# Patient Record
Sex: FEMALE | Race: BLACK OR AFRICAN AMERICAN | NOT HISPANIC OR LATINO | Employment: OTHER | URBAN - METROPOLITAN AREA
[De-identification: names, ages, dates, MRNs, and addresses within clinical notes are randomized per-mention and may not be internally consistent; named-entity substitution may affect disease eponyms.]

---

## 2017-09-22 ENCOUNTER — HISTORICAL (OUTPATIENT)
Dept: ADMINISTRATIVE | Facility: HOSPITAL | Age: 82
End: 2017-09-22

## 2017-10-17 ENCOUNTER — HISTORICAL (OUTPATIENT)
Dept: RADIOLOGY | Facility: HOSPITAL | Age: 82
End: 2017-10-17

## 2017-10-27 ENCOUNTER — HISTORICAL (OUTPATIENT)
Dept: ADMINISTRATIVE | Facility: HOSPITAL | Age: 82
End: 2017-10-27

## 2017-12-29 ENCOUNTER — HISTORICAL (OUTPATIENT)
Dept: RADIOLOGY | Facility: HOSPITAL | Age: 82
End: 2017-12-29

## 2018-01-10 ENCOUNTER — HISTORICAL (OUTPATIENT)
Dept: RADIOLOGY | Facility: HOSPITAL | Age: 83
End: 2018-01-10

## 2018-01-23 ENCOUNTER — HISTORICAL (OUTPATIENT)
Dept: RADIOLOGY | Facility: HOSPITAL | Age: 83
End: 2018-01-23

## 2018-02-14 ENCOUNTER — HISTORICAL (OUTPATIENT)
Dept: RADIOLOGY | Facility: HOSPITAL | Age: 83
End: 2018-02-14

## 2018-06-07 ENCOUNTER — HISTORICAL (OUTPATIENT)
Dept: RADIOLOGY | Facility: HOSPITAL | Age: 83
End: 2018-06-07

## 2018-06-28 ENCOUNTER — HISTORICAL (OUTPATIENT)
Dept: RADIOLOGY | Facility: HOSPITAL | Age: 83
End: 2018-06-28

## 2020-01-08 ENCOUNTER — HISTORICAL (OUTPATIENT)
Dept: RADIOLOGY | Facility: HOSPITAL | Age: 85
End: 2020-01-08

## 2020-02-05 ENCOUNTER — HISTORICAL (OUTPATIENT)
Dept: ADMINISTRATIVE | Facility: HOSPITAL | Age: 85
End: 2020-02-05

## 2020-02-05 LAB
ABS NEUT (OLG): 3.72 X10(3)/MCL (ref 2.1–9.2)
BASOPHILS # BLD AUTO: 0 X10(3)/MCL (ref 0–0.2)
BASOPHILS NFR BLD AUTO: 0 %
CRP SERPL HS-MCNC: <0.16 MG/L (ref 0–3)
EOSINOPHIL # BLD AUTO: 0.1 X10(3)/MCL (ref 0–0.9)
EOSINOPHIL NFR BLD AUTO: 1 %
ERYTHROCYTE [DISTWIDTH] IN BLOOD BY AUTOMATED COUNT: 13 % (ref 11.5–17)
ERYTHROCYTE [SEDIMENTATION RATE] IN BLOOD: 4 MM/HR (ref 0–20)
HCT VFR BLD AUTO: 42 % (ref 37–47)
HGB BLD-MCNC: 12.9 GM/DL (ref 12–16)
LYMPHOCYTES # BLD AUTO: 1.9 X10(3)/MCL (ref 0.6–4.6)
LYMPHOCYTES NFR BLD AUTO: 31 %
MCH RBC QN AUTO: 27.1 PG (ref 27–31)
MCHC RBC AUTO-ENTMCNC: 30.7 GM/DL (ref 33–36)
MCV RBC AUTO: 88.2 FL (ref 80–94)
MONOCYTES # BLD AUTO: 0.5 X10(3)/MCL (ref 0.1–1.3)
MONOCYTES NFR BLD AUTO: 8 %
NEUTROPHILS # BLD AUTO: 3.72 X10(3)/MCL (ref 2.1–9.2)
NEUTROPHILS NFR BLD AUTO: 59 %
PLATELET # BLD AUTO: 190 X10(3)/MCL (ref 130–400)
PMV BLD AUTO: 10.9 FL (ref 9.4–12.4)
RBC # BLD AUTO: 4.76 X10(6)/MCL (ref 4.2–5.4)
WBC # SPEC AUTO: 6.3 X10(3)/MCL (ref 4.5–11.5)

## 2020-03-04 ENCOUNTER — HISTORICAL (OUTPATIENT)
Dept: PREADMISSION TESTING | Facility: HOSPITAL | Age: 85
End: 2020-03-04

## 2020-03-04 ENCOUNTER — HISTORICAL (OUTPATIENT)
Dept: ADMINISTRATIVE | Facility: HOSPITAL | Age: 85
End: 2020-03-04

## 2020-03-04 LAB
ABS NEUT (OLG): 4.11 X10(3)/MCL (ref 2.1–9.2)
BASOPHILS # BLD AUTO: 0 X10(3)/MCL (ref 0–0.2)
BASOPHILS NFR BLD AUTO: 0 %
BUN SERPL-MCNC: 13 MG/DL (ref 7–18)
CALCIUM SERPL-MCNC: 9.7 MG/DL (ref 8.5–10.1)
CHLORIDE SERPL-SCNC: 90 MMOL/L (ref 98–107)
CO2 SERPL-SCNC: 31 MMOL/L (ref 21–32)
CREAT SERPL-MCNC: 0.91 MG/DL (ref 0.55–1.02)
CREAT/UREA NIT SERPL: 14.3
EOSINOPHIL # BLD AUTO: 0.1 X10(3)/MCL (ref 0–0.9)
EOSINOPHIL NFR BLD AUTO: 1 %
ERYTHROCYTE [DISTWIDTH] IN BLOOD BY AUTOMATED COUNT: 12.7 % (ref 11.5–17)
GLUCOSE SERPL-MCNC: 115 MG/DL (ref 74–106)
HCT VFR BLD AUTO: 41.4 % (ref 37–47)
HGB BLD-MCNC: 13.1 GM/DL (ref 12–16)
INR PPP: 1 (ref 0–1.3)
LYMPHOCYTES # BLD AUTO: 2.4 X10(3)/MCL (ref 0.6–4.6)
LYMPHOCYTES NFR BLD AUTO: 33 %
MCH RBC QN AUTO: 27.7 PG (ref 27–31)
MCHC RBC AUTO-ENTMCNC: 31.6 GM/DL (ref 33–36)
MCV RBC AUTO: 87.5 FL (ref 80–94)
MONOCYTES # BLD AUTO: 0.6 X10(3)/MCL (ref 0.1–1.3)
MONOCYTES NFR BLD AUTO: 9 %
NEUTROPHILS # BLD AUTO: 4.11 X10(3)/MCL (ref 2.1–9.2)
NEUTROPHILS NFR BLD AUTO: 56 %
PLATELET # BLD AUTO: 222 X10(3)/MCL (ref 130–400)
PMV BLD AUTO: 10.7 FL (ref 9.4–12.4)
POTASSIUM SERPL-SCNC: 4.4 MMOL/L (ref 3.5–5.1)
PROTHROMBIN TIME: 12.7 SECOND(S) (ref 11.1–13.7)
RBC # BLD AUTO: 4.73 X10(6)/MCL (ref 4.2–5.4)
SODIUM SERPL-SCNC: 129 MMOL/L (ref 136–145)
WBC # SPEC AUTO: 7.3 X10(3)/MCL (ref 4.5–11.5)

## 2020-03-12 ENCOUNTER — HISTORICAL (OUTPATIENT)
Dept: INTENSIVE CARE | Facility: HOSPITAL | Age: 85
End: 2020-03-12

## 2020-04-01 ENCOUNTER — HISTORICAL (OUTPATIENT)
Dept: RADIOLOGY | Facility: HOSPITAL | Age: 85
End: 2020-04-01

## 2020-07-10 ENCOUNTER — HISTORICAL (OUTPATIENT)
Dept: RADIOLOGY | Facility: HOSPITAL | Age: 85
End: 2020-07-10

## 2020-07-31 ENCOUNTER — HISTORICAL (OUTPATIENT)
Dept: LAB | Facility: HOSPITAL | Age: 85
End: 2020-07-31

## 2020-07-31 LAB
BUN SERPL-MCNC: 16 MG/DL (ref 9.8–20.1)
CALCIUM SERPL-MCNC: 9.6 MG/DL (ref 8.4–10.2)
CHLORIDE SERPL-SCNC: 101 MMOL/L (ref 98–107)
CO2 SERPL-SCNC: 29 MMOL/L (ref 23–31)
CREAT SERPL-MCNC: 0.96 MG/DL (ref 0.55–1.02)
CREAT/UREA NIT SERPL: 17
GLUCOSE SERPL-MCNC: 119 MG/DL (ref 82–115)
POTASSIUM SERPL-SCNC: 3.7 MMOL/L (ref 3.5–5.1)
SODIUM SERPL-SCNC: 140 MMOL/L (ref 136–145)

## 2020-08-04 ENCOUNTER — HISTORICAL (OUTPATIENT)
Dept: RESPIRATORY THERAPY | Facility: HOSPITAL | Age: 85
End: 2020-08-04

## 2020-08-21 ENCOUNTER — HISTORICAL (OUTPATIENT)
Dept: RADIOLOGY | Facility: HOSPITAL | Age: 85
End: 2020-08-21

## 2020-09-01 ENCOUNTER — HISTORICAL (OUTPATIENT)
Dept: CARDIOLOGY | Facility: HOSPITAL | Age: 85
End: 2020-09-01

## 2020-12-17 ENCOUNTER — HISTORICAL (OUTPATIENT)
Dept: RADIOLOGY | Facility: HOSPITAL | Age: 85
End: 2020-12-17

## 2020-12-17 LAB — POC CREATININE: 1.1 MG/DL (ref 0.6–1.3)

## 2022-04-11 ENCOUNTER — HISTORICAL (OUTPATIENT)
Dept: ADMINISTRATIVE | Facility: HOSPITAL | Age: 87
End: 2022-04-11
Payer: MEDICARE

## 2022-04-26 VITALS
WEIGHT: 187.38 LBS | HEIGHT: 65 IN | OXYGEN SATURATION: 98 % | BODY MASS INDEX: 31.22 KG/M2 | DIASTOLIC BLOOD PRESSURE: 76 MMHG | SYSTOLIC BLOOD PRESSURE: 122 MMHG

## 2022-05-11 ENCOUNTER — TELEPHONE (OUTPATIENT)
Dept: NEUROLOGY | Facility: CLINIC | Age: 87
End: 2022-05-11
Payer: MEDICARE

## 2022-05-11 NOTE — TELEPHONE ENCOUNTER
----- Message from Jes Smart MA sent at 2022 10:08 AM CDT -----  Regarding: Worsening Conditions  Issue: Reports pt has been falling due to trying to get out of bed, thinks she is not home while at home, and thinks her parents just . Reports pt recently fell 3 weeks ago and was taken to ED. States pt complained of hip pain afterwards. Reports pt feel again trying to get out of bed 2 weeks ago. Questions if pt has Alzheimer's or dementia. Is asking if a CT or MRI of the brain can be done. Reports pt is on new medication Benztropine 1 mg, Valium 5 mg, and Lexapram 25 mg. States pt stopped Seroquel and started back 2 weeks ago and it was decreased from 100 mg to 25 mg.       Is asking if pt's next appt can be schedule and she can be contacted with day and time.     Call back number: [phone] ( work) [phone] (Cell)

## 2022-05-18 DIAGNOSIS — F03.90 DEMENTIA WITHOUT BEHAVIORAL DISTURBANCE, UNSPECIFIED DEMENTIA TYPE: Primary | ICD-10-CM

## 2022-06-01 ENCOUNTER — HOSPITAL ENCOUNTER (OUTPATIENT)
Dept: RADIOLOGY | Facility: CLINIC | Age: 87
Discharge: HOME | End: 2022-06-01
Attending: ORTHOPAEDIC SURGERY
Payer: MEDICARE

## 2022-06-01 ENCOUNTER — OFFICE VISIT (OUTPATIENT)
Dept: ORTHOPEDICS | Facility: CLINIC | Age: 87
End: 2022-06-01
Payer: MEDICARE

## 2022-06-01 VITALS — BODY MASS INDEX: 27.32 KG/M2 | HEIGHT: 66 IN | WEIGHT: 170 LBS

## 2022-06-01 DIAGNOSIS — M17.0 BILATERAL PRIMARY OSTEOARTHRITIS OF KNEE: Primary | ICD-10-CM

## 2022-06-01 DIAGNOSIS — M25.561 ACUTE PAIN OF BOTH KNEES: ICD-10-CM

## 2022-06-01 DIAGNOSIS — M25.562 ACUTE PAIN OF BOTH KNEES: ICD-10-CM

## 2022-06-01 PROCEDURE — 99203 PR OFFICE/OUTPT VISIT, NEW, LEVL III, 30-44 MIN: ICD-10-PCS | Mod: 25,,, | Performed by: ORTHOPAEDIC SURGERY

## 2022-06-01 PROCEDURE — 73562 X-RAY EXAM OF KNEE 3: CPT | Mod: 50,,, | Performed by: ORTHOPAEDIC SURGERY

## 2022-06-01 PROCEDURE — 20610 LARGE JOINT ASPIRATION/INJECTION: BILATERAL KNEE: ICD-10-PCS | Mod: 50,,, | Performed by: NURSE PRACTITIONER

## 2022-06-01 PROCEDURE — 73562 XR KNEE 3 VIEW BILATERAL: ICD-10-PCS | Mod: 50,,, | Performed by: ORTHOPAEDIC SURGERY

## 2022-06-01 PROCEDURE — 20610 DRAIN/INJ JOINT/BURSA W/O US: CPT | Mod: 50,,, | Performed by: NURSE PRACTITIONER

## 2022-06-01 PROCEDURE — 99203 OFFICE O/P NEW LOW 30 MIN: CPT | Mod: 25,,, | Performed by: ORTHOPAEDIC SURGERY

## 2022-06-01 RX ORDER — ESCITALOPRAM OXALATE 20 MG/1
20 TABLET ORAL DAILY
COMMUNITY
Start: 2022-05-04 | End: 2022-10-05

## 2022-06-01 RX ORDER — CARVEDILOL 25 MG/1
6.25 TABLET ORAL 2 TIMES DAILY
COMMUNITY
Start: 2022-05-04

## 2022-06-01 RX ORDER — CLOPIDOGREL BISULFATE 75 MG/1
75 TABLET ORAL DAILY
Status: ON HOLD | COMMUNITY
Start: 2022-05-30 | End: 2022-12-30 | Stop reason: HOSPADM

## 2022-06-01 RX ORDER — DIAZEPAM 5 MG/1
5 TABLET ORAL DAILY PRN
Status: ON HOLD | COMMUNITY
Start: 2022-05-17 | End: 2022-12-14

## 2022-06-01 RX ORDER — RANOLAZINE 1000 MG/1
1000 TABLET, EXTENDED RELEASE ORAL 2 TIMES DAILY
Status: ON HOLD | COMMUNITY
Start: 2022-03-29 | End: 2022-12-30 | Stop reason: HOSPADM

## 2022-06-01 RX ORDER — ATORVASTATIN CALCIUM 40 MG/1
40 TABLET, FILM COATED ORAL DAILY
COMMUNITY
Start: 2022-05-04

## 2022-06-01 RX ORDER — CYPROHEPTADINE HYDROCHLORIDE 4 MG/1
4 TABLET ORAL NIGHTLY
COMMUNITY
Start: 2022-05-10 | End: 2022-10-05

## 2022-06-01 RX ORDER — ASPIRIN 81 MG/1
81 TABLET ORAL
Status: ON HOLD | COMMUNITY
End: 2023-02-12 | Stop reason: HOSPADM

## 2022-06-01 RX ORDER — QUETIAPINE FUMARATE 25 MG/1
25 TABLET, FILM COATED ORAL 2 TIMES DAILY
COMMUNITY
Start: 2022-05-06

## 2022-06-01 RX ORDER — IBUPROFEN 100 MG/5ML
1000 SUSPENSION, ORAL (FINAL DOSE FORM) ORAL DAILY
COMMUNITY

## 2022-06-01 RX ADMIN — BETAMETHASONE SODIUM PHOSPHATE AND BETAMETHASONE ACETATE 6 MG: 3; 3 INJECTION, SUSPENSION INTRA-ARTICULAR; INTRALESIONAL; INTRAMUSCULAR; SOFT TISSUE at 01:06

## 2022-06-01 RX ADMIN — LIDOCAINE HYDROCHLORIDE 5 ML: 20 INJECTION, SOLUTION EPIDURAL; INFILTRATION; INTRACAUDAL; PERINEURAL at 01:06

## 2022-06-01 NOTE — PROGRESS NOTES
Chief Complaint:   Chief Complaint   Patient presents with    Right Knee - Pain    Left Knee - Pain    Pain     FRAN knee pain patient states its been going on for a few years has had cortisone injections     History of present illness:  She is a pleasant 88-year-old who presents with bilateral knee pain, left worse than right, due to a known history of osteoarthritis.  Her pain is global in both knees.  It is worse with any activity, and she is now confined to a wheelchair.  It is somewhat better with rest.  She has tried steroid and Synvisc injections in the past, but she finds that they do not help anymore.  She has tried formal therapy but discontinued due to pain.     Past Medical History:   Diagnosis Date    Arthritis     Carpal tunnel syndrome     Hypertension     Respiratory distress        Past Surgical History:   Procedure Laterality Date    CARDIAC SURGERY      CATARACT EXTRACTION      INSERTION OF PACEMAKER      PARTIAL HYSTERECTOMY         Current Outpatient Medications   Medication Sig    ascorbic acid, vitamin C, (VITAMIN C) 1000 MG tablet Take 1,000 mg by mouth once daily.    aspirin (ECOTRIN) 81 MG EC tablet Take 81 mg by mouth.    atorvastatin (LIPITOR) 40 MG tablet Take 40 mg by mouth once daily.    carvediloL (COREG) 25 MG tablet Take 25 mg by mouth 2 (two) times daily.    clopidogreL (PLAVIX) 75 mg tablet Take 75 mg by mouth once daily.    cyproheptadine (PERIACTIN) 4 mg tablet Take 4 mg by mouth nightly.    diazePAM (VALIUM) 5 MG tablet Take 5 mg by mouth daily as needed.    EScitalopram oxalate (LEXAPRO) 20 MG tablet Take 20 mg by mouth once daily.    QUEtiapine (SEROQUEL) 25 MG Tab Take 25 mg by mouth nightly.    ranolazine (RANEXA) 1,000 mg Tb12 Take 1,000 mg by mouth 2 (two) times daily.     No current facility-administered medications for this visit.       Review of patient's allergies indicates:   Allergen Reactions    Statins-hmg-coa reductase inhibitors      Other  "reaction(s): Joint pain    Bimatoprost      Other reaction(s): Eye redness    Cortisone     Dorzolamide-timolol     Gabapentin      Other reaction(s): Confusion    Hydrocortisone     Tafluprost (pf)        History reviewed. No pertinent family history.    Social History     Socioeconomic History    Marital status:    Tobacco Use    Smoking status: Never Smoker    Smokeless tobacco: Never Used       Review of Systems:    Constitution:   Denies chills, fever, and sweats.  HENT:   Denies headaches or blurry vision.  Cardiovascular:  Denies chest pain or irregular heart beat.  Respiratory:   Denies cough or shortness of breath.  Gastrointestinal:  Denies abdominal pain, nausea, or vomiting.  Musculoskeletal:   Denies muscle cramps.  Neurological:   Denies dizziness or focal weakness.  Psychiatric/Behavior: Normal mental status.  Hematology/Lymph:  Denies bleeding problem or easy bruising/bleeding.  Skin:    Denies rash or suspicious lesions.    Examination:    Vital Signs:    Vitals:    06/01/22 1422 06/01/22 1423   Weight: 77.1 kg (170 lb)    Height: 5' 6" (1.676 m)    PainSc:  10-Worst pain ever       Body mass index is 27.44 kg/m².    Constitution:   Well-developed, well nourished patient in no acute distress.  Neurological:   Alert and oriented x 3 and cooperative to examination.     Psychiatric/Behavior: Normal mental status.  Respiratory:   No shortness of breath.  Eyes:    Extraoccular muscles intact  Skin:    No scars, rash or suspicious lesions.    MSK:   .Standing exam  stance: normal alignment, no significant leg-length discrepancy  gait: wheelchair     Knee examination  - General comments: unremarkable appearance    Knee                  RIGHT    LEFT  Skin:                  Intact      Intact  ROM:                 0-110      0-90  Effusion:             +              +  MJL TTP:           Neg         Neg  LJL TTP:            Neg         Neg  Amy:         Neg         Neg  Pat crep:       "      +              +  Patella TTPs:     Neg         Neg  Patella grind:      Neg        Neg      N-V intact intact    Lower extremity edema:Negative       Imaging: X-rays ordered and images interpreted today personally by me of 3 views of both knees show osteoarthritis        Assessment: Bilateral primary osteoarthritis of knee  -     X-Ray Knee 3 View Bilateral; Future; Expected date: 06/01/2022  -     Large Joint Aspiration/Injection: bilateral knee        Plan:  We will try steroid injections today to give her some relief. Refer to Dr. Loco for possible [place]a.

## 2022-06-01 NOTE — PROCEDURES
Large Joint Aspiration/Injection: bilateral knee    Date/Time: 6/1/2022 1:45 PM  Performed by: NEERAJ Richardson  Authorized by: NEERAJ Richardson     Consent Done?:  Yes (Verbal)  Indications:  Arthritis and pain  Site marked: the procedure site was marked    Timeout: prior to procedure the correct patient, procedure, and site was verified    Prep: patient was prepped and draped in usual sterile fashion      Local anesthesia used?: Yes    Local anesthetic:  Topical anesthetic    Details:  Needle Size:  21 G  Ultrasonic Guidance for needle placement?: No    Approach:  Anterolateral  Location:  Knee  Laterality:  Bilateral  Site:  Bilateral knee  Medications (Right):  5 mL LIDOcaine (PF) 20 mg/mL (2%) 20 mg/mL (2 %); 6 mg betamethasone acetate-betamethasone sodium phosphate 6 mg/mL  Medications (Left):  5 mL LIDOcaine (PF) 20 mg/mL (2%) 20 mg/mL (2 %); 6 mg betamethasone acetate-betamethasone sodium phosphate 6 mg/mL  Patient tolerance:  Patient tolerated the procedure well with no immediate complications

## 2022-06-21 RX ORDER — BETAMETHASONE SODIUM PHOSPHATE AND BETAMETHASONE ACETATE 3; 3 MG/ML; MG/ML
6 INJECTION, SUSPENSION INTRA-ARTICULAR; INTRALESIONAL; INTRAMUSCULAR; SOFT TISSUE
Status: SHIPPED | OUTPATIENT
Start: 2022-06-01

## 2022-06-21 RX ORDER — LIDOCAINE HYDROCHLORIDE 20 MG/ML
5 INJECTION, SOLUTION EPIDURAL; INFILTRATION; INTRACAUDAL; PERINEURAL
Status: SHIPPED | OUTPATIENT
Start: 2022-06-01

## 2022-07-09 ENCOUNTER — HOSPITAL ENCOUNTER (EMERGENCY)
Facility: HOSPITAL | Age: 87
Discharge: HOME | End: 2022-07-09
Attending: STUDENT IN AN ORGANIZED HEALTH CARE EDUCATION/TRAINING PROGRAM
Payer: MEDICARE

## 2022-07-09 VITALS
OXYGEN SATURATION: 97 % | BODY MASS INDEX: 31.53 KG/M2 | HEIGHT: 61 IN | SYSTOLIC BLOOD PRESSURE: 163 MMHG | TEMPERATURE: 99 F | DIASTOLIC BLOOD PRESSURE: 85 MMHG | RESPIRATION RATE: 20 BRPM | WEIGHT: 167 LBS | HEART RATE: 79 BPM

## 2022-07-09 DIAGNOSIS — I10 UNCONTROLLED HYPERTENSION: ICD-10-CM

## 2022-07-09 DIAGNOSIS — N30.00 ACUTE CYSTITIS WITHOUT HEMATURIA: Primary | ICD-10-CM

## 2022-07-09 DIAGNOSIS — F03.90 DEMENTIA WITHOUT BEHAVIORAL DISTURBANCE, UNSPECIFIED DEMENTIA TYPE: ICD-10-CM

## 2022-07-09 DIAGNOSIS — R53.1 WEAKNESS: ICD-10-CM

## 2022-07-09 LAB
ALBUMIN SERPL-MCNC: 3.6 GM/DL (ref 3.4–4.8)
ALBUMIN/GLOB SERPL: 1.2 RATIO (ref 1.1–2)
ALP SERPL-CCNC: 67 UNIT/L (ref 40–150)
ALT SERPL-CCNC: 17 UNIT/L (ref 0–55)
APPEARANCE UR: CLEAR
AST SERPL-CCNC: 20 UNIT/L (ref 5–34)
BACTERIA #/AREA URNS AUTO: ABNORMAL /HPF
BASOPHILS # BLD AUTO: 0.02 X10(3)/MCL (ref 0–0.2)
BASOPHILS NFR BLD AUTO: 0.3 %
BILIRUB UR QL STRIP.AUTO: NEGATIVE MG/DL
BILIRUBIN DIRECT+TOT PNL SERPL-MCNC: 0.5 MG/DL
BUN SERPL-MCNC: 20 MG/DL (ref 9.8–20.1)
CALCIUM SERPL-MCNC: 9.4 MG/DL (ref 8.4–10.2)
CHLORIDE SERPL-SCNC: 105 MMOL/L (ref 98–107)
CO2 SERPL-SCNC: 31 MMOL/L (ref 23–31)
COLOR UR AUTO: YELLOW
CREAT SERPL-MCNC: 0.84 MG/DL (ref 0.55–1.02)
EOSINOPHIL # BLD AUTO: 0.11 X10(3)/MCL (ref 0–0.9)
EOSINOPHIL NFR BLD AUTO: 1.8 %
ERYTHROCYTE [DISTWIDTH] IN BLOOD BY AUTOMATED COUNT: 13.7 % (ref 11.5–17)
FLUAV AG UPPER RESP QL IA.RAPID: NOT DETECTED
FLUBV AG UPPER RESP QL IA.RAPID: NOT DETECTED
GLOBULIN SER-MCNC: 2.9 GM/DL (ref 2.4–3.5)
GLUCOSE SERPL-MCNC: 112 MG/DL (ref 82–115)
GLUCOSE UR QL STRIP.AUTO: NEGATIVE MG/DL
HCT VFR BLD AUTO: 38 % (ref 37–47)
HGB BLD-MCNC: 11.6 GM/DL (ref 12–16)
IMM GRANULOCYTES # BLD AUTO: 0.01 X10(3)/MCL (ref 0–0.04)
IMM GRANULOCYTES NFR BLD AUTO: 0.2 %
KETONES UR QL STRIP.AUTO: NEGATIVE MG/DL
LEUKOCYTE ESTERASE UR QL STRIP.AUTO: ABNORMAL UNIT/L
LYMPHOCYTES # BLD AUTO: 1.94 X10(3)/MCL (ref 0.6–4.6)
LYMPHOCYTES NFR BLD AUTO: 32 %
MAGNESIUM SERPL-MCNC: 2 MG/DL (ref 1.6–2.6)
MCH RBC QN AUTO: 28.3 PG (ref 27–31)
MCHC RBC AUTO-ENTMCNC: 30.5 MG/DL (ref 33–36)
MCV RBC AUTO: 92.7 FL (ref 80–94)
MONOCYTES # BLD AUTO: 0.41 X10(3)/MCL (ref 0.1–1.3)
MONOCYTES NFR BLD AUTO: 6.8 %
NEUTROPHILS # BLD AUTO: 3.6 X10(3)/MCL (ref 2.1–9.2)
NEUTROPHILS NFR BLD AUTO: 58.9 %
NITRITE UR QL STRIP.AUTO: NEGATIVE
PH UR STRIP.AUTO: 7.5 [PH]
PLATELET # BLD AUTO: 138 X10(3)/MCL (ref 130–400)
PMV BLD AUTO: 11.1 FL (ref 7.4–10.4)
POTASSIUM SERPL-SCNC: 4.3 MMOL/L (ref 3.5–5.1)
PROT SERPL-MCNC: 6.5 GM/DL (ref 5.8–7.6)
PROT UR QL STRIP.AUTO: NEGATIVE MG/DL
RBC # BLD AUTO: 4.1 X10(6)/MCL (ref 4.2–5.4)
RBC #/AREA URNS AUTO: ABNORMAL /HPF
RBC UR QL AUTO: NEGATIVE UNIT/L
RSV A 5' UTR RNA NPH QL NAA+PROBE: NOT DETECTED
SARS-COV-2 RNA RESP QL NAA+PROBE: NOT DETECTED
SODIUM SERPL-SCNC: 143 MMOL/L (ref 136–145)
SP GR UR STRIP.AUTO: 1.01
SQUAMOUS #/AREA URNS AUTO: ABNORMAL /HPF
TROPONIN I SERPL-MCNC: 0.01 NG/ML (ref 0–0.04)
TSH SERPL-ACNC: 0.8 UIU/ML (ref 0.35–4.94)
UROBILINOGEN UR STRIP-ACNC: 0.2 MG/DL
WBC # SPEC AUTO: 6.1 X10(3)/MCL (ref 4.5–11.5)
WBC #/AREA URNS AUTO: ABNORMAL /HPF

## 2022-07-09 PROCEDURE — 63600175 PHARM REV CODE 636 W HCPCS: Performed by: STUDENT IN AN ORGANIZED HEALTH CARE EDUCATION/TRAINING PROGRAM

## 2022-07-09 PROCEDURE — 93005 ELECTROCARDIOGRAM TRACING: CPT

## 2022-07-09 PROCEDURE — 87636 SARSCOV2 & INF A&B AMP PRB: CPT | Performed by: STUDENT IN AN ORGANIZED HEALTH CARE EDUCATION/TRAINING PROGRAM

## 2022-07-09 PROCEDURE — 85025 COMPLETE CBC W/AUTO DIFF WBC: CPT | Performed by: STUDENT IN AN ORGANIZED HEALTH CARE EDUCATION/TRAINING PROGRAM

## 2022-07-09 PROCEDURE — 81001 URINALYSIS AUTO W/SCOPE: CPT | Performed by: STUDENT IN AN ORGANIZED HEALTH CARE EDUCATION/TRAINING PROGRAM

## 2022-07-09 PROCEDURE — 99284 EMERGENCY DEPT VISIT MOD MDM: CPT | Mod: 25,CS

## 2022-07-09 PROCEDURE — 80053 COMPREHEN METABOLIC PANEL: CPT | Performed by: STUDENT IN AN ORGANIZED HEALTH CARE EDUCATION/TRAINING PROGRAM

## 2022-07-09 PROCEDURE — 36415 COLL VENOUS BLD VENIPUNCTURE: CPT | Performed by: STUDENT IN AN ORGANIZED HEALTH CARE EDUCATION/TRAINING PROGRAM

## 2022-07-09 PROCEDURE — 84484 ASSAY OF TROPONIN QUANT: CPT | Performed by: STUDENT IN AN ORGANIZED HEALTH CARE EDUCATION/TRAINING PROGRAM

## 2022-07-09 PROCEDURE — 84443 ASSAY THYROID STIM HORMONE: CPT | Performed by: STUDENT IN AN ORGANIZED HEALTH CARE EDUCATION/TRAINING PROGRAM

## 2022-07-09 PROCEDURE — 83735 ASSAY OF MAGNESIUM: CPT | Performed by: STUDENT IN AN ORGANIZED HEALTH CARE EDUCATION/TRAINING PROGRAM

## 2022-07-09 PROCEDURE — 96374 THER/PROPH/DIAG INJ IV PUSH: CPT

## 2022-07-09 PROCEDURE — 25000003 PHARM REV CODE 250: Performed by: STUDENT IN AN ORGANIZED HEALTH CARE EDUCATION/TRAINING PROGRAM

## 2022-07-09 RX ORDER — CEFUROXIME AXETIL 500 MG/1
500 TABLET ORAL EVERY 12 HOURS
Qty: 14 TABLET | Refills: 0 | Status: SHIPPED | OUTPATIENT
Start: 2022-07-09 | End: 2022-07-16

## 2022-07-09 RX ORDER — HYDRALAZINE HYDROCHLORIDE 20 MG/ML
10 INJECTION INTRAMUSCULAR; INTRAVENOUS
Status: COMPLETED | OUTPATIENT
Start: 2022-07-09 | End: 2022-07-09

## 2022-07-09 RX ORDER — CLONIDINE HYDROCHLORIDE 0.2 MG/1
0.2 TABLET ORAL
Status: COMPLETED | OUTPATIENT
Start: 2022-07-09 | End: 2022-07-09

## 2022-07-09 RX ADMIN — SODIUM CHLORIDE, POTASSIUM CHLORIDE, SODIUM LACTATE AND CALCIUM CHLORIDE 1000 ML: 600; 310; 30; 20 INJECTION, SOLUTION INTRAVENOUS at 09:07

## 2022-07-09 RX ADMIN — CLONIDINE HYDROCHLORIDE 0.2 MG: 0.2 TABLET ORAL at 11:07

## 2022-07-09 RX ADMIN — HYDRALAZINE HYDROCHLORIDE 10 MG: 20 INJECTION, SOLUTION INTRAMUSCULAR; INTRAVENOUS at 12:07

## 2022-07-09 NOTE — ED PROVIDER NOTES
Encounter Date: 7/9/2022       History     Chief Complaint   Patient presents with    Headache     Pt c/o sharp pain in left temple x one weak, intermittent heart fluttering x months and general weakness today.     The history is provided by the patient and a relative.     This is a 88-year-old female with a past medical history delineated below who presents emergency department for vague symptoms.  Patient states that she has been having generalized weakness for last month.  She also states that her urine has been very dark/brown.  She also states she has been having intermittent left-sided headaches although currently does not have a headache.  She also notes that she has some heart fluttering at times.  Patient is a very poor historian secondary to her dementia.    Review of patient's allergies indicates:   Allergen Reactions    Statins-hmg-coa reductase inhibitors      Other reaction(s): Joint pain    Bimatoprost      Other reaction(s): Eye redness    Cortisone     Dorzolamide-timolol     Gabapentin      Other reaction(s): Confusion    Hydrocortisone     Tafluprost (pf)      Past Medical History:   Diagnosis Date    Aortic stenosis     Arthritis     Carpal tunnel syndrome     Dementia     Hypertension     Respiratory distress      Past Surgical History:   Procedure Laterality Date    CARDIAC SURGERY      CATARACT EXTRACTION      INSERTION OF PACEMAKER      PARTIAL HYSTERECTOMY       History reviewed. No pertinent family history.  Social History     Tobacco Use    Smoking status: Never Smoker    Smokeless tobacco: Never Used     Review of Systems   Unable to perform ROS: Intubated   Constitutional: Positive for fatigue.   Respiratory: Negative for cough and shortness of breath.    Cardiovascular: Positive for palpitations. Negative for chest pain.   Gastrointestinal: Negative for abdominal pain.   Neurological: Positive for headaches.   All other systems reviewed and are  negative.      Physical Exam     Initial Vitals [07/09/22 0917]   BP Pulse Resp Temp SpO2   (!) 222/93 68 20 99 °F (37.2 °C) 99 %      MAP       --         Physical Exam    Nursing note and vitals reviewed.  Constitutional: She appears well-developed and well-nourished. No distress.   Cardiovascular: Normal rate and regular rhythm.   Pulmonary/Chest: Breath sounds normal.   Abdominal: Abdomen is soft. Bowel sounds are normal. There is no abdominal tenderness.   Musculoskeletal:         General: No tenderness. Normal range of motion.     Neurological: She is alert and oriented to person, place, and time. GCS score is 15. GCS eye subscore is 4. GCS verbal subscore is 5. GCS motor subscore is 6.   Skin: Skin is warm. Capillary refill takes less than 2 seconds.   Psychiatric: She has a normal mood and affect. Thought content normal.         ED Course   Procedures  Labs Reviewed   URINALYSIS, REFLEX TO URINE CULTURE - Abnormal; Notable for the following components:       Result Value    Leukocyte Esterase, UA Small (*)     All other components within normal limits   CBC WITH DIFFERENTIAL - Abnormal; Notable for the following components:    RBC 4.10 (*)     Hgb 11.6 (*)     MCHC 30.5 (*)     MPV 11.1 (*)     All other components within normal limits   URINALYSIS, MICROSCOPIC - Abnormal; Notable for the following components:    Bacteria, UA Few (*)     Squamous Epithelial Cells, UA Few (*)     All other components within normal limits   COVID/RSV/FLU A&B PCR - Normal   MAGNESIUM - Normal   TROPONIN I - Normal   TSH - Normal   COMPREHENSIVE METABOLIC PANEL   CBC W/ AUTO DIFFERENTIAL    Narrative:     The following orders were created for panel order CBC auto differential.  Procedure                               Abnormality         Status                     ---------                               -----------         ------                     CBC with Differential[[id number]]        Abnormal            Final result                  Please view results for these tests on the individual orders.     EKG Readings: (Independently Interpreted)   Rhythm: Normal Sinus Rhythm. Heart Rate: 68. Ectopy: PACs. Conduction: LAFB (Incomplet right bundle-branch block e). ST Segments: Normal ST Segments. Clinical Impression: Normal Sinus Rhythm with PACs       Imaging Results    None          Medications   lactated ringers bolus 1,000 mL (1,000 mLs Intravenous New Bag 7/9/22 0948)   cloNIDine tablet 0.2 mg (0.2 mg Oral Given 7/9/22 1115)   hydrALAZINE injection 10 mg (10 mg Intravenous Given 7/9/22 1200)     Medical Decision Making:   Differential Diagnosis:   Viral syndrome, arrhythmia, dehydration, anemia, electrolyte abnormality             ED Course as of 07/09/22 1248   Sat Jul 09, 2022   1245 Blood pressures improved after hydralazine.  Currently 171/79.  Family member state they did not give her blood pressure medicine this morning.  Patient with no signs of end-organ damage.  Small leukocytes in urine will treat for a urinary tract infection.  All of her symptoms extremity vague.  She does have a history of significant dementia and acting where her late  is.  Strict ER precautions given to family and they state understanding.  Informed them to check her blood pressure twice a day keep a log to send to the PCP. [BS]      ED Course User Index  [BS] Bib Garber MD             Clinical Impression:   Final diagnoses:  [R53.1] Weakness  [N30.00] Acute cystitis without hematuria (Primary)  [I10] Uncontrolled hypertension  [F03.90] Dementia without behavioral disturbance, unspecified dementia type          ED Disposition Condition    Discharge Stable        ED Prescriptions     Medication Sig Dispense Start Date End Date Auth. Provider    cefUROXime (CEFTIN) 500 MG tablet Take 1 tablet (500 mg total) by mouth every 12 (twelve) hours. for 7 days 14 tablet 7/9/2022 7/16/2022 Bib Garber MD        Follow-up Information     Follow up  With Specialties Details Why Contact Info    Barrett Lopez MD Family Medicine Schedule an appointment as soon as possible for a visit   [address]  [place] [address]  [phone]      [facility] - Emergency Dept Emergency Medicine Go to  If symptoms worsen [id number]  [facility] [id number] [phone]           Bib Garber MD  07/09/22 1248       Bib Garber MD  07/09/22 1248

## 2022-07-12 ENCOUNTER — HOSPITAL ENCOUNTER (EMERGENCY)
Facility: HOSPITAL | Age: 87
Discharge: HOME | End: 2022-07-12
Attending: INTERNAL MEDICINE
Payer: MEDICARE

## 2022-07-12 VITALS
SYSTOLIC BLOOD PRESSURE: 149 MMHG | TEMPERATURE: 98 F | DIASTOLIC BLOOD PRESSURE: 86 MMHG | BODY MASS INDEX: 28.32 KG/M2 | HEIGHT: 65 IN | HEART RATE: 69 BPM | WEIGHT: 170 LBS | OXYGEN SATURATION: 95 % | RESPIRATION RATE: 14 BRPM

## 2022-07-12 DIAGNOSIS — I10 UNCONTROLLED HYPERTENSION: ICD-10-CM

## 2022-07-12 DIAGNOSIS — R10.13 EPIGASTRIC PAIN: ICD-10-CM

## 2022-07-12 DIAGNOSIS — F41.9 ANXIETY: Primary | ICD-10-CM

## 2022-07-12 DIAGNOSIS — R07.9 CHEST PAIN: ICD-10-CM

## 2022-07-12 LAB
ALBUMIN SERPL-MCNC: 3.5 GM/DL (ref 3.4–4.8)
ALBUMIN/GLOB SERPL: 1 RATIO (ref 1.1–2)
ALLENS TEST: ABNORMAL
ALP SERPL-CCNC: 70 UNIT/L (ref 40–150)
ALT SERPL-CCNC: 16 UNIT/L (ref 0–55)
APPEARANCE UR: CLEAR
AST SERPL-CCNC: 35 UNIT/L (ref 5–34)
BACTERIA #/AREA URNS AUTO: NORMAL /HPF
BASOPHILS # BLD AUTO: 0.03 X10(3)/MCL (ref 0–0.2)
BASOPHILS NFR BLD AUTO: 0.4 %
BILIRUB UR QL STRIP.AUTO: NEGATIVE MG/DL
BILIRUBIN DIRECT+TOT PNL SERPL-MCNC: 0.4 MG/DL
BNP BLD-MCNC: 89.2 PG/ML
BUN SERPL-MCNC: 16 MG/DL (ref 9.8–20.1)
CALCIUM SERPL-MCNC: 9.4 MG/DL (ref 8.4–10.2)
CHLORIDE SERPL-SCNC: 105 MMOL/L (ref 98–107)
CO2 SERPL-SCNC: 26 MMOL/L (ref 23–31)
COLOR UR AUTO: YELLOW
CREAT SERPL-MCNC: 0.81 MG/DL (ref 0.55–1.02)
DELSYS: ABNORMAL
EOSINOPHIL # BLD AUTO: 0.09 X10(3)/MCL (ref 0–0.9)
EOSINOPHIL NFR BLD AUTO: 1.3 %
ERYTHROCYTE [DISTWIDTH] IN BLOOD BY AUTOMATED COUNT: 13.8 % (ref 11.5–17)
GLOBULIN SER-MCNC: 3.5 GM/DL (ref 2.4–3.5)
GLUCOSE SERPL-MCNC: 132 MG/DL (ref 82–115)
GLUCOSE UR QL STRIP.AUTO: NEGATIVE MG/DL
HCO3 UR-SCNC: 27.8 MMOL/L (ref 24–28)
HCT VFR BLD AUTO: 38.4 % (ref 37–47)
HEMOCCULT SP1 STL QL: NEGATIVE
HGB BLD-MCNC: 12.1 GM/DL (ref 12–16)
IMM GRANULOCYTES # BLD AUTO: 0.03 X10(3)/MCL (ref 0–0.04)
IMM GRANULOCYTES NFR BLD AUTO: 0.4 %
KETONES UR QL STRIP.AUTO: NEGATIVE MG/DL
LEUKOCYTE ESTERASE UR QL STRIP.AUTO: NEGATIVE UNIT/L
LYMPHOCYTES # BLD AUTO: 2.05 X10(3)/MCL (ref 0.6–4.6)
LYMPHOCYTES NFR BLD AUTO: 30.6 %
MCH RBC QN AUTO: 28.3 PG (ref 27–31)
MCHC RBC AUTO-ENTMCNC: 31.5 MG/DL (ref 33–36)
MCV RBC AUTO: 89.9 FL (ref 80–94)
MONOCYTES # BLD AUTO: 0.41 X10(3)/MCL (ref 0.1–1.3)
MONOCYTES NFR BLD AUTO: 6.1 %
NEUTROPHILS # BLD AUTO: 4.1 X10(3)/MCL (ref 2.1–9.2)
NEUTROPHILS NFR BLD AUTO: 61.2 %
NITRITE UR QL STRIP.AUTO: NEGATIVE
PCO2 BLDA: 42.9 MMHG (ref 35–45)
PH SMN: 7.42 [PH] (ref 7.35–7.45)
PH UR STRIP.AUTO: 7 [PH]
PLATELET # BLD AUTO: 165 X10(3)/MCL (ref 130–400)
PMV BLD AUTO: 12.2 FL (ref 7.4–10.4)
PO2 BLDA: 72 MMHG (ref 80–100)
POC BE: 3 MMOL/L
POC SATURATED O2: 94 % (ref 95–100)
POC TCO2: 29 MMOL/L (ref 23–27)
POTASSIUM SERPL-SCNC: 4.4 MMOL/L (ref 3.5–5.1)
PROT SERPL-MCNC: 7 GM/DL (ref 5.8–7.6)
PROT UR QL STRIP.AUTO: NEGATIVE MG/DL
RBC # BLD AUTO: 4.27 X10(6)/MCL (ref 4.2–5.4)
RBC #/AREA URNS AUTO: NORMAL /HPF
RBC UR QL AUTO: ABNORMAL UNIT/L
SAMPLE: ABNORMAL
SITE: ABNORMAL
SODIUM SERPL-SCNC: 142 MMOL/L (ref 136–145)
SP GR UR STRIP.AUTO: 1.01
SQUAMOUS #/AREA URNS AUTO: NORMAL /HPF
TROPONIN I SERPL-MCNC: 0.06 NG/ML (ref 0–0.04)
TROPONIN I SERPL-MCNC: 0.07 NG/ML (ref 0–0.04)
TSH SERPL-ACNC: 0.82 UIU/ML (ref 0.35–4.94)
UROBILINOGEN UR STRIP-ACNC: 0.2 MG/DL
WBC # SPEC AUTO: 6.7 X10(3)/MCL (ref 4.5–11.5)
WBC #/AREA URNS AUTO: NORMAL /HPF

## 2022-07-12 PROCEDURE — 84484 ASSAY OF TROPONIN QUANT: CPT | Performed by: INTERNAL MEDICINE

## 2022-07-12 PROCEDURE — 82803 BLOOD GASES ANY COMBINATION: CPT

## 2022-07-12 PROCEDURE — 93005 ELECTROCARDIOGRAM TRACING: CPT

## 2022-07-12 PROCEDURE — 83880 ASSAY OF NATRIURETIC PEPTIDE: CPT | Performed by: INTERNAL MEDICINE

## 2022-07-12 PROCEDURE — 36600 WITHDRAWAL OF ARTERIAL BLOOD: CPT

## 2022-07-12 PROCEDURE — 84443 ASSAY THYROID STIM HORMONE: CPT | Performed by: INTERNAL MEDICINE

## 2022-07-12 PROCEDURE — 36000 PLACE NEEDLE IN VEIN: CPT

## 2022-07-12 PROCEDURE — 82272 OCCULT BLD FECES 1-3 TESTS: CPT | Performed by: INTERNAL MEDICINE

## 2022-07-12 PROCEDURE — 81001 URINALYSIS AUTO W/SCOPE: CPT | Performed by: INTERNAL MEDICINE

## 2022-07-12 PROCEDURE — 36415 COLL VENOUS BLD VENIPUNCTURE: CPT | Performed by: INTERNAL MEDICINE

## 2022-07-12 PROCEDURE — 99285 EMERGENCY DEPT VISIT HI MDM: CPT | Mod: 25

## 2022-07-12 PROCEDURE — 80053 COMPREHEN METABOLIC PANEL: CPT | Performed by: INTERNAL MEDICINE

## 2022-07-12 PROCEDURE — 85025 COMPLETE CBC W/AUTO DIFF WBC: CPT | Performed by: INTERNAL MEDICINE

## 2022-07-12 NOTE — ED PROVIDER NOTES
07/12/2022         11:36 AM    Source of History:  History obtained from the patient.       Chief complaint:  From Nurse Triage:  Fatigue and Abdominal Pain (Epigastric pressure. )    HPI:  Miguel Angel Oliveros is a 88 y.o. female presenting with Fatigue and Abdominal Pain (Epigastric pressure. )       Patient with history of hypertension, coronary disease, anxiety disorder comes to the emergency room saying that she is feeling scared, tired, fatigued, some upper abdominal pressure which all started this morning, she was seen in the emergency room couple of days back with the hypertension and cystitis, patient is not offering any other complaints, just feeling scared.  Patient says that she has been in the wheelchair for the last 3 months or more, and it has happened last year also that she was stuck in the wheelchair for a few months.    Review of Systems   Constitutional symptoms:  No Fever. No Chills, weakness, feeling scared    Skin symptoms:  No Rash.    Eye symptoms:  Negative except as documented in HPI.   ENMT symptoms:  No Sore throat,    Respiratory symptoms:  No Shortness of Breath, no Cough, no Wheezing.    Cardiovascular symptoms:  No Chest pain, no Palpitations, no Tachycardia.    Gastrointestinal symptoms:  No Abdominal pain, but fullness in the epigastric area,, no Nausea, no Vomiting, no Diarrhea, no Constipation.    Genitourinary symptoms:  No Dysuria,    Musculoskeletal symptoms:  No Back pain,    Neurologic symptoms:  no Headache, no Dizziness.  Fatigue   Psychiatric symptoms:  No Anxiety, No Depression, No Substance Abuse.              Additional review of systems information: Patient Denies Any Other Complaints.  All Other Systems Reviewed With Patient And Negative.    ALLEGIES:  Review of patient's allergies indicates:   Allergen Reactions    Statins-hmg-coa reductase inhibitors      Other reaction(s): Joint pain    Bimatoprost      Other reaction(s): Eye redness    Cortisone      Dorzolamide-timolol     Gabapentin      Other reaction(s): Confusion    Hydrocortisone     Tafluprost (pf)        HOME MEDICINES:  No current facility-administered medications for this encounter.    Current Outpatient Medications:     ascorbic acid, vitamin C, (VITAMIN C) 1000 MG tablet, Take 1,000 mg by mouth once daily., Disp: , Rfl:     aspirin (ECOTRIN) 81 MG EC tablet, Take 81 mg by mouth., Disp: , Rfl:     atorvastatin (LIPITOR) 40 MG tablet, Take 40 mg by mouth once daily., Disp: , Rfl:     carvediloL (COREG) 25 MG tablet, Take 25 mg by mouth 2 (two) times daily., Disp: , Rfl:     cefUROXime (CEFTIN) 500 MG tablet, Take 1 tablet (500 mg total) by mouth every 12 (twelve) hours. for 7 days, Disp: 14 tablet, Rfl: 0    clopidogreL (PLAVIX) 75 mg tablet, Take 75 mg by mouth once daily., Disp: , Rfl:     cyproheptadine (PERIACTIN) 4 mg tablet, Take 4 mg by mouth nightly., Disp: , Rfl:     diazePAM (VALIUM) 5 MG tablet, Take 5 mg by mouth daily as needed., Disp: , Rfl:     EScitalopram oxalate (LEXAPRO) 20 MG tablet, Take 20 mg by mouth once daily., Disp: , Rfl:     QUEtiapine (SEROQUEL) 25 MG Tab, Take 25 mg by mouth nightly., Disp: , Rfl:     ranolazine (RANEXA) 1,000 mg Tb12, Take 1,000 mg by mouth 2 (two) times daily., Disp: , Rfl:     Facility-Administered Medications Ordered in Other Encounters:     betamethasone acetate-betamethasone sodium phosphate injection 6 mg, 6 mg, Intra-articular, , Zina Weston, FNP, 6 mg at 06/01/22 1345    betamethasone acetate-betamethasone sodium phosphate injection 6 mg, 6 mg, Intra-articular, , Zina Weston, FNP, 6 mg at 06/01/22 1345    LIDOcaine (PF) 20 mg/mL (2%) injection 5 mL, 5 mL, , , Zina Weston, FNP, 5 mL at 06/01/22 1345    LIDOcaine (PF) 20 mg/mL (2%) injection 5 mL, 5 mL, , , Zina Weston, FNP, 5 mL at 06/01/22 1345    PMH:  As per HPI and below:    Reviewed and updated in chart.    PAST MEDICAL HISTORY:  Past Medical History:  "  Diagnosis Date    Anxiety disorder     Aortic stenosis     Arthritis     Carpal tunnel syndrome     Coronary artery disease     Dementia     Depression     Diabetes mellitus     GERD (gastroesophageal reflux disease)     Hx of rectal polypectomy     Hypercholesterolemia     Hypertension     Obstructive sleep apnea     Respiratory distress     S/P TAVR (transcatheter aortic valve replacement)     TIA (transient ischemic attack)         PAST SURGICAL HISTORY:  Past Surgical History:   Procedure Laterality Date    CARDIAC SURGERY      CATARACT EXTRACTION      CORONARY STENT PLACEMENT      HYSTERECTOMY      INSERTION OF PACEMAKER      PARTIAL HYSTERECTOMY      RECTAL POLYPECTOMY      TONSILLECTOMY      TRANSCATHETER AORTIC VALVE REPLACEMENT (TAVR)         SOCIAL HISTORY:  Social History     Tobacco Use    Smoking status: Never Smoker    Smokeless tobacco: Never Used   Substance Use Topics    Alcohol use: Never    Drug use: Never       FAMILY HISTORY:  Family History   Problem Relation Age of Onset    Diabetes Mother     Stroke Father     Hyperlipidemia Father     Breast cancer Sister         PROBLEM LIST:  There is no problem list on file for this patient.       PHYSICAL EXAM:    Vitals:    07/12/22 1530   BP: (!) 164/93   Pulse: 69   Resp: 15   Temp:      BP (!) 164/93   Pulse 69   Temp 98.3 °F (36.8 °C) (Oral)   Resp 15   Ht 5' 5" (1.651 m)   Wt 77.1 kg (170 lb)   SpO2 98%   BMI 28.29 kg/m²    Vital Signs: Reviewed As In Chart.  General:  Alert, No Acute Distress.   Skin: Normal For Ethnic Origin  Eye:  Extraocular Movements Are Intact.   Cardiovascular:  Regular Rate And Rhythm, No Murmur.  No pedal edema   Respiratory:  Lungs Are Clear To Auscultation, Respirations Are Non-Labored.    Musculoskeletal:  No Gross Deformity Noted.   Gastrointestinal:  Soft, Non Distended, Non Tender, obese, Normal Bowel Sounds.    Neurological:  Alert And Oriented To Person, Place, Time, And " Situation, Normal Motor Observed, Normal Speech Observed.    Psychiatric:  Cooperative, Appropriate Mood & Affect.    INITIAL IMPRESSION/ DIFFERENTIAL DX:      MEDICAL DECISION MAKING:      Reviewed Nurses Note. Reviewed Vital Signs.     Reviewed Pertinent old records.    88 y.o. female with history of sleep apnea, hypertension, diabetes, comes in with generalized weakness and feeling fearful of something which she cannot explain.    ED WORKUP AND COURSE:    Orders Placed This Encounter   Procedures    X-Ray Chest 1 View    CBC auto differential    Comprehensive metabolic panel    Troponin I #1    BNP    Urinalysis, Reflex to Urine Culture Urine, Clean Catch    TSH    CBC with Differential    Occult blood x 3, stool    Urinalysis, Microscopic    Troponin I    Diet NPO    Vital signs    Cardiac Monitoring - Adult    Pulse Oximetry Continuous    POCT ARTERIAL BLOOD GAS Blood Gas    EKG 12-lead    Saline lock IV       Medications - No data to display         Reviewed Labs and Radiology Interpretations:    ECG Results          EKG 12-lead (Preliminary result)  Result time 07/12/22 11:57:58    ED Interpretation by Jyoti Granados MD (07/12/22 11:57:58, [facility] - Emergency Dept, Emergency Medicine)    EKG: Interpreted by Jyoti Granados MD. independently as Normal Sinus Rhythm, Rate 76, Normal Intervals., left anterior fascicular block.                                ED LABS ORDERED AND REVIEWED:  Admission on 07/12/2022   Component Date Value Ref Range Status    Sodium Level 07/12/2022 142  136 - 145 mmol/L Final    Potassium Level 07/12/2022 4.4  3.5 - 5.1 mmol/L Final    Chloride 07/12/2022 105  98 - 107 mmol/L Final    Carbon Dioxide 07/12/2022 26  23 - 31 mmol/L Final    Glucose Level 07/12/2022 132 (A) 82 - 115 mg/dL Final    Blood Urea Nitrogen 07/12/2022 16.0  9.8 - 20.1 mg/dL Final    Creatinine 07/12/2022 0.81  0.55 - 1.02 mg/dL Final    Calcium Level Total 07/12/2022  9.4  8.4 - 10.2 mg/dL Final    Protein Total 07/12/2022 7.0  5.8 - 7.6 gm/dL Final    Albumin Level 07/12/2022 3.5  3.4 - 4.8 gm/dL Final    Globulin 07/12/2022 3.5  2.4 - 3.5 gm/dL Final    Albumin/Globulin Ratio 07/12/2022 1.0 (A) 1.1 - 2.0 ratio Final    Bilirubin Total 07/12/2022 0.4  <=1.5 mg/dL Final    Alkaline Phosphatase 07/12/2022 70  40 - 150 unit/L Final    Alanine Aminotransferase 07/12/2022 16  0 - 55 unit/L Final    Aspartate Aminotransferase 07/12/2022 35 (A) 5 - 34 unit/L Final    Estimated GFR- 07/12/2022 >60  mls/min/1.73/m2 Final    Troponin-I 07/12/2022 0.064 (A) 0.000 - 0.045 ng/mL Final    Natriuretic Peptide 07/12/2022 89.2  <=100.0 pg/mL Final    Color, UA 07/12/2022 Yellow  Yellow, Colorless, Other, Clear Final    Appearance, UA 07/12/2022 Clear  Clear Final    Specific Gravity, UA 07/12/2022 1.010   Final    pH, UA 07/12/2022 7.0  5.0, 5.5, 6.0, 6.5, 7.0, 7.5, 8.0, 8.5 Final    Protein, UA 07/12/2022 Negative  Negative, 300  mg/dL Final    Glucose, UA 07/12/2022 Negative  Negative, Normal mg/dL Final    Ketones, UA 07/12/2022 Negative  Negative, +1, +2, +3, +4, +5, >=160, >=80 mg/dL Final    Blood, UA 07/12/2022 Small (A) Negative unit/L Final    Bilirubin, UA 07/12/2022 Negative  Negative mg/dL Final    Urobilinogen, UA 07/12/2022 0.2  0.2, 1.0, Normal mg/dL Final    Nitrites, UA 07/12/2022 Negative  Negative Final    Leukocyte Esterase, UA 07/12/2022 Negative  Negative, 75  unit/L Final    Thyroid Stimulating Hormone 07/12/2022 0.8207  0.3500 - 4.9400 uIU/mL Final    WBC 07/12/2022 6.7  4.5 - 11.5 x10(3)/mcL Final    RBC 07/12/2022 4.27  4.20 - 5.40 x10(6)/mcL Final    Hgb 07/12/2022 12.1  12.0 - 16.0 gm/dL Final    Hct 07/12/2022 38.4  37.0 - 47.0 % Final    MCV 07/12/2022 89.9  80.0 - 94.0 fL Final    MCH 07/12/2022 28.3  27.0 - 31.0 pg Final    MCHC 07/12/2022 31.5 (A) 33.0 - 36.0 mg/dL Final    RDW 07/12/2022 13.8  11.5 - 17.0 % Final     Platelet 07/12/2022 165  130 - 400 x10(3)/mcL Final    MPV 07/12/2022 12.2 (A) 7.4 - 10.4 fL Final    Neut % 07/12/2022 61.2  % Final    Lymph % 07/12/2022 30.6  % Final    Mono % 07/12/2022 6.1  % Final    Eos % 07/12/2022 1.3  % Final    Basophil % 07/12/2022 0.4  % Final    Lymph # 07/12/2022 2.05  0.6 - 4.6 x10(3)/mcL Final    Neut # 07/12/2022 4.1  2.1 - 9.2 x10(3)/mcL Final    Mono # 07/12/2022 0.41  0.1 - 1.3 x10(3)/mcL Final    Eos # 07/12/2022 0.09  0 - 0.9 x10(3)/mcL Final    Baso # 07/12/2022 0.03  0 - 0.2 x10(3)/mcL Final    IG# 07/12/2022 0.03  0 - 0.04 x10(3)/mcL Final    IG% 07/12/2022 0.4  % Final    POC PH 07/12/2022 7.420  7.35 - 7.45 Final    POC PCO2 07/12/2022 42.9  35 - 45 mmHg Final    POC PO2 07/12/2022 72 (A) 80 - 100 mmHg Final    POC HCO3 07/12/2022 27.8  24 - 28 mmol/L Final    POC BE 07/12/2022 3  -2 to 2 mmol/L Final    POC SATURATED O2 07/12/2022 94 (A) 95 - 100 % Final    POC TCO2 07/12/2022 29 (A) 23 - 27 mmol/L Final    Sample 07/12/2022 ARTERIAL   Final    Site 07/12/2022 RR   Final    Allens Test 07/12/2022 Pass   Final    DelSys 07/12/2022 Room Air   Final    Occult Blood Stool 1 07/12/2022 Negative  Negative Final    Bacteria, UA 07/12/2022 Occasional  None Seen, Rare, Occasional /HPF Final    RBC, UA 07/12/2022 3-5  None Seen, 0-2, 3-5, 0-5 /HPF Final    WBC, UA 07/12/2022 0-2  None Seen, 0-2, 3-5, 0-5 /HPF Final    Squamous Epithelial Cells, UA 07/12/2022 Occasional  None Seen, Rare, Occasional, Occ /HPF Final    Troponin-I 07/12/2022 0.069 (A) 0.000 - 0.045 ng/mL Final       RADIOLOGY STUDIES ORDERED AND REVIEWED:  Imaging Results          X-Ray Chest 1 View (Final result)  Result time 07/12/22 13:03:50    Final result by Prabhakar Rojas MD (07/12/22 13:03:50)                 Impression:      1. No active cardiopulmonary disease identified      Electronically signed by: Prabhakar Rojas  Date:    07/12/2022  Time:    13:03              Narrative:    EXAMINATION:  XR CHEST 1 VIEW    CLINICAL HISTORY:  , Epigastric pain.    COMPARISON:  10/08/2020    FINDINGS:  An AP view or more reveals the heart to be normal in size.  The trachea is to the right of midline.  Atherosclerosis is seen within the aorta.  A cardiac device is noted to the left chest.  No infiltrate or effusion is seen.  A cardiac valve prosthetic has been placed since the prior exam.  Degenerative changes and mild curvature noted to the thoracic spine.                                ED COURSE AND REEVALUATIONS:    PROCEDURES PERFORMED IN ED:  Procedures    ED Course as of 07/12/22 1548   Tue Jul 12, 2022   1424 Patient blood pressure has come down, her heart rate is normal, oxygen saturations 100%, she is just anxious.  I am going to repeat her troponin and with that it stays in the same range I will let her go home with instruction to make sure that she takes her blood pressure medicines regularly and she must see her family doctor for adjustment of her blood pressure medication. [GQ]   1521 Talked to Dr. Lopez, who is very well acquainted with patient says that she has mainly anxiety problem and her blood pressure goes up with that, since most of her workup is essentially negative and her son reports that she took her antibiotic this morning on an empty stomach and that is when she started having epigastric discomfort, it is okay to discharge her and he is going to follow up with the patient.  I talked to patient informed them about by a conversation with Dr. Lopez and patient and her son are willing to go home. [GQ]      ED Course User Index  [GQ] Jyoti Granados MD              DIAGNOSTIC IMPRESSION:      1. Anxiety    2. Chest pain    3. Epigastric pain    4. Uncontrolled hypertension         ED Disposition Condition    Discharge Stable             Medication List      ASK your doctor about these medications    ascorbic acid (vitamin C) 1000 MG tablet  Commonly  known as: VITAMIN C     aspirin 81 MG EC tablet  Commonly known as: ECOTRIN     atorvastatin 40 MG tablet  Commonly known as: LIPITOR     carvediloL 25 MG tablet  Commonly known as: COREG     cefUROXime 500 MG tablet  Commonly known as: CEFTIN  Take 1 tablet (500 mg total) by mouth every 12 (twelve) hours. for 7 days     clopidogreL 75 mg tablet  Commonly known as: PLAVIX     cyproheptadine 4 mg tablet  Commonly known as: PERIACTIN     diazePAM 5 MG tablet  Commonly known as: VALIUM     EScitalopram oxalate 20 MG tablet  Commonly known as: LEXAPRO     QUEtiapine 25 MG Tab  Commonly known as: SEROQUEL     ranolazine 1,000 mg Tb12  Commonly known as: RANEXA              Follow-up Information     Barrett Lopez MD In 2 days.    Specialty: Family Medicine  Contact information:  [address] [place] [address]  [phone]                          ED Prescriptions     None        Follow-up Information     Follow up With Specialties Details Why Contact Info    Barrett Lopez MD Family Medicine In 2 days  [address] [place] [address]  [phone]             Jyoti Granados MD  07/12/22 0836

## 2022-07-15 ENCOUNTER — HOSPITAL ENCOUNTER (OUTPATIENT)
Dept: RADIOLOGY | Facility: HOSPITAL | Age: 87
Discharge: HOME | End: 2022-07-15
Attending: FAMILY MEDICINE
Payer: MEDICARE

## 2022-07-15 DIAGNOSIS — R52 PAIN: ICD-10-CM

## 2022-07-15 PROCEDURE — 74019 RADEX ABDOMEN 2 VIEWS: CPT | Mod: TC

## 2022-08-04 ENCOUNTER — OFFICE VISIT (OUTPATIENT)
Dept: ORTHOPEDICS | Facility: CLINIC | Age: 87
End: 2022-08-04
Payer: MEDICARE

## 2022-08-04 VITALS
HEART RATE: 70 BPM | SYSTOLIC BLOOD PRESSURE: 125 MMHG | HEIGHT: 65 IN | DIASTOLIC BLOOD PRESSURE: 79 MMHG | BODY MASS INDEX: 28.32 KG/M2 | WEIGHT: 170 LBS

## 2022-08-04 DIAGNOSIS — M17.0 BILATERAL PRIMARY OSTEOARTHRITIS OF KNEE: Primary | ICD-10-CM

## 2022-08-04 PROCEDURE — 99213 PR OFFICE/OUTPT VISIT, EST, LEVL III, 20-29 MIN: ICD-10-PCS | Mod: ,,, | Performed by: ORTHOPAEDIC SURGERY

## 2022-08-04 PROCEDURE — 99213 OFFICE O/P EST LOW 20 MIN: CPT | Mod: ,,, | Performed by: ORTHOPAEDIC SURGERY

## 2022-08-04 RX ORDER — ISOSORBIDE MONONITRATE 30 MG/1
15 TABLET, EXTENDED RELEASE ORAL
COMMUNITY

## 2022-08-04 NOTE — PROGRESS NOTES
Past Medical History:   Diagnosis Date    Anxiety disorder     Aortic stenosis     Arthritis     Carpal tunnel syndrome     Coronary artery disease     Dementia     Depression     Diabetes mellitus     GERD (gastroesophageal reflux disease)     Hx of rectal polypectomy     Hypercholesterolemia     Hypertension     Obstructive sleep apnea     Respiratory distress     S/P TAVR (transcatheter aortic valve replacement)     TIA (transient ischemic attack)        Past Surgical History:   Procedure Laterality Date    CARDIAC SURGERY      CATARACT EXTRACTION      CORONARY STENT PLACEMENT      HYSTERECTOMY      INSERTION OF PACEMAKER      PARTIAL HYSTERECTOMY      RECTAL POLYPECTOMY      TONSILLECTOMY      TRANSCATHETER AORTIC VALVE REPLACEMENT (TAVR)         Current Outpatient Medications   Medication Sig    ascorbic acid, vitamin C, (VITAMIN C) 1000 MG tablet Take 1,000 mg by mouth once daily.    aspirin (ECOTRIN) 81 MG EC tablet Take 81 mg by mouth.    atorvastatin (LIPITOR) 40 MG tablet Take 40 mg by mouth once daily.    carvediloL (COREG) 25 MG tablet Take 25 mg by mouth 2 (two) times daily.    clopidogreL (PLAVIX) 75 mg tablet Take 75 mg by mouth once daily.    cyproheptadine (PERIACTIN) 4 mg tablet Take 4 mg by mouth nightly.    diazePAM (VALIUM) 5 MG tablet Take 5 mg by mouth daily as needed.    EScitalopram oxalate (LEXAPRO) 20 MG tablet Take 20 mg by mouth once daily.    isosorbide mononitrate (IMDUR) 30 MG 24 hr tablet isosorbide mononitrate ER 30 mg tablet,extended release 24 hr    QUEtiapine (SEROQUEL) 25 MG Tab Take 25 mg by mouth nightly.    ranolazine (RANEXA) 1,000 mg Tb12 Take 1,000 mg by mouth 2 (two) times daily.     No current facility-administered medications for this visit.     Facility-Administered Medications Ordered in Other Visits   Medication    betamethasone acetate-betamethasone sodium phosphate injection 6 mg    betamethasone acetate-betamethasone sodium  phosphate injection 6 mg    LIDOcaine (PF) 20 mg/mL (2%) injection 5 mL    LIDOcaine (PF) 20 mg/mL (2%) injection 5 mL       Review of patient's allergies indicates:   Allergen Reactions    Statins-hmg-coa reductase inhibitors      Other reaction(s): Joint pain    Bimatoprost      Other reaction(s): Eye redness    Cortisone     Dorzolamide-timolol     Gabapentin      Other reaction(s): Confusion    Hydrocortisone     Tafluprost (pf)        Family History   Problem Relation Age of Onset    Diabetes Mother     Stroke Father     Hyperlipidemia Father     Breast cancer Sister        Social History     Socioeconomic History    Marital status:    Tobacco Use    Smoking status: Never Smoker    Smokeless tobacco: Never Used   Substance and Sexual Activity    Alcohol use: Never    Drug use: Never       Chief Complaint:   Chief Complaint   Patient presents with    Pain     FRAN Knee pain, Referred by Dr. Pitts, pt states pain has been going on for years, pt states has to take medicine to help sleep at night,        History of present illness: Miguel Angel Oliveros is a 88 y.o. female, Presents to the clinic today after referral from Dr. Pitts from bilateral knee pain.  patient does have a longstanding history of osteoarthritis to bilateral knees.  Daughter the patient states that she was scheduled to have a total knee arthroplasty about 10 years ago.  At this point time several of her children found that she was not ready for knee replacement.  Since then she has had several cardiac surgeries any events making her not a good surgical candidate at this point.  she has been treating her knees conservatively.  In the past she has had cortisone and viscosupplementation.  Cortisone injections helped her very minimally.  She would like to speak about her different options today in clinic.  Current meds she is taking Tylenol for pain.  pain is worse at night.  States that she ambulates very minimally is  mostly in a wheelchair.      Review of Systems:  Denies fevers, chills, chest pain, shortness of breath. Comprehensive review of systems performed and otherwise negative except as noted in HPI     Physical Examination:    General: awake and alert, no acute distress, healthy appearing  Head and Neck: Head atraumatic/normocephalic. Moist MM  CV: brisk cap refill  Lungs: non-labored breathing, w/o cough or SOB  Skin: no rashes present, warm to touch  Neuro: sensation grossly intact distally       Vital Signs:    Vitals:    08/04/22 1328   BP: 125/79   Pulse: 70       Body mass index is 28.29 kg/m².    Focused Orthopedic Exam:    bilateral knees without wound or skin breakdown.  +1 joint effusion  tenderness to palpation about the medial joint line of left knee and lateral joint line of right knee  ROM from 5 extension to 110 flexion  stable to varus/valgus.  stable to anterior/posterior drawer  + [place]  noticable crepitus to ROM       Assessment::Primary OA bilateral knees    Plan:  Patient presents to clinic today with bilateral knee pain after referral.  She does have a longstanding history of osteoarthritis to both knees.  Regarding her options she has already tried cortisone and viscosupplementation with minimal relief.  We did speak about Zilretta and IO vera.  She had a candidate for both options.  we will get her approved for bilateral Zilretta injections and have her return once approved.  All questions and concerns were addressed. The patient understands and agrees with the plan of care.      This note was created using [employer] voice recognition software that occasionally misinterpreted phrases or words.    Consult note is delivered via Epic messaging service.

## 2022-08-05 DIAGNOSIS — I67.81 CEREBROVASCULAR INSUFFICIENCY: Primary | ICD-10-CM

## 2022-08-09 ENCOUNTER — HOSPITAL ENCOUNTER (OUTPATIENT)
Dept: RADIOLOGY | Facility: HOSPITAL | Age: 87
Discharge: HOME | End: 2022-08-09
Attending: PSYCHIATRY & NEUROLOGY
Payer: MEDICARE

## 2022-08-09 DIAGNOSIS — I67.81 CEREBROVASCULAR INSUFFICIENCY: ICD-10-CM

## 2022-08-09 PROCEDURE — 93880 EXTRACRANIAL BILAT STUDY: CPT | Mod: TC

## 2022-08-18 ENCOUNTER — APPOINTMENT (OUTPATIENT)
Dept: RADIOLOGY | Facility: HOSPITAL | Age: 87
End: 2022-08-18
Attending: PSYCHIATRY & NEUROLOGY
Payer: MEDICARE

## 2022-08-18 DIAGNOSIS — I67.81 CEREBROVASCULAR INSUFFICIENCY: ICD-10-CM

## 2022-08-18 PROCEDURE — 70551 MRI BRAIN STEM W/O DYE: CPT | Mod: TC

## 2022-09-06 ENCOUNTER — OFFICE VISIT (OUTPATIENT)
Dept: ORTHOPEDICS | Facility: CLINIC | Age: 87
End: 2022-09-06
Payer: MEDICARE

## 2022-09-06 VITALS
SYSTOLIC BLOOD PRESSURE: 132 MMHG | HEIGHT: 65 IN | DIASTOLIC BLOOD PRESSURE: 72 MMHG | BODY MASS INDEX: 28.32 KG/M2 | TEMPERATURE: 98 F | WEIGHT: 170 LBS

## 2022-09-06 DIAGNOSIS — M17.0 BILATERAL PRIMARY OSTEOARTHRITIS OF KNEE: Primary | ICD-10-CM

## 2022-09-06 PROCEDURE — 20610 DRAIN/INJ JOINT/BURSA W/O US: CPT | Mod: 50,,, | Performed by: NURSE PRACTITIONER

## 2022-09-06 PROCEDURE — 20610 LARGE JOINT ASPIRATION/INJECTION: BILATERAL KNEE: ICD-10-PCS | Mod: 50,,, | Performed by: NURSE PRACTITIONER

## 2022-09-06 PROCEDURE — 99213 PR OFFICE/OUTPT VISIT, EST, LEVL III, 20-29 MIN: ICD-10-PCS | Mod: 25,,, | Performed by: ORTHOPAEDIC SURGERY

## 2022-09-06 PROCEDURE — 99213 OFFICE O/P EST LOW 20 MIN: CPT | Mod: 25,,, | Performed by: ORTHOPAEDIC SURGERY

## 2022-09-06 NOTE — PROGRESS NOTES
Past Medical History:   Diagnosis Date    Anxiety disorder     Aortic stenosis     Arthritis     Carpal tunnel syndrome     Coronary artery disease     Dementia     Depression     Diabetes mellitus     GERD (gastroesophageal reflux disease)     Hx of rectal polypectomy     Hypercholesterolemia     Hypertension     Obstructive sleep apnea     Respiratory distress     S/P TAVR (transcatheter aortic valve replacement)     TIA (transient ischemic attack)        Past Surgical History:   Procedure Laterality Date    CARDIAC SURGERY      CATARACT EXTRACTION      CORONARY STENT PLACEMENT      HYSTERECTOMY      INSERTION OF PACEMAKER      PARTIAL HYSTERECTOMY      RECTAL POLYPECTOMY      TONSILLECTOMY      TRANSCATHETER AORTIC VALVE REPLACEMENT (TAVR)         Current Outpatient Medications   Medication Sig    ascorbic acid, vitamin C, (VITAMIN C) 1000 MG tablet Take 1,000 mg by mouth once daily.    aspirin (ECOTRIN) 81 MG EC tablet Take 81 mg by mouth.    atorvastatin (LIPITOR) 40 MG tablet Take 40 mg by mouth once daily.    carvediloL (COREG) 25 MG tablet Take 25 mg by mouth 2 (two) times daily.    clopidogreL (PLAVIX) 75 mg tablet Take 75 mg by mouth once daily.    cyproheptadine (PERIACTIN) 4 mg tablet Take 4 mg by mouth nightly.    diazePAM (VALIUM) 5 MG tablet Take 5 mg by mouth daily as needed.    EScitalopram oxalate (LEXAPRO) 20 MG tablet Take 20 mg by mouth once daily.    isosorbide mononitrate (IMDUR) 30 MG 24 hr tablet isosorbide mononitrate ER 30 mg tablet,extended release 24 hr    QUEtiapine (SEROQUEL) 25 MG Tab Take 25 mg by mouth nightly.    ranolazine (RANEXA) 1,000 mg Tb12 Take 1,000 mg by mouth 2 (two) times daily.     No current facility-administered medications for this visit.     Facility-Administered Medications Ordered in Other Visits   Medication    betamethasone acetate-betamethasone sodium phosphate injection 6 mg    betamethasone acetate-betamethasone sodium phosphate injection 6 mg    LIDOcaine  (PF) 20 mg/mL (2%) injection 5 mL    LIDOcaine (PF) 20 mg/mL (2%) injection 5 mL       Review of patient's allergies indicates:   Allergen Reactions    Statins-hmg-coa reductase inhibitors      Other reaction(s): Joint pain    Bimatoprost      Other reaction(s): Eye redness    Cortisone     Dorzolamide-timolol     Gabapentin      Other reaction(s): Confusion    Hydrocortisone     Tafluprost (pf)        Family History   Problem Relation Age of Onset    Diabetes Mother     Stroke Father     Hyperlipidemia Father     Breast cancer Sister        Social History     Socioeconomic History    Marital status:    Tobacco Use    Smoking status: Never    Smokeless tobacco: Never   Substance and Sexual Activity    Alcohol use: Never    Drug use: Never       Chief Complaint:   Chief Complaint   Patient presents with    Left Knee - Pain    Right Knee - Pain    Injections     left and right knee Zilretta injection       History of present illness: Miguel Angel Oliveros is a 88 y.o. female, presents to clinic today with bilateral knee pain.  This has been ongoing for quite some time now.  She has been treated in the past on multiple injections.  She is here today for bilateral Zilretta injections.  Very limited with ambulation in a wheelchair today here in clinic.      Review of Systems:    Denies fevers, chills, chest pain, shortness of breath. Comprehensive review of systems performed and otherwise negative except as noted in HPI     Physical Examination:    General: awake and alert, no acute distress, healthy appearing  Head and Neck: Head atraumatic/normocephalic. Moist MM  CV: brisk cap refill  Lungs: non-labored breathing, w/o cough or SOB  Skin: no rashes present, warm to touch  Neuro: sensation grossly intact distally       Vital Signs:    Vitals:    09/06/22 1332   BP: 132/72   Temp: 98.3 °F (36.8 °C)       Body mass index is 28.29 kg/m².       Focused Orthopedic Exam:     bilateral knees without wound or skin  breakdown.  +1 joint effusion  tenderness to palpation about the medial joint line of left knee and lateral joint line of right knee  ROM from 5 extension to 110 flexion  stable to varus/valgus.  stable to anterior/posterior drawer  + McMurrays  noticable crepitus to ROM     Assessment: Primary OA bilateral knees    Plan:  Patient presents to clinic today with bilateral knee pain.  She has been treated in the past for multiple injections and here today for bilateral Zilretta injections.  We will give her these injections today here in clinic and have her preauthorized for further injections in the next 3 months.  If these injections do not give her good relief she would be a good candidate for IO vera procedure.  Patient states understanding and agrees to plan of care.    After verbal consent was obtained the patient's right knee was prepped with Chloraprep. The right knee joint was then injected under sterile technique with 32 mg triamcinolone it was dressed with a Band-Aid. The patient received good relief from the injection and was able to ambulate normally from clinic. Injection was successfully performed byKacie FNP-C.    After verbal consent was obtained the patient's left knee was prepped with Chloraprep. The left knee joint was then injected under sterile technique with 32 mg triamcinolone it was dressed with a Band-Aid. The patient received good relief from the injection and was able to ambulate normally from clinic. Injection was successfully performed byKacie FNP-C.      The above findings, diagnostics, and treatment plan were discussed with Dr. Barrett Loco who is in agreement with the plan of care.      This note was created using [employer] voice recognition software that occasionally misinterpreted phrases or words.    Consult note is delivered via Epic messaging service.

## 2022-09-06 NOTE — PROCEDURES
Large Joint Aspiration/Injection: bilateral knee    Date/Time: 9/6/2022 1:30 PM  Performed by: NEERAJ Camilo  Authorized by: Barrett Loco MD     Consent Done?:  Yes (Verbal)  Indications:  Arthritis and pain  Timeout: prior to procedure the correct patient, procedure, and site was verified    Prep: patient was prepped and draped in usual sterile fashion    Local anesthesia used?: No      Details:  Needle Size:  22 G  Ultrasonic Guidance for needle placement?: No    Approach:  Anterolateral  Location:  Knee  Laterality:  Bilateral  Site:  Bilateral knee  Medications (Right):  32 mg triamcinolone acetonide 32 mg  Medications (Left):  32 mg triamcinolone acetonide 32 mg  Patient tolerance:  Patient tolerated the procedure well with no immediate complications

## 2022-10-03 DIAGNOSIS — N02.B9: Primary | ICD-10-CM

## 2022-10-05 ENCOUNTER — OFFICE VISIT (OUTPATIENT)
Dept: NEUROLOGY | Facility: CLINIC | Age: 87
End: 2022-10-05
Payer: MEDICARE

## 2022-10-05 ENCOUNTER — HOSPITAL ENCOUNTER (OUTPATIENT)
Dept: RADIOLOGY | Facility: HOSPITAL | Age: 87
Discharge: HOME | End: 2022-10-05
Attending: FAMILY MEDICINE
Payer: MEDICARE

## 2022-10-05 VITALS
HEIGHT: 65 IN | DIASTOLIC BLOOD PRESSURE: 68 MMHG | SYSTOLIC BLOOD PRESSURE: 120 MMHG | BODY MASS INDEX: 28.32 KG/M2 | WEIGHT: 170 LBS

## 2022-10-05 DIAGNOSIS — F03.90 DEMENTIA WITHOUT BEHAVIORAL DISTURBANCE: ICD-10-CM

## 2022-10-05 DIAGNOSIS — F02.C11 SEVERE LATE ONSET ALZHEIMER'S DEMENTIA WITH AGITATION: Primary | ICD-10-CM

## 2022-10-05 DIAGNOSIS — N02.B9: ICD-10-CM

## 2022-10-05 DIAGNOSIS — G30.1 SEVERE LATE ONSET ALZHEIMER'S DEMENTIA WITH AGITATION: Primary | ICD-10-CM

## 2022-10-05 PROCEDURE — 76770 US EXAM ABDO BACK WALL COMP: CPT | Mod: TC

## 2022-10-05 PROCEDURE — 99205 OFFICE O/P NEW HI 60 MIN: CPT | Mod: S$PBB,,, | Performed by: SPECIALIST

## 2022-10-05 PROCEDURE — 99205 PR OFFICE/OUTPT VISIT, NEW, LEVL V, 60-74 MIN: ICD-10-PCS | Mod: S$PBB,,, | Performed by: SPECIALIST

## 2022-10-05 PROCEDURE — 99999 PR PBB SHADOW E&M-EST. PATIENT-LVL III: ICD-10-PCS | Mod: PBBFAC,,, | Performed by: SPECIALIST

## 2022-10-05 PROCEDURE — 99213 OFFICE O/P EST LOW 20 MIN: CPT | Mod: PBBFAC,25 | Performed by: SPECIALIST

## 2022-10-05 PROCEDURE — 99999 PR PBB SHADOW E&M-EST. PATIENT-LVL III: CPT | Mod: PBBFAC,,, | Performed by: SPECIALIST

## 2022-10-05 RX ORDER — NITROGLYCERIN 0.4 MG/1
0.4 TABLET SUBLINGUAL
Status: ON HOLD | COMMUNITY
End: 2022-12-30 | Stop reason: HOSPADM

## 2022-10-05 RX ORDER — CLONIDINE HYDROCHLORIDE 0.1 MG/1
TABLET ORAL
Status: ON HOLD | COMMUNITY
Start: 2022-09-08 | End: 2022-12-30 | Stop reason: HOSPADM

## 2022-10-05 RX ORDER — ONDANSETRON 4 MG/1
4 TABLET, ORALLY DISINTEGRATING ORAL EVERY 8 HOURS PRN
COMMUNITY
End: 2022-12-01 | Stop reason: SDUPTHER

## 2022-10-05 NOTE — PROGRESS NOTES
Subjective:       Patient ID: Miguel Angel Oliveros is a 89 y.o. female.    Chief Complaint: dementia     HPI:            NP ref by Flori for neuro cons to [facility] for Dementi (Pts daughter (Latrice) is here today. PT states she gets confused, forgets names and numbers. Pt does not repeat heself. States she has had decline in memeory for abt 2 yrs and has been getting worse for abt 7-8 mos. Pt saw Dr Chilel and had an EEG done and he put her on Memantine and the pt states she was on too many meds and she was sleeping all the time. Pt had an MRI done and they never got the results. Pt lives at home with her daughter and her son, does not drive. 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 (Latrice works rad  [employer]) and son (German; [employer]) work   She gets agitated or obstinate at times             Social History     Socioeconomic History    Marital status:    Tobacco Use    Smoking status: Never    Smokeless tobacco: Never   Substance and Sexual Activity    Alcohol use: Never    Drug use: Never     ----------------------------  Anxiety disorder  Aortic stenosis  Arthritis  Carpal tunnel syndrome  Coronary artery disease  Dementia  Depression  Diabetes mellitus  GERD (gastroesophageal reflux disease)  Hx of rectal polypectomy  Hypercholesterolemia  Hypertension  Obstructive sleep apnea  Respiratory distress  S/P TAVR (transcatheter aortic valve replacement)  TIA (transient ischemic attack)      Current Outpatient Medications:     ascorbic acid, vitamin C, (VITAMIN C) 1000 MG tablet, Take 1,000 mg by mouth once daily., Disp: , Rfl:     aspirin (ECOTRIN) 81 MG EC tablet, Take 81 mg by mouth., Disp: , Rfl:     atorvastatin (LIPITOR) 40 MG tablet, Take 40 mg by mouth once daily., Disp: , Rfl:     carvediloL (COREG) 25 MG tablet, Take 25 mg by mouth 2 (two) times daily., Disp: , Rfl:     cloNIDine (CATAPRES) 0.1 MG  "tablet, , Disp: , Rfl:     clopidogreL (PLAVIX) 75 mg tablet, Take 75 mg by mouth once daily., Disp: , Rfl:     diazePAM (VALIUM) 5 MG tablet, Take 5 mg by mouth daily as needed., Disp: , Rfl:     isosorbide mononitrate (IMDUR) 30 MG 24 hr tablet, isosorbide mononitrate ER 30 mg tablet,extended release 24 hr, Disp: , Rfl:     multivitamin/iron/folic acid (CENTRUM COMPLETE ORAL), Take by mouth., Disp: , Rfl:     nitroGLYCERIN (NITROSTAT) 0.4 MG SL tablet, Place 0.4 mg under the tongue., Disp: , Rfl:     ondansetron (ZOFRAN-ODT) 4 MG TbDL, Take 4 mg by mouth every 8 (eight) hours as needed., Disp: , Rfl:     QUEtiapine (SEROQUEL) 25 MG Tab, Take 25 mg by mouth nightly., Disp: , Rfl:     ranolazine (RANEXA) 1,000 mg Tb12, Take 1,000 mg by mouth 2 (two) times daily., Disp: , Rfl:     TUMERIC-GING-OLIVE-OREG-CAPRYL ORAL, Take by mouth., Disp: , Rfl:   No current facility-administered medications for this visit.    Facility-Administered Medications Ordered in Other Visits:     betamethasone acetate-betamethasone sodium phosphate injection 6 mg, 6 mg, Intra-articular, , Zina Weston, FNP, 6 mg at 06/01/22 1345    betamethasone acetate-betamethasone sodium phosphate injection 6 mg, 6 mg, Intra-articular, , Zina Weston, FNP, 6 mg at 06/01/22 1345    LIDOcaine (PF) 20 mg/mL (2%) injection 5 mL, 5 mL, , , Zina Weston, FNP, 5 mL at 06/01/22 1345    LIDOcaine (PF) 20 mg/mL (2%) injection 5 mL, 5 mL, , , Zina Weston, FNP, 5 mL at 06/01/22 1345     Objective:        Exam:   /68   Ht 5' 5" (1.651 m)   Wt 77.1 kg (170 lb)   BMI 28.29 kg/m²     General Exam  __unaccompanied  if accompanied, by__ dtr Latrice   body habitus_ Body mass index is 28.29 kg/m².    mental status_alert, sat there patiently as her dtr and I spoke   Maybe nodded off once or twice then complained of it being cold inside after I asked her about the weather   neck_  Heart__ RRR  Extremities_ held R LE extended at the knee   Fingernails on " "hands well done   skin_    Neurological:  cortical function__  MMSE:   MMSE 10/5/2022   What is the (year), (season), (date), (day), (month)? 1   Where are we (state), (country), (town or city), (hospital), (floor)? 2   Name 3 common objects (eg. "apple", "table", "kameron"). Take 1 second to say each. Then ask the patient to repeat all 3. Give 1 point for each correct answer. Then repeat them until he/she learns all 3. Count trials and record. 3   Serial 7's backwards. Stop after 5 answers. (100,93,86,79,72) or alternatively  spell "WORLD" backwards. (D..L..R..O..W). The score is the number of letters in correct order. 0   Ask for the 3 common objects named earlier in the exam. Give 1 point for each correct answer. 1   Name a "pencil" and "watch." 1   Repeat the following: "No ifs, ands, or buts." 0   Follow a 3-stage command: "Take a paper in your right hand, fold it in half, & put it on the floor." 0   Read and obey the following: (see paper exam) 0   Write a sentence. 0   Copy the following design: (see paper exam) 0   Total MMSE Score 8   Some recent data might be hidden       Speech __ ok   cranial nerves:  CN 2 VF_ok   fundi_   CN 3, 4, 6 EOMs_ok  CN 3, pupils_  CN 7_no lower face asymmetry  CN 8_hearing _  CN 12 tongue_ok    Motor__ UE's non lateralizing grossly   tone:   muscle stretch reflexes__  Vib sens_  Pin sens_  Plantars__ not checked   tremor: _  coordination: _  gait_ wchair  Romberg:     Neuroimaging:  Images and imaging reports reviewed.  My comments:   Global brain atrophy; maybe slightly more frontally predominate; confluent white matter hyperintensity likely degenerative     Labs: reviewed     _._ new patient     complexity of data   _._high _mod   _._ images and reports reviewed:  _._ hx obtained from family or accompaniment:   _._other studies reviewed  mmse  _._studies considered or discussed but not ordered __ PET   _._DDx discussed __    risks  _._high _mod     _._ (possible or definite) " neurodegenerative condition expected to progress  _._ fall risk  _._ driving discussed she no longer drives   _._ diagnosis unclear or DDx wide making risk uncertain to high  subtype of dementia unclear but likely AD     MDM/Medical Decision Making used for CPT choice based on above elements:  _._high  despite no orders         Assessment/Plan:       Problem List Items Addressed This Visit       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

## 2022-10-06 ENCOUNTER — APPOINTMENT (OUTPATIENT)
Dept: LAB | Facility: HOSPITAL | Age: 87
End: 2022-10-06
Attending: FAMILY MEDICINE
Payer: MEDICARE

## 2022-10-06 DIAGNOSIS — N39.0 URINARY TRACT INFECTION, SITE NOT SPECIFIED: Primary | ICD-10-CM

## 2022-10-06 PROCEDURE — 87088 URINE BACTERIA CULTURE: CPT

## 2022-10-06 PROCEDURE — 81001 URINALYSIS AUTO W/SCOPE: CPT

## 2022-10-10 LAB — BACTERIA UR CULT: NO GROWTH

## 2022-10-15 ENCOUNTER — HOSPITAL ENCOUNTER (EMERGENCY)
Facility: HOSPITAL | Age: 87
Discharge: HOME | End: 2022-10-15
Attending: FAMILY MEDICINE
Payer: MEDICARE

## 2022-10-15 VITALS
SYSTOLIC BLOOD PRESSURE: 143 MMHG | DIASTOLIC BLOOD PRESSURE: 83 MMHG | RESPIRATION RATE: 20 BRPM | OXYGEN SATURATION: 99 % | BODY MASS INDEX: 31.28 KG/M2 | TEMPERATURE: 99 F | HEIGHT: 62 IN | WEIGHT: 170 LBS | HEART RATE: 78 BPM

## 2022-10-15 DIAGNOSIS — F41.9 ANXIETY: ICD-10-CM

## 2022-10-15 DIAGNOSIS — R10.84 GENERALIZED ABDOMINAL PAIN: Primary | ICD-10-CM

## 2022-10-15 DIAGNOSIS — K83.8 BILIARY SLUDGE: ICD-10-CM

## 2022-10-15 LAB
ALBUMIN SERPL-MCNC: 3.5 GM/DL (ref 3.4–4.8)
ALBUMIN/GLOB SERPL: 1.1 RATIO (ref 1.1–2)
ALP SERPL-CCNC: 75 UNIT/L (ref 40–150)
ALT SERPL-CCNC: 17 UNIT/L (ref 0–55)
AST SERPL-CCNC: 19 UNIT/L (ref 5–34)
BASOPHILS # BLD AUTO: 0.02 X10(3)/MCL (ref 0–0.2)
BASOPHILS NFR BLD AUTO: 0.3 %
BILIRUBIN DIRECT+TOT PNL SERPL-MCNC: 0.5 MG/DL
BUN SERPL-MCNC: 17 MG/DL (ref 9.8–20.1)
CALCIUM SERPL-MCNC: 9.5 MG/DL (ref 8.4–10.2)
CHLORIDE SERPL-SCNC: 105 MMOL/L (ref 98–107)
CO2 SERPL-SCNC: 30 MMOL/L (ref 23–31)
CREAT SERPL-MCNC: 0.85 MG/DL (ref 0.55–1.02)
EOSINOPHIL # BLD AUTO: 0.05 X10(3)/MCL (ref 0–0.9)
EOSINOPHIL NFR BLD AUTO: 0.8 %
ERYTHROCYTE [DISTWIDTH] IN BLOOD BY AUTOMATED COUNT: 13.6 % (ref 11.5–17)
GFR SERPLBLD CREATININE-BSD FMLA CKD-EPI: >60 MLS/MIN/1.73/M2
GLOBULIN SER-MCNC: 3.2 GM/DL (ref 2.4–3.5)
GLUCOSE SERPL-MCNC: 116 MG/DL (ref 82–115)
HCT VFR BLD AUTO: 39.1 % (ref 37–47)
HGB BLD-MCNC: 12.1 GM/DL (ref 12–16)
IMM GRANULOCYTES # BLD AUTO: 0.03 X10(3)/MCL (ref 0–0.04)
IMM GRANULOCYTES NFR BLD AUTO: 0.5 %
LIPASE SERPL-CCNC: 22 U/L
LYMPHOCYTES # BLD AUTO: 1.79 X10(3)/MCL (ref 0.6–4.6)
LYMPHOCYTES NFR BLD AUTO: 27.4 %
MCH RBC QN AUTO: 28.1 PG (ref 27–31)
MCHC RBC AUTO-ENTMCNC: 30.9 MG/DL (ref 33–36)
MCV RBC AUTO: 90.9 FL (ref 80–94)
MONOCYTES # BLD AUTO: 0.45 X10(3)/MCL (ref 0.1–1.3)
MONOCYTES NFR BLD AUTO: 6.9 %
NEUTROPHILS # BLD AUTO: 4.2 X10(3)/MCL (ref 2.1–9.2)
NEUTROPHILS NFR BLD AUTO: 64.1 %
PLATELET # BLD AUTO: 169 X10(3)/MCL (ref 130–400)
PMV BLD AUTO: 10.4 FL (ref 7.4–10.4)
POTASSIUM SERPL-SCNC: 3.5 MMOL/L (ref 3.5–5.1)
PROT SERPL-MCNC: 6.7 GM/DL (ref 5.8–7.6)
RBC # BLD AUTO: 4.3 X10(6)/MCL (ref 4.2–5.4)
SODIUM SERPL-SCNC: 142 MMOL/L (ref 136–145)
WBC # SPEC AUTO: 6.5 X10(3)/MCL (ref 4.5–11.5)

## 2022-10-15 PROCEDURE — 36415 COLL VENOUS BLD VENIPUNCTURE: CPT | Performed by: FAMILY MEDICINE

## 2022-10-15 PROCEDURE — 25000003 PHARM REV CODE 250: Performed by: FAMILY MEDICINE

## 2022-10-15 PROCEDURE — 85025 COMPLETE CBC W/AUTO DIFF WBC: CPT | Performed by: FAMILY MEDICINE

## 2022-10-15 PROCEDURE — 99284 EMERGENCY DEPT VISIT MOD MDM: CPT | Mod: 25

## 2022-10-15 PROCEDURE — 80053 COMPREHEN METABOLIC PANEL: CPT | Performed by: FAMILY MEDICINE

## 2022-10-15 PROCEDURE — 83690 ASSAY OF LIPASE: CPT | Performed by: FAMILY MEDICINE

## 2022-10-15 RX ORDER — SUCRALFATE 1 G/10ML
1 SUSPENSION ORAL
Status: COMPLETED | OUTPATIENT
Start: 2022-10-15 | End: 2022-10-15

## 2022-10-15 RX ORDER — ALPRAZOLAM 0.25 MG/1
0.5 TABLET ORAL
Status: COMPLETED | OUTPATIENT
Start: 2022-10-15 | End: 2022-10-15

## 2022-10-15 RX ADMIN — ALPRAZOLAM 0.5 MG: 0.25 TABLET ORAL at 12:10

## 2022-10-15 RX ADMIN — SUCRALFATE ORAL 1 G: 1 SUSPENSION ORAL at 12:10

## 2022-10-15 NOTE — ED PROVIDER NOTES
Encounter Date: 10/15/2022       History     Chief Complaint   Patient presents with    Abdominal Pain     Pt c/o abd pain and swelling x 3 days, last bm yesterday but pt felt it wan inadequate.     89-year-old female with an extensive past medical history presents with generalized periumbilical pain and abdominal bloating for the past 2 or 3 days.  Family gave her a Linzess yesterday which usually causes diarrhea but the patient only had a small bowel movement.  Pain continued so they brought her to the ED.  No fever or sick contacts.  No nausea or vomiting.  No chest pain or shortness of breath.  No other complaints.    Review of patient's allergies indicates:   Allergen Reactions    Statins-hmg-coa reductase inhibitors      Other reaction(s): Joint pain    Bimatoprost      Other reaction(s): Eye redness    Cortisone     Dorzolamide-timolol     Gabapentin      Other reaction(s): Confusion    Hydrocortisone     Tafluprost (pf)      Past Medical History:   Diagnosis Date    Anxiety disorder     Aortic stenosis     Arthritis     Carpal tunnel syndrome     Coronary artery disease     Dementia     Depression     Diabetes mellitus     GERD (gastroesophageal reflux disease)     Hx of rectal polypectomy     Hypercholesterolemia     Hypertension     Obstructive sleep apnea     Respiratory distress     S/P TAVR (transcatheter aortic valve replacement)     TIA (transient ischemic attack)      Past Surgical History:   Procedure Laterality Date    CARDIAC SURGERY      CATARACT EXTRACTION      CORONARY STENT PLACEMENT      HYSTERECTOMY      INSERTION OF PACEMAKER      PARTIAL HYSTERECTOMY      RECTAL POLYPECTOMY      TONSILLECTOMY      TRANSCATHETER AORTIC VALVE REPLACEMENT (TAVR)       Family History   Problem Relation Age of Onset    Diabetes Mother     Stroke Father     Hyperlipidemia Father     Breast cancer Sister      Social History     Tobacco Use    Smoking status: Never    Smokeless tobacco: Never   Substance Use  Topics    Alcohol use: Never    Drug use: Never     Review of Systems   Constitutional: Negative.    HENT: Negative.     Eyes: Negative.    Respiratory: Negative.     Cardiovascular: Negative.    Gastrointestinal:  Positive for abdominal pain.   Endocrine: Negative.    Genitourinary: Negative.    Musculoskeletal: Negative.    Skin: Negative.    Allergic/Immunologic: Negative.    Neurological: Negative.    Hematological: Negative.    Psychiatric/Behavioral: Negative.       Physical Exam     Initial Vitals [10/15/22 1201]   BP Pulse Resp Temp SpO2   (!) 143/83 78 20 99 °F (37.2 °C) 99 %      MAP       --         Physical Exam    Nursing note and vitals reviewed.  Constitutional: She appears well-developed and well-nourished.   HENT:   Head: Normocephalic and atraumatic.   Eyes: Conjunctivae and EOM are normal. Pupils are equal, round, and reactive to light.   Neck: Neck supple.   Normal range of motion.  Cardiovascular:  Normal rate and regular rhythm.           Pulmonary/Chest: Breath sounds normal.   Abdominal: Abdomen is soft. Bowel sounds are normal. She exhibits no distension. There is no abdominal tenderness.   Negative Storm's, negative McBurney's, negative Rosvings There is no rebound.   Musculoskeletal:         General: Normal range of motion.      Cervical back: Normal range of motion and neck supple.     Neurological: She is alert and oriented to person, place, and time. She has normal strength. GCS score is 15. GCS eye subscore is 4. GCS verbal subscore is 5. GCS motor subscore is 6.   Skin: Skin is warm. Capillary refill takes less than 2 seconds.   Psychiatric: She has a normal mood and affect.       ED Course   Procedures  Labs Reviewed   COMPREHENSIVE METABOLIC PANEL - Abnormal; Notable for the following components:       Result Value    Glucose Level 116 (*)     All other components within normal limits   CBC WITH DIFFERENTIAL - Abnormal; Notable for the following components:    MCHC 30.9 (*)     All  other components within normal limits   LIPASE - Normal   CBC W/ AUTO DIFFERENTIAL    Narrative:     The following orders were created for panel order CBC auto differential.  Procedure                               Abnormality         Status                     ---------                               -----------         ------                     CBC with Differential[[id number]]        Abnormal            Final result                 Please view results for these tests on the individual orders.          Imaging Results              CT Abdomen Pelvis  Without Contrast (Final result)  Result time 10/15/22 13:57:38      Final result by Jomar Hamilton MD (10/15/22 13:57:38)                   Impression:      1. Suspected biliary sludge versus layering gallstones.  No surrounding gallbladder inflammatory changes.      Electronically signed by: Jomar Hamilton MD  Date:    10/15/2022  Time:    13:57               Narrative:    EXAMINATION:  CT ABDOMEN PELVIS WITHOUT CONTRAST    CLINICAL HISTORY:  Abdominal pain    TECHNIQUE:  Axial CT images were obtained through the abdomen and pelvis without IV contrast.  Coronal and sagittal reconstructions submitted and interpreted.  Automated exposure control, dose radiation lowering technique, was utilized.    COMPARISON:  Renal sonogram from 10/05/2022    FINDINGS:  Replaced aortic valve is present.  Heart size is at the upper limits of normal.  There is scattered dependent atelectasis and/or scarring.  There is suspected biliary sludge versus layering gallstones.  No pericholecystic inflammatory changes.  The liver, spleen, pancreas, adrenal glands appear normal.  No hydronephrosis.  Mild bilateral renal atrophy is present.  Nonspecific bilateral perinephric fat stranding is present.    The bowel is nonobstructed.  The appendix is not seen.  No inflammatory changes are present in the right lower quadrant around the cecum.  Air and stool are present within the colon.    No  free fluid.  No free air.  No enlarged abdominal or pelvic lymph nodes are identified.  Unremarkable bladder.  Moderate atherosclerosis of the abdominal aorta and its branches is present.    No suspicious osteolytic or osteoblastic lesion is present.                                       Medications   sucralfate 100 mg/mL suspension 1 g (1 g Oral Given 10/15/22 1252)   ALPRAZolam tablet 0.5 mg (0.5 mg Oral Given 10/15/22 1252)     Medical Decision Making:   Clinical Tests:   Lab Tests: Ordered and Reviewed  The following lab test(s) were unremarkable: CBC, CMP and Lipase  Radiological Study: Ordered and Reviewed  ED Management:  Patient is nontoxic-appearing no acute distress.  Vital signs stable.  Benign physical exam.  Patient resting comfortably after GI cocktail and Xanax in the ED.  Workup demonstrates no acute pathology.  Patient does have a history of severe anxiety for which she takes Valium daily.  Family admits that the patient sometimes asks for more Valium when her anxiety increases and she will sometimes run out of her 30 day prescription early.  Patient tells me that she used to take Valium 4 times a day but now her doctor will not prescribe it like that to her anymore.  Family feels safe taking her home.  Encouraged them to call follow-up with her PCP as soon as possible for further evaluation.  Strict return to ER precautions given, patient/family voiced understanding.                        Clinical Impression:   Final diagnoses:  [R10.84] Generalized abdominal pain (Primary)  [K83.8] Biliary sludge  [F41.9] Anxiety      ED Disposition Condition    Discharge Stable          ED Prescriptions    None       Follow-up Information       Follow up With Specialties Details Why Contact Info    Barrett Lopez MD Family Medicine   [address] [place] [address]  [phone]               Dariusz Cheng MD  10/16/22 0130

## 2022-11-10 DIAGNOSIS — C67.9 BLADDER CANCER: Primary | ICD-10-CM

## 2022-11-17 ENCOUNTER — HOSPITAL ENCOUNTER (OUTPATIENT)
Dept: RADIOLOGY | Facility: HOSPITAL | Age: 87
Discharge: HOME | End: 2022-11-17
Attending: SPECIALIST
Payer: MEDICARE

## 2022-11-17 DIAGNOSIS — C67.9 BLADDER CANCER: ICD-10-CM

## 2022-11-17 LAB
CREAT SERPL-MCNC: 0.9 MG/DL (ref 0.5–1.4)
SAMPLE: NORMAL

## 2022-11-17 PROCEDURE — 74178 CT ABD&PLV WO CNTR FLWD CNTR: CPT | Mod: TC

## 2022-11-17 PROCEDURE — 25500020 PHARM REV CODE 255: Performed by: SPECIALIST

## 2022-11-17 RX ADMIN — IOPAMIDOL 100 ML: 755 INJECTION, SOLUTION INTRAVENOUS at 09:11

## 2022-11-22 DIAGNOSIS — C67.9 BLADDER CANCER: Primary | ICD-10-CM

## 2022-11-30 ENCOUNTER — HOSPITAL ENCOUNTER (EMERGENCY)
Facility: HOSPITAL | Age: 87
Discharge: HOME | End: 2022-11-30
Attending: EMERGENCY MEDICINE
Payer: MEDICARE

## 2022-11-30 VITALS
WEIGHT: 170 LBS | BODY MASS INDEX: 29.02 KG/M2 | TEMPERATURE: 98 F | OXYGEN SATURATION: 100 % | HEIGHT: 64 IN | SYSTOLIC BLOOD PRESSURE: 189 MMHG | RESPIRATION RATE: 18 BRPM | HEART RATE: 75 BPM | DIASTOLIC BLOOD PRESSURE: 95 MMHG

## 2022-11-30 DIAGNOSIS — N39.0 URINARY TRACT INFECTION WITHOUT HEMATURIA, SITE UNSPECIFIED: ICD-10-CM

## 2022-11-30 DIAGNOSIS — R10.30 LOWER ABDOMINAL PAIN: ICD-10-CM

## 2022-11-30 DIAGNOSIS — K59.00 CONSTIPATION, UNSPECIFIED CONSTIPATION TYPE: Primary | ICD-10-CM

## 2022-11-30 DIAGNOSIS — R10.9 ABDOMINAL PAIN: ICD-10-CM

## 2022-11-30 DIAGNOSIS — C67.9 MALIGNANT NEOPLASM OF URINARY BLADDER, UNSPECIFIED SITE: ICD-10-CM

## 2022-11-30 LAB
ALBUMIN SERPL-MCNC: 3.3 GM/DL (ref 3.4–4.8)
ALBUMIN/GLOB SERPL: 1 RATIO (ref 1.1–2)
ALP SERPL-CCNC: 84 UNIT/L (ref 40–150)
ALT SERPL-CCNC: 12 UNIT/L (ref 0–55)
APPEARANCE UR: CLEAR
AST SERPL-CCNC: 17 UNIT/L (ref 5–34)
BACTERIA #/AREA URNS AUTO: ABNORMAL /HPF
BASOPHILS # BLD AUTO: 0.04 X10(3)/MCL (ref 0–0.2)
BASOPHILS NFR BLD AUTO: 0.6 %
BILIRUB UR QL STRIP.AUTO: NEGATIVE MG/DL
BILIRUBIN DIRECT+TOT PNL SERPL-MCNC: 0.3 MG/DL
BUN SERPL-MCNC: 20 MG/DL (ref 9.8–20.1)
CALCIUM SERPL-MCNC: 9.5 MG/DL (ref 8.4–10.2)
CHLORIDE SERPL-SCNC: 108 MMOL/L (ref 98–107)
CO2 SERPL-SCNC: 29 MMOL/L (ref 23–31)
COLOR UR AUTO: YELLOW
CREAT SERPL-MCNC: 0.95 MG/DL (ref 0.55–1.02)
EOSINOPHIL # BLD AUTO: 0.13 X10(3)/MCL (ref 0–0.9)
EOSINOPHIL NFR BLD AUTO: 1.9 %
ERYTHROCYTE [DISTWIDTH] IN BLOOD BY AUTOMATED COUNT: 13.3 % (ref 11.5–17)
GFR SERPLBLD CREATININE-BSD FMLA CKD-EPI: 57 MLS/MIN/1.73/M2
GLOBULIN SER-MCNC: 3.3 GM/DL (ref 2.4–3.5)
GLUCOSE SERPL-MCNC: 123 MG/DL (ref 82–115)
GLUCOSE UR QL STRIP.AUTO: NEGATIVE MG/DL
HCT VFR BLD AUTO: 38.8 % (ref 37–47)
HGB BLD-MCNC: 12.1 GM/DL (ref 12–16)
IMM GRANULOCYTES # BLD AUTO: 0.05 X10(3)/MCL (ref 0–0.04)
IMM GRANULOCYTES NFR BLD AUTO: 0.7 %
KETONES UR QL STRIP.AUTO: ABNORMAL MG/DL
LEUKOCYTE ESTERASE UR QL STRIP.AUTO: ABNORMAL UNIT/L
LIPASE SERPL-CCNC: 24 U/L
LYMPHOCYTES # BLD AUTO: 2.5 X10(3)/MCL (ref 0.6–4.6)
LYMPHOCYTES NFR BLD AUTO: 36.2 %
MCH RBC QN AUTO: 28.3 PG (ref 27–31)
MCHC RBC AUTO-ENTMCNC: 31.2 MG/DL (ref 33–36)
MCV RBC AUTO: 90.9 FL (ref 80–94)
MONOCYTES # BLD AUTO: 0.61 X10(3)/MCL (ref 0.1–1.3)
MONOCYTES NFR BLD AUTO: 8.8 %
NEUTROPHILS # BLD AUTO: 3.6 X10(3)/MCL (ref 2.1–9.2)
NEUTROPHILS NFR BLD AUTO: 51.8 %
NITRITE UR QL STRIP.AUTO: NEGATIVE
PH UR STRIP.AUTO: 5.5 [PH]
PLATELET # BLD AUTO: 183 X10(3)/MCL (ref 130–400)
PMV BLD AUTO: 10.4 FL (ref 7.4–10.4)
POTASSIUM SERPL-SCNC: 4 MMOL/L (ref 3.5–5.1)
PROT SERPL-MCNC: 6.6 GM/DL (ref 5.8–7.6)
PROT UR QL STRIP.AUTO: 30 MG/DL
RBC # BLD AUTO: 4.27 X10(6)/MCL (ref 4.2–5.4)
RBC #/AREA URNS AUTO: ABNORMAL /HPF
RBC UR QL AUTO: ABNORMAL UNIT/L
SODIUM SERPL-SCNC: 143 MMOL/L (ref 136–145)
SP GR UR STRIP.AUTO: >=1.03
SQUAMOUS #/AREA URNS AUTO: ABNORMAL /HPF
UROBILINOGEN UR STRIP-ACNC: 0.2 MG/DL
WBC # SPEC AUTO: 6.9 X10(3)/MCL (ref 4.5–11.5)
WBC #/AREA URNS AUTO: ABNORMAL /HPF

## 2022-11-30 PROCEDURE — 25000003 PHARM REV CODE 250: Performed by: INTERNAL MEDICINE

## 2022-11-30 PROCEDURE — 85025 COMPLETE CBC W/AUTO DIFF WBC: CPT | Performed by: EMERGENCY MEDICINE

## 2022-11-30 PROCEDURE — 99285 EMERGENCY DEPT VISIT HI MDM: CPT | Mod: 25

## 2022-11-30 PROCEDURE — 81001 URINALYSIS AUTO W/SCOPE: CPT | Performed by: EMERGENCY MEDICINE

## 2022-11-30 PROCEDURE — 87088 URINE BACTERIA CULTURE: CPT | Performed by: EMERGENCY MEDICINE

## 2022-11-30 PROCEDURE — 63600175 PHARM REV CODE 636 W HCPCS: Performed by: INTERNAL MEDICINE

## 2022-11-30 PROCEDURE — 25500020 PHARM REV CODE 255: Performed by: EMERGENCY MEDICINE

## 2022-11-30 PROCEDURE — 83690 ASSAY OF LIPASE: CPT | Performed by: EMERGENCY MEDICINE

## 2022-11-30 PROCEDURE — 96361 HYDRATE IV INFUSION ADD-ON: CPT

## 2022-11-30 PROCEDURE — 63600175 PHARM REV CODE 636 W HCPCS: Performed by: EMERGENCY MEDICINE

## 2022-11-30 PROCEDURE — 81003 URINALYSIS AUTO W/O SCOPE: CPT | Performed by: EMERGENCY MEDICINE

## 2022-11-30 PROCEDURE — 96374 THER/PROPH/DIAG INJ IV PUSH: CPT | Mod: 59

## 2022-11-30 PROCEDURE — 80053 COMPREHEN METABOLIC PANEL: CPT | Performed by: EMERGENCY MEDICINE

## 2022-11-30 RX ORDER — NITROFURANTOIN 25; 75 MG/1; MG/1
100 CAPSULE ORAL 2 TIMES DAILY
Qty: 10 CAPSULE | Refills: 0 | Status: SHIPPED | OUTPATIENT
Start: 2022-11-30 | End: 2022-12-05

## 2022-11-30 RX ORDER — CEFTRIAXONE 1 G/1
1 INJECTION, POWDER, FOR SOLUTION INTRAMUSCULAR; INTRAVENOUS
Status: COMPLETED | OUTPATIENT
Start: 2022-11-30 | End: 2022-11-30

## 2022-11-30 RX ORDER — SODIUM CHLORIDE, SODIUM LACTATE, POTASSIUM CHLORIDE, CALCIUM CHLORIDE 600; 310; 30; 20 MG/100ML; MG/100ML; MG/100ML; MG/100ML
1000 INJECTION, SOLUTION INTRAVENOUS
Status: DISCONTINUED | OUTPATIENT
Start: 2022-11-30 | End: 2022-11-30 | Stop reason: HOSPADM

## 2022-11-30 RX ORDER — LACTULOSE 10 G/15ML
20 SOLUTION ORAL ONCE
Status: COMPLETED | OUTPATIENT
Start: 2022-11-30 | End: 2022-11-30

## 2022-11-30 RX ADMIN — IOPAMIDOL 100 ML: 755 INJECTION, SOLUTION INTRAVENOUS at 05:11

## 2022-11-30 RX ADMIN — SODIUM CHLORIDE, POTASSIUM CHLORIDE, SODIUM LACTATE AND CALCIUM CHLORIDE 500 ML: 600; 310; 30; 20 INJECTION, SOLUTION INTRAVENOUS at 05:11

## 2022-11-30 RX ADMIN — CEFTRIAXONE SODIUM 1 G: 1 INJECTION, POWDER, FOR SOLUTION INTRAMUSCULAR; INTRAVENOUS at 06:11

## 2022-11-30 RX ADMIN — LACTULOSE 20 G: 20 SOLUTION ORAL at 06:11

## 2022-12-01 ENCOUNTER — HOSPITAL ENCOUNTER (EMERGENCY)
Facility: HOSPITAL | Age: 87
Discharge: HOME | End: 2022-12-01
Attending: INTERNAL MEDICINE
Payer: MEDICARE

## 2022-12-01 VITALS
WEIGHT: 180 LBS | DIASTOLIC BLOOD PRESSURE: 70 MMHG | HEART RATE: 90 BPM | OXYGEN SATURATION: 98 % | TEMPERATURE: 99 F | BODY MASS INDEX: 30.73 KG/M2 | RESPIRATION RATE: 18 BRPM | SYSTOLIC BLOOD PRESSURE: 169 MMHG | HEIGHT: 64 IN

## 2022-12-01 DIAGNOSIS — R51.9 SINUS HEADACHE: Primary | ICD-10-CM
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
## 2022-12-01 DIAGNOSIS — K59.00 CONSTIPATION: ICD-10-CM

## 2022-12-01 LAB
ALBUMIN SERPL-MCNC: 3.6 GM/DL (ref 3.4–4.8)
ALBUMIN/GLOB SERPL: 1 RATIO (ref 1.1–2)
ALP SERPL-CCNC: 87 UNIT/L (ref 40–150)
ALT SERPL-CCNC: 11 UNIT/L (ref 0–55)
AST SERPL-CCNC: 21 UNIT/L (ref 5–34)
BASOPHILS # BLD AUTO: 0.04 X10(3)/MCL (ref 0–0.2)
BASOPHILS NFR BLD AUTO: 0.4 %
BILIRUBIN DIRECT+TOT PNL SERPL-MCNC: 0.6 MG/DL
BUN SERPL-MCNC: 13 MG/DL (ref 9.8–20.1)
CALCIUM SERPL-MCNC: 9.6 MG/DL (ref 8.4–10.2)
CHLORIDE SERPL-SCNC: 100 MMOL/L (ref 98–107)
CO2 SERPL-SCNC: 26 MMOL/L (ref 23–31)
CREAT SERPL-MCNC: 0.82 MG/DL (ref 0.55–1.02)
EOSINOPHIL # BLD AUTO: 0.06 X10(3)/MCL (ref 0–0.9)
EOSINOPHIL NFR BLD AUTO: 0.6 %
ERYTHROCYTE [DISTWIDTH] IN BLOOD BY AUTOMATED COUNT: 12.9 % (ref 11.5–17)
GFR SERPLBLD CREATININE-BSD FMLA CKD-EPI: >60 MLS/MIN/1.73/M2
GLOBULIN SER-MCNC: 3.7 GM/DL (ref 2.4–3.5)
GLUCOSE SERPL-MCNC: 144 MG/DL (ref 82–115)
HCT VFR BLD AUTO: 40.3 % (ref 37–47)
HGB BLD-MCNC: 12.9 GM/DL (ref 12–16)
IMM GRANULOCYTES # BLD AUTO: 0.05 X10(3)/MCL (ref 0–0.04)
IMM GRANULOCYTES NFR BLD AUTO: 0.5 %
LYMPHOCYTES # BLD AUTO: 1.03 X10(3)/MCL (ref 0.6–4.6)
LYMPHOCYTES NFR BLD AUTO: 10.5 %
MCH RBC QN AUTO: 28 PG (ref 27–31)
MCHC RBC AUTO-ENTMCNC: 32 MG/DL (ref 33–36)
MCV RBC AUTO: 87.6 FL (ref 80–94)
MONOCYTES # BLD AUTO: 0.84 X10(3)/MCL (ref 0.1–1.3)
MONOCYTES NFR BLD AUTO: 8.6 %
NEUTROPHILS # BLD AUTO: 7.8 X10(3)/MCL (ref 2.1–9.2)
NEUTROPHILS NFR BLD AUTO: 79.4 %
PLATELET # BLD AUTO: 177 X10(3)/MCL (ref 130–400)
PMV BLD AUTO: 10.7 FL (ref 7.4–10.4)
POTASSIUM SERPL-SCNC: 3.6 MMOL/L (ref 3.5–5.1)
PROT SERPL-MCNC: 7.3 GM/DL (ref 5.8–7.6)
RBC # BLD AUTO: 4.6 X10(6)/MCL (ref 4.2–5.4)
SODIUM SERPL-SCNC: 135 MMOL/L (ref 136–145)
WBC # SPEC AUTO: 9.8 X10(3)/MCL (ref 4.5–11.5)

## 2022-12-01 PROCEDURE — 85025 COMPLETE CBC W/AUTO DIFF WBC: CPT | Performed by: INTERNAL MEDICINE

## 2022-12-01 PROCEDURE — 99285 EMERGENCY DEPT VISIT HI MDM: CPT | Mod: 25

## 2022-12-01 PROCEDURE — 25000003 PHARM REV CODE 250: Performed by: INTERNAL MEDICINE

## 2022-12-01 PROCEDURE — 80053 COMPREHEN METABOLIC PANEL: CPT | Performed by: INTERNAL MEDICINE

## 2022-12-01 RX ORDER — ONDANSETRON 4 MG/1
4 TABLET, ORALLY DISINTEGRATING ORAL
Status: COMPLETED | OUTPATIENT
Start: 2022-12-01 | End: 2022-12-01

## 2022-12-01 RX ORDER — DOCUSATE SODIUM 100 MG/1
100 CAPSULE, LIQUID FILLED ORAL 2 TIMES DAILY
Qty: 30 CAPSULE | Refills: 0 | Status: ON HOLD | OUTPATIENT
Start: 2022-12-01 | End: 2022-12-30

## 2022-12-01 RX ORDER — ONDANSETRON 4 MG/1
4 TABLET, FILM COATED ORAL EVERY 8 HOURS PRN
Qty: 30 TABLET | Refills: 0 | Status: ON HOLD | OUTPATIENT
Start: 2022-12-01 | End: 2022-12-30

## 2022-12-01 RX ADMIN — ONDANSETRON 4 MG: 4 TABLET, ORALLY DISINTEGRATING ORAL at 01:12

## 2022-12-01 NOTE — ED PROVIDER NOTES
Encounter Date: 11/30/2022       History     Chief Complaint   Patient presents with    Abdominal Pain     Pt c/o generalized abd pain x one week. Pt also states she had difficulty moving her bowels. No relief with linzess.      89-year-old female who son lives with her the son who she stays with brought her to the emergency department today because she was complaining of abdominal pain.  Patient recently had a bladder cancer resected.  The proximally 3 weeks ago Dr. Nacho Long @ [facility].  In the son explains that going to go back and do another biopsy on some lymph nodes.  They have not set that up yet.  He says his mother is having early cognitive deficits maybe little early dementia.  Her urologist is Dr. Nacho Long  Her primary care doctor is Dr. Barrett Lopez  Patient is very vague about the abdominal pain she says it feels like her guts twisted up she is not having vomiting she is not having diarrhea she said no food tasted good she has no appetite.  She is not had any unusual weight loss  She does not have diabetes   she does take medications for high blood pressure, hypercholesterolemia, nitroglycerin.  She is had a valve replacement in the past.  She is on Plavix.  She does take something for anxiety and stress.  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    Review of patient's allergies indicates:   Allergen Reactions    Statins-hmg-coa reductase inhibitors      Other reaction(s): Joint pain    Bimatoprost      Other reaction(s): Eye redness    Cortisone     Dorzolamide-timolol     Gabapentin      Other reaction(s): Confusion    Hydrocortisone     Tafluprost (pf)      Past Medical History:   Diagnosis Date    Anxiety disorder     Aortic stenosis     Arthritis     Carpal tunnel syndrome     Coronary artery disease     Dementia     Depression     Diabetes mellitus     GERD (gastroesophageal reflux disease)     Hx of rectal polypectomy      Hypercholesterolemia     Hypertension     Obstructive sleep apnea     Respiratory distress     S/P TAVR (transcatheter aortic valve replacement)     TIA (transient ischemic attack)      Past Surgical History:   Procedure Laterality Date    CARDIAC SURGERY      CATARACT EXTRACTION      CORONARY STENT PLACEMENT      HYSTERECTOMY      INSERTION OF PACEMAKER      PARTIAL HYSTERECTOMY      RECTAL POLYPECTOMY      TONSILLECTOMY      TRANSCATHETER AORTIC VALVE REPLACEMENT (TAVR)       Family History   Problem Relation Age of Onset    Diabetes Mother     Stroke Father     Hyperlipidemia Father     Breast cancer Sister      Social History     Tobacco Use    Smoking status: Never    Smokeless tobacco: Never   Substance Use Topics    Alcohol use: Never    Drug use: Never     Review of Systems   Constitutional: Negative.    HENT: Negative.     Eyes: Negative.    Respiratory: Negative.     Cardiovascular: Negative.    Gastrointestinal:  Positive for abdominal pain. Negative for nausea and vomiting.   Endocrine: Negative.    Genitourinary:  Negative for dysuria.   Musculoskeletal: Negative.    Skin: Negative.    Allergic/Immunologic: Negative.    Neurological:  Positive for headaches. Negative for tremors.   Hematological: Negative.    Psychiatric/Behavioral: Negative.     All other systems reviewed and are negative.    Physical Exam     Initial Vitals [11/30/22 1402]   BP Pulse Resp Temp SpO2   (!) 146/86 83 18 98.4 °F (36.9 °C) 98 %      MAP       --         Physical Exam    Nursing note and vitals reviewed.  Constitutional: She appears well-developed and well-nourished.    very talkative very pleasant heavyset black female   HENT:   Head: Normocephalic and atraumatic.   Right Ear: Tympanic membrane and external ear normal.   Left Ear: Tympanic membrane and external ear normal.   Nose: Nose normal.   Mouth/Throat: Oropharynx is clear and moist and mucous membranes are normal.   Eyes: EOM are normal. Pupils are equal, round, and  reactive to light.   Neck: Neck supple. No thyromegaly present. No tracheal deviation present. No JVD present.   Normal range of motion.  Cardiovascular:  Normal rate, regular rhythm and normal heart sounds.     Exam reveals no gallop and no friction rub.       No murmur heard.  Pulmonary/Chest: Breath sounds normal. No stridor. No respiratory distress. She has no wheezes. She has no rhonchi. She has no rales. She exhibits no tenderness.   Abdominal: Abdomen is soft. Bowel sounds are normal. She exhibits no distension and no mass. There is abdominal tenderness.   Very minimal diffuse tenderness without organomegaly appreciated   Genitourinary:    Genitourinary Comments: No CVA tenderness patient is able to pull herself up in the bed auscultated lungs bilaterally were clear     Musculoskeletal:         General: No tenderness or edema. Normal range of motion.      Cervical back: Normal range of motion and neck supple.     Lymphadenopathy:     She has no cervical adenopathy.   Neurological: She is alert and oriented to person, place, and time. She has normal strength and normal reflexes. No cranial nerve deficit. GCS score is 15. GCS eye subscore is 4. GCS verbal subscore is 5. GCS motor subscore is 6.   Skin: Skin is warm and dry. Capillary refill takes 2 to 3 seconds. No rash noted.   Psychiatric: She has a normal mood and affect. Her behavior is normal. Judgment and thought content normal.       ED Course   Procedures  Admission on 11/30/2022   Component Date Value Ref Range Status    Sodium Level 11/30/2022 143  136 - 145 mmol/L Final    Potassium Level 11/30/2022 4.0  3.5 - 5.1 mmol/L Final    Chloride 11/30/2022 108 (H)  98 - 107 mmol/L Final    Carbon Dioxide 11/30/2022 29  23 - 31 mmol/L Final    Glucose Level 11/30/2022 123 (H)  82 - 115 mg/dL Final    Blood Urea Nitrogen 11/30/2022 20.0  9.8 - 20.1 mg/dL Final    Creatinine 11/30/2022 0.95  0.55 - 1.02 mg/dL Final    Calcium Level Total 11/30/2022 9.5  8.4 -  10.2 mg/dL Final    Protein Total 11/30/2022 6.6  5.8 - 7.6 gm/dL Final    Albumin Level 11/30/2022 3.3 (L)  3.4 - 4.8 gm/dL Final    Globulin 11/30/2022 3.3  2.4 - 3.5 gm/dL Final    Albumin/Globulin Ratio 11/30/2022 1.0 (L)  1.1 - 2.0 ratio Final    Bilirubin Total 11/30/2022 0.3  <=1.5 mg/dL Final    Alkaline Phosphatase 11/30/2022 84  40 - 150 unit/L Final    Alanine Aminotransferase 11/30/2022 12  0 - 55 unit/L Final    Aspartate Aminotransferase 11/30/2022 17  5 - 34 unit/L Final    eGFR 11/30/2022 57  mls/min/1.73/m2 Final    Color, UA 11/30/2022 Yellow  Yellow, Light-Yellow, Dark Yellow, Gracie, Straw Final    Appearance, UA 11/30/2022 Clear  Clear Final    Specific Gravity, UA 11/30/2022 >=1.030   Final    pH, UA 11/30/2022 5.5  5.0 - 8.5 Final    Protein, UA 11/30/2022 30 (A)  Negative mg/dL Final    Glucose, UA 11/30/2022 Negative  Negative, Normal mg/dL Final    Ketones, UA 11/30/2022 Trace (A)  Negative mg/dL Final    Blood, UA 11/30/2022 Small (A)  Negative unit/L Final    Bilirubin, UA 11/30/2022 Negative  Negative mg/dL Final    Urobilinogen, UA 11/30/2022 0.2  0.2, 1.0, Normal mg/dL Final    Nitrites, UA 11/30/2022 Negative  Negative Final    Leukocyte Esterase, UA 11/30/2022 Trace (A)  Negative unit/L Final    WBC 11/30/2022 6.9  4.5 - 11.5 x10(3)/mcL Final    RBC 11/30/2022 4.27  4.20 - 5.40 x10(6)/mcL Final    Hgb 11/30/2022 12.1  12.0 - 16.0 gm/dL Final    Hct 11/30/2022 38.8  37.0 - 47.0 % Final    MCV 11/30/2022 90.9  80.0 - 94.0 fL Final    MCH 11/30/2022 28.3  27.0 - 31.0 pg Final    MCHC 11/30/2022 31.2 (L)  33.0 - 36.0 mg/dL Final    RDW 11/30/2022 13.3  11.5 - 17.0 % Final    Platelet 11/30/2022 183  130 - 400 x10(3)/mcL Final    MPV 11/30/2022 10.4  7.4 - 10.4 fL Final    Neut % 11/30/2022 51.8  % Final    Lymph % 11/30/2022 36.2  % Final    Mono % 11/30/2022 8.8  % Final    Eos % 11/30/2022 1.9  % Final    Basophil % 11/30/2022 0.6  % Final    Lymph # 11/30/2022 2.50  0.6 - 4.6  x10(3)/mcL Final    Neut # 11/30/2022 3.6  2.1 - 9.2 x10(3)/mcL Final    Mono # 11/30/2022 0.61  0.1 - 1.3 x10(3)/mcL Final    Eos # 11/30/2022 0.13  0 - 0.9 x10(3)/mcL Final    Baso # 11/30/2022 0.04  0 - 0.2 x10(3)/mcL Final    IG# 11/30/2022 0.05 (H)  0 - 0.04 x10(3)/mcL Final    IG% 11/30/2022 0.7  % Final    Lipase Level 11/30/2022 24  <=60 U/L Final    Bacteria, UA 11/30/2022 Rare  None Seen, Rare, Occasional /HPF Final    RBC, UA 11/30/2022 3-5  None Seen, 0-2, 3-5, 0-5 /HPF Final    WBC, UA 11/30/2022 21-50 (A)  None Seen, 0-2, 3-5, 0-5 /HPF Final    Squamous Epithelial Cells, UA 11/30/2022 Few (A)  None Seen, Rare, Occasional, Occ /HPF Final       Labs Reviewed   COMPREHENSIVE METABOLIC PANEL - Abnormal; Notable for the following components:       Result Value    Chloride 108 (*)     Glucose Level 123 (*)     Albumin Level 3.3 (*)     Albumin/Globulin Ratio 1.0 (*)     All other components within normal limits   URINALYSIS, REFLEX TO URINE CULTURE - Abnormal; Notable for the following components:    Protein, UA 30 (*)     Ketones, UA Trace (*)     Blood, UA Small (*)     Leukocyte Esterase, UA Trace (*)     All other components within normal limits   CBC WITH DIFFERENTIAL - Abnormal; Notable for the following components:    MCHC 31.2 (*)     IG# 0.05 (*)     All other components within normal limits   URINALYSIS, MICROSCOPIC - Abnormal; Notable for the following components:    WBC, UA 21-50 (*)     Squamous Epithelial Cells, UA Few (*)     All other components within normal limits   LIPASE - Normal   CULTURE, URINE   CBC W/ AUTO DIFFERENTIAL    Narrative:     The following orders were created for panel order CBC auto differential.  Procedure                               Abnormality         Status                     ---------                               -----------         ------                     CBC with Differential[[id number]]        Abnormal            Final result                 Please view  results for these tests on the individual orders.          Imaging Results              CT Abdomen Pelvis With Contrast (Final result)  Result time 11/30/22 18:18:16      Final result by Alex Alvarez MD (11/30/22 18:18:16)                   Impression:        1. Findings resemble a mild ileus secondary to constipation.  2. Similar appearance of enhancing mass in the left dome of the bladder consistent with patient's history of bladder neoplasm.  3. The history reports prior cholecystectomy, but the gallbladder is still present.  4. Subcentimeter nonenhancing cyst in anterior segment right lobe of the liver is similar in appearance.  5. Nonenhancing bilateral renal cysts are similar in appearance.  No further imaging follow-up is indicated.  6. Status post hysterectomy.  7. Cardiomegaly with postsurgical changes, as above.      Electronically signed by: Alex Alvarez MD  Date:    11/30/2022  Time:    18:18               Narrative:      CT SCAN OF ABDOMEN AND PELVIS WITH CONTRAST:    CPT 11920    Total DLP: 558 mGy-cm. Automatic exposure control was utilized to limit the radiation dose to the patient.  Total contrast: 100.0 mL in unspecified peripheral vein.      History: Acute onset of worsening abdominal pain and cramping with history of prior cholecystectomy and partial hysterectomy.  History of bladder neoplasm.    Comparison: 11/17/2022    Technique: Multiple contiguous axial images were acquired from the base of the chest to the femoral trochanters with intravenous contrast administration. Oral contrast was not administered.    Findings:  There is partially visualized subsegmental/discoid atelectasis versus pleuroparenchymal scarring in both lung bases.  The base of the heart is enlarged with AICD leads in postsurgical changes from cardiac valve replacement as well as atherosclerotic changes.  The subcentimeter cyst is nonenhancing adjacent to the gallbladder fossa in the anterior segment of the right lobe  of the liver is unchanged.  The history describes prior cholecystectomy, but the gallbladder is still present.  Small nonenhancing renal cysts are similar in appearance.  The uterus is surgically absent.  Findings resemble a mild ileus secondary to constipation.  The appendix is visualized and is unremarkable.  No hyperdense nephroureterolithiasis or hydroureteronephrosis is present.  The heterogeneously enhancing mass at the left dome of the bladder is similar in appearance.  The other organs in the abdomen and pelvis are grossly unremarkable. There is no free fluid, focal fluid collections, free air, or significant mesenteric inflammatory stranding.                                         X-Ray Abdomen AP 1 View (KUB) (Final result)  Result time 11/30/22 15:02:24      Final result by Prabhakar Rojas MD (11/30/22 15:02:24)                   Impression:      1. No diagnostic acute abdominal abnormality identified.      Electronically signed by: Prabhakar Rojas  Date:    11/30/2022  Time:    15:02               Narrative:    EXAMINATION:  XR ABDOMEN AP 1 VIEW    CLINICAL HISTORY:  , Unspecified abdominal pain.    COMPARISON:  07/15/2022    FINDINGS:  Single AP or more views reveal a nonspecific bowel gas pattern without evidence of obstruction. Gas and feces are seen within the colon.  Degenerative changes are noted to the lumbar spine.  Atherosclerosis is seen within the aorta and branching vessels.  Scattered small calcifications are again seen within the pelvis suspicious for phleboliths.                                       Medications   lactated ringers bolus 500 mL (0 mLs Intravenous Stopped 11/30/22 1847)     Followed by   lactated ringers infusion (has no administration in time range)   iopamidoL (ISOVUE-370) injection 100 mL (100 mLs Intravenous Given 11/30/22 1730)   cefTRIAXone injection 1 g (1 g Intravenous Given 11/30/22 1849)   lactulose 20 gram/30 mL solution Soln 20 g (20 g Oral Given 11/30/22 1850)      Medical Decision Making:   Initial Assessment:   Abdominal pain in an 89-year-old who had a recent bladder cancer resected.  Could be ominous could be nothing at all.  She does not appear to be in any distress her cognitive deficit in her age make it a little hard to pinpoint this.  Abdominal pain though today 9-year-old will be completely worked up  Differential Diagnosis:   Partial bowel obstruction, acute peritonitis, bladder infection  Clinical Tests:   Lab Tests: Ordered  Radiological Study: Ordered           ED Course as of 11/30/22 1905 Wed Nov 30, 2022 1812 At 6:00 p.m..  Patient is turned over to my colleague Dr. Bean [DM]   1900 LOUANN SAXENA MD, assumed care at 1755.  Agree with above care plan and documentation.   Patient seen and examined.  She has a history of a bladder tumor that is being worked up by Urology and in speaking with her and her family member she is got terrible bowel issues and is on laxatives and Linzess at home already.  Patient states that she is been having constipation issues but she was able to have a bowel movement yesterday and today but she just feels bloated and has a little more lower abdominal pressure than normal.  Workup has been done and shows that she does have a urinary tract infection which I have ordered Rocephin intravenous 4 and was sent home with Macrobid and she is already on lactulose but only 10 g daily as needed so will give 20 g right now and recommend she takes stool softeners with her Linzess and use the lactulose 20 g 3 times a day with plenty of fluids until she has a very loose bowel movement then back off to her once a day dose.  Patient and family member agree with this plan and are ready to be discharged [PL]      ED Course User Index  [DM] Prabhakar Calderón MD  [PL] Micky Bean MD                 Clinical Impression:   Final diagnoses:  [R10.9] Abdominal pain  [N39.0] Urinary tract infection without hematuria, site  unspecified  [R10.30] Lower abdominal pain  [C67.9] Malignant neoplasm of urinary bladder, unspecified site  [K59.00] Constipation, unspecified constipation type (Primary)        ED Disposition Condition    Discharge Stable          ED Prescriptions       Medication Sig Dispense Start Date End Date Auth. Provider    nitrofurantoin, macrocrystal-monohydrate, (MACROBID) 100 MG capsule Take 1 capsule (100 mg total) by mouth 2 (two) times daily. for 5 days 10 capsule 11/30/2022 12/5/2022 Micky Bean MD          Follow-up Information       Follow up With Specialties Details Why Contact Info    Barrett Lopez MD Family Medicine In 2 days  [address]   [place] [address] [phone]            Kassie Malcolm is a certified MA and was present during the entire interaction with this patient      Micky Bean MD  11/30/22 6243

## 2022-12-01 NOTE — ED PROVIDER NOTES
Encounter Date: 12/1/2022  History by daughter and patient limited due to patient's dementia     History     Chief Complaint   Patient presents with    Headache     Pt was seen last night for constipation, today c/o headache.      HPI  Patient is brought to the emergency room with complaint of headache and nausea and vomiting.  Patient was seen in the emergency room yesterday with a UTI and constipation, she was given antibiotics, but today she started having nausea and vomiting and also having some headache, according to family they were moving her and she fell and they feel that she might have hit her head, patient just complains of headache and nausea and vomiting.  According to patient she had 3 bowel movements today, but the family saying that she did not have any bowel movements today at all.    Review of patient's allergies indicates:   Allergen Reactions    Statins-hmg-coa reductase inhibitors      Other reaction(s): Joint pain    Bimatoprost      Other reaction(s): Eye redness    Cortisone     Dorzolamide-timolol     Gabapentin      Other reaction(s): Confusion    Hydrocortisone     Tafluprost (pf)      Past Medical History:   Diagnosis Date    Anxiety disorder     Aortic stenosis     Arthritis     Carpal tunnel syndrome     Coronary artery disease     Dementia     Depression     Diabetes mellitus     GERD (gastroesophageal reflux disease)     Hx of rectal polypectomy     Hypercholesterolemia     Hypertension     Obstructive sleep apnea     Respiratory distress     S/P TAVR (transcatheter aortic valve replacement)     TIA (transient ischemic attack)      Past Surgical History:   Procedure Laterality Date    CARDIAC SURGERY      CATARACT EXTRACTION      CORONARY STENT PLACEMENT      HYSTERECTOMY      INSERTION OF PACEMAKER      PARTIAL HYSTERECTOMY      RECTAL POLYPECTOMY      TONSILLECTOMY      TRANSCATHETER AORTIC VALVE REPLACEMENT (TAVR)       Family History   Problem Relation Age of Onset    Diabetes  Mother     Stroke Father     Hyperlipidemia Father     Breast cancer Sister      Social History     Tobacco Use    Smoking status: Never    Smokeless tobacco: Never   Substance Use Topics    Alcohol use: Never    Drug use: Never     Review of Systems   Unable to perform ROS: Dementia   HENT:  Negative for trouble swallowing and voice change.    Eyes:  Negative for visual disturbance.   Respiratory:  Negative for cough and shortness of breath.    Cardiovascular:  Negative for chest pain.   Gastrointestinal:  Positive for constipation, nausea and vomiting. Negative for abdominal pain and diarrhea.   Genitourinary:  Negative for dysuria and hematuria.   Musculoskeletal:  Negative for gait problem.        No Pain.   Skin:  Negative for color change and rash.   Neurological:  Positive for headaches.   Psychiatric/Behavioral:  Negative for behavioral problems and sleep disturbance.    All other systems reviewed and are negative.    Physical Exam     Initial Vitals [12/01/22 1325]   BP Pulse Resp Temp SpO2   (!) 164/66 96 18 98.5 °F (36.9 °C) 98 %      MAP       --         Physical Exam    Nursing note and vitals reviewed.  Constitutional: She appears well-developed and well-nourished. No distress.   HENT:   Head: Atraumatic.   Eyes: EOM are normal.   Neck: Neck supple.   Cardiovascular:  Normal rate and regular rhythm.           Pulmonary/Chest: Breath sounds normal. No respiratory distress.   Abdominal: Abdomen is soft. Bowel sounds are normal. She exhibits no distension. There is no abdominal tenderness.   Musculoskeletal:         General: Normal range of motion.      Cervical back: Neck supple.     Neurological: She is alert and oriented to person, place, and time.   Bed-bound patient   Skin: Skin is warm and dry.   Psychiatric: She has a normal mood and affect.       ED Course   Procedures    Orders Placed This Encounter   Procedures    CT Head Without Contrast    CT Cervical Spine Without Contrast    X-Ray Abdomen  Flat And Erect    X-Ray Chest 1 View    Comprehensive metabolic panel    CBC auto differential    CBC with Differential      Medications   ondansetron disintegrating tablet 4 mg (4 mg Oral Given 12/1/22 1351)       Admission on 12/01/2022   Component Date Value Ref Range Status    Sodium Level 12/01/2022 135 (L)  136 - 145 mmol/L Final    Potassium Level 12/01/2022 3.6  3.5 - 5.1 mmol/L Final    Chloride 12/01/2022 100  98 - 107 mmol/L Final    Carbon Dioxide 12/01/2022 26  23 - 31 mmol/L Final    Glucose Level 12/01/2022 144 (H)  82 - 115 mg/dL Final    Blood Urea Nitrogen 12/01/2022 13.0  9.8 - 20.1 mg/dL Final    Creatinine 12/01/2022 0.82  0.55 - 1.02 mg/dL Final    Calcium Level Total 12/01/2022 9.6  8.4 - 10.2 mg/dL Final    Protein Total 12/01/2022 7.3  5.8 - 7.6 gm/dL Final    Albumin Level 12/01/2022 3.6  3.4 - 4.8 gm/dL Final    Globulin 12/01/2022 3.7 (H)  2.4 - 3.5 gm/dL Final    Albumin/Globulin Ratio 12/01/2022 1.0 (L)  1.1 - 2.0 ratio Final    Bilirubin Total 12/01/2022 0.6  <=1.5 mg/dL Final    Alkaline Phosphatase 12/01/2022 87  40 - 150 unit/L Final    Alanine Aminotransferase 12/01/2022 11  0 - 55 unit/L Final    Aspartate Aminotransferase 12/01/2022 21  5 - 34 unit/L Final    eGFR 12/01/2022 >60  mls/min/1.73/m2 Final    WBC 12/01/2022 9.8  4.5 - 11.5 x10(3)/mcL Final    RBC 12/01/2022 4.60  4.20 - 5.40 x10(6)/mcL Final    Hgb 12/01/2022 12.9  12.0 - 16.0 gm/dL Final    Hct 12/01/2022 40.3  37.0 - 47.0 % Final    MCV 12/01/2022 87.6  80.0 - 94.0 fL Final    MCH 12/01/2022 28.0  27.0 - 31.0 pg Final    MCHC 12/01/2022 32.0 (L)  33.0 - 36.0 mg/dL Final    RDW 12/01/2022 12.9  11.5 - 17.0 % Final    Platelet 12/01/2022 177  130 - 400 x10(3)/mcL Final    MPV 12/01/2022 10.7 (H)  7.4 - 10.4 fL Final    Neut % 12/01/2022 79.4  % Final    Lymph % 12/01/2022 10.5  % Final    Mono % 12/01/2022 8.6  % Final    Eos % 12/01/2022 0.6  % Final    Basophil % 12/01/2022 0.4  % Final    Lymph # 12/01/2022 1.03   0.6 - 4.6 x10(3)/mcL Final    Neut # 12/01/2022 7.8  2.1 - 9.2 x10(3)/mcL Final    Mono # 12/01/2022 0.84  0.1 - 1.3 x10(3)/mcL Final    Eos # 12/01/2022 0.06  0 - 0.9 x10(3)/mcL Final    Baso # 12/01/2022 0.04  0 - 0.2 x10(3)/mcL Final    IG# 12/01/2022 0.05 (H)  0 - 0.04 x10(3)/mcL Final    IG% 12/01/2022 0.5  % Final              Imaging Results              CT Head Without Contrast (Final result)  Result time 12/01/22 14:23:21      Final result by Prabhakar Rojas MD (12/01/22 14:23:21)                   Impression:      1. No acute intracranial abnormality identified  2. Generalized cerebral and cerebellar atrophy  3. Intracranial atherosclerosis  4. Scattered hypodensities at the periventricular white matter suspicious for small vessel ischemic changes  5. Mucosal thickening at the right maxillary sinus      Electronically signed by: Prabhakar Rojas  Date:    12/01/2022  Time:    14:23               Narrative:    EXAMINATION:  CT HEAD WITHOUT CONTRAST    CLINICAL HISTORY:  Head trauma, minor (Age >= 65y);On Plavix;, .    TECHNIQUE:  PATIENT RADIATION DOSE: DLP(mGycm) 1489    As per PQRS measures, all CT scans at this facility used dose modulation, iterative reconstruction, and/or weight based dose adjustment when appropriate to reduce radiation dose to as low as reasonably achievable.    COMPARISON:  12/17/2020    FINDINGS:  Serial axial images were obtained of the head without the administration of IV contrast. Both brain and bone parenchymal windows were obtained.  Additional coronal and sagittal reconstructions were obtained.  Ventricles, cisterns, and sulci are prominent in size.  There is no evidence of intracranial hemorrhage, midline shift, mass effect, or abnormal extra-axial fluid collections.  Scattered hypodensities are seen within the periventricular white matter.  Intracranial atherosclerosis is identified.  Cerebellar tonsils extend caudally to the level of the foramen magnum.  There is mucosal  thickening at the right maxillary sinus.  Mastoid air cells are aerated bilaterally.  External auditory canals are grossly patent.                                       CT Cervical Spine Without Contrast (Final result)  Result time 12/01/22 14:42:52      Final result by Prabhakar Rojas MD (12/01/22 14:42:52)                   Impression:      1. Slight retrolisthesis of C5 on C6  2. Slight decreased height at C3, C4, C5, and C6 suspicious for congenital variant and or degenerative changes.  3. Advanced cervical spondylosis with subchondral cyst formation at the base of the odontoid  4. Multilevel moderate to severe encroachment into the central spinal canal and neural foramina as described; most severe at C5-6.  MR examination would allow further evaluation if clinically indicated  5. Osteopenia  6. Reversal of the normal lordotic curve with the apex at C3      Electronically signed by: Prabhakar Rojas  Date:    12/01/2022  Time:    14:42               Narrative:    EXAMINATION:  CT CERVICAL SPINE WITHOUT CONTRAST    CLINICAL HISTORY:  , Neck trauma (Age >= 65y);    TECHNIQUE,:  PATIENT RADIATION DOSE: DLP(mGycm) 1489    As per PQRS measures, all CT scans at this facility used dose modulation, iterative reconstruction, and/or weight based dose adjustment when appropriate to reduce radiation dose to as low as reasonably achievable.    COMPARISON:  None available    FINDINGS:  Serial axial images were obtained of the cervical spine without the administration of intravenous contrast.  Additional coronal and sagittal images were performed.  Both soft tissue and bone windows were obtained. There is slight decreased vertebral body height at the C3, C4, C5, and C6 suspicious for congenital variant and or degenerative changes.  There is reversal of the normal lordotic curve with the apex at the C3.  Subchondral cyst formation is evident at the odontoid process.  There is slight retrolisthesis of C5 on C6.  No acute fracture  or subluxation is seen.  Disc space narrowing, osteophyte formation, and facet hypertrophic changes are identified.  Bony structures are osteopenic.  There is no loss of integrity to the bony spinal canal.  There is multilevel moderate to severe encroachment into the central spinal canal and neural foramina secondary to posterior osteophyte formation, facet hypertrophic changes, and poorly visualized posterior disc bulge; most severe at C5-6 with slight retrolisthesis of C5 on C6.  The lung apices are relatively clear.  Atherosclerosis is seen within the carotid arteries.  Thyroid lobes are relatively symmetric in size.  A pinpoint bleb of gas is seen within the right neural foramina at C3-4 suspicious for vacuum phenomena.                                       X-Ray Abdomen Flat And Erect (Final result)  Result time 12/01/22 14:33:12      Final result by Prabhakar Rojas MD (12/01/22 14:33:12)                   Impression:      1. No diagnostic acute abdominal abnormality identified.      Electronically signed by: Prabhakar Rojas  Date:    12/01/2022  Time:    14:33               Narrative:    EXAMINATION:  XR ABDOMEN FLAT AND ERECT    CLINICAL HISTORY:  XR ABDOMEN FLAT AND ERECT, Constipation, unspecified.    COMPARISON:  11/30/2022    FINDINGS:  Upright and supine views reveal a nonspecific bowel gas pattern without evidence of obstruction. No free air is seen outside of bowel lumen. Gas and feces are seen within the colon.  The patient is rotated on the exam degenerative changes are noted to the thoracic spine.  Bony structures are osteopenic.                                       X-Ray Chest 1 View (Final result)  Result time 12/01/22 14:43:40      Final result by Prabhakar Rojas MD (12/01/22 14:43:40)                   Impression:      1. Borderline cardiomegaly  2. Suboptimal suppuration  3. Atherosclerosis  4. Cardiac valve prosthetic  5. Cardiac device left chest      Electronically signed by: Prabhakar  Bob  Date:    12/01/2022  Time:    14:43               Narrative:    EXAMINATION:  XR CHEST 1 VIEW    CLINICAL HISTORY:  , Unspecified fall, initial encounter.    COMPARISON:  07/12/2022    FINDINGS:  An AP view or more reveals the heart to be borderline enlarged.  The trachea is midline.  Atherosclerosis is seen within the aorta.  A cardiac device is noted to the left chest.  A cardiac valve prosthetic is identified.  No definite infiltrate or effusion is seen.  Inspiration is less than optimal.  Degenerative changes are noted to the thoracic spine.                                       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 [GQ]      ED Course User Index  [GQ] Jyoti Granados MD                 Clinical Impression:   Final diagnoses:  [K59.00] Constipation  [W19.XXXA] Fall  [R51.9] Sinus headache (Primary)        ED Disposition Condition    Discharge Stable          ED Prescriptions       Medication Sig Dispense Start Date End Date Auth. Provider    ondansetron (ZOFRAN) 4 MG tablet Take 1 tablet (4 mg total) by mouth every 8 (eight) hours as needed for Nausea. 30 tablet 12/1/2022 12/11/2022 Jyoti Granados MD    docusate sodium (COLACE) 100 MG capsule Take 1 capsule (100 mg total) by mouth 2 (two) times daily. for 15 days 30 capsule 12/1/2022 12/16/2022 Jyoti Granados MD          Follow-up Information       Follow up With Specialties Details Why Contact Info     Barrett Lopez MD Family Medicine In 2 days  [address] [place] [address]  [phone]               Jyoti Granados MD  12/01/22 1500

## 2022-12-03 LAB — BACTERIA UR CULT: NO GROWTH

## 2022-12-11 ENCOUNTER — HOSPITAL ENCOUNTER (INPATIENT)
Facility: HOSPITAL | Age: 87
LOS: 19 days | Discharge: HOSPICE | DRG: 163 | End: 2022-12-30
Attending: STUDENT IN AN ORGANIZED HEALTH CARE EDUCATION/TRAINING PROGRAM | Admitting: STUDENT IN AN ORGANIZED HEALTH CARE EDUCATION/TRAINING PROGRAM
Payer: MEDICARE

## 2022-12-11 ENCOUNTER — HOSPITAL ENCOUNTER (EMERGENCY)
Facility: HOSPITAL | Age: 87
Discharge: ANOTHER HOSPITAL | End: 2022-12-11
Attending: EMERGENCY MEDICINE
Payer: MEDICARE

## 2022-12-11 VITALS
HEIGHT: 64 IN | WEIGHT: 180 LBS | BODY MASS INDEX: 30.73 KG/M2 | OXYGEN SATURATION: 95 % | HEART RATE: 88 BPM | TEMPERATURE: 98 F | RESPIRATION RATE: 24 BRPM | DIASTOLIC BLOOD PRESSURE: 72 MMHG | SYSTOLIC BLOOD PRESSURE: 113 MMHG

## 2022-12-11 DIAGNOSIS — R10.9 ABDOMINAL PAIN: ICD-10-CM

## 2022-12-11 DIAGNOSIS — R06.02 SOB (SHORTNESS OF BREATH): ICD-10-CM

## 2022-12-11 DIAGNOSIS — R13.12 OROPHARYNGEAL DYSPHAGIA: ICD-10-CM

## 2022-12-11 DIAGNOSIS — I21.4 NSTEMI (NON-ST ELEVATED MYOCARDIAL INFARCTION): Primary | ICD-10-CM

## 2022-12-11 DIAGNOSIS — C67.9 MALIGNANT NEOPLASM OF URINARY BLADDER, UNSPECIFIED SITE: ICD-10-CM

## 2022-12-11 DIAGNOSIS — I26.99 PULMONARY EMBOLISM: ICD-10-CM

## 2022-12-11 DIAGNOSIS — I81 PORTAL VEIN THROMBOSIS: ICD-10-CM

## 2022-12-11 DIAGNOSIS — N17.9 AKI (ACUTE KIDNEY INJURY): ICD-10-CM

## 2022-12-11 DIAGNOSIS — E87.5 HYPERKALEMIA: ICD-10-CM

## 2022-12-11 DIAGNOSIS — R09.02 HYPOXIA: ICD-10-CM

## 2022-12-11 DIAGNOSIS — M79.89 SWELLING OF BOTH UPPER EXTREMITIES: ICD-10-CM

## 2022-12-11 DIAGNOSIS — I26.99 PULMONARY EMBOLISM, UNSPECIFIED CHRONICITY, UNSPECIFIED PULMONARY EMBOLISM TYPE, UNSPECIFIED WHETHER ACUTE COR PULMONALE PRESENT: ICD-10-CM

## 2022-12-11 DIAGNOSIS — R60.9 SWELLING: ICD-10-CM

## 2022-12-11 DIAGNOSIS — R79.89 ELEVATED BRAIN NATRIURETIC PEPTIDE (BNP) LEVEL: ICD-10-CM

## 2022-12-11 DIAGNOSIS — R06.00 DYSPNEA, UNSPECIFIED TYPE: ICD-10-CM

## 2022-12-11 DIAGNOSIS — N17.9 ACUTE KIDNEY INJURY (NONTRAUMATIC): ICD-10-CM

## 2022-12-11 DIAGNOSIS — R06.02 SHORTNESS OF BREATH: ICD-10-CM

## 2022-12-11 LAB
ALBUMIN SERPL-MCNC: 3.2 GM/DL (ref 3.4–4.8)
ALBUMIN SERPL-MCNC: 3.3 GM/DL (ref 3.4–4.8)
ALBUMIN/GLOB SERPL: 0.9 RATIO (ref 1.1–2)
ALBUMIN/GLOB SERPL: 1 RATIO (ref 1.1–2)
ALLENS TEST: ABNORMAL
ALP SERPL-CCNC: 103 UNIT/L (ref 40–150)
ALP SERPL-CCNC: 94 UNIT/L (ref 40–150)
ALT SERPL-CCNC: 26 UNIT/L (ref 0–55)
ALT SERPL-CCNC: 44 UNIT/L (ref 0–55)
APPEARANCE UR: CLEAR
AST SERPL-CCNC: 38 UNIT/L (ref 5–34)
AST SERPL-CCNC: 64 UNIT/L (ref 5–34)
BACTERIA #/AREA URNS AUTO: ABNORMAL /HPF
BASOPHILS # BLD AUTO: 0.02 X10(3)/MCL (ref 0–0.2)
BASOPHILS # BLD AUTO: 0.03 X10(3)/MCL (ref 0–0.2)
BASOPHILS NFR BLD AUTO: 0.2 %
BASOPHILS NFR BLD AUTO: 0.2 %
BILIRUB UR QL STRIP.AUTO: NEGATIVE MG/DL
BILIRUBIN DIRECT+TOT PNL SERPL-MCNC: 0.4 MG/DL
BILIRUBIN DIRECT+TOT PNL SERPL-MCNC: 0.8 MG/DL
BNP BLD-MCNC: 1124.1 PG/ML
BNP BLD-MCNC: 1900 PG/ML
BUN SERPL-MCNC: 43 MG/DL (ref 9.8–20.1)
BUN SERPL-MCNC: 47.9 MG/DL (ref 9.8–20.1)
CALCIUM SERPL-MCNC: 9.4 MG/DL (ref 8.4–10.2)
CALCIUM SERPL-MCNC: 9.7 MG/DL (ref 8.4–10.2)
CHLORIDE SERPL-SCNC: 100 MMOL/L (ref 98–107)
CHLORIDE SERPL-SCNC: 102 MMOL/L (ref 98–107)
CO2 SERPL-SCNC: 18 MMOL/L (ref 23–31)
CO2 SERPL-SCNC: 23 MMOL/L (ref 23–31)
COLOR UR AUTO: YELLOW
CREAT SERPL-MCNC: 1.29 MG/DL (ref 0.55–1.02)
CREAT SERPL-MCNC: 2.11 MG/DL (ref 0.55–1.02)
DELSYS: ABNORMAL
EOSINOPHIL # BLD AUTO: 0.03 X10(3)/MCL (ref 0–0.9)
EOSINOPHIL # BLD AUTO: 0.07 X10(3)/MCL (ref 0–0.9)
EOSINOPHIL NFR BLD AUTO: 0.2 %
EOSINOPHIL NFR BLD AUTO: 0.6 %
ERYTHROCYTE [DISTWIDTH] IN BLOOD BY AUTOMATED COUNT: 13.3 % (ref 11.5–17)
ERYTHROCYTE [DISTWIDTH] IN BLOOD BY AUTOMATED COUNT: 13.4 % (ref 11.5–17)
GFR SERPLBLD CREATININE-BSD FMLA CKD-EPI: 22 MLS/MIN/1.73/M2
GFR SERPLBLD CREATININE-BSD FMLA CKD-EPI: 40 MLS/MIN/1.73/M2
GLOBULIN SER-MCNC: 3.4 GM/DL (ref 2.4–3.5)
GLOBULIN SER-MCNC: 3.6 GM/DL (ref 2.4–3.5)
GLUCOSE SERPL-MCNC: 138 MG/DL (ref 82–115)
GLUCOSE SERPL-MCNC: 179 MG/DL (ref 82–115)
GLUCOSE UR QL STRIP.AUTO: NEGATIVE MG/DL
HCO3 UR-SCNC: 19 MMOL/L (ref 24–28)
HCT VFR BLD AUTO: 36 % (ref 37–47)
HCT VFR BLD AUTO: 37.4 % (ref 37–47)
HEMOCCULT SP1 STL QL: NEGATIVE
HGB BLD-MCNC: 11.3 GM/DL (ref 12–16)
HGB BLD-MCNC: 11.8 GM/DL (ref 12–16)
IMM GRANULOCYTES # BLD AUTO: 0.1 X10(3)/MCL (ref 0–0.04)
IMM GRANULOCYTES # BLD AUTO: 0.18 X10(3)/MCL (ref 0–0.04)
IMM GRANULOCYTES NFR BLD AUTO: 0.9 %
IMM GRANULOCYTES NFR BLD AUTO: 1.3 %
KETONES UR QL STRIP.AUTO: ABNORMAL MG/DL
LEUKOCYTE ESTERASE UR QL STRIP.AUTO: ABNORMAL UNIT/L
LIPASE SERPL-CCNC: 13 U/L
LYMPHOCYTES # BLD AUTO: 2.25 X10(3)/MCL (ref 0.6–4.6)
LYMPHOCYTES # BLD AUTO: 3.5 X10(3)/MCL (ref 0.6–4.6)
LYMPHOCYTES NFR BLD AUTO: 19.9 %
LYMPHOCYTES NFR BLD AUTO: 24.6 %
MCH RBC QN AUTO: 27.8 PG (ref 27–31)
MCH RBC QN AUTO: 28.1 PG (ref 27–31)
MCHC RBC AUTO-ENTMCNC: 31.4 MG/DL (ref 33–36)
MCHC RBC AUTO-ENTMCNC: 31.6 MG/DL (ref 33–36)
MCV RBC AUTO: 88.2 FL (ref 80–94)
MCV RBC AUTO: 89.6 FL (ref 80–94)
MONOCYTES # BLD AUTO: 0.93 X10(3)/MCL (ref 0.1–1.3)
MONOCYTES # BLD AUTO: 1.15 X10(3)/MCL (ref 0.1–1.3)
MONOCYTES NFR BLD AUTO: 8.1 %
MONOCYTES NFR BLD AUTO: 8.2 %
MUCOUS THREADS URNS QL MICRO: ABNORMAL /LPF
NEUTROPHILS # BLD AUTO: 7.9 X10(3)/MCL (ref 2.1–9.2)
NEUTROPHILS # BLD AUTO: 9.3 X10(3)/MCL (ref 2.1–9.2)
NEUTROPHILS NFR BLD AUTO: 65.6 %
NEUTROPHILS NFR BLD AUTO: 70.2 %
NITRITE UR QL STRIP.AUTO: NEGATIVE
NRBC BLD AUTO-RTO: 3.7 %
PCO2 BLDA: 30 MMHG (ref 35–45)
PH SMN: 7.41 [PH] (ref 7.35–7.45)
PH UR STRIP.AUTO: 5 [PH]
PLATELET # BLD AUTO: 103 X10(3)/MCL (ref 130–400)
PLATELET # BLD AUTO: 117 X10(3)/MCL (ref 130–400)
PMV BLD AUTO: 11.9 FL (ref 7.4–10.4)
PMV BLD AUTO: 12 FL (ref 7.4–10.4)
PO2 BLDA: 58 MMHG (ref 80–100)
POC BE: -6 MMOL/L
POC SATURATED O2: 90 % (ref 95–100)
POC TCO2: 20 MMOL/L (ref 23–27)
POCT GLUCOSE: 117 MG/DL (ref 70–110)
POTASSIUM SERPL-SCNC: 4 MMOL/L (ref 3.5–5.1)
POTASSIUM SERPL-SCNC: 5.2 MMOL/L (ref 3.5–5.1)
PROT SERPL-MCNC: 6.7 GM/DL (ref 5.8–7.6)
PROT SERPL-MCNC: 6.8 GM/DL (ref 5.8–7.6)
PROT UR QL STRIP.AUTO: 100 MG/DL
RBC # BLD AUTO: 4.02 X10(6)/MCL (ref 4.2–5.4)
RBC # BLD AUTO: 4.24 X10(6)/MCL (ref 4.2–5.4)
RBC #/AREA URNS AUTO: ABNORMAL /HPF
RBC UR QL AUTO: NEGATIVE UNIT/L
SAMPLE: ABNORMAL
SITE: ABNORMAL
SODIUM SERPL-SCNC: 135 MMOL/L (ref 136–145)
SODIUM SERPL-SCNC: 135 MMOL/L (ref 136–145)
SP GR UR STRIP.AUTO: >=1.03
SQUAMOUS #/AREA URNS AUTO: ABNORMAL /HPF
TROPONIN I SERPL-MCNC: 0.76 NG/ML (ref 0–0.04)
TROPONIN I SERPL-MCNC: 0.78 NG/ML (ref 0–0.04)
TROPONIN I SERPL-MCNC: 0.87 NG/ML (ref 0–0.04)
TROPONIN I SERPL-MCNC: 0.9 NG/ML (ref 0–0.04)
UROBILINOGEN UR STRIP-ACNC: 0.2 MG/DL
WBC # SPEC AUTO: 11.3 X10(3)/MCL (ref 4.5–11.5)
WBC # SPEC AUTO: 14.2 X10(3)/MCL (ref 4.5–11.5)
WBC #/AREA URNS AUTO: ABNORMAL /HPF

## 2022-12-11 PROCEDURE — 82803 BLOOD GASES ANY COMBINATION: CPT

## 2022-12-11 PROCEDURE — 93010 EKG 12-LEAD: ICD-10-PCS | Mod: ,,, | Performed by: STUDENT IN AN ORGANIZED HEALTH CARE EDUCATION/TRAINING PROGRAM

## 2022-12-11 PROCEDURE — 99285 EMERGENCY DEPT VISIT HI MDM: CPT | Mod: 25

## 2022-12-11 PROCEDURE — 99291 CRITICAL CARE FIRST HOUR: CPT | Mod: 25

## 2022-12-11 PROCEDURE — 25000003 PHARM REV CODE 250: Performed by: EMERGENCY MEDICINE

## 2022-12-11 PROCEDURE — 96374 THER/PROPH/DIAG INJ IV PUSH: CPT

## 2022-12-11 PROCEDURE — 63600175 PHARM REV CODE 636 W HCPCS: Performed by: STUDENT IN AN ORGANIZED HEALTH CARE EDUCATION/TRAINING PROGRAM

## 2022-12-11 PROCEDURE — 96372 THER/PROPH/DIAG INJ SC/IM: CPT | Mod: 59 | Performed by: EMERGENCY MEDICINE

## 2022-12-11 PROCEDURE — 84484 ASSAY OF TROPONIN QUANT: CPT | Mod: 91 | Performed by: EMERGENCY MEDICINE

## 2022-12-11 PROCEDURE — 84484 ASSAY OF TROPONIN QUANT: CPT | Performed by: STUDENT IN AN ORGANIZED HEALTH CARE EDUCATION/TRAINING PROGRAM

## 2022-12-11 PROCEDURE — 81001 URINALYSIS AUTO W/SCOPE: CPT | Performed by: EMERGENCY MEDICINE

## 2022-12-11 PROCEDURE — 93005 ELECTROCARDIOGRAM TRACING: CPT

## 2022-12-11 PROCEDURE — 80053 COMPREHEN METABOLIC PANEL: CPT | Performed by: STUDENT IN AN ORGANIZED HEALTH CARE EDUCATION/TRAINING PROGRAM

## 2022-12-11 PROCEDURE — 87088 URINE BACTERIA CULTURE: CPT | Performed by: EMERGENCY MEDICINE

## 2022-12-11 PROCEDURE — 80053 COMPREHEN METABOLIC PANEL: CPT | Performed by: EMERGENCY MEDICINE

## 2022-12-11 PROCEDURE — 93010 EKG 12-LEAD: ICD-10-PCS | Mod: ,,, | Performed by: INTERNAL MEDICINE

## 2022-12-11 PROCEDURE — 83690 ASSAY OF LIPASE: CPT | Performed by: EMERGENCY MEDICINE

## 2022-12-11 PROCEDURE — 96361 HYDRATE IV INFUSION ADD-ON: CPT

## 2022-12-11 PROCEDURE — 83880 ASSAY OF NATRIURETIC PEPTIDE: CPT | Performed by: STUDENT IN AN ORGANIZED HEALTH CARE EDUCATION/TRAINING PROGRAM

## 2022-12-11 PROCEDURE — 83880 ASSAY OF NATRIURETIC PEPTIDE: CPT | Performed by: EMERGENCY MEDICINE

## 2022-12-11 PROCEDURE — 63600175 PHARM REV CODE 636 W HCPCS: Performed by: EMERGENCY MEDICINE

## 2022-12-11 PROCEDURE — 93010 ELECTROCARDIOGRAM REPORT: CPT | Mod: ,,, | Performed by: INTERNAL MEDICINE

## 2022-12-11 PROCEDURE — 99900035 HC TECH TIME PER 15 MIN (STAT)

## 2022-12-11 PROCEDURE — 93010 ELECTROCARDIOGRAM REPORT: CPT | Mod: ,,, | Performed by: STUDENT IN AN ORGANIZED HEALTH CARE EDUCATION/TRAINING PROGRAM

## 2022-12-11 PROCEDURE — 81003 URINALYSIS AUTO W/O SCOPE: CPT | Performed by: EMERGENCY MEDICINE

## 2022-12-11 PROCEDURE — 11000001 HC ACUTE MED/SURG PRIVATE ROOM

## 2022-12-11 PROCEDURE — 36600 WITHDRAWAL OF ARTERIAL BLOOD: CPT

## 2022-12-11 PROCEDURE — 82270 OCCULT BLOOD FECES: CPT | Performed by: EMERGENCY MEDICINE

## 2022-12-11 PROCEDURE — 85025 COMPLETE CBC W/AUTO DIFF WBC: CPT | Performed by: STUDENT IN AN ORGANIZED HEALTH CARE EDUCATION/TRAINING PROGRAM

## 2022-12-11 PROCEDURE — 82962 GLUCOSE BLOOD TEST: CPT | Mod: 59

## 2022-12-11 PROCEDURE — 85025 COMPLETE CBC W/AUTO DIFF WBC: CPT | Performed by: EMERGENCY MEDICINE

## 2022-12-11 RX ORDER — MORPHINE SULFATE 4 MG/ML
2 INJECTION, SOLUTION INTRAMUSCULAR; INTRAVENOUS EVERY 4 HOURS PRN
Status: DISCONTINUED | OUTPATIENT
Start: 2022-12-11 | End: 2022-12-17

## 2022-12-11 RX ORDER — SODIUM CHLORIDE 9 MG/ML
1000 INJECTION, SOLUTION INTRAVENOUS
Status: COMPLETED | OUTPATIENT
Start: 2022-12-11 | End: 2022-12-11

## 2022-12-11 RX ORDER — ENOXAPARIN SODIUM 100 MG/ML
80 INJECTION SUBCUTANEOUS ONCE
Status: COMPLETED | OUTPATIENT
Start: 2022-12-11 | End: 2022-12-11

## 2022-12-11 RX ORDER — FAMOTIDINE 10 MG/ML
20 INJECTION INTRAVENOUS DAILY
Status: DISCONTINUED | OUTPATIENT
Start: 2022-12-12 | End: 2022-12-26

## 2022-12-11 RX ORDER — SODIUM CHLORIDE 0.9 % (FLUSH) 0.9 %
10 SYRINGE (ML) INJECTION
Status: DISCONTINUED | OUTPATIENT
Start: 2022-12-11 | End: 2022-12-30 | Stop reason: HOSPADM

## 2022-12-11 RX ORDER — ONDANSETRON 2 MG/ML
4 INJECTION INTRAMUSCULAR; INTRAVENOUS EVERY 8 HOURS PRN
Status: DISCONTINUED | OUTPATIENT
Start: 2022-12-11 | End: 2022-12-30 | Stop reason: HOSPADM

## 2022-12-11 RX ORDER — FUROSEMIDE 10 MG/ML
40 INJECTION INTRAMUSCULAR; INTRAVENOUS
Status: COMPLETED | OUTPATIENT
Start: 2022-12-11 | End: 2022-12-11

## 2022-12-11 RX ORDER — TALC
6 POWDER (GRAM) TOPICAL NIGHTLY PRN
Status: DISCONTINUED | OUTPATIENT
Start: 2022-12-11 | End: 2022-12-18

## 2022-12-11 RX ORDER — MORPHINE SULFATE 4 MG/ML
4 INJECTION, SOLUTION INTRAMUSCULAR; INTRAVENOUS EVERY 4 HOURS PRN
Status: DISCONTINUED | OUTPATIENT
Start: 2022-12-11 | End: 2022-12-17

## 2022-12-11 RX ORDER — SODIUM CHLORIDE 0.9 % (FLUSH) 0.9 %
10 SYRINGE (ML) INJECTION
Status: DISCONTINUED | OUTPATIENT
Start: 2022-12-11 | End: 2022-12-11 | Stop reason: HOSPADM

## 2022-12-11 RX ORDER — NAPROXEN SODIUM 220 MG/1
324 TABLET, FILM COATED ORAL ONCE
Status: COMPLETED | OUTPATIENT
Start: 2022-12-11 | End: 2022-12-11

## 2022-12-11 RX ORDER — ONDANSETRON 2 MG/ML
4 INJECTION INTRAMUSCULAR; INTRAVENOUS
Status: COMPLETED | OUTPATIENT
Start: 2022-12-11 | End: 2022-12-11

## 2022-12-11 RX ADMIN — FUROSEMIDE 40 MG: 10 INJECTION, SOLUTION INTRAVENOUS at 10:12

## 2022-12-11 RX ADMIN — SODIUM CHLORIDE 1000 ML: 9 INJECTION, SOLUTION INTRAVENOUS at 10:12

## 2022-12-11 RX ADMIN — ASPIRIN 81 MG 324 MG: 81 TABLET ORAL at 10:12

## 2022-12-11 RX ADMIN — ENOXAPARIN SODIUM 80 MG: 100 INJECTION SUBCUTANEOUS at 03:12

## 2022-12-11 RX ADMIN — ONDANSETRON 4 MG: 2 INJECTION INTRAMUSCULAR; INTRAVENOUS at 09:12

## 2022-12-11 RX ADMIN — SODIUM CHLORIDE 1000 ML: 9 INJECTION, SOLUTION INTRAVENOUS at 09:12

## 2022-12-11 NOTE — Clinical Note
The catheter was inserted into the  proximal LPA. Aspiration thrombectomy performed using Penumbra Lightning 12 system and canister. .

## 2022-12-11 NOTE — ED PROVIDER NOTES
"Encounter Date: 2022       History     Chief Complaint   Patient presents with    Back Pain     Pt c/o pain to rt lower back and "pulling" to abdomen x one week/     This is a 89-year-old black female who presents the emergency department complaining of abdominal pain.  The daughter who lives with a brings her in.  Apparently in October she had a bladder cancer resected.  Dr. Nacho Long was the surgeon.  Her normal doctors Dr. Barrett Lopez she saw Dr. Lopez on the  2 days ago.  For all the similar problems.  Diffuse weakness can not help herself all this week.  He decreased her Valium dose.  Also increased her lactulose dose she is had problems with constipation in the past.  Daughter said she is been very weak can not help herself she seems to be very short of breath even with the minor is if exertion.  And she has right side pain lower abdomen feels like something wants to burst.  The patient does have early dementia.  Bladder cancer resected in October hypertension diabetes mellitus borderline but no medications.  She coronary disease she is a full code.      Daughter denies fever or chills denies vomiting.  Very weak very short of breath not eating or drinking that is got progressively worsened yesterday.  There has been no reported fever no report of vomiting.      Past medical history significant for bladder cancer hypertension coronary disease diabetes mellitus but no medications.  And early-onset dementia.      Social history  lives home with the son and a daughter.  Does not use tobacco alcohol or drugs.      Vaccinations COVID shots x2 has not had any pneumonia shots flu or tetanus the daughter is aware of.      Surgical history bladder or mass resection in abdomen for bladder cancer.  Pacemaker and 3 cardiac stents.      Gyn history  8 para 8 status post hysterectomy.      Physicians Dr. Barrett Lopez, Dr. Candida Long, Dr. Miko berger.      Mother "  unknown etiology dad  heart attack    Review of patient's allergies indicates:   Allergen Reactions    Statins-hmg-coa reductase inhibitors      Other reaction(s): Joint pain    Bimatoprost      Other reaction(s): Eye redness    Cortisone     Dorzolamide-timolol     Gabapentin      Other reaction(s): Confusion    Hydrocortisone     Tafluprost (pf)      Past Medical History:   Diagnosis Date    Anxiety disorder     Aortic stenosis     Arthritis     Carpal tunnel syndrome     Coronary artery disease     Dementia     Depression     Diabetes mellitus     GERD (gastroesophageal reflux disease)     Hx of rectal polypectomy     Hypercholesterolemia     Hypertension     Obstructive sleep apnea     Respiratory distress     S/P TAVR (transcatheter aortic valve replacement)     TIA (transient ischemic attack)      Past Surgical History:   Procedure Laterality Date    CARDIAC SURGERY      CATARACT EXTRACTION      CORONARY STENT PLACEMENT      HYSTERECTOMY      INSERTION OF PACEMAKER      PARTIAL HYSTERECTOMY      RECTAL POLYPECTOMY      TONSILLECTOMY      TRANSCATHETER AORTIC VALVE REPLACEMENT (TAVR)       Family History   Problem Relation Age of Onset    Diabetes Mother     Stroke Father     Hyperlipidemia Father     Breast cancer Sister      Social History     Tobacco Use    Smoking status: Never    Smokeless tobacco: Never   Substance Use Topics    Alcohol use: Never    Drug use: Never     Review of Systems   Unable to perform ROS: Dementia   Constitutional:         Review of systems not obtainable from the lady due to the dementia she does complain of abdominal pain when asked what the problem is indicates it is more right-sided aside from that we can not get much information from this 89-year-old female today     Physical Exam     Initial Vitals [22 0852]   BP Pulse Resp Temp SpO2   129/71 91 20 98 °F (36.7 °C) (!) 94 %      MAP       --         Physical  Exam    Nursing note and vitals reviewed.  Constitutional: She appears well-developed and well-nourished.   Moderately obese black female awake oriented only complains of right abdominal pain when questioned   HENT:   Head: Normocephalic and atraumatic.   Mucous membranes slightly dry no exudate in nose oropharynx.  TMs are normal bilaterally   Eyes: EOM are normal. Pupils are equal, round, and reactive to light.   Neck: Neck supple. No tracheal deviation present. No JVD present.   Very short obese neck limits observation for jugular venous distention   Normal range of motion.  Cardiovascular:            Regular rate and rhythm without tachycardia this time   Pulmonary/Chest:   Patient can not cooperate by taking very deep breaths there minimum decreased breath sounds in both bases there is no gross crackles or wheezing there is no dullness to percussion   Abdominal: Abdomen is soft. Bowel sounds are normal.   Abdomen is soft with no obvious masses obese abdomen.  No organomegaly able to be palpated both right and left side appear to be somewhat mildly tender but not markedly worse on the right than the left.   Genitourinary:    Genitourinary Comments: There is no CVA tenderness rectal evaluation is done it reveals some soft pasty brown stool without any melena specimen is sent to the lab stool is not of the caliber of a fecal impaction.  It is not rock hard patient is on lactulose just increased to twice a day on the 9th     Musculoskeletal:      Cervical back: Normal range of motion and neck supple.     Neurological: She is alert.   Generalized weakness without focal findings no facial asymmetry noted   Skin: Skin is warm and dry. Capillary refill takes 2 to 3 seconds. No rash noted.   Psychiatric: She has a normal mood and affect.       ED Course   Critical Care    Date/Time: 12/11/2022 2:34 PM  Performed by: Prabhakar Calderón MD  Authorized by: Prabhakar Calderón MD   Direct patient critical care time: 21  minutes  Additional history critical care time: 11 minutes  Ordering / reviewing critical care time: 10 minutes  Documentation critical care time: 15 minutes  Consulting other physicians critical care time: 9 minutes  Consult with family critical care time: 3 minutes  Total critical care time (exclusive of procedural time) : 69 minutes  Critical care was necessary to treat or prevent imminent or life-threatening deterioration of the following conditions: cardiac failure.      Labs Reviewed   COMPREHENSIVE METABOLIC PANEL - Abnormal; Notable for the following components:       Result Value    Sodium Level 135 (*)     Glucose Level 179 (*)     Blood Urea Nitrogen 43.0 (*)     Creatinine 1.29 (*)     Albumin Level 3.2 (*)     Globulin 3.6 (*)     Albumin/Globulin Ratio 0.9 (*)     Aspartate Aminotransferase 38 (*)     All other components within normal limits   URINALYSIS, REFLEX TO URINE CULTURE - Abnormal; Notable for the following components:    Protein,  (*)     Ketones, UA Trace (*)     Leukocyte Esterase, UA Trace (*)     All other components within normal limits   TROPONIN I - Abnormal; Notable for the following components:    Troponin-I 0.868 (*)     All other components within normal limits   CBC WITH DIFFERENTIAL - Abnormal; Notable for the following components:    Hgb 11.8 (*)     MCHC 31.6 (*)     Platelet 117 (*)     MPV 12.0 (*)     IG# 0.10 (*)     All other components within normal limits   B-TYPE NATRIURETIC PEPTIDE - Abnormal; Notable for the following components:    Natriuretic Peptide 1,124.1 (*)     All other components within normal limits   URINALYSIS, MICROSCOPIC - Abnormal; Notable for the following components:    Mucous, UA Small (*)     WBC, UA 11-20 (*)     Squamous Epithelial Cells, UA Few (*)     All other components within normal limits   TROPONIN I - Abnormal; Notable for the following components:    Troponin-I 0.784 (*)     All other components within normal limits   ISTAT  PROCEDURE - Abnormal; Notable for the following components:    POC PCO2 30.0 (*)     POC PO2 58 (*)     POC HCO3 19.0 (*)     POC SATURATED O2 90 (*)     POC TCO2 20 (*)     All other components within normal limits   LIPASE - Normal   OCCULT BLOOD STOOL, CA SCREEN - Normal   CULTURE, URINE   CBC W/ AUTO DIFFERENTIAL    Narrative:     The following orders were created for panel order CBC W/ AUTO DIFFERENTIAL.  Procedure                               Abnormality         Status                     ---------                               -----------         ------                     CBC with Differential[[id number]]        Abnormal            Final result                 Please view results for these tests on the individual orders.   OCCULT BLOOD,STOOL,SCREEN (1-3)    Narrative:     The following orders were created for panel order Occult Blood, Stool Screening (1 -3).  Procedure                               Abnormality         Status                     ---------                               -----------         ------                     Occult Blood Stool, CA S...[[id number]]  Normal              Final result               OCCULT BLOOD STOOL, CA S...[[id number]]                                                   Please view results for these tests on the individual orders.   OCCULT BLOOD STOOL, CA SCREEN 2ND SPEC     Recent Results (from the past 8 hour(s))   Comp. Metabolic Panel    Collection Time: 12/11/22  9:17 AM   Result Value Ref Range    Sodium Level 135 (L) 136 - 145 mmol/L    Potassium Level 4.0 3.5 - 5.1 mmol/L    Chloride 100 98 - 107 mmol/L    Carbon Dioxide 23 23 - 31 mmol/L    Glucose Level 179 (H) 82 - 115 mg/dL    Blood Urea Nitrogen 43.0 (H) 9.8 - 20.1 mg/dL    Creatinine 1.29 (H) 0.55 - 1.02 mg/dL    Calcium Level Total 9.7 8.4 - 10.2 mg/dL    Protein Total 6.8 5.8 - 7.6 gm/dL    Albumin Level 3.2 (L) 3.4 - 4.8 gm/dL    Globulin 3.6 (H) 2.4 - 3.5 gm/dL    Albumin/Globulin Ratio 0.9 (L) 1.1 - 2.0  ratio    Bilirubin Total 0.4 <=1.5 mg/dL    Alkaline Phosphatase 94 40 - 150 unit/L    Alanine Aminotransferase 26 0 - 55 unit/L    Aspartate Aminotransferase 38 (H) 5 - 34 unit/L    eGFR 40 mls/min/1.73/m2   Lipase    Collection Time: 12/11/22  9:17 AM   Result Value Ref Range    Lipase Level 13 <=60 U/L   Troponin I    Collection Time: 12/11/22  9:17 AM   Result Value Ref Range    Troponin-I 0.868 (H) 0.000 - 0.045 ng/mL   CBC with Differential    Collection Time: 12/11/22  9:17 AM   Result Value Ref Range    WBC 11.3 4.5 - 11.5 x10(3)/mcL    RBC 4.24 4.20 - 5.40 x10(6)/mcL    Hgb 11.8 (L) 12.0 - 16.0 gm/dL    Hct 37.4 37.0 - 47.0 %    MCV 88.2 80.0 - 94.0 fL    MCH 27.8 27.0 - 31.0 pg    MCHC 31.6 (L) 33.0 - 36.0 mg/dL    RDW 13.4 11.5 - 17.0 %    Platelet 117 (L) 130 - 400 x10(3)/mcL    MPV 12.0 (H) 7.4 - 10.4 fL    Neut % 70.2 %    Lymph % 19.9 %    Mono % 8.2 %    Eos % 0.6 %    Basophil % 0.2 %    Lymph # 2.25 0.6 - 4.6 x10(3)/mcL    Neut # 7.9 2.1 - 9.2 x10(3)/mcL    Mono # 0.93 0.1 - 1.3 x10(3)/mcL    Eos # 0.07 0 - 0.9 x10(3)/mcL    Baso # 0.02 0 - 0.2 x10(3)/mcL    IG# 0.10 (H) 0 - 0.04 x10(3)/mcL    IG% 0.9 %   Brain natriuretic peptide    Collection Time: 12/11/22  9:18 AM   Result Value Ref Range    Natriuretic Peptide 1,124.1 (H) <=100.0 pg/mL   Occult Blood Stool, CA Screen    Collection Time: 12/11/22  9:19 AM   Result Value Ref Range    Occult Blood Stool 1 Negative Negative   ISTAT PROCEDURE    Collection Time: 12/11/22  9:30 AM   Result Value Ref Range    POC PH 7.410 7.35 - 7.45    POC PCO2 30.0 (L) 35 - 45 mmHg    POC PO2 58 (LL) 80 - 100 mmHg    POC HCO3 19.0 (L) 24 - 28 mmol/L    POC BE -6 -2 to 2 mmol/L    POC SATURATED O2 90 (L) 95 - 100 %    POC TCO2 20 (L) 23 - 27 mmol/L    Sample ARTERIAL     Site RB     Allens Test N/A     DelSys Nasal Can    Urinalysis, Reflex to Urine Culture Urine, Clean Catch    Collection Time: 12/11/22 10:13 AM    Specimen: Urine   Result Value Ref Range    Color,  UA Yellow Yellow, Light-Yellow, Dark Yellow, Gracie, Straw    Appearance, UA Clear Clear    Specific Gravity, UA >=1.030     pH, UA 5.0 5.0 - 8.5    Protein,  (A) Negative mg/dL    Glucose, UA Negative Negative, Normal mg/dL    Ketones, UA Trace (A) Negative mg/dL    Blood, UA Negative Negative unit/L    Bilirubin, UA Negative Negative mg/dL    Urobilinogen, UA 0.2 0.2, 1.0, Normal mg/dL    Nitrites, UA Negative Negative    Leukocyte Esterase, UA Trace (A) Negative unit/L   Urinalysis, Microscopic    Collection Time: 12/11/22 10:13 AM   Result Value Ref Range    Bacteria, UA Rare None Seen, Rare, Occasional /HPF    Mucous, UA Small (A) None Seen /LPF    RBC, UA 0-2 None Seen, 0-2, 3-5, 0-5 /HPF    WBC, UA 11-20 (A) None Seen, 0-2, 3-5, 0-5 /HPF    Squamous Epithelial Cells, UA Few (A) None Seen, Rare, Occasional, Occ /HPF   Troponin I    Collection Time: 12/11/22 11:46 AM   Result Value Ref Range    Troponin-I 0.784 (H) 0.000 - 0.045 ng/mL       EKG Readings: (Independently Interpreted)   Initial Reading: No STEMI. Previous EKG: Compared with most recent EKG Previous EKG Date: july 12 2023. Rhythm: Normal Sinus Rhythm. Ectopy: PACs. Conduction: RBBB. Axis: Left Axis Deviation. Q Waves: I, II, III, V1, V2, V3 and V4.   EKG is done sinus rhythm with PACs incomplete right bundle-branch block left axis deviation-81 R axis.  Q-waves in the inferior and anterior leads    Compared to previous EKG the inferior Q-waves apparently are new and the anterior Q-waves are more pronounced on this EKG but the bundle-branch block with left axis deviation all old I d   ECG Results              EKG 12-lead (In process)  Result time 12/11/22 11:05:33      In process by Interface, Lab In [facility] (12/11/22 11:05:33)                   Narrative:    Test Reason : R10.9,    Vent. Rate : 091 BPM     Atrial Rate : 091 BPM     P-R Int : 176 ms          QRS Dur : 092 ms      QT Int : 382 ms       P-R-T Axes : 047 -81 061 degrees     QTc Int  : 469 ms    Sinus rhythm with Premature atrial complexes  Left axis deviation  Incomplete right bundle branch block  Minimal voltage criteria for LVH, may be normal variant  Inferior infarct ,age undetermined  Anterior infarct ,age undetermined  Abnormal ECG  When compared with ECG of 12-JUL-2022 11:48,  Premature atrial complexes are now Present  Anterior infarct is now Present  Inferior infarct is now Present    Referred By: [email or web address]   SELF           Confirmed By:                                   Imaging Results              X-Ray Chest AP Portable (Final result)  Result time 12/11/22 09:58:25      Final result by Glenn Hamilton MD (12/11/22 09:58:25)                   Impression:      Cardiomegaly without acute cardiopulmonary abnormality.      Electronically signed by: Glenn Hamilton MD  Date:    12/11/2022  Time:    09:58               Narrative:    EXAMINATION:  Single view chest radiograph.    CLINICAL HISTORY:  Shortness of breath    TECHNIQUE:  Single view of the chest.    COMPARISON:  Chest radiograph 12/01/2022.    FINDINGS:  The lungs are clear without consolidation or effusion.  There is no pneumothorax.  The cardiac silhouette is enlarged.  The pacing device is unchanged.                                       CT Abdomen Pelvis  Without Contrast (Final result)  Result time 12/11/22 09:57:58      Final result by Glenn Hamilton MD (12/11/22 09:57:58)                   Impression:      Heterogeneous hyperdense mass lesion in the superior and left lateral aspect of the urinary bladder.  This is highly suspicious for malignancy.  This is grossly unchanged from the prior exam.      Electronically signed by: Glenn Hamilton MD  Date:    12/11/2022  Time:    09:57               Narrative:    EXAMINATION:  CT ABDOMEN PELVIS WITHOUT CONTRAST    CLINICAL HISTORY:  Abdominal pain, acute, nonlocalized;hx of bladder cancer;    TECHNIQUE:  Axial images of the abdomen and pelvis were obtained without IV contrast administration.   Coronal and sagittal reconstructions were provided.  Three dimensional and MIP images were obtained and evaluated.  Total DLP was 466 mGy-cm. Dose lowering technique and automated exposure control were utilized for this exam.    COMPARISON:  CT of the abdomen and pelvis 11/17/2022.    FINDINGS:  The bilateral lung bases are clear.  The heart is not enlarged.    The liver is homogeneous in attenuation.  The gallbladder, spleen, pancreas, and adrenal glands are normal.  The bilateral kidneys are normal.  There is no hydronephrosis or nephrolithiasis.  There is a phlebolith in the right adnexal vein.    The stomach and small bowel are decompressed.  The appendix is normal.  The colon is normal.  The uterus is surgically absent.  There is a poorly defined area of hyperdensity along the superior left lateral aspect of the urinary bladder measuring 4.8 x 3.4 cm.  There is no pelvic or retroperitoneal adenopathy.  The aorta is nonaneurysmal.  There is no lytic or blastic osseous lesion.                                       Medications   sodium chloride 0.9% flush 10 mL (has no administration in time range)   sodium chloride 0.9% bolus 1,000 mL (0 mLs Intravenous Stopped 12/11/22 1010)   ondansetron injection 4 mg (4 mg Intravenous Given 12/11/22 0910)   0.9%  NaCl infusion (1,000 mLs Intravenous New Bag 12/11/22 1018)   aspirin chewable tablet 324 mg (324 mg Oral Given 12/11/22 1017)     Medical Decision Making:   History:   I obtained history from: someone other than patient.       <> Summary of History: I talked to the daughter at length.  Daughter gave the information progressive weakness for all this week unable to help herself or sit up.  Acutely worse last night today complaining of shortness of breath daughter notices shortness of breath with the slightest exertion.  Had a bladder cancer resected in October.  Is due to have lymph nodes surgery this Wednesday.  Old Medical Records: I decided to obtain old medical  records.  Initial Assessment:   Comes to emergency department with 89 years of age and multiple problems that appear to be somewhat recurrent somewhat new.  She does not have a history of CHF she does have a history of coronary disease she is had stents in the past cardiac workup metabolic workup abdominal workup including CT scan without contrast is done lady had a CT with contrast on the 30th I do not think we need to repeated with contrast at this time.  Differential Diagnosis:   Intra-abdominal abscess or form problem, congestive heart failure, myocardial infarction, pulmonary edema, pneumonia, COVID flu RSV, pulmonary embolus is a possibility, Nstemi ,  Stemi  Clinical Tests:   Lab Tests: Ordered and Reviewed  The following lab test(s) were unremarkable: CBC, Troponin, PTT and PT       <> Summary of Lab: Troponin was .86   3 hours later .78  Radiological Study: Ordered and Reviewed  Medical Tests: Ordered and Reviewed  ED Management:  Patient has been maintained here she did receive her aspirin.  We have run fluids we gave her a small bolus there were no lab work returned with backed off and we gently hydrating her.  We have not given her diuretics as she is satting okay with supplemental oxygen.   Other:   I have discussed this case with another health care provider.       <> Summary of the Discussion: Cardiologist reviewed lab work reviewed EKGs talked with patient and family came to the conclusion the patient will be best served with transfer to whether his cardiac capabilities an echocardiogram at a minimum possibly a heart catheterization.  We are going to call him back and determine about anticoagulation status she recently stopped her Plavix and aspirin because she was going for lymph node biopsy this Wednesday per Dr Long           ED Course as of 12/11/22 1800   Sun Dec 11, 2022   1104 Discussed with daughter the elevated cardiac enzyme the elevated congestive heart failure enzyme but negative  chest x-ray told the abdominal scan did not have any acute findings on it.  Compared to the most recent scan.  Urine is grossly uninfected.  I am going to repeat the troponin at noon which would be the 3 hour maykel and then were going to get a cardiology consult determine where we will be able to admit this patient. [DM]   1126 I did discuss with the daughter the repeat enzyme at noon and then the need to call for Dr. Miko berger or whoever is on-call for Cardiology for that person to speak to them about disposition and treatment. [DM]   1245 The troponin has dropped down from 0.88 to 0.78   I did share this with the daughter at the bedside and we have placed a call for tele cardiology.    This is very tenuous fluid status.  Her BUN creatinine would indicate she needs to be aggressively hydrated.  But her BNP even though her chest x-ray is clear with seem to signify she has early congestive heart failure and dark chronic congestive heart failure [DM]   1329 Consultation has been obtained with Dr. Prabahkar Mcgovern.  Patient will be placed in the transfer Que.  One of the daughters was present in the room and discussed this with the cardiologist via telemedicine.  I did reiterate this to the daughter request for transfer has been placed [DM]   1531 I spoke with Dr. Mcgovern a 2nd time and Lovenox was ordered.  1 milligram/kilogram 1 time injection [DM]   1542 We are still looking for a bed the [facility] told us at this time that they might have a bed at [facility] family said they would like to wait for that bed as opposed to going anywhere else.   [DM]   1755 Troponin was drawn at 5:30 p.m..  Patient will be turned over to my colleague in case something go sideways with the transfer that his plan. [DM]      ED Course User Index  [DM] Prabhakar Calderón MD                 Clinical Impression:   Final diagnoses:  [R10.9] Abdominal pain  [R06.02] SOB (shortness of breath)  [I21.4] NSTEMI (non-ST elevated myocardial infarction)  (Primary)  [R79.89] Elevated brain natriuretic peptide (BNP) level  [N17.9] Acute kidney injury (nontraumatic)  [C67.9] Malignant neoplasm of urinary bladder, unspecified site        ED Disposition Condition    Transfer to Another Facility Stable                Prabhkaar Calderón MD  12/11/22 1800

## 2022-12-11 NOTE — Clinical Note
The groin was prepped. The site was prepped with ChloraPrep. The site was clipped. The patient was draped. The patient was positioned supine. The patient was secured using safety straps.

## 2022-12-11 NOTE — Clinical Note
Diagnosis: NSTEMI (non-ST elevated myocardial infarction) [550957]   Admitting Provider:: MARIZOL GREGORIO [[id number]]   Future Attending Provider: JOSEFA BOLAÑOS [[id number]]   Reason for IP Medical Treatment  (Clinical interventions that can only be accomplished in the IP setting? ) :: cardiology evaluation, acs workup, anticoagulation   Estimated Length of Stay:: 2 midnights   I certify that Inpatient services for greater than or equal to 2 midnights are medically necessary:: Yes   Plans for Post-Acute care--if anticipated (pick the single best option):: A. No post acute care anticipated at this time

## 2022-12-11 NOTE — Clinical Note
The catheter was repositioned to the  proximal RPA. Aspiration thrombectomy performed using Penumbra Lightning 12 system and canister..

## 2022-12-12 LAB
ALBUMIN SERPL-MCNC: 3.7 GM/DL (ref 3.4–4.8)
ALBUMIN/GLOB SERPL: 0.9 RATIO (ref 1.1–2)
ALP SERPL-CCNC: 122 UNIT/L (ref 40–150)
ALT SERPL-CCNC: 92 UNIT/L (ref 0–55)
AMMONIA PLAS-MSCNC: 24.4 UMOL/L (ref 18–72)
ANION GAP SERPL CALC-SCNC: 20 MEQ/L
APPEARANCE UR: ABNORMAL
APTT PPP: 27.6 SECONDS (ref 23.2–33.7)
AST SERPL-CCNC: 128 UNIT/L (ref 5–34)
AV INDEX (PROSTH): 0.34
AV MEAN GRADIENT: 10 MMHG
AV PEAK GRADIENT: 21 MMHG
AV VELOCITY RATIO: 0.37
BACTERIA #/AREA URNS AUTO: ABNORMAL /HPF
BASOPHILS # BLD AUTO: 0.01 X10(3)/MCL (ref 0–0.2)
BASOPHILS # BLD AUTO: 0.06 X10(3)/MCL (ref 0–0.2)
BASOPHILS NFR BLD AUTO: 0.1 %
BASOPHILS NFR BLD AUTO: 0.3 %
BILIRUB UR QL STRIP.AUTO: ABNORMAL MG/DL
BILIRUBIN DIRECT+TOT PNL SERPL-MCNC: 1.1 MG/DL
BSA FOR ECHO PROCEDURE: 2 M2
BUN SERPL-MCNC: 49.7 MG/DL (ref 9.8–20.1)
BUN SERPL-MCNC: 58 MG/DL (ref 9.8–20.1)
CALCIUM SERPL-MCNC: 9.6 MG/DL (ref 8.4–10.2)
CALCIUM SERPL-MCNC: 9.6 MG/DL (ref 8.4–10.2)
CHLORIDE SERPL-SCNC: 100 MMOL/L (ref 98–107)
CHLORIDE SERPL-SCNC: 101 MMOL/L (ref 98–107)
CO2 SERPL-SCNC: 14 MMOL/L (ref 23–31)
CO2 SERPL-SCNC: 17 MMOL/L (ref 23–31)
COLOR UR AUTO: ABNORMAL
CORRECTED TEMPERATURE (PCO2): 20 MMHG (ref 35–45)
CORRECTED TEMPERATURE (PH): 7.28 (ref 7.29–7.61)
CORRECTED TEMPERATURE (PO2): 76 MMHG (ref 80–100)
CREAT SERPL-MCNC: 2.4 MG/DL (ref 0.55–1.02)
CREAT SERPL-MCNC: 3.14 MG/DL (ref 0.55–1.02)
CREAT/UREA NIT SERPL: 18
CRP SERPL-MCNC: 26.7 MG/L
CV ECHO LV RWT: 1.18 CM
DOP CALC AO PEAK VEL: 2.3 M/S
DOP CALC AO VTI: 31 CM
DOP CALC LVOT PEAK VEL: 0.84 M/S
DOP CALCLVOT PEAK VEL VTI: 10.5 CM
E WAVE DECELERATION TIME: 214 MSEC
E/A RATIO: 0.45
E/E' RATIO: 9.8 M/S
ECHO LV POSTERIOR WALL: 1.44 CM (ref 0.6–1.1)
EOSINOPHIL # BLD AUTO: 0 X10(3)/MCL (ref 0–0.9)
EOSINOPHIL # BLD AUTO: 0.01 X10(3)/MCL (ref 0–0.9)
EOSINOPHIL NFR BLD AUTO: 0 %
EOSINOPHIL NFR BLD AUTO: 0.1 %
ERYTHROCYTE [DISTWIDTH] IN BLOOD BY AUTOMATED COUNT: 13.4 % (ref 11.5–17)
ERYTHROCYTE [DISTWIDTH] IN BLOOD BY AUTOMATED COUNT: 13.6 % (ref 11.5–17)
FLUAV AG UPPER RESP QL IA.RAPID: NOT DETECTED
FLUBV AG UPPER RESP QL IA.RAPID: NOT DETECTED
FRACTIONAL SHORTENING: 36 % (ref 28–44)
GFR SERPLBLD CREATININE-BSD FMLA CKD-EPI: 14 MLS/MIN/1.73/M2
GFR SERPLBLD CREATININE-BSD FMLA CKD-EPI: 19 MLS/MIN/1.73/M2
GLOBULIN SER-MCNC: 3.9 GM/DL (ref 2.4–3.5)
GLUCOSE SERPL-MCNC: 115 MG/DL (ref 82–115)
GLUCOSE SERPL-MCNC: 92 MG/DL (ref 82–115)
GLUCOSE UR QL STRIP.AUTO: NEGATIVE MG/DL
HCO3 UR-SCNC: 9.4 MMOL/L
HCT VFR BLD AUTO: 31.6 % (ref 37–47)
HCT VFR BLD AUTO: 38.3 % (ref 37–47)
HGB BLD-MCNC: 10.2 GM/DL (ref 12–16)
HGB BLD-MCNC: 11.5 GM/DL (ref 12–16)
HGB BLD-MCNC: 11.6 G/DL (ref 12–16)
IMM GRANULOCYTES # BLD AUTO: 0.14 X10(3)/MCL (ref 0–0.04)
IMM GRANULOCYTES # BLD AUTO: 0.31 X10(3)/MCL (ref 0–0.04)
IMM GRANULOCYTES NFR BLD AUTO: 1 %
IMM GRANULOCYTES NFR BLD AUTO: 1.8 %
INR BLD: 1.57 (ref 0–1.3)
INTERVENTRICULAR SEPTUM: 1.42 CM (ref 0.6–1.1)
KETONES UR QL STRIP.AUTO: ABNORMAL MG/DL
LACTATE SERPL-SCNC: 5.9 MMOL/L (ref 0.5–2.2)
LACTATE SERPL-SCNC: 7.9 MMOL/L (ref 0.5–2.2)
LACTATE SERPL-SCNC: 8.4 MMOL/L (ref 0.5–2.2)
LACTATE SERPL-SCNC: 9.5 MMOL/L (ref 0.5–2.2)
LEFT ATRIUM SIZE: 3.1 CM
LEFT INTERNAL DIMENSION IN SYSTOLE: 1.55 CM (ref 2.1–4)
LEFT VENTRICLE DIASTOLIC VOLUME INDEX: 10.71 ML/M2
LEFT VENTRICLE DIASTOLIC VOLUME: 21 ML
LEFT VENTRICLE MASS INDEX: 57 G/M2
LEFT VENTRICLE SYSTOLIC VOLUME INDEX: 3.4 ML/M2
LEFT VENTRICLE SYSTOLIC VOLUME: 6.6 ML
LEFT VENTRICULAR INTERNAL DIMENSION IN DIASTOLE: 2.44 CM (ref 3.5–6)
LEFT VENTRICULAR MASS: 112.38 G
LEUKOCYTE ESTERASE UR QL STRIP.AUTO: ABNORMAL UNIT/L
LV LATERAL E/E' RATIO: 8.17 M/S
LV SEPTAL E/E' RATIO: 12.25 M/S
LVOT MG: 1 MMHG
LVOT MV: 0.53 CM/S
LYMPHOCYTES # BLD AUTO: 1.25 X10(3)/MCL (ref 0.6–4.6)
LYMPHOCYTES # BLD AUTO: 3.31 X10(3)/MCL (ref 0.6–4.6)
LYMPHOCYTES NFR BLD AUTO: 19.2 %
LYMPHOCYTES NFR BLD AUTO: 9.3 %
MAGNESIUM SERPL-MCNC: 2.4 MG/DL (ref 1.6–2.6)
MCH RBC QN AUTO: 28 PG (ref 27–31)
MCH RBC QN AUTO: 28.2 PG (ref 27–31)
MCHC RBC AUTO-ENTMCNC: 30 MG/DL (ref 33–36)
MCHC RBC AUTO-ENTMCNC: 32.3 MG/DL (ref 33–36)
MCV RBC AUTO: 87.3 FL (ref 80–94)
MCV RBC AUTO: 93.4 FL (ref 80–94)
MONOCYTES # BLD AUTO: 0.49 X10(3)/MCL (ref 0.1–1.3)
MONOCYTES # BLD AUTO: 1.31 X10(3)/MCL (ref 0.1–1.3)
MONOCYTES NFR BLD AUTO: 3.6 %
MONOCYTES NFR BLD AUTO: 7.6 %
MRSA PCR SCRN (OHS): NOT DETECTED
MV PEAK A VEL: 1.08 M/S
MV PEAK E VEL: 0.49 M/S
NEUTROPHILS # BLD AUTO: 11.6 X10(3)/MCL (ref 2.1–9.2)
NEUTROPHILS # BLD AUTO: 12.2 X10(3)/MCL (ref 2.1–9.2)
NEUTROPHILS NFR BLD AUTO: 71 %
NEUTROPHILS NFR BLD AUTO: 86 %
NITRITE UR QL STRIP.AUTO: POSITIVE
NRBC BLD AUTO-RTO: 3.8 %
NRBC BLD AUTO-RTO: 4.2 %
PCO2 BLDA: 20 MMHG (ref 35–45)
PCO2 BLDA: 34 MMHG
PH SMN: 7.28 [PH] (ref 7.29–7.61)
PH SMN: 7.4 [PH]
PH UR STRIP.AUTO: 5 [PH]
PHOSPHATE SERPL-MCNC: 7.9 MG/DL (ref 2.3–4.7)
PISA TR MAX VEL: 3.26 M/S
PLATELET # BLD AUTO: 104 X10(3)/MCL (ref 130–400)
PLATELET # BLD AUTO: 85 X10(3)/MCL (ref 130–400)
PMV BLD AUTO: 12.3 FL (ref 7.4–10.4)
PMV BLD AUTO: 12.8 FL (ref 7.4–10.4)
PO2 BLDA: 169 MMHG
PO2 BLDA: 76 MMHG (ref 80–100)
POC BASE DEFICIT: -15.3 MMOL/L (ref -2–2)
POC BASE DEFICIT: -3.1 MMOL/L
POC COHB: 2 %
POC HCO3: 21.1 MMOL/L
POC IONIZED CALCIUM: 1.05 MMOL/L (ref 1.12–1.23)
POC IONIZED CALCIUM: 1.2 MMOL/L (ref 1.12–1.23)
POC METHB: 0.6 % (ref 0.4–1.5)
POC O2HB: 92.3 % (ref 94–97)
POC SATURATED O2: 100 %
POC SATURATED O2: 93 %
POC TEMPERATURE: 37 C
POC TEMPERATURE: 37 °C
POCT GLUCOSE: 127 MG/DL (ref 70–110)
POCT GLUCOSE: 175 MG/DL (ref 70–110)
POCT GLUCOSE: 63 MG/DL (ref 70–110)
POTASSIUM BLD-SCNC: 4.4 MMOL/L
POTASSIUM BLD-SCNC: 6.5 MMOL/L (ref 3.5–5)
POTASSIUM SERPL-SCNC: 5.5 MMOL/L (ref 3.5–5.1)
POTASSIUM SERPL-SCNC: 5.8 MMOL/L (ref 3.5–5.1)
POTASSIUM SERPL-SCNC: 6.4 MMOL/L (ref 3.5–5.1)
PROT SERPL-MCNC: 7.6 GM/DL (ref 5.8–7.6)
PROT UR QL STRIP.AUTO: ABNORMAL MG/DL
PROTHROMBIN TIME: 18.5 SECONDS (ref 12.5–14.5)
RA PRESSURE: 15 MMHG
RBC # BLD AUTO: 3.62 X10(6)/MCL (ref 4.2–5.4)
RBC # BLD AUTO: 4.1 X10(6)/MCL (ref 4.2–5.4)
RBC #/AREA URNS AUTO: ABNORMAL /HPF
RBC UR QL AUTO: ABNORMAL UNIT/L
SARS-COV-2 RNA RESP QL NAA+PROBE: DETECTED
SODIUM BLD-SCNC: 128 MMOL/L (ref 137–145)
SODIUM BLD-SCNC: 129 MMOL/L (ref 137–145)
SODIUM SERPL-SCNC: 135 MMOL/L (ref 136–145)
SODIUM SERPL-SCNC: 136 MMOL/L (ref 136–145)
SP GR UR STRIP.AUTO: >=1.03 (ref 1–1.03)
SPECIMEN SOURCE: ABNORMAL
SPECIMEN SOURCE: ABNORMAL
SQUAMOUS #/AREA URNS AUTO: ABNORMAL /HPF
TDI LATERAL: 0.06 M/S
TDI SEPTAL: 0.04 M/S
TDI: 0.05 M/S
TR MAX PG: 43 MMHG
TRICUSPID ANNULAR PLANE SYSTOLIC EXCURSION: 1.65 CM
TROPONIN I SERPL-MCNC: 0.82 NG/ML (ref 0–0.04)
TV REST PULMONARY ARTERY PRESSURE: 58 MMHG
UROBILINOGEN UR STRIP-ACNC: ABNORMAL MG/DL
WBC # SPEC AUTO: 13.4 X10(3)/MCL (ref 4.5–11.5)
WBC # SPEC AUTO: 17.2 X10(3)/MCL (ref 4.5–11.5)
WBC #/AREA URNS AUTO: >100 /HPF

## 2022-12-12 PROCEDURE — 99900035 HC TECH TIME PER 15 MIN (STAT)

## 2022-12-12 PROCEDURE — 25500020 PHARM REV CODE 255: Performed by: INTERNAL MEDICINE

## 2022-12-12 PROCEDURE — 80053 COMPREHEN METABOLIC PANEL: CPT | Performed by: STUDENT IN AN ORGANIZED HEALTH CARE EDUCATION/TRAINING PROGRAM

## 2022-12-12 PROCEDURE — 84132 ASSAY OF SERUM POTASSIUM: CPT | Performed by: FAMILY MEDICINE

## 2022-12-12 PROCEDURE — 63600175 PHARM REV CODE 636 W HCPCS: Performed by: INTERNAL MEDICINE

## 2022-12-12 PROCEDURE — 27100171 HC OXYGEN HIGH FLOW UP TO 24 HOURS

## 2022-12-12 PROCEDURE — 93005 ELECTROCARDIOGRAM TRACING: CPT

## 2022-12-12 PROCEDURE — 25000003 PHARM REV CODE 250: Performed by: STUDENT IN AN ORGANIZED HEALTH CARE EDUCATION/TRAINING PROGRAM

## 2022-12-12 PROCEDURE — 87040 BLOOD CULTURE FOR BACTERIA: CPT | Performed by: NURSE PRACTITIONER

## 2022-12-12 PROCEDURE — 93010 EKG 12-LEAD: ICD-10-PCS | Mod: ,,, | Performed by: STUDENT IN AN ORGANIZED HEALTH CARE EDUCATION/TRAINING PROGRAM

## 2022-12-12 PROCEDURE — 83735 ASSAY OF MAGNESIUM: CPT | Performed by: STUDENT IN AN ORGANIZED HEALTH CARE EDUCATION/TRAINING PROGRAM

## 2022-12-12 PROCEDURE — 27000221 HC OXYGEN, UP TO 24 HOURS

## 2022-12-12 PROCEDURE — 83605 ASSAY OF LACTIC ACID: CPT | Performed by: NURSE PRACTITIONER

## 2022-12-12 PROCEDURE — 25000003 PHARM REV CODE 250

## 2022-12-12 PROCEDURE — 99900031 HC PATIENT EDUCATION (STAT)

## 2022-12-12 PROCEDURE — 93010 ELECTROCARDIOGRAM REPORT: CPT | Mod: ,,, | Performed by: STUDENT IN AN ORGANIZED HEALTH CARE EDUCATION/TRAINING PROGRAM

## 2022-12-12 PROCEDURE — 36415 COLL VENOUS BLD VENIPUNCTURE: CPT | Performed by: NURSE PRACTITIONER

## 2022-12-12 PROCEDURE — 87641 MR-STAPH DNA AMP PROBE: CPT | Performed by: PHYSICIAN ASSISTANT

## 2022-12-12 PROCEDURE — 82140 ASSAY OF AMMONIA: CPT | Performed by: PHYSICIAN ASSISTANT

## 2022-12-12 PROCEDURE — 20000000 HC ICU ROOM

## 2022-12-12 PROCEDURE — 83605 ASSAY OF LACTIC ACID: CPT | Performed by: STUDENT IN AN ORGANIZED HEALTH CARE EDUCATION/TRAINING PROGRAM

## 2022-12-12 PROCEDURE — 36600 WITHDRAWAL OF ARTERIAL BLOOD: CPT

## 2022-12-12 PROCEDURE — 84484 ASSAY OF TROPONIN QUANT: CPT | Performed by: STUDENT IN AN ORGANIZED HEALTH CARE EDUCATION/TRAINING PROGRAM

## 2022-12-12 PROCEDURE — 63600175 PHARM REV CODE 636 W HCPCS: Performed by: STUDENT IN AN ORGANIZED HEALTH CARE EDUCATION/TRAINING PROGRAM

## 2022-12-12 PROCEDURE — 85730 THROMBOPLASTIN TIME PARTIAL: CPT | Performed by: INTERNAL MEDICINE

## 2022-12-12 PROCEDURE — 85025 COMPLETE CBC W/AUTO DIFF WBC: CPT | Performed by: STUDENT IN AN ORGANIZED HEALTH CARE EDUCATION/TRAINING PROGRAM

## 2022-12-12 PROCEDURE — 85025 COMPLETE CBC W/AUTO DIFF WBC: CPT | Performed by: INTERNAL MEDICINE

## 2022-12-12 PROCEDURE — 27000249 HC VAPOTHERM CIRCUIT

## 2022-12-12 PROCEDURE — 25000003 PHARM REV CODE 250: Performed by: PHYSICIAN ASSISTANT

## 2022-12-12 PROCEDURE — 63600175 PHARM REV CODE 636 W HCPCS: Performed by: PHYSICIAN ASSISTANT

## 2022-12-12 PROCEDURE — 51702 INSERT TEMP BLADDER CATH: CPT

## 2022-12-12 PROCEDURE — 25000003 PHARM REV CODE 250: Performed by: NURSE PRACTITIONER

## 2022-12-12 PROCEDURE — 86140 C-REACTIVE PROTEIN: CPT | Performed by: NURSE PRACTITIONER

## 2022-12-12 PROCEDURE — 27000207 HC ISOLATION

## 2022-12-12 PROCEDURE — 84100 ASSAY OF PHOSPHORUS: CPT | Performed by: STUDENT IN AN ORGANIZED HEALTH CARE EDUCATION/TRAINING PROGRAM

## 2022-12-12 PROCEDURE — 82803 BLOOD GASES ANY COMBINATION: CPT

## 2022-12-12 PROCEDURE — 94761 N-INVAS EAR/PLS OXIMETRY MLT: CPT

## 2022-12-12 PROCEDURE — 85610 PROTHROMBIN TIME: CPT | Performed by: INTERNAL MEDICINE

## 2022-12-12 PROCEDURE — 0240U COVID/FLU A&B PCR: CPT | Performed by: PHYSICIAN ASSISTANT

## 2022-12-12 PROCEDURE — 80202 ASSAY OF VANCOMYCIN: CPT | Performed by: STUDENT IN AN ORGANIZED HEALTH CARE EDUCATION/TRAINING PROGRAM

## 2022-12-12 PROCEDURE — 25000003 PHARM REV CODE 250: Performed by: INTERNAL MEDICINE

## 2022-12-12 PROCEDURE — 81001 URINALYSIS AUTO W/SCOPE: CPT | Performed by: NURSE PRACTITIONER

## 2022-12-12 RX ORDER — INDOMETHACIN 25 MG/1
50 CAPSULE ORAL ONCE
Status: DISCONTINUED | OUTPATIENT
Start: 2022-12-12 | End: 2022-12-17

## 2022-12-12 RX ORDER — HEPARIN SODIUM,PORCINE/D5W 25000/250
0-40 INTRAVENOUS SOLUTION INTRAVENOUS CONTINUOUS
Status: DISCONTINUED | OUTPATIENT
Start: 2022-12-12 | End: 2022-12-18

## 2022-12-12 RX ORDER — SODIUM BICARBONATE 1 MEQ/ML
SYRINGE (ML) INTRAVENOUS
Status: COMPLETED
Start: 2022-12-12 | End: 2022-12-12

## 2022-12-12 RX ORDER — HYDROXYZINE PAMOATE 25 MG/1
25 CAPSULE ORAL
Status: COMPLETED | OUTPATIENT
Start: 2022-12-12 | End: 2022-12-12

## 2022-12-12 RX ORDER — DEXAMETHASONE SODIUM PHOSPHATE 4 MG/ML
6 INJECTION, SOLUTION INTRA-ARTICULAR; INTRALESIONAL; INTRAMUSCULAR; INTRAVENOUS; SOFT TISSUE EVERY 24 HOURS
Status: COMPLETED | OUTPATIENT
Start: 2022-12-12 | End: 2022-12-21

## 2022-12-12 RX ORDER — MUPIROCIN 20 MG/G
OINTMENT TOPICAL 2 TIMES DAILY
Status: COMPLETED | OUTPATIENT
Start: 2022-12-12 | End: 2022-12-17

## 2022-12-12 RX ORDER — DIPHENHYDRAMINE HYDROCHLORIDE 50 MG/ML
25 INJECTION INTRAMUSCULAR; INTRAVENOUS EVERY 6 HOURS PRN
Status: DISCONTINUED | OUTPATIENT
Start: 2022-12-12 | End: 2022-12-17

## 2022-12-12 RX ADMIN — REMDESIVIR 200 MG: 100 INJECTION, POWDER, LYOPHILIZED, FOR SOLUTION INTRAVENOUS at 04:12

## 2022-12-12 RX ADMIN — PERFLUTREN 1.3 ML: 6.52 INJECTION, SUSPENSION INTRAVENOUS at 10:12

## 2022-12-12 RX ADMIN — HYDROXYZINE PAMOATE 25 MG: 25 CAPSULE ORAL at 12:12

## 2022-12-12 RX ADMIN — MUPIROCIN: 20 OINTMENT TOPICAL at 08:12

## 2022-12-12 RX ADMIN — SODIUM BICARBONATE 50 MEQ: 84 INJECTION, SOLUTION INTRAVENOUS at 11:12

## 2022-12-12 RX ADMIN — DEXAMETHASONE SODIUM PHOSPHATE 6 MG: 4 INJECTION, SOLUTION INTRA-ARTICULAR; INTRALESIONAL; INTRAMUSCULAR; INTRAVENOUS; SOFT TISSUE at 03:12

## 2022-12-12 RX ADMIN — SODIUM BICARBONATE: 84 INJECTION, SOLUTION INTRAVENOUS at 12:12

## 2022-12-12 RX ADMIN — VANCOMYCIN HYDROCHLORIDE 2000 MG: 1 INJECTION, POWDER, LYOPHILIZED, FOR SOLUTION INTRAVENOUS at 03:12

## 2022-12-12 RX ADMIN — PIPERACILLIN SODIUM,TAZOBACTAM SODIUM 4.5 G: 4; .5 INJECTION, POWDER, FOR SOLUTION INTRAVENOUS at 01:12

## 2022-12-12 RX ADMIN — HEPARIN SODIUM 18 UNITS/KG/HR: 10000 INJECTION, SOLUTION INTRAVENOUS at 08:12

## 2022-12-12 NOTE — PROGRESS NOTES
Pharmacokinetic Initial Assessment: IV Vancomycin    Assessment/Plan:    Initiate intravenous vancomycin with loading dose of 2000 mg once with subsequent doses when random concentrations are less than 20 mcg/mL  Desired empiric serum trough concentration is 15 to 20 mcg/mL  Draw vancomycin random level on 12/13 at 0430.  Pharmacy will continue to follow and monitor vancomycin.      Please contact pharmacy at extension [phone] with any questions regarding this assessment.     Thank you for the consult,   Derek Pride, PharmD       Patient brief summary:  Miguel Angel Oliveros is a 89 y.o. female initiated on antimicrobial therapy with IV Vancomycin for treatment of suspected bacteremia    Drug Allergies:   Review of patient's allergies indicates:   Allergen Reactions    Statins-hmg-coa reductase inhibitors      Other reaction(s): Joint pain    Bimatoprost      Other reaction(s): Eye redness    Cortisone     Dorzolamide-timolol     Gabapentin      Other reaction(s): Confusion    Hydrocortisone     Tafluprost (pf)        Actual Body Weight:   86.2    Renal Function:   Estimated Creatinine Clearance: 13.4 mL/min (A) (based on SCr of 3.14 mg/dL (H)).,     Dialysis Method (if applicable):  N/A    CBC (last 72 hours):  Recent Labs   Lab Result Units 12/11/22  0917 12/11/22  2244 12/12/22  0354   WBC x10(3)/mcL 11.3 14.2* 17.2*   Hgb gm/dL 11.8* 11.3* 11.5*   Hct % 37.4 36.0* 38.3   Platelet x10(3)/mcL 117* 103* 85*   Mono % % 8.2 8.1 7.6   Eos % % 0.6 0.2 0.1   Basophil % % 0.2 0.2 0.3       Metabolic Panel (last 72 hours):  Recent Labs   Lab Result Units 12/11/22  0917 12/11/22  1013 12/11/22  2244 12/12/22  0501 12/12/22  1124   Sodium Level mmol/L 135*  --  135* 136 135*   Potassium Level mmol/L 4.0  --  5.2* 5.8* 6.4*   Chloride mmol/L 100  --  102 100 101   Carbon Dioxide mmol/L 23  --  18* 17* 14*   Glucose Level mg/dL 179*  --  138* 115 92   Glucose, UA mg/dL  --  Negative  --   --   --    Blood Urea Nitrogen mg/dL  43.0*  --  47.9* 49.7* 58.0*   Creatinine mg/dL 1.29*  --  2.11* 2.40* 3.14*   Albumin Level gm/dL 3.2*  --  3.3* 3.7  --    Bilirubin Total mg/dL 0.4  --  0.8 1.1  --    Alkaline Phosphatase unit/L 94  --  103 122  --    Aspartate Aminotransferase unit/L 38*  --  64* 128*  --    Alanine Aminotransferase unit/L 26  --  44 92*  --        Drug levels (last 3 results):  No results for input(s): VANCOMYCINRA, VANCORANDOM, VANCOMYCINPE, VANCOPEAK, VANCOMYCINTR, VANCOTROUGH in the last 72 hours.    Microbiologic Results:  Microbiology Results (last 7 days)       Procedure Component Value Units Date/Time    Blood Culture [[id number]] Collected: 12/12/22 0948    Order Status: Resulted Specimen: Blood, Venous Updated: 12/12/22 0949    Blood Culture [[id number]] Collected: 12/12/22 0948    Order Status: Resulted Specimen: Blood, Venous Updated: 12/12/22 0949

## 2022-12-12 NOTE — PROCEDURES
12/12/2022  5:47 PM    Procedure:  Central line  Indication:  Venous access    Risks, benefits, and indications for the procedure were reviewed with family/ primary decision maker. Consent was signed and placed in chart. Pt prepped and draped in sterile fashion. The following sterile precautions were followed: cap and mask, hand hygiene, sterile gown and sterile gloves, large sterile sheet and sterile field and 2% Chlorhexidine for cutaneous antisepsis. Time out was performed with nursing staff. Lidocaine 1% injected at site. Ultrasound guidance utilized to visualize anatomy. Access obtained without difficulty and blood flow was non-pulsatile. Wire threaded smoothly and US confirmed appropriate venous placement of wire. Catheter advanced without resistance. Pt tolerated procedure well. No evidence of hematoma at vascular access site. CXR has been ordered to confirm appropriate placement.     Antonio Corbett MD - PGY2  [facility]

## 2022-12-12 NOTE — PROGRESS NOTES
89-year-old female, known to Dr. Rao (TAVR), with PMH of CAD s/p PCI, HTN, AS s/p TAVR, HLD, GERD PARAMJIT, depression, who presented to the ER in [place] with weakness and TODD. She reported TODD with minimal movement. She denied CP. She was noted to have markedly different EKG and initial trop was elevated. She was seen by [facility] via telemedicine. She was given ASA and lovenox in ER and transferred to [facility] for NSTEMI. Of note she had been holding ASA and Plavix for upcoming biopsy. [facility] will admit for further workup.

## 2022-12-12 NOTE — CONSULTS
Inpatient consult to Cardiology  Consult performed by: NEERAJ Nevarez  Consult ordered by: NEERAJ Nevarez      [facility] - 3rd Floor Medical Telemetry  Cardiology  Consult Note    Patient Name: Miguel Angel Oliveros  MRN: [id number]  Admission Date: 12/11/2022  Hospital Length of Stay: 1 days  Code Status: Full Code   Attending Provider: Handy Balderrama MD   Consulting Provider: NEERAJ Nevarez  Primary Care Physician: Barrett Lopez MD  Principal Problem:<principal problem not specified>    Patient information was obtained from past medical records and ER records.     Subjective:     Chief Complaint:       Ms. Oliveros is an 89 y/o female, with PMHx significant for CAD/PCI, VHD/TAVR, SSS/PPM, HTN,HLD, PARAMJIT, depression/anxiety, and dementia who presented to [facility] with c/o weakness, TODD, and abdominal pain. Troponin was elevated 0.868->0.784. She was seen by [facility] through teleconsult who recommended transfer for cardiology services. Workup at [facility] revealed no acute cardiopulmonary process via CXR. + Leukocytosis with WBC 14.2->17.2, Plt 103->85, K+ 5.2->5.8, BUN/creatinine 47.9/2.11->49.7/2.40. AST//92, BNP 1124->1900, troponin 0.868-0.784-0.761-0.895- 0.825. [facility] has been consulted for NSTEMI. Patient is currently stuporous. On oxymask, BP marginal. She is hypothermic. Echo at bedside.         PMH: CAD/PCI, VHD/TAVR, SSS/PPM, T2DM, HLD, PARAMJIT, depression/anxiety, dementia, bladder cancer/resection  PSH: bladder resection, TAVR 23 mm S3 Ultra valve 10/2020, EGD, colonoscopy, hysterectomy, rectal polypectomy, tonsillectomy,   Family History: Father- HLD, CVA, Mother- DM; sister- breast cancer  Social History: Denies alcohol or ETOH     Previous Cardiac Diagnostics:   TTE 10/05/2021:  LV normal in size. Global LVSF is hyperdynamic. LVEF 80%. Mild septal hypertrophy is noted. Patient hx TAVR using a 23 mmS3 Ultra bio-prosthesis with a MG 20 mmHg, PV  3.2 m/sec, and DI 0.38. No significant periprosthetic regurgitation is appreciated. Trans aortic velocities and gradients are elevated when compared to previous exam. Moderate severe calcification of the mitral valve is noted. LADI 1.6 cm2, Mean trans mitral gradient is 2.5 mmHg. Question mild MV stenosis. MV Don score 12. 1+ MR/TR.      [facility] 09/01/2020:  LM: Normal  Lcx: patent proximal stent.  distal segment  Ramus: patent proximal stent  LAD-20% calcified proximal lesion, dominant vessel  RCA: Patent proximal stent, dominant vessel  LVEDP 17  LADI 0.61, MG 35.   Wedge pressure mean: 18  No plans for intervention to distal circumflex as this is a  that has been present for many years. Feel majority of patients symptoms are related to her valve.        Review of patient's allergies indicates:   Allergen Reactions    Statins-hmg-coa reductase inhibitors      Other reaction(s): Joint pain    Bimatoprost      Other reaction(s): Eye redness    Cortisone     Dorzolamide-timolol     Gabapentin      Other reaction(s): Confusion    Hydrocortisone     Tafluprost (pf)        Current Facility-Administered Medications on File Prior to Encounter   Medication    [COMPLETED] 0.9%  NaCl infusion    [COMPLETED] aspirin chewable tablet 324 mg    betamethasone acetate-betamethasone sodium phosphate injection 6 mg    betamethasone acetate-betamethasone sodium phosphate injection 6 mg    [COMPLETED] enoxaparin injection 80 mg    LIDOcaine (PF) 20 mg/mL (2%) injection 5 mL    LIDOcaine (PF) 20 mg/mL (2%) injection 5 mL    [COMPLETED] ondansetron injection 4 mg    [COMPLETED] sodium chloride 0.9% bolus 1,000 mL    [DISCONTINUED] sodium chloride 0.9% flush 10 mL     Current Outpatient Medications on File Prior to Encounter   Medication Sig    ascorbic acid, vitamin C, (VITAMIN C) 1000 MG tablet Take 1,000 mg by mouth once daily.    aspirin (ECOTRIN) 81 MG EC tablet Take 81 mg by mouth.    atorvastatin (LIPITOR) 40 MG tablet Take 40  mg by mouth once daily.    carvediloL (COREG) 25 MG tablet Take 25 mg by mouth 2 (two) times daily.    cloNIDine (CATAPRES) 0.1 MG tablet     clopidogreL (PLAVIX) 75 mg tablet Take 75 mg by mouth once daily.    isosorbide mononitrate (IMDUR) 30 MG 24 hr tablet 15 mg.    multivitamin/iron/folic acid (CENTRUM COMPLETE ORAL) Take by mouth.    QUEtiapine (SEROQUEL) 25 MG Tab Take 25 mg by mouth 2 (two) times daily.    ranolazine (RANEXA) 1,000 mg Tb12 Take 1,000 mg by mouth 2 (two) times daily.    TUMERIC-GING-OLIVE-OREG-CAPRYL ORAL Take by mouth.    diazePAM (VALIUM) 5 MG tablet Take 5 mg by mouth daily as needed.    docusate sodium (COLACE) 100 MG capsule Take 1 capsule (100 mg total) by mouth 2 (two) times daily. for 15 days    nitroGLYCERIN (NITROSTAT) 0.4 MG SL tablet Place 0.4 mg under the tongue.    [] ondansetron (ZOFRAN) 4 MG tablet Take 1 tablet (4 mg total) by mouth every 8 (eight) hours as needed for Nausea.         Review of Systems   Unable to perform ROS: Mental status change     Objective:     Vital Signs (Most Recent):  Temp: 99.7 °F (37.6 °C) (22)  Pulse: 90 (22 0600)  Resp: (!) 31 (22 06)  BP: 124/76 (2200)  SpO2: 96 % (22)   Vital Signs (24h Range):  Temp:  [95.5 °F (35.3 °C)-99.7 °F (37.6 °C)] 99.7 °F (37.6 °C)  Pulse:  [88-97] 90  Resp:  [18-33] 31  SpO2:  [91 %-100 %] 96 %  BP: ()/() 124/76     Weight: 86.2 kg (190 lb)  Body mass index is 30.67 kg/m².    SpO2: 96 %       No intake or output data in the 24 hours ending 22 0843    Lines/Drains/Airways       None                   Significant Labs:  Recent Results (from the past 72 hour(s))   Comp. Metabolic Panel    Collection Time: 22  9:17 AM   Result Value Ref Range    Sodium Level 135 (L) 136 - 145 mmol/L    Potassium Level 4.0 3.5 - 5.1 mmol/L    Chloride 100 98 - 107 mmol/L    Carbon Dioxide 23 23 - 31 mmol/L    Glucose Level 179 (H) 82 - 115 mg/dL    Blood Urea  Nitrogen 43.0 (H) 9.8 - 20.1 mg/dL    Creatinine 1.29 (H) 0.55 - 1.02 mg/dL    Calcium Level Total 9.7 8.4 - 10.2 mg/dL    Protein Total 6.8 5.8 - 7.6 gm/dL    Albumin Level 3.2 (L) 3.4 - 4.8 gm/dL    Globulin 3.6 (H) 2.4 - 3.5 gm/dL    Albumin/Globulin Ratio 0.9 (L) 1.1 - 2.0 ratio    Bilirubin Total 0.4 <=1.5 mg/dL    Alkaline Phosphatase 94 40 - 150 unit/L    Alanine Aminotransferase 26 0 - 55 unit/L    Aspartate Aminotransferase 38 (H) 5 - 34 unit/L    eGFR 40 mls/min/1.73/m2   Lipase    Collection Time: 12/11/22  9:17 AM   Result Value Ref Range    Lipase Level 13 <=60 U/L   Troponin I    Collection Time: 12/11/22  9:17 AM   Result Value Ref Range    Troponin-I 0.868 (H) 0.000 - 0.045 ng/mL   CBC with Differential    Collection Time: 12/11/22  9:17 AM   Result Value Ref Range    WBC 11.3 4.5 - 11.5 x10(3)/mcL    RBC 4.24 4.20 - 5.40 x10(6)/mcL    Hgb 11.8 (L) 12.0 - 16.0 gm/dL    Hct 37.4 37.0 - 47.0 %    MCV 88.2 80.0 - 94.0 fL    MCH 27.8 27.0 - 31.0 pg    MCHC 31.6 (L) 33.0 - 36.0 mg/dL    RDW 13.4 11.5 - 17.0 %    Platelet 117 (L) 130 - 400 x10(3)/mcL    MPV 12.0 (H) 7.4 - 10.4 fL    Neut % 70.2 %    Lymph % 19.9 %    Mono % 8.2 %    Eos % 0.6 %    Basophil % 0.2 %    Lymph # 2.25 0.6 - 4.6 x10(3)/mcL    Neut # 7.9 2.1 - 9.2 x10(3)/mcL    Mono # 0.93 0.1 - 1.3 x10(3)/mcL    Eos # 0.07 0 - 0.9 x10(3)/mcL    Baso # 0.02 0 - 0.2 x10(3)/mcL    IG# 0.10 (H) 0 - 0.04 x10(3)/mcL    IG% 0.9 %   Brain natriuretic peptide    Collection Time: 12/11/22  9:18 AM   Result Value Ref Range    Natriuretic Peptide 1,124.1 (H) <=100.0 pg/mL   Occult Blood Stool, CA Screen    Collection Time: 12/11/22  9:19 AM   Result Value Ref Range    Occult Blood Stool 1 Negative Negative   ISTAT PROCEDURE    Collection Time: 12/11/22  9:30 AM   Result Value Ref Range    POC PH 7.410 7.35 - 7.45    POC PCO2 30.0 (L) 35 - 45 mmHg    POC PO2 58 (LL) 80 - 100 mmHg    POC HCO3 19.0 (L) 24 - 28 mmol/L    POC BE -6 -2 to 2 mmol/L    POC  SATURATED O2 90 (L) 95 - 100 %    POC TCO2 20 (L) 23 - 27 mmol/L    Sample ARTERIAL     Site RB     Allens Test N/A     DelSys Nasal Can    Urinalysis, Reflex to Urine Culture Urine, Clean Catch    Collection Time: 12/11/22 10:13 AM    Specimen: Urine   Result Value Ref Range    Color, UA Yellow Yellow, Light-Yellow, Dark Yellow, Gracie, Straw    Appearance, UA Clear Clear    Specific Gravity, UA >=1.030     pH, UA 5.0 5.0 - 8.5    Protein,  (A) Negative mg/dL    Glucose, UA Negative Negative, Normal mg/dL    Ketones, UA Trace (A) Negative mg/dL    Blood, UA Negative Negative unit/L    Bilirubin, UA Negative Negative mg/dL    Urobilinogen, UA 0.2 0.2, 1.0, Normal mg/dL    Nitrites, UA Negative Negative    Leukocyte Esterase, UA Trace (A) Negative unit/L   Urinalysis, Microscopic    Collection Time: 12/11/22 10:13 AM   Result Value Ref Range    Bacteria, UA Rare None Seen, Rare, Occasional /HPF    Mucous, UA Small (A) None Seen /LPF    RBC, UA 0-2 None Seen, 0-2, 3-5, 0-5 /HPF    WBC, UA 11-20 (A) None Seen, 0-2, 3-5, 0-5 /HPF    Squamous Epithelial Cells, UA Few (A) None Seen, Rare, Occasional, Occ /HPF   Urine culture    Collection Time: 12/11/22 10:13 AM    Specimen: Urine   Result Value Ref Range    Urine Culture No Growth At 24 Hours    Troponin I    Collection Time: 12/11/22 11:46 AM   Result Value Ref Range    Troponin-I 0.784 (H) 0.000 - 0.045 ng/mL   Troponin I    Collection Time: 12/11/22  5:26 PM   Result Value Ref Range    Troponin-I 0.761 (H) 0.000 - 0.045 ng/mL   POCT glucose    Collection Time: 12/11/22  9:26 PM   Result Value Ref Range    POCT Glucose 117 (H) 70 - 110 mg/dL   Comprehensive metabolic panel    Collection Time: 12/11/22 10:44 PM   Result Value Ref Range    Sodium Level 135 (L) 136 - 145 mmol/L    Potassium Level 5.2 (H) 3.5 - 5.1 mmol/L    Chloride 102 98 - 107 mmol/L    Carbon Dioxide 18 (L) 23 - 31 mmol/L    Glucose Level 138 (H) 82 - 115 mg/dL    Blood Urea Nitrogen 47.9 (H) 9.8  - 20.1 mg/dL    Creatinine 2.11 (H) 0.55 - 1.02 mg/dL    Calcium Level Total 9.4 8.4 - 10.2 mg/dL    Protein Total 6.7 5.8 - 7.6 gm/dL    Albumin Level 3.3 (L) 3.4 - 4.8 gm/dL    Globulin 3.4 2.4 - 3.5 gm/dL    Albumin/Globulin Ratio 1.0 (L) 1.1 - 2.0 ratio    Bilirubin Total 0.8 <=1.5 mg/dL    Alkaline Phosphatase 103 40 - 150 unit/L    Alanine Aminotransferase 44 0 - 55 unit/L    Aspartate Aminotransferase 64 (H) 5 - 34 unit/L    eGFR 22 mls/min/1.73/m2   Troponin I    Collection Time: 12/11/22 10:44 PM   Result Value Ref Range    Troponin-I 0.895 (H) 0.000 - 0.045 ng/mL   CBC with Differential    Collection Time: 12/11/22 10:44 PM   Result Value Ref Range    WBC 14.2 (H) 4.5 - 11.5 x10(3)/mcL    RBC 4.02 (L) 4.20 - 5.40 x10(6)/mcL    Hgb 11.3 (L) 12.0 - 16.0 gm/dL    Hct 36.0 (L) 37.0 - 47.0 %    MCV 89.6 80.0 - 94.0 fL    MCH 28.1 27.0 - 31.0 pg    MCHC 31.4 (L) 33.0 - 36.0 mg/dL    RDW 13.3 11.5 - 17.0 %    Platelet 103 (L) 130 - 400 x10(3)/mcL    MPV 11.9 (H) 7.4 - 10.4 fL    Neut % 65.6 %    Lymph % 24.6 %    Mono % 8.1 %    Eos % 0.2 %    Basophil % 0.2 %    Lymph # 3.50 0.6 - 4.6 x10(3)/mcL    Neut # 9.3 (H) 2.1 - 9.2 x10(3)/mcL    Mono # 1.15 0.1 - 1.3 x10(3)/mcL    Eos # 0.03 0 - 0.9 x10(3)/mcL    Baso # 0.03 0 - 0.2 x10(3)/mcL    IG# 0.18 (H) 0 - 0.04 x10(3)/mcL    IG% 1.3 %    NRBC% 3.7 %   Brain natriuretic peptide    Collection Time: 12/11/22 10:44 PM   Result Value Ref Range    Natriuretic Peptide 1,900.0 (H) <=100.0 pg/mL   CBC with Differential    Collection Time: 12/12/22  3:54 AM   Result Value Ref Range    WBC 17.2 (H) 4.5 - 11.5 x10(3)/mcL    RBC 4.10 (L) 4.20 - 5.40 x10(6)/mcL    Hgb 11.5 (L) 12.0 - 16.0 gm/dL    Hct 38.3 37.0 - 47.0 %    MCV 93.4 80.0 - 94.0 fL    MCH 28.0 27.0 - 31.0 pg    MCHC 30.0 (L) 33.0 - 36.0 mg/dL    RDW 13.6 11.5 - 17.0 %    Platelet 85 (L) 130 - 400 x10(3)/mcL    MPV 12.8 (H) 7.4 - 10.4 fL    Neut % 71.0 %    Lymph % 19.2 %    Mono % 7.6 %    Eos % 0.1 %    Basophil  % 0.3 %    Lymph # 3.31 0.6 - 4.6 x10(3)/mcL    Neut # 12.2 (H) 2.1 - 9.2 x10(3)/mcL    Mono # 1.31 (H) 0.1 - 1.3 x10(3)/mcL    Eos # 0.01 0 - 0.9 x10(3)/mcL    Baso # 0.06 0 - 0.2 x10(3)/mcL    IG# 0.31 (H) 0 - 0.04 x10(3)/mcL    IG% 1.8 %    NRBC% 3.8 %   Troponin I    Collection Time: 12/12/22  5:01 AM   Result Value Ref Range    Troponin-I 0.825 (H) 0.000 - 0.045 ng/mL   Comprehensive metabolic panel    Collection Time: 12/12/22  5:01 AM   Result Value Ref Range    Sodium Level 136 136 - 145 mmol/L    Potassium Level 5.8 (H) 3.5 - 5.1 mmol/L    Chloride 100 98 - 107 mmol/L    Carbon Dioxide 17 (L) 23 - 31 mmol/L    Glucose Level 115 82 - 115 mg/dL    Blood Urea Nitrogen 49.7 (H) 9.8 - 20.1 mg/dL    Creatinine 2.40 (H) 0.55 - 1.02 mg/dL    Calcium Level Total 9.6 8.4 - 10.2 mg/dL    Protein Total 7.6 5.8 - 7.6 gm/dL    Albumin Level 3.7 3.4 - 4.8 gm/dL    Globulin 3.9 (H) 2.4 - 3.5 gm/dL    Albumin/Globulin Ratio 0.9 (L) 1.1 - 2.0 ratio    Bilirubin Total 1.1 <=1.5 mg/dL    Alkaline Phosphatase 122 40 - 150 unit/L    Alanine Aminotransferase 92 (H) 0 - 55 unit/L    Aspartate Aminotransferase 128 (H) 5 - 34 unit/L    eGFR 19 mls/min/1.73/m2       Significant Imaging:  Imaging Results              CT Head Without Contrast (Final result)  Result time 12/12/22 06:52:36      Final result by Chapin Ward MD (12/12/22 06:52:36)                   Impression:      No acute intracranial findings identified.    No significant discrepancy with overnight report.      Electronically signed by: Chapin Ward  Date:    12/12/2022  Time:    06:52               Narrative:    EXAMINATION:  CT HEAD WITHOUT CONTRAST    CLINICAL HISTORY:  Mental status change, unknown cause;    TECHNIQUE:  Sequential axial images were performed of the brain without contrast.    Dose product length of 1035 mGycm. Automated exposure control was utilized to minimize radiation dose.    COMPARISON:  December 1, 2022.    FINDINGS:  There is no  intracranial mass effect, midline shift, hydrocephalus or hemorrhage. There is no sulcal effacement or low attenuation changes to suggest recent large vessel territory infarction. Chronic appearing periventricular and subcortical white matter low attenuation changes are present and are consistent with chronic microangiopathic ischemia. The ventricular system and sulcal markings prominence is consistent with atrophy. There is no acute extra axial fluid collection.  Right maxillary sinus lobulated mucoperiosteal thickening.  Otherwise, visualized paranasal sinuses are clear without mucosal thickening, polypoidal abnormality or air-fluid levels. Mastoid air cells aeration is optimal.                        Preliminary result by Chapin Ward MD (12/11/22 22:58:29)                   Narrative:    START OF REPORT:  Technique: CT of the head was performed without intravenous contrast with axial as well as coronal and sagittal images.    Comparison: Comparison is with study [id number] 13:59:19.    Dosage Information: Automated exposure control was utilized.    Clinical history: Ams.    Findings:  Hemorrhage: No acute intracranial hemorrhage is seen.  CSF spaces: The ventricles, sulci and basal cisterns all appear mildly prominent consistent with global cerebral atrophy.  Brain parenchyma: There is preservation of the grey white junction throughout. Moderate microvascular change is seen in portions of the periventricular and deep white matter tracts.  Cerebellum: Unremarkable.  Sella and skull base: The sella appears to be within normal limits for age.  Herniation: None.  Intracranial calcifications: Incidental note is made of bilateral choroid plexus calcification. Incidental note is made of some pineal region calcification. Incidental note is made of some calcification of the falx.  Calvarium: No acute linear or depressed skull fracture is seen.    Maxillofacial Structures:  Paranasal sinuses: Again noted is  moderate mucoperiosteal thickening in the right maxillary sinus with a small retention cyst or polyp.  Orbits: The orbits appear unremarkable.  Zygomatic arches: The zygomatic arches are intact and unremarkable.  Temporal bones and mastoids: The temporal bones and mastoids appear unremarkable.  TMJ: The mandibular condyles appear normally placed with respect to the mandibular fossa.      Impression:  1. No acute intracranial process identified. Details as above.                          Preliminary result by Mane Inman MD (12/11/22 22:58:29)                   Narrative:    START OF REPORT:  Technique: CT of the head was performed without intravenous contrast with axial as well as coronal and sagittal images.    Comparison: Comparison is with study [id number] 13:59:19.    Dosage Information: Automated exposure control was utilized.    Clinical history: Ams.    Findings:  Hemorrhage: No acute intracranial hemorrhage is seen.  CSF spaces: The ventricles, sulci and basal cisterns all appear mildly prominent consistent with global cerebral atrophy.  Brain parenchyma: There is preservation of the grey white junction throughout. Moderate microvascular change is seen in portions of the periventricular and deep white matter tracts.  Cerebellum: Unremarkable.  Sella and skull base: The sella appears to be within normal limits for age.  Herniation: None.  Intracranial calcifications: Incidental note is made of bilateral choroid plexus calcification. Incidental note is made of some pineal region calcification. Incidental note is made of some calcification of the falx.  Calvarium: No acute linear or depressed skull fracture is seen.    Maxillofacial Structures:  Paranasal sinuses: Again noted is moderate mucoperiosteal thickening in the right maxillary sinus with a small retention cyst or polyp.  Orbits: The orbits appear unremarkable.  Zygomatic arches: The zygomatic arches are intact and unremarkable.  Temporal bones  and mastoids: The temporal bones and mastoids appear unremarkable.  TMJ: The mandibular condyles appear normally placed with respect to the mandibular fossa.      Impression:  1. No acute intracranial process identified. Details as above.                                            Physical Exam  Vitals and nursing note reviewed.   Constitutional:       Appearance: She is ill-appearing.   HENT:      Mouth/Throat:      Mouth: Mucous membranes are dry.   Cardiovascular:      Rate and Rhythm: Normal rate and regular rhythm.   Pulmonary:      Comments: O2 via oxymask. Diminished breath sounds  Abdominal:      Palpations: Abdomen is soft.   Skin:     Comments: Skin cold/dry   Neurological:      Comments: stuporous       Home Medications:   Current Facility-Administered Medications on File Prior to Encounter   Medication Dose Route Frequency Provider Last Rate Last Admin    [COMPLETED] 0.9%  NaCl infusion  1,000 mL Intravenous ED 1 Time Prabhakar Calderón  mL/hr at 12/11/22 1018 1,000 mL at 12/11/22 1018    [COMPLETED] aspirin chewable tablet 324 mg  324 mg Oral Once Prabhakar Calderón MD   324 mg at 12/11/22 1017    betamethasone acetate-betamethasone sodium phosphate injection 6 mg  6 mg Intra-articular  Zina D Bouy, FNP   6 mg at 06/01/22 1345    betamethasone acetate-betamethasone sodium phosphate injection 6 mg  6 mg Intra-articular  Zina D Bouy, FNP   6 mg at 06/01/22 1345    [COMPLETED] enoxaparin injection 80 mg  80 mg Subcutaneous Once Prabhakar Calderón MD   80 mg at 12/11/22 1552    LIDOcaine (PF) 20 mg/mL (2%) injection 5 mL  5 mL   Zina TESSY Weston, FNP   5 mL at 06/01/22 1345    LIDOcaine (PF) 20 mg/mL (2%) injection 5 mL  5 mL   Zina TESSY Weston, FNP   5 mL at 06/01/22 1345    [COMPLETED] ondansetron injection 4 mg  4 mg Intravenous ED 1 Time Prabhakar Calderón MD   4 mg at 12/11/22 0910    [COMPLETED] sodium chloride 0.9% bolus 1,000 mL  1,000 mL Intravenous Once Prabhakar Calderón MD   Stopped at  22 1010    [DISCONTINUED] sodium chloride 0.9% flush 10 mL  10 mL Intravenous PRN Prabhakar Calderón MD         Current Outpatient Medications on File Prior to Encounter   Medication Sig Dispense Refill    ascorbic acid, vitamin C, (VITAMIN C) 1000 MG tablet Take 1,000 mg by mouth once daily.      aspirin (ECOTRIN) 81 MG EC tablet Take 81 mg by mouth.      atorvastatin (LIPITOR) 40 MG tablet Take 40 mg by mouth once daily.      carvediloL (COREG) 25 MG tablet Take 25 mg by mouth 2 (two) times daily.      cloNIDine (CATAPRES) 0.1 MG tablet       clopidogreL (PLAVIX) 75 mg tablet Take 75 mg by mouth once daily.      isosorbide mononitrate (IMDUR) 30 MG 24 hr tablet 15 mg.      multivitamin/iron/folic acid (CENTRUM COMPLETE ORAL) Take by mouth.      QUEtiapine (SEROQUEL) 25 MG Tab Take 25 mg by mouth 2 (two) times daily.      ranolazine (RANEXA) 1,000 mg Tb12 Take 1,000 mg by mouth 2 (two) times daily.      TUMERIC-GING-OLIVE-OREG-CAPRYL ORAL Take by mouth.      diazePAM (VALIUM) 5 MG tablet Take 5 mg by mouth daily as needed.      docusate sodium (COLACE) 100 MG capsule Take 1 capsule (100 mg total) by mouth 2 (two) times daily. for 15 days 30 capsule 0    nitroGLYCERIN (NITROSTAT) 0.4 MG SL tablet Place 0.4 mg under the tongue.      [] ondansetron (ZOFRAN) 4 MG tablet Take 1 tablet (4 mg total) by mouth every 8 (eight) hours as needed for Nausea. 30 tablet 0       Current Inpatient Medications:    Current Facility-Administered Medications:     famotidine (PF) injection 20 mg, 20 mg, Intravenous, Daily, Oswaldo Alonso MD    melatonin tablet 6 mg, 6 mg, Oral, Nightly PRN, Oswaldo Alonso MD    morphine injection 2 mg, 2 mg, Intravenous, Q4H PRN, Oswaldo Alonso MD    morphine injection 4 mg, 4 mg, Intravenous, Q4H PRN, Oswaldo Alonso MD    ondansetron injection 4 mg, 4 mg, Intravenous, Q8H PRN, Oswaldo Alonso MD    sodium chloride 0.9% flush 10 mL, 10 mL, Intravenous, PRN, Oswaldo  SHANELLE Alonso MD    Facility-Administered Medications Ordered in Other Encounters:     betamethasone acetate-betamethasone sodium phosphate injection 6 mg, 6 mg, Intra-articular, , Zina STILL Trista, FNP, 6 mg at 06/01/22 1345    betamethasone acetate-betamethasone sodium phosphate injection 6 mg, 6 mg, Intra-articular, , Zina TESSY Bouy, FNP, 6 mg at 06/01/22 1345    LIDOcaine (PF) 20 mg/mL (2%) injection 5 mL, 5 mL, , , Zina STILL Bouy, FNP, 5 mL at 06/01/22 1345    LIDOcaine (PF) 20 mg/mL (2%) injection 5 mL, 5 mL, , , Zina D Bouy, FNP, 5 mL at 06/01/22 1345         VTE Risk Mitigation (From admission, onward)           Ordered     IP VTE HIGH RISK PATIENT  Once         12/11/22 2229     Place sequential compression device  Until discontinued         12/11/22 2229                    Assessment:   AMS  --likely metabolic encephalopathy  NSTEMI, likely Type II due to ARF  Native CAD  Acute kidney failure  Hyperkalemia  Leukocytosis  Thrombocytopenia  VHD  --Hx TAVR    Plan:   Troponin flat. Obtain echo now for comparison  Obtain lactic acid, blood cultures x 2 and CRP  Consult nephrology for assistance due to acute kidney failure        Thank you for your consult.     NEERAJ Nevarez  Cardiology  [facility] - 3rd Floor Medical Telemetry  12/12/2022 8:43 AM     I have seen the patient, reviewed the Nurse Practitioner's note, assessment and plan. I have personally interviewed and examined the patient at bedside and agree with the findings.     The patient's echo showed severely enlarged RV with moderate-severe hypokinesis and probable thrombus in the IVC. LV is hyperdynamic and findings are highly suggestive of large PE with marked RV strain

## 2022-12-12 NOTE — H&P
[facility] - [facility] ICU  Pulmonary Critical Care Note    Patient Name: Miguel Angel Oliveros  MRN: [id number]  Admission Date: 12/11/2022  Hospital Length of Stay: 1 days  Code Status: Full Code  Attending Provider: Trevin Blas MD  Primary Care Provider: Barrett Lopez MD     Subjective:     HPI: Miguel Angel Oliveros is an 89-year-old female with PMH of DMII, CAD s/p PCI, HTN, AS s/p TAVR, HLD, GERD, and PARAMJIT, who is admitted to [facility] ICU from regular unit after being found to have altered mental status and increased respiratory distress. She was initially admitted to [facility] on 12/11/2022 by [facility] after being transferred from [place] with the diagnosis of NSTEMI. At the time of encounter, patient states having lower abdominal discomfort, bladder fullness, and dysuria. She states that these symptoms have been persisting for the past few days accompanied by decreased urinary output. Per patient's family members at beside, she usually gets UTI several times each year and she has had 4 episodes of UTI in 2022 (last UTI was 3 months ago).    Hospital Course/Significant events:  12/11/2022 - Transferred from [place], admitted to regular unit by [facility] for NSTEMI  12/12/2022 - Admitted to ICU for ongoing monitoring and medical therapy    24 Hour Interval History:  N/A    Past Medical History:   Diagnosis Date    Anxiety disorder     Aortic stenosis     Arthritis     Carpal tunnel syndrome     Coronary artery disease     Dementia     Depression     Diabetes mellitus     GERD (gastroesophageal reflux disease)     Hx of rectal polypectomy     Hypercholesterolemia     Hypertension     Obstructive sleep apnea     Respiratory distress     S/P TAVR (transcatheter aortic valve replacement)     TIA (transient ischemic attack)        Past Surgical History:   Procedure Laterality Date    CARDIAC SURGERY      CATARACT EXTRACTION      CORONARY STENT PLACEMENT      HYSTERECTOMY      INSERTION OF PACEMAKER       PARTIAL HYSTERECTOMY      RECTAL POLYPECTOMY      TONSILLECTOMY      TRANSCATHETER AORTIC VALVE REPLACEMENT (TAVR)         Social History     Socioeconomic History    Marital status:    Tobacco Use    Smoking status: Never    Smokeless tobacco: Never   Substance and Sexual Activity    Alcohol use: Never    Drug use: Never           Current Outpatient Medications   Medication Instructions    ascorbic acid (vitamin C) (VITAMIN C) 1,000 mg, Oral, Daily    aspirin (ECOTRIN) 81 mg, Oral    atorvastatin (LIPITOR) 40 mg, Oral, Daily    carvediloL (COREG) 25 mg, Oral, 2 times daily    cloNIDine (CATAPRES) 0.1 MG tablet No dose, route, or frequency recorded.    clopidogreL (PLAVIX) 75 mg, Oral, Daily    diazePAM (VALIUM) 5 mg, Oral, Daily PRN    docusate sodium (COLACE) 100 mg, Oral, 2 times daily    isosorbide mononitrate (IMDUR) 30 MG 24 hr tablet 15 mg.    multivitamin/iron/folic acid (CENTRUM COMPLETE ORAL) Oral    nitroGLYCERIN (NITROSTAT) 0.4 mg, Sublingual    QUEtiapine (SEROQUEL) 25 mg, Oral, 2 times daily    ranolazine (RANEXA) 1,000 mg, Oral, 2 times daily    TUMERIC-GING-OLIVE-OREG-CAPRYL ORAL Oral       Current Inpatient Medications   dextrose 50% in water (D50W)  25 g Intravenous Once    famotidine (PF)  20 mg Intravenous Daily    insulin regular  6 Units Intravenous Once    piperacillin-tazobactam (ZOSYN) IVPB  4.5 g Intravenous Q12H    sodium bicarbonate        sodium bicarbonate  50 mEq Intravenous Once       Current Intravenous Infusions   sodium bicarbonate drip           Review of Systems   Constitutional:  Positive for chills and malaise/fatigue. Negative for fever.   Respiratory:  Positive for shortness of breath. Negative for cough.    Cardiovascular:  Negative for chest pain and palpitations.   Gastrointestinal:  Positive for abdominal pain.   Genitourinary:  Positive for dysuria. Negative for frequency and hematuria.        Objective:     No intake or output data in the 24 hours ending 12/12/22  1204      Vital Signs (Most Recent):  Temp: 96.3 °F (35.7 °C) (12/12/22 0844)  Pulse: 90 (12/12/22 0844)  Resp: (!) 31 (12/12/22 0600)  BP: 109/70 (12/12/22 0844)  SpO2: 96 % (12/12/22 0600)    Body mass index is 30.67 kg/m².  Weight: 86.2 kg (190 lb) Vital Signs (24h Range):  Temp:  [95.5 °F (35.3 °C)-99.7 °F (37.6 °C)] 96.3 °F (35.7 °C)  Pulse:  [88-97] 90  Resp:  [18-33] 31  SpO2:  [92 %-100 %] 96 %  BP: ()/() 109/70     Physical Exam  General: Obese w/ moderate distress; lethargic but arousable  HEENT: NC/AT; PERRL; nasal and oral mucosa moist and clear  Neck: No lymphadenopathy  Pulm: Diminished lung sound in lower lobes bilaterally; normal respiratory effort on non-rebreather  CV: S1, S2 w/ murmurs; no edema noted  GI: Distended abdomen; normal bowel sounds in all quadrants, no masses on palpation  MSK: Limited ROM in all extremities d/t weakness  Derm: No rashes, abnormal bruising, or skin lesions  Neuro: Lethargic, easily arousable; oriented to person and place, motor/sensory function intact      Lines/Drains/Airways       None                   Significant Labs:    Lab Results   Component Value Date    WBC 17.2 (H) 12/12/2022    HGB 11.5 (L) 12/12/2022    HCT 38.3 12/12/2022    MCV 93.4 12/12/2022    PLT 85 (L) 12/12/2022         BMP  Lab Results   Component Value Date     (L) 12/12/2022    K 6.4 (HH) 12/12/2022    CO2 14 (L) 12/12/2022    BUN 58.0 (H) 12/12/2022    CREATININE 3.14 (H) 12/12/2022    CALCIUM 9.6 12/12/2022    EGFRNONAA 57 10/14/2020       ABG  Recent Labs   Lab 12/11/22  0930 12/12/22  1042   PH 7.410 7.28*   PO2 58* 76*   PCO2 30.0* 20*   HCO3 19.0* 9.4*   BE -6  --        Mechanical Ventilation Support:       Significant Imaging:  I have reviewed the pertinent imaging within the past 24 hours.    12/12/2022 - CT head w/o contrast shows no acute intracranial findings  12/12/2022 - CXR shows presence of cardiac device, cardiomegaly, cardiac valve prosthetic, and calcified  aortic knob; no pleural effusion observed    Assessment/Plan:     Assessment  Altered mental status likely 2/2 sepsis and metabolic encephalopathy  Patient met SIRS 3 out of 4 (WBC 17.2k, tachypnea, tachycardia)  Persistent elevation in lactic acid from 8.4 to 9.5 on 12/12/2022  Patient states having painful and burning urination for the past few days and has HX of multiple episodes of UTI each year  Acute renal failure   Cr 1.29 on 12/11/2022, increased to 3.14 on 12/12/2022  BUN increased from 43.0 to 58.0 on 12/12/2022  Hale catheter in place. Patient states she has decreased urination for the past few days  Lactic acidosis likely 2/2 acute renal failure and sepsis  NSTEMI   Troponin trend 0.784 --> 0.761-->0.895-->0.825  Likely d/t demand  COVID positive on admission to ICU  Bladder cancer s/p resection in October 2022  CT ab & pelvis 12/11/2022 shows heterogeneous hyperdense mass lesion in the superior and left lateral aspect of the urinary bladder  Per patient's family at bedside, patient will be receiving additional imaging to stage bladder cancer  HX of HTN, DM II, TIA, aortic stenosis s/p TAVR, CAD s/p PCI, GERD, PARAMJIT      Plan  -Admitted to ICU for ongoing monitoring and medical therapy  -Blood cultures x2 obtained; will repeat urine culture today; started Vanc & Zosyn empirically  -COVID positive on 12/12/2022, placed patient on airborne precautions and started remdesivir and dexamethasone 6mg IV daily x 10 days  -Will hold off on aggressive IV fluid administration at this time  -May consider levophed if BP continues to decrease to maintain MAP > 65 mmHg  -Will keep patient on non-rebreather for now  -Per Cardiology's recommendation, obtain echocardiogram today  -Per Nephrology's recommendations: need to stabilize patient's respiratory status before considering hemodialysis; continue sodium bicarb infusion at 100 mL/hr    DVT Prophylaxis: SCD  GI Prophylaxis: Famotidine IV  IV fluid: Sodium bicarb @  100mL/hr  ABX: Vanc + Zosyn (D1)     32 minutes of critical care was time spent personally by me on the following activities: development of treatment plan with patient or surrogate and bedside caregivers, discussions with consultants, evaluation of patient's response to treatment, examination of patient, ordering and performing treatments and interventions, ordering and review of laboratory studies, ordering and review of radiographic studies, pulse oximetry, re-evaluation of patient's condition.  This critical care time did not overlap with that of any other provider or involve time for any procedures.     Cathi Morocho, DO  Pulmonary Critical Care Medicine  [facility] - [facility] ICU

## 2022-12-12 NOTE — NURSING
Nurses Note -- 4 Eyes      12/12/2022   10:37 AM      Skin assessed during: Admit      [x] No Pressure Injuries Present    [x]Prevention Measures Documented      [] Yes- Altered Skin Integrity Present or Discovered   [] LDA Added if Not in Epic (Describe Wound)   [] New Altered Skin Integrity was Present on Admit and Documented in LDA   [] Wound Image Taken    Wound Care Consulted? No    Attending Nurse:  Henny Vasquez RN     Second RN/Staff Member:  Mane Valdez

## 2022-12-12 NOTE — CONSULTS
[facility]  Hospital Medicine Consultation Note        Patient Name: Miguel Angel Oliveros  MRN: [id number]  Patient Class: IP- Inpatient   Admission Date: 12/11/2022  9:17 PM  Length of Stay: 1  Attending Physician: Dr. Chad Sctot MD   Primary Care Provider: Barrett Lopez MD  Face-to-Face encounter date: 12/12/2022   Consulting Physician: Hospital Medicine   Reason for Consult: Medical management     Patient information was obtained from patient, patient's family, past medical records.    HISTORY OF PRESENT ILLNESS:   Miguel Angel Oliveros is a 89 y.o. female with a past medical history of hypertension, hyperlipidemia, diabetes mellitus type 2, aortic stenosis, CAD, TIA, bladder cancer, dementia, carpal tunnel syndrome, GERD, depression, anxiety, obstructive sleep apnea, and arthritis admitted to [facility] under Dr. Scott on 12/11/2022 transferred from [place] with the diagnosis of NSTEMI.  Patient presented to outside facility for weakness, shortness of breath, and abdominal pain.  Labs revealed WBC 14.2, potassium 5.2, CO2 18, BUN 47.9, creatinine 2.11, glucose 138, AST 64, ALT 44, BNP 1,124, and troponin 0.868.  EKG revealed normal sinus rhythm with PACs, incomplete right bundle branch block, left axis deviation, and rate of 81 beats per minute.  Chest x-ray revealed cardiomegaly without acute cardiopulmonary abnormality. CT head revealed no acute intracranial abnormality. CT abdomen and pelvis revealed heterogeneous hyperdense mass lesion in the superior and left lateral aspect of the urinary bladder which is highly suspicious for malignancy and grossly unchanged from the prior exam.  Patient was transferred to [facility] for cardiology services. Hospital Medicine team was consulted for medical management.  PAST MEDICAL HISTORY:   Essential hypertension  Hyperlipidemia  Diabetes mellitus type 2   Aortic stenosis  CAD  TIA   Bladder cancer  Dementia   Carpal tunnel syndrome    GERD  Depression  Anxiety   Obstructive sleep apnea   Arthritis    PAST SURGICAL HISTORY:     Past Surgical History:   Procedure Laterality Date    CARDIAC SURGERY      CATARACT EXTRACTION      CORONARY STENT PLACEMENT      HYSTERECTOMY      INSERTION OF PACEMAKER      PARTIAL HYSTERECTOMY      RECTAL POLYPECTOMY      TONSILLECTOMY      TRANSCATHETER AORTIC VALVE REPLACEMENT (TAVR)         ALLERGIES:   Statins-hmg-coa reductase inhibitors, Bimatoprost, Cortisone, Dorzolamide-timolol, Gabapentin, Hydrocortisone, and Tafluprost (pf)    FAMILY HISTORY:   MI: Father    SOCIAL HISTORY:   Denies tobacco, illicit drug, and alcohol use     HOME MEDICATIONS:     Prior to Admission medications    Medication Sig Start Date End Date Taking? Authorizing Provider   ascorbic acid, vitamin C, (VITAMIN C) 1000 MG tablet Take 1,000 mg by mouth once daily.   Yes Historical Provider   aspirin (ECOTRIN) 81 MG EC tablet Take 81 mg by mouth.   Yes Historical Provider   atorvastatin (LIPITOR) 40 MG tablet Take 40 mg by mouth once daily. 5/4/22  Yes Historical Provider   carvediloL (COREG) 25 MG tablet Take 25 mg by mouth 2 (two) times daily. 5/4/22  Yes Historical Provider   cloNIDine (CATAPRES) 0.1 MG tablet  9/8/22  Yes Historical Provider   clopidogreL (PLAVIX) 75 mg tablet Take 75 mg by mouth once daily. 5/30/22  Yes Historical Provider   isosorbide mononitrate (IMDUR) 30 MG 24 hr tablet 15 mg.   Yes Historical Provider   multivitamin/iron/folic acid (CENTRUM COMPLETE ORAL) Take by mouth.   Yes Historical Provider   QUEtiapine (SEROQUEL) 25 MG Tab Take 25 mg by mouth 2 (two) times daily. 5/6/22  Yes Historical Provider   ranolazine (RANEXA) 1,000 mg Tb12 Take 1,000 mg by mouth 2 (two) times daily. 3/29/22  Yes Historical Provider   TUMERIC-GING-OLIVE-OREG-CAPRYL ORAL Take by mouth.   Yes Historical Provider   diazePAM (VALIUM) 5 MG tablet Take 5 mg by mouth daily as needed. 5/17/22   Historical Provider   docusate sodium (COLACE) 100  MG capsule Take 1 capsule (100 mg total) by mouth 2 (two) times daily. for 15 days 12/1/22 12/16/22  Jyoti Granados MD   nitroGLYCERIN (NITROSTAT) 0.4 MG SL tablet Place 0.4 mg under the tongue.    Historical Provider   ondansetron (ZOFRAN) 4 MG tablet Take 1 tablet (4 mg total) by mouth every 8 (eight) hours as needed for Nausea. 12/1/22 12/11/22  Jyoti Granados MD       REVIEW OF SYSTEMS:   Except as documented, all other systems reviewed and negative     PHYSICAL EXAM:     VITAL SIGNS: 24 HRS MIN & MAX LAST   Temp  Min: 95.5 °F (35.3 °C)  Max: 99.7 °F (37.6 °C) 96.3 °F (35.7 °C)   BP  Min: 90/69  Max: 149/111 109/70   Pulse  Min: 88  Max: 97  90   Resp  Min: 18  Max: 33 (!) 31     SpO2  Min: 91 %  Max: 100 % 96 %       Vitals Reviewed  General appearance: Female in mild distress on OxyMask  HEENT: Atraumatic head.   Eyes: Normal extraocular movements.   Neck: Supple.   Lungs:  Tachypneic. Diminished breath sounds bilaterally  Heart: Regular rate and rhythm.   Abdomen: Soft, non-distended, non-tender. Bowel sounds are normal.   Extremities: No cyanosis, clubbing, or edema.  Skin: No Rash.   Neuro: Alert, confused    LABS AND IMAGING:     Recent Labs   Lab 12/11/22  0917 12/11/22 2244 12/12/22  0354   WBC 11.3 14.2* 17.2*   RBC 4.24 4.02* 4.10*   HGB 11.8* 11.3* 11.5*   HCT 37.4 36.0* 38.3   MCV 88.2 89.6 93.4   MCH 27.8 28.1 28.0   MCHC 31.6* 31.4* 30.0*   RDW 13.4 13.3 13.6   * 103* 85*   MPV 12.0* 11.9* 12.8*       Recent Labs   Lab 12/11/22  0917 12/11/22  0930 12/11/22  2244 12/12/22  0501   *  --  135* 136   K 4.0  --  5.2* 5.8*   CO2 23  --  18* 17*   BUN 43.0*  --  47.9* 49.7*   CREATININE 1.29*  --  2.11* 2.40*   CALCIUM 9.7  --  9.4 9.6   PH  --  7.410  --   --    ALBUMIN 3.2*  --  3.3* 3.7   ALKPHOS 94  --  103 122   ALT 26  --  44 92*   AST 38*  --  64* 128*   BILITOT 0.4  --  0.8 1.1        Microbiology Results (last 7 days)       Procedure Component Value Units Date/Time    Blood  Culture [[id number]]     Order Status: Sent Specimen: Blood, Venous     Blood Culture [[id number]]     Order Status: Sent Specimen: Blood, Venous                ASSESSMENT & PLAN:   Assessment:  Sepsis  Acute hypoxia  Acute renal failure  Metabolic encephalopathy  NSTEMI   CHF exacerbation   Hyperkalemia   Bladder cancer  History essential hypertension, hyperlipidemia, diabetes mellitus type 2, aortic stenosis, CAD, TIA, dementia, carpal tunnel syndrome, GERD, depression, anxiety, obstructive sleep apnea, and arthritis      Plan:  IVF   IV Zosyn   Continue supplemental oxygen   Chest x-ray   ABG  Ammonia pending   Nephrology consulted, appreciate recommendations   Blood cultures pending  Urine culture pending  Flu/Covid pending   Cardiac monitoring   Trend troponin  Echo   Continue appropriate home medications   Labs in a.m.  COVID 19 PCR    Thank you for the consult!    VTE Prophylaxis: Already on Lovenox  __________________________________________________________________________  INPATIENT LIST OF MEDICATIONS     Current Facility-Administered Medications:     famotidine (PF) injection 20 mg, 20 mg, Intravenous, Daily, Oswaldo Alonso MD    melatonin tablet 6 mg, 6 mg, Oral, Nightly PRN, Oswaldo Alonso MD    morphine injection 2 mg, 2 mg, Intravenous, Q4H PRN, Oswaldo Alonso MD    morphine injection 4 mg, 4 mg, Intravenous, Q4H PRN, Oswaldo Alonso MD    ondansetron injection 4 mg, 4 mg, Intravenous, Q8H PRN, Oswaldo Alonso MD    sodium chloride 0.9% flush 10 mL, 10 mL, Intravenous, PRN, Oswaldo Alonso MD    Facility-Administered Medications Ordered in Other Encounters:     betamethasone acetate-betamethasone sodium phosphate injection 6 mg, 6 mg, Intra-articular, Zina FNP, 6 mg at 06/01/22 1345    betamethasone acetate-betamethasone sodium phosphate injection 6 mg, 6 mg, Intra-articular, Zina FNP, 6 mg at 06/01/22 1345    LIDOcaine (PF) 20 mg/mL (2%)  injection 5 mL, 5 mL, , , Zina STILL Masonmelvina, FNP, 5 mL at 06/01/22 1345    LIDOcaine (PF) 20 mg/mL (2%) injection 5 mL, 5 mL, , , Zina Weston, FNP, 5 mL at 06/01/22 1345      Scheduled Meds:   famotidine (PF)  20 mg Intravenous Daily     Continuous Infusions:  PRN Meds:.melatonin, morphine, morphine, ondansetron, sodium chloride 0.9%    RADIOLOGY                                                                                                    CT Head Without Contrast  Narrative: EXAMINATION:  CT HEAD WITHOUT CONTRAST    CLINICAL HISTORY:  Mental status change, unknown cause;    TECHNIQUE:  Sequential axial images were performed of the brain without contrast.    Dose product length of 1035 mGycm. Automated exposure control was utilized to minimize radiation dose.    COMPARISON:  December 1, 2022.    FINDINGS:  There is no intracranial mass effect, midline shift, hydrocephalus or hemorrhage. There is no sulcal effacement or low attenuation changes to suggest recent large vessel territory infarction. Chronic appearing periventricular and subcortical white matter low attenuation changes are present and are consistent with chronic microangiopathic ischemia. The ventricular system and sulcal markings prominence is consistent with atrophy. There is no acute extra axial fluid collection.  Right maxillary sinus lobulated mucoperiosteal thickening.  Otherwise, visualized paranasal sinuses are clear without mucosal thickening, polypoidal abnormality or air-fluid levels. Mastoid air cells aeration is optimal.  Impression: No acute intracranial findings identified.    No significant discrepancy with overnight report.    Electronically signed by: Chapin Ward  Date:    12/12/2022  Time:    06:52          I, Curtis Lim PA-C, have reviewed and discussed the case with . _  Please see the following addendum for further assessment and plan from there attending MD.    12/12/2022    DR. ONTIVEROS-CHART REVIEWED PATIENT EXAMINED.   PATIENT IS AN 89-YEAR-OLD  FEMALE ADMITTED TO [facility].  WE HAVE BEEN CONSULTED FOR ACUTE HYPOXIC RESPIRATORY FAILURE ON A NON-REBREATHER MASK AT 15 L. UPON ADMISSION HER LACTIC ACID WAS FOUND TO BE 9.5 WITH SMITHA ON CKD WITH WORSENING METABOLIC ACIDOSIS.  COVID-19 POSITIVE.  SHE WAS IN MODERATE DISTRESS WITH COLD EXTREMITIES AND WE WERE UNABLE TO GET A GOOD O2 SAT, AT TIMES WAS IN THE 70S.  PATIENT IS HYPERKALEMIC/APPEARS TO HAVE AN URINARY TRACT INFECTION.  IN VIEW OF HYPOXEMIA/COVID-19 POSITIVE/WORSENING RENAL FUNCTION/HYPERKALEMIA/ACUTE HYPOXIC RESPIRATORY FAILURE / RRT WAS CALLED  PATIENT HAS BEEN TRANSFERRED TO ICU.  AGREE WITH ASSESSMENT AND PLANS DONE BY A RAIMUNDO SIMPSON       CRITICAL CARE 35 MINUTES   CRITICAL CARE DIAGNOSIS-ACUTE HYPOXIC RESPIRATORY FAILURE-SMITHA AND CKD/HYPERKALEMIA/LACTIC ACIDOSIS.        Thank you for the consult, will be happy to follow alongside you      All diagnosis and differential diagnosis have been reviewed; assessment and plan has been documented; I have personally reviewed the labs and test results that are presently available; I have reviewed the patients medication list; I have reviewed the consulting providers response and recommendations. I have reviewed or attempted to review medical records based upon their availability.    All of the patient and family questions have been addressed and answered. Patient's is agreeable to the above stated plan. I will continue to monitor closely and make adjustments to medical management as needed.      Hospital Medicine Team  12/12/2022

## 2022-12-12 NOTE — CONSULTS
"[facility]  Nephrology Consultation  Patient Name: Miguel Angel Oliveros  Age: 89 y.o.  : 10/4/1933  MRN: [id number]  Admission Date: 2022    Date of Consultation: 22    Consultation Requested By: Hospital Medicine     Reason for Consultation: SMITHA    Chief Complaint: Chest Pain (Pt arrives via [facility], Transfer from [place], Chest Pain , elevated trop, NSTEMI)      History of Present Illness:  Ms Miguel Angel Oliveros is a 89 y.o. Black or  female with past medical history of aortic stenosis s/p TAVR, arthritis, CAD, DM II, HTN, TIA, bladder cancer resected end of 2022 with follow up planned. Patients lives with her daughter at home and seems to be awake and alert at baseline. She ambulates mostly with wheelchair due to severity of arthritis in her knees but does pivot to bathroom and things. Until 4 days ago patient was eating and drinking well. She then became weak and inability to help her family with ADL. Her daughter describes "She became like dead weight." Family brought her to ED yesterday. Initial workup not alarming for infectious process. CXR was negative with elevated BNP. Patient is tacypneic on oxymask with respiratory rate in the 30s. Mild hypotension with . Nurse reported critical lactate of 8.4 during exam.      Oral intake and UOP have decreased some. Patient keeps reporting urge to void but does not. This may be related to pure wick in place. Bladder scan done at bedside during exam shows 8mL at most. Patient does mumble some when spoken to but mostly difficult to understand. No history of nephrologist in the past. It appears baseline Cr <1. Of note, CT abdomen this admission shows lesion in bladder presently highly suspicious for malignancy.     Patient is allergic to statins-hmg-coa reductase inhibitors, bimatoprost, cortisone, dorzolamide-timolol, gabapentin, hydrocortisone, and tafluprost (pf).     Review of systems: 12 " "point review of systems conducted, negative except as stated in the HPI.     Past Medical History:  has a past medical history of Anxiety disorder, Aortic stenosis, Arthritis, Carpal tunnel syndrome, Coronary artery disease, Dementia, Depression, Diabetes mellitus, GERD (gastroesophageal reflux disease), rectal polypectomy, Hypercholesterolemia, Hypertension, Obstructive sleep apnea, Respiratory distress, S/P TAVR (transcatheter aortic valve replacement), and TIA (transient ischemic attack).    Procedure History:  has a past surgical history that includes Partial hysterectomy; Cataract extraction; Insertion of pacemaker; Cardiac surgery; Hysterectomy; Rectal polypectomy; Transcatheter aortic valve replacement (TAVR); Tonsillectomy; and Coronary stent placement.    Family History: family history includes Breast cancer in her sister; Diabetes in her mother; Hyperlipidemia in her father; Stroke in her father.    Social History:  reports that she has never smoked. She has never used smokeless tobacco. She reports that she does not drink alcohol and does not use drugs.    Physical Exam:   /70   Pulse 90   Temp 96.3 °F (35.7 °C) (Axillary)   Resp (!) 31   Ht 5' 6" (1.676 m)   Wt 86.2 kg (190 lb)   SpO2 96%   BMI 30.67 kg/m²  Body mass index is 30.67 kg/m².  General Appearance:  mild distress with tachypnea on oxymask   Head:  Normocephalic, no obvious abnormality  EENT:  conjunctiva and corneas clear, mucosa clear and moist, teeth intact                     Neck:  Supple, symmetrical, no tenderness, no JVD  Lungs:  bilaterally diminished, oxymask, unable to obtain Spo2, RR>30  Heart:  regular rate & rhythm  Abdomen:  Soft, non-tender, bowel sounds active all four quadrants  Musculoskeletal:  no peripheral edema  Skin:  Skin warm, dry, and intact, no rashes or abnormal dyspigmentation  Neurologic:  disoriented       Inpatient Medications:     Current Facility-Administered Medications:     famotidine (PF) " injection 20 mg, 20 mg, Intravenous, Daily, Oswaldo Alonso MD    melatonin tablet 6 mg, 6 mg, Oral, Nightly PRN, Oswaldo Alonso MD    morphine injection 2 mg, 2 mg, Intravenous, Q4H PRN, Oswaldo Alonso MD    morphine injection 4 mg, 4 mg, Intravenous, Q4H PRN, Oswaldo Alonso MD    ondansetron injection 4 mg, 4 mg, Intravenous, Q8H PRN, Oswaldo Alonso MD    sodium chloride 0.9% flush 10 mL, 10 mL, Intravenous, PRN, Oswaldo Alonso MD    Facility-Administered Medications Ordered in Other Encounters:     betamethasone acetate-betamethasone sodium phosphate injection 6 mg, 6 mg, Intra-articular, , Zina TESSY Cuevasuy, FNP, 6 mg at 06/01/22 1345    betamethasone acetate-betamethasone sodium phosphate injection 6 mg, 6 mg, Intra-articular, , Zina Cuevasuy, FNP, 6 mg at 06/01/22 1345    LIDOcaine (PF) 20 mg/mL (2%) injection 5 mL, 5 mL, , , Zina TESSY Bouy, FNP, 5 mL at 06/01/22 1345    LIDOcaine (PF) 20 mg/mL (2%) injection 5 mL, 5 mL, , , Zina TESSY Bouy, FNP, 5 mL at 06/01/22 1345     Imaging:  CT Head Without Contrast   Final Result      No acute intracranial findings identified.      No significant discrepancy with overnight report.         Electronically signed by: Chapin Ward   Date:    12/12/2022   Time:    06:52          Laboratory Data:  Recent Labs   Lab 12/12/22  0501      K 5.8*   CO2 17*   BUN 49.7*   CREATININE 2.40*   GLUCOSE 115   CALCIUM 9.6     Recent Labs   Lab 12/12/22  0354   WBC 17.2*   HGB 11.5*   HCT 38.3   PLT 85*         Impression:   Oliguric SMITHA s/t ATN s/t NSTEMI and lactic acidosis   Tachypnea with hypoxia   Altered mental status and generalized weakness   Profound lactic acidosis   Significant cardiac history including aortic stenosis s/p TAVR and CAD  HTN  DM II  TIA - -CT head negative on admission   Recent bladder CA resection in October 2022 without chemotherapy or radiation as of yet. Repeat CT abdomen this admission shows suspicion for malignancy  in bladder presently     Plan:   -Start NS at 75 mL/hr now until NaBicarb fluids are ready   -Give NaBicarb push once now and again in 2 hours   -Potassium 6.8 on ABGS that have just resulted. I will give IV Insulin to treat now.   -place urine catheter now.   -Patient appears to be in acute renal failure. Respiratory status must stabilize before considering hemodialysis.   -Consider urological consultation regarding CT results         Thank you very much for your consultation.     Kourtney Sutton NP  Nephrology  12/12/2022 10:43 AM

## 2022-12-12 NOTE — ED PROVIDER NOTES
Encounter Date: 12/11/2022    SCRIBE #1 NOTE: I, Chinmay Vazquez, am scribing for, and in the presence of,  Oswaldo Alonso MD. I have scribed the following portions of the note - Other sections scribed: HPI, ROS, PE.     History     Chief Complaint   Patient presents with    Chest Pain     Pt arrives via [facility], Transfer from [place], Chest Pain , elevated trop, NSTEMI     88 y/o female with a history of DM, HTN,  CAD, GERD, and dementia presents to the ED via [facility] as a transfer from [facility] for abdominal pain. Per EMS, pt is also complaining of generalized weakness. Pt notes that she has 2 cardiac stents placed.    The history is provided by the patient, the EMS personnel and medical records. The history is limited by the condition of the patient. No  was used.   Abdominal Pain  The problem has not changed since onset.The abdominal pain is generalized.   Risk factors for an acute abdominal problem include being elderly. Significant associated medical issues include GERD, diabetes and cardiac disease.   Review of patient's allergies indicates:   Allergen Reactions    Statins-hmg-coa reductase inhibitors      Other reaction(s): Joint pain    Bimatoprost      Other reaction(s): Eye redness    Cortisone     Dorzolamide-timolol     Gabapentin      Other reaction(s): Confusion    Hydrocortisone     Tafluprost (pf)      Past Medical History:   Diagnosis Date    Anxiety disorder     Aortic stenosis     Arthritis     Carpal tunnel syndrome     Coronary artery disease     Dementia     Depression     Diabetes mellitus     GERD (gastroesophageal reflux disease)     Hx of rectal polypectomy     Hypercholesterolemia     Hypertension     Obstructive sleep apnea     Respiratory distress     S/P TAVR (transcatheter aortic valve replacement)     TIA (transient ischemic attack)      Past Surgical History:   Procedure Laterality Date    CARDIAC SURGERY      CATARACT EXTRACTION      CORONARY STENT PLACEMENT       HYSTERECTOMY      INSERTION OF PACEMAKER      PARTIAL HYSTERECTOMY      RECTAL POLYPECTOMY      TONSILLECTOMY      TRANSCATHETER AORTIC VALVE REPLACEMENT (TAVR)       Family History   Problem Relation Age of Onset    Diabetes Mother     Stroke Father     Hyperlipidemia Father     Breast cancer Sister      Social History     Tobacco Use    Smoking status: Never    Smokeless tobacco: Never   Substance Use Topics    Alcohol use: Never    Drug use: Never     Review of Systems   Reason unable to perform ROS: ROS limited by condition of the pateint.   Constitutional:         Generalized weakness   Gastrointestinal:  Positive for abdominal pain.     Physical Exam     Initial Vitals [12/11/22 2120]   BP Pulse Resp Temp SpO2   (!) 149/111 90 18 (!) 95.5 °F (35.3 °C) (!) 94 %      MAP       --         Physical Exam    Nursing note and vitals reviewed.  Constitutional: She appears well-developed and well-nourished. She is not diaphoretic. No distress.   HENT:   Head: Normocephalic and atraumatic.   Nose: Nose normal.   Mouth/Throat: Oropharynx is clear and moist.   Eyes: EOM are normal. Pupils are equal, round, and reactive to light.   Neck: Neck supple.   Normal range of motion.  Cardiovascular:  Normal rate and regular rhythm.           No murmur heard.  Pulmonary/Chest: No respiratory distress. She has no wheezes.   Crackles at the bases bilaterally   Abdominal: Abdomen is soft. She exhibits distension (mild). There is no abdominal tenderness.   Musculoskeletal:         General: No edema. Normal range of motion.      Cervical back: Normal range of motion and neck supple.     Neurological: She is alert and oriented to person, place, and time. She has normal strength. No cranial nerve deficit or sensory deficit.   Skin: Skin is warm. Capillary refill takes less than 2 seconds. No rash noted.   Psychiatric: She has a normal mood and affect. Her behavior is normal.       ED Course   Critical Care    Date/Time: 12/19/2022  11:02 AM  Performed by: Oswaldo Alonso MD  Authorized by: Mukesh Denton MD   Direct patient critical care time: 35 minutes  Total critical care time (exclusive of procedural time) : 35 minutes  Critical care time was exclusive of separately billable procedures and treating other patients.  Critical care was necessary to treat or prevent imminent or life-threatening deterioration of the following conditions: cardiac failure, respiratory failure and metabolic crisis.  Critical care was time spent personally by me on the following activities: blood draw for specimens, development of treatment plan with patient or surrogate, discussions with consultants, evaluation of patient's response to treatment, examination of patient, obtaining history from patient or surrogate, ordering and performing treatments and interventions, ordering and review of laboratory studies, ordering and review of radiographic studies, pulse oximetry, re-evaluation of patient's condition and review of old charts.      Labs Reviewed   COMPREHENSIVE METABOLIC PANEL - Abnormal; Notable for the following components:       Result Value    Sodium Level 135 (*)     Potassium Level 5.2 (*)     Carbon Dioxide 18 (*)     Glucose Level 138 (*)     Blood Urea Nitrogen 47.9 (*)     Creatinine 2.11 (*)     Albumin Level 3.3 (*)     Albumin/Globulin Ratio 1.0 (*)     Aspartate Aminotransferase 64 (*)     All other components within normal limits   TROPONIN I - Abnormal; Notable for the following components:    Troponin-I 0.895 (*)     All other components within normal limits   CBC WITH DIFFERENTIAL - Abnormal; Notable for the following components:    WBC 14.2 (*)     RBC 4.02 (*)     Hgb 11.3 (*)     Hct 36.0 (*)     MCHC 31.4 (*)     Platelet 103 (*)     MPV 11.9 (*)     Neut # 9.3 (*)     IG# 0.18 (*)     All other components within normal limits   B-TYPE NATRIURETIC PEPTIDE - Abnormal; Notable for the following components:    Natriuretic Peptide 1,900.0  (*)     All other components within normal limits   TROPONIN I - Abnormal; Notable for the following components:    Troponin-I 0.825 (*)     All other components within normal limits   COMPREHENSIVE METABOLIC PANEL - Abnormal; Notable for the following components:    Potassium Level 5.8 (*)     Carbon Dioxide 17 (*)     Blood Urea Nitrogen 49.7 (*)     Creatinine 2.40 (*)     Globulin 3.9 (*)     Albumin/Globulin Ratio 0.9 (*)     Alanine Aminotransferase 92 (*)     Aspartate Aminotransferase 128 (*)     All other components within normal limits   CBC WITH DIFFERENTIAL - Abnormal; Notable for the following components:    WBC 17.2 (*)     RBC 4.10 (*)     Hgb 11.5 (*)     MCHC 30.0 (*)     Platelet 85 (*)     MPV 12.8 (*)     Neut # 12.2 (*)     Mono # 1.31 (*)     IG# 0.31 (*)     All other components within normal limits   POCT GLUCOSE - Abnormal; Notable for the following components:    POCT Glucose 117 (*)     All other components within normal limits   CBC W/ AUTO DIFFERENTIAL    Narrative:     The following orders were created for panel order CBC auto differential.  Procedure                               Abnormality         Status                     ---------                               -----------         ------                     CBC with Differential[[id number]]        Abnormal            Final result                 Please view results for these tests on the individual orders.   CBC W/ AUTO DIFFERENTIAL    Narrative:     The following orders were created for panel order CBC auto differential.  Procedure                               Abnormality         Status                     ---------                               -----------         ------                     CBC with Differential[[id number]]        Abnormal            Final result                 Please view results for these tests on the individual orders.        ECG Results    None       Imaging Results              CT Head Without Contrast  (Final result)  Result time 12/12/22 06:52:36      Final result by Chapin Ward MD (12/12/22 06:52:36)                   Impression:      No acute intracranial findings identified.    No significant discrepancy with overnight report.      Electronically signed by: Chapin Ward  Date:    12/12/2022  Time:    06:52               Narrative:    EXAMINATION:  CT HEAD WITHOUT CONTRAST    CLINICAL HISTORY:  Mental status change, unknown cause;    TECHNIQUE:  Sequential axial images were performed of the brain without contrast.    Dose product length of 1035 mGycm. Automated exposure control was utilized to minimize radiation dose.    COMPARISON:  December 1, 2022.    FINDINGS:  There is no intracranial mass effect, midline shift, hydrocephalus or hemorrhage. There is no sulcal effacement or low attenuation changes to suggest recent large vessel territory infarction. Chronic appearing periventricular and subcortical white matter low attenuation changes are present and are consistent with chronic microangiopathic ischemia. The ventricular system and sulcal markings prominence is consistent with atrophy. There is no acute extra axial fluid collection.  Right maxillary sinus lobulated mucoperiosteal thickening.  Otherwise, visualized paranasal sinuses are clear without mucosal thickening, polypoidal abnormality or air-fluid levels. Mastoid air cells aeration is optimal.                        Preliminary result by Chapin Ward MD (12/11/22 22:58:29)                   Narrative:    START OF REPORT:  Technique: CT of the head was performed without intravenous contrast with axial as well as coronal and sagittal images.    Comparison: Comparison is with study [id number] 13:59:19.    Dosage Information: Automated exposure control was utilized.    Clinical history: Ams.    Findings:  Hemorrhage: No acute intracranial hemorrhage is seen.  CSF spaces: The ventricles, sulci and basal cisterns all appear mildly prominent  consistent with global cerebral atrophy.  Brain parenchyma: There is preservation of the grey white junction throughout. Moderate microvascular change is seen in portions of the periventricular and deep white matter tracts.  Cerebellum: Unremarkable.  Sella and skull base: The sella appears to be within normal limits for age.  Herniation: None.  Intracranial calcifications: Incidental note is made of bilateral choroid plexus calcification. Incidental note is made of some pineal region calcification. Incidental note is made of some calcification of the falx.  Calvarium: No acute linear or depressed skull fracture is seen.    Maxillofacial Structures:  Paranasal sinuses: Again noted is moderate mucoperiosteal thickening in the right maxillary sinus with a small retention cyst or polyp.  Orbits: The orbits appear unremarkable.  Zygomatic arches: The zygomatic arches are intact and unremarkable.  Temporal bones and mastoids: The temporal bones and mastoids appear unremarkable.  TMJ: The mandibular condyles appear normally placed with respect to the mandibular fossa.      Impression:  1. No acute intracranial process identified. Details as above.                          Preliminary result by Mane Inman MD (12/11/22 22:58:29)                   Narrative:    START OF REPORT:  Technique: CT of the head was performed without intravenous contrast with axial as well as coronal and sagittal images.    Comparison: Comparison is with study [id number] 13:59:19.    Dosage Information: Automated exposure control was utilized.    Clinical history: Ams.    Findings:  Hemorrhage: No acute intracranial hemorrhage is seen.  CSF spaces: The ventricles, sulci and basal cisterns all appear mildly prominent consistent with global cerebral atrophy.  Brain parenchyma: There is preservation of the grey white junction throughout. Moderate microvascular change is seen in portions of the periventricular and deep white matter  tracts.  Cerebellum: Unremarkable.  Sella and skull base: The sella appears to be within normal limits for age.  Herniation: None.  Intracranial calcifications: Incidental note is made of bilateral choroid plexus calcification. Incidental note is made of some pineal region calcification. Incidental note is made of some calcification of the falx.  Calvarium: No acute linear or depressed skull fracture is seen.    Maxillofacial Structures:  Paranasal sinuses: Again noted is moderate mucoperiosteal thickening in the right maxillary sinus with a small retention cyst or polyp.  Orbits: The orbits appear unremarkable.  Zygomatic arches: The zygomatic arches are intact and unremarkable.  Temporal bones and mastoids: The temporal bones and mastoids appear unremarkable.  TMJ: The mandibular condyles appear normally placed with respect to the mandibular fossa.      Impression:  1. No acute intracranial process identified. Details as above.                                         Medications   sodium chloride 0.9% flush 10 mL (has no administration in time range)   ondansetron injection 4 mg (has no administration in time range)   famotidine (PF) injection 20 mg (20 mg Intravenous Given 12/19/22 0837)   insulin regular injection 6 Units 0.06 mL (has no administration in time range)   dextrose 50% injection 25 g (25 g Intravenous Not Given 12/12/22 1130)   dexAMETHasone injection 6 mg (6 mg Intravenous Given 12/19/22 0837)   0.9%  NaCl infusion ( Intravenous New Bag 12/18/22 2236)   Amino acid 4.25% - dextrose 5% (CLINIMIX-E) solution (1L provides 42.5 gm AA, 50 gm CHO (170 kcal/L dextrose), Na 35, K 30, Mg 5, Ca 4.5, Acetate 70, Cl 39, Phos 15) ( Intravenous Stopped 12/19/22 0616)   diazePAM tablet 2 mg (has no administration in time range)   melatonin tablet 6 mg (6 mg Oral Not Given 12/18/22 2100)   hydrALAZINE injection 5 mg (5 mg Intravenous Given by Other 12/19/22 0423)   argatroban in sodium chloride 0.9% (conc: 1 mg/mL)  (0.5 mcg/kg/min × 86.2 kg Intravenous New Bag 12/19/22 0630)   haloperidol lactate injection 2 mg (has no administration in time range)   amino acid 4.25% - dextrose 5% solution ( Intravenous Incomplete 12/19/22 1103)   furosemide injection 40 mg (40 mg Intravenous Given 12/11/22 2228)   hydrOXYzine pamoate capsule 25 mg (25 mg Oral Given 12/12/22 0006)   perflutren lipid microspheres injection 1.3 mL (1.3 mLs Intravenous Given 12/12/22 1029)   sodium bicarbonate 8.4 % (1 mEq/mL) injection (50 mEq  Given 12/12/22 1150)   vancomycin (VANCOCIN) 2,000 mg in dextrose 5 % 500 mL IVPB (0 mg Intravenous Stopped 12/12/22 1633)   mupirocin 2 % ointment ( Nasal Given 12/17/22 0847)   remdesivir 200 mg in sodium chloride 0.9% 250 mL infusion (0 mg Intravenous Stopped 12/12/22 1639)     Followed by   remdesivir 100 mg in sodium chloride 0.9% 100 mL infusion (0 mg Intravenous Stopped 12/14/22 0916)   calcium gluconate 1 g in NS IVPB (premixed) (0 g Intravenous Stopped 12/13/22 1649)   vancomycin 1.25 g in dextrose 5% 250 mL IVPB (ready to mix) (0 mg Intravenous Stopped 12/14/22 0701)   haloperidol lactate injection 2 mg (2 mg Intramuscular Given 12/18/22 0107)   iopamidoL (ISOVUE-370) injection 100 mL (100 mLs Intravenous Given 12/18/22 1608)     Medical Decision Making:   Independently Interpreted Test(s):   I have ordered and independently interpreted X-rays - see prior notes.  I have ordered and independently interpreted EKG Reading(s) - see prior notes  Clinical Tests:   Lab Tests: Ordered and Reviewed  Radiological Study: Ordered and Reviewed  Medical Tests: Reviewed  ED Management:  DDX:  ACS, arrhythmia, electrolyte abnormalities, ICH, TIA/CVA, intra-abdominal catastrophe    MDM: Miguel Angel Oliveros is a 89 y.o. female with above past medical history who presents to the ED for cardiology evaluation of her NSTEMI. Vital signs reviewed.  Patient arrives as a transfer from an outside hospital for Cardiology evaluation of  her NSTEMI.  Patient denies any chest pain currently but does report some shortness of breath, generalized weakness, and generalized abdominal pain.  Patient reportedly was confused earlier, will proceed with CT head evaluation to evaluate for bleed.  Cardiology has been consulted, appreciate recommendations.  Post transfer labs including repeat troponin are pending.  Chest x-ray and EKG have been reviewed.  Continue pulse oximetry and telemetry monitoring.  I have discussed the patient's case with the patient's family and all questions have been answered.    Update:  Case has been discussed with Cardiology, will evaluate the patient in consult recommends hospital Medicine admission.  Hospital Medicine has been consulted and will evaluate the patient.  Patient has had a mild increase in her oxygen requirement while awaiting admission, may require ICU level of care if she continues to decompensate.  Patient will be evaluated by hospital medicine service and they have accepted the patient for admission.    Dispo: Admit    Data Reviewed/Counseling: I have personally reviewed the patient's vital signs, nursing notes, and other relevant tests, information, and imaging. I had a detailed discussion regarding the historical points, exam findings, and any diagnostic results supporting the discharge diagnosis.     Portions of this note were dictated using voice recognition software. Although it was reviewed for accuracy, some inherent voice recognition errors may have occurred and be present in this document.    Other:   I have discussed this case with another health care provider.        Scribe Attestation:   Scribe #1: I performed the above scribed service and the documentation accurately describes the services I performed. I attest to the accuracy of the note.    Attending Attestation:           Physician Attestation for Scribe:  Physician Attestation Statement for Scribe #1: I, Oswaldo Alonso MD, reviewed  documentation, as scribed by Chinmay Vazquez in my presence, and it is both accurate and complete.           ED Course as of 12/19/22 1107   [place] Dec 11, 2022   2215 [facility]IS paged [JG]      ED Course User Index  [JG] Chinmay Vazquez                 Clinical Impression:   Final diagnoses:  [I21.4] NSTEMI (non-ST elevated myocardial infarction) (Primary)  [R06.02] Shortness of breath  [R06.00] Dyspnea, unspecified type  [R09.02] Hypoxia  [N17.9] SMITHA (acute kidney injury)        ED Disposition Condition    Admit Stable                Oswaldo Alonso MD  12/19/22 1105

## 2022-12-13 LAB
ABS NEUT (OLG): 15.56 X10(3)/MCL (ref 2.1–9.2)
ACANTHOCYTES (OLG): ABNORMAL
ALBUMIN SERPL-MCNC: 2.8 GM/DL (ref 3.4–4.8)
ALBUMIN SERPL-MCNC: 3 GM/DL (ref 3.4–4.8)
ALBUMIN/GLOB SERPL: 0.9 RATIO (ref 1.1–2)
ALBUMIN/GLOB SERPL: 0.9 RATIO (ref 1.1–2)
ALP SERPL-CCNC: 103 UNIT/L (ref 40–150)
ALP SERPL-CCNC: 109 UNIT/L (ref 40–150)
ALT SERPL-CCNC: 331 UNIT/L (ref 0–55)
ALT SERPL-CCNC: 869 UNIT/L (ref 0–55)
ANISOCYTOSIS BLD QL SMEAR: ABNORMAL
APTT PPP: 122.2 SECONDS (ref 23.2–33.7)
APTT PPP: 197.2 SECONDS (ref 23.2–33.7)
APTT PPP: 66.9 SECONDS (ref 23.2–33.7)
APTT PPP: 77.1 SECONDS (ref 23.2–33.7)
AST SERPL-CCNC: 1019 UNIT/L (ref 5–34)
AST SERPL-CCNC: 449 UNIT/L (ref 5–34)
BACTERIA UR CULT: NO GROWTH
BASOPHILS # BLD AUTO: 0.03 X10(3)/MCL (ref 0–0.2)
BASOPHILS NFR BLD AUTO: 0.2 %
BILIRUBIN DIRECT+TOT PNL SERPL-MCNC: 0.4 MG/DL
BILIRUBIN DIRECT+TOT PNL SERPL-MCNC: 0.5 MG/DL
BUN SERPL-MCNC: 72.7 MG/DL (ref 9.8–20.1)
BUN SERPL-MCNC: 83.1 MG/DL (ref 9.8–20.1)
BURR CELLS (OLG): ABNORMAL
CALCIUM SERPL-MCNC: 8.7 MG/DL (ref 8.4–10.2)
CALCIUM SERPL-MCNC: 8.7 MG/DL (ref 8.4–10.2)
CHLORIDE SERPL-SCNC: 94 MMOL/L (ref 98–107)
CHLORIDE SERPL-SCNC: 97 MMOL/L (ref 98–107)
CO2 SERPL-SCNC: 20 MMOL/L (ref 23–31)
CO2 SERPL-SCNC: 25 MMOL/L (ref 23–31)
CREAT SERPL-MCNC: 3.02 MG/DL (ref 0.55–1.02)
CREAT SERPL-MCNC: 3.23 MG/DL (ref 0.55–1.02)
EOSINOPHIL # BLD AUTO: 0 X10(3)/MCL (ref 0–0.9)
EOSINOPHIL NFR BLD AUTO: 0 %
ERYTHROCYTE [DISTWIDTH] IN BLOOD BY AUTOMATED COUNT: 13.7 % (ref 11.5–17)
ERYTHROCYTE [DISTWIDTH] IN BLOOD BY AUTOMATED COUNT: 14 % (ref 11.5–17)
GFR SERPLBLD CREATININE-BSD FMLA CKD-EPI: 13 MLS/MIN/1.73/M2
GFR SERPLBLD CREATININE-BSD FMLA CKD-EPI: 14 MLS/MIN/1.73/M2
GLOBULIN SER-MCNC: 3.2 GM/DL (ref 2.4–3.5)
GLOBULIN SER-MCNC: 3.2 GM/DL (ref 2.4–3.5)
GLUCOSE SERPL-MCNC: 177 MG/DL (ref 82–115)
GLUCOSE SERPL-MCNC: 192 MG/DL (ref 82–115)
HCT VFR BLD AUTO: 34.1 % (ref 37–47)
HCT VFR BLD AUTO: 35.9 % (ref 37–47)
HEMATOLOGIST REVIEW: NORMAL
HGB BLD-MCNC: 11 GM/DL (ref 12–16)
HGB BLD-MCNC: 11.3 GM/DL (ref 12–16)
IMM GRANULOCYTES # BLD AUTO: 0.14 X10(3)/MCL (ref 0–0.04)
IMM GRANULOCYTES # BLD AUTO: 0.22 X10(3)/MCL (ref 0–0.04)
IMM GRANULOCYTES NFR BLD AUTO: 1 %
IMM GRANULOCYTES NFR BLD AUTO: 1.3 %
INSTRUMENT WBC (OLG): 17.1 X10(3)/MCL
LACTATE SERPL-SCNC: 3.1 MMOL/L (ref 0.5–2.2)
LACTATE SERPL-SCNC: 3.7 MMOL/L (ref 0.5–2.2)
LACTATE SERPL-SCNC: 3.8 MMOL/L (ref 0.5–2.2)
LACTATE SERPL-SCNC: 3.8 MMOL/L (ref 0.5–2.2)
LACTATE SERPL-SCNC: 4.3 MMOL/L (ref 0.5–2.2)
LACTATE SERPL-SCNC: 4.3 MMOL/L (ref 0.5–2.2)
LACTATE SERPL-SCNC: 5.2 MMOL/L (ref 0.5–2.2)
LYMPHOCYTES # BLD AUTO: 1.27 X10(3)/MCL (ref 0.6–4.6)
LYMPHOCYTES NFR BLD AUTO: 8.7 %
LYMPHOCYTES NFR BLD MANUAL: 0.68 X10(3)/MCL
LYMPHOCYTES NFR BLD MANUAL: 4 %
MCH RBC QN AUTO: 28.2 PG (ref 27–31)
MCH RBC QN AUTO: 28.4 PG (ref 27–31)
MCHC RBC AUTO-ENTMCNC: 31.5 MG/DL (ref 33–36)
MCHC RBC AUTO-ENTMCNC: 32.3 MG/DL (ref 33–36)
MCV RBC AUTO: 88.1 FL (ref 80–94)
MCV RBC AUTO: 89.5 FL (ref 80–94)
MICROCYTES BLD QL SMEAR: ABNORMAL
MONOCYTES # BLD AUTO: 0.74 X10(3)/MCL (ref 0.1–1.3)
MONOCYTES NFR BLD AUTO: 5.1 %
MONOCYTES NFR BLD MANUAL: 0.85 X10(3)/MCL (ref 0.1–1.3)
MONOCYTES NFR BLD MANUAL: 5 %
NEUTROPHILS # BLD AUTO: 12.5 X10(3)/MCL (ref 2.1–9.2)
NEUTROPHILS NFR BLD AUTO: 85 %
NEUTROPHILS NFR BLD MANUAL: 91 %
NRBC BLD AUTO-RTO: 5.6 %
NRBC BLD AUTO-RTO: 5.7 %
NRBC BLD MANUAL-RTO: 20 %
PAPPENHEIMER BODIES (OLG): ABNORMAL
PCO2 BLDA: 35 MMHG
PH SMN: 7.51 [PH] (ref 7.35–7.45)
PLATELET # BLD AUTO: 117 X10(3)/MCL (ref 130–400)
PLATELET # BLD AUTO: 118 X10(3)/MCL (ref 130–400)
PLATELET # BLD EST: ABNORMAL 10*3/UL
PMV BLD AUTO: 12.5 FL (ref 7.4–10.4)
PMV BLD AUTO: 12.8 FL (ref 7.4–10.4)
PO2 BLDA: 67 MMHG
POC BASE DEFICIT: 4.9 MMOL/L
POC HCO3: 27.9 MMOL/L
POC IONIZED CALCIUM: 0.95 MMOL/L (ref 1.1–1.2)
POC SATURATED O2: 95 %
POC TEMPERATURE: 37 C
POCT GLUCOSE: 179 MG/DL (ref 70–110)
POCT GLUCOSE: 183 MG/DL (ref 70–110)
POIKILOCYTOSIS BLD QL SMEAR: ABNORMAL
POTASSIUM BLD-SCNC: 3.9 MMOL/L
POTASSIUM SERPL-SCNC: 4.4 MMOL/L (ref 3.5–5.1)
POTASSIUM SERPL-SCNC: 4.9 MMOL/L (ref 3.5–5.1)
PROT SERPL-MCNC: 6 GM/DL (ref 5.8–7.6)
PROT SERPL-MCNC: 6.2 GM/DL (ref 5.8–7.6)
RBC # BLD AUTO: 3.87 X10(6)/MCL (ref 4.2–5.4)
RBC # BLD AUTO: 4.01 X10(6)/MCL (ref 4.2–5.4)
RBC MORPH BLD: ABNORMAL
SODIUM BLD-SCNC: 129 MMOL/L (ref 137–145)
SODIUM SERPL-SCNC: 134 MMOL/L (ref 136–145)
SODIUM SERPL-SCNC: 134 MMOL/L (ref 136–145)
SPECIMEN SOURCE: ABNORMAL
VANCOMYCIN SERPL-MCNC: 23.9 UG/ML (ref 15–20)
WBC # SPEC AUTO: 14.7 X10(3)/MCL (ref 4.5–11.5)
WBC # SPEC AUTO: 17.1 X10(3)/MCL (ref 4.5–11.5)

## 2022-12-13 PROCEDURE — 63600175 PHARM REV CODE 636 W HCPCS: Performed by: STUDENT IN AN ORGANIZED HEALTH CARE EDUCATION/TRAINING PROGRAM

## 2022-12-13 PROCEDURE — 63600175 PHARM REV CODE 636 W HCPCS: Mod: TB | Performed by: STUDENT IN AN ORGANIZED HEALTH CARE EDUCATION/TRAINING PROGRAM

## 2022-12-13 PROCEDURE — 36415 COLL VENOUS BLD VENIPUNCTURE: CPT | Performed by: STUDENT IN AN ORGANIZED HEALTH CARE EDUCATION/TRAINING PROGRAM

## 2022-12-13 PROCEDURE — 85730 THROMBOPLASTIN TIME PARTIAL: CPT | Performed by: INTERNAL MEDICINE

## 2022-12-13 PROCEDURE — 36600 WITHDRAWAL OF ARTERIAL BLOOD: CPT

## 2022-12-13 PROCEDURE — 25000003 PHARM REV CODE 250: Performed by: INTERNAL MEDICINE

## 2022-12-13 PROCEDURE — 25000003 PHARM REV CODE 250: Performed by: NURSE PRACTITIONER

## 2022-12-13 PROCEDURE — 63600175 PHARM REV CODE 636 W HCPCS: Performed by: PHYSICIAN ASSISTANT

## 2022-12-13 PROCEDURE — 63600175 PHARM REV CODE 636 W HCPCS: Performed by: INTERNAL MEDICINE

## 2022-12-13 PROCEDURE — 36415 COLL VENOUS BLD VENIPUNCTURE: CPT | Performed by: INTERNAL MEDICINE

## 2022-12-13 PROCEDURE — 99900035 HC TECH TIME PER 15 MIN (STAT)

## 2022-12-13 PROCEDURE — 82803 BLOOD GASES ANY COMBINATION: CPT

## 2022-12-13 PROCEDURE — 27000207 HC ISOLATION

## 2022-12-13 PROCEDURE — 27000221 HC OXYGEN, UP TO 24 HOURS

## 2022-12-13 PROCEDURE — 25000003 PHARM REV CODE 250: Performed by: PHYSICIAN ASSISTANT

## 2022-12-13 PROCEDURE — 85060 BLOOD SMEAR INTERPRETATION: CPT | Performed by: STUDENT IN AN ORGANIZED HEALTH CARE EDUCATION/TRAINING PROGRAM

## 2022-12-13 PROCEDURE — 80053 COMPREHEN METABOLIC PANEL: CPT | Performed by: STUDENT IN AN ORGANIZED HEALTH CARE EDUCATION/TRAINING PROGRAM

## 2022-12-13 PROCEDURE — 83605 ASSAY OF LACTIC ACID: CPT | Performed by: STUDENT IN AN ORGANIZED HEALTH CARE EDUCATION/TRAINING PROGRAM

## 2022-12-13 PROCEDURE — 20000000 HC ICU ROOM

## 2022-12-13 PROCEDURE — 25000003 PHARM REV CODE 250: Performed by: STUDENT IN AN ORGANIZED HEALTH CARE EDUCATION/TRAINING PROGRAM

## 2022-12-13 PROCEDURE — 85027 COMPLETE CBC AUTOMATED: CPT | Performed by: STUDENT IN AN ORGANIZED HEALTH CARE EDUCATION/TRAINING PROGRAM

## 2022-12-13 RX ORDER — SODIUM CHLORIDE, SODIUM LACTATE, POTASSIUM CHLORIDE, CALCIUM CHLORIDE 600; 310; 30; 20 MG/100ML; MG/100ML; MG/100ML; MG/100ML
INJECTION, SOLUTION INTRAVENOUS CONTINUOUS
Status: DISCONTINUED | OUTPATIENT
Start: 2022-12-13 | End: 2022-12-17

## 2022-12-13 RX ORDER — CALCIUM GLUCONATE 20 MG/ML
1 INJECTION, SOLUTION INTRAVENOUS ONCE
Status: COMPLETED | OUTPATIENT
Start: 2022-12-13 | End: 2022-12-13

## 2022-12-13 RX ADMIN — MUPIROCIN: 20 OINTMENT TOPICAL at 08:12

## 2022-12-13 RX ADMIN — HEPARIN SODIUM 17 UNITS/KG/HR: 10000 INJECTION, SOLUTION INTRAVENOUS at 07:12

## 2022-12-13 RX ADMIN — PIPERACILLIN SODIUM,TAZOBACTAM SODIUM 4.5 G: 4; .5 INJECTION, POWDER, FOR SOLUTION INTRAVENOUS at 11:12

## 2022-12-13 RX ADMIN — FAMOTIDINE 20 MG: 10 INJECTION, SOLUTION INTRAVENOUS at 08:12

## 2022-12-13 RX ADMIN — PIPERACILLIN SODIUM,TAZOBACTAM SODIUM 4.5 G: 4; .5 INJECTION, POWDER, FOR SOLUTION INTRAVENOUS at 01:12

## 2022-12-13 RX ADMIN — REMDESIVIR 100 MG: 100 INJECTION, POWDER, LYOPHILIZED, FOR SOLUTION INTRAVENOUS at 09:12

## 2022-12-13 RX ADMIN — DEXAMETHASONE SODIUM PHOSPHATE 6 MG: 4 INJECTION, SOLUTION INTRA-ARTICULAR; INTRALESIONAL; INTRAMUSCULAR; INTRAVENOUS; SOFT TISSUE at 08:12

## 2022-12-13 RX ADMIN — SODIUM BICARBONATE: 84 INJECTION, SOLUTION INTRAVENOUS at 01:12

## 2022-12-13 RX ADMIN — CALCIUM GLUCONATE 1 G: 20 INJECTION, SOLUTION INTRAVENOUS at 03:12

## 2022-12-13 RX ADMIN — HEPARIN SODIUM 13 UNITS/KG/HR: 10000 INJECTION, SOLUTION INTRAVENOUS at 03:12

## 2022-12-13 RX ADMIN — FAMOTIDINE 20 MG: 10 INJECTION, SOLUTION INTRAVENOUS at 09:12

## 2022-12-13 RX ADMIN — SODIUM CHLORIDE, POTASSIUM CHLORIDE, SODIUM LACTATE AND CALCIUM CHLORIDE: 600; 310; 30; 20 INJECTION, SOLUTION INTRAVENOUS at 09:12

## 2022-12-13 NOTE — PROGRESS NOTES
Nephrology follow up progress note    HPI:      Miguel Angel Oliveros is a 89 y.o. female who has been found to have SMITHA probably secondary to ATN probably secondary to multifactorial reasons.  Now she is found to have COVID positive.  Also she has thrombus in the IVC close to right atrium.  Patient is on heparin.  Patient is on remdesivir and Decadron.  Urine output has been low.  Patient is receiving bicarb drip.  She is comfortable.    Interval history:          Review of Systems:       Past medical, family, surgical, and social history reviewed and unchanged from initial consult note.     Objective:       VITAL SIGNS: 24 HR MIN & MAX LAST    Temp  Min: 96.5 °F (35.8 °C)  Max: 98.6 °F (37 °C)  98.4 °F (36.9 °C)        BP  Min: 97/71  Max: 169/103  116/74     Pulse  Min: 88  Max: 102  92     Resp  Min: 21  Max: 50  (!) 26    SpO2  Min: 57 %  Max: 100 %  95 %      GEN: Chronically ill appearing in NAD  HEENT: Conjunctiva anicteric, pupils reactive.  CV: RRR without rub.  PULM: CTAB, unlabored  ABD: Soft, NT/ND abdomen.  Bowel sounds positive.  EXT: No cyanosis or edema  SKIN: Warm and dry            Component Value Date/Time     (L) 12/12/2022 2356     (L) 12/12/2022 1124    K 4.9 12/12/2022 2356    K 5.5 (H) 12/12/2022 1904    CHLORIDE 97 (L) 12/12/2022 2356    CHLORIDE 101 12/12/2022 1124    CO2 20 (L) 12/12/2022 2356    CO2 14 (L) 12/12/2022 1124    BUN 72.7 (H) 12/12/2022 2356    BUN 58.0 (H) 12/12/2022 1124    CREATININE 3.23 (H) 12/12/2022 2356    CREATININE 3.14 (H) 12/12/2022 1124    CALCIUM 8.7 12/12/2022 2356    CALCIUM 9.6 12/12/2022 1124    PHOS 7.9 (H) 12/12/2022 1124            Component Value Date/Time    WBC 14.7 (H) 12/12/2022 2356    WBC 13.4 (H) 12/12/2022 2005    HGB 11.0 (L) 12/12/2022 2356    HGB 10.2 (L) 12/12/2022 2005    HCT 34.1 (L) 12/12/2022 2356    HCT 31.6 (L) 12/12/2022 2005    HCT 37.0 (L) 05/02/2020 1818    HCT 37.0 (L) 05/02/2020 1812     (L) 12/12/2022 2356      (L) 12/12/2022 2005       Imaging reviewed      Assessment / Plan:   SMITHA secondary to ATN secondary to sepsis.  COVID-19 positive  Thrombus in IVC close to right atrium  Altered mental status  Lactic acidosis  Status post TAVR.  Hypertension  Diabetes type 2  History of recent bladder cancer resection in October 2022.    Recommendations  Continue bicarb drip  Renal ultrasound  Check labs tomorrow  Patient does have rising BUN creatinine but considering patient's advanced age multiple comorbid conditions, she is not a good candidate for any renal replacement therapy.  She did have normal kidney functions till about a month ago.  So if the intensivist and family member think to try RRT, it can be considered at a later date.

## 2022-12-13 NOTE — CONSULTS
The patient is covid positive. I got the information from the nurse before going in tgo visit. Two family members were in the room at the time of the visit.  I provided care and support to them. I offered words of comfort and hope to them..  I PRAYED AND GAVE THE PATIENT THE ANOINTING OF THE SICK. I spent little time with the family, blessed them then left them to allow them more time with their patient . 12/13/22

## 2022-12-13 NOTE — PROGRESS NOTES
Consults  [facility] - 3rd Floor Medical Telemetry  Cardiology  Progress Note    Patient Name: Miguel Angel Oliveros  MRN: [id number]  Admission Date: 12/11/2022  Hospital Length of Stay: 2 days  Code Status: Full Code   Attending Provider: Trevin Blas MD   Consulting Provider: NEERAJ Cherry  Primary Care Physician: Barrett Lopez MD  Principal Problem:<principal problem not specified>    Patient information was obtained from past medical records and ER records.     Subjective:     Chief Complaint:       Ms. Oliveros is an 88 y/o female, with PMHx significant for CAD/PCI, VHD/TAVR, SSS/PPM, HTN,HLD, PARAMJIT, depression/anxiety, and dementia who presented to [facility] with c/o weakness, TODD, and abdominal pain. Troponin was elevated 0.868->0.784. She was seen by [facility] through teleconsult who recommended transfer for cardiology services. Workup at [facility] revealed no acute cardiopulmonary process via CXR. + Leukocytosis with WBC 14.2->17.2, Plt 103->85, K+ 5.2->5.8, BUN/creatinine 47.9/2.11->49.7/2.40. AST//92, BNP 1124->1900, troponin 0.868-0.784-0.761-0.895- 0.825. [facility] has been consulted for NSTEMI. Patient is currently stuporous. On oxymask, BP marginal. She is hypothermic. Echo at bedside.     Hospital Course:  12.13.22: SR on Tele. BP Stable.     PMH: CAD/PCI, VHD/TAVR, SSS/PPM, T2DM, HLD, PARAMJIT, depression/anxiety, dementia, bladder cancer/resection  PSH: bladder resection, TAVR 23 mm S3 Ultra valve 10/2020, EGD, colonoscopy, hysterectomy, rectal polypectomy, tonsillectomy,   Family History: Father- HLD, CVA, Mother- DM; sister- breast cancer  Social History: Denies alcohol or ETOH     Previous Cardiac Diagnostics:   Echocardiogram (12.12.22):  Hyperdynamic left ventricular systolic function, LVEF >70%  Severe right ventricular enlargement with moderately to severely reduced right ventricular systolic function.  Well seated bioprosthetic AV (TAVR) without significant  stenosis or perivalvular leak. Peak AV 2.3 m/sec, MG 10 mmHg,dimensionless index 0.34.  Mild to moderate tricuspid regurgitation.  There is pulmonary hypertension. The estimated PA systolic pressure is 58 mmHg.  Large echodensities seen in IVC at RA junction, cannot completely exclude IVC thrombus.  The estimated PA systolic pressure is 58 mmHg.    TTE (10/05/2021):  LV normal in size. Global LVSF is hyperdynamic. LVEF 80%. Mild septal hypertrophy is noted. Patient hx TAVR using a 23 mmS3 Ultra bio-prosthesis with a MG 20 mmHg, PV 3.2 m/sec, and DI 0.38. No significant periprosthetic regurgitation is appreciated. Trans aortic velocities and gradients are elevated when compared to previous exam. Moderate severe calcification of the mitral valve is noted. LADI 1.6 cm2, Mean trans mitral gradient is 2.5 mmHg. Question mild MV stenosis. MV Don score 12. 1+ MR/TR.      [facility] (09/01/2020):  LM: Normal  Lcx: patent proximal stent.  distal segment  Ramus: patent proximal stent  LAD-20% calcified proximal lesion, dominant vessel  RCA: Patent proximal stent, dominant vessel  LVEDP 17  LADI 0.61, MG 35.   Wedge pressure mean: 18  No plans for intervention to distal circumflex as this is a  that has been present for many years. Feel majority of patients symptoms are related to her valve.      Review of patient's allergies indicates:   Allergen Reactions    Statins-hmg-coa reductase inhibitors      Other reaction(s): Joint pain    Bimatoprost      Other reaction(s): Eye redness    Cortisone     Dorzolamide-timolol     Gabapentin      Other reaction(s): Confusion    Hydrocortisone     Tafluprost (pf)        Current Facility-Administered Medications on File Prior to Encounter   Medication    betamethasone acetate-betamethasone sodium phosphate injection 6 mg    betamethasone acetate-betamethasone sodium phosphate injection 6 mg    LIDOcaine (PF) 20 mg/mL (2%) injection 5 mL    LIDOcaine (PF) 20 mg/mL (2%) injection 5 mL      Current Outpatient Medications on File Prior to Encounter   Medication Sig    ascorbic acid, vitamin C, (VITAMIN C) 1000 MG tablet Take 1,000 mg by mouth once daily.    aspirin (ECOTRIN) 81 MG EC tablet Take 81 mg by mouth.    atorvastatin (LIPITOR) 40 MG tablet Take 40 mg by mouth once daily.    carvediloL (COREG) 25 MG tablet Take 25 mg by mouth 2 (two) times daily.    cloNIDine (CATAPRES) 0.1 MG tablet     clopidogreL (PLAVIX) 75 mg tablet Take 75 mg by mouth once daily.    isosorbide mononitrate (IMDUR) 30 MG 24 hr tablet 15 mg.    multivitamin/iron/folic acid (CENTRUM COMPLETE ORAL) Take by mouth.    QUEtiapine (SEROQUEL) 25 MG Tab Take 25 mg by mouth 2 (two) times daily.    ranolazine (RANEXA) 1,000 mg Tb12 Take 1,000 mg by mouth 2 (two) times daily.    TUMERIC-GING-OLIVE-OREG-CAPRYL ORAL Take by mouth.    diazePAM (VALIUM) 5 MG tablet Take 5 mg by mouth daily as needed.    docusate sodium (COLACE) 100 MG capsule Take 1 capsule (100 mg total) by mouth 2 (two) times daily. for 15 days    nitroGLYCERIN (NITROSTAT) 0.4 MG SL tablet Place 0.4 mg under the tongue.         Review of Systems   Unable to perform ROS: Other  ROS Deferred given COVID-19 Precautions.    Objective:     Vital Signs (Most Recent):  Temp: 98.4 °F (36.9 °C) (12/13/22 0800)  Pulse: 92 (12/13/22 0900)  Resp: (!) 26 (12/13/22 0900)  BP: 116/74 (12/13/22 0900)  SpO2: 95 % (12/13/22 0900)   Vital Signs (24h Range):  Temp:  [96.5 °F (35.8 °C)-98.6 °F (37 °C)] 98.4 °F (36.9 °C)  Pulse:  [] 92  Resp:  [21-50] 26  SpO2:  [57 %-100 %] 95 %  BP: ()/() 116/74     Weight: 86.2 kg (190 lb)  Body mass index is 30.67 kg/m².    SpO2: 95 %         Intake/Output Summary (Last 24 hours) at 12/13/2022 1119  Last data filed at 12/13/2022 0800  Gross per 24 hour   Intake 1268.33 ml   Output 305 ml   Net 963.33 ml       Lines/Drains/Airways       Central Venous Catheter Line  Duration             Percutaneous Central Line Insertion/Assessment -  Triple Lumen  12/12/22 1745 left internal jugular <1 day              Drain  Duration                  Urethral Catheter 12/12/22 1145 <1 day              Peripheral Intravenous Line  Duration                  Peripheral IV - Single Lumen 12/12/22 1140 20 G Anterior;Right Wrist <1 day         Peripheral IV - Single Lumen 12/12/22 1140 Anterior;Left;Proximal Antecubital <1 day                  Significant Labs:  Recent Results (from the past 72 hour(s))   Comp. Metabolic Panel    Collection Time: 12/11/22  9:17 AM   Result Value Ref Range    Sodium Level 135 (L) 136 - 145 mmol/L    Potassium Level 4.0 3.5 - 5.1 mmol/L    Chloride 100 98 - 107 mmol/L    Carbon Dioxide 23 23 - 31 mmol/L    Glucose Level 179 (H) 82 - 115 mg/dL    Blood Urea Nitrogen 43.0 (H) 9.8 - 20.1 mg/dL    Creatinine 1.29 (H) 0.55 - 1.02 mg/dL    Calcium Level Total 9.7 8.4 - 10.2 mg/dL    Protein Total 6.8 5.8 - 7.6 gm/dL    Albumin Level 3.2 (L) 3.4 - 4.8 gm/dL    Globulin 3.6 (H) 2.4 - 3.5 gm/dL    Albumin/Globulin Ratio 0.9 (L) 1.1 - 2.0 ratio    Bilirubin Total 0.4 <=1.5 mg/dL    Alkaline Phosphatase 94 40 - 150 unit/L    Alanine Aminotransferase 26 0 - 55 unit/L    Aspartate Aminotransferase 38 (H) 5 - 34 unit/L    eGFR 40 mls/min/1.73/m2   Lipase    Collection Time: 12/11/22  9:17 AM   Result Value Ref Range    Lipase Level 13 <=60 U/L   Troponin I    Collection Time: 12/11/22  9:17 AM   Result Value Ref Range    Troponin-I 0.868 (H) 0.000 - 0.045 ng/mL   CBC with Differential    Collection Time: 12/11/22  9:17 AM   Result Value Ref Range    WBC 11.3 4.5 - 11.5 x10(3)/mcL    RBC 4.24 4.20 - 5.40 x10(6)/mcL    Hgb 11.8 (L) 12.0 - 16.0 gm/dL    Hct 37.4 37.0 - 47.0 %    MCV 88.2 80.0 - 94.0 fL    MCH 27.8 27.0 - 31.0 pg    MCHC 31.6 (L) 33.0 - 36.0 mg/dL    RDW 13.4 11.5 - 17.0 %    Platelet 117 (L) 130 - 400 x10(3)/mcL    MPV 12.0 (H) 7.4 - 10.4 fL    Neut % 70.2 %    Lymph % 19.9 %    Mono % 8.2 %    Eos % 0.6 %    Basophil % 0.2 %    Lymph  # 2.25 0.6 - 4.6 x10(3)/mcL    Neut # 7.9 2.1 - 9.2 x10(3)/mcL    Mono # 0.93 0.1 - 1.3 x10(3)/mcL    Eos # 0.07 0 - 0.9 x10(3)/mcL    Baso # 0.02 0 - 0.2 x10(3)/mcL    IG# 0.10 (H) 0 - 0.04 x10(3)/mcL    IG% 0.9 %   Brain natriuretic peptide    Collection Time: 12/11/22  9:18 AM   Result Value Ref Range    Natriuretic Peptide 1,124.1 (H) <=100.0 pg/mL   Occult Blood Stool, CA Screen    Collection Time: 12/11/22  9:19 AM   Result Value Ref Range    Occult Blood Stool 1 Negative Negative   ISTAT PROCEDURE    Collection Time: 12/11/22  9:30 AM   Result Value Ref Range    POC PH 7.410 7.35 - 7.45    POC PCO2 30.0 (L) 35 - 45 mmHg    POC PO2 58 (LL) 80 - 100 mmHg    POC HCO3 19.0 (L) 24 - 28 mmol/L    POC BE -6 -2 to 2 mmol/L    POC SATURATED O2 90 (L) 95 - 100 %    POC TCO2 20 (L) 23 - 27 mmol/L    Sample ARTERIAL     Site RB     Allens Test N/A     DelSys Nasal Can    Urinalysis, Reflex to Urine Culture Urine, Clean Catch    Collection Time: 12/11/22 10:13 AM    Specimen: Urine   Result Value Ref Range    Color, UA Yellow Yellow, Light-Yellow, Dark Yellow, Gracie, Straw    Appearance, UA Clear Clear    Specific Gravity, UA >=1.030     pH, UA 5.0 5.0 - 8.5    Protein,  (A) Negative mg/dL    Glucose, UA Negative Negative, Normal mg/dL    Ketones, UA Trace (A) Negative mg/dL    Blood, UA Negative Negative unit/L    Bilirubin, UA Negative Negative mg/dL    Urobilinogen, UA 0.2 0.2, 1.0, Normal mg/dL    Nitrites, UA Negative Negative    Leukocyte Esterase, UA Trace (A) Negative unit/L   Urinalysis, Microscopic    Collection Time: 12/11/22 10:13 AM   Result Value Ref Range    Bacteria, UA Rare None Seen, Rare, Occasional /HPF    Mucous, UA Small (A) None Seen /LPF    RBC, UA 0-2 None Seen, 0-2, 3-5, 0-5 /HPF    WBC, UA 11-20 (A) None Seen, 0-2, 3-5, 0-5 /HPF    Squamous Epithelial Cells, UA Few (A) None Seen, Rare, Occasional, Occ /HPF   Urine culture    Collection Time: 12/11/22 10:13 AM    Specimen: Urine   Result  Value Ref Range    Urine Culture No Growth    Troponin I    Collection Time: 12/11/22 11:46 AM   Result Value Ref Range    Troponin-I 0.784 (H) 0.000 - 0.045 ng/mL   Troponin I    Collection Time: 12/11/22  5:26 PM   Result Value Ref Range    Troponin-I 0.761 (H) 0.000 - 0.045 ng/mL   POCT glucose    Collection Time: 12/11/22  9:26 PM   Result Value Ref Range    POCT Glucose 117 (H) 70 - 110 mg/dL   Comprehensive metabolic panel    Collection Time: 12/11/22 10:44 PM   Result Value Ref Range    Sodium Level 135 (L) 136 - 145 mmol/L    Potassium Level 5.2 (H) 3.5 - 5.1 mmol/L    Chloride 102 98 - 107 mmol/L    Carbon Dioxide 18 (L) 23 - 31 mmol/L    Glucose Level 138 (H) 82 - 115 mg/dL    Blood Urea Nitrogen 47.9 (H) 9.8 - 20.1 mg/dL    Creatinine 2.11 (H) 0.55 - 1.02 mg/dL    Calcium Level Total 9.4 8.4 - 10.2 mg/dL    Protein Total 6.7 5.8 - 7.6 gm/dL    Albumin Level 3.3 (L) 3.4 - 4.8 gm/dL    Globulin 3.4 2.4 - 3.5 gm/dL    Albumin/Globulin Ratio 1.0 (L) 1.1 - 2.0 ratio    Bilirubin Total 0.8 <=1.5 mg/dL    Alkaline Phosphatase 103 40 - 150 unit/L    Alanine Aminotransferase 44 0 - 55 unit/L    Aspartate Aminotransferase 64 (H) 5 - 34 unit/L    eGFR 22 mls/min/1.73/m2   Troponin I    Collection Time: 12/11/22 10:44 PM   Result Value Ref Range    Troponin-I 0.895 (H) 0.000 - 0.045 ng/mL   CBC with Differential    Collection Time: 12/11/22 10:44 PM   Result Value Ref Range    WBC 14.2 (H) 4.5 - 11.5 x10(3)/mcL    RBC 4.02 (L) 4.20 - 5.40 x10(6)/mcL    Hgb 11.3 (L) 12.0 - 16.0 gm/dL    Hct 36.0 (L) 37.0 - 47.0 %    MCV 89.6 80.0 - 94.0 fL    MCH 28.1 27.0 - 31.0 pg    MCHC 31.4 (L) 33.0 - 36.0 mg/dL    RDW 13.3 11.5 - 17.0 %    Platelet 103 (L) 130 - 400 x10(3)/mcL    MPV 11.9 (H) 7.4 - 10.4 fL    Neut % 65.6 %    Lymph % 24.6 %    Mono % 8.1 %    Eos % 0.2 %    Basophil % 0.2 %    Lymph # 3.50 0.6 - 4.6 x10(3)/mcL    Neut # 9.3 (H) 2.1 - 9.2 x10(3)/mcL    Mono # 1.15 0.1 - 1.3 x10(3)/mcL    Eos # 0.03 0 - 0.9  x10(3)/mcL    Baso # 0.03 0 - 0.2 x10(3)/mcL    IG# 0.18 (H) 0 - 0.04 x10(3)/mcL    IG% 1.3 %    NRBC% 3.7 %   Brain natriuretic peptide    Collection Time: 12/11/22 10:44 PM   Result Value Ref Range    Natriuretic Peptide 1,900.0 (H) <=100.0 pg/mL   CBC with Differential    Collection Time: 12/12/22  3:54 AM   Result Value Ref Range    WBC 17.2 (H) 4.5 - 11.5 x10(3)/mcL    RBC 4.10 (L) 4.20 - 5.40 x10(6)/mcL    Hgb 11.5 (L) 12.0 - 16.0 gm/dL    Hct 38.3 37.0 - 47.0 %    MCV 93.4 80.0 - 94.0 fL    MCH 28.0 27.0 - 31.0 pg    MCHC 30.0 (L) 33.0 - 36.0 mg/dL    RDW 13.6 11.5 - 17.0 %    Platelet 85 (L) 130 - 400 x10(3)/mcL    MPV 12.8 (H) 7.4 - 10.4 fL    Neut % 71.0 %    Lymph % 19.2 %    Mono % 7.6 %    Eos % 0.1 %    Basophil % 0.3 %    Lymph # 3.31 0.6 - 4.6 x10(3)/mcL    Neut # 12.2 (H) 2.1 - 9.2 x10(3)/mcL    Mono # 1.31 (H) 0.1 - 1.3 x10(3)/mcL    Eos # 0.01 0 - 0.9 x10(3)/mcL    Baso # 0.06 0 - 0.2 x10(3)/mcL    IG# 0.31 (H) 0 - 0.04 x10(3)/mcL    IG% 1.8 %    NRBC% 3.8 %   Troponin I    Collection Time: 12/12/22  5:01 AM   Result Value Ref Range    Troponin-I 0.825 (H) 0.000 - 0.045 ng/mL   Comprehensive metabolic panel    Collection Time: 12/12/22  5:01 AM   Result Value Ref Range    Sodium Level 136 136 - 145 mmol/L    Potassium Level 5.8 (H) 3.5 - 5.1 mmol/L    Chloride 100 98 - 107 mmol/L    Carbon Dioxide 17 (L) 23 - 31 mmol/L    Glucose Level 115 82 - 115 mg/dL    Blood Urea Nitrogen 49.7 (H) 9.8 - 20.1 mg/dL    Creatinine 2.40 (H) 0.55 - 1.02 mg/dL    Calcium Level Total 9.6 8.4 - 10.2 mg/dL    Protein Total 7.6 5.8 - 7.6 gm/dL    Albumin Level 3.7 3.4 - 4.8 gm/dL    Globulin 3.9 (H) 2.4 - 3.5 gm/dL    Albumin/Globulin Ratio 0.9 (L) 1.1 - 2.0 ratio    Bilirubin Total 1.1 <=1.5 mg/dL    Alkaline Phosphatase 122 40 - 150 unit/L    Alanine Aminotransferase 92 (H) 0 - 55 unit/L    Aspartate Aminotransferase 128 (H) 5 - 34 unit/L    eGFR 19 mls/min/1.73/m2   Echo    Collection Time: 12/12/22  9:20 AM    Result Value Ref Range    BSA 2 m2    TDI SEPTAL 0.04 m/s    LV LATERAL E/E' RATIO 8.17 m/s    LV SEPTAL E/E' RATIO 12.25 m/s    Right Atrial Pressure (from IVC) 15 mmHg    Left Ventricular Outflow Tract Mean Velocity 0.53 cm/s    Left Ventricular Outflow Tract Mean Gradient 1.00 mmHg    TDI LATERAL 0.06 m/s    LVIDd 2.44 (A) 3.5 - 6.0 cm    IVS 1.42 (A) 0.6 - 1.1 cm    Posterior Wall 1.44 (A) 0.6 - 1.1 cm    LVIDs 1.55 (A) 2.1 - 4.0 cm    FS 36 28 - 44 %    LV mass 112.38 g    LA size 3.10 cm    TAPSE 1.65 cm    Left Ventricle Relative Wall Thickness 1.18 cm    AV mean gradient 10 mmHg    AV Velocity Ratio 0.37     AV index (prosthetic) 0.34     E/A ratio 0.45     Mean e' 0.05 m/s    E wave deceleration time 214.00 msec    LVOT peak brian 0.84 m/s    LVOT peak VTI 10.50 cm    Ao peak brian 2.30 m/s    Ao VTI 31.0 cm    AV peak gradient 21 mmHg    TV rest pulmonary artery pressure 58 mmHg    E/E' ratio 9.80 m/s    MV Peak E Brian 0.49 m/s    TR Max Brian 3.26 m/s    MV Peak A Brian 1.08 m/s    LV Systolic Volume 6.60 mL    LV Systolic Volume Index 3.4 mL/m2    LV Diastolic Volume 21.00 mL    LV Diastolic Volume Index 10.71 mL/m2    LV Mass Index 57 g/m2    Triscuspid Valve Regurgitation Peak Gradient 43 mmHg   Blood Culture    Collection Time: 12/12/22  9:48 AM    Specimen: Blood, Venous   Result Value Ref Range    CULTURE, BLOOD ([facility]) No Growth At 24 Hours    Blood Culture    Collection Time: 12/12/22  9:48 AM    Specimen: Blood, Venous   Result Value Ref Range    CULTURE, BLOOD ([facility]) No Growth At 24 Hours    Lactic Acid, Plasma    Collection Time: 12/12/22  9:48 AM   Result Value Ref Range    Lactic Acid Level 8.4 (HH) 0.5 - 2.2 mmol/L   C-Reactive Protein    Collection Time: 12/12/22  9:48 AM   Result Value Ref Range    C-Reactive Protein 26.70 (H) <5.00 mg/L   POCT ARTERIAL BLOOD GAS    Collection Time: 12/12/22 10:42 AM   Result Value Ref Range    POC PH 7.28 (AA) 7.29 - 7.61    POC PCO2 20 (A) 35 - 45 mmHg    POC PO2  76 (A) 80 - 100 mmHg    POC HEMOGLOBIN 11.6 (A) 12.0 - 16.0 g/dL    POC SATURATED O2 93.0 %    POC O2Hb 92.3 (A) 94.0 - 97.0 %    POC COHb 2.0 %    POC MetHb 0.60 0.40 - 1.5 %    POC Potassium 6.5 (A) 3.5 - 5.0 mmol/l    POC Sodium 129 (A) 137 - 145 mmol/l    POC Ionized Calcium 1.20 1.12 - 1.23 mmol/l    Correct Temperature (PH) 7.28 (AA) 7.29 - 7.61    Corrected Temperature (pCO2) 20 (A) 35 - 45 mmHg    Corrected Temperature (pO2) 76 (A) 80 - 100 mmHg    POC HCO3 9.4 (AA) 15 - mmol/l    Base Deficit -15.3 (A) -2.00 - 2.00 mmol/l    POC Temp 37.0 °C    Specimen source Arterial sample    Lactic Acid, Plasma    Collection Time: 12/12/22 11:24 AM   Result Value Ref Range    Lactic Acid Level 9.5 (HH) 0.5 - 2.2 mmol/L   Basic Metabolic Panel    Collection Time: 12/12/22 11:24 AM   Result Value Ref Range    Sodium Level 135 (L) 136 - 145 mmol/L    Potassium Level 6.4 (HH) 3.5 - 5.1 mmol/L    Chloride 101 98 - 107 mmol/L    Carbon Dioxide 14 (L) 23 - 31 mmol/L    Glucose Level 92 82 - 115 mg/dL    Blood Urea Nitrogen 58.0 (H) 9.8 - 20.1 mg/dL    Creatinine 3.14 (H) 0.55 - 1.02 mg/dL    BUN/Creatinine Ratio 18     Calcium Level Total 9.6 8.4 - 10.2 mg/dL    Anion Gap 20.0 mEq/L    eGFR 14 mls/min/1.73/m2   Ammonia    Collection Time: 12/12/22 11:24 AM   Result Value Ref Range    Ammonia Level 24.4 18.0 - 72.0 umol/L   Magnesium    Collection Time: 12/12/22 11:24 AM   Result Value Ref Range    Magnesium Level 2.40 1.60 - 2.60 mg/dL   Phosphorus    Collection Time: 12/12/22 11:24 AM   Result Value Ref Range    Phosphorus Level 7.9 (H) 2.3 - 4.7 mg/dL   COVID/FLU A&B PCR    Collection Time: 12/12/22 12:13 PM   Result Value Ref Range    Influenza A PCR Not Detected Not Detected    Influenza B PCR Not Detected Not Detected    SARS-CoV-2 PCR Detected (A) Not Detected   Urinalysis, Reflex to Urine Culture Urine, Clean Catch    Collection Time: 12/12/22 12:13 PM    Specimen: Urine, Clean Catch   Result Value Ref Range    Color,  UA Brown (A) Yellow, Light-Yellow, Dark Yellow, Gracie, Straw    Appearance, UA Turbid (A) Clear    Specific Gravity, UA >=1.030 1.001 - 1.030    pH, UA 5.0 5.0 - 8.5    Protein, UA 2+ (A) Negative mg/dL    Glucose, UA Negative Negative, Normal mg/dL    Ketones, UA Trace (A) Negative mg/dL    Blood, UA 3+ (A) Negative unit/L    Bilirubin, UA 2+ (A) Negative mg/dL    Urobilinogen, UA 1+ (A) 0.2, 1.0, Normal mg/dL    Nitrites, UA Positive (A) Negative    Leukocyte Esterase, UA 2+ (A) Negative unit/L   MRSA PCR    Collection Time: 12/12/22 12:13 PM   Result Value Ref Range    MRSA PCR SCRN ([facility]) Not Detected Not Detected   Urinalysis, Microscopic    Collection Time: 12/12/22 12:13 PM   Result Value Ref Range    RBC, UA 50-99 (A) None Seen, 0-2, 3-5, 0-5 /HPF    WBC, UA >100 (A) None Seen, 0-2, 3-5, 0-5 /HPF    Squamous Epithelial Cells, UA 20-30 (A) 0-4, None Seen /HPF    Bacteria, UA 2+ (A) None Seen, Rare, Occasional /HPF   POCT glucose    Collection Time: 12/12/22 12:25 PM   Result Value Ref Range    POCT Glucose 63 (L) 70 - 110 mg/dL   POCT glucose    Collection Time: 12/12/22  3:05 PM   Result Value Ref Range    POCT Glucose 127 (H) 70 - 110 mg/dL   Lactic Acid, Plasma    Collection Time: 12/12/22  4:40 PM   Result Value Ref Range    Lactic Acid Level 5.9 (HH) 0.5 - 2.2 mmol/L   Lactic Acid, Plasma    Collection Time: 12/12/22  7:04 PM   Result Value Ref Range    Lactic Acid Level 7.9 (HH) 0.5 - 2.2 mmol/L   Potassium    Collection Time: 12/12/22  7:04 PM   Result Value Ref Range    Potassium Level 5.5 (H) 3.5 - 5.1 mmol/L   APTT    Collection Time: 12/12/22  8:05 PM   Result Value Ref Range    PTT 27.6 23.2 - 33.7 seconds   Protime-INR    Collection Time: 12/12/22  8:05 PM   Result Value Ref Range    PT 18.5 (H) 12.5 - 14.5 seconds    INR 1.57 (H) 0.00 - 1.30   CBC with Differential    Collection Time: 12/12/22  8:05 PM   Result Value Ref Range    WBC 13.4 (H) 4.5 - 11.5 x10(3)/mcL    RBC 3.62 (L) 4.20 - 5.40  x10(6)/mcL    Hgb 10.2 (L) 12.0 - 16.0 gm/dL    Hct 31.6 (L) 37.0 - 47.0 %    MCV 87.3 80.0 - 94.0 fL    MCH 28.2 27.0 - 31.0 pg    MCHC 32.3 (L) 33.0 - 36.0 mg/dL    RDW 13.4 11.5 - 17.0 %    Platelet 104 (L) 130 - 400 x10(3)/mcL    MPV 12.3 (H) 7.4 - 10.4 fL    Neut % 86.0 %    Lymph % 9.3 %    Mono % 3.6 %    Eos % 0.0 %    Basophil % 0.1 %    Lymph # 1.25 0.6 - 4.6 x10(3)/mcL    Neut # 11.6 (H) 2.1 - 9.2 x10(3)/mcL    Mono # 0.49 0.1 - 1.3 x10(3)/mcL    Eos # 0.00 0 - 0.9 x10(3)/mcL    Baso # 0.01 0 - 0.2 x10(3)/mcL    IG# 0.14 (H) 0 - 0.04 x10(3)/mcL    IG% 1.0 %    NRBC% 4.2 %   POCT glucose    Collection Time: 12/12/22  8:18 PM   Result Value Ref Range    POCT Glucose 175 (H) 70 - 110 mg/dL   POCT ARTERIAL BLOOD GAS    Collection Time: 12/12/22  8:38 PM   Result Value Ref Range    POC PH 7.40     POC PCO2 34 (A) mmHg    POC PO2 169 (A) mmHg    POC SATURATED O2 100 %    POC Potassium 4.4 mmol/l    POC Sodium 128 (A) 137 - 145 mmol/l    POC Ionized Calcium 1.05 (A) 1.12 - 1.23 mmol/l    POC HCO3 21.1 mmol/l    Base Deficit -3.1 mmol/l    POC Temp 37.0 C    Specimen source Arterial sample    Lactic Acid, Plasma    Collection Time: 12/12/22 11:56 PM   Result Value Ref Range    Lactic Acid Level 5.2 (HH) 0.5 - 2.2 mmol/L   Comprehensive Metabolic Panel    Collection Time: 12/12/22 11:56 PM   Result Value Ref Range    Sodium Level 134 (L) 136 - 145 mmol/L    Potassium Level 4.9 3.5 - 5.1 mmol/L    Chloride 97 (L) 98 - 107 mmol/L    Carbon Dioxide 20 (L) 23 - 31 mmol/L    Glucose Level 192 (H) 82 - 115 mg/dL    Blood Urea Nitrogen 72.7 (H) 9.8 - 20.1 mg/dL    Creatinine 3.23 (H) 0.55 - 1.02 mg/dL    Calcium Level Total 8.7 8.4 - 10.2 mg/dL    Protein Total 6.2 5.8 - 7.6 gm/dL    Albumin Level 3.0 (L) 3.4 - 4.8 gm/dL    Globulin 3.2 2.4 - 3.5 gm/dL    Albumin/Globulin Ratio 0.9 (L) 1.1 - 2.0 ratio    Bilirubin Total 0.4 <=1.5 mg/dL    Alkaline Phosphatase 109 40 - 150 unit/L    Alanine Aminotransferase 331 (H) 0 - 55  unit/L    Aspartate Aminotransferase 449 (H) 5 - 34 unit/L    eGFR 13 mls/min/1.73/m2   APTT    Collection Time: 12/12/22 11:56 PM   Result Value Ref Range    PTT 66.9 (H) 23.2 - 33.7 seconds   Vancomycin, Random    Collection Time: 12/12/22 11:56 PM   Result Value Ref Range    Vanc Lvl Random 23.9 (H) 15.0 - 20.0 ug/ml   CBC with Differential    Collection Time: 12/12/22 11:56 PM   Result Value Ref Range    WBC 14.7 (H) 4.5 - 11.5 x10(3)/mcL    RBC 3.87 (L) 4.20 - 5.40 x10(6)/mcL    Hgb 11.0 (L) 12.0 - 16.0 gm/dL    Hct 34.1 (L) 37.0 - 47.0 %    MCV 88.1 80.0 - 94.0 fL    MCH 28.4 27.0 - 31.0 pg    MCHC 32.3 (L) 33.0 - 36.0 mg/dL    RDW 13.7 11.5 - 17.0 %    Platelet 117 (L) 130 - 400 x10(3)/mcL    MPV 12.5 (H) 7.4 - 10.4 fL    Neut % 85.0 %    Lymph % 8.7 %    Mono % 5.1 %    Eos % 0.0 %    Basophil % 0.2 %    Lymph # 1.27 0.6 - 4.6 x10(3)/mcL    Neut # 12.5 (H) 2.1 - 9.2 x10(3)/mcL    Mono # 0.74 0.1 - 1.3 x10(3)/mcL    Eos # 0.00 0 - 0.9 x10(3)/mcL    Baso # 0.03 0 - 0.2 x10(3)/mcL    IG# 0.14 (H) 0 - 0.04 x10(3)/mcL    IG% 1.0 %    NRBC% 5.6 %   Lactic Acid, Plasma    Collection Time: 12/13/22  4:16 AM   Result Value Ref Range    Lactic Acid Level 3.7 (HH) 0.5 - 2.2 mmol/L   PTT Heparin Monitoring    Collection Time: 12/13/22  4:16 AM   Result Value Ref Range    PTT Heparin Monitor 122.2 (H) 23.2 - 33.7 seconds   Lactic Acid, Plasma    Collection Time: 12/13/22  7:30 AM   Result Value Ref Range    Lactic Acid Level 3.8 (HH) 0.5 - 2.2 mmol/L   POCT glucose    Collection Time: 12/13/22  9:08 AM   Result Value Ref Range    POCT Glucose 183 (H) 70 - 110 mg/dL       Significant Imaging:  Imaging Results              CT Head Without Contrast (Final result)  Result time 12/12/22 06:52:36      Final result by Chapin Ward MD (12/12/22 06:52:36)                   Impression:      No acute intracranial findings identified.    No significant discrepancy with overnight report.      Electronically signed by: Chapin  Sylvia  Date:    12/12/2022  Time:    06:52               Narrative:    EXAMINATION:  CT HEAD WITHOUT CONTRAST    CLINICAL HISTORY:  Mental status change, unknown cause;    TECHNIQUE:  Sequential axial images were performed of the brain without contrast.    Dose product length of 1035 mGycm. Automated exposure control was utilized to minimize radiation dose.    COMPARISON:  December 1, 2022.    FINDINGS:  There is no intracranial mass effect, midline shift, hydrocephalus or hemorrhage. There is no sulcal effacement or low attenuation changes to suggest recent large vessel territory infarction. Chronic appearing periventricular and subcortical white matter low attenuation changes are present and are consistent with chronic microangiopathic ischemia. The ventricular system and sulcal markings prominence is consistent with atrophy. There is no acute extra axial fluid collection.  Right maxillary sinus lobulated mucoperiosteal thickening.  Otherwise, visualized paranasal sinuses are clear without mucosal thickening, polypoidal abnormality or air-fluid levels. Mastoid air cells aeration is optimal.                        Preliminary result by Chapin Ward MD (12/11/22 22:58:29)                   Narrative:    START OF REPORT:  Technique: CT of the head was performed without intravenous contrast with axial as well as coronal and sagittal images.    Comparison: Comparison is with study [id number] 13:59:19.    Dosage Information: Automated exposure control was utilized.    Clinical history: Ams.    Findings:  Hemorrhage: No acute intracranial hemorrhage is seen.  CSF spaces: The ventricles, sulci and basal cisterns all appear mildly prominent consistent with global cerebral atrophy.  Brain parenchyma: There is preservation of the grey white junction throughout. Moderate microvascular change is seen in portions of the periventricular and deep white matter tracts.  Cerebellum: Unremarkable.  Sella and skull base: The  sella appears to be within normal limits for age.  Herniation: None.  Intracranial calcifications: Incidental note is made of bilateral choroid plexus calcification. Incidental note is made of some pineal region calcification. Incidental note is made of some calcification of the falx.  Calvarium: No acute linear or depressed skull fracture is seen.    Maxillofacial Structures:  Paranasal sinuses: Again noted is moderate mucoperiosteal thickening in the right maxillary sinus with a small retention cyst or polyp.  Orbits: The orbits appear unremarkable.  Zygomatic arches: The zygomatic arches are intact and unremarkable.  Temporal bones and mastoids: The temporal bones and mastoids appear unremarkable.  TMJ: The mandibular condyles appear normally placed with respect to the mandibular fossa.      Impression:  1. No acute intracranial process identified. Details as above.                          Preliminary result by Mane Inman MD (12/11/22 22:58:29)                   Narrative:    START OF REPORT:  Technique: CT of the head was performed without intravenous contrast with axial as well as coronal and sagittal images.    Comparison: Comparison is with study [id number] 13:59:19.    Dosage Information: Automated exposure control was utilized.    Clinical history: Ams.    Findings:  Hemorrhage: No acute intracranial hemorrhage is seen.  CSF spaces: The ventricles, sulci and basal cisterns all appear mildly prominent consistent with global cerebral atrophy.  Brain parenchyma: There is preservation of the grey white junction throughout. Moderate microvascular change is seen in portions of the periventricular and deep white matter tracts.  Cerebellum: Unremarkable.  Sella and skull base: The sella appears to be within normal limits for age.  Herniation: None.  Intracranial calcifications: Incidental note is made of bilateral choroid plexus calcification. Incidental note is made of some pineal region calcification.  Incidental note is made of some calcification of the falx.  Calvarium: No acute linear or depressed skull fracture is seen.    Maxillofacial Structures:  Paranasal sinuses: Again noted is moderate mucoperiosteal thickening in the right maxillary sinus with a small retention cyst or polyp.  Orbits: The orbits appear unremarkable.  Zygomatic arches: The zygomatic arches are intact and unremarkable.  Temporal bones and mastoids: The temporal bones and mastoids appear unremarkable.  TMJ: The mandibular condyles appear normally placed with respect to the mandibular fossa.      Impression:  1. No acute intracranial process identified. Details as above.                                      Telemetry: Sinus Rhythm    Physical Exam  Cardiovascular:      Rate and Rhythm: Normal rate and regular rhythm.      Comments: Sinus Rhythm  Limited Physical Exam/COVID Precautions. History obtained from Bedside RN.    Home Medications:   Current Facility-Administered Medications on File Prior to Encounter   Medication Dose Route Frequency Provider Last Rate Last Admin    betamethasone acetate-betamethasone sodium phosphate injection 6 mg  6 mg Intra-articular  Zina D Bouy, FNP   6 mg at 06/01/22 1345    betamethasone acetate-betamethasone sodium phosphate injection 6 mg  6 mg Intra-articular  Zina D Bouy, FNP   6 mg at 06/01/22 1345    LIDOcaine (PF) 20 mg/mL (2%) injection 5 mL  5 mL   Zina D Bouy, FNP   5 mL at 06/01/22 1345    LIDOcaine (PF) 20 mg/mL (2%) injection 5 mL  5 mL   Zina D Bouy, FNP   5 mL at 06/01/22 1345     Current Outpatient Medications on File Prior to Encounter   Medication Sig Dispense Refill    ascorbic acid, vitamin C, (VITAMIN C) 1000 MG tablet Take 1,000 mg by mouth once daily.      aspirin (ECOTRIN) 81 MG EC tablet Take 81 mg by mouth.      atorvastatin (LIPITOR) 40 MG tablet Take 40 mg by mouth once daily.      carvediloL (COREG) 25 MG tablet Take 25 mg by mouth 2 (two) times daily.       cloNIDine (CATAPRES) 0.1 MG tablet       clopidogreL (PLAVIX) 75 mg tablet Take 75 mg by mouth once daily.      isosorbide mononitrate (IMDUR) 30 MG 24 hr tablet 15 mg.      multivitamin/iron/folic acid (CENTRUM COMPLETE ORAL) Take by mouth.      QUEtiapine (SEROQUEL) 25 MG Tab Take 25 mg by mouth 2 (two) times daily.      ranolazine (RANEXA) 1,000 mg Tb12 Take 1,000 mg by mouth 2 (two) times daily.      TUMERIC-GING-OLIVE-OREG-CAPRYL ORAL Take by mouth.      diazePAM (VALIUM) 5 MG tablet Take 5 mg by mouth daily as needed.      docusate sodium (COLACE) 100 MG capsule Take 1 capsule (100 mg total) by mouth 2 (two) times daily. for 15 days 30 capsule 0    nitroGLYCERIN (NITROSTAT) 0.4 MG SL tablet Place 0.4 mg under the tongue.         Current Inpatient Medications:    Current Facility-Administered Medications:     dexAMETHasone injection 6 mg, 6 mg, Intravenous, Daily, Cathi Morocho DO, 6 mg at 12/13/22 0859    dextrose 50% injection 25 g, 25 g, Intravenous, Once, CHANTELLE Corrales    diphenhydrAMINE injection 25 mg, 25 mg, Intravenous, Q6H PRN, Cathi Morocho DO    famotidine (PF) injection 20 mg, 20 mg, Intravenous, Daily, Oswaldo Alonso MD, 20 mg at 12/13/22 0900    heparin 25,000 units in dextrose 5% (100 units/ml) IV bolus from bag - ADDITIONAL PRN BOLUS - 30 units/kg, 30 Units/kg (Adjusted), Intravenous, PRN, Trevin Blas MD    heparin 25,000 units in dextrose 5% (100 units/ml) IV bolus from bag - ADDITIONAL PRN BOLUS - 60 units/kg, 60 Units/kg (Adjusted), Intravenous, PRN, Trevin Blas MD    heparin 25,000 units in dextrose 5% (100 units/ml) IV bolus from bag INITIAL BOLUS, 80 Units/kg (Adjusted), Intravenous, Once, Trevin Blas MD    heparin 25,000 units in dextrose 5% 250 mL (100 units/mL) infusion HIGH INTENSITY nomogram - [facility], 0-40 Units/kg/hr (Adjusted), Intravenous, Continuous, Trevin Blas MD, Last Rate: 12.6 mL/hr at 12/12/22 2058, 18 Units/kg/hr at 12/12/22 2058     insulin regular injection 6 Units 0.06 mL, 6 Units, Intravenous, Once, CHANTELLE Corrales    melatonin tablet 6 mg, 6 mg, Oral, Nightly PRN, Oswaldo Alonso MD    morphine injection 2 mg, 2 mg, Intravenous, Q4H PRN, Oswaldo Alonso MD    morphine injection 4 mg, 4 mg, Intravenous, Q4H PRN, Oswaldo Alonso MD    mupirocin 2 % ointment, , Nasal, BID, Handy Balderrama MD, Given at 12/13/22 0859    ondansetron injection 4 mg, 4 mg, Intravenous, Q8H PRN, Oswaldo Alonso MD    piperacillin-tazobactam (ZOSYN) 4.5 g in dextrose 5 % in water (D5W) 5 % 100 mL IVPB (MB+), 4.5 g, Intravenous, Q12H, Curtis Lim PA-C, Stopped at 12/13/22 0500    [COMPLETED] remdesivir 200 mg in sodium chloride 0.9% 250 mL infusion, 200 mg, Intravenous, Q24H, Stopped at 12/12/22 1639 **FOLLOWED BY** remdesivir 100 mg in sodium chloride 0.9% 100 mL infusion, 100 mg, Intravenous, Daily, Cathi Morocho DO, Stopped at 12/13/22 0930    sodium bicarbonate 150 mEq in dextrose 5 % 1,000 mL infusion, , Intravenous, Continuous, CHANTELLE Corrales, Last Rate: 100 mL/hr at 12/13/22 0132, New Bag at 12/13/22 0132    sodium bicarbonate solution 50 mEq, 50 mEq, Intravenous, Once, CHANTELLE Corrales    sodium chloride 0.9% flush 10 mL, 10 mL, Intravenous, PRN, Oswaldo Alonso MD    Pharmacy to dose Vancomycin consult, , , Once **AND** vancomycin - pharmacy to dose, , Intravenous, pharmacy to manage frequency, Cathi Morocho DO    Facility-Administered Medications Ordered in Other Encounters:     betamethasone acetate-betamethasone sodium phosphate injection 6 mg, 6 mg, Intra-articular, , Zina Weston, MORGANP, 6 mg at 06/01/22 1345    betamethasone acetate-betamethasone sodium phosphate injection 6 mg, 6 mg, Intra-articular, , NEERAJ Richardson, 6 mg at 06/01/22 1345    LIDOcaine (PF) 20 mg/mL (2%) injection 5 mL, 5 mL, , , NEERAJ Richardson, 5 mL at 06/01/22 1345    LIDOcaine (PF) 20 mg/mL (2%) injection 5 mL, 5 mL, ,  , Zina Weston, FNP, 5 mL at 06/01/22 1345         VTE Risk Mitigation (From admission, onward)           Ordered     heparin 25,000 units in dextrose 5% (100 units/ml) IV bolus from bag - ADDITIONAL PRN BOLUS - 60 units/kg  As needed (PRN)        Question:  Heparin Infusion Adjustment (DO NOT MODIFY ANSWER)  Answer:  [email or web address]\Images\Pharmacy\HeparinInfusions\heparin HIGH INTENSITY nomogram for [facility] [id number]    12/12/22 1443     heparin 25,000 units in dextrose 5% (100 units/ml) IV bolus from bag - ADDITIONAL PRN BOLUS - 30 units/kg  As needed (PRN)        Question:  Heparin Infusion Adjustment (DO NOT MODIFY ANSWER)  Answer:  [email or web address]\Images\Pharmacy\HeparinInfusions\heparin HIGH INTENSITY nomogram for [facility] [id number].pdf    12/12/22 1443     heparin 25,000 units in dextrose 5% (100 units/ml) IV bolus from bag INITIAL BOLUS  Once        Question:  Heparin Infusion Adjustment (DO NOT MODIFY ANSWER)  Answer:  [email or web address]\Images\Pharmacy\HeparinInfusions\heparin HIGH INTENSITY nomogram for [facility] [id number]    12/12/22 1443     heparin 25,000 units in dextrose 5% 250 mL (100 units/mL) infusion HIGH INTENSITY nomogram - [facility]  Continuous        Question Answer Comment   Heparin Infusion Adjustment (DO NOT MODIFY ANSWER) [email or web address]\Images\Pharmacy\HeparinInfusions\heparin HIGH INTENSITY nomogram for [facility] [id number].pdf    Begin at (in units/kg/hr) 18        12/12/22 1443     IP VTE HIGH RISK PATIENT  Once         12/11/22 2229     Place sequential compression device  Until discontinued         12/11/22 2229                    Assessment:   Large Echodensity seen in IVC at [place]- Cannot Exclude IVC Thrombus- Findings Highly Suggestive of Large Pulmonary Embolism with Marked RV Strain    - On Heparin gtt  Severe RV Enlargement with Moderately to Severely reduced RV Systolic Function  NSTEMI Type II  CAD (Native)    - EF > 70%  Altered Mental Status likely due to Sepsis/Metabolic  Encephalopathy    - CT Head with No Acute Findings  Acute Renal Failure  Lactic Acidosis  Acute COVID-19 Infection  Bladder Cancer Status Post Resection (October 2022)  History of Hypertension (BP Stable)  VHD- Aortic Stenosis Status Post TAVR    - Well seated bioprosthetic AV (TAVR) without significant stenosis or perivalvular leak. Peak AV 2.3 m/sec, MG 10 mmHg,dimensionless index 0.34.  Pulmonary Hypertension  Leukocytosis  Thrombocytopenia  GERD  Obstructive Sleep Apnea    Plan:   Continue Heparin Infusion per Protocol Re: IVC Thrombus with High Probability of Pulmonary Embolism  No plans for Invasive Treatment of Suspected PE at this time as patient is hemodynamically stable and CTA unable to be performed given Renal Dysfunction  Continue Supportive Care as per ICU Team  Check CBC Today    NEERAJ Cherry  Cardiology  [facility] - 3rd Floor Medical Telemetry  12/13/2022 8:43 AM

## 2022-12-13 NOTE — NURSING
Nurses Note -- 4 Eyes      12/13/2022   5:48 PM      Skin assessed during: Admit      [x] No Pressure Injuries Present    []Prevention Measures Documented      [] Yes- Altered Skin Integrity Present or Discovered   [] LDA Added if Not in Epic (Describe Wound)   [] New Altered Skin Integrity was Present on Admit and Documented in LDA   [] Wound Image Taken    Wound Care Consulted? No    Attending Nurse:  Crescencio Ortiz RN     Second RN/Staff Member:  KRYS victor Rn

## 2022-12-13 NOTE — PROGRESS NOTES
[facility] - [facility] ICU  Pulmonary Critical Care Note    Patient Name: Miguel Angel Oliveros  MRN: [id number]  Admission Date: 12/11/2022  Hospital Length of Stay: 2 days  Code Status: Full Code  Attending Provider: Trevin Blas MD  Primary Care Provider: Barrett Lopez MD     Subjective:     HPI: Miguel Angel Oliveros is an 89-year-old female with PMH of DMII, CAD s/p PCI, HTN, AS s/p TAVR, HLD, GERD, and PARAMJIT, who is admitted to [facility] ICU from regular unit after being found to have altered mental status and increased respiratory distress. She was initially admitted to [facility] on 12/11/2022 by [facility] after being transferred from [place] with the diagnosis of NSTEMI. At the time of encounter, patient states having lower abdominal discomfort, bladder fullness, and dysuria. She states that these symptoms have been persisting for the past few days accompanied by decreased urinary output. Per patient's family members at beside, she usually gets UTI several times each year and she has had 4 episodes of UTI in 2022 (last UTI was 3 months ago).    Hospital Course/Significant events:  12/11/2022 - Transferred from [place], admitted to regular unit by [facility] for NSTEMI  12/12/2022 - Admitted to ICU for ongoing monitoring and medical therapy    24 Hour Interval History:  BP remains stable overnight, patient is afebrile, SpO2 % on oxymask 6 L/min. Patient still appears lethargic but arousable. A decline in mental status observed this AM as evidenced by patient not being able to speak clearly and in full sentences. Labs this AM: WBC 17.1k (increased from 14.7k from yesterday), H/H 11.3/35.9, PLT 118k, lactate trending down to 3.8; awaiting chem panel to be performed.     Past Medical History:   Diagnosis Date    Anxiety disorder     Aortic stenosis     Arthritis     Carpal tunnel syndrome     Coronary artery disease     Dementia     Depression     Diabetes mellitus     GERD (gastroesophageal reflux  disease)     Hx of rectal polypectomy     Hypercholesterolemia     Hypertension     Obstructive sleep apnea     Respiratory distress     S/P TAVR (transcatheter aortic valve replacement)     TIA (transient ischemic attack)        Past Surgical History:   Procedure Laterality Date    CARDIAC SURGERY      CATARACT EXTRACTION      CORONARY STENT PLACEMENT      HYSTERECTOMY      INSERTION OF PACEMAKER      PARTIAL HYSTERECTOMY      RECTAL POLYPECTOMY      TONSILLECTOMY      TRANSCATHETER AORTIC VALVE REPLACEMENT (TAVR)         Social History     Socioeconomic History    Marital status:    Tobacco Use    Smoking status: Never    Smokeless tobacco: Never   Substance and Sexual Activity    Alcohol use: Never    Drug use: Never           Current Outpatient Medications   Medication Instructions    ascorbic acid (vitamin C) (VITAMIN C) 1,000 mg, Oral, Daily    aspirin (ECOTRIN) 81 mg, Oral    atorvastatin (LIPITOR) 40 mg, Oral, Daily    carvediloL (COREG) 25 mg, Oral, 2 times daily    cloNIDine (CATAPRES) 0.1 MG tablet No dose, route, or frequency recorded.    clopidogreL (PLAVIX) 75 mg, Oral, Daily    diazePAM (VALIUM) 5 mg, Oral, Daily PRN    docusate sodium (COLACE) 100 mg, Oral, 2 times daily    isosorbide mononitrate (IMDUR) 30 MG 24 hr tablet 15 mg.    multivitamin/iron/folic acid (CENTRUM COMPLETE ORAL) Oral    nitroGLYCERIN (NITROSTAT) 0.4 mg, Sublingual    QUEtiapine (SEROQUEL) 25 mg, Oral, 2 times daily    ranolazine (RANEXA) 1,000 mg, Oral, 2 times daily    TUMERIC-GING-OLIVE-OREG-CAPRYL ORAL Oral       Current Inpatient Medications   dexAMETHasone  6 mg Intravenous Daily    dextrose 50% in water (D50W)  25 g Intravenous Once    famotidine (PF)  20 mg Intravenous Daily    heparin (PORCINE)  80 Units/kg (Adjusted) Intravenous Once    insulin regular  6 Units Intravenous Once    mupirocin   Nasal BID    piperacillin-tazobactam (ZOSYN) IVPB  4.5 g Intravenous Q12H    remdesivir infusion  100 mg Intravenous  Daily    sodium bicarbonate  50 mEq Intravenous Once       Current Intravenous Infusions   heparin (porcine) in D5W 18 Units/kg/hr (12/12/22 2058)    sodium bicarbonate drip 100 mL/hr at 12/13/22 0132         Review of Systems   Constitutional:  Positive for chills and malaise/fatigue. Negative for fever.   Respiratory:  Positive for shortness of breath. Negative for cough.    Cardiovascular:  Negative for chest pain and palpitations.   Gastrointestinal:  Positive for abdominal pain.   Genitourinary:  Negative for frequency and hematuria.        Objective:       Intake/Output Summary (Last 24 hours) at 12/13/2022 1048  Last data filed at 12/13/2022 0800  Gross per 24 hour   Intake 1268.33 ml   Output 305 ml   Net 963.33 ml         Vital Signs (Most Recent):  Temp: 98.4 °F (36.9 °C) (12/13/22 0800)  Pulse: 92 (12/13/22 0900)  Resp: (!) 26 (12/13/22 0900)  BP: 116/74 (12/13/22 0900)  SpO2: 95 % (12/13/22 0900)    Body mass index is 30.67 kg/m².  Weight: 86.2 kg (190 lb) Vital Signs (24h Range):  Temp:  [96.5 °F (35.8 °C)-98.6 °F (37 °C)] 98.4 °F (36.9 °C)  Pulse:  [] 92  Resp:  [21-50] 26  SpO2:  [57 %-100 %] 95 %  BP: ()/() 116/74     Physical Exam  General: Obese w/ moderate distress; lethargic but arousable  HEENT: NC/AT; PERRL; nasal and oral mucosa moist and clear  Neck: No lymphadenopathy  Pulm: Diminished lung sound in lower lobes bilaterally; normal respiratory effort on non-rebreather  CV: S1, S2 w/ murmurs; no edema noted  GI: Distended abdomen; normal bowel sounds in all quadrants, no masses on palpation  MSK: Limited ROM in all extremities d/t weakness  Derm: No rashes, abnormal bruising, or skin lesions  Neuro: Lethargic, easily arousable; Unable to assess orientation, motor/sensory function intact      Lines/Drains/Airways       Central Venous Catheter Line  Duration             Percutaneous Central Line Insertion/Assessment - Triple Lumen  12/12/22 1745 left internal jugular <1 day               Drain  Duration                  Urethral Catheter 12/12/22 1145 <1 day              Peripheral Intravenous Line  Duration                  Peripheral IV - Single Lumen 12/12/22 1140 20 G Anterior;Right Wrist <1 day         Peripheral IV - Single Lumen 12/12/22 1140 Anterior;Left;Proximal Antecubital <1 day                    Significant Labs:    Lab Results   Component Value Date    WBC 14.7 (H) 12/12/2022    HGB 11.0 (L) 12/12/2022    HCT 34.1 (L) 12/12/2022    MCV 88.1 12/12/2022     (L) 12/12/2022         BMP  Lab Results   Component Value Date     (L) 12/12/2022    K 4.9 12/12/2022    CO2 20 (L) 12/12/2022    BUN 72.7 (H) 12/12/2022    CREATININE 3.23 (H) 12/12/2022    CALCIUM 8.7 12/12/2022    EGFRNONAA 57 10/14/2020       ABG  Recent Labs   Lab 12/11/22  0930 12/12/22  1042 12/12/22 2038   PH 7.410   < > 7.40   PO2 58*   < > 169*   PCO2 30.0*   < > 34*   HCO3 19.0*   < > 21.1   BE -6  --   --     < > = values in this interval not displayed.         Mechanical Ventilation Support:  Oxygen Concentration (%): 100 (12/12/22 1305)    Significant Imaging:  I have reviewed the pertinent imaging within the past 24 hours.    12/12/2022 - CT head w/o contrast shows no acute intracranial findings  12/12/2022 - CXR shows presence of cardiac device, cardiomegaly, cardiac valve prosthetic, and calcified aortic knob; no pleural effusion observed    Assessment/Plan:     Assessment  Altered mental status likely 2/2 sepsis and metabolic encephalopathy  Patient met SIRS 3 out of 4 (WBC 17.2k, tachypnea, tachycardia)  Persistent elevation in lactic acid from 8.4 to 9.5 on 12/12/2022  Patient states having painful and burning urination for the past few days and has HX of multiple episodes of UTI each year  Acute renal failure   Cr 1.29 on 12/11/2022, increased to 3.14 on 12/12/2022  BUN increased from 43.0 to 58.0 on 12/12/2022  Hale catheter in place. Patient states she has decreased urination for the  past few days  Lactic acidosis likely 2/2 acute renal failure and sepsis  NSTEMI   Troponin trend 0.784 --> 0.761-->0.895-->0.825  Likely d/t demand  Echo 12/12/2022 shows EF >70%, mild-to-moderate TR, PASP 58 mmHg, IVC thrombus  COVID positive on admission to ICU  Bladder cancer s/p resection in October 2022  CT ab & pelvis 12/11/2022 shows heterogeneous hyperdense mass lesion in the superior and left lateral aspect of the urinary bladder  Per patient's family at bedside, patient will be receiving additional imaging to stage bladder cancer  HX of HTN, DM II, TIA, aortic stenosis s/p TAVR, CAD s/p PCI, GERD, PARAMJIT    Plan  -Continue ICU level of care for ongoing monitoring and medical therapy  -Blood cultures x2 obtained; will repeat urine culture today; continue Vanc & Zosyn empirically  -COVID positive on 12/12/2022, placed patient on airborne precautions and started remdesivir and dexamethasone 6mg IV daily x 10 days  -Continue oxymask at 6 L/min  -Per cardiology's recommendation: continue heparin drip for IVC thombus  -Per Nephrology's recommendations: may consider dialysis at a later time d/t patient's age and other accompanying medical conditions  -Pending studies: retroperitoneal U/S    DVT Prophylaxis: SCD  GI Prophylaxis: Famotidine IV  IV fluid: Sodium bicarb @ 100mL/hr  ABX: Vanc + Zosyn (D2)     32 minutes of critical care was time spent personally by me on the following activities: development of treatment plan with patient or surrogate and bedside caregivers, discussions with consultants, evaluation of patient's response to treatment, examination of patient, ordering and performing treatments and interventions, ordering and review of laboratory studies, ordering and review of radiographic studies, pulse oximetry, re-evaluation of patient's condition.  This critical care time did not overlap with that of any other provider or involve time for any procedures.     Cathi Morocho,   Pulmonary Critical Care  Medicine  [facility] - [address] [place] [address]

## 2022-12-13 NOTE — PROGRESS NOTES
Pharmacokinetic Assessment Follow Up: IV Vancomycin    Vancomycin serum concentration assessment(s):    The random level was drawn correctly and can be used to guide therapy at this time. The measurement is above the desired definitive target range of 15 to 20 mcg/mL.    Vancomycin Regimen Plan:  Continue to pulse dose due to poor renal function  Patient is not on HD yet. May consider starting soon. Follow renal notes  No doses needed today  Re-dose when the random level is less than 20 mcg/mL, next level to be drawn at 0300 on 12/14      Drug levels (last 3 results):  Recent Labs   Lab Result Units 12/12/22  2356   Vanc Lvl Random ug/ml 23.9*       Vancomycin Administrations:  vancomycin given in the last 96 hours                     vancomycin (VANCOCIN) 2,000 mg in dextrose 5 % 500 mL IVPB (mg) 2,000 mg New Bag 12/12/22 1503                    Pharmacy will continue to follow and monitor vancomycin.    Please contact pharmacy at extension [phone] for questions regarding this assessment.    Thank you for the consult,   Farhad Celaya       Patient brief summary:  Miguel Angel Oliveros is a 89 y.o. female initiated on antimicrobial therapy with IV Vancomycin for treatment of bacteremia      Drug Allergies:   Review of patient's allergies indicates:   Allergen Reactions    Statins-hmg-coa reductase inhibitors      Other reaction(s): Joint pain    Bimatoprost      Other reaction(s): Eye redness    Cortisone     Dorzolamide-timolol     Gabapentin      Other reaction(s): Confusion    Hydrocortisone     Tafluprost (pf)        Actual Body Weight:   86 kg    Renal Function:   Estimated Creatinine Clearance: 13.1 mL/min (A) (based on SCr of 3.23 mg/dL (H)).,     Dialysis Method (if applicable):  Patient is not on HD yet. May consider starting soon. Follow renal notes    CBC (last 72 hours):  Recent Labs   Lab Result Units 12/11/22  0917 12/11/22  2244 12/12/22  0354 12/12/22  2005 12/12/22  2356   WBC x10(3)/mcL 11.3 14.2*  17.2* 13.4* 14.7*   Hgb gm/dL 11.8* 11.3* 11.5* 10.2* 11.0*   Hct % 37.4 36.0* 38.3 31.6* 34.1*   Platelet x10(3)/mcL 117* 103* 85* 104* 117*   Mono % % 8.2 8.1 7.6 3.6 5.1   Eos % % 0.6 0.2 0.1 0.0 0.0   Basophil % % 0.2 0.2 0.3 0.1 0.2       Metabolic Panel (last 72 hours):  Recent Labs   Lab Result Units 12/11/22  0917 12/11/22  1013 12/11/22  2244 12/12/22  0501 12/12/22  1124 12/12/22  1213 12/12/22  1904 12/12/22  2356   Sodium Level mmol/L 135*  --  135* 136 135*  --   --  134*   Potassium Level mmol/L 4.0  --  5.2* 5.8* 6.4*  --  5.5* 4.9   Chloride mmol/L 100  --  102 100 101  --   --  97*   Carbon Dioxide mmol/L 23  --  18* 17* 14*  --   --  20*   Glucose Level mg/dL 179*  --  138* 115 92  --   --  192*   Glucose, UA mg/dL  --  Negative  --   --   --  Negative  --   --    Blood Urea Nitrogen mg/dL 43.0*  --  47.9* 49.7* 58.0*  --   --  72.7*   Creatinine mg/dL 1.29*  --  2.11* 2.40* 3.14*  --   --  3.23*   Albumin Level gm/dL 3.2*  --  3.3* 3.7  --   --   --  3.0*   Bilirubin Total mg/dL 0.4  --  0.8 1.1  --   --   --  0.4   Alkaline Phosphatase unit/L 94  --  103 122  --   --   --  109   Aspartate Aminotransferase unit/L 38*  --  64* 128*  --   --   --  449*   Alanine Aminotransferase unit/L 26  --  44 92*  --   --   --  331*   Magnesium Level mg/dL  --   --   --   --  2.40  --   --   --    Phosphorus Level mg/dL  --   --   --   --  7.9*  --   --   --        Microbiologic Results:  Microbiology Results (last 7 days)       Procedure Component Value Units Date/Time    Blood Culture [[id number]] Collected: 12/12/22 0948    Order Status: Resulted Specimen: Blood, Venous Updated: 12/12/22 0949    Blood Culture [[id number]] Collected: 12/12/22 0948    Order Status: Resulted Specimen: Blood, Venous Updated: 12/12/22 0949

## 2022-12-14 LAB
ACANTHOCYTES (OLG): ABNORMAL
ALBUMIN SERPL-MCNC: 2.9 GM/DL (ref 3.4–4.8)
ALBUMIN/GLOB SERPL: 1 RATIO (ref 1.1–2)
ALP SERPL-CCNC: 104 UNIT/L (ref 40–150)
ALT SERPL-CCNC: 1014 UNIT/L (ref 0–55)
ANISOCYTOSIS BLD QL SMEAR: ABNORMAL
APTT PPP: 100.7 SECONDS (ref 23.2–33.7)
APTT PPP: 94.3 SECONDS (ref 23.2–33.7)
AST SERPL-CCNC: 1103 UNIT/L (ref 5–34)
BASE EXCESS ARTERIAL: 2.7 MMOL/L (ref -2–2)
BASOPHILS # BLD AUTO: 0.02 X10(3)/MCL (ref 0–0.2)
BASOPHILS NFR BLD AUTO: 0.1 %
BILIRUBIN DIRECT+TOT PNL SERPL-MCNC: 0.7 MG/DL
BUN SERPL-MCNC: 87.2 MG/DL (ref 9.8–20.1)
BURR CELLS (OLG): ABNORMAL
CALCIUM SERPL-MCNC: 8.4 MG/DL (ref 8.4–10.2)
CHLORIDE SERPL-SCNC: 94 MMOL/L (ref 98–107)
CO2 SERPL-SCNC: 21 MMOL/L (ref 23–31)
CREAT SERPL-MCNC: 2.81 MG/DL (ref 0.55–1.02)
EOSINOPHIL # BLD AUTO: 0 X10(3)/MCL (ref 0–0.9)
EOSINOPHIL NFR BLD AUTO: 0 %
ERYTHROCYTE [DISTWIDTH] IN BLOOD BY AUTOMATED COUNT: 13.6 % (ref 11.5–17)
GFR SERPLBLD CREATININE-BSD FMLA CKD-EPI: 16 MLS/MIN/1.73/M2
GLOBULIN SER-MCNC: 2.9 GM/DL (ref 2.4–3.5)
GLUCOSE SERPL-MCNC: 150 MG/DL (ref 82–115)
HCO3 ARTERIAL: 25.9 MMOL/L (ref 18–23)
HCT VFR BLD AUTO: 32.4 % (ref 37–47)
HGB BLD-MCNC: 10.5 GM/DL (ref 12–16)
HGB BLD-MCNC: 10.6 G/DL
IMM GRANULOCYTES # BLD AUTO: 0.15 X10(3)/MCL (ref 0–0.04)
IMM GRANULOCYTES NFR BLD AUTO: 0.9 %
LACTATE SERPL-SCNC: 4.1 MMOL/L (ref 0.5–2.2)
LACTATE SERPL-SCNC: 4.6 MMOL/L (ref 0.5–2.2)
LACTATE SERPL-SCNC: 4.7 MMOL/L (ref 0.5–2.2)
LACTATE SERPL-SCNC: 5 MMOL/L (ref 0.5–2.2)
LACTATE SERPL-SCNC: 5.4 MMOL/L (ref 0.5–2.2)
LACTATE SERPL-SCNC: 6.6 MMOL/L (ref 0.5–2.2)
LYMPHOCYTES # BLD AUTO: 1.5 X10(3)/MCL (ref 0.6–4.6)
LYMPHOCYTES NFR BLD AUTO: 9.2 %
MACROCYTES BLD QL SMEAR: ABNORMAL
MCH RBC QN AUTO: 28.5 PG (ref 27–31)
MCHC RBC AUTO-ENTMCNC: 32.4 MG/DL (ref 33–36)
MCV RBC AUTO: 87.8 FL (ref 80–94)
MONOCYTES # BLD AUTO: 0.72 X10(3)/MCL (ref 0.1–1.3)
MONOCYTES NFR BLD AUTO: 4.4 %
NEUTROPHILS # BLD AUTO: 13.8 X10(3)/MCL (ref 2.1–9.2)
NEUTROPHILS NFR BLD AUTO: 85.4 %
NRBC BLD AUTO-RTO: 7.4 %
PCO2 BLDA: 34 MM[HG]
PCO2 BLDA: ABNORMAL MM[HG]
PH SMN: 7.49 [PH]
PLATELET # BLD AUTO: 146 X10(3)/MCL (ref 130–400)
PLATELET # BLD EST: ABNORMAL 10*3/UL
PMV BLD AUTO: 13 FL (ref 7.4–10.4)
PO2 BLDA: 77 MM[HG]
POC COHB: 1.7
POC FIO2: ABNORMAL
POC IONIZED CALCIUM: 1.05
POC METHB: 1
POC O2HB: 94
POIKILOCYTOSIS BLD QL SMEAR: ABNORMAL
POLYCHROMASIA BLD QL SMEAR: ABNORMAL
POTASSIUM BLD-SCNC: 4.1 MMOL/L
POTASSIUM SERPL-SCNC: 4.7 MMOL/L (ref 3.5–5.1)
PROT SERPL-MCNC: 5.8 GM/DL (ref 5.8–7.6)
RBC # BLD AUTO: 3.69 X10(6)/MCL (ref 4.2–5.4)
RBC MORPH BLD: ABNORMAL
SATURATED O2 ARTERIAL, I-STAT: 96.3
SODIUM BLD-SCNC: 128 MMOL/L
SODIUM SERPL-SCNC: 134 MMOL/L (ref 136–145)
VANCOMYCIN SERPL-MCNC: 13.6 UG/ML (ref 15–20)
WBC # SPEC AUTO: 16.2 X10(3)/MCL (ref 4.5–11.5)

## 2022-12-14 PROCEDURE — 63600175 PHARM REV CODE 636 W HCPCS: Performed by: PHYSICIAN ASSISTANT

## 2022-12-14 PROCEDURE — 27000221 HC OXYGEN, UP TO 24 HOURS

## 2022-12-14 PROCEDURE — 36415 COLL VENOUS BLD VENIPUNCTURE: CPT | Performed by: INTERNAL MEDICINE

## 2022-12-14 PROCEDURE — 25000003 PHARM REV CODE 250: Performed by: PHYSICIAN ASSISTANT

## 2022-12-14 PROCEDURE — 80202 ASSAY OF VANCOMYCIN: CPT | Performed by: INTERNAL MEDICINE

## 2022-12-14 PROCEDURE — 85730 THROMBOPLASTIN TIME PARTIAL: CPT | Performed by: INTERNAL MEDICINE

## 2022-12-14 PROCEDURE — 85025 COMPLETE CBC W/AUTO DIFF WBC: CPT | Performed by: INTERNAL MEDICINE

## 2022-12-14 PROCEDURE — 25000003 PHARM REV CODE 250: Performed by: INTERNAL MEDICINE

## 2022-12-14 PROCEDURE — 82803 BLOOD GASES ANY COMBINATION: CPT

## 2022-12-14 PROCEDURE — 20000000 HC ICU ROOM

## 2022-12-14 PROCEDURE — 63600175 PHARM REV CODE 636 W HCPCS: Performed by: INTERNAL MEDICINE

## 2022-12-14 PROCEDURE — 36600 WITHDRAWAL OF ARTERIAL BLOOD: CPT

## 2022-12-14 PROCEDURE — 25000003 PHARM REV CODE 250: Performed by: STUDENT IN AN ORGANIZED HEALTH CARE EDUCATION/TRAINING PROGRAM

## 2022-12-14 PROCEDURE — 83605 ASSAY OF LACTIC ACID: CPT | Performed by: STUDENT IN AN ORGANIZED HEALTH CARE EDUCATION/TRAINING PROGRAM

## 2022-12-14 PROCEDURE — 80053 COMPREHEN METABOLIC PANEL: CPT | Performed by: STUDENT IN AN ORGANIZED HEALTH CARE EDUCATION/TRAINING PROGRAM

## 2022-12-14 PROCEDURE — 27000207 HC ISOLATION

## 2022-12-14 PROCEDURE — 99900035 HC TECH TIME PER 15 MIN (STAT)

## 2022-12-14 PROCEDURE — 63600175 PHARM REV CODE 636 W HCPCS: Mod: TB | Performed by: STUDENT IN AN ORGANIZED HEALTH CARE EDUCATION/TRAINING PROGRAM

## 2022-12-14 RX ORDER — DIAZEPAM 2 MG/1
2 TABLET ORAL NIGHTLY
Status: DISCONTINUED | OUTPATIENT
Start: 2022-12-14 | End: 2022-12-17

## 2022-12-14 RX ORDER — SODIUM CHLORIDE 9 MG/ML
INJECTION, SOLUTION INTRAVENOUS CONTINUOUS
Status: DISCONTINUED | OUTPATIENT
Start: 2022-12-14 | End: 2022-12-15

## 2022-12-14 RX ADMIN — VANCOMYCIN HYDROCHLORIDE 1250 MG: 1.25 INJECTION, POWDER, LYOPHILIZED, FOR SOLUTION INTRAVENOUS at 05:12

## 2022-12-14 RX ADMIN — PIPERACILLIN SODIUM,TAZOBACTAM SODIUM 4.5 G: 4; .5 INJECTION, POWDER, FOR SOLUTION INTRAVENOUS at 12:12

## 2022-12-14 RX ADMIN — DEXAMETHASONE SODIUM PHOSPHATE 6 MG: 4 INJECTION, SOLUTION INTRA-ARTICULAR; INTRALESIONAL; INTRAMUSCULAR; INTRAVENOUS; SOFT TISSUE at 08:12

## 2022-12-14 RX ADMIN — MUPIROCIN: 20 OINTMENT TOPICAL at 09:12

## 2022-12-14 RX ADMIN — SODIUM CHLORIDE: 9 INJECTION, SOLUTION INTRAVENOUS at 03:12

## 2022-12-14 RX ADMIN — DIAZEPAM 2 MG: 2 TABLET ORAL at 08:12

## 2022-12-14 RX ADMIN — MUPIROCIN: 20 OINTMENT TOPICAL at 08:12

## 2022-12-14 RX ADMIN — REMDESIVIR 100 MG: 100 INJECTION, POWDER, LYOPHILIZED, FOR SOLUTION INTRAVENOUS at 08:12

## 2022-12-14 RX ADMIN — FAMOTIDINE 20 MG: 10 INJECTION, SOLUTION INTRAVENOUS at 08:12

## 2022-12-14 NOTE — PROGRESS NOTES
[facility] - [facility] ICU  Pulmonary Critical Care Note    Patient Name: Miguel Angel Oliveros  MRN: [id number]  Admission Date: 12/11/2022  Hospital Length of Stay: 3 days  Code Status: Full Code  Attending Provider: Trevin Blas MD  Primary Care Provider: Barrett Lopez MD     Subjective:     HPI: Miguel Angel Oliveros is an 89-year-old female with PMH of DMII, CAD s/p PCI, HTN, AS s/p TAVR, HLD, GERD, and PARAMJIT, who is admitted to [facility] ICU from regular unit after being found to have altered mental status and increased respiratory distress. She was initially admitted to [facility] on 12/11/2022 by [facility] after being transferred from [place] with the diagnosis of NSTEMI. At the time of encounter, patient states having lower abdominal discomfort, bladder fullness, and dysuria. She states that these symptoms have been persisting for the past few days accompanied by decreased urinary output. Per patient's family members at beside, she usually gets UTI several times each year and she has had 4 episodes of UTI in 2022 (last UTI was 3 months ago).    Hospital Course/Significant events:  12/11/2022 - Transferred from [place], admitted to regular unit by [facility] for NSTEMI  12/12/2022 - Admitted to ICU for ongoing monitoring and medical therapy    24 Hour Interval History:  BP remains stable overnight, patient is afebrile, SpO2 % on oxymask 6 L/min. Patient still appears lethargic but arousable. I/O: net positive 3 L past 24 hours. Labs this AM: WBC 16.2k, H/H 10.5/32.4, PLT 146k, lactate 5.4; persistent anion-gap acidosis (gap of 19), BUN 87.2, Cr 2.81.    Past Medical History:   Diagnosis Date    Anxiety disorder     Aortic stenosis     Arthritis     Carpal tunnel syndrome     Coronary artery disease     Dementia     Depression     Diabetes mellitus     GERD (gastroesophageal reflux disease)     Hx of rectal polypectomy     Hypercholesterolemia     Hypertension     Obstructive sleep apnea     Respiratory  distress     S/P TAVR (transcatheter aortic valve replacement)     TIA (transient ischemic attack)        Past Surgical History:   Procedure Laterality Date    CARDIAC SURGERY      CATARACT EXTRACTION      CORONARY STENT PLACEMENT      HYSTERECTOMY      INSERTION OF PACEMAKER      PARTIAL HYSTERECTOMY      RECTAL POLYPECTOMY      TONSILLECTOMY      TRANSCATHETER AORTIC VALVE REPLACEMENT (TAVR)         Social History     Socioeconomic History    Marital status:    Tobacco Use    Smoking status: Never    Smokeless tobacco: Never   Substance and Sexual Activity    Alcohol use: Never    Drug use: Never           Current Outpatient Medications   Medication Instructions    ascorbic acid (vitamin C) (VITAMIN C) 1,000 mg, Oral, Daily    aspirin (ECOTRIN) 81 mg, Oral    atorvastatin (LIPITOR) 40 mg, Oral, Daily    carvediloL (COREG) 25 mg, Oral, 2 times daily    cloNIDine (CATAPRES) 0.1 MG tablet No dose, route, or frequency recorded.    clopidogreL (PLAVIX) 75 mg, Oral, Daily    diazePAM (VALIUM) 5 mg, Oral, Daily PRN    docusate sodium (COLACE) 100 mg, Oral, 2 times daily    isosorbide mononitrate (IMDUR) 30 MG 24 hr tablet 15 mg.    multivitamin/iron/folic acid (CENTRUM COMPLETE ORAL) Oral    nitroGLYCERIN (NITROSTAT) 0.4 mg, Sublingual    QUEtiapine (SEROQUEL) 25 mg, Oral, 2 times daily    ranolazine (RANEXA) 1,000 mg, Oral, 2 times daily    TUMERIC-GING-OLIVE-OREG-CAPRYL ORAL Oral       Current Inpatient Medications   dexAMETHasone  6 mg Intravenous Daily    dextrose 50% in water (D50W)  25 g Intravenous Once    famotidine (PF)  20 mg Intravenous Daily    heparin (PORCINE)  80 Units/kg (Adjusted) Intravenous Once    insulin regular  6 Units Intravenous Once    mupirocin   Nasal BID    piperacillin-tazobactam (ZOSYN) IVPB  4.5 g Intravenous Q12H    remdesivir infusion  100 mg Intravenous Daily    sodium bicarbonate  50 mEq Intravenous Once       Current Intravenous Infusions   sodium chloride 0.9% 125 mL/hr at  12/14/22 0353    heparin (porcine) in D5W 13 Units/kg/hr (12/13/22 1530)    lactated ringers 125 mL/hr at 12/13/22 2144    sodium bicarbonate drip 100 mL/hr at 12/13/22 0132         Review of Systems   Constitutional:  Positive for chills and malaise/fatigue. Negative for fever.   Respiratory:  Positive for shortness of breath. Negative for cough.    Cardiovascular:  Negative for chest pain and palpitations.   Gastrointestinal:  Positive for abdominal pain.   Genitourinary:  Negative for frequency and hematuria.        Objective:       Intake/Output Summary (Last 24 hours) at 12/14/2022 0817  Last data filed at 12/14/2022 0600  Gross per 24 hour   Intake 3844.22 ml   Output 720 ml   Net 3124.22 ml           Vital Signs (Most Recent):  Temp: 97.7 °F (36.5 °C) (12/14/22 0000)  Pulse: 88 (12/14/22 0600)  Resp: (!) 38 (12/14/22 0600)  BP: 134/87 (12/14/22 0600)  SpO2: 98 % (12/14/22 0600)    Body mass index is 30.67 kg/m².  Weight: 86.2 kg (190 lb) Vital Signs (24h Range):  Temp:  [97.7 °F (36.5 °C)-98.1 °F (36.7 °C)] 97.7 °F (36.5 °C)  Pulse:  [87-98] 88  Resp:  [20-45] 38  SpO2:  [91 %-100 %] 98 %  BP: (103-143)/(59-90) 134/87     Physical Exam  General: Obese w/ moderate distress; lethargic but arousable  HEENT: NC/AT; PERRL; nasal and oral mucosa moist and clear  Neck: No lymphadenopathy  Pulm: Diminished lung sound in lower lobes bilaterally; normal respiratory effort on non-rebreather  CV: S1, S2 w/ murmurs; no edema noted  GI: Distended abdomen; normal bowel sounds in all quadrants, no masses on palpation  MSK: Limited ROM in all extremities d/t weakness  Derm: No rashes, abnormal bruising, or skin lesions  Neuro: Lethargic, easily arousable; Unable to assess orientation, motor/sensory function intact      Lines/Drains/Airways       Central Venous Catheter Line  Duration             Percutaneous Central Line Insertion/Assessment - Triple Lumen  12/12/22 1746 left internal jugular 1 day              Drain   Duration                  Urethral Catheter 12/12/22 1145 1 day              Peripheral Intravenous Line  Duration                  Peripheral IV - Single Lumen 12/12/22 1140 20 G Anterior;Right Wrist 1 day         Peripheral IV - Single Lumen 12/12/22 1140 Anterior;Left;Proximal Antecubital 1 day                    Significant Labs:    Lab Results   Component Value Date    WBC 16.2 (H) 12/14/2022    HGB 10.5 (L) 12/14/2022    HCT 32.4 (L) 12/14/2022    MCV 87.8 12/14/2022     12/14/2022         BMP  Lab Results   Component Value Date     (L) 12/14/2022    K 4.7 12/14/2022    CO2 21 (L) 12/14/2022    BUN 87.2 (H) 12/14/2022    CREATININE 2.81 (H) 12/14/2022    CALCIUM 8.4 12/14/2022    EGFRNONAA 57 10/14/2020       ABG  Recent Labs   Lab 12/11/22  0930 12/12/22  1042 12/13/22  1213   PH 7.410   < > 7.51*   PO2 58*   < > 67*   PCO2 30.0*   < > 35   HCO3 19.0*   < > 27.9   BE -6  --   --     < > = values in this interval not displayed.         Mechanical Ventilation Support:  Oxygen Concentration (%): 100 (12/12/22 1305)    Significant Imaging:  I have reviewed the pertinent imaging within the past 24 hours.    12/12/2022 - CT head w/o contrast shows no acute intracranial findings  12/12/2022 - CXR shows presence of cardiac device, cardiomegaly, cardiac valve prosthetic, and calcified aortic knob; no pleural effusion observed    Assessment/Plan:     Assessment  Altered mental status likely 2/2 sepsis and metabolic encephalopathy  Patient met SIRS 3 out of 4 (WBC 17.2k, tachypnea, tachycardia)  Persistent elevation in lactic acid from 8.4 to 9.5 on 12/12/2022  Patient states having painful and burning urination for the past few days and has HX of multiple episodes of UTI each year  Acute renal failure   Cr 1.29 on 12/11/2022, increased to 3.14 on 12/12/2022  BUN increased from 43.0 to 58.0 on 12/12/2022  Hale catheter in place. Patient states she has decreased urination for the past few days  Lactic  acidosis likely 2/2 acute renal failure and sepsis  NSTEMI   Troponin trend 0.784 --> 0.761-->0.895-->0.825  Likely d/t demand  Echo 12/12/2022 shows EF >70%, mild-to-moderate TR, PASP 58 mmHg, IVC thrombus  COVID positive on admission to ICU  Transminitis   AST 1013, ALT 1014 on 12/14/2022  Bladder cancer s/p resection in October 2022  CT ab & pelvis 12/11/2022 shows heterogeneous hyperdense mass lesion in the superior and left lateral aspect of the urinary bladder  Per patient's family at bedside, patient will be receiving additional imaging to stage bladder cancer  HX of HTN, DM II, TIA, aortic stenosis s/p TAVR, CAD s/p PCI, GERD, PARAMJIT    Plan  -Continue ICU level of care for ongoing monitoring and medical therapy  -D/C Bicarb infusion d/t alkalosis based on yesterday's ABG; will repeat ABG this AM  -Blood cultures x2 show no growth at 24 hours; D/C Vanc and continue Zosyn for now; will plan to de-escalate Zosyn  -COVID positive on 12/12/2022, continue remdesivir and dexamethasone 6mg IV daily x 10 days  -Continue oxymask at 6 L/min  -Per cardiology's recommendation: continue heparin drip for IVC thombus  -Per Nephrology's recommendations: may consider dialysis at a later time d/t patient's age and other accompanying medical conditions  -Pending study: Retroperitoneal U/S    DVT Prophylaxis: SCD and heparin drip  GI Prophylaxis: Famotidine IV  IV fluid: None  ABX: Zosyn (D3)     32 minutes of critical care was time spent personally by me on the following activities: development of treatment plan with patient or surrogate and bedside caregivers, discussions with consultants, evaluation of patient's response to treatment, examination of patient, ordering and performing treatments and interventions, ordering and review of laboratory studies, ordering and review of radiographic studies, pulse oximetry, re-evaluation of patient's condition.  This critical care time did not overlap with that of any other provider or  involve time for any procedures.     Cathi Morocho DO  Pulmonary Critical Care Medicine  [facility] - [facility] ICU

## 2022-12-14 NOTE — PROGRESS NOTES
Pharmacokinetic Assessment Follow Up: IV Vancomycin    Vancomycin serum concentration assessment(s):    The random level was drawn correctly and can be used to guide therapy at this time. The measurement is below the desired definitive target range of 15 to 20 mcg/mL.    Vancomycin Regimen Plan:    Give Vancomycin 1250 mg at 0500, 12/14.    Re-dose when the random level is less than 20 mcg/mL, next level to be drawn at 0430 on 12/15    Drug levels (last 3 results):  Recent Labs   Lab Result Units 12/12/22  2356 12/14/22  0155   Vanc Lvl Random ug/ml 23.9* 13.6*       Pharmacy will continue to follow and monitor vancomycin.    Please contact pharmacy at extension [phone] for questions regarding this assessment.    Thank you for the consult,   Oleg Shaffer       Patient brief summary:  Miguel Angel Oliveros is a 89 y.o. female initiated on antimicrobial therapy with IV Vancomycin for treatment of bacteremia    The patient's current regimen is pulse dose due to renal function.     Drug Allergies:   Review of patient's allergies indicates:   Allergen Reactions    Statins-hmg-coa reductase inhibitors      Other reaction(s): Joint pain    Bimatoprost      Other reaction(s): Eye redness    Cortisone     Dorzolamide-timolol     Gabapentin      Other reaction(s): Confusion    Hydrocortisone     Tafluprost (pf)        Actual Body Weight:   86.2kg    Renal Function:   Estimated Creatinine Clearance: 15 mL/min (A) (based on SCr of 2.81 mg/dL (H)).,     Dialysis Method (if applicable):  N/A As per nephrologist note yesterday, no HD scheduled.     CBC (last 72 hours):  Recent Labs   Lab Result Units 12/11/22  0917 12/11/22  2244 12/12/22  0354 12/12/22 2005 12/12/22  2356 12/13/22  1102 12/14/22  0155   WBC x10(3)/mcL 11.3 14.2* 17.2* 13.4* 14.7* 17.1* 16.2*   Hgb gm/dL 11.8* 11.3* 11.5* 10.2* 11.0* 11.3* 10.5*   Hct % 37.4 36.0* 38.3 31.6* 34.1* 35.9* 32.4*   Platelet x10(3)/mcL 117* 103* 85* 104* 117* 118* 146   Mono % % 8.2  8.1 7.6 3.6 5.1  --  4.4   Monocyte Man %  --   --   --   --   --  5  --    Eos % % 0.6 0.2 0.1 0.0 0.0  --  0.0   Basophil % % 0.2 0.2 0.3 0.1 0.2  --  0.1       Metabolic Panel (last 72 hours):  Recent Labs   Lab Result Units 12/11/22  0917 12/11/22  1013 12/11/22  2244 12/12/22  0501 12/12/22  1124 12/12/22  1213 12/12/22  1904 12/12/22  2356 12/13/22  1827 12/14/22  0155   Sodium Level mmol/L 135*  --  135* 136 135*  --   --  134* 134* 134*   Potassium Level mmol/L 4.0  --  5.2* 5.8* 6.4*  --  5.5* 4.9 4.4 4.7   Chloride mmol/L 100  --  102 100 101  --   --  97* 94* 94*   Carbon Dioxide mmol/L 23  --  18* 17* 14*  --   --  20* 25 21*   Glucose Level mg/dL 179*  --  138* 115 92  --   --  192* 177* 150*   Glucose, UA mg/dL  --  Negative  --   --   --  Negative  --   --   --   --    Blood Urea Nitrogen mg/dL 43.0*  --  47.9* 49.7* 58.0*  --   --  72.7* 83.1* 87.2*   Creatinine mg/dL 1.29*  --  2.11* 2.40* 3.14*  --   --  3.23* 3.02* 2.81*   Albumin Level gm/dL 3.2*  --  3.3* 3.7  --   --   --  3.0* 2.8* 2.9*   Bilirubin Total mg/dL 0.4  --  0.8 1.1  --   --   --  0.4 0.5 0.7   Alkaline Phosphatase unit/L 94  --  103 122  --   --   --  109 103 104   Aspartate Aminotransferase unit/L 38*  --  64* 128*  --   --   --  449* 1,019* 1,103*   Alanine Aminotransferase unit/L 26  --  44 92*  --   --   --  331* 869* 1,014*   Magnesium Level mg/dL  --   --   --   --  2.40  --   --   --   --   --    Phosphorus Level mg/dL  --   --   --   --  7.9*  --   --   --   --   --        Vancomycin Administrations:  vancomycin given in the last 96 hours                     vancomycin (VANCOCIN) 2,000 mg in dextrose 5 % 500 mL IVPB (mg) 2,000 mg New Bag 12/12/22 1503                    Microbiologic Results:  Microbiology Results (last 7 days)       Procedure Component Value Units Date/Time    Blood Culture [[id number]]  (Normal) Collected: 12/12/22 0948    Order Status: Completed Specimen: Blood, Venous Updated: 12/13/22 1000     CULTURE,  BLOOD ([facility]) No Growth At 24 Hours    Blood Culture [[id number]]  (Normal) Collected: 12/12/22 0948    Order Status: Completed Specimen: Blood, Venous Updated: 12/13/22 1000     CULTURE, BLOOD ([facility]) No Growth At 24 Hours

## 2022-12-14 NOTE — CONSULTS
Gastroenterology Consultation Note    Reason for Consult:  Portal vein   Thrombosis with Transaminitis     PCP:   Barrett Lopez MD    History of Present Illness (HPI):  89 year old female known to Dr. Vasquez with Hx DM type 2, aortic stenosis, CAD, TIA, bladder cancer, dementia, carpal tunnel syndrome, GERD, depression, anxiety, PARAMJIT who was initially transferred from [facility] on 12/11 with NSTEMI.   GI was consulted for PVT and transaminitis.     On arrival, she was found to be in respiratory distress with lactic acid of 9.5,worsening acute renal failure and metabolic acidosis, and was found to be COVID+ , upgraded to ICU.   AST/ALT initially 64/44-->449/331-->1019/869-->1103/1014 today.     CT Abdomen Pelvis 12/11: Heterogeneous hyperdense mass lesion in the superior and left lateral aspect of the urinary bladder- highly suspicious for malignancy  US Abdomen 12/14 significant for thrombosis of portal vein with polyp in GB w/ pericholecystic fluid and GB wall thickening.       Review of Systems   Unable to perform ROS: Acuity of condition    Medical History:   Past Medical History:   Diagnosis Date    Anxiety disorder     Aortic stenosis     Arthritis     Carpal tunnel syndrome     Coronary artery disease     Dementia     Depression     Diabetes mellitus     GERD (gastroesophageal reflux disease)     Hx of rectal polypectomy     Hypercholesterolemia     Hypertension     Obstructive sleep apnea     Respiratory distress     S/P TAVR (transcatheter aortic valve replacement)     TIA (transient ischemic attack)        Surgical History:   Past Surgical History:   Procedure Laterality Date    CARDIAC SURGERY      CATARACT EXTRACTION      CORONARY STENT PLACEMENT      HYSTERECTOMY      INSERTION OF PACEMAKER      PARTIAL HYSTERECTOMY      RECTAL POLYPECTOMY      TONSILLECTOMY      TRANSCATHETER AORTIC VALVE REPLACEMENT (TAVR)         Family History:   Family History   Problem Relation Age of Onset    Diabetes Mother      Stroke Father     Hyperlipidemia Father     Breast cancer Sister    .     Social History:   Social History     Tobacco Use    Smoking status: Never    Smokeless tobacco: Never   Substance Use Topics    Alcohol use: Never       Allergies:  Review of patient's allergies indicates:   Allergen Reactions    Statins-hmg-coa reductase inhibitors      Other reaction(s): Joint pain    Bimatoprost      Other reaction(s): Eye redness    Cortisone     Dorzolamide-timolol     Gabapentin      Other reaction(s): Confusion    Hydrocortisone     Tafluprost (pf)        Medications Prior to Admission   Medication Sig Dispense Refill Last Dose    ascorbic acid, vitamin C, (VITAMIN C) 1000 MG tablet Take 1,000 mg by mouth once daily.   2022    aspirin (ECOTRIN) 81 MG EC tablet Take 81 mg by mouth.   2022    atorvastatin (LIPITOR) 40 MG tablet Take 40 mg by mouth once daily.   2022    carvediloL (COREG) 25 MG tablet Take 25 mg by mouth 2 (two) times daily.   2022    cloNIDine (CATAPRES) 0.1 MG tablet    2022    clopidogreL (PLAVIX) 75 mg tablet Take 75 mg by mouth once daily.   2022    isosorbide mononitrate (IMDUR) 30 MG 24 hr tablet 15 mg.   2022    multivitamin/iron/folic acid (CENTRUM COMPLETE ORAL) Take by mouth.   2022    QUEtiapine (SEROQUEL) 25 MG Tab Take 25 mg by mouth 2 (two) times daily.   2022    ranolazine (RANEXA) 1,000 mg Tb12 Take 1,000 mg by mouth 2 (two) times daily.   2022    TUMERIC-GING-OLIVE-OREG-CAPRYL ORAL Take by mouth.   2022    docusate sodium (COLACE) 100 MG capsule Take 1 capsule (100 mg total) by mouth 2 (two) times daily. for 15 days 30 capsule 0     nitroGLYCERIN (NITROSTAT) 0.4 MG SL tablet Place 0.4 mg under the tongue.   Unknown    [] ondansetron (ZOFRAN) 4 MG tablet Take 1 tablet (4 mg total) by mouth every 8 (eight) hours as needed for Nausea. 30 tablet 0     [DISCONTINUED] diazePAM (VALIUM) 5 MG tablet Take 5 mg by mouth  "daily as needed.   Unknown         Objective Findings:    Vital Signs:  BP (!) 144/84   Pulse 88   Temp 97.5 °F (36.4 °C)   Resp (!) 35   Ht 5' 5.98" (1.676 m)   Wt 86.2 kg (190 lb)   SpO2 100%   BMI 30.68 kg/m²   Body mass index is 30.68 kg/m².    Physical Exam: Limited Physical exam secondary to COVID precautions  Physical Exam  Constitutional:       Appearance: She is ill-appearing.      Comments: Somnolent but arousable    HENT:      Nose: Nose normal.   Eyes:      General: No scleral icterus.     Extraocular Movements: Extraocular movements intact.   Cardiovascular:      Rate and Rhythm: Normal rate.      Pulses: Normal pulses.   Pulmonary:      Breath sounds: No stridor. No rhonchi.   Skin:     Coloration: Skin is not jaundiced.   Neurological:      Motor: Weakness present.   Psychiatric:         Mood and Affect: Mood normal.       Labs:  Recent Results (from the past 24 hour(s))   Comprehensive Metabolic Panel    Collection Time: 12/13/22  6:27 PM   Result Value Ref Range    Sodium Level 134 (L) 136 - 145 mmol/L    Potassium Level 4.4 3.5 - 5.1 mmol/L    Chloride 94 (L) 98 - 107 mmol/L    Carbon Dioxide 25 23 - 31 mmol/L    Glucose Level 177 (H) 82 - 115 mg/dL    Blood Urea Nitrogen 83.1 (H) 9.8 - 20.1 mg/dL    Creatinine 3.02 (H) 0.55 - 1.02 mg/dL    Calcium Level Total 8.7 8.4 - 10.2 mg/dL    Protein Total 6.0 5.8 - 7.6 gm/dL    Albumin Level 2.8 (L) 3.4 - 4.8 gm/dL    Globulin 3.2 2.4 - 3.5 gm/dL    Albumin/Globulin Ratio 0.9 (L) 1.1 - 2.0 ratio    Bilirubin Total 0.5 <=1.5 mg/dL    Alkaline Phosphatase 103 40 - 150 unit/L    Alanine Aminotransferase 869 (H) 0 - 55 unit/L    Aspartate Aminotransferase 1,019 (H) 5 - 34 unit/L    eGFR 14 mls/min/1.73/m2   Lactic Acid, Plasma    Collection Time: 12/13/22  6:27 PM   Result Value Ref Range    Lactic Acid Level 3.1 (H) 0.5 - 2.2 mmol/L   PTT Heparin Monitoring    Collection Time: 12/13/22  6:28 PM   Result Value Ref Range    PTT Heparin Monitor 77.1 (H) " 23.2 - 33.7 seconds   Lactic Acid, Plasma    Collection Time: 12/13/22  8:15 PM   Result Value Ref Range    Lactic Acid Level 3.8 (HH) 0.5 - 2.2 mmol/L   PTT Heparin Monitoring    Collection Time: 12/14/22 12:10 AM   Result Value Ref Range    PTT Heparin Monitor 100.7 (H) 23.2 - 33.7 seconds   Lactic Acid, Plasma    Collection Time: 12/14/22 12:11 AM   Result Value Ref Range    Lactic Acid Level 4.1 (HH) 0.5 - 2.2 mmol/L   APTT    Collection Time: 12/14/22  1:55 AM   Result Value Ref Range    PTT 94.3 (H) 23.2 - 33.7 seconds   Comprehensive Metabolic Panel    Collection Time: 12/14/22  1:55 AM   Result Value Ref Range    Sodium Level 134 (L) 136 - 145 mmol/L    Potassium Level 4.7 3.5 - 5.1 mmol/L    Chloride 94 (L) 98 - 107 mmol/L    Carbon Dioxide 21 (L) 23 - 31 mmol/L    Glucose Level 150 (H) 82 - 115 mg/dL    Blood Urea Nitrogen 87.2 (H) 9.8 - 20.1 mg/dL    Creatinine 2.81 (H) 0.55 - 1.02 mg/dL    Calcium Level Total 8.4 8.4 - 10.2 mg/dL    Protein Total 5.8 5.8 - 7.6 gm/dL    Albumin Level 2.9 (L) 3.4 - 4.8 gm/dL    Globulin 2.9 2.4 - 3.5 gm/dL    Albumin/Globulin Ratio 1.0 (L) 1.1 - 2.0 ratio    Bilirubin Total 0.7 <=1.5 mg/dL    Alkaline Phosphatase 104 40 - 150 unit/L    Alanine Aminotransferase 1,014 (H) 0 - 55 unit/L    Aspartate Aminotransferase 1,103 (H) 5 - 34 unit/L    eGFR 16 mls/min/1.73/m2   Vancomycin, Random    Collection Time: 12/14/22  1:55 AM   Result Value Ref Range    Vanc Lvl Random 13.6 (L) 15.0 - 20.0 ug/ml   Lactic Acid, Plasma    Collection Time: 12/14/22  1:55 AM   Result Value Ref Range    Lactic Acid Level 5.4 (HH) 0.5 - 2.2 mmol/L   CBC with Differential    Collection Time: 12/14/22  1:55 AM   Result Value Ref Range    WBC 16.2 (H) 4.5 - 11.5 x10(3)/mcL    RBC 3.69 (L) 4.20 - 5.40 x10(6)/mcL    Hgb 10.5 (L) 12.0 - 16.0 gm/dL    Hct 32.4 (L) 37.0 - 47.0 %    MCV 87.8 80.0 - 94.0 fL    MCH 28.5 27.0 - 31.0 pg    MCHC 32.4 (L) 33.0 - 36.0 mg/dL    RDW 13.6 11.5 - 17.0 %    Platelet 146  130 - 400 x10(3)/mcL    MPV 13.0 (H) 7.4 - 10.4 fL    Neut % 85.4 %    Lymph % 9.2 %    Mono % 4.4 %    Eos % 0.0 %    Basophil % 0.1 %    Lymph # 1.50 0.6 - 4.6 x10(3)/mcL    Neut # 13.8 (H) 2.1 - 9.2 x10(3)/mcL    Mono # 0.72 0.1 - 1.3 x10(3)/mcL    Eos # 0.00 0 - 0.9 x10(3)/mcL    Baso # 0.02 0 - 0.2 x10(3)/mcL    IG# 0.15 (H) 0 - 0.04 x10(3)/mcL    IG% 0.9 %    NRBC% 7.4 %   Blood Smear Microscopic Exam    Collection Time: 12/14/22  1:55 AM   Result Value Ref Range    RBC Morph Abnormal (A) Normal    Acanthocytes 1+ (A) (none)    Anisocyte 1+ (A) (none)    [place] Cells 1+ (A) (none)    Macrocyte 1+ (A) (none)    Poik 2+ (A) (none)    Polychrom 1+ (A) (none)    Platelet Est Decreased (A) Normal, Adequate   Lactic Acid, Plasma    Collection Time: 12/14/22  8:03 AM   Result Value Ref Range    Lactic Acid Level 4.7 (HH) 0.5 - 2.2 mmol/L   POCT ARTERIAL BLOOD GAS Blood Gas    Collection Time: 12/14/22  8:45 AM   Result Value Ref Range    POC PH 7.490     POC PCO2 34     POC PO2 77     POC Sodium 128     POC Potassium 4.1     POC Ionized Calcium 1.05     POC HEMOGLOBIN 10.6     POC O2Hb 94.0     POC COHb 1.7     POC MetHb 1.0     POC PCO2      Base Excess, Arterial 2.7 (A) -2.0 - 2.0 mmol/L    O2 Sat, Art 96.3     HCO3, Arterial 25.9 (A) 18.0 - 23.0 MMOL/L    POC FIO2     Lactic Acid, Plasma    Collection Time: 12/14/22  9:21 AM   Result Value Ref Range    Lactic Acid Level 4.6 (HH) 0.5 - 2.2 mmol/L   Lactic Acid, Plasma    Collection Time: 12/14/22 11:32 AM   Result Value Ref Range    Lactic Acid Level 6.6 (HH) 0.5 - 2.2 mmol/L       US Abdomen Limited_Liver   Final Result      Thrombosis of the main portal vein      A polyp noted in the gallbladder with pericholecystic fluid seen and gallbladder wall thickening seen.  Cholecystitis should be excluded         Electronically signed by: Mian Marvin   Date:    12/14/2022   Time:    16:01      US Retroperitoneal Complete   Final Result      Limited exam due to bowel  gas.      No significant abnormalities identified.      Bladder was decompressed         Electronically signed by: Avtar iLu   Date:    12/14/2022   Time:    09:13      X-Ray Chest 1 View   Final Result      Interval placement of a central line in the left internal jugular vein otherwise unchanged         Electronically signed by: Mian Marvin   Date:    12/12/2022   Time:    18:27      X-Ray Chest 1 View   Final Result      No acute abnormality of the chest.         Electronically signed by: Tiana Negro   Date:    12/12/2022   Time:    13:43      CT Head Without Contrast   Final Result      No acute intracranial findings identified.      No significant discrepancy with overnight report.         Electronically signed by: Chapin Ward   Date:    12/12/2022   Time:    06:52          Assessment/Plan:  1. NSTEMI (non-ST elevated myocardial infarction)    2. SOB (shortness of breath)    3. Shortness of breath    4. Hyperkalemia    5. Pulmonary embolism, unspecified chronicity, unspecified pulmonary embolism type, unspecified whether acute cor pulmonale present    6. Portal vein thrombosis       89 year old female known to Dr. Vasquez with Hx DM type 2, aortic stenosis, CAD, TIA, bladder cancer, dementia, carpal tunnel syndrome, GERD, depression, anxiety, PARAMJIT who was initially transferred from [facility] on 12/11 with NSTEMI.   GI was consulted for PVT and transaminitis.    Newly found PVT on US imaging   Transaminitis  - 64/44-->449/331-->1019/869-->1103/1014  3. Concern for IVC thrombus   - On Heparin drip per Cardiology  4. NSTEMI  5 SMITHA  6. AMS  7. Lactic Acidosis  8. COVID+  - On Oxymask 6 L/min   9. Bladder Ca s/p resection in 2022  - CT ab & pelvis 12/11/2022 shows heterogeneous hyperdense mass lesion in the superior and left lateral aspect of the urinary bladder    Theres is unfortunately no additional intervention to be done from a GI standpoint at this time as patient is with multi organ disease.  Recommend continuing with therapeutic anticoagulation and a possible Hematology consult. Please call with any questions.        Emelina Smallwood PA-C  Gastroenertology  [facility]     Thank you for allowing us to participate in the care of Miguel Angel Oliveros.     The documentation recorded by the scribe/NP accurately reflects the service I personally performed and the decisions made by me.   Pt does have mild RUQ tenderness . Rec surgery consult to evaluate    Tiffanie Srinivasan Iii, MD  [facility]

## 2022-12-14 NOTE — PROGRESS NOTES
Inpatient Nutrition Assessment    Admit Date: 12/11/2022   Total duration of encounter: 3 days     Nutrition Recommendation/Prescription     Advance diet when appropriate.  If po intake <50% of meals, will need to consider adding ONS.    Communication of Recommendations: reviewed with nurse    Nutrition Assessment     Malnutrition Assessment/Nutrition-Focused Physical Exam    Malnutrition in the context of chronic illness  Degree of Malnutrition: does not meet criteria  Energy Intake: unable to obtain  Interpretation of Weight Loss: does not meet criteria  Body Fat: does not meet criteria  Area of Body Fat Loss: does not meet criteria  Muscle Mass Loss: mild depletion  Area of Muscle Mass Loss: temple region - temporalis muscle  Fluid Accumulation: does not meet criteria  Edema: no edema present  Reduced  Strength: unable to obtain  A minimum of two characteristics is recommended for diagnosis of either severe or non-severe malnutrition.    Chart Review    Reason Seen: malnutrition screening tool    MST Score: 3  Have you recently lost weight without trying?: Yes: Unsure how much  Have you been eating poorly because of a decreased appetite?: Yes     Diagnosis:  Altered mental status likely 2/2 sepsis and metabolic encephalopathy  Acute renal failure   Lactic acidosis likely 2/2 acute renal failure and sepsis  NSTEMI   COVID positive   Transminitis     Relevant Medical History: CAD, dementia, depression, DM, GERD, high chol, HTN, TIA    Nutrition-Related Medications: NS @ 125ml/hr  Calorie Containing IV Medications: no significant kcals from medications at this time    Nutrition-Related Labs:  12/14 Na 134, Cl 94, BUN 87.2, Crea 2.81, Glu 150, Phos 7.9    Diet/PN Order: No diet orders on file  Oral Supplement Order: none  Tube Feeding Order: not applicable  Appetite/Oral Intake: NPO/NPO  Factors Affecting Nutritional Intake: NPO  Food/Muslim/Cultural Preferences: none reported  Food Allergies: none  "reported       Wound(s):   none noted    Comments    12/14/22: Plans for diet advancement per RN. No NG in place at this time. Noted MST, Previous EMR wts indicate wt gain over past 2 months. Unable to verify subjective info with pt, sleeping soundly.    Anthropometrics    Height: 5' 5.98" (167.6 cm)    Last Weight: 86.2 kg (190 lb) (12/11/22 2120) Weight Method: Stated  BMI (Calculated): 30.7  BMI Classification: obese grade I (BMI 30-34.9)     Ideal Body Weight (IBW), Female: 129.9 lb     % Ideal Body Weight, Female (lb): 146.15 %                             Usual Weight Provided By: unable to obtain usual weight    Wt Readings from Last 5 Encounters:   12/11/22 86.2 kg (190 lb)   12/11/22 81.6 kg (180 lb)   12/01/22 81.6 kg (180 lb)   11/30/22 77.1 kg (170 lb)   10/15/22 77.1 kg (170 lb)     Weight Change(s) Since Admission:  Admit Weight: 86.2 kg (190 lb) (12/11/22 2120)    Estimated Needs    Weight Used For Calorie Calculations: 86.2 kg (190 lb 0.6 oz)  Energy Calorie Requirements (kcal): 1564kcal (1.2 stress factor)  Energy Need Method: Haskell-St Jeor  Weight Used For Protein Calculations: 86.2 kg (190 lb 0.6 oz)  Protein Requirements: 69gm (0.8g/kg)  Fluid Requirements (mL): 1564ml (1ml/kcal)  Temp: 97.7 °F (36.5 °C)       Enteral Nutrition    Patient not receiving enteral nutrition at this time.    Parenteral Nutrition    Patient not receiving parenteral nutrition support at this time.    Evaluation of Received Nutrient Intake    Calories: not meeting estimated needs  Protein: not meeting estimated needs    Patient Education    Not applicable.    Nutrition Diagnosis     PES: Inadequate oral intake related to current condition as evidenced by NPO since admit. (new)    Interventions/Goals     Intervention(s): general/healthful diet, commercial beverage, and collaboration with other providers  Goal: Meet greater than 75% of nutritional needs by follow-up. (new)    Monitoring & Evaluation     Dietitian will " monitor energy intake.  Nutrition Risk/Follow-Up: moderate (follow-up in 3-5 days)   Please consult if re-assessment needed sooner.

## 2022-12-14 NOTE — NURSING
Nurses Note -- 4 Eyes      12/13/2022   8:00 PM      Skin assessed during: Q Shift Change  Q Shift Change    [x] No Pressure Injuries Present    [x]Prevention Measures Documented      [] Yes- Altered Skin Integrity Present or Discovered   [] LDA Added if Not in Epic (Describe Wound)   [] New Altered Skin Integrity was Present on Admit and Documented in LDA   [] Wound Image Taken    Wound Care Consulted? No    Attending Nurse:  Gregorio Tidwell RN     Second RN/Staff Member:  MARE Prather

## 2022-12-14 NOTE — NURSING
Nurses Note -- 4 Eyes      12/14/2022   6:53 AM      Skin assessed during: Q Shift Change      [x] No Pressure Injuries Present    [x]Prevention Measures Documented      [] Yes- Altered Skin Integrity Present or Discovered   [] LDA Added if Not in Epic (Describe Wound)   [] New Altered Skin Integrity was Present on Admit and Documented in LDA   [] Wound Image Taken    Wound Care Consulted? No    Attending Nurse:  Martir Patiño RN     Second RN/Staff Member:  Gregorio Tidwell RN

## 2022-12-14 NOTE — PROGRESS NOTES
Nephrology follow up progress note    HPI:      Miguel Angel Oliveros is a 89 y.o. female who has been found to have SMITHA probably secondary to ATN probably secondary to multifactorial reasons.  Now she is found to have COVID positive.  Also she has thrombus in the IVC close to right atrium.  Patient is on heparin.  Patient is on remdesivir and Decadron.  Urine output has been low.  Patient is receiving bicarb drip.  She is comfortable.    Interval history:   12/14/22 overall condition is unchanged from yesterday.  She stays NPO and oxygenating very well on oxygen.  Urine output is low but is still acceptable and she is tolerating IV fluids.       Review of Systems:       Past medical, family, surgical, and social history reviewed and unchanged from initial consult note.     Objective:       VITAL SIGNS: 24 HR MIN & MAX LAST    Temp  Min: 97.7 °F (36.5 °C)  Max: 98.1 °F (36.7 °C)  97.7 °F (36.5 °C)        BP  Min: 103/86  Max: 143/85  134/87     Pulse  Min: 87  Max: 98  88     Resp  Min: 20  Max: 45  (!) 38    SpO2  Min: 91 %  Max: 100 %  98 %      GEN: Chronically ill appearing in NAD, lethargic but arousable.  HEENT: Conjunctiva anicteric, pupils reactive.  CV: RRR without rub.  PULM:  Diminished breath sounds at lower lobes bilaterally.  ABD: Soft, NT/ND abdomen.  Bowel sounds positive.  EXT: No cyanosis or edema  SKIN: Warm and dry            Component Value Date/Time     (L) 12/14/2022 0155     (L) 12/13/2022 1827    K 4.7 12/14/2022 0155    K 4.4 12/13/2022 1827    CHLORIDE 94 (L) 12/14/2022 0155    CHLORIDE 94 (L) 12/13/2022 1827    CO2 21 (L) 12/14/2022 0155    CO2 25 12/13/2022 1827    BUN 87.2 (H) 12/14/2022 0155    BUN 83.1 (H) 12/13/2022 1827    CREATININE 2.81 (H) 12/14/2022 0155    CREATININE 3.02 (H) 12/13/2022 1827    CALCIUM 8.4 12/14/2022 0155    CALCIUM 8.7 12/13/2022 1827    PHOS 7.9 (H) 12/12/2022 1124            Component Value Date/Time    WBC 16.2 (H) 12/14/2022 0155    WBC 17.1  (H) 12/13/2022 1102    HGB 10.5 (L) 12/14/2022 0155    HGB 11.3 (L) 12/13/2022 1102    HCT 32.4 (L) 12/14/2022 0155    HCT 35.9 (L) 12/13/2022 1102    HCT 37.0 (L) 05/02/2020 1818    HCT 37.0 (L) 05/02/2020 1812     12/14/2022 0155     (L) 12/13/2022 1102       Imaging reviewed renal ultrasound reveals bilateral near normal-size kidneys and no hydronephrosis.      Assessment / Plan:   SMITHA secondary to ATN secondary to sepsis.  COVID-19 positive  Thrombus in IVC close to right atrium  Altered mental status  Lactic acidosis  Status post TAVR.  Hypertension  Diabetes type 2  History of recent bladder cancer resection in October 2022.    Recommendations  Now patient is on normal saline at 125 cc an hour.  There is no significant change in BUN and creatinine.  But the worrisome aspect is that now patient is having rising lactic acid level and abnormal liver function tests.  From the renal standpoint of view no changes recommended and will check labs tomorrow.

## 2022-12-14 NOTE — PROGRESS NOTES
Consults  [facility] - 3rd Floor Medical Telemetry  Cardiology  Progress Note    Patient Name: Miguel Angel Oliveros  MRN: [id number]  Admission Date: 12/11/2022  Hospital Length of Stay: 3 days  Code Status: Full Code   Attending Provider: Trevin Blas MD   Consulting Provider: NEERAJ Cherry  Primary Care Physician: Barrett Lopez MD  Principal Problem:<principal problem not specified>    Patient information was obtained from past medical records and ER records.     Subjective:     Chief Complaint:       Ms. Oliveros is an 89 y/o female, with PMHx significant for CAD/PCI, VHD/TAVR, SSS/PPM, HTN,HLD, PARAMJIT, depression/anxiety, and dementia who presented to [facility] with c/o weakness, TODD, and abdominal pain. Troponin was elevated 0.868->0.784. She was seen by [facility] through teleconsult who recommended transfer for cardiology services. Workup at [facility] revealed no acute cardiopulmonary process via CXR. + Leukocytosis with WBC 14.2->17.2, Plt 103->85, K+ 5.2->5.8, BUN/creatinine 47.9/2.11->49.7/2.40. AST//92, BNP 1124->1900, troponin 0.868-0.784-0.761-0.895- 0.825. [facility] has been consulted for NSTEMI. Patient is currently stuporous. On oxymask, BP marginal. She is hypothermic. Echo at bedside.     Hospital Course:  12.13.22: SR on Tele. BP Stable.  12.13.22: Vitals Stable. Remains on Heparin Infusion.     PMH: CAD/PCI, VHD/TAVR, SSS/PPM, T2DM, HLD, PARAMJIT, depression/anxiety, dementia, bladder cancer/resection  PSH: bladder resection, TAVR 23 mm S3 Ultra valve 10/2020, EGD, colonoscopy, hysterectomy, rectal polypectomy, tonsillectomy,   Family History: Father- HLD, CVA, Mother- DM; sister- breast cancer  Social History: Denies alcohol or ETOH     Previous Cardiac Diagnostics:   Echocardiogram (12.12.22):  Hyperdynamic left ventricular systolic function, LVEF >70%  Severe right ventricular enlargement with moderately to severely reduced right ventricular systolic function.  Well  seated bioprosthetic AV (TAVR) without significant stenosis or perivalvular leak. Peak AV 2.3 m/sec, MG 10 mmHg,dimensionless index 0.34.  Mild to moderate tricuspid regurgitation.  There is pulmonary hypertension. The estimated PA systolic pressure is 58 mmHg.  Large echodensities seen in IVC at RA junction, cannot completely exclude IVC thrombus.  The estimated PA systolic pressure is 58 mmHg.    TTE (10/05/2021):  LV normal in size. Global LVSF is hyperdynamic. LVEF 80%. Mild septal hypertrophy is noted. Patient hx TAVR using a 23 mmS3 Ultra bio-prosthesis with a MG 20 mmHg, PV 3.2 m/sec, and DI 0.38. No significant periprosthetic regurgitation is appreciated. Trans aortic velocities and gradients are elevated when compared to previous exam. Moderate severe calcification of the mitral valve is noted. LADI 1.6 cm2, Mean trans mitral gradient is 2.5 mmHg. Question mild MV stenosis. MV Don score 12. 1+ MR/TR.      [facility] (09/01/2020):  LM: Normal  Lcx: patent proximal stent.  distal segment  Ramus: patent proximal stent  LAD-20% calcified proximal lesion, dominant vessel  RCA: Patent proximal stent, dominant vessel  LVEDP 17  LADI 0.61, MG 35.   Wedge pressure mean: 18  No plans for intervention to distal circumflex as this is a  that has been present for many years. Feel majority of patients symptoms are related to her valve.      Review of patient's allergies indicates:   Allergen Reactions    Statins-hmg-coa reductase inhibitors      Other reaction(s): Joint pain    Bimatoprost      Other reaction(s): Eye redness    Cortisone     Dorzolamide-timolol     Gabapentin      Other reaction(s): Confusion    Hydrocortisone     Tafluprost (pf)        Current Facility-Administered Medications on File Prior to Encounter   Medication    betamethasone acetate-betamethasone sodium phosphate injection 6 mg    betamethasone acetate-betamethasone sodium phosphate injection 6 mg    LIDOcaine (PF) 20 mg/mL (2%) injection 5 mL     LIDOcaine (PF) 20 mg/mL (2%) injection 5 mL     Current Outpatient Medications on File Prior to Encounter   Medication Sig    ascorbic acid, vitamin C, (VITAMIN C) 1000 MG tablet Take 1,000 mg by mouth once daily.    aspirin (ECOTRIN) 81 MG EC tablet Take 81 mg by mouth.    atorvastatin (LIPITOR) 40 MG tablet Take 40 mg by mouth once daily.    carvediloL (COREG) 25 MG tablet Take 25 mg by mouth 2 (two) times daily.    cloNIDine (CATAPRES) 0.1 MG tablet     clopidogreL (PLAVIX) 75 mg tablet Take 75 mg by mouth once daily.    isosorbide mononitrate (IMDUR) 30 MG 24 hr tablet 15 mg.    multivitamin/iron/folic acid (CENTRUM COMPLETE ORAL) Take by mouth.    QUEtiapine (SEROQUEL) 25 MG Tab Take 25 mg by mouth 2 (two) times daily.    ranolazine (RANEXA) 1,000 mg Tb12 Take 1,000 mg by mouth 2 (two) times daily.    TUMERIC-GING-OLIVE-OREG-CAPRYL ORAL Take by mouth.    diazePAM (VALIUM) 5 MG tablet Take 5 mg by mouth daily as needed.    docusate sodium (COLACE) 100 MG capsule Take 1 capsule (100 mg total) by mouth 2 (two) times daily. for 15 days    nitroGLYCERIN (NITROSTAT) 0.4 MG SL tablet Place 0.4 mg under the tongue.         Review of Systems   Unable to perform ROS: Other    ROS Deferred given COVID-19 Precautions.    Objective:     Vital Signs (Most Recent):  Temp: 97.7 °F (36.5 °C) (12/14/22 0000)  Pulse: 88 (12/14/22 0600)  Resp: (!) 38 (12/14/22 0600)  BP: 134/87 (12/14/22 0600)  SpO2: 98 % (12/14/22 0600)   Vital Signs (24h Range):  Temp:  [97.7 °F (36.5 °C)-98.1 °F (36.7 °C)] 97.7 °F (36.5 °C)  Pulse:  [87-98] 88  Resp:  [20-45] 38  SpO2:  [91 %-100 %] 98 %  BP: (103-141)/(59-90) 134/87     Weight: 86.2 kg (190 lb)  Body mass index is 30.68 kg/m².    SpO2: 98 %         Intake/Output Summary (Last 24 hours) at 12/14/2022 1215  Last data filed at 12/14/2022 0800  Gross per 24 hour   Intake 3844.22 ml   Output 735 ml   Net 3109.22 ml         Lines/Drains/Airways       Central Venous Catheter Line  Duration              Percutaneous Central Line Insertion/Assessment - Triple Lumen  12/12/22 1745 left internal jugular 1 day              Drain  Duration                  Urethral Catheter 12/12/22 1145 2 days              Peripheral Intravenous Line  Duration                  Peripheral IV - Single Lumen 12/12/22 1140 20 G Anterior;Right Wrist 2 days         Peripheral IV - Single Lumen 12/12/22 1140 Anterior;Left;Proximal Antecubital 2 days                  Significant Labs:  Recent Results (from the past 72 hour(s))   Troponin I    Collection Time: 12/11/22  5:26 PM   Result Value Ref Range    Troponin-I 0.761 (H) 0.000 - 0.045 ng/mL   POCT glucose    Collection Time: 12/11/22  9:26 PM   Result Value Ref Range    POCT Glucose 117 (H) 70 - 110 mg/dL   Comprehensive metabolic panel    Collection Time: 12/11/22 10:44 PM   Result Value Ref Range    Sodium Level 135 (L) 136 - 145 mmol/L    Potassium Level 5.2 (H) 3.5 - 5.1 mmol/L    Chloride 102 98 - 107 mmol/L    Carbon Dioxide 18 (L) 23 - 31 mmol/L    Glucose Level 138 (H) 82 - 115 mg/dL    Blood Urea Nitrogen 47.9 (H) 9.8 - 20.1 mg/dL    Creatinine 2.11 (H) 0.55 - 1.02 mg/dL    Calcium Level Total 9.4 8.4 - 10.2 mg/dL    Protein Total 6.7 5.8 - 7.6 gm/dL    Albumin Level 3.3 (L) 3.4 - 4.8 gm/dL    Globulin 3.4 2.4 - 3.5 gm/dL    Albumin/Globulin Ratio 1.0 (L) 1.1 - 2.0 ratio    Bilirubin Total 0.8 <=1.5 mg/dL    Alkaline Phosphatase 103 40 - 150 unit/L    Alanine Aminotransferase 44 0 - 55 unit/L    Aspartate Aminotransferase 64 (H) 5 - 34 unit/L    eGFR 22 mls/min/1.73/m2   Troponin I    Collection Time: 12/11/22 10:44 PM   Result Value Ref Range    Troponin-I 0.895 (H) 0.000 - 0.045 ng/mL   CBC with Differential    Collection Time: 12/11/22 10:44 PM   Result Value Ref Range    WBC 14.2 (H) 4.5 - 11.5 x10(3)/mcL    RBC 4.02 (L) 4.20 - 5.40 x10(6)/mcL    Hgb 11.3 (L) 12.0 - 16.0 gm/dL    Hct 36.0 (L) 37.0 - 47.0 %    MCV 89.6 80.0 - 94.0 fL    MCH 28.1 27.0 - 31.0 pg    MCHC 31.4  (L) 33.0 - 36.0 mg/dL    RDW 13.3 11.5 - 17.0 %    Platelet 103 (L) 130 - 400 x10(3)/mcL    MPV 11.9 (H) 7.4 - 10.4 fL    Neut % 65.6 %    Lymph % 24.6 %    Mono % 8.1 %    Eos % 0.2 %    Basophil % 0.2 %    Lymph # 3.50 0.6 - 4.6 x10(3)/mcL    Neut # 9.3 (H) 2.1 - 9.2 x10(3)/mcL    Mono # 1.15 0.1 - 1.3 x10(3)/mcL    Eos # 0.03 0 - 0.9 x10(3)/mcL    Baso # 0.03 0 - 0.2 x10(3)/mcL    IG# 0.18 (H) 0 - 0.04 x10(3)/mcL    IG% 1.3 %    NRBC% 3.7 %   Brain natriuretic peptide    Collection Time: 12/11/22 10:44 PM   Result Value Ref Range    Natriuretic Peptide 1,900.0 (H) <=100.0 pg/mL   CBC with Differential    Collection Time: 12/12/22  3:54 AM   Result Value Ref Range    WBC 17.2 (H) 4.5 - 11.5 x10(3)/mcL    RBC 4.10 (L) 4.20 - 5.40 x10(6)/mcL    Hgb 11.5 (L) 12.0 - 16.0 gm/dL    Hct 38.3 37.0 - 47.0 %    MCV 93.4 80.0 - 94.0 fL    MCH 28.0 27.0 - 31.0 pg    MCHC 30.0 (L) 33.0 - 36.0 mg/dL    RDW 13.6 11.5 - 17.0 %    Platelet 85 (L) 130 - 400 x10(3)/mcL    MPV 12.8 (H) 7.4 - 10.4 fL    Neut % 71.0 %    Lymph % 19.2 %    Mono % 7.6 %    Eos % 0.1 %    Basophil % 0.3 %    Lymph # 3.31 0.6 - 4.6 x10(3)/mcL    Neut # 12.2 (H) 2.1 - 9.2 x10(3)/mcL    Mono # 1.31 (H) 0.1 - 1.3 x10(3)/mcL    Eos # 0.01 0 - 0.9 x10(3)/mcL    Baso # 0.06 0 - 0.2 x10(3)/mcL    IG# 0.31 (H) 0 - 0.04 x10(3)/mcL    IG% 1.8 %    NRBC% 3.8 %   Troponin I    Collection Time: 12/12/22  5:01 AM   Result Value Ref Range    Troponin-I 0.825 (H) 0.000 - 0.045 ng/mL   Comprehensive metabolic panel    Collection Time: 12/12/22  5:01 AM   Result Value Ref Range    Sodium Level 136 136 - 145 mmol/L    Potassium Level 5.8 (H) 3.5 - 5.1 mmol/L    Chloride 100 98 - 107 mmol/L    Carbon Dioxide 17 (L) 23 - 31 mmol/L    Glucose Level 115 82 - 115 mg/dL    Blood Urea Nitrogen 49.7 (H) 9.8 - 20.1 mg/dL    Creatinine 2.40 (H) 0.55 - 1.02 mg/dL    Calcium Level Total 9.6 8.4 - 10.2 mg/dL    Protein Total 7.6 5.8 - 7.6 gm/dL    Albumin Level 3.7 3.4 - 4.8 gm/dL     Globulin 3.9 (H) 2.4 - 3.5 gm/dL    Albumin/Globulin Ratio 0.9 (L) 1.1 - 2.0 ratio    Bilirubin Total 1.1 <=1.5 mg/dL    Alkaline Phosphatase 122 40 - 150 unit/L    Alanine Aminotransferase 92 (H) 0 - 55 unit/L    Aspartate Aminotransferase 128 (H) 5 - 34 unit/L    eGFR 19 mls/min/1.73/m2   Echo    Collection Time: 12/12/22  9:20 AM   Result Value Ref Range    BSA 2 m2    TDI SEPTAL 0.04 m/s    LV LATERAL E/E' RATIO 8.17 m/s    LV SEPTAL E/E' RATIO 12.25 m/s    Right Atrial Pressure (from IVC) 15 mmHg    Left Ventricular Outflow Tract Mean Velocity 0.53 cm/s    Left Ventricular Outflow Tract Mean Gradient 1.00 mmHg    TDI LATERAL 0.06 m/s    LVIDd 2.44 (A) 3.5 - 6.0 cm    IVS 1.42 (A) 0.6 - 1.1 cm    Posterior Wall 1.44 (A) 0.6 - 1.1 cm    LVIDs 1.55 (A) 2.1 - 4.0 cm    FS 36 28 - 44 %    LV mass 112.38 g    LA size 3.10 cm    TAPSE 1.65 cm    Left Ventricle Relative Wall Thickness 1.18 cm    AV mean gradient 10 mmHg    AV Velocity Ratio 0.37     AV index (prosthetic) 0.34     E/A ratio 0.45     Mean e' 0.05 m/s    E wave deceleration time 214.00 msec    LVOT peak brian 0.84 m/s    LVOT peak VTI 10.50 cm    Ao peak brian 2.30 m/s    Ao VTI 31.0 cm    AV peak gradient 21 mmHg    TV rest pulmonary artery pressure 58 mmHg    E/E' ratio 9.80 m/s    MV Peak E Brian 0.49 m/s    TR Max Brian 3.26 m/s    MV Peak A Brian 1.08 m/s    LV Systolic Volume 6.60 mL    LV Systolic Volume Index 3.4 mL/m2    LV Diastolic Volume 21.00 mL    LV Diastolic Volume Index 10.71 mL/m2    LV Mass Index 57 g/m2    Triscuspid Valve Regurgitation Peak Gradient 43 mmHg   Blood Culture    Collection Time: 12/12/22  9:48 AM    Specimen: Blood, Venous   Result Value Ref Range    CULTURE, BLOOD ([facility]) No Growth At 48 Hours    Blood Culture    Collection Time: 12/12/22  9:48 AM    Specimen: Blood, Venous   Result Value Ref Range    CULTURE, BLOOD ([facility]) No Growth At 48 Hours    Lactic Acid, Plasma    Collection Time: 12/12/22  9:48 AM   Result Value Ref Range     Lactic Acid Level 8.4 (HH) 0.5 - 2.2 mmol/L   C-Reactive Protein    Collection Time: 12/12/22  9:48 AM   Result Value Ref Range    C-Reactive Protein 26.70 (H) <5.00 mg/L   POCT ARTERIAL BLOOD GAS    Collection Time: 12/12/22 10:42 AM   Result Value Ref Range    POC PH 7.28 (AA) 7.29 - 7.61    POC PCO2 20 (A) 35 - 45 mmHg    POC PO2 76 (A) 80 - 100 mmHg    POC HEMOGLOBIN 11.6 (A) 12.0 - 16.0 g/dL    POC SATURATED O2 93.0 %    POC O2Hb 92.3 (A) 94.0 - 97.0 %    POC COHb 2.0 %    POC MetHb 0.60 0.40 - 1.5 %    POC Potassium 6.5 (A) 3.5 - 5.0 mmol/l    POC Sodium 129 (A) 137 - 145 mmol/l    POC Ionized Calcium 1.20 1.12 - 1.23 mmol/l    Correct Temperature (PH) 7.28 (AA) 7.29 - 7.61    Corrected Temperature (pCO2) 20 (A) 35 - 45 mmHg    Corrected Temperature (pO2) 76 (A) 80 - 100 mmHg    POC HCO3 9.4 (AA) 15 - mmol/l    Base Deficit -15.3 (A) -2.00 - 2.00 mmol/l    POC Temp 37.0 °C    Specimen source Arterial sample    Lactic Acid, Plasma    Collection Time: 12/12/22 11:24 AM   Result Value Ref Range    Lactic Acid Level 9.5 (HH) 0.5 - 2.2 mmol/L   Basic Metabolic Panel    Collection Time: 12/12/22 11:24 AM   Result Value Ref Range    Sodium Level 135 (L) 136 - 145 mmol/L    Potassium Level 6.4 (HH) 3.5 - 5.1 mmol/L    Chloride 101 98 - 107 mmol/L    Carbon Dioxide 14 (L) 23 - 31 mmol/L    Glucose Level 92 82 - 115 mg/dL    Blood Urea Nitrogen 58.0 (H) 9.8 - 20.1 mg/dL    Creatinine 3.14 (H) 0.55 - 1.02 mg/dL    BUN/Creatinine Ratio 18     Calcium Level Total 9.6 8.4 - 10.2 mg/dL    Anion Gap 20.0 mEq/L    eGFR 14 mls/min/1.73/m2   Ammonia    Collection Time: 12/12/22 11:24 AM   Result Value Ref Range    Ammonia Level 24.4 18.0 - 72.0 umol/L   Magnesium    Collection Time: 12/12/22 11:24 AM   Result Value Ref Range    Magnesium Level 2.40 1.60 - 2.60 mg/dL   Phosphorus    Collection Time: 12/12/22 11:24 AM   Result Value Ref Range    Phosphorus Level 7.9 (H) 2.3 - 4.7 mg/dL   COVID/FLU A&B PCR    Collection Time:  12/12/22 12:13 PM   Result Value Ref Range    Influenza A PCR Not Detected Not Detected    Influenza B PCR Not Detected Not Detected    SARS-CoV-2 PCR Detected (A) Not Detected   Urinalysis, Reflex to Urine Culture Urine, Clean Catch    Collection Time: 12/12/22 12:13 PM    Specimen: Urine, Clean Catch   Result Value Ref Range    Color, UA Brown (A) Yellow, Light-Yellow, Dark Yellow, Gracie, Straw    Appearance, UA Turbid (A) Clear    Specific Gravity, UA >=1.030 1.001 - 1.030    pH, UA 5.0 5.0 - 8.5    Protein, UA 2+ (A) Negative mg/dL    Glucose, UA Negative Negative, Normal mg/dL    Ketones, UA Trace (A) Negative mg/dL    Blood, UA 3+ (A) Negative unit/L    Bilirubin, UA 2+ (A) Negative mg/dL    Urobilinogen, UA 1+ (A) 0.2, 1.0, Normal mg/dL    Nitrites, UA Positive (A) Negative    Leukocyte Esterase, UA 2+ (A) Negative unit/L   MRSA PCR    Collection Time: 12/12/22 12:13 PM   Result Value Ref Range    MRSA PCR SCRN ([facility]) Not Detected Not Detected   Urinalysis, Microscopic    Collection Time: 12/12/22 12:13 PM   Result Value Ref Range    RBC, UA 50-99 (A) None Seen, 0-2, 3-5, 0-5 /HPF    WBC, UA >100 (A) None Seen, 0-2, 3-5, 0-5 /HPF    Squamous Epithelial Cells, UA 20-30 (A) 0-4, None Seen /HPF    Bacteria, UA 2+ (A) None Seen, Rare, Occasional /HPF   POCT glucose    Collection Time: 12/12/22 12:25 PM   Result Value Ref Range    POCT Glucose 63 (L) 70 - 110 mg/dL   POCT glucose    Collection Time: 12/12/22  3:05 PM   Result Value Ref Range    POCT Glucose 127 (H) 70 - 110 mg/dL   Lactic Acid, Plasma    Collection Time: 12/12/22  4:40 PM   Result Value Ref Range    Lactic Acid Level 5.9 (HH) 0.5 - 2.2 mmol/L   Lactic Acid, Plasma    Collection Time: 12/12/22  7:04 PM   Result Value Ref Range    Lactic Acid Level 7.9 (HH) 0.5 - 2.2 mmol/L   Potassium    Collection Time: 12/12/22  7:04 PM   Result Value Ref Range    Potassium Level 5.5 (H) 3.5 - 5.1 mmol/L   APTT    Collection Time: 12/12/22  8:05 PM   Result Value  Ref Range    PTT 27.6 23.2 - 33.7 seconds   Protime-INR    Collection Time: 12/12/22  8:05 PM   Result Value Ref Range    PT 18.5 (H) 12.5 - 14.5 seconds    INR 1.57 (H) 0.00 - 1.30   CBC with Differential    Collection Time: 12/12/22  8:05 PM   Result Value Ref Range    WBC 13.4 (H) 4.5 - 11.5 x10(3)/mcL    RBC 3.62 (L) 4.20 - 5.40 x10(6)/mcL    Hgb 10.2 (L) 12.0 - 16.0 gm/dL    Hct 31.6 (L) 37.0 - 47.0 %    MCV 87.3 80.0 - 94.0 fL    MCH 28.2 27.0 - 31.0 pg    MCHC 32.3 (L) 33.0 - 36.0 mg/dL    RDW 13.4 11.5 - 17.0 %    Platelet 104 (L) 130 - 400 x10(3)/mcL    MPV 12.3 (H) 7.4 - 10.4 fL    Neut % 86.0 %    Lymph % 9.3 %    Mono % 3.6 %    Eos % 0.0 %    Basophil % 0.1 %    Lymph # 1.25 0.6 - 4.6 x10(3)/mcL    Neut # 11.6 (H) 2.1 - 9.2 x10(3)/mcL    Mono # 0.49 0.1 - 1.3 x10(3)/mcL    Eos # 0.00 0 - 0.9 x10(3)/mcL    Baso # 0.01 0 - 0.2 x10(3)/mcL    IG# 0.14 (H) 0 - 0.04 x10(3)/mcL    IG% 1.0 %    NRBC% 4.2 %   POCT glucose    Collection Time: 12/12/22  8:18 PM   Result Value Ref Range    POCT Glucose 175 (H) 70 - 110 mg/dL   POCT ARTERIAL BLOOD GAS    Collection Time: 12/12/22  8:38 PM   Result Value Ref Range    POC PH 7.40     POC PCO2 34 (A) mmHg    POC PO2 169 (A) mmHg    POC SATURATED O2 100 %    POC Potassium 4.4 mmol/l    POC Sodium 128 (A) 137 - 145 mmol/l    POC Ionized Calcium 1.05 (A) 1.12 - 1.23 mmol/l    POC HCO3 21.1 mmol/l    Base Deficit -3.1 mmol/l    POC Temp 37.0 C    Specimen source Arterial sample    Lactic Acid, Plasma    Collection Time: 12/12/22 11:56 PM   Result Value Ref Range    Lactic Acid Level 5.2 (HH) 0.5 - 2.2 mmol/L   Comprehensive Metabolic Panel    Collection Time: 12/12/22 11:56 PM   Result Value Ref Range    Sodium Level 134 (L) 136 - 145 mmol/L    Potassium Level 4.9 3.5 - 5.1 mmol/L    Chloride 97 (L) 98 - 107 mmol/L    Carbon Dioxide 20 (L) 23 - 31 mmol/L    Glucose Level 192 (H) 82 - 115 mg/dL    Blood Urea Nitrogen 72.7 (H) 9.8 - 20.1 mg/dL    Creatinine 3.23 (H) 0.55 -  1.02 mg/dL    Calcium Level Total 8.7 8.4 - 10.2 mg/dL    Protein Total 6.2 5.8 - 7.6 gm/dL    Albumin Level 3.0 (L) 3.4 - 4.8 gm/dL    Globulin 3.2 2.4 - 3.5 gm/dL    Albumin/Globulin Ratio 0.9 (L) 1.1 - 2.0 ratio    Bilirubin Total 0.4 <=1.5 mg/dL    Alkaline Phosphatase 109 40 - 150 unit/L    Alanine Aminotransferase 331 (H) 0 - 55 unit/L    Aspartate Aminotransferase 449 (H) 5 - 34 unit/L    eGFR 13 mls/min/1.73/m2   APTT    Collection Time: 12/12/22 11:56 PM   Result Value Ref Range    PTT 66.9 (H) 23.2 - 33.7 seconds   Vancomycin, Random    Collection Time: 12/12/22 11:56 PM   Result Value Ref Range    Vanc Lvl Random 23.9 (H) 15.0 - 20.0 ug/ml   CBC with Differential    Collection Time: 12/12/22 11:56 PM   Result Value Ref Range    WBC 14.7 (H) 4.5 - 11.5 x10(3)/mcL    RBC 3.87 (L) 4.20 - 5.40 x10(6)/mcL    Hgb 11.0 (L) 12.0 - 16.0 gm/dL    Hct 34.1 (L) 37.0 - 47.0 %    MCV 88.1 80.0 - 94.0 fL    MCH 28.4 27.0 - 31.0 pg    MCHC 32.3 (L) 33.0 - 36.0 mg/dL    RDW 13.7 11.5 - 17.0 %    Platelet 117 (L) 130 - 400 x10(3)/mcL    MPV 12.5 (H) 7.4 - 10.4 fL    Neut % 85.0 %    Lymph % 8.7 %    Mono % 5.1 %    Eos % 0.0 %    Basophil % 0.2 %    Lymph # 1.27 0.6 - 4.6 x10(3)/mcL    Neut # 12.5 (H) 2.1 - 9.2 x10(3)/mcL    Mono # 0.74 0.1 - 1.3 x10(3)/mcL    Eos # 0.00 0 - 0.9 x10(3)/mcL    Baso # 0.03 0 - 0.2 x10(3)/mcL    IG# 0.14 (H) 0 - 0.04 x10(3)/mcL    IG% 1.0 %    NRBC% 5.6 %   Lactic Acid, Plasma    Collection Time: 12/13/22  4:16 AM   Result Value Ref Range    Lactic Acid Level 3.7 (HH) 0.5 - 2.2 mmol/L   PTT Heparin Monitoring    Collection Time: 12/13/22  4:16 AM   Result Value Ref Range    PTT Heparin Monitor 122.2 (H) 23.2 - 33.7 seconds   Lactic Acid, Plasma    Collection Time: 12/13/22  7:30 AM   Result Value Ref Range    Lactic Acid Level 3.8 (HH) 0.5 - 2.2 mmol/L   POCT glucose    Collection Time: 12/13/22  9:08 AM   Result Value Ref Range    POCT Glucose 183 (H) 70 - 110 mg/dL   PTT Heparin Monitoring     Collection Time: 12/13/22 11:02 AM   Result Value Ref Range    PTT Heparin Monitor 197.2 (HH) 23.2 - 33.7 seconds   Lactic Acid, Plasma    Collection Time: 12/13/22 11:02 AM   Result Value Ref Range    Lactic Acid Level 4.3 (HH) 0.5 - 2.2 mmol/L   CBC with Differential    Collection Time: 12/13/22 11:02 AM   Result Value Ref Range    WBC 17.1 (H) 4.5 - 11.5 x10(3)/mcL    RBC 4.01 (L) 4.20 - 5.40 x10(6)/mcL    Hgb 11.3 (L) 12.0 - 16.0 gm/dL    Hct 35.9 (L) 37.0 - 47.0 %    MCV 89.5 80.0 - 94.0 fL    MCH 28.2 27.0 - 31.0 pg    MCHC 31.5 (L) 33.0 - 36.0 mg/dL    RDW 14.0 11.5 - 17.0 %    Platelet 118 (L) 130 - 400 x10(3)/mcL    MPV 12.8 (H) 7.4 - 10.4 fL    IG# 0.22 (H) 0 - 0.04 x10(3)/mcL    IG% 1.3 %    NRBC% 5.7 %   Manual Differential    Collection Time: 12/13/22 11:02 AM   Result Value Ref Range    Neut Man 91 %    Lymph Man 4 %    Monocyte Man 5 %    Instr WBC 17.1 x10(3)/mcL    Abs Mono 0.855 0.1 - 1.3 x10(3)/mcL    Abs Lymp 0.684 0.6 - 4.6 x10(3)/mcL    Abs Neut 15.561 (H) 2.1 - 9.2 x10(3)/mcL    NRBC Man 20 %    RBC Morph Abnormal (A) Normal    Anisocyte 1+ (A) (none)    Poik 2+ (A) (none)    Microcyte 1+ (A) (none)    Pappenheimer Bodies      [place] Cells 2+ (A) (none)    Acanthocytes 1+ (A) (none)    Platelet Est Decreased (A) Normal, Adequate   Path Review, Peripheral Smear    Collection Time: 12/13/22 11:02 AM   Result Value Ref Range    Peripheral Smear Evaluation       - Leukocytosis with absolute neutrophilia; no circulating blasts.  - Mild normocytic, normochromic anemia with marked anisopoikilocytosis including frequent elliptocytes, target cells, and echinocytes. Polychromasia is significantly increased including circulating nRBCs.  - Mild Thrombocytopenia.    Impression: Leukocytosis with neutrophilia can be seen in infections, drug reactions, chronic inflammatory disorders and can be stress induced. Normocytic anemia can be seen in early iron deficiency anemia, anemia of chronic disease and acute  blood loss. Thrombocytopenia can be due to decreased production, increased destruction (immune and non-immune mediated) or due to splenic sequestration.    Finesse Mendoza M.D.      POCT ARTERIAL BLOOD GAS    Collection Time: 12/13/22 12:13 PM   Result Value Ref Range    POC PH 7.51 (A) 7.35 - 7.45    POC PCO2 35 mmHg    POC PO2 67 (A) mmHg    POC SATURATED O2 95 %    POC Potassium 3.9 mmol/l    POC Sodium 129 (A) 137 - 145 mmol/l    POC Ionized Calcium 0.95 (A) 1.1 - 1.2 mmol/l    POC HCO3 27.9 mmol/l    Base Deficit 4.9 mmol/l    POC Temp 37.0 C    Specimen source Arterial sample    Lactic Acid, Plasma    Collection Time: 12/13/22 12:48 PM   Result Value Ref Range    Lactic Acid Level 4.3 (HH) 0.5 - 2.2 mmol/L   POCT glucose    Collection Time: 12/13/22  4:36 PM   Result Value Ref Range    POCT Glucose 179 (H) 70 - 110 mg/dL   Comprehensive Metabolic Panel    Collection Time: 12/13/22  6:27 PM   Result Value Ref Range    Sodium Level 134 (L) 136 - 145 mmol/L    Potassium Level 4.4 3.5 - 5.1 mmol/L    Chloride 94 (L) 98 - 107 mmol/L    Carbon Dioxide 25 23 - 31 mmol/L    Glucose Level 177 (H) 82 - 115 mg/dL    Blood Urea Nitrogen 83.1 (H) 9.8 - 20.1 mg/dL    Creatinine 3.02 (H) 0.55 - 1.02 mg/dL    Calcium Level Total 8.7 8.4 - 10.2 mg/dL    Protein Total 6.0 5.8 - 7.6 gm/dL    Albumin Level 2.8 (L) 3.4 - 4.8 gm/dL    Globulin 3.2 2.4 - 3.5 gm/dL    Albumin/Globulin Ratio 0.9 (L) 1.1 - 2.0 ratio    Bilirubin Total 0.5 <=1.5 mg/dL    Alkaline Phosphatase 103 40 - 150 unit/L    Alanine Aminotransferase 869 (H) 0 - 55 unit/L    Aspartate Aminotransferase 1,019 (H) 5 - 34 unit/L    eGFR 14 mls/min/1.73/m2   Lactic Acid, Plasma    Collection Time: 12/13/22  6:27 PM   Result Value Ref Range    Lactic Acid Level 3.1 (H) 0.5 - 2.2 mmol/L   PTT Heparin Monitoring    Collection Time: 12/13/22  6:28 PM   Result Value Ref Range    PTT Heparin Monitor 77.1 (H) 23.2 - 33.7 seconds   Lactic Acid, Plasma    Collection Time:  12/13/22  8:15 PM   Result Value Ref Range    Lactic Acid Level 3.8 (HH) 0.5 - 2.2 mmol/L   PTT Heparin Monitoring    Collection Time: 12/14/22 12:10 AM   Result Value Ref Range    PTT Heparin Monitor 100.7 (H) 23.2 - 33.7 seconds   Lactic Acid, Plasma    Collection Time: 12/14/22 12:11 AM   Result Value Ref Range    Lactic Acid Level 4.1 (HH) 0.5 - 2.2 mmol/L   APTT    Collection Time: 12/14/22  1:55 AM   Result Value Ref Range    PTT 94.3 (H) 23.2 - 33.7 seconds   Comprehensive Metabolic Panel    Collection Time: 12/14/22  1:55 AM   Result Value Ref Range    Sodium Level 134 (L) 136 - 145 mmol/L    Potassium Level 4.7 3.5 - 5.1 mmol/L    Chloride 94 (L) 98 - 107 mmol/L    Carbon Dioxide 21 (L) 23 - 31 mmol/L    Glucose Level 150 (H) 82 - 115 mg/dL    Blood Urea Nitrogen 87.2 (H) 9.8 - 20.1 mg/dL    Creatinine 2.81 (H) 0.55 - 1.02 mg/dL    Calcium Level Total 8.4 8.4 - 10.2 mg/dL    Protein Total 5.8 5.8 - 7.6 gm/dL    Albumin Level 2.9 (L) 3.4 - 4.8 gm/dL    Globulin 2.9 2.4 - 3.5 gm/dL    Albumin/Globulin Ratio 1.0 (L) 1.1 - 2.0 ratio    Bilirubin Total 0.7 <=1.5 mg/dL    Alkaline Phosphatase 104 40 - 150 unit/L    Alanine Aminotransferase 1,014 (H) 0 - 55 unit/L    Aspartate Aminotransferase 1,103 (H) 5 - 34 unit/L    eGFR 16 mls/min/1.73/m2   Vancomycin, Random    Collection Time: 12/14/22  1:55 AM   Result Value Ref Range    Vanc Lvl Random 13.6 (L) 15.0 - 20.0 ug/ml   Lactic Acid, Plasma    Collection Time: 12/14/22  1:55 AM   Result Value Ref Range    Lactic Acid Level 5.4 (HH) 0.5 - 2.2 mmol/L   CBC with Differential    Collection Time: 12/14/22  1:55 AM   Result Value Ref Range    WBC 16.2 (H) 4.5 - 11.5 x10(3)/mcL    RBC 3.69 (L) 4.20 - 5.40 x10(6)/mcL    Hgb 10.5 (L) 12.0 - 16.0 gm/dL    Hct 32.4 (L) 37.0 - 47.0 %    MCV 87.8 80.0 - 94.0 fL    MCH 28.5 27.0 - 31.0 pg    MCHC 32.4 (L) 33.0 - 36.0 mg/dL    RDW 13.6 11.5 - 17.0 %    Platelet 146 130 - 400 x10(3)/mcL    MPV 13.0 (H) 7.4 - 10.4 fL    Neut %  85.4 %    Lymph % 9.2 %    Mono % 4.4 %    Eos % 0.0 %    Basophil % 0.1 %    Lymph # 1.50 0.6 - 4.6 x10(3)/mcL    Neut # 13.8 (H) 2.1 - 9.2 x10(3)/mcL    Mono # 0.72 0.1 - 1.3 x10(3)/mcL    Eos # 0.00 0 - 0.9 x10(3)/mcL    Baso # 0.02 0 - 0.2 x10(3)/mcL    IG# 0.15 (H) 0 - 0.04 x10(3)/mcL    IG% 0.9 %    NRBC% 7.4 %   Blood Smear Microscopic Exam    Collection Time: 12/14/22  1:55 AM   Result Value Ref Range    RBC Morph Abnormal (A) Normal    Acanthocytes 1+ (A) (none)    Anisocyte 1+ (A) (none)    [place] Cells 1+ (A) (none)    Macrocyte 1+ (A) (none)    Poik 2+ (A) (none)    Polychrom 1+ (A) (none)    Platelet Est Decreased (A) Normal, Adequate   Lactic Acid, Plasma    Collection Time: 12/14/22  8:03 AM   Result Value Ref Range    Lactic Acid Level 4.7 (HH) 0.5 - 2.2 mmol/L   POCT ARTERIAL BLOOD GAS Blood Gas    Collection Time: 12/14/22  8:45 AM   Result Value Ref Range    POC PH 7.490     POC PCO2 34     POC PO2 77     POC Sodium 128     POC Potassium 4.1     POC Ionized Calcium 1.05     POC HEMOGLOBIN 10.6     POC O2Hb 94.0     POC COHb 1.7     POC MetHb 1.0     POC PCO2      Base Excess, Arterial 2.7 (A) -2.0 - 2.0 mmol/L    O2 Sat, Art 96.3     HCO3, Arterial 25.9 (A) 18.0 - 23.0 MMOL/L    POC FIO2     Lactic Acid, Plasma    Collection Time: 12/14/22  9:21 AM   Result Value Ref Range    Lactic Acid Level 4.6 (HH) 0.5 - 2.2 mmol/L       Significant Imaging:  Imaging Results              CT Head Without Contrast (Final result)  Result time 12/12/22 06:52:36      Final result by Chapin Ward MD (12/12/22 06:52:36)                   Impression:      No acute intracranial findings identified.    No significant discrepancy with overnight report.      Electronically signed by: Chapin Ward  Date:    12/12/2022  Time:    06:52               Narrative:    EXAMINATION:  CT HEAD WITHOUT CONTRAST    CLINICAL HISTORY:  Mental status change, unknown cause;    TECHNIQUE:  Sequential axial images were performed of the  brain without contrast.    Dose product length of 1035 mGycm. Automated exposure control was utilized to minimize radiation dose.    COMPARISON:  December 1, 2022.    FINDINGS:  There is no intracranial mass effect, midline shift, hydrocephalus or hemorrhage. There is no sulcal effacement or low attenuation changes to suggest recent large vessel territory infarction. Chronic appearing periventricular and subcortical white matter low attenuation changes are present and are consistent with chronic microangiopathic ischemia. The ventricular system and sulcal markings prominence is consistent with atrophy. There is no acute extra axial fluid collection.  Right maxillary sinus lobulated mucoperiosteal thickening.  Otherwise, visualized paranasal sinuses are clear without mucosal thickening, polypoidal abnormality or air-fluid levels. Mastoid air cells aeration is optimal.                        Preliminary result by Chapin Ward MD (12/11/22 22:58:29)                   Narrative:    START OF REPORT:  Technique: CT of the head was performed without intravenous contrast with axial as well as coronal and sagittal images.    Comparison: Comparison is with study [id number] 13:59:19.    Dosage Information: Automated exposure control was utilized.    Clinical history: Ams.    Findings:  Hemorrhage: No acute intracranial hemorrhage is seen.  CSF spaces: The ventricles, sulci and basal cisterns all appear mildly prominent consistent with global cerebral atrophy.  Brain parenchyma: There is preservation of the grey white junction throughout. Moderate microvascular change is seen in portions of the periventricular and deep white matter tracts.  Cerebellum: Unremarkable.  Sella and skull base: The sella appears to be within normal limits for age.  Herniation: None.  Intracranial calcifications: Incidental note is made of bilateral choroid plexus calcification. Incidental note is made of some pineal region calcification.  Incidental note is made of some calcification of the falx.  Calvarium: No acute linear or depressed skull fracture is seen.    Maxillofacial Structures:  Paranasal sinuses: Again noted is moderate mucoperiosteal thickening in the right maxillary sinus with a small retention cyst or polyp.  Orbits: The orbits appear unremarkable.  Zygomatic arches: The zygomatic arches are intact and unremarkable.  Temporal bones and mastoids: The temporal bones and mastoids appear unremarkable.  TMJ: The mandibular condyles appear normally placed with respect to the mandibular fossa.      Impression:  1. No acute intracranial process identified. Details as above.                          Preliminary result by Mane Inman MD (12/11/22 22:58:29)                   Narrative:    START OF REPORT:  Technique: CT of the head was performed without intravenous contrast with axial as well as coronal and sagittal images.    Comparison: Comparison is with study [id number] 13:59:19.    Dosage Information: Automated exposure control was utilized.    Clinical history: Ams.    Findings:  Hemorrhage: No acute intracranial hemorrhage is seen.  CSF spaces: The ventricles, sulci and basal cisterns all appear mildly prominent consistent with global cerebral atrophy.  Brain parenchyma: There is preservation of the grey white junction throughout. Moderate microvascular change is seen in portions of the periventricular and deep white matter tracts.  Cerebellum: Unremarkable.  Sella and skull base: The sella appears to be within normal limits for age.  Herniation: None.  Intracranial calcifications: Incidental note is made of bilateral choroid plexus calcification. Incidental note is made of some pineal region calcification. Incidental note is made of some calcification of the falx.  Calvarium: No acute linear or depressed skull fracture is seen.    Maxillofacial Structures:  Paranasal sinuses: Again noted is moderate mucoperiosteal thickening in  the right maxillary sinus with a small retention cyst or polyp.  Orbits: The orbits appear unremarkable.  Zygomatic arches: The zygomatic arches are intact and unremarkable.  Temporal bones and mastoids: The temporal bones and mastoids appear unremarkable.  TMJ: The mandibular condyles appear normally placed with respect to the mandibular fossa.      Impression:  1. No acute intracranial process identified. Details as above.                                      Telemetry: Sinus Rhythm    Physical Exam  Cardiovascular:      Rate and Rhythm: Normal rate and regular rhythm.      Comments: Sinus Rhythm  Limited Physical Exam/COVID Precautions. History obtained from Bedside RN.    Home Medications:   Current Facility-Administered Medications on File Prior to Encounter   Medication Dose Route Frequency Provider Last Rate Last Admin    betamethasone acetate-betamethasone sodium phosphate injection 6 mg  6 mg Intra-articular  Zina D Bouy, FNP   6 mg at 06/01/22 1345    betamethasone acetate-betamethasone sodium phosphate injection 6 mg  6 mg Intra-articular  Zina D Bouy, FNP   6 mg at 06/01/22 1345    LIDOcaine (PF) 20 mg/mL (2%) injection 5 mL  5 mL   Zina D Bouy, FNP   5 mL at 06/01/22 1345    LIDOcaine (PF) 20 mg/mL (2%) injection 5 mL  5 mL   Zina D Bouy, FNP   5 mL at 06/01/22 1345     Current Outpatient Medications on File Prior to Encounter   Medication Sig Dispense Refill    ascorbic acid, vitamin C, (VITAMIN C) 1000 MG tablet Take 1,000 mg by mouth once daily.      aspirin (ECOTRIN) 81 MG EC tablet Take 81 mg by mouth.      atorvastatin (LIPITOR) 40 MG tablet Take 40 mg by mouth once daily.      carvediloL (COREG) 25 MG tablet Take 25 mg by mouth 2 (two) times daily.      cloNIDine (CATAPRES) 0.1 MG tablet       clopidogreL (PLAVIX) 75 mg tablet Take 75 mg by mouth once daily.      isosorbide mononitrate (IMDUR) 30 MG 24 hr tablet 15 mg.      multivitamin/iron/folic acid (CENTRUM COMPLETE ORAL) Take  by mouth.      QUEtiapine (SEROQUEL) 25 MG Tab Take 25 mg by mouth 2 (two) times daily.      ranolazine (RANEXA) 1,000 mg Tb12 Take 1,000 mg by mouth 2 (two) times daily.      TUMERIC-GING-OLIVE-OREG-CAPRYL ORAL Take by mouth.      diazePAM (VALIUM) 5 MG tablet Take 5 mg by mouth daily as needed.      docusate sodium (COLACE) 100 MG capsule Take 1 capsule (100 mg total) by mouth 2 (two) times daily. for 15 days 30 capsule 0    nitroGLYCERIN (NITROSTAT) 0.4 MG SL tablet Place 0.4 mg under the tongue.         Current Inpatient Medications:    Current Facility-Administered Medications:     0.9%  NaCl infusion, , Intravenous, Continuous, Samara Freedman MD, Last Rate: 125 mL/hr at 12/14/22 0353, New Bag at 12/14/22 0353    dexAMETHasone injection 6 mg, 6 mg, Intravenous, Daily, Cathi Morocho, , 6 mg at 12/14/22 0846    dextrose 50% injection 25 g, 25 g, Intravenous, Once, Kourtney Sutton, CHANTELLE    diphenhydrAMINE injection 25 mg, 25 mg, Intravenous, Q6H PRN, Cathi Morocho DO    famotidine (PF) injection 20 mg, 20 mg, Intravenous, Daily, Oswaldo Alonso MD, 20 mg at 12/14/22 0846    heparin 25,000 units in dextrose 5% (100 units/ml) IV bolus from bag - ADDITIONAL PRN BOLUS - 30 units/kg, 30 Units/kg (Adjusted), Intravenous, PRN, Trevin Blas MD    heparin 25,000 units in dextrose 5% (100 units/ml) IV bolus from bag - ADDITIONAL PRN BOLUS - 60 units/kg, 60 Units/kg (Adjusted), Intravenous, PRN, Trevin Blas MD    heparin 25,000 units in dextrose 5% (100 units/ml) IV bolus from bag INITIAL BOLUS, 80 Units/kg (Adjusted), Intravenous, Once, Trevin Blas MD    heparin 25,000 units in dextrose 5% 250 mL (100 units/mL) infusion HIGH INTENSITY nomogram - [facility], 0-40 Units/kg/hr (Adjusted), Intravenous, Continuous, Trevin Blas MD, Last Rate: 9.1 mL/hr at 12/13/22 1530, 13 Units/kg/hr at 12/13/22 1530    insulin regular injection 6 Units 0.06 mL, 6 Units, Intravenous, Once, Kourtney Sutton,  AGNP    lactated ringers infusion, , Intravenous, Continuous, Alexia Cedillo MD, Last Rate: 125 mL/hr at 12/13/22 2144, New Bag at 12/13/22 2144    melatonin tablet 6 mg, 6 mg, Oral, Nightly PRN, Oswaldo Alonso MD    morphine injection 2 mg, 2 mg, Intravenous, Q4H PRN, Oswaldo Alonso MD    morphine injection 4 mg, 4 mg, Intravenous, Q4H PRN, Oswaldo Alonso MD    mupirocin 2 % ointment, , Nasal, BID, Handy Balderrama MD, Given at 12/14/22 0900    ondansetron injection 4 mg, 4 mg, Intravenous, Q8H PRN, Oswaldo Alonso MD    piperacillin-tazobactam (ZOSYN) 4.5 g in dextrose 5 % in water (D5W) 5 % 100 mL IVPB (MB+), 4.5 g, Intravenous, Q12H, Curtis Lim PA-C, Stopped at 12/14/22 0458    sodium bicarbonate 150 mEq in dextrose 5 % 1,000 mL infusion, , Intravenous, Continuous, CHANTELLE Corrales, Last Rate: 100 mL/hr at 12/13/22 0132, New Bag at 12/13/22 0132    sodium bicarbonate solution 50 mEq, 50 mEq, Intravenous, Once, CHANTELLE Corrales    sodium chloride 0.9% flush 10 mL, 10 mL, Intravenous, PRN, Oswaldo Alonso MD    Facility-Administered Medications Ordered in Other Encounters:     betamethasone acetate-betamethasone sodium phosphate injection 6 mg, 6 mg, Intra-articular, , Zina Weston, FNP, 6 mg at 06/01/22 1345    betamethasone acetate-betamethasone sodium phosphate injection 6 mg, 6 mg, Intra-articular, , Zina Weston, FNP, 6 mg at 06/01/22 1345    LIDOcaine (PF) 20 mg/mL (2%) injection 5 mL, 5 mL, , , Zina Weston, FNP, 5 mL at 06/01/22 1345    LIDOcaine (PF) 20 mg/mL (2%) injection 5 mL, 5 mL, , , Zina Weston FNP, 5 mL at 06/01/22 3147         VTE Risk Mitigation (From admission, onward)           Ordered     heparin 25,000 units in dextrose 5% (100 units/ml) IV bolus from bag - ADDITIONAL PRN BOLUS - 60 units/kg  As needed (PRN)        Question:  Heparin Infusion Adjustment (DO NOT MODIFY ANSWER)  Answer:   [email or web address]\Images\Pharmacy\HeparinInfusions\heparin HIGH INTENSITY nomogram for [facility] [id number].pdf    12/12/22 1443     heparin 25,000 units in dextrose 5% (100 units/ml) IV bolus from bag - ADDITIONAL PRN BOLUS - 30 units/kg  As needed (PRN)        Question:  Heparin Infusion Adjustment (DO NOT MODIFY ANSWER)  Answer:  [email or web address]\Images\Pharmacy\HeparinInfusions\heparin HIGH INTENSITY nomogram for [facility] [id number].pdf    12/12/22 1443     heparin 25,000 units in dextrose 5% (100 units/ml) IV bolus from bag INITIAL BOLUS  Once        Question:  Heparin Infusion Adjustment (DO NOT MODIFY ANSWER)  Answer:  [email or web address]\Images\Pharmacy\HeparinInfusions\heparin HIGH INTENSITY nomogram for [facility] [id number]    12/12/22 1443     heparin 25,000 units in dextrose 5% 250 mL (100 units/mL) infusion HIGH INTENSITY nomogram - [facility]  Continuous        Question Answer Comment   Heparin Infusion Adjustment (DO NOT MODIFY ANSWER) \\[email or web address]\Images\Pharmacy\HeparinInfusions\heparin HIGH INTENSITY nomogram for [facility] [id number].pdf    Begin at (in units/kg/hr) 18        12/12/22 1443     IP VTE HIGH RISK PATIENT  Once         12/11/22 2229     Place sequential compression device  Until discontinued         12/11/22 2229                    Assessment:   Large Echodensity seen in IVC at RA Junction- Cannot Exclude IVC Thrombus- Findings Highly Suggestive of Large Pulmonary Embolism with Marked RV Strain    - On Heparin gtt  Severe RV Enlargement with 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 Peak AV 2.3 m/sec, MG 10 mmHg,dimensionless index 0.34.  Pulmonary Hypertension  Leukocytosis  Thrombocytopenia  GERD  Obstructive Sleep Apnea  Elevated Hepatic Enzymes    Plan:   Continue Heparin Infusion per Protocol Re: IVC Thrombus with High Probability of Pulmonary Embolism  No plans for Invasive Treatment of Suspected PE at this time as patient is hemodynamically stable and CTA unable to be performed given Renal Dysfunction.  Chest Imaging would be beneficial in confirming presence of Pulmonary Embolism for definitive treatment, starting OAC  Continue Supportive Care as per ICU Team  Will be available. Call if needed.    NEERAJ Cherry  Cardiology  [facility] - 3rd Floor Medical Telemetry  12/14/2022 8:43 AM

## 2022-12-15 LAB
ALBUMIN SERPL-MCNC: 2.9 G/DL (ref 3.4–4.8)
ALBUMIN/GLOB SERPL: 1 RATIO (ref 1.1–2)
ALP SERPL-CCNC: 109 UNIT/L (ref 40–150)
ALT SERPL-CCNC: 1390 UNIT/L (ref 0–55)
ANISOCYTOSIS BLD QL SMEAR: ABNORMAL
APTT PPP: 133.2 SECONDS (ref 23.2–33.7)
APTT PPP: 51.1 SECONDS (ref 23.2–33.7)
APTT PPP: 90 SECONDS (ref 23.2–33.7)
AST SERPL-CCNC: 1105 UNIT/L (ref 5–34)
BASOPHILS # BLD AUTO: 0.02 X10(3)/MCL (ref 0–0.2)
BASOPHILS NFR BLD AUTO: 0.2 %
BILIRUBIN DIRECT+TOT PNL SERPL-MCNC: 0.8 MG/DL
BUN SERPL-MCNC: 83.5 MG/DL (ref 9.8–20.1)
BURR CELLS (OLG): ABNORMAL
CALCIUM SERPL-MCNC: 8.6 MG/DL (ref 8.4–10.2)
CHLORIDE SERPL-SCNC: 96 MMOL/L (ref 98–107)
CO2 SERPL-SCNC: 24 MMOL/L (ref 23–31)
CREAT SERPL-MCNC: 2.58 MG/DL (ref 0.55–1.02)
EOSINOPHIL # BLD AUTO: 0 X10(3)/MCL (ref 0–0.9)
EOSINOPHIL NFR BLD AUTO: 0 %
ERYTHROCYTE [DISTWIDTH] IN BLOOD BY AUTOMATED COUNT: 14.1 % (ref 11–14.5)
GFR SERPLBLD CREATININE-BSD FMLA CKD-EPI: 17 MLS/MIN/1.73/M2
GLOBULIN SER-MCNC: 2.9 GM/DL (ref 2.4–3.5)
GLUCOSE SERPL-MCNC: 177 MG/DL (ref 82–115)
HCT VFR BLD AUTO: 33.9 % (ref 37–47)
HGB BLD-MCNC: 10.5 GM/DL (ref 12–16)
IMM GRANULOCYTES # BLD AUTO: 0.18 X10(3)/MCL (ref 0–0.04)
IMM GRANULOCYTES NFR BLD AUTO: 1.4 %
LACTATE SERPL-SCNC: 3.6 MMOL/L (ref 0.5–2.2)
LACTATE SERPL-SCNC: 3.6 MMOL/L (ref 0.5–2.2)
LACTATE SERPL-SCNC: 4 MMOL/L (ref 0.5–2.2)
LACTATE SERPL-SCNC: 4.9 MMOL/L (ref 0.5–2.2)
LYMPHOCYTES # BLD AUTO: 1.36 X10(3)/MCL (ref 0.6–4.6)
LYMPHOCYTES NFR BLD AUTO: 10.3 %
MACROCYTES BLD QL SMEAR: ABNORMAL
MCH RBC QN AUTO: 28.2 PG
MCHC RBC AUTO-ENTMCNC: 31 MG/DL (ref 33–36)
MCV RBC AUTO: 90.9 FL (ref 80–94)
MONOCYTES # BLD AUTO: 0.63 X10(3)/MCL (ref 0.1–1.3)
MONOCYTES NFR BLD AUTO: 4.8 %
NEUTROPHILS # BLD AUTO: 11.04 X10(3)/MCL (ref 2.1–9.2)
NEUTROPHILS NFR BLD AUTO: 83.3 %
NRBC BLD AUTO-RTO: 19.5 % (ref 0–1)
PLATELET # BLD AUTO: 134 X10(3)/MCL (ref 140–371)
PLATELET # BLD EST: NORMAL 10*3/UL
PMV BLD AUTO: 12.9 FL (ref 9.4–12.4)
POCT GLUCOSE: 161 MG/DL (ref 70–110)
POIKILOCYTOSIS BLD QL SMEAR: ABNORMAL
POTASSIUM SERPL-SCNC: 4.3 MMOL/L (ref 3.5–5.1)
PROT SERPL-MCNC: 5.8 GM/DL (ref 5.8–7.6)
RBC # BLD AUTO: 3.73 X10(6)/MCL (ref 4.2–5.4)
RBC MORPH BLD: ABNORMAL
SODIUM SERPL-SCNC: 135 MMOL/L (ref 136–145)
WBC # SPEC AUTO: 13.2 X10(3)/MCL (ref 4.5–11.5)

## 2022-12-15 PROCEDURE — 27000221 HC OXYGEN, UP TO 24 HOURS

## 2022-12-15 PROCEDURE — 63600175 PHARM REV CODE 636 W HCPCS: Performed by: PHYSICIAN ASSISTANT

## 2022-12-15 PROCEDURE — 36415 COLL VENOUS BLD VENIPUNCTURE: CPT | Performed by: STUDENT IN AN ORGANIZED HEALTH CARE EDUCATION/TRAINING PROGRAM

## 2022-12-15 PROCEDURE — 25000003 PHARM REV CODE 250: Performed by: STUDENT IN AN ORGANIZED HEALTH CARE EDUCATION/TRAINING PROGRAM

## 2022-12-15 PROCEDURE — 80053 COMPREHEN METABOLIC PANEL: CPT | Performed by: STUDENT IN AN ORGANIZED HEALTH CARE EDUCATION/TRAINING PROGRAM

## 2022-12-15 PROCEDURE — 25000003 PHARM REV CODE 250: Performed by: INTERNAL MEDICINE

## 2022-12-15 PROCEDURE — 85025 COMPLETE CBC W/AUTO DIFF WBC: CPT | Performed by: INTERNAL MEDICINE

## 2022-12-15 PROCEDURE — 20000000 HC ICU ROOM

## 2022-12-15 PROCEDURE — 83605 ASSAY OF LACTIC ACID: CPT | Performed by: STUDENT IN AN ORGANIZED HEALTH CARE EDUCATION/TRAINING PROGRAM

## 2022-12-15 PROCEDURE — 25000003 PHARM REV CODE 250: Performed by: PHYSICIAN ASSISTANT

## 2022-12-15 PROCEDURE — 36415 COLL VENOUS BLD VENIPUNCTURE: CPT | Performed by: INTERNAL MEDICINE

## 2022-12-15 PROCEDURE — 27000207 HC ISOLATION

## 2022-12-15 PROCEDURE — 85730 THROMBOPLASTIN TIME PARTIAL: CPT | Performed by: INTERNAL MEDICINE

## 2022-12-15 PROCEDURE — 63600175 PHARM REV CODE 636 W HCPCS: Performed by: STUDENT IN AN ORGANIZED HEALTH CARE EDUCATION/TRAINING PROGRAM

## 2022-12-15 RX ORDER — SODIUM CHLORIDE 9 MG/ML
INJECTION, SOLUTION INTRAVENOUS CONTINUOUS
Status: DISCONTINUED | OUTPATIENT
Start: 2022-12-15 | End: 2022-12-22

## 2022-12-15 RX ORDER — HYDRALAZINE HYDROCHLORIDE 20 MG/ML
10 INJECTION INTRAMUSCULAR; INTRAVENOUS EVERY 8 HOURS PRN
Status: DISCONTINUED | OUTPATIENT
Start: 2022-12-15 | End: 2022-12-18

## 2022-12-15 RX ADMIN — DEXAMETHASONE SODIUM PHOSPHATE 6 MG: 4 INJECTION, SOLUTION INTRA-ARTICULAR; INTRALESIONAL; INTRAMUSCULAR; INTRAVENOUS; SOFT TISSUE at 09:12

## 2022-12-15 RX ADMIN — PIPERACILLIN SODIUM,TAZOBACTAM SODIUM 4.5 G: 4; .5 INJECTION, POWDER, FOR SOLUTION INTRAVENOUS at 12:12

## 2022-12-15 RX ADMIN — HYDRALAZINE HYDROCHLORIDE 10 MG: 20 INJECTION INTRAMUSCULAR; INTRAVENOUS at 06:12

## 2022-12-15 RX ADMIN — FAMOTIDINE 20 MG: 10 INJECTION, SOLUTION INTRAVENOUS at 09:12

## 2022-12-15 RX ADMIN — MUPIROCIN: 20 OINTMENT TOPICAL at 09:12

## 2022-12-15 RX ADMIN — MORPHINE SULFATE 4 MG: 4 INJECTION INTRAVENOUS at 09:12

## 2022-12-15 RX ADMIN — MORPHINE SULFATE 2 MG: 4 INJECTION INTRAVENOUS at 03:12

## 2022-12-15 RX ADMIN — SODIUM CHLORIDE: 9 INJECTION, SOLUTION INTRAVENOUS at 09:12

## 2022-12-15 NOTE — NURSING
Nurses Note -- 4 Eyes      12/15/2022   12:33 AM      Skin assessed during: Q Shift Change      [x] No Pressure Injuries Present    [x]Prevention Measures Documented      [] Yes- Altered Skin Integrity Present or Discovered   [] LDA Added if Not in Epic (Describe Wound)   [] New Altered Skin Integrity was Present on Admit and Documented in LDA   [] Wound Image Taken    Wound Care Consulted? No    Attending Nurse:  Gregorio Tidwell RN     Second RN/Staff Member:  MARE Ervin

## 2022-12-15 NOTE — PROGRESS NOTES
[facility] - [facility] ICU  Pulmonary Critical Care Note    Patient Name: Miguel Angel Oliveros  MRN: [id number]  Admission Date: 12/11/2022  Hospital Length of Stay: 4 days  Code Status: Full Code  Attending Provider: Trevin Blas MD  Primary Care Provider: Barrett Lopez MD     Subjective:     HPI: Miguel Angel Oliveros is an 89-year-old female with PMH of DMII, CAD s/p PCI, HTN, AS s/p TAVR, HLD, GERD, and PARAMJIT, who is admitted to [facility] ICU from regular unit after being found to have altered mental status and increased respiratory distress. She was initially admitted to [facility] on 12/11/2022 by [facility] after being transferred from [place] with the diagnosis of NSTEMI. At the time of encounter, patient states having lower abdominal discomfort, bladder fullness, and dysuria. She states that these symptoms have been persisting for the past few days accompanied by decreased urinary output. Per patient's family members at beside, she usually gets UTI several times each year and she has had 4 episodes of UTI in 2022 (last UTI was 3 months ago).    Hospital Course/Significant events:  12/11/2022 - Transferred from [place], admitted to regular unit by [facility] for NSTEMI  12/12/2022 - Admitted to ICU for ongoing monitoring and medical therapy    24 Hour Interval History:  BP remains stable overnight, patient is afebrile, SpO2 % on oxymask 6 L/min. Patient still appears lethargic but arousable. I/O: net positive 3 L past 24 hours. Labs this AM: WBC 16.2k, H/H 10.5/32.4, PLT 146k, lactate 5.4; persistent anion-gap acidosis (gap of 19), BUN 87.2, Cr 2.81.    Past Medical History:   Diagnosis Date    Anxiety disorder     Aortic stenosis     Arthritis     Carpal tunnel syndrome     Coronary artery disease     Dementia     Depression     Diabetes mellitus     GERD (gastroesophageal reflux disease)     Hx of rectal polypectomy     Hypercholesterolemia     Hypertension     Obstructive sleep apnea     Respiratory  distress     S/P TAVR (transcatheter aortic valve replacement)     TIA (transient ischemic attack)        Past Surgical History:   Procedure Laterality Date    CARDIAC SURGERY      CATARACT EXTRACTION      CORONARY STENT PLACEMENT      HYSTERECTOMY      INSERTION OF PACEMAKER      PARTIAL HYSTERECTOMY      RECTAL POLYPECTOMY      TONSILLECTOMY      TRANSCATHETER AORTIC VALVE REPLACEMENT (TAVR)         Social History     Socioeconomic History    Marital status:    Tobacco Use    Smoking status: Never    Smokeless tobacco: Never   Substance and Sexual Activity    Alcohol use: Never    Drug use: Never           Current Outpatient Medications   Medication Instructions    ascorbic acid (vitamin C) (VITAMIN C) 1,000 mg, Oral, Daily    aspirin (ECOTRIN) 81 mg, Oral    atorvastatin (LIPITOR) 40 mg, Oral, Daily    carvediloL (COREG) 25 mg, Oral, 2 times daily    cloNIDine (CATAPRES) 0.1 MG tablet No dose, route, or frequency recorded.    clopidogreL (PLAVIX) 75 mg, Oral, Daily    docusate sodium (COLACE) 100 mg, Oral, 2 times daily    isosorbide mononitrate (IMDUR) 30 MG 24 hr tablet 15 mg.    multivitamin/iron/folic acid (CENTRUM COMPLETE ORAL) Oral    nitroGLYCERIN (NITROSTAT) 0.4 mg, Sublingual    QUEtiapine (SEROQUEL) 25 mg, Oral, 2 times daily    ranolazine (RANEXA) 1,000 mg, Oral, 2 times daily    TUMERIC-GING-OLIVE-OREG-CAPRYL ORAL Oral       Current Inpatient Medications   dexAMETHasone  6 mg Intravenous Daily    dextrose 50% in water (D50W)  25 g Intravenous Once    diazePAM  2 mg Oral QHS    famotidine (PF)  20 mg Intravenous Daily    heparin (PORCINE)  80 Units/kg (Adjusted) Intravenous Once    insulin regular  6 Units Intravenous Once    mupirocin   Nasal BID    piperacillin-tazobactam (ZOSYN) IVPB  4.5 g Intravenous Q12H    sodium bicarbonate  50 mEq Intravenous Once       Current Intravenous Infusions   sodium chloride 0.9%      heparin (porcine) in D5W 10 Units/kg/hr (12/15/22 0215)    lactated ringers  125 mL/hr at 12/13/22 2144    sodium bicarbonate drip 100 mL/hr at 12/13/22 0132         Review of Systems   Constitutional:  Positive for chills and malaise/fatigue. Negative for fever.   Respiratory:  Positive for shortness of breath. Negative for cough.    Cardiovascular:  Negative for chest pain and palpitations.   Gastrointestinal:  Positive for abdominal pain.   Genitourinary:  Negative for frequency and hematuria.        Objective:       Intake/Output Summary (Last 24 hours) at 12/15/2022 0904  Last data filed at 12/15/2022 0600  Gross per 24 hour   Intake 2618.6 ml   Output 900 ml   Net 1718.6 ml           Vital Signs (Most Recent):  Temp: 97.5 °F (36.4 °C) (12/15/22 0400)  Pulse: 82 (12/15/22 0600)  Resp: 18 (12/15/22 0600)  BP: (!) 136/91 (12/15/22 0600)  SpO2: (!) 94 % (12/15/22 0600)    Body mass index is 30.68 kg/m².  Weight: 86.2 kg (190 lb) Vital Signs (24h Range):  Temp:  [96.8 °F (36 °C)-98 °F (36.7 °C)] 97.5 °F (36.4 °C)  Pulse:  [] 82  Resp:  [15-35] 18  SpO2:  [67 %-100 %] 94 %  BP: (107-170)/() 136/91     Physical Exam  General: Obese w/ moderate distress; lethargic but arousable  HEENT: NC/AT; PERRL; nasal and oral mucosa moist and clear  Neck: No lymphadenopathy  Pulm: Diminished lung sound in lower lobes bilaterally; normal respiratory effort on non-rebreather  CV: S1, S2 w/ murmurs; no edema noted  GI: Distended abdomen; normal bowel sounds in all quadrants, no masses on palpation  MSK: Limited ROM in all extremities d/t weakness  Derm: No rashes, abnormal bruising, or skin lesions  Neuro: Lethargic, easily arousable; Unable to assess orientation, motor/sensory function intact      Lines/Drains/Airways       Central Venous Catheter Line  Duration             Percutaneous Central Line Insertion/Assessment - Triple Lumen  12/12/22 7575 left internal jugular 2 days              Drain  Duration                  Urethral Catheter 12/12/22 1145 2 days              Peripheral  Intravenous Line  Duration                  Peripheral IV - Single Lumen 12/12/22 1140 20 G Anterior;Right Wrist 2 days         Peripheral IV - Single Lumen 12/12/22 1140 Anterior;Left;Proximal Antecubital 2 days                    Significant Labs:    Lab Results   Component Value Date    WBC 13.2 (H) 12/15/2022    HGB 10.5 (L) 12/15/2022    HCT 33.9 (L) 12/15/2022    MCV 90.9 12/15/2022     (L) 12/15/2022         BMP  Lab Results   Component Value Date     (L) 12/15/2022    K 4.3 12/15/2022    CO2 24 12/15/2022    BUN 83.5 (H) 12/15/2022    CREATININE 2.58 (H) 12/15/2022    CALCIUM 8.6 12/15/2022    EGFRNONAA 57 10/14/2020       ABG  Recent Labs   Lab 12/11/22  0930 12/12/22  1042 12/13/22  1213 12/14/22  0845   PH 7.410   < > 7.51* 7.490   PO2 58*   < > 67* 77   PCO2 30.0*   < > 35 34   HCO3 19.0*   < > 27.9  --    BE -6  --   --   --     < > = values in this interval not displayed.         Mechanical Ventilation Support:  Oxygen Concentration (%): 100 (12/12/22 1305)    Significant Imaging:  I have reviewed the pertinent imaging within the past 24 hours.    12/14/22 abdominal ultrasound revealed portal venous thrombosis and gallbladder nodule    Assessment/Plan:     Assessment  Altered mental status likely 2/2 sepsis and metabolic encephalopathy  Patient met SIRS 3 out of 4 (WBC 17.2k, tachypnea, tachycardia)  Persistent elevation in lactic acid from 8.4 to 9.5 on 12/12/2022  Patient states having painful and burning urination for the past few days and has HX of multiple episodes of UTI each year  Acute renal failure   Cr 1.29 on 12/11/2022, increased to 3.14 on 12/12/2022 now downtrending  BUN increased from 43.0 to 58.0 on 12/12/2022 now downtrending  Hale catheter in place. Patient states she has decreased urination for the past few days  Lactic acidosis likely 2/2 acute renal failure and sepsis, and portal venous thrombosis  NSTEMI   Troponin trend 0.784 --> 0.761-->0.895-->0.825  Likely d/t  demand  Echo 12/12/2022 shows EF >70%, mild-to-moderate TR, PASP 58 mmHg, IVC thrombus  COVID positive on admission to ICU  Transminitis   AST 1013, ALT 1014 on 12/14/2022 plateauing  Bladder cancer s/p resection in October 2022  CT ab & pelvis 12/11/2022 shows heterogeneous hyperdense mass lesion in the superior and left lateral aspect of the urinary bladder  Per patient's family at bedside, patient will be receiving additional imaging to stage bladder cancer  HX of HTN, DM II, TIA, aortic stenosis s/p TAVR, CAD s/p PCI, GERD, PARAMJIT  Portal Vein Thrombosis  Suspected pulmonary embolism    Plan  Continue systemic anticoagulation with heparin drip for portal venous thrombosis as well as suspected pulmonary embolism.  There is also an IVC thrombus that was visualized on echocardiogram.  Persistent lactic acidosis is likely secondary to the thrombus in the portal vein.  Transaminases are stable but elevated.  Renal function is slowly improving.  Patient is hemodynamically stable and oxygenating adequately on OxyMask at approximately 6 L. stable to step-down to the floor for ongoing care at this point.       Joaquín Smallwood MD  Pulmonary Critical Care Medicine  [facility]

## 2022-12-16 LAB
ABS NEUT (OLG): 10.72 X10(3)/MCL (ref 2.1–9.2)
ALBUMIN SERPL-MCNC: 2.9 G/DL (ref 3.4–4.8)
ALBUMIN/GLOB SERPL: 1 RATIO (ref 1.1–2)
ALP SERPL-CCNC: 108 UNIT/L (ref 40–150)
ALT SERPL-CCNC: 1430 UNIT/L (ref 0–55)
ANISOCYTOSIS BLD QL SMEAR: ABNORMAL
APTT PPP: 117.7 SECONDS (ref 23.2–33.7)
APTT PPP: 94.4 SECONDS (ref 23.2–33.7)
APTT PPP: 97.8 SECONDS (ref 23.2–33.7)
AST SERPL-CCNC: 688 UNIT/L (ref 5–34)
BILIRUBIN DIRECT+TOT PNL SERPL-MCNC: 1 MG/DL
BUN SERPL-MCNC: 80.7 MG/DL (ref 9.8–20.1)
BURR CELLS (OLG): ABNORMAL
CALCIUM SERPL-MCNC: 8.5 MG/DL (ref 8.4–10.2)
CHLORIDE SERPL-SCNC: 102 MMOL/L (ref 98–107)
CO2 SERPL-SCNC: 24 MMOL/L (ref 23–31)
CREAT SERPL-MCNC: 1.92 MG/DL (ref 0.55–1.02)
ERYTHROCYTE [DISTWIDTH] IN BLOOD BY AUTOMATED COUNT: 15 % (ref 11–14.5)
GFR SERPLBLD CREATININE-BSD FMLA CKD-EPI: 25 MLS/MIN/1.73/M2
GLOBULIN SER-MCNC: 2.9 GM/DL (ref 2.4–3.5)
GLUCOSE SERPL-MCNC: 215 MG/DL (ref 82–115)
HCT VFR BLD AUTO: 33.1 % (ref 37–47)
HGB BLD-MCNC: 10.3 GM/DL (ref 12–16)
IMM GRANULOCYTES # BLD AUTO: 0.15 X10(3)/MCL (ref 0–0.04)
IMM GRANULOCYTES NFR BLD AUTO: 1.3 %
INSTRUMENT WBC (OLG): 11.4 X10(3)/MCL
LYMPHOCYTES NFR BLD MANUAL: 0.23 X10(3)/MCL
LYMPHOCYTES NFR BLD MANUAL: 2 %
MACROCYTES BLD QL SMEAR: ABNORMAL
MCH RBC QN AUTO: 27.9 PG
MCHC RBC AUTO-ENTMCNC: 31.1 MG/DL (ref 33–36)
MCV RBC AUTO: 89.7 FL (ref 80–94)
MONOCYTES NFR BLD MANUAL: 0.46 X10(3)/MCL (ref 0.1–1.3)
MONOCYTES NFR BLD MANUAL: 4 %
NEUTROPHILS NFR BLD MANUAL: 94 %
NRBC BLD AUTO-RTO: 40.6 % (ref 0–1)
NRBC BLD MANUAL-RTO: 56 %
OVALOCYTES (OLG): ABNORMAL
PLATELET # BLD AUTO: 119 X10(3)/MCL (ref 140–371)
PLATELET # BLD EST: ABNORMAL 10*3/UL
PMV BLD AUTO: 12.4 FL (ref 9.4–12.4)
POCT GLUCOSE: 184 MG/DL (ref 70–110)
POCT GLUCOSE: 193 MG/DL (ref 70–110)
POIKILOCYTOSIS BLD QL SMEAR: ABNORMAL
POLYCHROMASIA BLD QL SMEAR: ABNORMAL
POTASSIUM SERPL-SCNC: 4.4 MMOL/L (ref 3.5–5.1)
PROT SERPL-MCNC: 5.8 GM/DL (ref 5.8–7.6)
RBC # BLD AUTO: 3.69 X10(6)/MCL (ref 4.2–5.4)
RBC MORPH BLD: ABNORMAL
SODIUM SERPL-SCNC: 138 MMOL/L (ref 136–145)
WBC # SPEC AUTO: 11.4 X10(3)/MCL (ref 4.5–11.5)

## 2022-12-16 PROCEDURE — 27000221 HC OXYGEN, UP TO 24 HOURS

## 2022-12-16 PROCEDURE — 63600175 PHARM REV CODE 636 W HCPCS: Performed by: STUDENT IN AN ORGANIZED HEALTH CARE EDUCATION/TRAINING PROGRAM

## 2022-12-16 PROCEDURE — 25000003 PHARM REV CODE 250: Performed by: INTERNAL MEDICINE

## 2022-12-16 PROCEDURE — 85730 THROMBOPLASTIN TIME PARTIAL: CPT | Performed by: INTERNAL MEDICINE

## 2022-12-16 PROCEDURE — 25000003 PHARM REV CODE 250: Performed by: STUDENT IN AN ORGANIZED HEALTH CARE EDUCATION/TRAINING PROGRAM

## 2022-12-16 PROCEDURE — 85027 COMPLETE CBC AUTOMATED: CPT | Performed by: INTERNAL MEDICINE

## 2022-12-16 PROCEDURE — 25000003 PHARM REV CODE 250: Performed by: PHYSICIAN ASSISTANT

## 2022-12-16 PROCEDURE — 27000207 HC ISOLATION

## 2022-12-16 PROCEDURE — 36415 COLL VENOUS BLD VENIPUNCTURE: CPT | Performed by: INTERNAL MEDICINE

## 2022-12-16 PROCEDURE — 80053 COMPREHEN METABOLIC PANEL: CPT | Performed by: STUDENT IN AN ORGANIZED HEALTH CARE EDUCATION/TRAINING PROGRAM

## 2022-12-16 PROCEDURE — 20000000 HC ICU ROOM

## 2022-12-16 PROCEDURE — 63600175 PHARM REV CODE 636 W HCPCS: Performed by: INTERNAL MEDICINE

## 2022-12-16 PROCEDURE — 94761 N-INVAS EAR/PLS OXIMETRY MLT: CPT

## 2022-12-16 PROCEDURE — 63600175 PHARM REV CODE 636 W HCPCS: Performed by: PHYSICIAN ASSISTANT

## 2022-12-16 RX ADMIN — PIPERACILLIN SODIUM,TAZOBACTAM SODIUM 4.5 G: 4; .5 INJECTION, POWDER, FOR SOLUTION INTRAVENOUS at 12:12

## 2022-12-16 RX ADMIN — MUPIROCIN: 20 OINTMENT TOPICAL at 08:12

## 2022-12-16 RX ADMIN — FAMOTIDINE 20 MG: 10 INJECTION, SOLUTION INTRAVENOUS at 09:12

## 2022-12-16 RX ADMIN — MUPIROCIN: 20 OINTMENT TOPICAL at 09:12

## 2022-12-16 RX ADMIN — SODIUM CHLORIDE: 9 INJECTION, SOLUTION INTRAVENOUS at 01:12

## 2022-12-16 RX ADMIN — PIPERACILLIN SODIUM,TAZOBACTAM SODIUM 4.5 G: 4; .5 INJECTION, POWDER, FOR SOLUTION INTRAVENOUS at 11:12

## 2022-12-16 RX ADMIN — SODIUM CHLORIDE: 9 INJECTION, SOLUTION INTRAVENOUS at 04:12

## 2022-12-16 RX ADMIN — DEXAMETHASONE SODIUM PHOSPHATE 6 MG: 4 INJECTION, SOLUTION INTRA-ARTICULAR; INTRALESIONAL; INTRAMUSCULAR; INTRAVENOUS; SOFT TISSUE at 09:12

## 2022-12-16 RX ADMIN — MORPHINE SULFATE 2 MG: 4 INJECTION INTRAVENOUS at 11:12

## 2022-12-16 RX ADMIN — HEPARIN SODIUM 12 UNITS/KG/HR: 10000 INJECTION, SOLUTION INTRAVENOUS at 02:12

## 2022-12-16 NOTE — PLAN OF CARE
Clinical updates submitted to [facility] via [email or web address]. Informed them that pt may require placement upon discharge.

## 2022-12-16 NOTE — HOSPITAL COURSE
Patient is sleeping heavily at this time and really only stating that she is cold whenever I try to remove her blanket somewhat.  I did ask her if she has any pain and she said no but she is not answering a lot of other questions at this time.

## 2022-12-16 NOTE — PROGRESS NOTES
[facility] Hospital Medicine  Progress Note    Patient Name: Miguel Angel Oliveros  MRN: [id number]  Patient Class: IP- Inpatient   Admission Date: 12/11/2022  Length of Stay: 5 days  Attending Physician: Trevin Blas MD  Primary Care Provider: Barrett Lopez MD        Subjective:     Principal Problem:<principal problem not specified>          HPI:  Miguel Angel Oliveros is an 89-year-old female with PMH of DMII, CAD s/p PCI, HTN, AS s/p TAVR, HLD, GERD, and PARAMJIT, who is admitted to [facility] ICU from regular unit after being found to have altered mental status and increased respiratory distress. She was initially admitted to [facility] on 12/11/2022 by [facility] after being transferred from [place] with the diagnosis of NSTEMI. At the time of encounter, patient states having lower abdominal discomfort, bladder fullness, and dysuria. She states that these symptoms have been persisting for the past few days accompanied by decreased urinary output. Per patient's family members at beside, she usually gets UTI several times each year and she has had 4 episodes of UTI in 2022 (last UTI was 3 months ago).      Overview/Hospital Course:  Patient is sleeping heavily at this time and really only stating that she is cold whenever I try to remove her blanket somewhat.  I did ask her if she has any pain and she said no but she is not answering a lot of other questions at this time.          Review of Systems   Unable to perform ROS: Mental status change   Objective:     Vital Signs (Most Recent):  Temp: 97.8 °F (36.6 °C) (12/16/22 0700)  Pulse: 81 (12/16/22 1100)  Resp: 13 (12/16/22 1100)  BP: (!) 146/89 (12/16/22 1100)  SpO2: 95 % (12/16/22 1100)   Vital Signs (24h Range):  Temp:  [97.5 °F (36.4 °C)-97.8 °F (36.6 °C)] 97.8 °F (36.6 °C)  Pulse:  [75-97] 81  Resp:  [12-28] 13  SpO2:  [88 %-99 %] 95 %  BP: (106-172)/() 146/89     Weight: 86.2 kg (190 lb)  Body mass index is 30.68  kg/m².    Intake/Output Summary (Last 24 hours) at 12/16/2022 1118  Last data filed at 12/16/2022 0434  Gross per 24 hour   Intake 2136.36 ml   Output 1150 ml   Net 986.36 ml      Physical Exam  Vitals and nursing note reviewed.   Constitutional:       General: She is not in acute distress.     Appearance: She is obese. She is not ill-appearing.   HENT:      Head: Normocephalic and atraumatic.   Neck:      Comments: Left IJ  Cardiovascular:      Rate and Rhythm: Normal rate and regular rhythm.      Pulses: Normal pulses.      Heart sounds: Normal heart sounds.   Pulmonary:      Effort: Pulmonary effort is normal.      Breath sounds: Normal breath sounds.   Abdominal:      General: Bowel sounds are normal.      Palpations: Abdomen is soft.   Musculoskeletal:      Cervical back: Neck supple.      Comments: Bilateral SCDs   Skin:     General: Skin is warm and dry.   Neurological:      General: No focal deficit present.      Comments: Could not participate in a full exam       Significant Labs: All pertinent labs within the past 24 hours have been reviewed.  BMP:   Recent Labs   Lab 12/16/22  0148      K 4.4   CO2 24   BUN 80.7*   CREATININE 1.92*   CALCIUM 8.5     CBC:   Recent Labs   Lab 12/15/22  0019 12/16/22 0148   WBC 13.2* 11.4   HGB 10.5* 10.3*   HCT 33.9* 33.1*   * 119*     CMP:   Recent Labs   Lab 12/15/22  0019 12/16/22 0148   * 138   K 4.3 4.4   CO2 24 24   BUN 83.5* 80.7*   CREATININE 2.58* 1.92*   CALCIUM 8.6 8.5   ALBUMIN 2.9* 2.9*   BILITOT 0.8 1.0   ALKPHOS 109 108   AST 1,105* 688*   ALT 1,390* 1,430*       Significant Imaging: I have reviewed all pertinent imaging results/findings within the past 24 hours.      Assessment/Plan:      No notes have been filed under this hospital service.  Service: Hospital Medicine    1. Altered mental status likely 2/2 sepsis and metabolic encephalopathy  2. Acute renal failure   3. Lactic acidosis likely 2/2 acute renal failure and sepsis, and  portal venous thrombosis  4. NSTEMI   5. COVID positive on admission to ICU  6. Transminitis   7. Bladder cancer s/p resection in October 2022  a. CT ab & pelvis 12/11/2022 shows heterogeneous hyperdense mass lesion in the superior and left lateral aspect of the urinary bladder  b. Per patient's family at bedside, patient will be receiving additional imaging to stage bladder cancer  8. HX of HTN, DM II, TIA, aortic stenosis s/p TAVR, CAD s/p PCI, GERD, PARAMJIT  9. Portal Vein Thrombosis  10. Suspected pulmonary embolism    Patient apparently lives at home with family and has a significant amount of DME as well as support.  She has not really been assessed for any nutrition since she has been here because of her oxygen requirements.  She continues on remdesivir and dexamethasone at the moment for COVID as well as a heparin drip for her portal vein thrombosis and pulmonary embolus.  It looks that her transaminases are beginning to plateau but will continue to follow these.  Appreciate the help from Nephrology who continues to follow her acute renal failure.  Apparently at baseline she remains confused and has significant dementia.      VTE Risk Mitigation (From admission, onward)         Ordered     heparin 25,000 units in dextrose 5% (100 units/ml) IV bolus from bag - ADDITIONAL PRN BOLUS - 60 units/kg  As needed (PRN)        Question:  Heparin Infusion Adjustment (DO NOT MODIFY ANSWER)  Answer:  \[email or web address]\Images\Pharmacy\HeparinInfusions\heparin HIGH INTENSITY nomogram for [facility] [id number]    12/12/22 1443     heparin 25,000 units in dextrose 5% (100 units/ml) IV bolus from bag - ADDITIONAL PRN BOLUS - 30 units/kg  As needed (PRN)        Question:  Heparin Infusion Adjustment (DO NOT MODIFY ANSWER)  Answer:  \[email or web address]\Images\Pharmacy\HeparinInfusions\heparin HIGH INTENSITY nomogram for [facility] [id number].pdf    12/12/22 1443     heparin 25,000 units in dextrose 5% (100 units/ml) IV bolus from bag INITIAL BOLUS   Once        Question:  Heparin Infusion Adjustment (DO NOT MODIFY ANSWER)  Answer:  \\[email or web address]\epic\Images\Pharmacy\HeparinInfusions\heparin HIGH INTENSITY nomogram for [facility] [id number].pdf    12/12/22 1443     heparin 25,000 units in dextrose 5% 250 mL (100 units/mL) infusion HIGH INTENSITY nomogram - [facility]  Continuous        Question Answer Comment   Heparin Infusion Adjustment (DO NOT MODIFY ANSWER) \\[email or web address]\epic\Images\Pharmacy\HeparinInfusions\heparin HIGH INTENSITY nomogram for [facility] [id number]    Begin at (in units/kg/hr) 18        12/12/22 1443     IP VTE HIGH RISK PATIENT  Once         12/11/22 2229     Place sequential compression device  Until discontinued         12/11/22 2229                Discharge Planning   TERRY:      Code Status: Full Code   Is the patient medically ready for discharge?:     Reason for patient still in hospital (select all that apply): Treatment  Discharge Plan A: Home Health                  Eliane Love MD  Department of Hospital Medicine   [facility]

## 2022-12-16 NOTE — HPI
Miguel Angel Oliveros is an 89-year-old female with PMH of DMII, CAD s/p PCI, HTN, AS s/p TAVR, HLD, GERD, and PARAMJIT, who is admitted to [facility] ICU from regular unit after being found to have altered mental status and increased respiratory distress. She was initially admitted to [facility] on 12/11/2022 by [facility] after being transferred from [place] with the diagnosis of NSTEMI. At the time of encounter, patient states having lower abdominal discomfort, bladder fullness, and dysuria. She states that these symptoms have been persisting for the past few days accompanied by decreased urinary output. Per patient's family members at beside, she usually gets UTI several times each year and she has had 4 episodes of UTI in 2022 (last UTI was 3 months ago).

## 2022-12-16 NOTE — SUBJECTIVE & OBJECTIVE
Review of Systems   Unable to perform ROS: Mental status change   Objective:     Vital Signs (Most Recent):  Temp: 97.8 °F (36.6 °C) (12/16/22 0700)  Pulse: 81 (12/16/22 1100)  Resp: 13 (12/16/22 1100)  BP: (!) 146/89 (12/16/22 1100)  SpO2: 95 % (12/16/22 1100)   Vital Signs (24h Range):  Temp:  [97.5 °F (36.4 °C)-97.8 °F (36.6 °C)] 97.8 °F (36.6 °C)  Pulse:  [75-97] 81  Resp:  [12-28] 13  SpO2:  [88 %-99 %] 95 %  BP: (106-172)/() 146/89     Weight: 86.2 kg (190 lb)  Body mass index is 30.68 kg/m².    Intake/Output Summary (Last 24 hours) at 12/16/2022 1118  Last data filed at 12/16/2022 0434  Gross per 24 hour   Intake 2136.36 ml   Output 1150 ml   Net 986.36 ml      Physical Exam  Vitals and nursing note reviewed.   Constitutional:       General: She is not in acute distress.     Appearance: She is obese. She is not ill-appearing.   HENT:      Head: Normocephalic and atraumatic.   Neck:      Comments: Left IJ  Cardiovascular:      Rate and Rhythm: Normal rate and regular rhythm.      Pulses: Normal pulses.      Heart sounds: Normal heart sounds.   Pulmonary:      Effort: Pulmonary effort is normal.      Breath sounds: Normal breath sounds.   Abdominal:      General: Bowel sounds are normal.      Palpations: Abdomen is soft.   Musculoskeletal:      Cervical back: Neck supple.      Comments: Bilateral SCDs   Skin:     General: Skin is warm and dry.   Neurological:      General: No focal deficit present.      Comments: Could not participate in a full exam       Significant Labs: All pertinent labs within the past 24 hours have been reviewed.  BMP:   Recent Labs   Lab 12/16/22  0148      K 4.4   CO2 24   BUN 80.7*   CREATININE 1.92*   CALCIUM 8.5     CBC:   Recent Labs   Lab 12/15/22  0019 12/16/22  0148   WBC 13.2* 11.4   HGB 10.5* 10.3*   HCT 33.9* 33.1*   * 119*     CMP:   Recent Labs   Lab 12/15/22  0019 12/16/22  0148   * 138   K 4.3 4.4   CO2 24 24   BUN 83.5* 80.7*   CREATININE  2.58* 1.92*   CALCIUM 8.6 8.5   ALBUMIN 2.9* 2.9*   BILITOT 0.8 1.0   ALKPHOS 109 108   AST 1,105* 688*   ALT 1,390* 1,430*       Significant Imaging: I have reviewed all pertinent imaging results/findings within the past 24 hours.

## 2022-12-16 NOTE — PROGRESS NOTES
Nephrology     HPI:      Miguel Angel Oliveros is a 89 y.o. female with multiple and advanced underlying comorbidities including CAD/previous PCI, VHD with history of TAVR, sick sinus syndrome and pacemaker implantation, obstructive sleep apnea, hypertension, dyslipidemia, recent resection of bladder cancer and dementia.  The patient was admitted to [facility] with an NSTEMI after being transferred [facility] in [place] with worsening abdominal pain, UTI and acute kidney injury.  She noted to be COVID positive on admission to ICU.  She is currently nonoliguric and renal functions are slowly improving.    Unfortunately she is noted to have an inferior vena cava thrombus and now portal vein thrombosis. Patient is on heparin.  Patient is on remdesivir and Decadron.  Urine output has been adequate. Patient is receiving NS IV.  She is comfortable.    Interval history:   She continues to tolerate normal saline as bicarbonate was discontinued due to alkalosis.       Review of Systems:       Objective:       VITAL SIGNS: 24 HR MIN & MAX LAST    Temp  Min: 97.5 °F (36.4 °C)  Max: 97.8 °F (36.6 °C)  (P) 97.8 °F (36.6 °C)        BP  Min: 106/72  Max: 172/100  131/79     Pulse  Min: 75  Max: 97  82     Resp  Min: 12  Max: 28  13    SpO2  Min: 88 %  Max: 99 %  (!) 93 %        Physical examination below assisted to by nurse as patient is COVID positive.  GEN: Chronically ill appearing in NAD, lethargic but arousable.  Face mask O2 noted  CV: RRR without rub.  PULM:  Diminished breath sounds at lower lobes bilaterally.  EXT: No cyanosis or edema      Intake/Output Summary (Last 24 hours) at 12/16/2022 0911  Last data filed at 12/16/2022 0434  Gross per 24 hour   Intake 2136.36 ml   Output 1150 ml   Net 986.36 ml            Component Value Date/Time     12/16/2022 0148     (L) 12/15/2022 0019    K 4.4 12/16/2022 0148    K 4.3 12/15/2022 0019    CHLORIDE 102 12/16/2022 0148    CHLORIDE 96 (L) 12/15/2022 0019     CO2 24 12/16/2022 0148    CO2 24 12/15/2022 0019    BUN 80.7 (H) 12/16/2022 0148    BUN 83.5 (H) 12/15/2022 0019    CREATININE 1.92 (H) 12/16/2022 0148    CREATININE 2.58 (H) 12/15/2022 0019    CALCIUM 8.5 12/16/2022 0148    CALCIUM 8.6 12/15/2022 0019    PHOS 7.9 (H) 12/12/2022 1124            Component Value Date/Time    WBC 11.4 12/16/2022 0148    WBC 13.2 (H) 12/15/2022 0019    HGB 10.3 (L) 12/16/2022 0148    HGB 10.5 (L) 12/15/2022 0019    HCT 33.1 (L) 12/16/2022 0148    HCT 33.9 (L) 12/15/2022 0019    HCT 37.0 (L) 05/02/2020 1818    HCT 37.0 (L) 05/02/2020 1812     (L) 12/16/2022 0148     (L) 12/15/2022 0019       Imaging reviewed renal ultrasound reveals bilateral near normal-size kidneys and no hydronephrosis.      Assessment / Plan:   SMITHA secondary to ATN secondary to sepsis.  COVID-19 positive  IVC and PV thrombosis  Recent resection of bladder cancer October  Persistent SIRS with Lactic acidosis  6    Hypertension  7.   Diabetes type II    Continue normal saline at 75 cc an hour.  There is no indication for renal replacement therapy in addition, patient is not considered a candidate for renal replacement therapy given her advanced age multiplicity of underlying advanced comorbidities and likelihood of recovery.  No new recommendations at this time.  We will follow peripherally.

## 2022-12-17 LAB
ABS NEUT (OLG): 9.69 X10(3)/MCL (ref 2.1–9.2)
ALBUMIN SERPL-MCNC: 3 G/DL (ref 3.4–4.8)
ALBUMIN/GLOB SERPL: 1 RATIO (ref 1.1–2)
ALP SERPL-CCNC: 106 UNIT/L (ref 40–150)
ALT SERPL-CCNC: 1180 UNIT/L (ref 0–55)
ANISOCYTOSIS BLD QL SMEAR: ABNORMAL
APTT PPP: 76 SECONDS (ref 23.2–33.7)
AST SERPL-CCNC: 311 UNIT/L (ref 5–34)
BACTERIA BLD CULT: NORMAL
BACTERIA BLD CULT: NORMAL
BILIRUBIN DIRECT+TOT PNL SERPL-MCNC: 1 MG/DL
BUN SERPL-MCNC: 73.5 MG/DL (ref 9.8–20.1)
BURR CELLS (OLG): ABNORMAL
CALCIUM SERPL-MCNC: 9 MG/DL (ref 8.4–10.2)
CHLORIDE SERPL-SCNC: 106 MMOL/L (ref 98–107)
CO2 SERPL-SCNC: 22 MMOL/L (ref 23–31)
CREAT SERPL-MCNC: 1.51 MG/DL (ref 0.55–1.02)
ELLIPTOCYTOSIS (OHS): ABNORMAL
ERYTHROCYTE [DISTWIDTH] IN BLOOD BY AUTOMATED COUNT: 15.5 % (ref 11–14.5)
GFR SERPLBLD CREATININE-BSD FMLA CKD-EPI: 33 MLS/MIN/1.73/M2
GLOBULIN SER-MCNC: 3.1 GM/DL (ref 2.4–3.5)
GLUCOSE SERPL-MCNC: 222 MG/DL (ref 82–115)
HCT VFR BLD AUTO: 34.8 % (ref 37–47)
HGB BLD-MCNC: 10.8 GM/DL (ref 12–16)
IMM GRANULOCYTES # BLD AUTO: 0.17 X10(3)/MCL (ref 0–0.04)
IMM GRANULOCYTES NFR BLD AUTO: 1.7 %
INSTRUMENT WBC (OLG): 10.2 X10(3)/MCL
LYMPHOCYTES NFR BLD MANUAL: 0.31 X10(3)/MCL
LYMPHOCYTES NFR BLD MANUAL: 3 %
MACROCYTES BLD QL SMEAR: ABNORMAL
MCH RBC QN AUTO: 27.8 PG
MCHC RBC AUTO-ENTMCNC: 31 MG/DL (ref 33–36)
MCV RBC AUTO: 89.7 FL (ref 80–94)
MONOCYTES NFR BLD MANUAL: 0.31 X10(3)/MCL (ref 0.1–1.3)
MONOCYTES NFR BLD MANUAL: 3 %
NEUTROPHILS NFR BLD MANUAL: 95 %
NRBC BLD AUTO-RTO: 45 % (ref 0–1)
NRBC BLD MANUAL-RTO: 76 %
PLATELET # BLD AUTO: 115 X10(3)/MCL (ref 140–371)
PLATELET # BLD EST: ABNORMAL 10*3/UL
PMV BLD AUTO: 12.5 FL (ref 9.4–12.4)
POIKILOCYTOSIS BLD QL SMEAR: ABNORMAL
POLYCHROMASIA BLD QL SMEAR: ABNORMAL
POTASSIUM SERPL-SCNC: 4.1 MMOL/L (ref 3.5–5.1)
PROT SERPL-MCNC: 6.1 GM/DL (ref 5.8–7.6)
RBC # BLD AUTO: 3.88 X10(6)/MCL (ref 4.2–5.4)
RBC MORPH BLD: ABNORMAL
SODIUM SERPL-SCNC: 140 MMOL/L (ref 136–145)
WBC # SPEC AUTO: 10.2 X10(3)/MCL (ref 4.5–11.5)

## 2022-12-17 PROCEDURE — 25000003 PHARM REV CODE 250: Performed by: STUDENT IN AN ORGANIZED HEALTH CARE EDUCATION/TRAINING PROGRAM

## 2022-12-17 PROCEDURE — 25000003 PHARM REV CODE 250: Performed by: INTERNAL MEDICINE

## 2022-12-17 PROCEDURE — 27000207 HC ISOLATION

## 2022-12-17 PROCEDURE — 25000003 PHARM REV CODE 250: Performed by: PHYSICIAN ASSISTANT

## 2022-12-17 PROCEDURE — 21400001 HC TELEMETRY ROOM

## 2022-12-17 PROCEDURE — 36415 COLL VENOUS BLD VENIPUNCTURE: CPT | Performed by: INTERNAL MEDICINE

## 2022-12-17 PROCEDURE — 27000221 HC OXYGEN, UP TO 24 HOURS

## 2022-12-17 PROCEDURE — 85730 THROMBOPLASTIN TIME PARTIAL: CPT | Performed by: INTERNAL MEDICINE

## 2022-12-17 PROCEDURE — 94761 N-INVAS EAR/PLS OXIMETRY MLT: CPT

## 2022-12-17 PROCEDURE — 63600175 PHARM REV CODE 636 W HCPCS: Performed by: PHYSICIAN ASSISTANT

## 2022-12-17 PROCEDURE — 80053 COMPREHEN METABOLIC PANEL: CPT | Performed by: STUDENT IN AN ORGANIZED HEALTH CARE EDUCATION/TRAINING PROGRAM

## 2022-12-17 PROCEDURE — 63600175 PHARM REV CODE 636 W HCPCS: Performed by: STUDENT IN AN ORGANIZED HEALTH CARE EDUCATION/TRAINING PROGRAM

## 2022-12-17 PROCEDURE — 85027 COMPLETE CBC AUTOMATED: CPT | Performed by: INTERNAL MEDICINE

## 2022-12-17 PROCEDURE — 63600175 PHARM REV CODE 636 W HCPCS: Performed by: INTERNAL MEDICINE

## 2022-12-17 RX ORDER — DIAZEPAM 2 MG/1
2 TABLET ORAL NIGHTLY PRN
Status: DISCONTINUED | OUTPATIENT
Start: 2022-12-17 | End: 2022-12-23

## 2022-12-17 RX ADMIN — DEXAMETHASONE SODIUM PHOSPHATE 6 MG: 4 INJECTION, SOLUTION INTRA-ARTICULAR; INTRALESIONAL; INTRAMUSCULAR; INTRAVENOUS; SOFT TISSUE at 08:12

## 2022-12-17 RX ADMIN — MUPIROCIN: 20 OINTMENT TOPICAL at 08:12

## 2022-12-17 RX ADMIN — PIPERACILLIN SODIUM,TAZOBACTAM SODIUM 4.5 G: 4; .5 INJECTION, POWDER, FOR SOLUTION INTRAVENOUS at 11:12

## 2022-12-17 RX ADMIN — FAMOTIDINE 20 MG: 10 INJECTION, SOLUTION INTRAVENOUS at 08:12

## 2022-12-17 RX ADMIN — LEUCINE, PHENYLALANINE, LYSINE, METHIONINE, ISOLEUCINE, VALINE, HISTIDINE, THREONINE, TRYPTOPHAN, ALANINE, GLYCINE, ARGININE, PROLINE, SERINE, TYROSINE, SODIUM ACETATE, DIBASIC POTASSIUM PHOSPHATE, MAGNESIUM CHLORIDE, SODIUM CHLORIDE, CALCIUM CHLORIDE, DEXTROSE
311; 238; 247; 170; 255; 247; 204; 179; 77; 880; 438; 489; 289; 213; 17; 297; 261; 51; 77; 33; 5 INJECTION INTRAVENOUS at 01:12

## 2022-12-17 RX ADMIN — HYDRALAZINE HYDROCHLORIDE 10 MG: 20 INJECTION INTRAMUSCULAR; INTRAVENOUS at 12:12

## 2022-12-17 RX ADMIN — HEPARIN SODIUM 10 UNITS/KG/HR: 10000 INJECTION, SOLUTION INTRAVENOUS at 11:12

## 2022-12-17 RX ADMIN — PIPERACILLIN SODIUM,TAZOBACTAM SODIUM 4.5 G: 4; .5 INJECTION, POWDER, FOR SOLUTION INTRAVENOUS at 12:12

## 2022-12-17 RX ADMIN — SODIUM CHLORIDE: 9 INJECTION, SOLUTION INTRAVENOUS at 05:12

## 2022-12-17 NOTE — PLAN OF CARE
Problem: Adult Inpatient Plan of Care  Goal: Plan of Care Review  Outcome: Ongoing, Progressing  Goal: Patient-Specific Goal (Individualized)  Outcome: Ongoing, Progressing  Goal: Absence of Hospital-Acquired Illness or Injury  Outcome: Ongoing, Progressing  Goal: Optimal Comfort and Wellbeing  Outcome: Ongoing, Progressing  Goal: Readiness for Transition of Care  Outcome: Ongoing, Progressing     Problem: Infection  Goal: Absence of Infection Signs and Symptoms  Outcome: Ongoing, Progressing     Problem: Skin Injury Risk Increased  Goal: Skin Health and Integrity  Outcome: Ongoing, Progressing     Problem: Fall Injury Risk  Goal: Absence of Fall and Fall-Related Injury  Outcome: Ongoing, Progressing     Problem: Pain Acute  Goal: Acceptable Pain Control and Functional Ability  Outcome: Ongoing, Progressing     Problem: Adjustment to Illness (Stroke, Ischemic/Transient Ischemic Attack)  Goal: Optimal Coping  Outcome: Ongoing, Progressing     Problem: Bowel Elimination Impaired (Stroke, Ischemic/Transient Ischemic Attack)  Goal: Effective Bowel Elimination  Outcome: Ongoing, Progressing     Problem: Cerebral Tissue Perfusion (Stroke, Ischemic/Transient Ischemic Attack)  Goal: Optimal Cerebral Tissue Perfusion  Outcome: Ongoing, Progressing     Problem: Cognitive Impairment (Stroke, Ischemic/Transient Ischemic Attack)  Goal: Optimal Cognitive Function  Outcome: Ongoing, Progressing     Problem: Communication Impairment (Stroke, Ischemic/Transient Ischemic Attack)  Goal: Improved Communication Skills  Outcome: Ongoing, Progressing     Problem: Functional Ability Impaired (Stroke, Ischemic/Transient Ischemic Attack)  Goal: Optimal Functional Ability  Outcome: Ongoing, Progressing     Problem: Respiratory Compromise (Stroke, Ischemic/Transient Ischemic Attack)  Goal: Effective Oxygenation and Ventilation  Outcome: Ongoing, Progressing     Problem: Sensorimotor Impairment (Stroke, Ischemic/Transient Ischemic  Attack)  Goal: Improved Sensorimotor Function  Outcome: Ongoing, Progressing     Problem: Swallowing Impairment (Stroke, Ischemic/Transient Ischemic Attack)  Goal: Optimal Eating and Swallowing without Aspiration  Outcome: Ongoing, Progressing     Problem: Urinary Elimination Impaired (Stroke, Ischemic/Transient Ischemic Attack)  Goal: Effective Urinary Elimination  Outcome: Ongoing, Progressing

## 2022-12-17 NOTE — NURSING
Nurses Note -- 4 Eyes      12/17/2022   4:20 AM      Skin assessed during: Transfer      [] No Pressure Injuries Present    []Prevention Measures Documented      [x] Yes- Altered Skin Integrity Present or Discovered   [x] LDA Added if Not in Epic (Describe Wound)   [] New Altered Skin Integrity was Present on Admit and Documented in LDA   [] Wound Image Taken    Wound Care Consulted? Yes    Attending Nurse:  Yolande Pringle RN     Second RN/Staff Member:  Floridalma Garcia RN

## 2022-12-17 NOTE — PT/OT/SLP PROGRESS
"Attempted swallow evaluation, however pt lethargic and unable to participate.  Repeats "I'm tired" under her breath.  Will f/u tomorrow.  "

## 2022-12-17 NOTE — PROGRESS NOTES
"[facility] Hospital Medicine Progress Note        Chief Complaint: Inpatient Follow-up for AMS, Covid    HPI: Miguel Angel Oliveros is an 89-year-old female with PMH of DMII, CAD s/p PCI, HTN, AS s/p TAVR, HLD, GERD, and PARAMJIT, who is admitted to [facility] ICU from regular unit after being found to have altered mental status and increased respiratory distress. She was initially admitted to [facility] on 12/11/2022 by [facility] after being transferred from [place] with the diagnosis of NSTEMI. At the time of encounter, patient states having lower abdominal discomfort, bladder fullness, and dysuria. She states that these symptoms have been persisting for the past few days accompanied by decreased urinary output. Per patient's family members at beside, she usually gets UTI several times each year and she has had 4 episodes of UTI in 2022 (last UTI was 3 months ago).    Interval Hx:   Laying in bed, eyes close, moaning, answering all the questions with "yes".   Patient's son, daughter and granddaughter at bedside.  Son reported patient has been confused since admission, her daughter reports that she was able to consult question when she 1st came in but now she is gradually becoming confused  Discussed she has completed her remdesivir and dex as methicillin discontinue  I answered the questions to the best of my knowledge and their satisfaction  Case was discussed with patient's nurse in the room    Objective/physical exam:  General:  Confused, morning, but not in acute distress, left IJ present, obese  Chest: Clear to auscultation bilaterally  Heart: RRR, +S1, S2, no appreciable murmur  Abdomen: Soft, nontender, BS +  MSK: Warm, no lower extremity edema, no clubbing or cyanosis  Neurologic:  Not following commands, ordered in mentation, moaning.    VITAL SIGNS: 24 HRS MIN & MAX LAST   Temp  Min: 96.5 °F (35.8 °C)  Max: 98.1 °F (36.7 °C) 97.4 °F (36.3 °C)   BP  Min: 117/75  Max: 169/94 (!) 151/87     Pulse "  Min: 77  Max: 94  81   Resp  Min: 12  Max: 33 14   SpO2  Min: 88 %  Max: 99 % 97 %       Recent Labs   Lab 12/15/22  0019 12/16/22  0148 12/17/22  0533   WBC 13.2* 11.4 10.2   RBC 3.73* 3.69* 3.88*   HGB 10.5* 10.3* 10.8*   HCT 33.9* 33.1* 34.8*   MCV 90.9 89.7 89.7   MCH 28.2 27.9 27.8   MCHC 31.0* 31.1* 31.0*   RDW 14.1 15.0* 15.5*   * 119* 115*   MPV 12.9* 12.4 12.5*       Recent Labs   Lab 12/12/22  1124 12/12/22  1904 12/12/22  2038 12/12/22  2356 12/13/22  1213 12/13/22  1827 12/14/22  0845 12/15/22  0019 12/16/22 0148 12/17/22  0533   *  --   --    < >  --    < >  --  135* 138 140   K 6.4*   < >  --    < >  --    < >  --  4.3 4.4 4.1   CO2 14*  --   --    < >  --    < >  --  24 24 22*   BUN 58.0*  --   --    < >  --    < >  --  83.5* 80.7* 73.5*   CREATININE 3.14*  --   --    < >  --    < >  --  2.58* 1.92* 1.51*   CALCIUM 9.6  --   --    < >  --    < >  --  8.6 8.5 9.0   PH  --   --  7.40  --  7.51*  --  7.490  --   --   --    MG 2.40  --   --   --   --   --   --   --   --   --    ALBUMIN  --   --   --    < >  --    < >  --  2.9* 2.9* 3.0*   ALKPHOS  --   --   --    < >  --    < >  --  109 108 106   ALT  --   --   --    < >  --    < >  --  1,390* 1,430* 1,180*   AST  --   --   --    < >  --    < >  --  1,105* 688* 311*   BILITOT  --   --   --    < >  --    < >  --  0.8 1.0 1.0    < > = values in this interval not displayed.          Microbiology Results (last 7 days)       Procedure Component Value Units Date/Time    Blood Culture [[id number]]  (Normal) Collected: 12/12/22 0948    Order Status: Completed Specimen: Blood, Venous Updated: 12/16/22 1000     CULTURE, BLOOD ([facility]) No Growth At 96 Hours    Blood Culture [[id number]]  (Normal) Collected: 12/12/22 0948    Order Status: Completed Specimen: Blood, Venous Updated: 12/16/22 1000     CULTURE, BLOOD ([facility]) No Growth At 96 Hours             See below for Radiology    Scheduled Med:   dexAMETHasone  6 mg Intravenous Daily    dextrose 50% in water  (D50W)  25 g Intravenous Once    diazePAM  2 mg Oral QHS    famotidine (PF)  20 mg Intravenous Daily    heparin (PORCINE)  80 Units/kg (Adjusted) Intravenous Once    insulin regular  6 Units Intravenous Once    mupirocin   Nasal BID    piperacillin-tazobactam (ZOSYN) IVPB  4.5 g Intravenous Q12H    sodium bicarbonate  50 mEq Intravenous Once        Continuous Infusions:   sodium chloride 0.9% 75 mL/hr at 12/17/22 0531    heparin (porcine) in D5W 9.986 Units/kg/hr (12/17/22 0400)    lactated ringers 125 mL/hr at 12/13/22 2144    sodium bicarbonate drip 100 mL/hr at 12/13/22 0132        PRN Meds:  diphenhydrAMINE, heparin (PORCINE), heparin (PORCINE), hydrALAZINE, melatonin, morphine, morphine, ondansetron, sodium chloride 0.9%       Assessment/Plan:  Altered mental status likely 2/2 sepsis and metabolic encephalopathy  Acute renal failure - improving   Lactic acidosis likely 2/2 acute renal failure and sepsis, and portal venous thrombosis  NSTEMI Type II in the setting of Acute COVID Infection, SMITHA, and Probable IVC/ portal vein Thrombus/PE  COVID positive on admission to ICU- 12/12- day 5 of diagnosis   Transminitis - trending down  Bladder cancer s/p resection in October 2022  CT ABD & pelvis 12/11/2022 shows heterogeneous hyperdense mass lesion in the superior and left lateral aspect of the urinary bladder  Per patient's family at bedside, patient will be receiving additional imaging to stage bladder cancer  HX of HTN, DM II, TIA, aortic stenosis s/p TAVR, CAD s/p PCI, GERD, PARAMJIT  IVC and Portal Vein Thrombosis with High probability of Pulmonary Embolism with Marked RV Strain - On Heparin gtt         Admitted to ICU in 12/12, d/graded to hospital medicine team on 12/16  On heparin drip, aptt per heparin nomogram  Monitor Plt count, gradually decreasing   Patient apparently lives at home with family and has a significant amount of DME as well as support.  She has not really been assessed for any nutrition since she  has been here because of her increased oxygen requirements early in admission   Currently on supplemental oxygen via nasal cannula at 5 liters/minute  Consult speech though pt is lethargic and not following commands   Start Clinimix E at 60 ml/hr given she is also n NS at 75cc/hr   Completed remdesivir and continued on dexamethasone- day 6 of 10   Transaminases are beginning to slowly trend down, monitor closely  Appreciate the help from Nephrology who continues to follow her acute renal failure, Cr improving .    Apparently at baseline she remains confused form time to time per son but daughter reports differently stating she was talking but has been gradually becoming worse now. Note pt has significant dementia as well  Morning CBC, CMP, Mag ordered     VTE prophylaxis: heparin gtt    Patient condition:  Guarded    Anticipated discharge and Disposition:   TBD    Critical care note:  Critical care diagnosis: IVC, Portal vein and suspected PE on heparin gtt  Critical care interventions: Hands-on evaluation, review of labs/radiographs/records and discussion with patient and family if present  Critical care time spent: 35 minutes        All diagnosis and differential diagnosis have been reviewed; assessment and plan has been documented; I have personally reviewed the labs and test results that are presently available; I have reviewed the patients medication list; I have reviewed the consulting providers response and recommendations. I have reviewed or attempted to review medical records based upon their availability    All of the patient's questions have been  addressed and answered. Patient's is agreeable to the above stated plan. I will continue to monitor closely and make adjustments to medical management as needed.  _____________________________________________________________________    Nutrition Status:    Radiology:  US Abdomen Limited_Liver  Narrative: EXAMINATION:  US ABDOMEN LIMITED_LIVER    CLINICAL  HISTORY:  Elevated AST/ALT;    TECHNIQUE:  Multiple sagittal and transverse images were obtained of the abdomen.    COMPARISON:  None    FINDINGS:  The pancreas appears grossly unremarkable.    The liver measures 15.5 cm.  No liver mass or lesion is seen.  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  Gallbladder wall measures 5 mm.  There is a polyp seen within the gallbladder.  It measures 5 mm x 5 mm.  There is some pericholecystic fluid seen.  Common bile duct measures 4.6 mm.  The right kidney was not able to be imaged due to patient cooperation.  Impression: Thrombosis of the main portal vein    A polyp noted in the gallbladder with pericholecystic fluid seen and gallbladder wall thickening seen.  Cholecystitis should be excluded    Electronically signed by: Mian Marvin  Date:    12/14/2022  Time:    16:01  US Retroperitoneal Complete  Narrative: EXAMINATION:  US RETROPERITONEAL COMPLETE    CLINICAL HISTORY:  SMITHA;, .    TECHNIQUE:  Transverse and longitudinal images of the kidneys  and bladder were obtained.    COMPARISON:  None    FINDINGS: EXAM IS SUBOPTIMAL DUE TO BOWEL GAS  Right Kidney:    Length: 11.4 x 4.5 x 3.9 cm    Appearance: Normal echogenicity.    Collecting system: No hydronephrosis    Stones: None    Cyst/Mass: None    Left Kidney:    Length: 10.4 x 6.2 x 4.5 cm    Appearance: Normal echogenicity.    Collecting system: No hydronephrosis    Stones: None    Cyst/Mass: None    Bladder:    Decompressed by Hale catheter    Vessels:    Visualized portions of the IVC and aorta have a normal grayscale appearance.  Impression: Limited exam due to bowel gas.    No significant abnormalities identified.    Bladder was decompressed    Electronically signed by: Avtar Liu  Date:    12/14/2022  Time:    09:13      Mukesh Denton MD  Department of Hospital Medicine   [facility]   12/17/2022   7:43 AM

## 2022-12-17 NOTE — PROGRESS NOTES
Nephrology Progress Note    HPI:      Miguel Angel Oliveros is a 89 y.o. female with multiple and advanced underlying comorbidities including CAD/previous PCI, VHD with history of TAVR, sick sinus syndrome and pacemaker implantation, obstructive sleep apnea, hypertension, dyslipidemia, recent resection of bladder cancer and dementia.  The patient was admitted to [facility] with an NSTEMI after being transferred [facility] in [place] with worsening abdominal pain, UTI and acute kidney injury.  She was COVID positive on admission to ICU.  She is currently nonoliguric and renal function is slowly improving.    Unfortunately she is noted to have an inferior vena cava thrombus and now portal vein thrombosis. Patient is on remdesivir and Decadron.    Interval history:   No acute events overnight  Review of Systems:   Unable to obtain secondary to patient's condition    Objective:     Vitals:    12/17/22 1321   BP: (!) 156/100   Pulse: 86   Resp: 18   Temp: 97.3 °F (36.3 °C)       Intake/Output Summary (Last 24 hours) at 12/17/2022 1407  Last data filed at 12/17/2022 1348  Gross per 24 hour   Intake 2107.6 ml   Output 1500 ml   Net 607.6 ml        Physical examination   General appearance: Patient is in no acute distress.  HEENT: PERRLA, EOMI, no scleral icterus, no JVD. Neck is supple.  Chest: Respirations are unlabored. Lungs sounds are diminished to auscultation.   Heart: S1, S2.   Abdomen: Benign.  : Deferred.  Extremities:  Generalized trace edema, peripheral pulses are palpable.   Neuro:  Confused.  Lethargic but arousable, does not follow commands    Labs:   Hematology  Lab Results   Component Value Date    WBC 10.2 12/17/2022    HGB 10.8 (L) 12/17/2022    HCT 34.8 (L) 12/17/2022     (L) 12/17/2022     Chemistry  Lab Results   Component Value Date     12/17/2022    K 4.1 12/17/2022    CHLORIDE 106 12/17/2022    CO2 22 (L) 12/17/2022    BUN 73.5 (H) 12/17/2022    CREATININE 1.51 (H)  12/17/2022    EGFRNORACEVR 33 12/17/2022    GLUCOSE 222 (H) 12/17/2022    CALCIUM 9.0 12/17/2022    ALKPHOS 106 12/17/2022    LABPROT 6.1 12/17/2022    ALBUMIN 3.0 (L) 12/17/2022    BILIDIR 0.3 10/14/2020    IBILI 0.40 10/14/2020     (H) 12/17/2022    ALT 1,180 (H) 12/17/2022    MG 2.40 12/12/2022    PHOS 7.9 (H) 12/12/2022      Urine:  Lab Results   Component Value Date    COLORUA Brown (A) 12/12/2022    APPEARANCEUA Turbid (A) 12/12/2022    SGUA >=1.030 12/12/2022    PHUA 5.0 12/12/2022    PROTEINUA 2+ (A) 12/12/2022    GLUCOSEUA Negative 12/12/2022    KETONESUA Trace (A) 12/12/2022    BLOODUA 3+ (A) 12/12/2022    NITRITESUA Positive (A) 12/12/2022    LEUKOCYTESUR 2+ (A) 12/12/2022    RBCUA 50-99 (A) 12/12/2022    WBCUA >100 (A) 12/12/2022    BACTERIA 2+ (A) 12/12/2022         Lab Results   Component Value Date    HGBA1C 4.9 07/15/2022    GLUCOSE 222 (H) 12/17/2022        Imaging  US Retroperitoneal Complete 12/14/2022  EXAM IS SUBOPTIMAL DUE TO BOWEL GAS  Right Kidney:    Length: 11.4 x 4.5 x 3.9 cm    Appearance: Normal echogenicity.    Collecting system: No hydronephrosis    Stones: None    Cyst/Mass: None    Left Kidney:    Length: 10.4 x 6.2 x 4.5 cm    Appearance: Normal echogenicity.    Collecting system: No hydronephrosis    Stones: None    Cyst/Mass: None    Bladder:    Decompressed by Hale catheter    Vessels:    Visualized portions of the IVC and aorta have a normal grayscale appearance.    Impression  Limited exam due to bowel gas.    No significant abnormalities identified.    Bladder was decompressed         CT Abdomen Pelvis  Without Contrast 12/11/2022     The bilateral lung bases are clear.  The heart is not enlarged.    The liver is homogeneous in attenuation.  The gallbladder, spleen, pancreas, and adrenal glands are normal.  The bilateral kidneys are normal.  There is no hydronephrosis or nephrolithiasis.  There is a phlebolith in the right adnexal vein.    The stomach and small bowel  are decompressed.  The appendix is normal.  The colon is normal.  The uterus is surgically absent.  There is a poorly defined area of hyperdensity along the superior left lateral aspect of the urinary bladder measuring 4.8 x 3.4 cm.  There is no pelvic or retroperitoneal adenopathy.  The aorta is nonaneurysmal.  There is no lytic or blastic osseous lesion.    Impression  Heterogeneous hyperdense mass lesion in the superior and left lateral aspect of the urinary bladder.  This is highly suspicious for malignancy.  This is grossly unchanged from the prior exam.        Assessment / Plan:   SMITHA secondary to ATN secondary to sepsis, resolving  COVID-19 positive  IVC and PV thrombosis  Recent resection of bladder cancer    Persistent SIRS with Lactic acidosis  6    Hypertension  7.   Diabetes type II    Continue normal saline at current rate  There is no indication for renal replacement therapy in addition, patient is not considered a candidate for renal replacement therapy given her advanced age multiplicity of underlying advanced comorbidities and likelihood of recovery.      Arlette Marquez NP  12/17/2022  2:13 PM

## 2022-12-18 LAB
ALBUMIN SERPL-MCNC: 2.8 G/DL (ref 3.4–4.8)
ALBUMIN SERPL-MCNC: 2.9 G/DL (ref 3.4–4.8)
ALBUMIN/GLOB SERPL: 0.9 RATIO (ref 1.1–2)
ALP SERPL-CCNC: 115 UNIT/L (ref 40–150)
ALP SERPL-CCNC: 99 UNIT/L (ref 40–150)
ALT SERPL-CCNC: 770 UNIT/L (ref 0–55)
ALT SERPL-CCNC: 855 UNIT/L (ref 0–55)
APPEARANCE UR: ABNORMAL
APTT PPP: 53.4 SECONDS (ref 23.2–33.7)
APTT PPP: 61.1 SECONDS (ref 23.2–33.7)
AST SERPL-CCNC: 141 UNIT/L (ref 5–34)
AST SERPL-CCNC: 153 UNIT/L (ref 5–34)
BACTERIA #/AREA URNS AUTO: ABNORMAL /HPF
BASOPHILS # BLD AUTO: 0.02 X10(3)/MCL (ref 0–0.2)
BASOPHILS NFR BLD AUTO: 0.2 %
BILIRUB UR QL STRIP.AUTO: NEGATIVE MG/DL
BILIRUBIN DIRECT+TOT PNL SERPL-MCNC: 0.4 MG/DL (ref 0–0.5)
BILIRUBIN DIRECT+TOT PNL SERPL-MCNC: 0.9 MG/DL (ref 0–0.8)
BILIRUBIN DIRECT+TOT PNL SERPL-MCNC: 1 MG/DL
BILIRUBIN DIRECT+TOT PNL SERPL-MCNC: 1.3 MG/DL
BUN SERPL-MCNC: 62.1 MG/DL (ref 9.8–20.1)
CALCIUM SERPL-MCNC: 8.7 MG/DL (ref 8.4–10.2)
CHLORIDE SERPL-SCNC: 108 MMOL/L (ref 98–107)
CO2 SERPL-SCNC: 18 MMOL/L (ref 23–31)
COLOR UR AUTO: YELLOW
CREAT SERPL-MCNC: 1.15 MG/DL (ref 0.55–1.02)
CREAT SERPL-MCNC: 1.22 MG/DL (ref 0.55–1.02)
EOSINOPHIL # BLD AUTO: 0 X10(3)/MCL (ref 0–0.9)
EOSINOPHIL NFR BLD AUTO: 0 %
ERYTHROCYTE [DISTWIDTH] IN BLOOD BY AUTOMATED COUNT: 15.6 % (ref 11–14.5)
GFR SERPLBLD CREATININE-BSD FMLA CKD-EPI: 43 MLS/MIN/1.73/M2
GFR SERPLBLD CREATININE-BSD FMLA CKD-EPI: 46 MLS/MIN/1.73/M2
GLOBULIN SER-MCNC: 3.1 GM/DL (ref 2.4–3.5)
GLUCOSE SERPL-MCNC: 275 MG/DL (ref 82–115)
GLUCOSE UR QL STRIP.AUTO: ABNORMAL MG/DL
HCT VFR BLD AUTO: 33.7 % (ref 37–47)
HGB BLD-MCNC: 10.4 GM/DL (ref 12–16)
IMM GRANULOCYTES # BLD AUTO: 0.15 X10(3)/MCL (ref 0–0.04)
IMM GRANULOCYTES NFR BLD AUTO: 1.4 %
KETONES UR QL STRIP.AUTO: NEGATIVE MG/DL
LEUKOCYTE ESTERASE UR QL STRIP.AUTO: ABNORMAL UNIT/L
LYMPHOCYTES # BLD AUTO: 0.55 X10(3)/MCL (ref 0.6–4.6)
LYMPHOCYTES NFR BLD AUTO: 5 %
MAGNESIUM SERPL-MCNC: 2.8 MG/DL (ref 1.6–2.6)
MCH RBC QN AUTO: 27.8 PG
MCHC RBC AUTO-ENTMCNC: 30.9 MG/DL (ref 33–36)
MCV RBC AUTO: 90.1 FL (ref 80–94)
MONOCYTES # BLD AUTO: 0.8 X10(3)/MCL (ref 0.1–1.3)
MONOCYTES NFR BLD AUTO: 7.3 %
NEUTROPHILS # BLD AUTO: 9.5 X10(3)/MCL (ref 2.1–9.2)
NEUTROPHILS NFR BLD AUTO: 86.1 %
NITRITE UR QL STRIP.AUTO: NEGATIVE
NRBC BLD AUTO-RTO: 32.3 % (ref 0–1)
PH UR STRIP.AUTO: 6.5 [PH]
PLATELET # BLD AUTO: 104 X10(3)/MCL (ref 140–371)
PMV BLD AUTO: 13.1 FL (ref 9.4–12.4)
POTASSIUM SERPL-SCNC: 4.5 MMOL/L (ref 3.5–5.1)
PROT SERPL-MCNC: 5.9 GM/DL (ref 5.8–7.6)
PROT SERPL-MCNC: 6.3 GM/DL (ref 5.8–7.6)
PROT UR QL STRIP.AUTO: ABNORMAL MG/DL
RBC # BLD AUTO: 3.74 X10(6)/MCL (ref 4.2–5.4)
RBC #/AREA URNS AUTO: 495 /HPF
RBC UR QL AUTO: ABNORMAL UNIT/L
SODIUM SERPL-SCNC: 139 MMOL/L (ref 136–145)
SP GR UR STRIP.AUTO: 1.04 (ref 1–1.03)
SQUAMOUS #/AREA URNS AUTO: <5 /HPF
UROBILINOGEN UR STRIP-ACNC: 1 MG/DL
WBC # SPEC AUTO: 11 X10(3)/MCL (ref 4.5–11.5)
WBC #/AREA URNS AUTO: 21 /HPF

## 2022-12-18 PROCEDURE — 63600175 PHARM REV CODE 636 W HCPCS: Performed by: PHYSICIAN ASSISTANT

## 2022-12-18 PROCEDURE — 25000003 PHARM REV CODE 250: Performed by: INTERNAL MEDICINE

## 2022-12-18 PROCEDURE — 87088 URINE BACTERIA CULTURE: CPT | Performed by: INTERNAL MEDICINE

## 2022-12-18 PROCEDURE — 85730 THROMBOPLASTIN TIME PARTIAL: CPT | Performed by: INTERNAL MEDICINE

## 2022-12-18 PROCEDURE — 27000207 HC ISOLATION

## 2022-12-18 PROCEDURE — 82565 ASSAY OF CREATININE: CPT | Performed by: INTERNAL MEDICINE

## 2022-12-18 PROCEDURE — 85025 COMPLETE CBC W/AUTO DIFF WBC: CPT | Performed by: INTERNAL MEDICINE

## 2022-12-18 PROCEDURE — 63600175 PHARM REV CODE 636 W HCPCS: Performed by: NURSE PRACTITIONER

## 2022-12-18 PROCEDURE — 80076 HEPATIC FUNCTION PANEL: CPT | Performed by: INTERNAL MEDICINE

## 2022-12-18 PROCEDURE — 25000003 PHARM REV CODE 250: Performed by: STUDENT IN AN ORGANIZED HEALTH CARE EDUCATION/TRAINING PROGRAM

## 2022-12-18 PROCEDURE — 63600175 PHARM REV CODE 636 W HCPCS: Performed by: STUDENT IN AN ORGANIZED HEALTH CARE EDUCATION/TRAINING PROGRAM

## 2022-12-18 PROCEDURE — 80053 COMPREHEN METABOLIC PANEL: CPT | Performed by: INTERNAL MEDICINE

## 2022-12-18 PROCEDURE — 21400001 HC TELEMETRY ROOM

## 2022-12-18 PROCEDURE — 63600175 PHARM REV CODE 636 W HCPCS: Performed by: INTERNAL MEDICINE

## 2022-12-18 PROCEDURE — 36415 COLL VENOUS BLD VENIPUNCTURE: CPT | Performed by: INTERNAL MEDICINE

## 2022-12-18 PROCEDURE — 86022 PLATELET ANTIBODIES: CPT | Performed by: INTERNAL MEDICINE

## 2022-12-18 PROCEDURE — 25500020 PHARM REV CODE 255: Performed by: INTERNAL MEDICINE

## 2022-12-18 PROCEDURE — 88305 TISSUE EXAM BY PATHOLOGIST: CPT | Performed by: INTERNAL MEDICINE

## 2022-12-18 PROCEDURE — 83735 ASSAY OF MAGNESIUM: CPT | Performed by: INTERNAL MEDICINE

## 2022-12-18 PROCEDURE — 25000003 PHARM REV CODE 250: Performed by: PHYSICIAN ASSISTANT

## 2022-12-18 PROCEDURE — 81001 URINALYSIS AUTO W/SCOPE: CPT | Performed by: INTERNAL MEDICINE

## 2022-12-18 RX ORDER — ARGATROBAN 1 MG/ML
0-10 INJECTION INTRAVENOUS CONTINUOUS
Status: DISCONTINUED | OUTPATIENT
Start: 2022-12-18 | End: 2022-12-23

## 2022-12-18 RX ORDER — TALC
6 POWDER (GRAM) TOPICAL NIGHTLY
Status: DISCONTINUED | OUTPATIENT
Start: 2022-12-18 | End: 2022-12-24

## 2022-12-18 RX ORDER — HALOPERIDOL 5 MG/ML
2 INJECTION INTRAMUSCULAR ONCE AS NEEDED
Status: DISCONTINUED | OUTPATIENT
Start: 2022-12-18 | End: 2022-12-18

## 2022-12-18 RX ORDER — HALOPERIDOL 5 MG/ML
2 INJECTION INTRAMUSCULAR NIGHTLY PRN
Status: DISCONTINUED | OUTPATIENT
Start: 2022-12-18 | End: 2022-12-30 | Stop reason: HOSPADM

## 2022-12-18 RX ORDER — HYDRALAZINE HYDROCHLORIDE 20 MG/ML
5 INJECTION INTRAMUSCULAR; INTRAVENOUS EVERY 4 HOURS PRN
Status: DISCONTINUED | OUTPATIENT
Start: 2022-12-18 | End: 2022-12-30 | Stop reason: HOSPADM

## 2022-12-18 RX ORDER — HALOPERIDOL 5 MG/ML
2 INJECTION INTRAMUSCULAR ONCE AS NEEDED
Status: COMPLETED | OUTPATIENT
Start: 2022-12-18 | End: 2022-12-18

## 2022-12-18 RX ADMIN — LEUCINE, PHENYLALANINE, LYSINE, METHIONINE, ISOLEUCINE, VALINE, HISTIDINE, THREONINE, TRYPTOPHAN, ALANINE, GLYCINE, ARGININE, PROLINE, SERINE, TYROSINE, SODIUM ACETATE, DIBASIC POTASSIUM PHOSPHATE, MAGNESIUM CHLORIDE, SODIUM CHLORIDE, CALCIUM CHLORIDE, DEXTROSE
311; 238; 247; 170; 255; 247; 204; 179; 77; 880; 438; 489; 289; 213; 17; 297; 261; 51; 77; 33; 5 INJECTION INTRAVENOUS at 06:12

## 2022-12-18 RX ADMIN — HYDRALAZINE HYDROCHLORIDE 10 MG: 20 INJECTION INTRAMUSCULAR; INTRAVENOUS at 12:12

## 2022-12-18 RX ADMIN — PIPERACILLIN SODIUM,TAZOBACTAM SODIUM 4.5 G: 4; .5 INJECTION, POWDER, FOR SOLUTION INTRAVENOUS at 12:12

## 2022-12-18 RX ADMIN — IOPAMIDOL 100 ML: 755 INJECTION, SOLUTION INTRAVENOUS at 04:12

## 2022-12-18 RX ADMIN — ARGATROBAN 0.5 MCG/KG/MIN: 50 INJECTION, SOLUTION INTRAVENOUS at 10:12

## 2022-12-18 RX ADMIN — DEXAMETHASONE SODIUM PHOSPHATE 6 MG: 4 INJECTION, SOLUTION INTRA-ARTICULAR; INTRALESIONAL; INTRAMUSCULAR; INTRAVENOUS; SOFT TISSUE at 08:12

## 2022-12-18 RX ADMIN — HYDRALAZINE HYDROCHLORIDE 5 MG: 20 INJECTION INTRAMUSCULAR; INTRAVENOUS at 11:12

## 2022-12-18 RX ADMIN — SODIUM CHLORIDE: 9 INJECTION, SOLUTION INTRAVENOUS at 10:12

## 2022-12-18 RX ADMIN — LEUCINE, PHENYLALANINE, LYSINE, METHIONINE, ISOLEUCINE, VALINE, HISTIDINE, THREONINE, TRYPTOPHAN, ALANINE, GLYCINE, ARGININE, PROLINE, SERINE, TYROSINE, SODIUM ACETATE, DIBASIC POTASSIUM PHOSPHATE, MAGNESIUM CHLORIDE, SODIUM CHLORIDE, CALCIUM CHLORIDE, DEXTROSE
311; 238; 247; 170; 255; 247; 204; 179; 77; 880; 438; 489; 289; 213; 17; 297; 261; 51; 77; 33; 5 INJECTION INTRAVENOUS at 05:12

## 2022-12-18 RX ADMIN — FAMOTIDINE 20 MG: 10 INJECTION, SOLUTION INTRAVENOUS at 08:12

## 2022-12-18 RX ADMIN — HYDRALAZINE HYDROCHLORIDE 5 MG: 20 INJECTION INTRAMUSCULAR; INTRAVENOUS at 01:12

## 2022-12-18 RX ADMIN — HALOPERIDOL LACTATE 2 MG: 5 INJECTION, SOLUTION INTRAMUSCULAR at 01:12

## 2022-12-18 NOTE — PT/OT/SLP PROGRESS
Consulted with MARE Naylor; pt remains lethargic and does not maintain alertness.  Not approp for PO trials today.

## 2022-12-18 NOTE — PROGRESS NOTES
[facility] Medicine Progress Note        Chief Complaint: Inpatient Follow-up for AMS, Covid    HPI: Miguel Angel Oliveros is an 89-year-old female with PMH of DMII, CAD s/p PCI, HTN, AS s/p TAVR, HLD, GERD, and PARAMJIT, who is admitted to [facility] ICU from regular unit after being found to have altered mental status and increased respiratory distress. She was initially admitted to [facility] on 2022 by [facility] after being transferred from [place] with the diagnosis of NSTEMI. At the time of encounter, patient states having lower abdominal discomfort, bladder fullness, and dysuria. She states that these symptoms have been persisting for the past few days accompanied by decreased urinary output. Per patient's family members at beside, she usually gets UTI several times each year and she has had 4 episodes of UTI in  (last UTI was 3 months ago).  Daughter reported that patient as baseline is able to converse with family member, recognizes everyone, able to sit at the table and feed herself, able to transfer herself from bed to wheelchair and wheelchair to chair with some assistance.  Stated she has some good days and bad days in a way that sometimes she asked about her old friends who have  but otherwise her dementia is not too bad.  She is known to Dr. Montalvo in outpatient setting.  Reported she was sick at home a week prior to admission to our facility.  Initially she went to ER because she was severely constipated which has since resolved, after that she went back to her PCP given she was short of breath and fatigued and lethargic.  Then she was brought here on  when she was very lethargic to the point that she needed max assist to move so they called EMS.  Daughter also reported in ICU on Thursday afternoon she was feeling her best, recognized her brother, sister, her grandkids.  But Thursday afternoon she started becoming more lethargic and sleepy and since then she  has not improved    Interval Hx:   Laying in bed, very groggy as she had a rough night and was awake till 3pm, slept finally after that when she got haldol constantin agitation.   Daughter is concerned that patient has not had her home medication since the admission.  Discuss that speech could not evaluate her given she was not following command yesterday and today also she is very groggy and will be unable to evaluate her today.  Discussed possible placement of NG tube to give her home meds, she will discuss it with her sibling and will let us know.  Daughter reported that patient as baseline is able to converse with family member, recognizes everyone, able to sit at the table and feed herself, able to transfer herself from bed to wheelchair and wheelchair to chair with some assistance.  Stated she has some good days and bad days in a way that sometimes she asked about her old friends who have  but otherwise her dementia is not too bad.  She is known to Dr. Montalvo in outpatient setting.  Reported she was sick at home a week prior to admission to our facility.  Initially she went to ER because she was severely constipated which has since resolved, after that she went back to her PCP given she was short of breath and fatigued and lethargic.  Then she was brought here on  when she was very lethargic to the point that she needed max assist to move so they called EMS.  Daughter also reported in ICU on Thursday afternoon she was feeling her best, recognized her brother, sister, her grandkids.  But Thursday afternoon she started becoming more lethargic and sleepy and since then she has not improved  Case was discussed with patient's nurse on the floor as well as Koko Samaniego NP     Objective/physical exam:  General:  Confused, sleepy, left IJ present, obese  Chest: Clear to auscultation bilaterally  Heart: RRR, +S1, S2, no appreciable murmur  Abdomen: Soft, nontender, BS +  MSK: Warm, no lower extremity edema, no  clubbing or cyanosis, hands in mittens  Neurologic:  Not following commands, altered mentation    VITAL SIGNS: 24 HRS MIN & MAX LAST   Temp  Min: 95.9 °F (35.5 °C)  Max: 97.9 °F (36.6 °C) 97.2 °F (36.2 °C)   BP  Min: 122/80  Max: 175/98 (!) 161/96     Pulse  Min: 82  Max: 88  88   Resp  Min: 18  Max: 20 18   SpO2  Min: 96 %  Max: 99 % 99 %       Recent Labs   Lab 12/15/22  0019 12/16/22  0148 12/17/22  0533   WBC 13.2* 11.4 10.2   RBC 3.73* 3.69* 3.88*   HGB 10.5* 10.3* 10.8*   HCT 33.9* 33.1* 34.8*   MCV 90.9 89.7 89.7   MCH 28.2 27.9 27.8   MCHC 31.0* 31.1* 31.0*   RDW 14.1 15.0* 15.5*   * 119* 115*   MPV 12.9* 12.4 12.5*       Recent Labs   Lab 12/12/22  1124 12/12/22  1904 12/12/22  2038 12/12/22  2356 12/13/22  1213 12/13/22  1827 12/14/22  0845 12/15/22  0019 12/16/22  0148 12/17/22  0533   *  --   --    < >  --    < >  --  135* 138 140   K 6.4*   < >  --    < >  --    < >  --  4.3 4.4 4.1   CO2 14*  --   --    < >  --    < >  --  24 24 22*   BUN 58.0*  --   --    < >  --    < >  --  83.5* 80.7* 73.5*   CREATININE 3.14*  --   --    < >  --    < >  --  2.58* 1.92* 1.51*   CALCIUM 9.6  --   --    < >  --    < >  --  8.6 8.5 9.0   PH  --   --  7.40  --  7.51*  --  7.490  --   --   --    MG 2.40  --   --   --   --   --   --   --   --   --    ALBUMIN  --   --   --    < >  --    < >  --  2.9* 2.9* 3.0*   ALKPHOS  --   --   --    < >  --    < >  --  109 108 106   ALT  --   --   --    < >  --    < >  --  1,390* 1,430* 1,180*   AST  --   --   --    < >  --    < >  --  1,105* 688* 311*   BILITOT  --   --   --    < >  --    < >  --  0.8 1.0 1.0    < > = values in this interval not displayed.          Microbiology Results (last 7 days)       Procedure Component Value Units Date/Time    Blood Culture [[id number]]  (Normal) Collected: 12/12/22 0948    Order Status: Completed Specimen: Blood, Venous Updated: 12/17/22 1000     CULTURE, BLOOD ([facility]) No Growth at 5 days    Blood Culture [[id number]]  (Normal)  Collected: 12/12/22 0948    Order Status: Completed Specimen: Blood, Venous Updated: 12/17/22 1000     CULTURE, BLOOD ([facility]) No Growth at 5 days             See below for Radiology    Scheduled Med:   dexAMETHasone  6 mg Intravenous Daily    dextrose 50% in water (D50W)  25 g Intravenous Once    famotidine (PF)  20 mg Intravenous Daily    heparin (PORCINE)  80 Units/kg (Adjusted) Intravenous Once    insulin regular  6 Units Intravenous Once    piperacillin-tazobactam (ZOSYN) IVPB  4.5 g Intravenous Q12H        Continuous Infusions:   sodium chloride 0.9% 75 mL/hr at 12/17/22 0531    Amino acid 4.25% - dextrose 5% (CLINIMIX-E) solution (1L provides 42.5 gm AA, 50 gm CHO (170 kcal/L dextrose), Na 35, K 30, Mg 5, Ca 4.5, Acetate 70, Cl 39, Phos 15) 60 mL/hr at 12/18/22 0616    heparin (porcine) in D5W 10 Units/kg/hr (12/17/22 2317)        PRN Meds:  diazePAM, heparin (PORCINE), heparin (PORCINE), hydrALAZINE, melatonin, ondansetron, sodium chloride 0.9%       Assessment/Plan:  Altered mental status likely 2/2 sepsis and metabolic encephalopathy  Acute renal failure/ ATN - Cr improving   Lactic acidosis likely 2/2 acute renal failure and sepsis, and portal venous thrombosis  NSTEMI Type II in the setting of Acute COVID Infection, SMITHA, and Probable IVC/ portal vein Thrombus/PE  COVID positive on admission to ICU- 12/12- day 6 of diagnosis   Transminitis - trending down  Bladder cancer s/p resection in October 2022  CT ABD & pelvis 12/11/2022 shows heterogeneous hyperdense mass lesion in the superior and left lateral aspect of the urinary bladder  Per patient's family at bedside, patient will be receiving additional imaging to stage bladder cancer  HX of HTN, DM II, TIA, aortic stenosis s/p TAVR, CAD s/p PCI, GERD, PARAMJIT  IVC and Portal Vein Thrombosis with High probability of Pulmonary Embolism with Marked RV Strain - On Heparin gtt  Thrombocytopenia- heparin induced ?         Admitted to ICU in 12/12, d/graded to hospital  medicine team on 12/16  On heparin drip, aptt per heparin nomogram  Monitor Plt count, gradually decreasing... I spoke to Saeed Samaniego NP, recommended CTA Chest PE Protocol to rule our PE. Recommended if negative for PE, will md/candido heprain, if positive, will have to switch to OAC  Speoke to Arlette NP with nephrology, agreed with CTA Chest. Recommended to continue NS at 75cc/hr atleast for 24 hrs post contrast  CTA Chest PE protocol ordered   Patient apparently lives at home with family and has a significant amount of DME as well as support.  She has not really been assessed for any oral nutrition since she has been here because of her increased oxygen requirements early in admission   Currently on supplemental oxygen via nasal cannula at 5 liters/minute  Consulted speech though pt was lethargic and not following commands, so was unable to assess   Started Clinimix E at 60 ml/hr given she is also n NS at 75cc/hr   Completed remdesivir and continued on dexamethasone- day 7 of 10   Transaminases are beginning to trend down, monitor closely  Appreciate the help from Nephrology and cardiology services, they both signed off   Apparently at baseline she become confuse form time to time per son but daughter reports differently stating she was conversing, able to transfer, able to feed herself. Reported her good and bad days with bad days being when she asks about her old friends if they are alive. Pt is established with Dr Montalvo who is treating her dementia   Daughter reported lately she has been depressed and her PCP started her on zoloft but they have not given it to her yet  Discussed valium 5 mg is a very high dose for her age gp and is considered high risk medicine and that we have decreased the dose to 2 mg daily as needed. Daughter reported all the doctors in the hospital has said the same thing but she has been on that dose for many years. Advised consulting with a Psychiatrist if he anxiety and depression  is getting worse, verbalized understanding   Awaiting daughter and family's decision regarding NG tube with Meds and tube feeds   Morning CMP, lactic acid ordered     VTE prophylaxis: heparin gtt    Patient condition:  Guarded    Anticipated discharge and Disposition:   TBD    Critical care note:  Critical care diagnosis: IVC, Portal vein and suspected PE on heparin gtt  Critical care interventions: Hands-on evaluation, review of labs/radiographs/records and discussion with patient and family if present  Critical care time spent: 35 minutes        All diagnosis and differential diagnosis have been reviewed; assessment and plan has been documented; I have personally reviewed the labs and test results that are presently available; I have reviewed the patients medication list; I have reviewed the consulting providers response and recommendations. I have reviewed or attempted to review medical records based upon their availability    All of the patient's questions have been  addressed and answered. Patient's is agreeable to the above stated plan. I will continue to monitor closely and make adjustments to medical management as needed.  _____________________________________________________________________    Nutrition Status:    Radiology:  US Abdomen Limited_Liver  Narrative: EXAMINATION:  US ABDOMEN LIMITED_LIVER    CLINICAL HISTORY:  Elevated AST/ALT;    TECHNIQUE:  Multiple sagittal and transverse images were obtained of the abdomen.    COMPARISON:  None    FINDINGS:  The pancreas appears grossly unremarkable.    The liver measures 15.5 cm.  No liver mass or lesion is seen.  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  Gallbladder wall measures 5 mm.  There is a polyp seen within the gallbladder.  It measures 5 mm x 5 mm.  There is some pericholecystic fluid seen.  Common bile duct measures 4.6 mm.  The right kidney was not able to be imaged due to patient cooperation.  Impression: Thrombosis of the main portal vein    A  polyp noted in the gallbladder with pericholecystic fluid seen and gallbladder wall thickening seen.  Cholecystitis should be excluded    Electronically signed by: Mian Marvin  Date:    12/14/2022  Time:    16:01  US Retroperitoneal Complete  Narrative: EXAMINATION:  US RETROPERITONEAL COMPLETE    CLINICAL HISTORY:  SMITHA;, .    TECHNIQUE:  Transverse and longitudinal images of the kidneys  and bladder were obtained.    COMPARISON:  None    FINDINGS: EXAM IS SUBOPTIMAL DUE TO BOWEL GAS  Right Kidney:    Length: 11.4 x 4.5 x 3.9 cm    Appearance: Normal echogenicity.    Collecting system: No hydronephrosis    Stones: None    Cyst/Mass: None    Left Kidney:    Length: 10.4 x 6.2 x 4.5 cm    Appearance: Normal echogenicity.    Collecting system: No hydronephrosis    Stones: None    Cyst/Mass: None    Bladder:    Decompressed by Hale catheter    Vessels:    Visualized portions of the IVC and aorta have a normal grayscale appearance.  Impression: Limited exam due to bowel gas.    No significant abnormalities identified.    Bladder was decompressed    Electronically signed by: Avtar Liu  Date:    12/14/2022  Time:    09:13      Mukesh Denton MD  Department of Hospital Medicine   [facility]   12/18/2022   7:43 AM

## 2022-12-18 NOTE — PROGRESS NOTES
Nephrology Progress Note    HPI:      Miguel Angel Oliveros is a 89 y.o. female with multiple and advanced underlying comorbidities including CAD/previous PCI, VHD with history of TAVR, sick sinus syndrome and pacemaker implantation, obstructive sleep apnea, hypertension, dyslipidemia, recent resection of bladder cancer and dementia.  The patient was admitted to [facility] with an NSTEMI after being transferred [facility] in [place] with worsening abdominal pain, UTI and acute kidney injury.  She was COVID positive on admission to ICU.  She is currently nonoliguric and renal function is slowly improving.    Unfortunately she is noted to have an inferior vena cava thrombus and now portal vein thrombosis. Patient is on remdesivir and Decadron.    Interval history:   No acute events overnight.  Patient is resting quietly, appears comfortable  Review of Systems:   Unable to obtain secondary to patient's condition    Objective:     Vitals:    12/18/22 0618   BP: (!) 161/96   Pulse: 88   Resp:    Temp:        Intake/Output Summary (Last 24 hours) at 12/18/2022 0849  Last data filed at 12/18/2022 0629  Gross per 24 hour   Intake --   Output 2300 ml   Net -2300 ml        Physical examination   General appearance: Patient is in no acute distress.  HEENT: PERRLA, EOMI, no scleral icterus, no JVD. Neck is supple.  Chest: Respirations are unlabored. Lungs sounds are diminished to auscultation.   Heart: S1, S2.   Abdomen: Benign.  : Deferred.  Extremities:  Generalized trace edema, peripheral pulses are palpable.   Neuro:  Confused.  Lethargic but arousable, does not follow commands    Labs:   Hematology  Lab Results   Component Value Date    WBC 10.2 12/17/2022    HGB 10.8 (L) 12/17/2022    HCT 34.8 (L) 12/17/2022     (L) 12/17/2022     Chemistry  Lab Results   Component Value Date     12/18/2022    K 4.5 12/18/2022    CHLORIDE 108 (H) 12/18/2022    CO2 18 (L) 12/18/2022    BUN 62.1 (H) 12/18/2022     CREATININE 1.22 (H) 12/18/2022    EGFRNORACEVR 43 12/18/2022    GLUCOSE 275 (H) 12/18/2022    CALCIUM 8.7 12/18/2022    ALKPHOS 99 12/18/2022    LABPROT 5.9 12/18/2022    ALBUMIN 2.8 (L) 12/18/2022    BILIDIR 0.3 10/14/2020    IBILI 0.40 10/14/2020     (H) 12/18/2022     (H) 12/18/2022    MG 2.80 (H) 12/18/2022    PHOS 7.9 (H) 12/12/2022      Urine:  Lab Results   Component Value Date    COLORUA Brown (A) 12/12/2022    APPEARANCEUA Turbid (A) 12/12/2022    SGUA >=1.030 12/12/2022    PHUA 5.0 12/12/2022    PROTEINUA 2+ (A) 12/12/2022    GLUCOSEUA Negative 12/12/2022    KETONESUA Trace (A) 12/12/2022    BLOODUA 3+ (A) 12/12/2022    NITRITESUA Positive (A) 12/12/2022    LEUKOCYTESUR 2+ (A) 12/12/2022    RBCUA 50-99 (A) 12/12/2022    WBCUA >100 (A) 12/12/2022    BACTERIA 2+ (A) 12/12/2022         Lab Results   Component Value Date    HGBA1C 4.9 07/15/2022    GLUCOSE 275 (H) 12/18/2022        Imaging  US Retroperitoneal Complete 12/14/2022  EXAM IS SUBOPTIMAL DUE TO BOWEL GAS  Right Kidney:  Length: 11.4 x 4.5 x 3.9 cm  Appearance: Normal echogenicity.  Collecting system: No hydronephrosis  Stones: None  Cyst/Mass: None  Left Kidney:  Length: 10.4 x 6.2 x 4.5 cm  Appearance: Normal echogenicity.  Collecting system: No hydronephrosis  Stones: None  Cyst/Mass: None  Bladder:  Decompressed by Hale catheter  Vessels:  Visualized portions of the IVC and aorta have a normal grayscale appearance.    Impression  Limited exam due to bowel gas.  No significant abnormalities identified.  Bladder was decompressed       CT Abdomen Pelvis  Without Contrast 12/11/2022     The bilateral lung bases are clear.  The heart is not enlarged.  The liver is homogeneous in attenuation.  The gallbladder, spleen, pancreas, and adrenal glands are normal.  The bilateral kidneys are normal.  There is no hydronephrosis or nephrolithiasis.  There is a phlebolith in the right adnexal vein.  The stomach and small bowel are decompressed.   The appendix is normal.  The colon is normal.  The uterus is surgically absent.  There is a poorly defined area of hyperdensity along the superior left lateral aspect of the urinary bladder measuring 4.8 x 3.4 cm.  There is no pelvic or retroperitoneal adenopathy.  The aorta is nonaneurysmal.  There is no lytic or blastic osseous lesion.    Impression  Heterogeneous hyperdense mass lesion in the superior and left lateral aspect of the urinary bladder.  This is highly suspicious for malignancy.  This is grossly unchanged from the prior exam.        Assessment / Plan:   SMITHA secondary to ATN secondary to sepsis, resolving  COVID-19 positive  IVC and PV thrombosis  Recent resection of bladder cancer    Persistent SIRS with Lactic acidosis  6    Hypertension  7.   Diabetes type II    Renal indices continue to improve, urinary output is adequate.  Nephrology will now sign off.  Thank you very much for allowing us to participate in the care of this patient.    Arlette Marquez NP  12/18/2022

## 2022-12-18 NOTE — PLAN OF CARE
Problem: Adult Inpatient Plan of Care  Goal: Plan of Care Review  Outcome: Ongoing, Progressing  Goal: Patient-Specific Goal (Individualized)  Outcome: Ongoing, Progressing  Goal: Absence of Hospital-Acquired Illness or Injury  Outcome: Ongoing, Progressing  Goal: Optimal Comfort and Wellbeing  Outcome: Ongoing, Progressing  Goal: Readiness for Transition of Care  Outcome: Ongoing, Progressing     Problem: Infection  Goal: Absence of Infection Signs and Symptoms  Outcome: Ongoing, Progressing     Problem: Skin Injury Risk Increased  Goal: Skin Health and Integrity  Outcome: Ongoing, Progressing     Problem: Fall Injury Risk  Goal: Absence of Fall and Fall-Related Injury  Outcome: Ongoing, Progressing     Problem: Fall Injury Risk  Goal: Absence of Fall and Fall-Related Injury  Outcome: Ongoing, Progressing     Problem: Adjustment to Illness (Stroke, Ischemic/Transient Ischemic Attack)  Goal: Optimal Coping  Outcome: Ongoing, Progressing     Problem: Bowel Elimination Impaired (Stroke, Ischemic/Transient Ischemic Attack)  Goal: Effective Bowel Elimination  Outcome: Ongoing, Progressing     Problem: Cerebral Tissue Perfusion (Stroke, Ischemic/Transient Ischemic Attack)  Goal: Optimal Cerebral Tissue Perfusion  Outcome: Ongoing, Progressing     Problem: Cognitive Impairment (Stroke, Ischemic/Transient Ischemic Attack)  Goal: Optimal Cognitive Function  Outcome: Ongoing, Progressing     Problem: Communication Impairment (Stroke, Ischemic/Transient Ischemic Attack)  Goal: Improved Communication Skills  Outcome: Ongoing, Progressing     Problem: Functional Ability Impaired (Stroke, Ischemic/Transient Ischemic Attack)  Goal: Optimal Functional Ability  Outcome: Ongoing, Progressing     Problem: Respiratory Compromise (Stroke, Ischemic/Transient Ischemic Attack)  Goal: Effective Oxygenation and Ventilation  Outcome: Ongoing, Progressing     Problem: Sensorimotor Impairment (Stroke, Ischemic/Transient Ischemic  Attack)  Goal: Improved Sensorimotor Function  Outcome: Ongoing, Progressing     Problem: Swallowing Impairment (Stroke, Ischemic/Transient Ischemic Attack)  Goal: Optimal Eating and Swallowing without Aspiration  Outcome: Ongoing, Progressing     Problem: Urinary Elimination Impaired (Stroke, Ischemic/Transient Ischemic Attack)  Goal: Effective Urinary Elimination  Outcome: Ongoing, Progressing     Problem: Impaired Wound Healing  Goal: Optimal Wound Healing  Outcome: Ongoing, Progressing

## 2022-12-19 LAB
ABS NEUT (OLG): 12.22 X10(3)/MCL (ref 2.1–9.2)
ALBUMIN SERPL-MCNC: 2.9 G/DL (ref 3.4–4.8)
ALBUMIN/GLOB SERPL: 0.8 RATIO (ref 1.1–2)
ALP SERPL-CCNC: 106 UNIT/L (ref 40–150)
ALT SERPL-CCNC: 709 UNIT/L (ref 0–55)
ANISOCYTOSIS BLD QL SMEAR: ABNORMAL
APTT PPP: 50.2 SECONDS (ref 23.2–33.7)
APTT PPP: 55.4 SECONDS (ref 23.2–33.7)
APTT PPP: 62.8 SECONDS (ref 23.2–33.7)
APTT PPP: 63.2 SECONDS (ref 23.2–33.7)
APTT PPP: 64.1 SECONDS (ref 23.2–33.7)
APTT PPP: 67.1 SECONDS (ref 23.2–33.7)
AST SERPL-CCNC: 136 UNIT/L (ref 5–34)
BILIRUBIN DIRECT+TOT PNL SERPL-MCNC: 1.4 MG/DL
BUN SERPL-MCNC: 52.2 MG/DL (ref 9.8–20.1)
BURR CELLS (OLG): ABNORMAL
CALCIUM SERPL-MCNC: 8.9 MG/DL (ref 8.4–10.2)
CHLORIDE SERPL-SCNC: 110 MMOL/L (ref 98–107)
CO2 SERPL-SCNC: 17 MMOL/L (ref 23–31)
CORRECTED TEMPERATURE (PCO2): 25 MMHG (ref 35–45)
CORRECTED TEMPERATURE (PH): 7.48 (ref 7.35–7.45)
CORRECTED TEMPERATURE (PO2): 69 MMHG (ref 80–100)
CREAT SERPL-MCNC: 1.06 MG/DL (ref 0.55–1.02)
ERYTHROCYTE [DISTWIDTH] IN BLOOD BY AUTOMATED COUNT: 15.7 % (ref 11–14.5)
GFR SERPLBLD CREATININE-BSD FMLA CKD-EPI: 50 MLS/MIN/1.73/M2
GLOBULIN SER-MCNC: 3.6 GM/DL (ref 2.4–3.5)
GLUCOSE SERPL-MCNC: 310 MG/DL (ref 82–115)
HCO3 UR-SCNC: 18.6 MMOL/L (ref 22–26)
HCT VFR BLD AUTO: 37.6 % (ref 37–47)
HGB BLD-MCNC: 11.2 GM/DL (ref 12–16)
HGB BLD-MCNC: 11.5 G/DL (ref 12–16)
IMM GRANULOCYTES # BLD AUTO: 0.22 X10(3)/MCL (ref 0–0.04)
IMM GRANULOCYTES NFR BLD AUTO: 1.7 %
INSTRUMENT WBC (OLG): 13 X10(3)/MCL
LACTATE SERPL-SCNC: 3.7 MMOL/L (ref 0.5–2.2)
LACTATE SERPL-SCNC: 4.3 MMOL/L (ref 0.5–2.2)
LYMPHOCYTES NFR BLD MANUAL: 0.13 X10(3)/MCL
LYMPHOCYTES NFR BLD MANUAL: 1 %
MACROCYTES BLD QL SMEAR: ABNORMAL
MAGNESIUM SERPL-MCNC: 2.8 MG/DL (ref 1.6–2.6)
MCH RBC QN AUTO: 27.7 PG
MCHC RBC AUTO-ENTMCNC: 29.8 MG/DL (ref 33–36)
MCV RBC AUTO: 92.8 FL (ref 80–94)
MONOCYTES NFR BLD MANUAL: 0.52 X10(3)/MCL (ref 0.1–1.3)
MONOCYTES NFR BLD MANUAL: 4 %
NEUTROPHILS NFR BLD MANUAL: 92 %
NRBC BLD AUTO-RTO: 28.1 % (ref 0–1)
NRBC BLD MANUAL-RTO: 48 %
OVALOCYTES (OLG): ABNORMAL
PATH REV: NORMAL
PCO2 BLDA: 25 MMHG (ref 35–45)
PH SMN: 7.48 [PH] (ref 7.35–7.45)
PHOSPHATE SERPL-MCNC: 2.9 MG/DL (ref 2.3–4.7)
PLASMA CELLS BLD QL SMEAR: 1 %
PLATELET # BLD AUTO: 84 X10(3)/MCL (ref 140–371)
PLATELET # BLD EST: ABNORMAL 10*3/UL
PMV BLD AUTO: 13.5 FL (ref 9.4–12.4)
PO2 BLDA: 69 MMHG (ref 80–100)
POC BASE DEFICIT: -3.6 MMOL/L (ref -2–2)
POC COHB: 1.9 %
POC IONIZED CALCIUM: 1.22 MMOL/L (ref 1.12–1.23)
POC METHB: 0.8 % (ref 0.4–1.5)
POC O2HB: 93 % (ref 94–97)
POC SATURATED O2: 94.8 %
POC TEMPERATURE: 37 °C
POCT GLUCOSE: 212 MG/DL (ref 70–110)
POCT GLUCOSE: 288 MG/DL (ref 70–110)
POIKILOCYTOSIS BLD QL SMEAR: ABNORMAL
POLYCHROMASIA BLD QL SMEAR: ABNORMAL
POTASSIUM BLD-SCNC: 4.1 MMOL/L (ref 3.5–5)
POTASSIUM SERPL-SCNC: 5.6 MMOL/L (ref 3.5–5.1)
PROMYELOCYTES # BLD MANUAL: 2 %
PROT SERPL-MCNC: 6.5 GM/DL (ref 5.8–7.6)
RBC # BLD AUTO: 4.05 X10(6)/MCL (ref 4.2–5.4)
RBC MORPH BLD: ABNORMAL
SCHISTOCYTE (OLG): ABNORMAL
SODIUM BLD-SCNC: 136 MMOL/L (ref 137–145)
SODIUM SERPL-SCNC: 138 MMOL/L (ref 136–145)
SPECIMEN SOURCE: ABNORMAL
TARGETS BLD QL SMEAR: ABNORMAL
WBC # SPEC AUTO: 12.7 X10(3)/MCL (ref 4.5–11.5)

## 2022-12-19 PROCEDURE — 85730 THROMBOPLASTIN TIME PARTIAL: CPT | Performed by: INTERNAL MEDICINE

## 2022-12-19 PROCEDURE — 80053 COMPREHEN METABOLIC PANEL: CPT | Performed by: INTERNAL MEDICINE

## 2022-12-19 PROCEDURE — 85027 COMPLETE CBC AUTOMATED: CPT | Performed by: INTERNAL MEDICINE

## 2022-12-19 PROCEDURE — 25000003 PHARM REV CODE 250: Performed by: INTERNAL MEDICINE

## 2022-12-19 PROCEDURE — 92610 EVALUATE SWALLOWING FUNCTION: CPT

## 2022-12-19 PROCEDURE — 99900035 HC TECH TIME PER 15 MIN (STAT)

## 2022-12-19 PROCEDURE — 21400001 HC TELEMETRY ROOM

## 2022-12-19 PROCEDURE — 63600175 PHARM REV CODE 636 W HCPCS: Performed by: INTERNAL MEDICINE

## 2022-12-19 PROCEDURE — 82803 BLOOD GASES ANY COMBINATION: CPT

## 2022-12-19 PROCEDURE — 25000003 PHARM REV CODE 250: Performed by: STUDENT IN AN ORGANIZED HEALTH CARE EDUCATION/TRAINING PROGRAM

## 2022-12-19 PROCEDURE — 27000207 HC ISOLATION

## 2022-12-19 PROCEDURE — 84100 ASSAY OF PHOSPHORUS: CPT | Performed by: INTERNAL MEDICINE

## 2022-12-19 PROCEDURE — 36415 COLL VENOUS BLD VENIPUNCTURE: CPT | Performed by: INTERNAL MEDICINE

## 2022-12-19 PROCEDURE — 27000221 HC OXYGEN, UP TO 24 HOURS

## 2022-12-19 PROCEDURE — 83605 ASSAY OF LACTIC ACID: CPT | Performed by: INTERNAL MEDICINE

## 2022-12-19 PROCEDURE — 25000003 PHARM REV CODE 250: Performed by: PHYSICIAN ASSISTANT

## 2022-12-19 PROCEDURE — 63600175 PHARM REV CODE 636 W HCPCS: Performed by: PHYSICIAN ASSISTANT

## 2022-12-19 PROCEDURE — 36600 WITHDRAWAL OF ARTERIAL BLOOD: CPT

## 2022-12-19 PROCEDURE — 83735 ASSAY OF MAGNESIUM: CPT | Performed by: INTERNAL MEDICINE

## 2022-12-19 PROCEDURE — 63600175 PHARM REV CODE 636 W HCPCS: Performed by: STUDENT IN AN ORGANIZED HEALTH CARE EDUCATION/TRAINING PROGRAM

## 2022-12-19 RX ORDER — MUPIROCIN 20 MG/G
OINTMENT TOPICAL DAILY
Status: DISCONTINUED | OUTPATIENT
Start: 2022-12-19 | End: 2022-12-30 | Stop reason: HOSPADM

## 2022-12-19 RX ORDER — CLONIDINE 0.1 MG/24H
1 PATCH, EXTENDED RELEASE TRANSDERMAL
Status: DISCONTINUED | OUTPATIENT
Start: 2022-12-19 | End: 2022-12-30 | Stop reason: HOSPADM

## 2022-12-19 RX ORDER — CLONIDINE 0.1 MG/24H
1 PATCH, EXTENDED RELEASE TRANSDERMAL
Status: DISCONTINUED | OUTPATIENT
Start: 2022-12-20 | End: 2022-12-19

## 2022-12-19 RX ADMIN — PIPERACILLIN SODIUM,TAZOBACTAM SODIUM 4.5 G: 4; .5 INJECTION, POWDER, FOR SOLUTION INTRAVENOUS at 12:12

## 2022-12-19 RX ADMIN — FAMOTIDINE 20 MG: 10 INJECTION, SOLUTION INTRAVENOUS at 08:12

## 2022-12-19 RX ADMIN — HYDRALAZINE HYDROCHLORIDE 5 MG: 20 INJECTION INTRAMUSCULAR; INTRAVENOUS at 05:12

## 2022-12-19 RX ADMIN — CLONIDINE 1 PATCH: 0.1 PATCH TRANSDERMAL at 08:12

## 2022-12-19 RX ADMIN — HYDRALAZINE HYDROCHLORIDE 5 MG: 20 INJECTION INTRAMUSCULAR; INTRAVENOUS at 12:12

## 2022-12-19 RX ADMIN — SODIUM CHLORIDE: 9 INJECTION, SOLUTION INTRAVENOUS at 04:12

## 2022-12-19 RX ADMIN — HYDRALAZINE HYDROCHLORIDE 5 MG: 20 INJECTION INTRAMUSCULAR; INTRAVENOUS at 04:12

## 2022-12-19 RX ADMIN — DEXAMETHASONE SODIUM PHOSPHATE 6 MG: 4 INJECTION, SOLUTION INTRA-ARTICULAR; INTRALESIONAL; INTRAMUSCULAR; INTRAVENOUS; SOFT TISSUE at 08:12

## 2022-12-19 RX ADMIN — LEUCINE, PHENYLALANINE, LYSINE, METHIONINE, ISOLEUCINE, VALINE, HISTIDINE, THREONINE, TRYPTOPHAN, ALANINE, GLYCINE, ARGININE, PROLINE, SERINE, TYROSINE, DEXTROSE: 311; 238; 247; 170; 255; 247; 204; 179; 77; 880; 438; 489; 289; 213; 17; 5 INJECTION INTRAVENOUS at 11:12

## 2022-12-19 RX ADMIN — MUPIROCIN: 20 OINTMENT TOPICAL at 04:12

## 2022-12-19 RX ADMIN — ARGATROBAN 0.5 MCG/KG/MIN: 50 INJECTION, SOLUTION INTRAVENOUS at 06:12

## 2022-12-19 NOTE — PROGRESS NOTES
[facility] Medicine Progress Note        Chief Complaint: Inpatient Follow-up for AMS, Covid    HPI: Miguel Angel Oliveros is an 89-year-old female with PMH of DMII, CAD s/p PCI, HTN, AS s/p TAVR, HLD, GERD, and PARAMJIT, who is admitted to [facility] ICU from regular unit after being found to have altered mental status and increased respiratory distress. She was initially admitted to [facility] on 2022 by [facility] after being transferred from [place] with the diagnosis of NSTEMI. At the time of encounter, patient states having lower abdominal discomfort, bladder fullness, and dysuria. She states that these symptoms have been persisting for the past few days accompanied by decreased urinary output. Per patient's family members at beside, she usually gets UTI several times each year and she has had 4 episodes of UTI in  (last UTI was 3 months ago).  Daughter reported that patient as baseline is able to converse with family member, recognizes everyone, able to sit at the table and feed herself, able to transfer herself from bed to wheelchair and wheelchair to chair with some assistance.  Stated she has some good days and bad days in a way that sometimes she asked about her old friends who have  but otherwise her dementia is not too bad.  She is known to Dr. Montalvo in outpatient setting.  Reported she was sick at home a week prior to admission to our facility.  Initially she went to ER because she was severely constipated which has since resolved, after that she went back to her PCP given she was short of breath and fatigued and lethargic.  Then she was brought here on  when she was very lethargic to the point that she needed max assist to move so they called EMS.  Daughter also reported in ICU on Thursday afternoon she was feeling her best, recognized her brother, sister, her grandkids.  But Thursday afternoon she started becoming more lethargic and sleepy and since then she  has not improved    Interval Hx:   Pt is awake reports she feels ok, reported she knows she is in the hospital. Said she wants to sit up and wants to drink water.  No family at bedside,  in the room   Case was discussed with patient's nurse on the floor     Objective/physical exam:  General:  Confused, sleepy, left IJ present, obese  Chest: Clear to auscultation bilaterally  Heart: RRR, +S1, S2, no appreciable murmur  Abdomen: Soft, nontender, BS +  MSK: Warm, no lower extremity edema, no clubbing or cyanosis, hands in mittens  Neurologic:  Not following commands, altered mentation    VITAL SIGNS: 24 HRS MIN & MAX LAST   Temp  Min: 96.8 °F (36 °C)  Max: 97.6 °F (36.4 °C) 97.6 °F (36.4 °C)   BP  Min: 148/75  Max: 194/92 (!) 175/94     Pulse  Min: 82  Max: 92  83   Resp  Min: 18  Max: 20 20   SpO2  Min: 94 %  Max: 99 % (!) 94 %       Recent Labs   Lab 12/17/22  0533 12/18/22  0931 12/19/22  0532   WBC 10.2 11.0 12.7*   RBC 3.88* 3.74* 4.05*   HGB 10.8* 10.4* 11.2*   HCT 34.8* 33.7* 37.6   MCV 89.7 90.1 92.8   MCH 27.8 27.8 27.7   MCHC 31.0* 30.9* 29.8*   RDW 15.5* 15.6* 15.7*   * 104* 84*   MPV 12.5* 13.1* 13.5*       Recent Labs   Lab 12/12/22  1124 12/12/22  1904 12/12/22  2038 12/12/22  2356 12/13/22  1213 12/13/22  1827 12/14/22  0845 12/15/22  0019 12/17/22  0533 12/18/22  0614 12/18/22  1747 12/19/22  0209   *  --   --    < >  --    < >  --    < > 140 139  --  138   K 6.4*   < >  --    < >  --    < >  --    < > 4.1 4.5  --  5.6*   CO2 14*  --   --    < >  --    < >  --    < > 22* 18*  --  17*   BUN 58.0*  --   --    < >  --    < >  --    < > 73.5* 62.1*  --  52.2*   CREATININE 3.14*  --   --    < >  --    < >  --    < > 1.51* 1.22* 1.15* 1.06*   CALCIUM 9.6  --   --    < >  --    < >  --    < > 9.0 8.7  --  8.9   PH  --   --  7.40  --  7.51*  --  7.490  --   --   --   --   --    MG 2.40  --   --   --   --   --   --   --   --  2.80*  --   --    ALBUMIN  --   --   --    < >  --    < >  --    < >  3.0* 2.8* 2.9* 2.9*   ALKPHOS  --   --   --    < >  --    < >  --    < > 106 99 115 106   ALT  --   --   --    < >  --    < >  --    < > 1,180* 855* 770* 709*   AST  --   --   --    < >  --    < >  --    < > 311* 153* 141* 136*   BILITOT  --   --   --    < >  --    < >  --    < > 1.0 1.0 1.3 1.4    < > = values in this interval not displayed.          Microbiology Results (last 7 days)       Procedure Component Value Units Date/Time    Urine culture [[id number]] Collected: 12/18/22 1940    Order Status: Completed Specimen: Urine Updated: 12/19/22 0623     Urine Culture No Growth At 24 Hours    Blood Culture [[id number]]  (Normal) Collected: 12/12/22 0948    Order Status: Completed Specimen: Blood, Venous Updated: 12/17/22 1000     CULTURE, BLOOD ([facility]) No Growth at 5 days    Blood Culture [[id number]]  (Normal) Collected: 12/12/22 0948    Order Status: Completed Specimen: Blood, Venous Updated: 12/17/22 1000     CULTURE, BLOOD ([facility]) No Growth at 5 days             See below for Radiology    Scheduled Med:   dexAMETHasone  6 mg Intravenous Daily    dextrose 50% in water (D50W)  25 g Intravenous Once    famotidine (PF)  20 mg Intravenous Daily    insulin regular  6 Units Intravenous Once    melatonin  6 mg Oral Nightly    piperacillin-tazobactam (ZOSYN) IVPB  4.5 g Intravenous Q12H        Continuous Infusions:   sodium chloride 0.9% 75 mL/hr at 12/18/22 2236    Amino acid 4.25% - dextrose 5% (CLINIMIX-E) solution (1L provides 42.5 gm AA, 50 gm CHO (170 kcal/L dextrose), Na 35, K 30, Mg 5, Ca 4.5, Acetate 70, Cl 39, Phos 15) 60 mL/hr at 12/18/22 1745    argatroban in 0.9 % sod chlor 0.5 mcg/kg/min (12/19/22 0630)        PRN Meds:  diazePAM, haloperidol lactate, hydrALAZINE, ondansetron, sodium chloride 0.9%       Assessment/Plan:  Altered mental status likely 2/2 sepsis and metabolic encephalopathy  Acute renal failure/ ATN - Cr improving   Lactic acidosis likely 2/2 acute renal failure and sepsis, and portal venous  thrombosis  NSTEMI Type II in the setting of Acute COVID Infection, SMITHA, and Probable IVC/ portal vein Thrombus/PE  COVID positive on admission to ICU- 12/12- day 6 of diagnosis   Transminitis - trending down  Bladder cancer s/p resection in October 2022  CT ABD & pelvis 12/11/2022 shows heterogeneous hyperdense mass lesion in the superior and left lateral aspect of the urinary bladder  Per patient's family at bedside, patient will be receiving additional imaging to stage bladder cancer  HX of HTN, DM II, TIA, aortic stenosis s/p TAVR, CAD s/p PCI, GERD, PARAMJIT  IVC and Portal Vein Thrombosis with High probability of Pulmonary Embolism with Marked RV Strain - On Heparin gtt  Thrombocytopenia- heparin induced ?  Saddle PE with right heart strain  Mild hyperkalemia probably due to clinimic E, awaiting dietician recs   Hematuria - blood thinners related?    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   Spoke to Arlette NP with nephrology, agreed with CTA Chest. Recommended to continue NS at 75cc/hr atleast for 24 hrs post contrast  CTA Chest- Saddle pulmonary embolism with thrombi extending into the bilateral upper and lower lobes and evidence of right heart strain.  12/18- Heparin changed to Argatroban gtt- aptt per protocol  Heparin PF4 AB- HIT - sent and in process  Consulted CTS - pending eval and recs   Patient apparently lives at home with family and has a significant amount of DME as well as support.  She has not really been assessed for any oral nutrition since she has been here because of her increased oxygen requirements early in admission   Currently on supplemental oxygen via nasal cannula at 4.5 liters/minute  Consulted speech though pt was lethargic and not following commands over the weekend, so was unable to assess . Today she is awake alert and following  commands  Started Clinimix E at 60 ml/hr given she is also n NS at 75cc/hr   Completed remdesivir and continued on dexamethasone- day 8 of 10   Discontinued zosyn after 7 days treatment   Transaminases are beginning to trend down, monitor closely  Appreciate the help from Nephrology and cardiology services, they both signed off   Apparently at baseline she become confuse form time to time per son but daughter reports differently stating she was conversing, able to transfer, able to feed herself. Reported her good and bad days with bad days being when she asks about her old friends if they are alive. Pt is established with Dr Montalvo who is treating her dementia   Daughter reported lately she has been depressed and her PCP started her on zoloft but they have not given it to her yet  Discussed valium 5 mg is a very high dose for her age gp and is considered high risk medicine and that we have decreased the dose to 2 mg daily as needed. Daughter reported all the doctors in the hospital has said the same thing but she has been on that dose for many years. Advised consulting with a Psychiatrist if he anxiety and depression is getting worse, verbalized understanding   Family decided to hold off on NG tube for Meds and tube feeds  UA shows hematuria, urine c/d negative x 24 hrs   Morning cbc cmp ordered     VTE prophylaxis: heparin gtt    Patient condition:  Guarded    Anticipated discharge and Disposition:   TBD    Critical care note:  Critical care diagnosis: IVC, Portal vein, saddle PE with R heart strain requiring IV Argatroban due to concern for HIT  Critical care interventions: Hands-on evaluation, review of labs/radiographs/records and discussion with patient and family if present  Critical care time spent: 35 minutes        All diagnosis and differential diagnosis have been reviewed; assessment and plan has been documented; I have personally reviewed the labs and test results that are presently available; I have  reviewed the patients medication list; I have reviewed the consulting providers response and recommendations. I have reviewed or attempted to review medical records based upon their availability    All of the patient's questions have been  addressed and answered. Patient's is agreeable to the above stated plan. I will continue to monitor closely and make adjustments to medical management as needed.  _____________________________________________________________________    Nutrition Status:    Radiology:  CTA Chest Non-Coronary (PE Studies)  Narrative: EXAMINATION:  CTA CHEST NON CORONARY (PE STUDIES)    CLINICAL HISTORY:  Pulmonary embolism (PE) suspected, unknown D-dimer;    TECHNIQUE:  Helical-acquisition CT images were obtained prior to and following st the intravenous administration of iodine-based contrast media.    The post-contrast images were timed to coincide with arterial opacification for purpose of CT angiography.    Multiplanar reconstructions, to include MIP and volume-rendered (3D) images, ns were accomplished by the CT technologist at a separate workstation and pushed to PACS for physician review.    Total DLP (mGy-cm) 622 (value may include radiation due to concomitantly performed CT imaging)    Automated tube current modulation and/or weight-based exposure dosing is utilized, when appropriate, to reach lowest reasonably achievable exposure rate.    COMPARISON:  None    FINDINGS:  The heart is normal size.  The RV to LV ratio is greater than 1 with flattening of the interventricular septum.  There is a saddle pulmonary embolism extending into the bilateral pulmonary trunks and bilateral upper and lower lobes.    There are trace bilateral pleural effusions with dependent subsegmental atelectasis.  There is otherwise no focal airspace consolidation.  There is no pneumothorax.    There are no acute findings in the upper abdomen.    There are degenerative changes of the thoracic spine.  Impression:  Saddle pulmonary embolism with thrombi extending into the bilateral upper and lower lobes and evidence of right heart strain.    Findings given to the patient's RN Emma at the time of dictation.    Electronically signed by: Tiana Negro  Date:    12/18/2022  Time:    16:35      Mukesh Denton MD  Department of Hospital Medicine   [facility]   12/19/2022   7:43 AM

## 2022-12-19 NOTE — PROGRESS NOTES
Inpatient Nutrition Assessment    Admit Date: 12/11/2022   Total duration of encounter: 8 days     Nutrition Recommendation/Prescription     - diet per SLP recs once appropriate    - continue PPN: Clinimix 4.25/5% @ 60ml/hr (490kcal, 61 gm protein)    - rec NG tube feeding if unable to begin oral diet:  Diabetisource goal rate 65ml/hr    - continue medical management of electrolytes      Communication of Recommendations: reviewed with nurse    Nutrition Assessment     Malnutrition Assessment/Nutrition-Focused Physical Exam    Malnutrition in the context of chronic illness  Degree of Malnutrition: does not meet criteria  Energy Intake: unable to obtain  Interpretation of Weight Loss: does not meet criteria  Body Fat: does not meet criteria  Area of Body Fat Loss: does not meet criteria  Muscle Mass Loss: mild depletion  Area of Muscle Mass Loss: temple region - temporalis muscle  Fluid Accumulation: does not meet criteria  Edema: no edema present  Reduced  Strength: unable to obtain  A minimum of two characteristics is recommended for diagnosis of either severe or non-severe malnutrition.    Chart Review    Reason Seen: malnutrition screening tool    MST Score: 3  Have you recently lost weight without trying?: Yes: Unsure how much  Have you been eating poorly because of a decreased appetite?: Yes     Diagnosis:  Altered mental status likely 2/2 sepsis and metabolic encephalopathy  Acute renal failure   Lactic acidosis likely 2/2 acute renal failure and sepsis  NSTEMI   COVID positive   Transminitis     Relevant Medical History: CAD, dementia, depression, DM, GERD, high chol, HTN, TIA    Nutrition-Related Medications: NS @ 125ml/hr  Calorie Containing IV Medications: no significant kcals from medications at this time    Nutrition-Related Labs:  12/14 Na 134, Cl 94, BUN 87.2, Crea 2.81, Glu 150, Phos 7.9  12/19: K 5.6, Cl 110, BUN 52.2, Crea 1.06, GFR 50, Glu 310, Mg 2.8, ,     Diet/PN Order: Diet  "NPO  amino acid 4.25% - dextrose 5% solution  Oral Supplement Order: none  Tube Feeding Order: not applicable  Appetite/Oral Intake: NPO/NPO  Factors Affecting Nutritional Intake: NPO  Food/Alevism/Cultural Preferences: none reported  Food Allergies: none reported       Wound(s):   none noted    Comments    12/14/22: Plans for diet advancement per RN. No NG in place at this time. Noted MST, Previous EMR wts indicate wt gain over past 2 months. Unable to verify subjective info with pt, sleeping soundly.    12/19:  pt on PPN, failed MBS, has had very little nutrition since admit; rec tube feeding until able to begin oral diet    Anthropometrics    Height: 5' 5" (165.1 cm)    Last Weight: 86.2 kg (190 lb) (12/18/22 0617) Weight Method: Bed Scale  BMI (Calculated): 31.6  BMI Classification: obese grade I (BMI 30-34.9)     Ideal Body Weight (IBW), Female: 125 lb     % Ideal Body Weight, Female (lb): 152 %                             Usual Weight Provided By: unable to obtain usual weight    Wt Readings from Last 5 Encounters:   12/18/22 86.2 kg (190 lb)   12/11/22 81.6 kg (180 lb)   12/01/22 81.6 kg (180 lb)   11/30/22 77.1 kg (170 lb)   10/15/22 77.1 kg (170 lb)     Weight Change(s) Since Admission:  Admit Weight: 86.2 kg (190 lb) (12/11/22 2120)    Estimated Needs    Weight Used For Calorie Calculations: 86.2 kg (190 lb 0.6 oz)  Energy Calorie Requirements (kcal): 1564kcal (1.2 stress factor)  Energy Need Method: [facility]  Weight Used For Protein Calculations: 86.2 kg (190 lb 0.6 oz)  Protein Requirements: 69gm (0.8g/kg)  Fluid Requirements (mL): 1564ml (1ml/kcal)  Temp: 97.7 °F (36.5 °C)       Enteral Nutrition    Patient not receiving enteral nutrition at this time.    Parenteral Nutrition    Standard Formula: Clinimix 4.25/5  Custom Formula: not applicable  Additives: none  Rate/Volume: 60ml/hr  Lipids: none  Total Nutrition Provided by Parenteral Nutrition:  Calories Provided  490 kcal/d, 31% needs "   Protein Provided  61 g/d, 88% needs   Dextrose Provided  72 g/d,    Fluid Provided  1440 ml/d, 92% needs       Evaluation of Received Nutrient Intake    Calories: not meeting estimated needs  Protein: meeting estimated needs    Patient Education    Not applicable.    Nutrition Diagnosis     PES: Inadequate oral intake related to current condition as evidenced by NPO since admit. (continues)    Interventions/Goals     Intervention(s): general/healthful diet, commercial beverage, and collaboration with other providers  Goal: Meet greater than 75% of nutritional needs by follow-up. (goal not met)    Monitoring & Evaluation     Dietitian will monitor energy intake.  Nutrition Risk/Follow-Up: high (follow-up in 1-4 days)   Please consult if re-assessment needed sooner.

## 2022-12-19 NOTE — PLAN OF CARE
Problem: Adult Inpatient Plan of Care  Goal: Plan of Care Review  Outcome: Ongoing, Progressing  Goal: Patient-Specific Goal (Individualized)  Outcome: Ongoing, Progressing  Goal: Absence of Hospital-Acquired Illness or Injury  Outcome: Ongoing, Progressing  Goal: Optimal Comfort and Wellbeing  Outcome: Ongoing, Progressing  Goal: Readiness for Transition of Care  Outcome: Ongoing, Progressing     Problem: Infection  Goal: Absence of Infection Signs and Symptoms  Outcome: Ongoing, Progressing     Problem: Skin Injury Risk Increased  Goal: Skin Health and Integrity  Outcome: Ongoing, Progressing     Problem: Fall Injury Risk  Goal: Absence of Fall and Fall-Related Injury  Outcome: Ongoing, Progressing     Problem: Pain Acute  Goal: Acceptable Pain Control and Functional Ability  Outcome: Ongoing, Progressing     Problem: Adjustment to Illness (Stroke, Ischemic/Transient Ischemic Attack)  Goal: Optimal Coping  Outcome: Ongoing, Progressing     Problem: Bowel Elimination Impaired (Stroke, Ischemic/Transient Ischemic Attack)  Goal: Effective Bowel Elimination  Outcome: Ongoing, Progressing     Problem: Communication Impairment (Stroke, Ischemic/Transient Ischemic Attack)  Goal: Improved Communication Skills  Outcome: Ongoing, Progressing     Problem: Functional Ability Impaired (Stroke, Ischemic/Transient Ischemic Attack)  Goal: Optimal Functional Ability  Outcome: Ongoing, Progressing

## 2022-12-19 NOTE — PROGRESS NOTES
[facility] - 9th Floor Med Surg  Wound Care    Patient Name:  Miguel Angel Oliveros   MRN:  [id number]  Date: 12/19/2022  Diagnosis: <principal problem not specified>    History:     Past Medical History:   Diagnosis Date    Anxiety disorder     Aortic stenosis     Arthritis     Carpal tunnel syndrome     Coronary artery disease     Dementia     Depression     Diabetes mellitus     GERD (gastroesophageal reflux disease)     Hx of rectal polypectomy     Hypercholesterolemia     Hypertension     Obstructive sleep apnea     Respiratory distress     S/P TAVR (transcatheter aortic valve replacement)     TIA (transient ischemic attack)        Social History     Socioeconomic History    Marital status:    Tobacco Use    Smoking status: Never    Smokeless tobacco: Never   Substance and Sexual Activity    Alcohol use: Never    Drug use: Never       Precautions:     Allergies as of 12/11/2022 - Reviewed 12/11/2022   Allergen Reaction Noted    Statins-hmg-coa reductase inhibitors  10/22/2019    Bimatoprost  02/29/2020    Cortisone  06/01/2022    Dorzolamide-timolol  02/29/2020    Gabapentin  06/01/2022    Hydrocortisone  10/22/2019    Tafluprost (pf)  02/29/2020       WOC Assessment Details/Treatment        12/19/22 1147        Altered Skin Integrity 12/17/22 0420 Right Antecubital   Date First Assessed/Time First Assessed: 12/17/22 0420   Altered Skin Integrity Present on Admission: suspected hospital acquired  Side: Right  Location: Antecubital   Wound Image    Dressing Appearance Dry;Intact;Clean   Drainage Amount Scant   Drainage Characteristics/Odor Serous   Appearance Red;Pink   Tissue loss description Partial thickness   Red (%), Wound Tissue Color 100 %   Periwound Area Ecchymotic;Redness;Warm   Wound Edges Irregular   Wound Length (cm) 8 cm   Wound Width (cm) 5 cm   Wound Surface Area (cm^2) 40 cm^2   Care Cleansed with:;Sterile normal saline   Dressing Applied     Nurse Jody reports that it is  skin tears to her arm. Family at bedside. Recommended to Cleanse Right AC with NS. Apply Bactroban ointment, Cover with adaptic, mepilex bordered foam. Change BID and PRN. Treatments orders put into place. Will Follow up in a few days.     12/19/2022

## 2022-12-19 NOTE — PLAN OF CARE
Problem: SLP  Goal: SLP Goal  Description: LTG: Pt will tolerate LRD with no overt signs/sx of aspiration.    ST. Pt will tolerate puree trials with no overt signs/sx of aspiration and min cues to initiate swallow.   2. Pt will complete comprehensive assessment of swallow fx (MBS) as appropriate.   Outcome: Ongoing, Progressing

## 2022-12-19 NOTE — PT/OT/SLP EVAL
Speech Language Pathology Department  Clinical Swallow Evaluation    Patient Name:  Miguel Angel Oliveros   MRN:  [id number]  Admitting Diagnosis: <principal problem not specified>    Recommendations:     General recommendations:  Modified Barium Swallow Study when appropriate and dysphagia therapy  Diet recommendations:  NPO, Liquid Diet Level: NPO   Swallow strategies/precautions:  NPO  Precautions: Standard, aspiration    History:     Past Medical History:   Diagnosis Date    Anxiety disorder     Aortic stenosis     Arthritis     Carpal tunnel syndrome     Coronary artery disease     Dementia     Depression     Diabetes mellitus     GERD (gastroesophageal reflux disease)     Hx of rectal polypectomy     Hypercholesterolemia     Hypertension     Obstructive sleep apnea     Respiratory distress     S/P TAVR (transcatheter aortic valve replacement)     TIA (transient ischemic attack)        Past Surgical History:   Procedure Laterality Date    CARDIAC SURGERY      CATARACT EXTRACTION      CORONARY STENT PLACEMENT      HYSTERECTOMY      INSERTION OF PACEMAKER      PARTIAL HYSTERECTOMY      RECTAL POLYPECTOMY      TONSILLECTOMY      TRANSCATHETER AORTIC VALVE REPLACEMENT (TAVR)       Home Diet: Regular and thin liquids  Current Method of Nutrition: NPO    Patient complaint: none stated    Subjective     Patient awake and confused.    Spiritual/Cultural/Oriental orthodox Beliefs/Practices that affect care: no    Pain/Comfort: Pain Rating 1: 0/10    Respiratory Status: nasal canula     Objective:     Oral Musculature Evaluation  Oral Musculature: WFL  Dentition: scattered dentition  Secretion Management: adequate  Mucosal Quality: adequate  Mandibular Strength and Mobility: WFL  Oral Labial Strength and Mobility: WFL  Lingual Strength and Mobility: WFL    Consistency Fed By Oral Symptoms Pharyngeal Symptoms   Thin liquid by spoon Daughter  Prolonged mastication  Slowed oral transit time  Tongue pumping Multiple swallows   Puree  Daughter Decreased lip closure  Prolonged mastication  Slowed oral transit time  Tongue pumping  Oral residue Multiple swallows  Wet vocal quality after swallow     Assessment:     Assessment limited secondary to inconsistent JESSICA. Slowed oral transit time and prolonged mastication with oral residue and anterior spillage with pureed solids. MAX cueing required to initiate swallow. Pt only responsive to dtr's assist with feeding. No overt signs/sx of aspiration, however risk is HIGH secondary to JESSICA. Rec: cont NPO. SLP to continue to follow and treat as appropriate.     Goals:   Multidisciplinary Problems       SLP Goals          Problem: SLP    Goal Priority Disciplines Outcome   SLP Goal     SLP Ongoing, Progressing   Description: LTG: Pt will tolerate LRD with no overt signs/sx of aspiration.    ST. Pt will tolerate puree trials with no overt signs/sx of aspiration and min cues to initiate swallow.   2. Pt will complete comprehensive assessment of swallow fx (MBS) as appropriate.                      Patient Education:     Patient and daughter/s provided with verbal education regarding dysphagia signs/sx, SLP POC.  Understanding was verbalized.    Plan:     SLP Follow-Up:  Yes   Patient to be seen:  daily   Plan of Care expires:  23  Plan of Care reviewed with:  patient, daughter      Time Tracking:     SLP Treatment Date:   22  Speech Start Time:  0900  Speech Stop Time:  0915     Speech Total Time (min):  15 min    Billable minutes:  Swallow and Oral Function Evaluation, 15 minutes      2022

## 2022-12-20 LAB
ABS NEUT (OLG): 13.3 X10(3)/MCL (ref 2.1–9.2)
ALBUMIN SERPL-MCNC: 3 G/DL (ref 3.4–4.8)
ALBUMIN/GLOB SERPL: 1 RATIO (ref 1.1–2)
ALP SERPL-CCNC: 116 UNIT/L (ref 40–150)
ALT SERPL-CCNC: 543 UNIT/L (ref 0–55)
ANISOCYTOSIS BLD QL SMEAR: ABNORMAL
APTT PPP: 53.6 SECONDS (ref 23.2–33.7)
APTT PPP: 57.5 SECONDS (ref 23.2–33.7)
AST SERPL-CCNC: 88 UNIT/L (ref 5–34)
AV INDEX (PROSTH): 0.54
AV MEAN GRADIENT: 16 MMHG
AV PEAK GRADIENT: 33 MMHG
AV VALVE AREA: 1.68 CM2
AV VELOCITY RATIO: 0.49
BACTERIA UR CULT: NO GROWTH
BILIRUBIN DIRECT+TOT PNL SERPL-MCNC: 1.4 MG/DL
BSA FOR ECHO PROCEDURE: 1.99 M2
BUN SERPL-MCNC: 52.5 MG/DL (ref 9.8–20.1)
BURR CELLS (OLG): ABNORMAL
CALCIUM SERPL-MCNC: 8.9 MG/DL (ref 8.4–10.2)
CHLORIDE SERPL-SCNC: 113 MMOL/L (ref 98–107)
CO2 SERPL-SCNC: 19 MMOL/L (ref 23–31)
CREAT SERPL-MCNC: 0.91 MG/DL (ref 0.55–1.02)
CV ECHO LV RWT: 0.78 CM
DOP CALC AO PEAK VEL: 2.89 M/S
DOP CALC AO VTI: 49.4 CM
DOP CALC LVOT AREA: 3.1 CM2
DOP CALC LVOT DIAMETER: 2 CM
DOP CALC LVOT PEAK VEL: 1.41 M/S
DOP CALC LVOT STROKE VOLUME: 83.21 CM3
DOP CALC MV VTI: 40.1 CM
DOP CALCLVOT PEAK VEL VTI: 26.5 CM
E WAVE DECELERATION TIME: 240 MSEC
E/A RATIO: 0.48
E/E' RATIO: 9.75 M/S
ECHO LV POSTERIOR WALL: 1.2 CM (ref 0.6–1.1)
EJECTION FRACTION: 65 %
ERYTHROCYTE [DISTWIDTH] IN BLOOD BY AUTOMATED COUNT: 15.7 % (ref 11–14.5)
FRACTIONAL SHORTENING: 28 % (ref 28–44)
GFR SERPLBLD CREATININE-BSD FMLA CKD-EPI: 60 MLS/MIN/1.73/M2
GLOBULIN SER-MCNC: 2.9 GM/DL (ref 2.4–3.5)
GLUCOSE SERPL-MCNC: 305 MG/DL (ref 82–115)
HCT VFR BLD AUTO: 36.4 % (ref 37–47)
HGB BLD-MCNC: 11.4 GM/DL (ref 12–16)
IMM GRANULOCYTES # BLD AUTO: 0.14 X10(3)/MCL (ref 0–0.04)
IMM GRANULOCYTES NFR BLD AUTO: 1 %
INSTRUMENT WBC (OLG): 14 X10(3)/MCL
INTERVENTRICULAR SEPTUM: 1.39 CM (ref 0.6–1.1)
LEFT INTERNAL DIMENSION IN SYSTOLE: 2.19 CM (ref 2.1–4)
LEFT VENTRICLE MASS INDEX: 65 G/M2
LEFT VENTRICULAR INTERNAL DIMENSION IN DIASTOLE: 3.06 CM (ref 3.5–6)
LEFT VENTRICULAR MASS: 126.82 G
LV LATERAL E/E' RATIO: 7.8 M/S
LV SEPTAL E/E' RATIO: 13 M/S
LVOT MG: 4 MMHG
LVOT MV: 0.9 CM/S
LYMPHOCYTES NFR BLD MANUAL: 0.14 X10(3)/MCL
LYMPHOCYTES NFR BLD MANUAL: 1 %
MACROCYTES BLD QL SMEAR: ABNORMAL
MCH RBC QN AUTO: 27.9 PG
MCHC RBC AUTO-ENTMCNC: 31.3 MG/DL (ref 33–36)
MCV RBC AUTO: 89.2 FL (ref 80–94)
MICROCYTES BLD QL SMEAR: ABNORMAL
MONOCYTES NFR BLD MANUAL: 0.56 X10(3)/MCL (ref 0.1–1.3)
MONOCYTES NFR BLD MANUAL: 4 %
MV MEAN GRADIENT: 4 MMHG
MV PEAK A VEL: 1.62 M/S
MV PEAK E VEL: 0.78 M/S
MV PEAK GRADIENT: 13 MMHG
MV STENOSIS PRESSURE HALF TIME: 89 MS
MV VALVE AREA BY CONTINUITY EQUATION: 2.08 CM2
MV VALVE AREA P 1/2 METHOD: 2.47 CM2
NEUTROPHILS NFR BLD MANUAL: 95 %
NRBC BLD AUTO-RTO: 19.9 % (ref 0–1)
NRBC BLD MANUAL-RTO: 45 %
OVALOCYTES (OLG): ABNORMAL
PF4 HEPARIN CMPLX IGG SER-IMP: NORMAL
PF4 HEPARIN CMPLX IGG SERPL IA: 0.1 OD
PF4 HEPARIN CMPLX IGG SERPL QL IA: NEGATIVE
PISA TR MAX VEL: 3.74 M/S
PLASMA CELLS BLD QL SMEAR: 1 %
PLATELET # BLD AUTO: 87 X10(3)/MCL (ref 140–371)
PLATELET # BLD EST: ABNORMAL 10*3/UL
PMV BLD AUTO: 13 FL (ref 9.4–12.4)
POCT GLUCOSE: 290 MG/DL (ref 70–110)
POIKILOCYTOSIS BLD QL SMEAR: ABNORMAL
POLYCHROMASIA BLD QL SMEAR: ABNORMAL
POTASSIUM SERPL-SCNC: 4.2 MMOL/L (ref 3.5–5.1)
PROT SERPL-MCNC: 5.9 GM/DL (ref 5.8–7.6)
RBC # BLD AUTO: 4.08 X10(6)/MCL (ref 4.2–5.4)
RBC MORPH BLD: ABNORMAL
SINUS: 2.2 CM
SODIUM SERPL-SCNC: 140 MMOL/L (ref 136–145)
TARGETS BLD QL SMEAR: ABNORMAL
TDI LATERAL: 0.1 M/S
TDI SEPTAL: 0.06 M/S
TDI: 0.08 M/S
TR MAX PG: 56 MMHG
TRICUSPID ANNULAR PLANE SYSTOLIC EXCURSION: 1.53 CM
WBC # SPEC AUTO: 13.6 X10(3)/MCL (ref 4.5–11.5)

## 2022-12-20 PROCEDURE — 27000221 HC OXYGEN, UP TO 24 HOURS

## 2022-12-20 PROCEDURE — 25500020 PHARM REV CODE 255: Performed by: INTERNAL MEDICINE

## 2022-12-20 PROCEDURE — 25000003 PHARM REV CODE 250: Performed by: INTERNAL MEDICINE

## 2022-12-20 PROCEDURE — 94761 N-INVAS EAR/PLS OXIMETRY MLT: CPT

## 2022-12-20 PROCEDURE — 80053 COMPREHEN METABOLIC PANEL: CPT | Performed by: INTERNAL MEDICINE

## 2022-12-20 PROCEDURE — 99223 PR INITIAL HOSPITAL CARE,LEVL III: ICD-10-PCS | Mod: ,,, | Performed by: PHYSICIAN ASSISTANT

## 2022-12-20 PROCEDURE — 25000003 PHARM REV CODE 250: Performed by: STUDENT IN AN ORGANIZED HEALTH CARE EDUCATION/TRAINING PROGRAM

## 2022-12-20 PROCEDURE — 63600175 PHARM REV CODE 636 W HCPCS: Mod: JG | Performed by: INTERNAL MEDICINE

## 2022-12-20 PROCEDURE — 63600175 PHARM REV CODE 636 W HCPCS: Performed by: STUDENT IN AN ORGANIZED HEALTH CARE EDUCATION/TRAINING PROGRAM

## 2022-12-20 PROCEDURE — 27000207 HC ISOLATION

## 2022-12-20 PROCEDURE — 85027 COMPLETE CBC AUTOMATED: CPT | Performed by: INTERNAL MEDICINE

## 2022-12-20 PROCEDURE — 99223 1ST HOSP IP/OBS HIGH 75: CPT | Mod: ,,, | Performed by: PHYSICIAN ASSISTANT

## 2022-12-20 PROCEDURE — 21400001 HC TELEMETRY ROOM

## 2022-12-20 PROCEDURE — 85730 THROMBOPLASTIN TIME PARTIAL: CPT | Performed by: INTERNAL MEDICINE

## 2022-12-20 PROCEDURE — 36415 COLL VENOUS BLD VENIPUNCTURE: CPT | Performed by: INTERNAL MEDICINE

## 2022-12-20 RX ADMIN — ARGATROBAN 0.5 MCG/KG/MIN: 50 INJECTION, SOLUTION INTRAVENOUS at 11:12

## 2022-12-20 RX ADMIN — HYDRALAZINE HYDROCHLORIDE 5 MG: 20 INJECTION INTRAMUSCULAR; INTRAVENOUS at 09:12

## 2022-12-20 RX ADMIN — DEXAMETHASONE SODIUM PHOSPHATE 6 MG: 4 INJECTION, SOLUTION INTRA-ARTICULAR; INTRALESIONAL; INTRAMUSCULAR; INTRAVENOUS; SOFT TISSUE at 08:12

## 2022-12-20 RX ADMIN — HYDRALAZINE HYDROCHLORIDE 5 MG: 20 INJECTION INTRAMUSCULAR; INTRAVENOUS at 06:12

## 2022-12-20 RX ADMIN — MUPIROCIN: 20 OINTMENT TOPICAL at 08:12

## 2022-12-20 RX ADMIN — PERFLUTREN 1 ML: 6.52 INJECTION, SUSPENSION INTRAVENOUS at 10:12

## 2022-12-20 RX ADMIN — SODIUM CHLORIDE: 9 INJECTION, SOLUTION INTRAVENOUS at 11:12

## 2022-12-20 RX ADMIN — SODIUM CHLORIDE: 9 INJECTION, SOLUTION INTRAVENOUS at 08:12

## 2022-12-20 RX ADMIN — HYDRALAZINE HYDROCHLORIDE 5 MG: 20 INJECTION INTRAMUSCULAR; INTRAVENOUS at 01:12

## 2022-12-20 RX ADMIN — FAMOTIDINE 20 MG: 10 INJECTION, SOLUTION INTRAVENOUS at 08:12

## 2022-12-20 RX ADMIN — ARGATROBAN 0.5 MCG/KG/MIN: 50 INJECTION, SOLUTION INTRAVENOUS at 02:12

## 2022-12-20 RX ADMIN — LEUCINE, PHENYLALANINE, LYSINE, METHIONINE, ISOLEUCINE, VALINE, HISTIDINE, THREONINE, TRYPTOPHAN, ALANINE, GLYCINE, ARGININE, PROLINE, SERINE, TYROSINE, DEXTROSE: 311; 238; 247; 170; 255; 247; 204; 179; 77; 880; 438; 489; 289; 213; 17; 5 INJECTION INTRAVENOUS at 03:12

## 2022-12-20 NOTE — CONSULTS
CT SURGERY PROGRESS NOTE  Miguel Angel Oliveros  89 y.o.  10/4/1933    Patients Procedure: * No surgery found *    Subjective  Interval History: 89-year-old female with PMH of DMII, CAD s/p PCI, HTN, AS s/p TAVR, HLD, GERD, and PARAMJIT, who is admitted to [facility] ICU from regular unit after being found to have altered mental status and increased respiratory distress.     She was initially admitted to [facility] on 12/11/2022 by [facility] after being transferred from [place] with the diagnosis of NSTEMI.     Initially she went to ER because she was severely constipated which has since resolved, after that she went back to her PCP given she was short of breath and fatigued and lethargic.  Then she was brought here on 12/12 when she was very lethargic to the point that she needed max assist to move so they called EMS.    She is Covid positive.    12/19/22 she was diagnosed with saddle PE bilateral upper and lower lobes.  Dr Ching consulted for CVTS eval.    CTA Chest Non-Coronary (PE Studies)  Narrative: EXAMINATION:  CTA CHEST NON CORONARY (PE STUDIES)     CLINICAL HISTORY:  Pulmonary embolism (PE) suspected, unknown D-dimer;     TECHNIQUE:  Helical-acquisition CT images were obtained prior to and following st the intravenous administration of iodine-based contrast media.     The post-contrast images were timed to coincide with arterial opacification for purpose of CT angiography.     Multiplanar reconstructions, to include MIP and volume-rendered (3D) images, ns were accomplished by the CT technologist at a separate workstation and pushed to PACS for physician review.     Total DLP (mGy-cm) 622 (value may include radiation due to concomitantly performed CT imaging)     Automated tube current modulation and/or weight-based exposure dosing is utilized, when appropriate, to reach lowest reasonably achievable exposure rate.     COMPARISON:  None     FINDINGS:  The heart is normal size.  The RV to LV ratio is greater than 1 with  flattening of the interventricular septum.  There is a saddle pulmonary embolism extending into the bilateral pulmonary trunks and bilateral upper and lower lobes.     There are trace bilateral pleural effusions with dependent subsegmental atelectasis.  There is otherwise no focal airspace consolidation.  There is no pneumothorax.     There are no acute findings in the upper abdomen.     There are degenerative changes of the thoracic spine.  Impression: Saddle pulmonary embolism with thrombi extending into the bilateral upper and lower lobes and evidence of right heart strain.     Findings given to the patient's RN Emma at the time of dictation.      Review of Systems   Constitutional:  Positive for malaise/fatigue.   Eyes: Negative.    Respiratory:          HPI   Cardiovascular:         HPI   Gastrointestinal: Negative.    Genitourinary: Negative.    Neurological: Negative.      Medication List  Infusions   sodium chloride 0.9% 75 mL/hr at 12/20/22 0844    amino acid 4.25% - dextrose 5% solution 60 mL/hr at 12/19/22 1103    argatroban in 0.9 % sod chlor 0.5 mcg/kg/min (12/20/22 0256)     Scheduled   cloNIDine 0.1 mg/24 hr td ptwk  1 patch Transdermal Q7 Days    dexAMETHasone  6 mg Intravenous Daily    dextrose 50% in water (D50W)  25 g Intravenous Once    famotidine (PF)  20 mg Intravenous Daily    insulin regular  6 Units Intravenous Once    melatonin  6 mg Oral Nightly    mupirocin   Topical (Top) Daily       Objective:  Recent Vitals:  Temp:  [97.1 °F (36.2 °C)-97.5 °F (36.4 °C)] 97.1 °F (36.2 °C)  Pulse:  [76-98] 79  Resp:  [22-33] 22  SpO2:  [94 %-99 %] 99 %  BP: (161-198)/() 197/109    Physical Exam  Constitutional:       Appearance: Normal appearance.   HENT:      Head: Normocephalic.      Nose: Nose normal.   Cardiovascular:      Rate and Rhythm: Normal rate and regular rhythm.   Pulmonary:      Breath sounds: Normal breath sounds.   Abdominal:      Palpations: Abdomen is soft.   Musculoskeletal:          General: Normal range of motion.      Cervical back: Normal range of motion and neck supple.   Skin:     General: Skin is warm.   Neurological:      General: No focal deficit present.      Mental Status: She is alert.        I/O last 24 hrs:  Intake/Output - Last 3 Shifts         12/18 0700  12/19 0659 12/19 0700 12/20 0659 12/20 0700 12/21 0659    Urine (mL/kg/hr) 1700 (0.8) 200 (0.1)     Total Output 1700 200     Net -1700 -200            Stool Occurrence  2 x             Labs  BMP:   Recent Labs   Lab 12/19/22  0209 12/20/22  0409    140   K 5.6* 4.2   CO2 17* 19*   BUN 52.2* 52.5*   CREATININE 1.06* 0.91   CALCIUM 8.9 8.9   MG 2.80*  --      CBC:   Recent Labs   Lab 12/20/22  0408   WBC 13.6*   RBC 4.08*   HGB 11.4*   HCT 36.4*   PLT 87*   MCV 89.2   MCH 27.9   MCHC 31.3*     CMP:   Recent Labs   Lab 12/20/22  0409   CALCIUM 8.9   ALBUMIN 3.0*      K 4.2   CO2 19*   BUN 52.5*   CREATININE 0.91   ALKPHOS 116   *   AST 88*   BILITOT 1.4         Imaging:   CT: CT Head Without Contrast    Result Date: 12/20/2022  No acute intracranial findings. No significant discrepancy with the preliminary report. Electronically signed by: Arcadio Cook Date:    12/20/2022 Time:    08:19   CXR: No results found in the last 24 hours.        ASSESSMENT/PLAN:    Eval for PE in progress  Not a candidate for surgival embolectomy  Rec consult vascular sx for possible percutaneous intervention    Case and plan of care discussed with MD Glenn Palma PA-C

## 2022-12-20 NOTE — PROGRESS NOTES
[facility] Medicine Progress Note        Chief Complaint: Inpatient Follow-up for AMS, Covid    HPI: Miguel Angel Oliveros is an 89-year-old female with PMH of DMII, CAD s/p PCI, HTN, AS s/p TAVR, HLD, GERD, and PARAMJIT, who is admitted to [facility] ICU from regular unit after being found to have altered mental status and increased respiratory distress. She was initially admitted to [facility] on 2022 by [facility] after being transferred from [place] with the diagnosis of NSTEMI. At the time of encounter, patient states having lower abdominal discomfort, bladder fullness, and dysuria. She states that these symptoms have been persisting for the past few days accompanied by decreased urinary output. Per patient's family members at beside, she usually gets UTI several times each year and she has had 4 episodes of UTI in  (last UTI was 3 months ago).  Daughter reported that patient as baseline is able to converse with family member, recognizes everyone, able to sit at the table and feed herself, able to transfer herself from bed to wheelchair and wheelchair to chair with some assistance.  Stated she has some good days and bad days in a way that sometimes she asked about her old friends who have  but otherwise her dementia is not too bad.  She is known to Dr. Montalvo in outpatient setting.  Reported she was sick at home a week prior to admission to our facility.  Initially she went to ER because she was severely constipated which has since resolved, after that she went back to her PCP given she was short of breath and fatigued and lethargic.  Then she was brought here on  when she was very lethargic to the point that she needed max assist to move so they called EMS.  Daughter also reported in ICU on Thursday afternoon she was feeling her best, recognized her brother, sister, her grandkids.  But Thursday afternoon she started becoming more lethargic and sleepy and since then she  has not improved    Interval Hx:   Pt is somewhat awake and somewhat answering the questions but not all the way following commands   Daughters at bedside.  Updated her that Cardiothoracic surgery recommended vascular surgery eval for her thrombectomy so we have consulted Dr. Urbina and awaiting eval and recommendation.  Discussed with daughter that patient is very sick  Daughter is asked me how long she needs to be in COVID isolation.  Discussed will verify and inform   in the room   Case was discussed with patient's nurse on the floor     Objective/physical exam:  General:  somewhat awake, left IJ present, obese  Chest: Clear to auscultation bilaterally, supplemental oxygen via nasal cannula  Heart: RRR, +S1, S2, no appreciable murmur  Abdomen: Soft, nontender, BS +  MSK: Warm, no lower extremity edema, no clubbing or cyanosis, hands in mittens  Neurologic:  pt is not following commands completely but able to answer few questions    VITAL SIGNS: 24 HRS MIN & MAX LAST   Temp  Min: 97.1 °F (36.2 °C)  Max: 97.7 °F (36.5 °C) 97.5 °F (36.4 °C)   BP  Min: 161/92  Max: 198/104 (!) 175/83     Pulse  Min: 76  Max: 98  82   Resp  Min: 18  Max: 33 (!) 33   SpO2  Min: 94 %  Max: 98 % 97 %       Recent Labs   Lab 12/18/22  0931 12/19/22  0532 12/20/22  0408   WBC 11.0 12.7* 13.6*   RBC 3.74* 4.05* 4.08*   HGB 10.4* 11.2* 11.4*   HCT 33.7* 37.6 36.4*   MCV 90.1 92.8 89.2   MCH 27.8 27.7 27.9   MCHC 30.9* 29.8* 31.3*   RDW 15.6* 15.7* 15.7*   * 84* 87*   MPV 13.1* 13.5* 13.0*       Recent Labs   Lab 12/13/22  1213 12/13/22  1827 12/14/22  0845 12/15/22  0019 12/18/22  0614 12/18/22  1747 12/19/22  0209 12/19/22  2043 12/20/22  0409   NA  --    < >  --    < > 139  --  138  --  140   K  --    < >  --    < > 4.5  --  5.6*  --  4.2   CO2  --    < >  --    < > 18*  --  17*  --  19*   BUN  --    < >  --    < > 62.1*  --  52.2*  --  52.5*   CREATININE  --    < >  --    < > 1.22* 1.15* 1.06*  --  0.91   CALCIUM  --    < >  --     < > 8.7  --  8.9  --  8.9   PH 7.51*  --  7.490  --   --   --   --  7.48*  --    MG  --   --   --   --  2.80*  --  2.80*  --   --    ALBUMIN  --    < >  --    < > 2.8* 2.9* 2.9*  --  3.0*   ALKPHOS  --    < >  --    < > 99 115 106  --  116   ALT  --    < >  --    < > 855* 770* 709*  --  543*   AST  --    < >  --    < > 153* 141* 136*  --  88*   BILITOT  --    < >  --    < > 1.0 1.3 1.4  --  1.4    < > = values in this interval not displayed.          Microbiology Results (last 7 days)       Procedure Component Value Units Date/Time    Urine culture [[id number]] Collected: 12/18/22 1940    Order Status: Completed Specimen: Urine Updated: 12/19/22 0623     Urine Culture No Growth At 24 Hours    Blood Culture [[id number]]  (Normal) Collected: 12/12/22 0948    Order Status: Completed Specimen: Blood, Venous Updated: 12/17/22 1000     CULTURE, BLOOD ([facility]) No Growth at 5 days    Blood Culture [[id number]]  (Normal) Collected: 12/12/22 0948    Order Status: Completed Specimen: Blood, Venous Updated: 12/17/22 1000     CULTURE, BLOOD ([facility]) No Growth at 5 days             See below for Radiology    Scheduled Med:   cloNIDine 0.1 mg/24 hr td ptwk  1 patch Transdermal Q7 Days    dexAMETHasone  6 mg Intravenous Daily    dextrose 50% in water (D50W)  25 g Intravenous Once    famotidine (PF)  20 mg Intravenous Daily    insulin regular  6 Units Intravenous Once    melatonin  6 mg Oral Nightly    mupirocin   Topical (Top) Daily        Continuous Infusions:   sodium chloride 0.9% 75 mL/hr at 12/19/22 1636    amino acid 4.25% - dextrose 5% solution 60 mL/hr at 12/19/22 1103    argatroban in 0.9 % sod chlor 0.5 mcg/kg/min (12/20/22 0256)        PRN Meds:  diazePAM, haloperidol lactate, hydrALAZINE, ondansetron, sodium chloride 0.9%       Assessment/Plan:  Altered mental status likely 2/2 sepsis and metabolic encephalopathy  Acute renal failure/ ATN - Cr improving   Lactic acidosis likely 2/2 acute renal failure and sepsis, and portal  venous thrombosis  NSTEMI Type II in the setting of Acute COVID Infection, SMITHA, and Probable IVC/ portal vein Thrombus/PE  COVID positive on admission to ICU- 12/12- day 6 of diagnosis   Transminitis - trending down  Bladder cancer s/p resection in October 2022  CT ABD & pelvis 12/11/2022 shows heterogeneous hyperdense mass lesion in the superior and left lateral aspect of the urinary bladder  Per patient's family at bedside, patient will be receiving additional imaging to stage bladder cancer  HX of HTN, DM II, TIA, aortic stenosis s/p TAVR, CAD s/p PCI, GERD, PARAMJIT  IVC and Portal Vein Thrombosis with High probability of Pulmonary Embolism with Marked RV Strain - On Heparin gtt  Thrombocytopenia- heparin induced ?  Saddle PE with right heart strain  Mild hyperkalemia probably due to clinimic E, awaiting dietician recs   Hematuria - blood thinners related?    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   Spoke to Arlette BOYER with nephrology, agreed with CTA Chest. Recommended to continue NS at 75cc/hr atleast for 24 hrs post contrast  CTA Chest- Saddle pulmonary embolism with thrombi extending into the bilateral upper and lower lobes and evidence of right heart strain.  12/18- Heparin changed to Argatroban gtt- aptt per protocol, cards recommended to continue Argatroban for now and once able to swallow, switch to apixiban oral prior to discharge   Heparin PF4 AB- HIT - sent and in process  Consulted CTS - recommended vascular consult  Consulted Dr Urbina--> reordered Echo, Upper and Lower extremities u/s repeat --> pending   Patient apparently lives at home with family and has a significant amount of DME as well as support.  She has not really been assessed for any oral nutrition since she has been here because of her increased oxygen requirements early in admission    Currently on supplemental oxygen via nasal cannula at 4.5 liters/minute  Consulted speech though pt was lethargic and not following commands over the weekend, so was unable to assess . Today she is awake alert and following commands  Cont Clinimix  at 60 ml/hr given she is also n NS at 75cc/hr   Hyperkalemia resolved once clinimix changed to plain without electrolytes   Completed remdesivir and continued on dexamethasone- day 9 of 10   Discontinued zosyn after 7 days treatment   Transaminases are beginning to trend down, monitor closely  Appreciate the help from Nephrology and cardiology services, they both signed off   Apparently at baseline she become confuse form time to time per son but daughter reports differently stating she was conversing, able to transfer, able to feed herself. Reported her good and bad days with bad days being when she asks about her old friends if they are alive. Pt is established with Dr Montalvo who is treating her dementia   Daughter reported lately she has been depressed and her PCP started her on zoloft but they have not given it to her yet  Discussed valium 5 mg is a very high dose for her age gp and is considered high risk medicine and that we have decreased the dose to 2 mg daily as needed. Daughter reported all the doctors in the hospital has said the same thing but she has been on that dose for many years. Advised consulting with a Psychiatrist if he anxiety and depression is getting worse, verbalized understanding   Family decided to hold off on NG tube for Meds and tube feeds  UA shows hematuria, urine c/d negative x 24 hrs   Morning cbc cmp ordered     VTE prophylaxis: Argatroban infusion    Patient condition:  Guarded    Anticipated discharge and Disposition:   TBD    Critical care note:  Critical care diagnosis: IVC, Portal vein, saddle PE with R heart strain requiring IV Argatroban due to concern for HIT  Critical care interventions: Hands-on evaluation, review of  labs/radiographs/records and discussion with patient and family if present  Critical care time spent: 35 minutes        All diagnosis and differential diagnosis have been reviewed; assessment and plan has been documented; I have personally reviewed the labs and test results that are presently available; I have reviewed the patients medication list; I have reviewed the consulting providers response and recommendations. I have reviewed or attempted to review medical records based upon their availability    All of the patient's questions have been  addressed and answered. Patient's is agreeable to the above stated plan. I will continue to monitor closely and make adjustments to medical management as needed.  _____________________________________________________________________    Nutrition Status:    Radiology:  CT Head Without Contrast  START OF REPORT:  TECHNIQUE: CT OF THE HEAD WAS PERFORMED WITHOUT INTRAVENOUS CONTRAST WITH AXIAL AS WELL AS CORONAL AND SAGITTAL IMAGES.    COMPARISON: COMPARISON IS WITH STUDY [id number] 21:55:13.    DOSAGE INFORMATION: AUTOMATED EXPOSURE CONTROL WAS UTILIZED.    CLINICAL HISTORY: MENTAL STATUS CHANGE.    Findings:  Hemorrhage: No acute intracranial hemorrhage is seen.  CSF spaces: The ventricles, sulci and basal cisterns all appear mildly prominent consistent with global cerebral atrophy.  Brain parenchyma: There is preservation of the grey white junction throughout. Moderate microvascular change is seen in portions of the periventricular and deep white matter tracts.  Cerebellum: Unremarkable.  Sella and skull base: The sella appears to be within normal limits for age.  Herniation: None.  Intracranial calcifications: Incidental note is made of some pineal region calcification. Incidental note is made of some calcification of the falx.  Calvarium: No acute linear or depressed skull fracture is seen.    Maxillofacial Structures:  Paranasal sinuses: Again noted is some mucosal  thickening in the right maxillary and sphenoid sinuses with retention cysts or polyps in the right maxillary sinus.  Orbits: The orbits appear unremarkable.  Zygomatic arches: The zygomatic arches are intact and unremarkable.  Temporal bones and mastoids: There is fluid in a few left mastoid air cells.  TMJ: The mandibular condyles appear normally placed with respect to the mandibular fossa.    Impression:  1. No acute intracranial process identified. Details and findings as noted above.      Mukesh Denotn MD  Department of Hospital Medicine   [facility]   12/20/2022

## 2022-12-20 NOTE — PROGRESS NOTES
Consults  [facility] - 3rd Floor Medical Telemetry  Cardiology  Progress Note    Patient Name: Miguel Angel Oliveros  MRN: [id number]  Admission Date: 12/11/2022  Hospital Length of Stay: 9 days  Code Status: Full Code   Attending Provider: Mukesh Denton MD   Consulting Provider: NEERAJ Cherry  Primary Care Physician: Barrett Lopez MD  Principal Problem:<principal problem not specified>    Patient information was obtained from past medical records and ER records.     Subjective:   Consultation Reason: NSTEMI & Re-consultation Reason: Anticoagulation Recommendations (Pulmonary Embolism)    Ms. Oliveros is an 88 y/o female, with PMHx significant for CAD/PCI, VHD/TAVR, SSS/PPM, HTN,HLD, PARAMJIT, depression/anxiety, and dementia who presented to [facility] with c/o weakness, TODD, and abdominal pain. Troponin was elevated 0.868->0.784. She was seen by [facility] through teleconsult who recommended transfer for cardiology services. Workup at [facility] revealed no acute cardiopulmonary process via CXR. + Leukocytosis with WBC 14.2->17.2, Plt 103->85, K+ 5.2->5.8, BUN/creatinine 47.9/2.11->49.7/2.40. AST//92, BNP 1124->1900, troponin 0.868-0.784-0.761-0.895- 0.825. [facility] has been consulted for NSTEMI. Patient is currently stuporous. On oxymask, BP marginal. She is hypothermic. Echo at bedside.     Hospital Course:  12.13.22: SR on Tele. BP Stable.  12.13.22: Vitals Stable. Remains on Heparin Infusion.  12.20.22: Re-consult for Anticoagulation Recommendations in the Setting of PE. Hypertensive. SR on Tele. On NC 5 L/Min.     PMH: CAD/PCI, VHD/TAVR, SSS/PPM, T2DM, HLD, PARAMJIT, depression/anxiety, dementia, bladder cancer/resection  PSH: bladder resection, TAVR 23 mm S3 Ultra valve 10/2020, EGD, colonoscopy, hysterectomy, rectal polypectomy, tonsillectomy,   Family History: Father- HLD, CVA, Mother- DM; sister- breast cancer  Social History: Denies alcohol or ETOH     Previous Cardiac Diagnostics:   CT  Angiogram Chest (12.18.22):  Impression:  Saddle pulmonary embolism with thrombi extending into the bilateral upper and lower lobes and evidence of right heart strain.    Echocardiogram (12.12.22):  Hyperdynamic left ventricular systolic function, LVEF >70%  Severe right ventricular enlargement with moderately to severely reduced right ventricular systolic function.  Well seated bioprosthetic AV (TAVR) without significant stenosis or perivalvular leak. Peak AV 2.3 m/sec, MG 10 mmHg,dimensionless index 0.34.  Mild to moderate tricuspid regurgitation.  There is pulmonary hypertension. The estimated PA systolic pressure is 58 mmHg.  Large echodensities seen in IVC at RA junction, cannot completely exclude IVC thrombus.  The estimated PA systolic pressure is 58 mmHg.    TTE (10/05/2021):  LV normal in size. Global LVSF is hyperdynamic. LVEF 80%. Mild septal hypertrophy is noted. Patient hx TAVR using a 23 mmS3 Ultra bio-prosthesis with a MG 20 mmHg, PV 3.2 m/sec, and DI 0.38. No significant periprosthetic regurgitation is appreciated. Trans aortic velocities and gradients are elevated when compared to previous exam. Moderate severe calcification of the mitral valve is noted. LADI 1.6 cm2, Mean trans mitral gradient is 2.5 mmHg. Question mild MV stenosis. MV Don score 12. 1+ MR/TR.      [facility] (09/01/2020):  LM: Normal  Lcx: patent proximal stent.  distal segment  Ramus: patent proximal stent  LAD-20% calcified proximal lesion, dominant vessel  RCA: Patent proximal stent, dominant vessel  LVEDP 17  LADI 0.61, MG 35.   Wedge pressure mean: 18  No plans for intervention to distal circumflex as this is a  that has been present for many years. Feel majority of patients symptoms are related to her valve.      Review of patient's allergies indicates:   Allergen Reactions    Statins-hmg-coa reductase inhibitors      Other reaction(s): Joint pain    Bimatoprost      Other reaction(s): Eye redness    Cortisone      Dorzolamide-timolol     Gabapentin      Other reaction(s): Confusion    Hydrocortisone     Tafluprost (pf)        Current Facility-Administered Medications on File Prior to Encounter   Medication    betamethasone acetate-betamethasone sodium phosphate injection 6 mg    betamethasone acetate-betamethasone sodium phosphate injection 6 mg    LIDOcaine (PF) 20 mg/mL (2%) injection 5 mL    LIDOcaine (PF) 20 mg/mL (2%) injection 5 mL     Current Outpatient Medications on File Prior to Encounter   Medication Sig    ascorbic acid, vitamin C, (VITAMIN C) 1000 MG tablet Take 1,000 mg by mouth once daily.    aspirin (ECOTRIN) 81 MG EC tablet Take 81 mg by mouth.    atorvastatin (LIPITOR) 40 MG tablet Take 40 mg by mouth once daily.    carvediloL (COREG) 25 MG tablet Take 25 mg by mouth 2 (two) times daily.    cloNIDine (CATAPRES) 0.1 MG tablet     clopidogreL (PLAVIX) 75 mg tablet Take 75 mg by mouth once daily.    isosorbide mononitrate (IMDUR) 30 MG 24 hr tablet 15 mg.    multivitamin/iron/folic acid (CENTRUM COMPLETE ORAL) Take by mouth.    QUEtiapine (SEROQUEL) 25 MG Tab Take 25 mg by mouth 2 (two) times daily.    ranolazine (RANEXA) 1,000 mg Tb12 Take 1,000 mg by mouth 2 (two) times daily.    TUMERIC-GING-OLIVE-OREG-CAPRYL ORAL Take by mouth.    nitroGLYCERIN (NITROSTAT) 0.4 MG SL tablet Place 0.4 mg under the tongue.         Review of Systems   Unable to perform ROS: Acuity of condition    ROS Deferred given COVID-19 Precautions.    Objective:     Vital Signs (Most Recent):  Temp: 97.5 °F (36.4 °C) (12/20/22 0257)  Pulse: 82 (12/20/22 0257)  Resp: (!) 33 (12/19/22 2035)  BP: (!) 175/83 (12/20/22 0253)  SpO2: 97 % (12/20/22 0257)   Vital Signs (24h Range):  Temp:  [97.1 °F (36.2 °C)-97.7 °F (36.5 °C)] 97.5 °F (36.4 °C)  Pulse:  [76-98] 82  Resp:  [18-33] 33  SpO2:  [94 %-98 %] 97 %  BP: (161-198)/() 175/83     Weight: 86.2 kg (190 lb)  Body mass index is 31.62 kg/m².    SpO2: 97 %         Intake/Output Summary (Last  24 hours) at 12/20/2022 0834  Last data filed at 12/19/2022 1030  Gross per 24 hour   Intake --   Output 200 ml   Net -200 ml         Lines/Drains/Airways       Central Venous Catheter Line  Duration             Percutaneous Central Line Insertion/Assessment - Triple Lumen  12/12/22 1745 left internal jugular 7 days              Peripheral Intravenous Line  Duration                  Peripheral IV - Single Lumen 12/12/22 1140 20 G Anterior;Right Wrist 7 days                  Significant Labs:  Recent Results (from the past 72 hour(s))   Comprehensive Metabolic Panel    Collection Time: 12/18/22  6:14 AM   Result Value Ref Range    Sodium Level 139 136 - 145 mmol/L    Potassium Level 4.5 3.5 - 5.1 mmol/L    Chloride 108 (H) 98 - 107 mmol/L    Carbon Dioxide 18 (L) 23 - 31 mmol/L    Glucose Level 275 (H) 82 - 115 mg/dL    Blood Urea Nitrogen 62.1 (H) 9.8 - 20.1 mg/dL    Creatinine 1.22 (H) 0.55 - 1.02 mg/dL    Calcium Level Total 8.7 8.4 - 10.2 mg/dL    Protein Total 5.9 5.8 - 7.6 gm/dL    Albumin Level 2.8 (L) 3.4 - 4.8 g/dL    Globulin 3.1 2.4 - 3.5 gm/dL    Albumin/Globulin Ratio 0.9 (L) 1.1 - 2.0 ratio    Bilirubin Total 1.0 <=1.5 mg/dL    Alkaline Phosphatase 99 40 - 150 unit/L    Alanine Aminotransferase 855 (H) 0 - 55 unit/L    Aspartate Aminotransferase 153 (H) 5 - 34 unit/L    eGFR 43 mls/min/1.73/m2   Magnesium    Collection Time: 12/18/22  6:14 AM   Result Value Ref Range    Magnesium Level 2.80 (H) 1.60 - 2.60 mg/dL   APTT    Collection Time: 12/18/22  9:31 AM   Result Value Ref Range    PTT 61.1 (H) 23.2 - 33.7 seconds   CBC with Differential    Collection Time: 12/18/22  9:31 AM   Result Value Ref Range    WBC 11.0 4.5 - 11.5 x10(3)/mcL    RBC 3.74 (L) 4.20 - 5.40 x10(6)/mcL    Hgb 10.4 (L) 12.0 - 16.0 gm/dL    Hct 33.7 (L) 37.0 - 47.0 %    MCV 90.1 80.0 - 94.0 fL    MCH 27.8 pg    MCHC 30.9 (L) 33.0 - 36.0 mg/dL    RDW 15.6 (H) 11.0 - 14.5 %    Platelet 104 (L) 140 - 371 x10(3)/mcL    MPV 13.1 (H) 9.4 -  12.4 fL    Neut % 86.1 %    Lymph % 5.0 %    Mono % 7.3 %    Eos % 0.0 %    Basophil % 0.2 %    Lymph # 0.55 (L) 0.6 - 4.6 x10(3)/mcL    Neut # 9.50 (H) 2.1 - 9.2 x10(3)/mcL    Mono # 0.80 0.1 - 1.3 x10(3)/mcL    Eos # 0.00 0 - 0.9 x10(3)/mcL    Baso # 0.02 0 - 0.2 x10(3)/mcL    IG# 0.15 (H) 0 - 0.04 x10(3)/mcL    IG% 1.4 %    NRBC% 32.3 (H) 0 - 1 %   Creatinine, Serum    Collection Time: 12/18/22  5:47 PM   Result Value Ref Range    Creatinine 1.15 (H) 0.55 - 1.02 mg/dL    eGFR 46 mls/min/1.73/m2   Hepatic function panel    Collection Time: 12/18/22  5:47 PM   Result Value Ref Range    Albumin Level 2.9 (L) 3.4 - 4.8 g/dL    Alkaline Phosphatase 115 40 - 150 unit/L    Alanine Aminotransferase 770 (H) 0 - 55 unit/L    Aspartate Aminotransferase 141 (H) 5 - 34 unit/L    Bilirubin Direct 0.4 0.0 - 0.5 mg/dL    Bilirubin Indirect 0.90 (H) 0.00 - 0.80 mg/dL    Bilirubin Total 1.3 <=1.5 mg/dL    Protein Total 6.3 5.8 - 7.6 gm/dL   APTT    Collection Time: 12/18/22  5:47 PM   Result Value Ref Range    PTT 53.4 (H) 23.2 - 33.7 seconds   Pathologist Interpretation    Collection Time: 12/18/22  5:47 PM   Result Value Ref Range    Pathology Review       Chemical abnormalities suggesting hepatic parenchymal injury; no specific evidence of biliary disease.    Finesse Mendoza M.D.      Urinalysis, Reflex to Urine Culture Urine, Clean Catch    Collection Time: 12/18/22  7:40 PM    Specimen: Urine   Result Value Ref Range    Color, UA Yellow Yellow, Light-Yellow, Dark Yellow, Gracie, Straw    Appearance, UA Cloudy (A) Clear    Specific Gravity, UA 1.040 (H) 1.001 - 1.030    pH, UA 6.5 5.0 - 8.5    Protein, UA 2+ (A) Negative mg/dL    Glucose, UA 2+ (A) Negative, Normal mg/dL    Ketones, UA Negative Negative mg/dL    Blood, UA 3+ (A) Negative unit/L    Bilirubin, UA Negative Negative mg/dL    Urobilinogen, UA 1.0 0.2, 1.0, Normal mg/dL    Nitrites, UA Negative Negative    Leukocyte Esterase, UA 1+ (A) Negative unit/L    Urinalysis, Microscopic    Collection Time: 12/18/22  7:40 PM   Result Value Ref Range    RBC,  (H) <=5 /HPF    WBC, UA 21 (H) <=5 /HPF    Squamous Epithelial Cells, UA <5 <=5 /HPF    Bacteria, UA None Seen None Seen, Rare, Occasional /HPF   Urine culture    Collection Time: 12/18/22  7:40 PM    Specimen: Urine   Result Value Ref Range    Urine Culture No Growth    APTT    Collection Time: 12/19/22 12:33 AM   Result Value Ref Range    PTT 50.2 (H) 23.2 - 33.7 seconds   Comprehensive Metabolic Panel    Collection Time: 12/19/22  2:09 AM   Result Value Ref Range    Sodium Level 138 136 - 145 mmol/L    Potassium Level 5.6 (H) 3.5 - 5.1 mmol/L    Chloride 110 (H) 98 - 107 mmol/L    Carbon Dioxide 17 (L) 23 - 31 mmol/L    Glucose Level 310 (H) 82 - 115 mg/dL    Blood Urea Nitrogen 52.2 (H) 9.8 - 20.1 mg/dL    Creatinine 1.06 (H) 0.55 - 1.02 mg/dL    Calcium Level Total 8.9 8.4 - 10.2 mg/dL    Protein Total 6.5 5.8 - 7.6 gm/dL    Albumin Level 2.9 (L) 3.4 - 4.8 g/dL    Globulin 3.6 (H) 2.4 - 3.5 gm/dL    Albumin/Globulin Ratio 0.8 (L) 1.1 - 2.0 ratio    Bilirubin Total 1.4 <=1.5 mg/dL    Alkaline Phosphatase 106 40 - 150 unit/L    Alanine Aminotransferase 709 (H) 0 - 55 unit/L    Aspartate Aminotransferase 136 (H) 5 - 34 unit/L    eGFR 50 mls/min/1.73/m2   APTT    Collection Time: 12/19/22  2:09 AM   Result Value Ref Range    PTT 55.4 (H) 23.2 - 33.7 seconds   Magnesium    Collection Time: 12/19/22  2:09 AM   Result Value Ref Range    Magnesium Level 2.80 (H) 1.60 - 2.60 mg/dL   Phosphorus    Collection Time: 12/19/22  2:09 AM   Result Value Ref Range    Phosphorus Level 2.9 2.3 - 4.7 mg/dL   Lactic Acid, Plasma    Collection Time: 12/19/22  5:32 AM   Result Value Ref Range    Lactic Acid Level 4.3 (HH) 0.5 - 2.2 mmol/L   CBC with Differential    Collection Time: 12/19/22  5:32 AM   Result Value Ref Range    WBC 12.7 (H) 4.5 - 11.5 x10(3)/mcL    RBC 4.05 (L) 4.20 - 5.40 x10(6)/mcL    Hgb 11.2 (L) 12.0 - 16.0 gm/dL     Hct 37.6 37.0 - 47.0 %    MCV 92.8 80.0 - 94.0 fL    MCH 27.7 pg    MCHC 29.8 (L) 33.0 - 36.0 mg/dL    RDW 15.7 (H) 11.0 - 14.5 %    Platelet 84 (L) 140 - 371 x10(3)/mcL    MPV 13.5 (H) 9.4 - 12.4 fL    IG# 0.22 (H) 0 - 0.04 x10(3)/mcL    IG% 1.7 %    NRBC% 28.1 (H) 0 - 1 %   APTT    Collection Time: 12/19/22  5:32 AM   Result Value Ref Range    PTT 67.1 (H) 23.2 - 33.7 seconds   Manual Differential    Collection Time: 12/19/22  5:32 AM   Result Value Ref Range    Neut Man 92 %    Lymph Man 1 %    Monocyte Man 4 %    Promyelo Man 2 %    Plasmacyte Man 1 %    Instr WBC 13 x10(3)/mcL    Abs Mono 0.52 0.1 - 1.3 x10(3)/mcL    Abs Lymp 0.13 (L) 0.6 - 4.6 x10(3)/mcL    Abs Neut 12.22 (H) 2.1 - 9.2 x10(3)/mcL    NRBC Man 48 %    Polychrom 1+ (A) (none)    RBC Morph Abnormal (A) Normal    Anisocyte 1+ (A) (none)    Poik 2+ (A) (none)    Macrocyte 1+ (A) (none)    Schistocyte 1+ (A) (none)    Target Cell 1+ (A) (none)    Ovalocytes 1+ (A) (none)    [place] Cells 1+ (A) (none)    Platelet Est Decreased (A) Normal, Adequate   Lactic Acid, Plasma    Collection Time: 12/19/22  9:01 AM   Result Value Ref Range    Lactic Acid Level 3.7 (HH) 0.5 - 2.2 mmol/L   APTT    Collection Time: 12/19/22  9:01 AM   Result Value Ref Range    PTT 64.1 (H) 23.2 - 33.7 seconds   APTT    Collection Time: 12/19/22  1:08 PM   Result Value Ref Range    PTT 63.2 (H) 23.2 - 33.7 seconds   POCT glucose    Collection Time: 12/19/22  4:20 PM   Result Value Ref Range    POCT Glucose 288 (H) 70 - 110 mg/dL   APTT    Collection Time: 12/19/22  6:04 PM   Result Value Ref Range    PTT 62.8 (H) 23.2 - 33.7 seconds   POCT glucose    Collection Time: 12/19/22  8:26 PM   Result Value Ref Range    POCT Glucose 290 (H) 70 - 110 mg/dL   POCT ARTERIAL BLOOD GAS    Collection Time: 12/19/22  8:43 PM   Result Value Ref Range    POC PH 7.48 (A) 7.35 - 7.45    POC PCO2 25 (A) 35 - 45 mmHg    POC PO2 69 (A) 80 - 100 mmHg    POC HEMOGLOBIN 11.5 (A) 12.0 - 16.0 g/dL    POC  SATURATED O2 94.8 %    POC O2Hb 93.0 (A) 94.0 - 97.0 %    POC COHb 1.9 %    POC MetHb 0.80 0.40 - 1.5 %    POC Potassium 4.1 3.5 - 5.0 mmol/l    POC Sodium 136 (A) 137 - 145 mmol/l    POC Ionized Calcium 1.22 1.12 - 1.23 mmol/l    Correct Temperature (PH) 7.48 (A) 7.35 - 7.45    Corrected Temperature (pCO2) 25 (A) 35 - 45 mmHg    Corrected Temperature (pO2) 69 (A) 80 - 100 mmHg    POC HCO3 18.6 (A) 22.0 - 26.0 mmol/l    Base Deficit -3.6 (A) -2.0 - 2.0 mmol/l    POC Temp 37.0 °C    Specimen source Arterial sample    CBC with Differential    Collection Time: 12/20/22  4:08 AM   Result Value Ref Range    WBC 13.6 (H) 4.5 - 11.5 x10(3)/mcL    RBC 4.08 (L) 4.20 - 5.40 x10(6)/mcL    Hgb 11.4 (L) 12.0 - 16.0 gm/dL    Hct 36.4 (L) 37.0 - 47.0 %    MCV 89.2 80.0 - 94.0 fL    MCH 27.9 pg    MCHC 31.3 (L) 33.0 - 36.0 mg/dL    RDW 15.7 (H) 11.0 - 14.5 %    Platelet 87 (L) 140 - 371 x10(3)/mcL    MPV 13.0 (H) 9.4 - 12.4 fL    IG# 0.14 (H) 0 - 0.04 x10(3)/mcL    IG% 1.0 %    NRBC% 19.9 (H) 0 - 1 %   Manual Differential    Collection Time: 12/20/22  4:08 AM   Result Value Ref Range    Neut Man 95 %    Lymph Man 1 %    Monocyte Man 4 %    Plasmacyte Man 1 %    Instr WBC 14 x10(3)/mcL    Abs Mono 0.56 0.1 - 1.3 x10(3)/mcL    Abs Lymp 0.14 (L) 0.6 - 4.6 x10(3)/mcL    Abs Neut 13.3 (H) 2.1 - 9.2 x10(3)/mcL    NRBC Man 45 %    Polychrom 2+ (A) (none)    RBC Morph Abnormal (A) Normal    Anisocyte 1+ (A) (none)    Poik 1+ (A) (none)    Microcyte 1+ (A) (none)    Macrocyte 1+ (A) (none)    Target Cell 1+ (A) (none)    Ovalocytes 1+ (A) (none)    [place] Cells 1+ (A) (none)    Platelet Est Decreased (A) Normal, Adequate   Comprehensive Metabolic Panel    Collection Time: 12/20/22  4:09 AM   Result Value Ref Range    Sodium Level 140 136 - 145 mmol/L    Potassium Level 4.2 3.5 - 5.1 mmol/L    Chloride 113 (H) 98 - 107 mmol/L    Carbon Dioxide 19 (L) 23 - 31 mmol/L    Glucose Level 305 (H) 82 - 115 mg/dL    Blood Urea Nitrogen 52.5 (H) 9.8 -  20.1 mg/dL    Creatinine 0.91 0.55 - 1.02 mg/dL    Calcium Level Total 8.9 8.4 - 10.2 mg/dL    Protein Total 5.9 5.8 - 7.6 gm/dL    Albumin Level 3.0 (L) 3.4 - 4.8 g/dL    Globulin 2.9 2.4 - 3.5 gm/dL    Albumin/Globulin Ratio 1.0 (L) 1.1 - 2.0 ratio    Bilirubin Total 1.4 <=1.5 mg/dL    Alkaline Phosphatase 116 40 - 150 unit/L    Alanine Aminotransferase 543 (H) 0 - 55 unit/L    Aspartate Aminotransferase 88 (H) 5 - 34 unit/L    eGFR 60 mls/min/1.73/m2       Significant Imaging:  Imaging Results              CT Head Without Contrast (Final result)  Result time 12/12/22 06:52:36      Final result by Chapin Ward MD (12/12/22 06:52:36)                   Impression:      No acute intracranial findings identified.    No significant discrepancy with overnight report.      Electronically signed by: Chapin Ward  Date:    12/12/2022  Time:    06:52               Narrative:    EXAMINATION:  CT HEAD WITHOUT CONTRAST    CLINICAL HISTORY:  Mental status change, unknown cause;    TECHNIQUE:  Sequential axial images were performed of the brain without contrast.    Dose product length of 1035 mGycm. Automated exposure control was utilized to minimize radiation dose.    COMPARISON:  December 1, 2022.    FINDINGS:  There is no intracranial mass effect, midline shift, hydrocephalus or hemorrhage. There is no sulcal effacement or low attenuation changes to suggest recent large vessel territory infarction. Chronic appearing periventricular and subcortical white matter low attenuation changes are present and are consistent with chronic microangiopathic ischemia. The ventricular system and sulcal markings prominence is consistent with atrophy. There is no acute extra axial fluid collection.  Right maxillary sinus lobulated mucoperiosteal thickening.  Otherwise, visualized paranasal sinuses are clear without mucosal thickening, polypoidal abnormality or air-fluid levels. Mastoid air cells aeration is optimal.                         Preliminary result by Chapin Ward MD (12/11/22 22:58:29)                   Narrative:    START OF REPORT:  Technique: CT of the head was performed without intravenous contrast with axial as well as coronal and sagittal images.    Comparison: Comparison is with study dated2022-12-01 13:59:19.    Dosage Information: Automated exposure control was utilized.    Clinical history: Ams.    Findings:  Hemorrhage: No acute intracranial hemorrhage is seen.  CSF spaces: The ventricles, sulci and basal cisterns all appear mildly prominent consistent with global cerebral atrophy.  Brain parenchyma: There is preservation of the grey white junction throughout. Moderate microvascular change is seen in portions of the periventricular and deep white matter tracts.  Cerebellum: Unremarkable.  Sella and skull base: The sella appears to be within normal limits for age.  Herniation: None.  Intracranial calcifications: Incidental note is made of bilateral choroid plexus calcification. Incidental note is made of some pineal region calcification. Incidental note is made of some calcification of the falx.  Calvarium: No acute linear or depressed skull fracture is seen.    Maxillofacial Structures:  Paranasal sinuses: Again noted is moderate mucoperiosteal thickening in the right maxillary sinus with a small retention cyst or polyp.  Orbits: The orbits appear unremarkable.  Zygomatic arches: The zygomatic arches are intact and unremarkable.  Temporal bones and mastoids: The temporal bones and mastoids appear unremarkable.  TMJ: The mandibular condyles appear normally placed with respect to the mandibular fossa.      Impression:  1. No acute intracranial process identified. Details as above.                          Preliminary result by Mane Inman MD (12/11/22 22:58:29)                   Narrative:    START OF REPORT:  Technique: CT of the head was performed without intravenous contrast with axial as well as coronal and sagittal  images.    Comparison: Comparison is with study [id number] 13:59:19.    Dosage Information: Automated exposure control was utilized.    Clinical history: Ams.    Findings:  Hemorrhage: No acute intracranial hemorrhage is seen.  CSF spaces: The ventricles, sulci and basal cisterns all appear mildly prominent consistent with global cerebral atrophy.  Brain parenchyma: There is preservation of the grey white junction throughout. Moderate microvascular change is seen in portions of the periventricular and deep white matter tracts.  Cerebellum: Unremarkable.  Sella and skull base: The sella appears to be within normal limits for age.  Herniation: None.  Intracranial calcifications: Incidental note is made of bilateral choroid plexus calcification. Incidental note is made of some pineal region calcification. Incidental note is made of some calcification of the falx.  Calvarium: No acute linear or depressed skull fracture is seen.    Maxillofacial Structures:  Paranasal sinuses: Again noted is moderate mucoperiosteal thickening in the right maxillary sinus with a small retention cyst or polyp.  Orbits: The orbits appear unremarkable.  Zygomatic arches: The zygomatic arches are intact and unremarkable.  Temporal bones and mastoids: The temporal bones and mastoids appear unremarkable.  TMJ: The mandibular condyles appear normally placed with respect to the mandibular fossa.      Impression:  1. No acute intracranial process identified. Details as above.                                      Telemetry: Sinus Rhythm    Physical Exam  Cardiovascular:      Rate and Rhythm: Normal rate and regular rhythm.      Comments: Sinus Rhythm  Limited Physical Exam/COVID Precautions. History obtained from Bedside RN.    Home Medications:   Current Facility-Administered Medications on File Prior to Encounter   Medication Dose Route Frequency Provider Last Rate Last Admin    betamethasone acetate-betamethasone sodium phosphate injection  6 mg  6 mg Intra-articular  Zina D Bouy, FNP   6 mg at 06/01/22 1345    betamethasone acetate-betamethasone sodium phosphate injection 6 mg  6 mg Intra-articular  Zina D Bouy, FNP   6 mg at 06/01/22 1345    LIDOcaine (PF) 20 mg/mL (2%) injection 5 mL  5 mL   Zina D Bouy, FNP   5 mL at 06/01/22 1345    LIDOcaine (PF) 20 mg/mL (2%) injection 5 mL  5 mL   Zina D Bouy, FNP   5 mL at 06/01/22 1345     Current Outpatient Medications on File Prior to Encounter   Medication Sig Dispense Refill    ascorbic acid, vitamin C, (VITAMIN C) 1000 MG tablet Take 1,000 mg by mouth once daily.      aspirin (ECOTRIN) 81 MG EC tablet Take 81 mg by mouth.      atorvastatin (LIPITOR) 40 MG tablet Take 40 mg by mouth once daily.      carvediloL (COREG) 25 MG tablet Take 25 mg by mouth 2 (two) times daily.      cloNIDine (CATAPRES) 0.1 MG tablet       clopidogreL (PLAVIX) 75 mg tablet Take 75 mg by mouth once daily.      isosorbide mononitrate (IMDUR) 30 MG 24 hr tablet 15 mg.      multivitamin/iron/folic acid (CENTRUM COMPLETE ORAL) Take by mouth.      QUEtiapine (SEROQUEL) 25 MG Tab Take 25 mg by mouth 2 (two) times daily.      ranolazine (RANEXA) 1,000 mg Tb12 Take 1,000 mg by mouth 2 (two) times daily.      TUMERIC-GING-OLIVE-OREG-CAPRYL ORAL Take by mouth.      nitroGLYCERIN (NITROSTAT) 0.4 MG SL tablet Place 0.4 mg under the tongue.         Current Inpatient Medications:    Current Facility-Administered Medications:     0.9%  NaCl infusion, , Intravenous, Continuous, Venkat Quarles MD, Last Rate: 75 mL/hr at 12/19/22 1636, New Bag at 12/19/22 1636    amino acid 4.25% - dextrose 5% solution, , Intravenous, Continuous, Mukesh Denton MD, Last Rate: 60 mL/hr at 12/19/22 1103, New Bag at 12/19/22 1103    argatroban in sodium chloride 0.9% (conc: 1 mg/mL), 0-10 mcg/kg/min, Intravenous, Continuous, Mukesh Denton MD, Last Rate: 2.6 mL/hr at 12/20/22 0256, 0.5 mcg/kg/min at 12/20/22 0256    cloNIDine 0.1 mg/24 hr td ptwk 1 patch, 1  patch, Transdermal, Q7 Days, Mukesh Denton MD, 1 patch at 12/19/22 2009    dexAMETHasone injection 6 mg, 6 mg, Intravenous, Daily, Cathi Morocho, DO, 6 mg at 12/19/22 0837    dextrose 50% injection 25 g, 25 g, Intravenous, Once, CHANTELLE Corrales    diazePAM tablet 2 mg, 2 mg, Oral, Nightly PRN, Mukesh Dneton MD    famotidine (PF) injection 20 mg, 20 mg, Intravenous, Daily, Oswaldo Alonso MD, 20 mg at 12/19/22 0837    haloperidol lactate injection 2 mg, 2 mg, Intramuscular, Nightly PRN, Tonya Fan, NEERAJ    hydrALAZINE injection 5 mg, 5 mg, Intravenous, Q4H PRN, Mukesh Denton MD, 5 mg at 12/20/22 0120    insulin regular injection 6 Units 0.06 mL, 6 Units, Intravenous, Once, CHANTELLE Corrales    melatonin tablet 6 mg, 6 mg, Oral, Nightly, Mukesh Denton MD    mupirocin 2 % ointment, , Topical (Top), Daily, Mukesh Denton MD, Given at 12/19/22 1623    ondansetron injection 4 mg, 4 mg, Intravenous, Q8H PRN, Oswaldo Alonso MD    sodium chloride 0.9% flush 10 mL, 10 mL, Intravenous, PRN, Oswaldo Alonso MD    Facility-Administered Medications Ordered in Other Encounters:     betamethasone acetate-betamethasone sodium phosphate injection 6 mg, 6 mg, Intra-articular, , MORGAN RichardsonP, 6 mg at 06/01/22 1345    betamethasone acetate-betamethasone sodium phosphate injection 6 mg, 6 mg, Intra-articular, , Zina Weston, FNP, 6 mg at 06/01/22 1345    LIDOcaine (PF) 20 mg/mL (2%) injection 5 mL, 5 mL, , , Zina Weston, FNP, 5 mL at 06/01/22 1345    LIDOcaine (PF) 20 mg/mL (2%) injection 5 mL, 5 mL, , , Zina Weston FNP, 5 mL at 06/01/22 1345         VTE Risk Mitigation (From admission, onward)           Ordered     argatroban in sodium chloride 0.9% (conc: 1 mg/mL)  Continuous        Question:  Begin at (in mcg/kg/min):  Answer:  0.5    12/18/22 1640     IP VTE HIGH RISK PATIENT  Once         12/11/22 2229     Place sequential compression device  Until discontinued         12/11/22 2229                     Assessment:   Pulmonary Embolism (Saddle)- Hemodynamically Stable    - Saddle pulmonary embolism with thrombi extending into the bilateral upper and lower lobes and evidence of right heart strain.    - Large Echodensity seen in IVC at [place]- Cannot Exclude IVC Thrombus (Echocardiogram 12.12.22)    - On Argatroban Infusion gtt    - Severe RV Enlargement with Moderately to Severely reduced RV Systolic Function (Echocardiogram 12.12.22)  NSTEMI Type II in the Setting of Acute COVID Infection, SMITHA, and Probable IVC Thrombus/PE  CAD (Native)    - EF > 70%  Altered Mental Status likely due to Sepsis/Metabolic Encephalopathy    - CT Head with No Acute Findings, Repeat CT Head (12.19.22): No Acute Abnormal Findings/No Hemorrhage  Acute Renal Failure (Resolved)  Acute COVID-19 Infection  Bladder Cancer Status Post Resection (October 2022)  Hypertension   VHD- Aortic Stenosis Status Post TAVR    - Well seated bioprosthetic AV (TAVR) without significant stenosis or perivalvular leak. Peak AV 2.3 m/sec, MG 10 mmHg,dimensionless index 0.34.  Pulmonary Hypertension  Leukocytosis  Thrombocytopenia (Worsening)  GERD  Obstructive Sleep Apnea  Elevated Hepatic Enzymes    Plan:   Continue Argatroban Infusion Per Protocol RE: Thrombocytopenia (Heparin Infusion Discontinued)  Recommend Hematology Consultation which we'll defer to Primary Team  Transition to Treatment-dosed DOAC Prior to discharge when safe from primary standpoint  No plans for Invasive Treatment of Suspected PE at this time as patient is hemodynamically stable   Vascular Surgery has been consulted to evaluate extremities given concern for clot  Will be available. Please call if needed.    NEERAJ Cherry  Cardiology  [facility] - 3rd Floor Medical Telemetry  12/20/2022 8:43 AM

## 2022-12-20 NOTE — PLAN OF CARE
Problem: Adult Inpatient Plan of Care  Goal: Plan of Care Review  12/20/2022 0523 by Inez Pineda LPN  Outcome: Ongoing, Progressing  12/20/2022 0157 by Inez Pineda LPN  Outcome: Ongoing, Progressing  Goal: Patient-Specific Goal (Individualized)  12/20/2022 0523 by Inez Pineda LPN  Outcome: Ongoing, Progressing  12/20/2022 0157 by Inez Pineda LPN  Outcome: Ongoing, Progressing  Goal: Absence of Hospital-Acquired Illness or Injury  12/20/2022 0523 by Inez Pineda LPN  Outcome: Ongoing, Progressing  12/20/2022 0157 by Inez Pineda LPN  Outcome: Ongoing, Progressing  Goal: Optimal Comfort and Wellbeing  12/20/2022 0523 by Inez Pineda LPN  Outcome: Ongoing, Progressing  12/20/2022 0157 by Inez Pineda LPN  Outcome: Ongoing, Progressing  Goal: Readiness for Transition of Care  12/20/2022 0523 by Inez Pineda LPN  Outcome: Ongoing, Progressing  12/20/2022 0157 by Inez Pineda LPN  Outcome: Ongoing, Progressing     Problem: Infection  Goal: Absence of Infection Signs and Symptoms  12/20/2022 0523 by Inez Pineda LPN  Outcome: Ongoing, Progressing  12/20/2022 0157 by Inez Pineda LPN  Outcome: Ongoing, Progressing     Problem: Skin Injury Risk Increased  Goal: Skin Health and Integrity  12/20/2022 0523 by Inez Pineda LPN  Outcome: Ongoing, Progressing  12/20/2022 0157 by Inez Pineda LPN  Outcome: Ongoing, Progressing     Problem: Fall Injury Risk  Goal: Absence of Fall and Fall-Related Injury  12/20/2022 0523 by Inez Pineda LPN  Outcome: Ongoing, Progressing  12/20/2022 0157 by Inez Pineda LPN  Outcome: Ongoing, Progressing     Problem: Pain Acute  Goal: Acceptable Pain Control and Functional Ability  12/20/2022 0523 by Inez Pineda LPN  Outcome: Ongoing, Progressing  12/20/2022 0157 by Inez Pineda LPN  Outcome: Ongoing, Progressing     Problem: Adjustment to Illness (Stroke, Ischemic/Transient Ischemic Attack)  Goal: Optimal Coping  12/20/2022 0523 by Inez  Edwin, LPN  Outcome: Ongoing, Progressing  12/20/2022 0157 by Rafatxus Pineda, LPN  Outcome: Ongoing, Progressing     Problem: Bowel Elimination Impaired (Stroke, Ischemic/Transient Ischemic Attack)  Goal: Effective Bowel Elimination  12/20/2022 0523 by Rafatxus Pineda, LPN  Outcome: Ongoing, Progressing  12/20/2022 0157 by Rafatxus Edwin, LPN  Outcome: Ongoing, Progressing     Problem: Cognitive Impairment (Stroke, Ischemic/Transient Ischemic Attack)  Goal: Optimal Cognitive Function  12/20/2022 0523 by Rafatxus Pineda, LPN  Outcome: Ongoing, Progressing  12/20/2022 0157 by Rafatxus Edwin, LPN  Outcome: Ongoing, Progressing     Problem: Communication Impairment (Stroke, Ischemic/Transient Ischemic Attack)  Goal: Improved Communication Skills  12/20/2022 0523 by Inez Pineda, LPN  Outcome: Ongoing, Progressing  12/20/2022 0157 by Rafatxus Pineda, LPN  Outcome: Ongoing, Progressing     Problem: Functional Ability Impaired (Stroke, Ischemic/Transient Ischemic Attack)  Goal: Optimal Functional Ability  12/20/2022 0523 by Rafatxus Pineda, LPN  Outcome: Ongoing, Progressing  12/20/2022 0157 by Rafatxus Pineda, LPN  Outcome: Ongoing, Progressing     Problem: Respiratory Compromise (Stroke, Ischemic/Transient Ischemic Attack)  Goal: Effective Oxygenation and Ventilation  12/20/2022 0523 by Rafatxus Pineda, LPN  Outcome: Ongoing, Progressing  12/20/2022 0157 by Rafatxus Pineda, LPN  Outcome: Ongoing, Progressing     Problem: Sensorimotor Impairment (Stroke, Ischemic/Transient Ischemic Attack)  Goal: Improved Sensorimotor Function  12/20/2022 0523 by Rafatxus Pineda, LPN  Outcome: Ongoing, Progressing  12/20/2022 0157 by Rafatxus Edwin, LPN  Outcome: Ongoing, Progressing     Problem: Swallowing Impairment (Stroke, Ischemic/Transient Ischemic Attack)  Goal: Optimal Eating and Swallowing without Aspiration  12/20/2022 0523 by Rafatxus Pineda, LPN  Outcome: Ongoing, Progressing  12/20/2022 0157 by Rafatxus Edwin, LPN  Outcome:  Ongoing, Progressing     Problem: Impaired Wound Healing  Goal: Optimal Wound Healing  12/20/2022 0523 by Inez Pineda LPN  Outcome: Ongoing, Progressing  12/20/2022 0157 by Inez Pineda LPN  Outcome: Ongoing, Progressing

## 2022-12-20 NOTE — PT/OT/SLP PROGRESS
SLP attempted to see pt for dysphagia tx, however pt remains lethargic with reduced JESSICA; therefore, not appropriate for PO intake at this time. Cont rec: NPO. SLP to continue to follow and treat as appropriate.

## 2022-12-20 NOTE — CONSULTS
[facility] - 9th Floor Med Surg  Vascular Surgery  Consult Note    Inpatient consult to Vascular Surgery  Consult performed by: Terri Urbina MD  Consult ordered by: Mukesh Denton MD      Subjective:     Chief Complaint/Reason for Admission:  Pulmonary embolism    History of Present Illness: Mrs. Oliveros is an 89-year-old woman with multiple medical problems and recent admission with back pain and weakness.  She has a history of dementia, diabetes mellitus and bladder cancer.  She did under go a bladder cancer resection in October.  She was seen by her primary care physician on December 9th with shortness of breath and weakness.  She was admitted to the hospital on December 11, 2022.  The next day she had significant mental status changes and hemodynamic changes requiring transfer to the ICU.  She also had significant lactic acidosis.  She was diagnosed with a NSTEMI.  There also echo changes suggestive of pulmonary embolism with right heart strain.  She was also diagnosed with COVID-19.  She remained in ICU and then was eventually transferred out.  On December 18, 2022 she underwent CTA of the chest which did demonstrate evidence of pulmonary embolism.  She is remained obtunded since the changes on December 12th.  She currently is on supplemental oxygen via nasal cannula and is satting 99%.  Heart rates have been in the 60s and 70s and she is been hypertensive.   Also diagnosed with COVID during this hospital stay.  Has had previous TAVR    Medications Prior to Admission   Medication Sig Dispense Refill Last Dose    ascorbic acid, vitamin C, (VITAMIN C) 1000 MG tablet Take 1,000 mg by mouth once daily.   12/11/2022    aspirin (ECOTRIN) 81 MG EC tablet Take 81 mg by mouth.   12/11/2022    atorvastatin (LIPITOR) 40 MG tablet Take 40 mg by mouth once daily.   12/11/2022    carvediloL (COREG) 25 MG tablet Take 25 mg by mouth 2 (two) times daily.   12/11/2022    cloNIDine (CATAPRES) 0.1 MG tablet     2022    clopidogreL (PLAVIX) 75 mg tablet Take 75 mg by mouth once daily.   2022    isosorbide mononitrate (IMDUR) 30 MG 24 hr tablet 15 mg.   2022    multivitamin/iron/folic acid (CENTRUM COMPLETE ORAL) Take by mouth.   2022    QUEtiapine (SEROQUEL) 25 MG Tab Take 25 mg by mouth 2 (two) times daily.   2022    ranolazine (RANEXA) 1,000 mg Tb12 Take 1,000 mg by mouth 2 (two) times daily.   2022    TUMERIC-GING-OLIVE-OREG-CAPRYL ORAL Take by mouth.   2022    [] docusate sodium (COLACE) 100 MG capsule Take 1 capsule (100 mg total) by mouth 2 (two) times daily. for 15 days 30 capsule 0     nitroGLYCERIN (NITROSTAT) 0.4 MG SL tablet Place 0.4 mg under the tongue.   Unknown    [] ondansetron (ZOFRAN) 4 MG tablet Take 1 tablet (4 mg total) by mouth every 8 (eight) hours as needed for Nausea. 30 tablet 0     [DISCONTINUED] diazePAM (VALIUM) 5 MG tablet Take 5 mg by mouth daily as needed.   Unknown       Review of patient's allergies indicates:   Allergen Reactions    Statins-hmg-coa reductase inhibitors      Other reaction(s): Joint pain    Bimatoprost      Other reaction(s): Eye redness    Cortisone     Dorzolamide-timolol     Gabapentin      Other reaction(s): Confusion    Hydrocortisone     Tafluprost (pf)        Past Medical History:   Diagnosis Date    Anxiety disorder     Aortic stenosis     Arthritis     Carpal tunnel syndrome     Coronary artery disease     Dementia     Depression     Diabetes mellitus     GERD (gastroesophageal reflux disease)     Hx of rectal polypectomy     Hypercholesterolemia     Hypertension     Obstructive sleep apnea     Respiratory distress     S/P TAVR (transcatheter aortic valve replacement)     TIA (transient ischemic attack)      Past Surgical History:   Procedure Laterality Date    CARDIAC SURGERY      CATARACT EXTRACTION      CORONARY STENT PLACEMENT      HYSTERECTOMY      INSERTION OF PACEMAKER      PARTIAL HYSTERECTOMY       RECTAL POLYPECTOMY      TONSILLECTOMY      TRANSCATHETER AORTIC VALVE REPLACEMENT (TAVR)       Family History       Problem Relation (Age of Onset)    Breast cancer Sister    Diabetes Mother    Hyperlipidemia Father    Stroke Father          Tobacco Use    Smoking status: Never    Smokeless tobacco: Never   Substance and Sexual Activity    Alcohol use: Never    Drug use: Never    Sexual activity: Not on file     Review of Systems  Not interactive and unable to supply  Objective:     Vital Signs (Most Recent):  Temp: 96.5 °F (35.8 °C) (12/20/22 1603)  Pulse: 74 (12/20/22 1603)  Resp: (!) 21 (12/20/22 1603)  BP: (!) 181/103 (12/20/22 1603)  SpO2: 99 % (12/20/22 1603)   Vital Signs (24h Range):  Temp:  [96.5 °F (35.8 °C)-97.5 °F (36.4 °C)] 96.5 °F (35.8 °C)  Pulse:  [72-98] 74  Resp:  [21-33] 21  SpO2:  [94 %-99 %] 99 %  BP: (161-198)/() 181/103     Weight: 86.2 kg (190 lb)  Body mass index is 31.62 kg/m².    Date 12/20/22 0700 - 12/21/22 0659   Shift 0494-6624 2503-8396 0882-3690 24 Hour Total   INTAKE   Shift Total(mL/kg)       OUTPUT   Urine(mL/kg/hr) 300(0.4)   300   Shift Total(mL/kg) 300(3.5)   300(3.5)   Weight (kg) 86.2 86.2 86.2 86.2       Physical Exam    Obese black woman resting in bed  Obtunded and not interactive  Does open eyes   Central line in place to the neck   Breathing easily but does have some airway noises  Palpable radial pulses bilaterally   Heart rhythm regular   Abdomen soft   Legs with mild edema   Palpable dorsalis pedis pulses    Significant Labs:  BMP:   Recent Labs   Lab 12/19/22  0209 12/20/22  0409    140   K 5.6* 4.2   CO2 17* 19*   BUN 52.2* 52.5*   CREATININE 1.06* 0.91   CALCIUM 8.9 8.9   MG 2.80*  --      CBC:   Recent Labs   Lab 12/20/22  0408   WBC 13.6*   RBC 4.08*   HGB 11.4*   HCT 36.4*   PLT 87*   MCV 89.2   MCH 27.9   MCHC 31.3*       Significant Diagnostics:  Upper extremity venous duplex is negative   Lower extremity venous duplex is negative   Echocardiogram  demonstrates some right heart strain with PA pressures in the mid 50s  EF is 65%  CTA from 2 days ago was evaluated and there is a saddle pulmonary embolism    Assessment/Plan:     There are no hospital problems to display for this patient.    Mrs. Oliveros is an 89-year-old woman with recent COVID diagnosis, pulmonary embolism, NSTEMI, and bladder cancer who remains obtunded for unknown reasons.  The CTA does show significant clot burden and a saddle pulmonary embolism.  It does sound like she likely had a pulmonary embolism approximately 8 days ago and this coincided with her significant lactic acidosis, acute kidney injury, transferred to ICU, and mental status changes.  She has resolved  her acute kidney injury.   She has already emerged from the acute phase of the pulmonary embolism and is tolerating it well hemodynamically - she is generating sufficient blood pressures and a normal resting heart rate.  I would recommend continued primary treatment with anticoagulation.    I am open to further discussion on this topic with other treating physicians and/or with any clinical changes.  I will work to discuss further with her attending.    Thank you for your consult. I will follow-up with patient. Please contact us if you have any additional questions.    Terri Urbina MD  Vascular Surgery  [facility] - 9th Floor Med Surg

## 2022-12-21 LAB
APTT PPP: 39.2 SECONDS (ref 23.2–33.7)
APTT PPP: 53.3 SECONDS (ref 23.2–33.7)
BASOPHILS # BLD AUTO: 0.02 X10(3)/MCL (ref 0–0.2)
BASOPHILS NFR BLD AUTO: 0.2 %
DHEA SERPL-MCNC: NORMAL
EOSINOPHIL # BLD AUTO: 0 X10(3)/MCL (ref 0–0.9)
EOSINOPHIL NFR BLD AUTO: 0 %
ERYTHROCYTE [DISTWIDTH] IN BLOOD BY AUTOMATED COUNT: 15.9 % (ref 11–14.5)
ESTROGEN SERPL-MCNC: NORMAL PG/ML
HCT VFR BLD AUTO: 35.8 % (ref 37–47)
HGB BLD-MCNC: 10.9 GM/DL (ref 12–16)
IMM GRANULOCYTES # BLD AUTO: 0.13 X10(3)/MCL (ref 0–0.04)
IMM GRANULOCYTES NFR BLD AUTO: 1 %
INSULIN SERPL-ACNC: NORMAL U[IU]/ML
LAB AP CLINICAL INFORMATION: NORMAL
LAB AP GROSS DESCRIPTION: NORMAL
LAB AP REPORT FOOTNOTES: NORMAL
LYMPHOCYTES # BLD AUTO: 0.14 X10(3)/MCL (ref 0.6–4.6)
LYMPHOCYTES NFR BLD AUTO: 1.1 %
MCH RBC QN AUTO: 27.2 PG
MCHC RBC AUTO-ENTMCNC: 30.4 MG/DL (ref 33–36)
MCV RBC AUTO: 89.3 FL (ref 80–94)
MONOCYTES # BLD AUTO: 0.67 X10(3)/MCL (ref 0.1–1.3)
MONOCYTES NFR BLD AUTO: 5.4 %
NEUTROPHILS # BLD AUTO: 11.51 X10(3)/MCL (ref 2.1–9.2)
NEUTROPHILS NFR BLD AUTO: 92.3 %
NRBC BLD AUTO-RTO: 10.1 % (ref 0–1)
PLATELET # BLD AUTO: 76 X10(3)/MCL (ref 140–371)
PMV BLD AUTO: ABNORMAL FL
POCT GLUCOSE: 289 MG/DL (ref 70–110)
POCT GLUCOSE: 294 MG/DL (ref 70–110)
RBC # BLD AUTO: 4.01 X10(6)/MCL (ref 4.2–5.4)
T3RU NFR SERPL: NORMAL %
WBC # SPEC AUTO: 12.5 X10(3)/MCL (ref 4.5–11.5)

## 2022-12-21 PROCEDURE — 85730 THROMBOPLASTIN TIME PARTIAL: CPT | Performed by: INTERNAL MEDICINE

## 2022-12-21 PROCEDURE — 99153 MOD SED SAME PHYS/QHP EA: CPT | Performed by: SPECIALIST

## 2022-12-21 PROCEDURE — 63600175 PHARM REV CODE 636 W HCPCS: Performed by: STUDENT IN AN ORGANIZED HEALTH CARE EDUCATION/TRAINING PROGRAM

## 2022-12-21 PROCEDURE — 37184 PRIM ART M-THRMBC 1ST VSL: CPT | Performed by: SPECIALIST

## 2022-12-21 PROCEDURE — 25500020 PHARM REV CODE 255: Performed by: SPECIALIST

## 2022-12-21 PROCEDURE — C1887 CATHETER, GUIDING: HCPCS | Performed by: SPECIALIST

## 2022-12-21 PROCEDURE — 25000003 PHARM REV CODE 250: Performed by: INTERNAL MEDICINE

## 2022-12-21 PROCEDURE — 25000003 PHARM REV CODE 250: Performed by: STUDENT IN AN ORGANIZED HEALTH CARE EDUCATION/TRAINING PROGRAM

## 2022-12-21 PROCEDURE — C1894 INTRO/SHEATH, NON-LASER: HCPCS | Performed by: SPECIALIST

## 2022-12-21 PROCEDURE — 36415 COLL VENOUS BLD VENIPUNCTURE: CPT | Performed by: INTERNAL MEDICINE

## 2022-12-21 PROCEDURE — 99152 MOD SED SAME PHYS/QHP 5/>YRS: CPT | Performed by: SPECIALIST

## 2022-12-21 PROCEDURE — C1769 GUIDE WIRE: HCPCS | Performed by: SPECIALIST

## 2022-12-21 PROCEDURE — 37185 PRIM ART M-THRMBC SBSQ VSL: CPT | Performed by: SPECIALIST

## 2022-12-21 PROCEDURE — 63600175 PHARM REV CODE 636 W HCPCS: Performed by: SPECIALIST

## 2022-12-21 PROCEDURE — 27000207 HC ISOLATION

## 2022-12-21 PROCEDURE — 21400001 HC TELEMETRY ROOM

## 2022-12-21 PROCEDURE — 63600175 PHARM REV CODE 636 W HCPCS: Mod: JG | Performed by: INTERNAL MEDICINE

## 2022-12-21 PROCEDURE — 85025 COMPLETE CBC W/AUTO DIFF WBC: CPT | Performed by: INTERNAL MEDICINE

## 2022-12-21 PROCEDURE — 25000003 PHARM REV CODE 250: Performed by: SPECIALIST

## 2022-12-21 PROCEDURE — C1757 CATH, THROMBECTOMY/EMBOLECT: HCPCS | Performed by: SPECIALIST

## 2022-12-21 RX ORDER — LIDOCAINE HYDROCHLORIDE 10 MG/ML
INJECTION, SOLUTION EPIDURAL; INFILTRATION; INTRACAUDAL; PERINEURAL
Status: DISCONTINUED | OUTPATIENT
Start: 2022-12-21 | End: 2022-12-26

## 2022-12-21 RX ORDER — FENTANYL CITRATE 50 UG/ML
INJECTION, SOLUTION INTRAMUSCULAR; INTRAVENOUS
Status: DISCONTINUED | OUTPATIENT
Start: 2022-12-21 | End: 2022-12-26

## 2022-12-21 RX ORDER — MIDAZOLAM HYDROCHLORIDE 1 MG/ML
INJECTION, SOLUTION INTRAMUSCULAR; INTRAVENOUS
Status: DISCONTINUED | OUTPATIENT
Start: 2022-12-21 | End: 2022-12-23

## 2022-12-21 RX ORDER — FUROSEMIDE 10 MG/ML
40 INJECTION INTRAMUSCULAR; INTRAVENOUS ONCE
Status: COMPLETED | OUTPATIENT
Start: 2022-12-21 | End: 2022-12-21

## 2022-12-21 RX ORDER — GLUCAGON 1 MG
1 KIT INJECTION
Status: DISCONTINUED | OUTPATIENT
Start: 2022-12-21 | End: 2022-12-30 | Stop reason: HOSPADM

## 2022-12-21 RX ORDER — INSULIN ASPART 100 [IU]/ML
0-5 INJECTION, SOLUTION INTRAVENOUS; SUBCUTANEOUS EVERY 6 HOURS PRN
Status: DISCONTINUED | OUTPATIENT
Start: 2022-12-21 | End: 2022-12-30 | Stop reason: HOSPADM

## 2022-12-21 RX ADMIN — HYDRALAZINE HYDROCHLORIDE 5 MG: 20 INJECTION INTRAMUSCULAR; INTRAVENOUS at 06:12

## 2022-12-21 RX ADMIN — FAMOTIDINE 20 MG: 10 INJECTION, SOLUTION INTRAVENOUS at 08:12

## 2022-12-21 RX ADMIN — HYDRALAZINE HYDROCHLORIDE 5 MG: 20 INJECTION INTRAMUSCULAR; INTRAVENOUS at 12:12

## 2022-12-21 RX ADMIN — DEXAMETHASONE SODIUM PHOSPHATE 6 MG: 4 INJECTION, SOLUTION INTRA-ARTICULAR; INTRALESIONAL; INTRAMUSCULAR; INTRAVENOUS; SOFT TISSUE at 08:12

## 2022-12-21 RX ADMIN — FUROSEMIDE 40 MG: 10 INJECTION, SOLUTION INTRAMUSCULAR; INTRAVENOUS at 07:12

## 2022-12-21 RX ADMIN — MUPIROCIN: 20 OINTMENT TOPICAL at 08:12

## 2022-12-21 RX ADMIN — HYDRALAZINE HYDROCHLORIDE 5 MG: 20 INJECTION INTRAMUSCULAR; INTRAVENOUS at 05:12

## 2022-12-21 RX ADMIN — ARGATROBAN 0.5 MCG/KG/MIN: 50 INJECTION, SOLUTION INTRAVENOUS at 10:12

## 2022-12-21 RX ADMIN — LEUCINE, PHENYLALANINE, LYSINE, METHIONINE, ISOLEUCINE, VALINE, HISTIDINE, THREONINE, TRYPTOPHAN, ALANINE, GLYCINE, ARGININE, PROLINE, SERINE, TYROSINE, DEXTROSE: 311; 238; 247; 170; 255; 247; 204; 179; 77; 880; 438; 489; 289; 213; 17; 5 INJECTION INTRAVENOUS at 09:12

## 2022-12-21 RX ADMIN — INSULIN ASPART 3 UNITS: 100 INJECTION, SOLUTION INTRAVENOUS; SUBCUTANEOUS at 05:12

## 2022-12-21 RX ADMIN — SODIUM CHLORIDE: 9 INJECTION, SOLUTION INTRAVENOUS at 05:12

## 2022-12-21 NOTE — OP NOTE
[facility]  General Surgery  Operative Note    SUMMARY     Date of Procedure: 12/21/2022     Procedure: Procedure(s) (LRB):  EMBOLECTOMY, ARTERY, PULMONARY (N/A)       Surgeon(s) and Role:     * Terri Urbina MD - Primary    Assisting Surgeon: None    Pre-Operative Diagnosis: Pulmonary embolism [I26.99]    Post-Operative Diagnosis: Post-Op Diagnosis Codes:     * Pulmonary embolism [I26.99]    Anesthesia: RN IV Sedation    Operative Findings (including complications, if any):  Bilateral pulmonary embolisms present;  there is significant saddle embolism present.  Does appear to be chronic organized clot.  Clot does involve the main pulmonary arteries and the segmental arteries.  There was no filling of the right inferior arterial segments.  We were able to retrieve a mixture of acute and chronic thrombus.  We were not able to fully remove all embolus.  There was improved filling bilaterally with return of some flow to the right inferior pulmonary arterial segments.    Description of Technical Procedures:  Mrs. Oliveros was taken to the cath lab and placed in a supine position where her bilateral groins were prepped and draped in the usual sterile fashion.  B-mode ultrasound was utilized to identify the right common femoral vein which was easily compressible.  1% lidocaine was injected the subcutaneous tissues.  Needle was utilized to cannulate the right common femoral vein and a wire was advanced.  A Bentson wire and pigtail catheter were initially used but we were unable to use this combination to obtain good pulmonary artery access.  We eventually utilized a Glidewire and JR4 catheter and were able to access the pulmonary arterial system.  We switched for an Amplatzer wire and advanced a 14 [place] DrySeal sheath.  We then advanced our 12 [place] lightening penumbra aspiration catheter.  We initially accessed the left pulmonary arterial segments.  We performed thrombectomy  through the mid and inferior pulmonary arterial segments and did retrieve thrombus.  There was improved flow and significant reduction in thrombus.  Some chronic thrombus did remain.  We then turned our attention to the right pulmonary artery there was no filling of the inferior pulmonary artery segments.  We performed thrombectomy again with arterial [place] lightening penumbra catheter.  Again we are able to retrieve further clot.  There was improved flow into the mid right segmental arteries and return of some flow to the inferior segmental pulmonary arteries.    At this point we did have a mixture of about 400 mL of aspirate mixture of blood and some saline.  I felt this was the limit our aspiration.  Did think we cleared some segmental thrombus.  We did take pulmonary artery pressures initially systolics were in the 70s and a completion pulmonary artery pressures were upper 50s/low 60s.  I think surgical embolectomy would be required to remove the chronic thrombus although I do not recommend that at this point.      Significant Surgical Tasks Conducted by the Assistant(s), if Applicable: none    Estimated Blood Loss (EBL): * No values recorded between 12/21/2022 11:05 AM and 12/21/2022  1:28 PM *           Implants: * No implants in log *    Specimens:   Specimen (24h ago, onward)      None

## 2022-12-21 NOTE — PROGRESS NOTES
[facility] Medicine Progress Note        Chief Complaint: Inpatient Follow-up for AMS, Covid    HPI: Miguel Angel Oliveros is an 89-year-old female with PMH of DMII, CAD s/p PCI, HTN, AS s/p TAVR, HLD, GERD, and PARAMJIT, who is admitted to [facility] ICU from regular unit after being found to have altered mental status and increased respiratory distress. She was initially admitted to [facility] on 2022 by [facility] after being transferred from [place] with the diagnosis of NSTEMI. At the time of encounter, patient states having lower abdominal discomfort, bladder fullness, and dysuria. She states that these symptoms have been persisting for the past few days accompanied by decreased urinary output. Per patient's family members at beside, she usually gets UTI several times each year and she has had 4 episodes of UTI in  (last UTI was 3 months ago).  Daughter reported that patient as baseline is able to converse with family member, recognizes everyone, able to sit at the table and feed herself, able to transfer herself from bed to wheelchair and wheelchair to chair with some assistance.  Stated she has some good days and bad days in a way that sometimes she asked about her old friends who have  but otherwise her dementia is not too bad.  She is known to Dr. Montalvo in outpatient setting.  Reported she was sick at home a week prior to admission to our facility.  Initially she went to ER because she was severely constipated which has since resolved, after that she went back to her PCP given she was short of breath and fatigued and lethargic.  Then she was brought here on  when she was very lethargic to the point that she needed max assist to move so they called EMS.  Daughter also reported in ICU on Thursday afternoon she was feeling her best, recognized her brother, sister, her grandkids.  But Thursday afternoon she started becoming more lethargic and sleepy and since then she  has not improved    Interval Hx:   Pt is somewhat awake and somewhat answering the questions but not all the way following commands   No family at bedside    in the room   Case was discussed with patient's nurse on the floor  Case also discussed with Dr Urbina, considering Thrombectomy    Objective/physical exam:  General:  somewhat awake, left IJ present, obese  Chest: Clear to auscultation bilaterally, supplemental oxygen via nasal cannula  Heart: RRR, +S1, S2, no appreciable murmur  Abdomen: Soft, nontender, BS +  MSK: Warm, no lower extremity edema, no clubbing or cyanosis, hands in mittens  Neurologic:  pt is not following commands completely but able to answer few questions    VITAL SIGNS: 24 HRS MIN & MAX LAST   Temp  Min: 96.5 °F (35.8 °C)  Max: 97.8 °F (36.6 °C) 97.8 °F (36.6 °C)   BP  Min: 165/91  Max: 199/100 (!) 176/98     Pulse  Min: 68  Max: 83  68   Resp  Min: 20  Max: 22 20   SpO2  Min: 95 %  Max: 99 % 95 %       Recent Labs   Lab 12/18/22  0931 12/19/22  0532 12/20/22  0408   WBC 11.0 12.7* 13.6*   RBC 3.74* 4.05* 4.08*   HGB 10.4* 11.2* 11.4*   HCT 33.7* 37.6 36.4*   MCV 90.1 92.8 89.2   MCH 27.8 27.7 27.9   MCHC 30.9* 29.8* 31.3*   RDW 15.6* 15.7* 15.7*   * 84* 87*   MPV 13.1* 13.5* 13.0*       Recent Labs   Lab 12/14/22  0845 12/15/22  0019 12/18/22  0614 12/18/22  1747 12/19/22  0209 12/19/22  2043 12/20/22  0409   NA  --    < > 139  --  138  --  140   K  --    < > 4.5  --  5.6*  --  4.2   CO2  --    < > 18*  --  17*  --  19*   BUN  --    < > 62.1*  --  52.2*  --  52.5*   CREATININE  --    < > 1.22* 1.15* 1.06*  --  0.91   CALCIUM  --    < > 8.7  --  8.9  --  8.9   PH 7.490  --   --   --   --  7.48*  --    MG  --   --  2.80*  --  2.80*  --   --    ALBUMIN  --    < > 2.8* 2.9* 2.9*  --  3.0*   ALKPHOS  --    < > 99 115 106  --  116   ALT  --    < > 855* 770* 709*  --  543*   AST  --    < > 153* 141* 136*  --  88*   BILITOT  --    < > 1.0 1.3 1.4  --  1.4    < > = values in this  interval not displayed.          Microbiology Results (last 7 days)       Procedure Component Value Units Date/Time    Urine culture [[id number]] Collected: 12/18/22 1940    Order Status: Completed Specimen: Urine Updated: 12/20/22 0716     Urine Culture No Growth    Blood Culture [[id number]]  (Normal) Collected: 12/12/22 0948    Order Status: Completed Specimen: Blood, Venous Updated: 12/17/22 1000     CULTURE, BLOOD ([facility]) No Growth at 5 days    Blood Culture [[id number]]  (Normal) Collected: 12/12/22 0948    Order Status: Completed Specimen: Blood, Venous Updated: 12/17/22 1000     CULTURE, BLOOD ([facility]) No Growth at 5 days             See below for Radiology    Scheduled Med:   cloNIDine 0.1 mg/24 hr td ptwk  1 patch Transdermal Q7 Days    dexAMETHasone  6 mg Intravenous Daily    dextrose 50% in water (D50W)  25 g Intravenous Once    famotidine (PF)  20 mg Intravenous Daily    insulin regular  6 Units Intravenous Once    melatonin  6 mg Oral Nightly    mupirocin   Topical (Top) Daily        Continuous Infusions:   sodium chloride 0.9% 75 mL/hr at 12/20/22 2316    amino acid 4.25% - dextrose 5% solution 60 mL/hr at 12/20/22 1550    argatroban in 0.9 % sod chlor 0.5 mcg/kg/min (12/20/22 2316)        PRN Meds:  diazePAM, haloperidol lactate, hydrALAZINE, ondansetron, sodium chloride 0.9%       Assessment/Plan:  Altered mental status likely 2/2 sepsis and metabolic encephalopathy  Acute renal failure/ ATN - Cr improving   Lactic acidosis likely 2/2 acute renal failure and sepsis, and portal venous thrombosis  NSTEMI Type II in the setting of Acute COVID Infection, SMITHA, and Probable IVC/ portal vein Thrombus/PE  COVID positive on admission to ICU- 12/12- day 6 of diagnosis   Transminitis - trending down  Bladder cancer s/p resection in October 2022  CT ABD & pelvis 12/11/2022 shows heterogeneous hyperdense mass lesion in the superior and left lateral aspect of the urinary bladder  Per patient's family at bedside, patient  will be receiving additional imaging to stage bladder cancer  HX of HTN, DM II, TIA, aortic stenosis s/p TAVR, CAD s/p PCI, GERD, PARAMJIT  IVC and Portal Vein Thrombosis with High probability of Pulmonary Embolism with Marked RV Strain - On Heparin gtt  Thrombocytopenia- heparin induced ?  Saddle PE with right heart strain  Mild hyperkalemia probably due to clinimic E, awaiting dietician recs   Hematuria - blood thinners related?    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   Spoke to Arlette NP with nephrology, agreed with CTA Chest. Recommended to continue NS at 75cc/hr atleast for 24 hrs post contrast  CTA Chest- Saddle pulmonary embolism with thrombi extending into the bilateral upper and lower lobes and evidence of right heart strain.  12/18- Heparin changed to Argatroban gtt- aptt per protocol, cards recommended to continue Argatroban for now and once able to swallow, switch to apixiban oral prior to discharge   Heparin PF4 AB- HIT - sent and in process  Consulted CTS - recommended vascular consult  Consulted Dr Urbina--> reordered Echo- showing EF o 65%, moderate tricuspid regurg, severe right ventricular enlargement.  Severe right atrial enlargement.  Mild mitral stenosis, Repeat Upper and Lower extremities u/s- official report pending  Case personally discussed with Dr. Urbina,.  Dr. Urbina is willing to attempt peripheral thrombectomy.  Patient apparently lives at home with family and has a significant amount of DME as well as support.  She has not really been assessed for any oral nutrition since she has been here because of her increased oxygen requirements early in admission   Currently on supplemental oxygen via nasal cannula at 4.5 liters/minute  Consulted speech though pt was lethargic and not following commands over the weekend, so was unable to assess .  Today she is awake alert and following commands  Cont Clinimix  at 60 ml/hr given she is also n NS at 75cc/hr   Hyperkalemia resolved once clinimix changed to plain without electrolytes   Completed remdesivir and continued on dexamethasone- day 9 of 10   Discontinued zosyn after 7 days treatment   Transaminases are beginning to trend down, monitor closely  Appreciate the help from Nephrology and cardiology services, they both signed off   Apparently at baseline she become confuse form time to time per son but daughter reports differently stating she was conversing, able to transfer, able to feed herself. Reported her good and bad days with bad days being when she asks about her old friends if they are alive. Pt is established with Dr Montalvo who is treating her dementia   Daughter reported lately she has been depressed and her PCP started her on zoloft but they have not given it to her yet  Discussed valium 5 mg is a very high dose for her age gp and is considered high risk medicine and that we have decreased the dose to 2 mg daily as needed. Daughter reported all the doctors in the hospital has said the same thing but she has been on that dose for many years. Advised consulting with a Psychiatrist if he anxiety and depression is getting worse, verbalized understanding   Family decided to hold off on NG tube for Meds and tube feeds  UA shows hematuria, urine c/d negative x 24 hrs   Morning cbc, cmp ordered     VTE prophylaxis: Argatroban infusion    Patient condition:  Guarded    Anticipated discharge and Disposition:   TBD    Critical care note:  Critical care diagnosis: IVC, Portal vein, saddle PE with R heart strain requiring IV Argatroban due to concern for HIT, prolonged fasting requiring Clinimix   Critical care interventions: Hands-on evaluation, review of labs/radiographs/records and discussion with patient and family if present  Critical care time spent: 35 minutes        All diagnosis and differential  diagnosis have been reviewed; assessment and plan has been documented; I have personally reviewed the labs and test results that are presently available; I have reviewed the patients medication list; I have reviewed the consulting providers response and recommendations. I have reviewed or attempted to review medical records based upon their availability    All of the patient's questions have been  addressed and answered. Patient's is agreeable to the above stated plan. I will continue to monitor closely and make adjustments to medical management as needed.  _____________________________________________________________________    Nutrition Status:    Radiology:  Echo  · The estimated ejection fraction is 65%.  · There is a transcutaneously-placed aortic bioprosthesis present.  · The aortic valve mean gradient is 16 mmHg with a dimensionless index of   0.54.  · Moderate concentric hypertrophy and normal systolic function.  · Severe right ventricular enlargement.  · Severe right atrial enlargement.  · Moderate tricuspid regurgitation.  · Mild mitral stenosis, mean diastolic gradient is 4 mmHg.     CT Head Without Contrast  Narrative: EXAMINATION:  CT HEAD WITHOUT CONTRAST    CLINICAL HISTORY:  Mental status change, unknown cause;    TECHNIQUE:  CT imaging of the head performed from the skull base to the vertex without intravenous contrast.  mGycm. Automatic exposure control, adjustment of mA/kV or iterative reconstruction technique was used to reduce radiation.    COMPARISON:  11 December 2022    FINDINGS:  There is no acute cortical infarct, hemorrhage or mass lesion.  There is no new parenchymal attenuation abnormality.  Ventricular size is stable.  There are vascular calcifications.    Visualized paranasal sinuses and mastoid air cells are clear.  Impression: No acute intracranial findings.    No significant discrepancy with the preliminary report.    Electronically signed by: Arcadio  Joey  Date:    12/20/2022  Time:    08:19      Mukesh Denton MD  Department of Hospital Medicine   [facility]   12/21/2022

## 2022-12-21 NOTE — PT/OT/SLP PROGRESS
SLP consulted with nurse regarding pt status. Nursing reports pt continues to present with reduced JESSICA and remains inappropriate for PO intake. Pt also NPO for procedure. SLP to continue to follow and treat as appropriate.

## 2022-12-21 NOTE — PROGRESS NOTES
I further considered the case and her progress.  Also discussed with Dr. Denton.  I do think proceeding with percutaneous pulmonary embolectomy/mechanical thrombectomy is in her best interest.  I discussed with son German - he is going to discuss with siblings and determine if they would like to proceed.   Will proceed today if they give final consent.

## 2022-12-22 LAB
ANION GAP SERPL CALC-SCNC: 10 MEQ/L
APTT PPP: 47.1 SECONDS (ref 23.2–33.7)
APTT PPP: 49.9 SECONDS (ref 23.2–33.7)
APTT PPP: 52.9 SECONDS (ref 23.2–33.7)
APTT PPP: 61.5 SECONDS (ref 23.2–33.7)
BASOPHILS # BLD AUTO: 0.02 X10(3)/MCL (ref 0–0.2)
BASOPHILS NFR BLD AUTO: 0.1 %
BUN SERPL-MCNC: 53.8 MG/DL (ref 9.8–20.1)
CALCIUM SERPL-MCNC: 9.3 MG/DL (ref 8.4–10.2)
CHLORIDE SERPL-SCNC: 113 MMOL/L (ref 98–107)
CO2 SERPL-SCNC: 20 MMOL/L (ref 23–31)
CREAT SERPL-MCNC: 0.78 MG/DL (ref 0.55–1.02)
CREAT/UREA NIT SERPL: 69
EOSINOPHIL # BLD AUTO: 0 X10(3)/MCL (ref 0–0.9)
EOSINOPHIL NFR BLD AUTO: 0 %
ERYTHROCYTE [DISTWIDTH] IN BLOOD BY AUTOMATED COUNT: 16.1 % (ref 11–14.5)
GFR SERPLBLD CREATININE-BSD FMLA CKD-EPI: >60 MLS/MIN/1.73/M2
GLUCOSE SERPL-MCNC: 274 MG/DL (ref 82–115)
HCT VFR BLD AUTO: 35 % (ref 37–47)
HGB BLD-MCNC: 10.6 GM/DL (ref 12–16)
IMM GRANULOCYTES # BLD AUTO: 0.1 X10(3)/MCL (ref 0–0.04)
IMM GRANULOCYTES NFR BLD AUTO: 0.7 %
INR BLD: 2.87 (ref 0–1.3)
LYMPHOCYTES # BLD AUTO: 0.86 X10(3)/MCL (ref 0.6–4.6)
LYMPHOCYTES NFR BLD AUTO: 6.1 %
MAGNESIUM SERPL-MCNC: 2.2 MG/DL (ref 1.6–2.6)
MCH RBC QN AUTO: 27.6 PG
MCHC RBC AUTO-ENTMCNC: 30.3 MG/DL (ref 33–36)
MCV RBC AUTO: 91.1 FL (ref 80–94)
MONOCYTES # BLD AUTO: 0.74 X10(3)/MCL (ref 0.1–1.3)
MONOCYTES NFR BLD AUTO: 5.2 %
NEUTROPHILS # BLD AUTO: 12.44 X10(3)/MCL (ref 2.1–9.2)
NEUTROPHILS NFR BLD AUTO: 87.9 %
NRBC BLD AUTO-RTO: 0.4 % (ref 0–1)
PLATELET # BLD AUTO: 69 X10(3)/MCL (ref 140–371)
PMV BLD AUTO: ABNORMAL FL
POCT GLUCOSE: 221 MG/DL (ref 70–110)
POCT GLUCOSE: 228 MG/DL (ref 70–110)
POCT GLUCOSE: 246 MG/DL (ref 70–110)
POTASSIUM SERPL-SCNC: 3.7 MMOL/L (ref 3.5–5.1)
PROTHROMBIN TIME: 29.4 SECONDS (ref 12.5–14.5)
RBC # BLD AUTO: 3.84 X10(6)/MCL (ref 4.2–5.4)
SODIUM SERPL-SCNC: 143 MMOL/L (ref 136–145)
WBC # SPEC AUTO: 14.2 X10(3)/MCL (ref 4.5–11.5)

## 2022-12-22 PROCEDURE — 85730 THROMBOPLASTIN TIME PARTIAL: CPT | Performed by: INTERNAL MEDICINE

## 2022-12-22 PROCEDURE — 85610 PROTHROMBIN TIME: CPT | Performed by: NURSE PRACTITIONER

## 2022-12-22 PROCEDURE — 21400001 HC TELEMETRY ROOM

## 2022-12-22 PROCEDURE — 36415 COLL VENOUS BLD VENIPUNCTURE: CPT | Performed by: INTERNAL MEDICINE

## 2022-12-22 PROCEDURE — 25000003 PHARM REV CODE 250: Performed by: STUDENT IN AN ORGANIZED HEALTH CARE EDUCATION/TRAINING PROGRAM

## 2022-12-22 PROCEDURE — 25000003 PHARM REV CODE 250: Performed by: INTERNAL MEDICINE

## 2022-12-22 PROCEDURE — 85025 COMPLETE CBC W/AUTO DIFF WBC: CPT | Performed by: SPECIALIST

## 2022-12-22 PROCEDURE — 27000221 HC OXYGEN, UP TO 24 HOURS

## 2022-12-22 PROCEDURE — 83735 ASSAY OF MAGNESIUM: CPT | Performed by: INTERNAL MEDICINE

## 2022-12-22 PROCEDURE — 85730 THROMBOPLASTIN TIME PARTIAL: CPT | Performed by: NURSE PRACTITIONER

## 2022-12-22 PROCEDURE — 80048 BASIC METABOLIC PNL TOTAL CA: CPT | Performed by: INTERNAL MEDICINE

## 2022-12-22 PROCEDURE — 36415 COLL VENOUS BLD VENIPUNCTURE: CPT | Performed by: SPECIALIST

## 2022-12-22 PROCEDURE — 63600175 PHARM REV CODE 636 W HCPCS: Mod: JG | Performed by: INTERNAL MEDICINE

## 2022-12-22 RX ORDER — FUROSEMIDE 10 MG/ML
40 INJECTION INTRAMUSCULAR; INTRAVENOUS ONCE
Status: COMPLETED | OUTPATIENT
Start: 2022-12-22 | End: 2022-12-22

## 2022-12-22 RX ADMIN — HYDRALAZINE HYDROCHLORIDE 5 MG: 20 INJECTION INTRAMUSCULAR; INTRAVENOUS at 03:12

## 2022-12-22 RX ADMIN — FAMOTIDINE 20 MG: 10 INJECTION, SOLUTION INTRAVENOUS at 09:12

## 2022-12-22 RX ADMIN — LEUCINE, PHENYLALANINE, LYSINE, METHIONINE, ISOLEUCINE, VALINE, HISTIDINE, THREONINE, TRYPTOPHAN, ALANINE, GLYCINE, ARGININE, PROLINE, SERINE, TYROSINE, DEXTROSE: 311; 238; 247; 170; 255; 247; 204; 179; 77; 880; 438; 489; 289; 213; 17; 5 INJECTION INTRAVENOUS at 02:12

## 2022-12-22 RX ADMIN — INSULIN ASPART 2 UNITS: 100 INJECTION, SOLUTION INTRAVENOUS; SUBCUTANEOUS at 06:12

## 2022-12-22 RX ADMIN — FUROSEMIDE 40 MG: 10 INJECTION, SOLUTION INTRAMUSCULAR; INTRAVENOUS at 01:12

## 2022-12-22 RX ADMIN — ARGATROBAN 0.5 MCG/KG/MIN: 50 INJECTION, SOLUTION INTRAVENOUS at 01:12

## 2022-12-22 RX ADMIN — INSULIN ASPART 2 UNITS: 100 INJECTION, SOLUTION INTRAVENOUS; SUBCUTANEOUS at 01:12

## 2022-12-22 RX ADMIN — HYDRALAZINE HYDROCHLORIDE 5 MG: 20 INJECTION INTRAMUSCULAR; INTRAVENOUS at 04:12

## 2022-12-22 RX ADMIN — INSULIN ASPART 1 UNITS: 100 INJECTION, SOLUTION INTRAVENOUS; SUBCUTANEOUS at 09:12

## 2022-12-22 RX ADMIN — MUPIROCIN: 20 OINTMENT TOPICAL at 09:12

## 2022-12-22 NOTE — PROGRESS NOTES
[facility] - 9th Floor Med Surg  Vascular Surgery  Progress Note    Patient Name: Miguel Angel Oliveros  MRN: [id number]  Admission Date: 12/11/2022  Primary Care Provider: Barrett Lopez MD    Subjective:     Interval History: No major changes;  Daughter feels like she is breathing easier - otherwise no changes;     Post-Op Info:  Procedure(s) (LRB):  EMBOLECTOMY, ARTERY, PULMONARY (N/A)   1 Day Post-Op      Medications:  Continuous Infusions:   sodium chloride 0.9% 75 mL/hr at 12/21/22 1729    amino acid 4.25% - dextrose 5% solution 60 mL/hr at 12/21/22 2145    argatroban in 0.9 % sod chlor 0.5 mcg/kg/min (12/21/22 2235)     Scheduled Meds:   cloNIDine 0.1 mg/24 hr td ptwk  1 patch Transdermal Q7 Days    dextrose 50% in water (D50W)  25 g Intravenous Once    famotidine (PF)  20 mg Intravenous Daily    insulin regular  6 Units Intravenous Once    melatonin  6 mg Oral Nightly    mupirocin   Topical (Top) Daily     PRN Meds:dextrose 10%, dextrose 10%, diazePAM, fentaNYL, glucagon (human recombinant), haloperidol lactate, hydrALAZINE, insulin aspart U-100, iopamidoL, LIDOcaine (PF) 10 mg/ml (1%), midazolam, ondansetron, sodium chloride 0.9%     Objective:     Vital Signs (Most Recent):  Temp: 97.3 °F (36.3 °C) (12/22/22 0358)  Pulse: 80 (12/22/22 0358)  Resp: 20 (12/22/22 0358)  BP: (!) 171/83 (12/22/22 0358)  SpO2: 98 % (12/22/22 0358)   Vital Signs (24h Range):  Temp:  [95.9 °F (35.5 °C)-98.1 °F (36.7 °C)] 97.3 °F (36.3 °C)  Pulse:  [62-83] 80  Resp:  [18-20] 20  SpO2:  [95 %-99 %] 98 %  BP: (142-199)/(70-98) 171/83         Physical Exam  Alert, but not interactive  Breathing easily with nasal cannula (4L)  Right groin soft    Significant Labs:  BMP:   Recent Labs   Lab 12/22/22  0430      K 3.7   CO2 20*   BUN 53.8*   CREATININE 0.78   CALCIUM 9.3   MG 2.20     CBC:   Recent Labs   Lab 12/22/22  0058   WBC 14.2*   RBC 3.84*   HGB 10.6*   HCT 35.0*   PLT 69*   MCV 91.1   MCH 27.6   MCHC  30.3*       Significant Diagnostics:      Assessment/Plan:     There are no hospital problems to display for this patient.    POD#1 s/p limited percutaneous thrombectomy of the pulmonary arteries.  We were able to debulk some thrombus and improve flow to particular segmental arteries (especially right lower lung segments).   Large chronic saddle thrombus still remains.   Hopefully our work with result in some improvement.  Continue anticoagulation.  Please contact me with any questions.    Terri Urbina MD  Vascular Surgery  [facility] - 9th Floor Med Surg

## 2022-12-22 NOTE — CONSULTS
Gastroenterology Consultation Note    Reason for Consult:  oropharyngeal dysphagia    PCP:   Barrett Lopez MD    Referring MD:  Prabhakar Albert Md  [address] [facility]  [place] [address]    Hospital Day: 11     Initial History of Present Illness (HPI):  This is a 89 y.o. female known to Dr. Vasquez with PMH of GERD, dementia, bladder cancer s/p resection 10/2022, T2DM, CAD s/p PCI, HTN, AS s/p TAVR, HLD, PARAMJIT. Patient admitted 12/11/22 with NSTEMI. An echocardiogram performed during hospitalization also revealed a pulmonary embolism. Patient's hospitalization also complicated by COVID 19. Patient had pulmonary embolectomy 12/21/22.     Patient had bedside swallow evaluation with SLP 12/19/22. No overt s/s of aspiration, but high risk for aspiration. They recommended NPO status.    Temp: 97.3 degrees F  Antibiotics: none currently  WBC: 14.2  Plt 69  INR: 2.87  Blood thinners: IV argatroban drip  Cirrhosis/ascites: negative on CT abd/pel 12/11/22  Abd scars: none  Family: spoke with daughter at bedside. She states the family is all agreeable to PEG placement. They are more concerned about patient receiving her medications, such as her dementia medications, rather than nutrition. They did not want patient to have NG tube at this time. Per daughter, some days are good and patient will eat and swallow, but other days patient will not eat    Colonoscopy Dr. Vasquez 2011: polyp in sigmoid, grade II internal and external hemorrhoids    ROS:  Review of Systems   Unable to perform ROS: Mental acuity     Medical History:   Past Medical History:   Diagnosis Date    Anxiety disorder     Aortic stenosis     Arthritis     Carpal tunnel syndrome     Coronary artery disease     Dementia     Depression     Diabetes mellitus     GERD (gastroesophageal reflux disease)     Hx of rectal polypectomy     Hypercholesterolemia     Hypertension     Obstructive sleep apnea     Respiratory distress     S/P TAVR (transcatheter aortic  valve replacement)     TIA (transient ischemic attack)        Surgical History:   Past Surgical History:   Procedure Laterality Date    CARDIAC SURGERY      CATARACT EXTRACTION      CORONARY STENT PLACEMENT      HYSTERECTOMY      INSERTION OF PACEMAKER      PARTIAL HYSTERECTOMY      RECTAL POLYPECTOMY      TONSILLECTOMY      TRANSCATHETER AORTIC VALVE REPLACEMENT (TAVR)         Family History:   Family History   Problem Relation Age of Onset    Diabetes Mother     Stroke Father     Hyperlipidemia Father     Breast cancer Sister    .     Social History:   Social History     Tobacco Use    Smoking status: Never    Smokeless tobacco: Never   Substance Use Topics    Alcohol use: Never       Allergies:  Review of patient's allergies indicates:   Allergen Reactions    Heparin analogues Other (See Comments)     HIT panel pending    Statins-hmg-coa reductase inhibitors      Other reaction(s): Joint pain    Bimatoprost      Other reaction(s): Eye redness    Cortisone     Dorzolamide-timolol     Gabapentin      Other reaction(s): Confusion    Hydrocortisone     Tafluprost (pf)        Medications Prior to Admission   Medication Sig Dispense Refill Last Dose    ascorbic acid, vitamin C, (VITAMIN C) 1000 MG tablet Take 1,000 mg by mouth once daily.   12/11/2022    aspirin (ECOTRIN) 81 MG EC tablet Take 81 mg by mouth.   12/11/2022    atorvastatin (LIPITOR) 40 MG tablet Take 40 mg by mouth once daily.   12/11/2022    carvediloL (COREG) 25 MG tablet Take 25 mg by mouth 2 (two) times daily.   12/11/2022    cloNIDine (CATAPRES) 0.1 MG tablet    12/11/2022    clopidogreL (PLAVIX) 75 mg tablet Take 75 mg by mouth once daily.   12/11/2022    isosorbide mononitrate (IMDUR) 30 MG 24 hr tablet 15 mg.   12/11/2022    multivitamin/iron/folic acid (CENTRUM COMPLETE ORAL) Take by mouth.   12/11/2022    QUEtiapine (SEROQUEL) 25 MG Tab Take 25 mg by mouth 2 (two) times daily.   12/11/2022    ranolazine (RANEXA) 1,000 mg Tb12 Take 1,000 mg by  "mouth 2 (two) times daily.   2022    TUMERIC-GING-OLIVE-OREG-CAPRYL ORAL Take by mouth.   2022    [] docusate sodium (COLACE) 100 MG capsule Take 1 capsule (100 mg total) by mouth 2 (two) times daily. for 15 days 30 capsule 0     nitroGLYCERIN (NITROSTAT) 0.4 MG SL tablet Place 0.4 mg under the tongue.   Unknown    [] ondansetron (ZOFRAN) 4 MG tablet Take 1 tablet (4 mg total) by mouth every 8 (eight) hours as needed for Nausea. 30 tablet 0     [DISCONTINUED] diazePAM (VALIUM) 5 MG tablet Take 5 mg by mouth daily as needed.   Unknown         Objective Findings:    Vital Signs:  BP (!) 171/83   Pulse 80   Temp 97.3 °F (36.3 °C) (Oral)   Resp 20   Ht 5' 5" (1.651 m)   Wt 86.2 kg (190 lb)   SpO2 98%   BMI 31.62 kg/m²   Body mass index is 31.62 kg/m².    Physical Exam:  Physical Exam    Labs:  Recent Results (from the past 24 hour(s))   APTT    Collection Time: 22 10:25 AM   Result Value Ref Range    PTT 53.3 (H) 23.2 - 33.7 seconds   POCT glucose    Collection Time: 22 10:37 AM   Result Value Ref Range    POCT Glucose 294 (H) 70 - 110 mg/dL   POCT glucose    Collection Time: 22  5:04 PM   Result Value Ref Range    POCT Glucose 289 (H) 70 - 110 mg/dL   APTT    Collection Time: 22  8:54 PM   Result Value Ref Range    PTT 39.2 (H) 23.2 - 33.7 seconds   CBC with Differential    Collection Time: 22 12:58 AM   Result Value Ref Range    WBC 14.2 (H) 4.5 - 11.5 x10(3)/mcL    RBC 3.84 (L) 4.20 - 5.40 x10(6)/mcL    Hgb 10.6 (L) 12.0 - 16.0 gm/dL    Hct 35.0 (L) 37.0 - 47.0 %    MCV 91.1 80.0 - 94.0 fL    MCH 27.6 pg    MCHC 30.3 (L) 33.0 - 36.0 mg/dL    RDW 16.1 (H) 11.0 - 14.5 %    Platelet 69 (L) 140 - 371 x10(3)/mcL    MPV      Neut % 87.9 %    Lymph % 6.1 %    Mono % 5.2 %    Eos % 0.0 %    Basophil % 0.1 %    Lymph # 0.86 0.6 - 4.6 x10(3)/mcL    Neut # 12.44 (H) 2.1 - 9.2 x10(3)/mcL    Mono # 0.74 0.1 - 1.3 x10(3)/mcL    Eos # 0.00 0 - 0.9 x10(3)/mcL    Baso # " 0.02 0 - 0.2 x10(3)/mcL    IG# 0.10 (H) 0 - 0.04 x10(3)/mcL    IG% 0.7 %    NRBC% 0.4 0 - 1 %   APTT    Collection Time: 12/22/22 12:58 AM   Result Value Ref Range    PTT 61.5 (H) 23.2 - 33.7 seconds   Basic Metabolic Panel    Collection Time: 12/22/22  4:30 AM   Result Value Ref Range    Sodium Level 143 136 - 145 mmol/L    Potassium Level 3.7 3.5 - 5.1 mmol/L    Chloride 113 (H) 98 - 107 mmol/L    Carbon Dioxide 20 (L) 23 - 31 mmol/L    Glucose Level 274 (H) 82 - 115 mg/dL    Blood Urea Nitrogen 53.8 (H) 9.8 - 20.1 mg/dL    Creatinine 0.78 0.55 - 1.02 mg/dL    BUN/Creatinine Ratio 69     Calcium Level Total 9.3 8.4 - 10.2 mg/dL    Anion Gap 10.0 mEq/L    eGFR >60 mls/min/1.73/m2   Magnesium    Collection Time: 12/22/22  4:30 AM   Result Value Ref Range    Magnesium Level 2.20 1.60 - 2.60 mg/dL   APTT    Collection Time: 12/22/22  4:30 AM   Result Value Ref Range    PTT 52.9 (H) 23.2 - 33.7 seconds   APTT    Collection Time: 12/22/22  8:28 AM   Result Value Ref Range    PTT 47.1 (H) 23.2 - 33.7 seconds       CT Head Without Contrast   Final Result      No acute intracranial findings.      No significant discrepancy with the preliminary report.         Electronically signed by: Arcadio Cook   Date:    12/20/2022   Time:    08:19      CTA Chest Non-Coronary (PE Studies)   Final Result      Saddle pulmonary embolism with thrombi extending into the bilateral upper and lower lobes and evidence of right heart strain.      Findings given to the patient's RN Emma at the time of dictation.         Electronically signed by: Tiana Negro   Date:    12/18/2022   Time:    16:35      US Abdomen Limited_Liver   Final Result      Thrombosis of the main portal vein      A polyp noted in the gallbladder with pericholecystic fluid seen and gallbladder wall thickening seen.  Cholecystitis should be excluded         Electronically signed by: Mian Marvin   Date:    12/14/2022   Time:    16:01      US Retroperitoneal Complete    Final Result      Limited exam due to bowel gas.      No significant abnormalities identified.      Bladder was decompressed         Electronically signed by: Avtar Liu   Date:    12/14/2022   Time:    09:13      X-Ray Chest 1 View   Final Result      Interval placement of a central line in the left internal jugular vein otherwise unchanged         Electronically signed by: Mian Marvin   Date:    12/12/2022   Time:    18:27      X-Ray Chest 1 View   Final Result      No acute abnormality of the chest.         Electronically signed by: Tiana Negro   Date:    12/12/2022   Time:    13:43      CT Head Without Contrast   Final Result      No acute intracranial findings identified.      No significant discrepancy with overnight report.         Electronically signed by: Chapin Ward   Date:    12/12/2022   Time:    06:52          Assessment/Plan:  This is a 89 y.o. female known to Dr. Vasquez with PMH of GERD, dementia, bladder cancer s/p resection 10/2022, T2DM, CAD s/p PCI, HTN, AS s/p TAVR, HLD, PARAMJIT. Patient admitted 12/11/22 with NSTEMI. An echocardiogram performed during hospitalization also revealed a pulmonary embolism. Patient's hospitalization also complicated by COVID 19. Patient had pulmonary embolectomy 12/21/22.     Patient had bedside swallow evaluation with SLP 12/19/22. No overt s/s of aspiration, but high risk for aspiration. They recommended NPO status.    Oropharyngeal dysphagia  S/p pulmonary embolectomy 12/21/22  - NPO  - Temp: 97.3 degrees F  - Antibiotics: none currently  - WBC: 14.2  - Plt 69  - INR 2.87  - Blood thinners: IV argatroban drip  - Cirrhosis/ascites: negative on CT abd/pel 12/11/22  - Abd scars: none  - Family: spoke with daughter at bedside. She states the family is all agreeable to PEG placement. They are more concerned about patient receiving her medications, such as her dementia medications, rather than nutrition. They did not want patient to have NG tube at  this time.  - seeing as patient just had pulmonary embolectomy yesterday and INR is elevated, we will hold off on PEG placement at this time. We can consider proceeding with PEG next week if her labs are improved and after patient has had time to heal. Patient needs to work with SLP daily  - trend INR    Will check back next week. Please call with questions.    Thank you for allowing us to participate in the care of Miguel Angel Oliveros.    Mirian Samaniego NP/Suiz Rubio PA-C acting as scribe for Tom Theodore MD  Gastroenterology  [facility]

## 2022-12-22 NOTE — PROGRESS NOTES
[facility] Medicine Progress Note        Chief Complaint: Inpatient Follow-up for AMS, Covid    HPI: Miguel Angel Oliveros is an 89-year-old female with PMH of DMII, CAD s/p PCI, HTN, AS s/p TAVR, HLD, GERD, and PARAMJIT, who is admitted to [facility] ICU from regular unit after being found to have altered mental status and increased respiratory distress. She was initially admitted to [facility] on 2022 by [facility] after being transferred from [place] with the diagnosis of NSTEMI. At the time of encounter, patient states having lower abdominal discomfort, bladder fullness, and dysuria. She states that these symptoms have been persisting for the past few days accompanied by decreased urinary output. Per patient's family members at beside, she usually gets UTI several times each year and she has had 4 episodes of UTI in  (last UTI was 3 months ago).  Daughter reported that patient as baseline is able to converse with family member, recognizes everyone, able to sit at the table and feed herself, able to transfer herself from bed to wheelchair and wheelchair to chair with some assistance.  Stated she has some good days and bad days in a way that sometimes she asked about her old friends who have  but otherwise her dementia is not too bad.  She is known to Dr. Montalvo in outpatient setting.  Reported she was sick at home a week prior to admission to our facility.  Initially she went to ER because she was severely constipated which has since resolved, after that she went back to her PCP given she was short of breath and fatigued and lethargic.  Then she was brought here on  when she was very lethargic to the point that she needed max assist to move so they called EMS.  Daughter also reported in ICU on Thursday afternoon she was feeling her best, recognized her brother, sister, her grandkids.  But Thursday afternoon she started becoming more lethargic and sleepy and since then she  has not improved    Interval Hx:   Pt is somewhat awake and somewhat answering the questions but falls back asleep  Daughter at bedside    in the room   Case was discussed with patient's nurse, speech therapist and GI on the floor      Objective/physical exam:  General:  somewhat awake, left IJ present, obese  Chest: Clear to auscultation bilaterally, supplemental oxygen via nasal cannula  Heart: RRR, +S1, S2, no appreciable murmur  Abdomen: Soft, nontender, BS +  MSK: Warm, no lower extremity edema, no clubbing or cyanosis, hands in mittens  Neurologic:  pt is not following commands completely but able to answer few questions    VITAL SIGNS: 24 HRS MIN & MAX LAST   Temp  Min: 95.9 °F (35.5 °C)  Max: 98.1 °F (36.7 °C) 97.3 °F (36.3 °C)   BP  Min: 142/94  Max: 199/92 (!) 171/83     Pulse  Min: 62  Max: 83  80   Resp  Min: 18  Max: 20 20   SpO2  Min: 95 %  Max: 99 % 98 %       Recent Labs   Lab 12/18/22  0931 12/19/22  0532 12/20/22  0408 12/21/22  0652 12/22/22  0058   WBC 11.0 12.7* 13.6* 12.5* 14.2*   RBC 3.74* 4.05* 4.08* 4.01* 3.84*   HGB 10.4* 11.2* 11.4* 10.9* 10.6*   HCT 33.7* 37.6 36.4* 35.8* 35.0*   MCV 90.1 92.8 89.2 89.3 91.1   MCH 27.8 27.7 27.9 27.2 27.6   MCHC 30.9* 29.8* 31.3* 30.4* 30.3*   RDW 15.6* 15.7* 15.7* 15.9* 16.1*   * 84* 87* 76* 69*   MPV 13.1* 13.5* 13.0*  --   --        Recent Labs   Lab 12/18/22  0614 12/18/22  1747 12/19/22  0209 12/19/22  2043 12/20/22  0409 12/22/22  0430     --  138  --  140 143   K 4.5  --  5.6*  --  4.2 3.7   CO2 18*  --  17*  --  19* 20*   BUN 62.1*  --  52.2*  --  52.5* 53.8*   CREATININE 1.22* 1.15* 1.06*  --  0.91 0.78   CALCIUM 8.7  --  8.9  --  8.9 9.3   PH  --   --   --  7.48*  --   --    MG 2.80*  --  2.80*  --   --  2.20   ALBUMIN 2.8* 2.9* 2.9*  --  3.0*  --    ALKPHOS 99 115 106  --  116  --    * 770* 709*  --  543*  --    * 141* 136*  --  88*  --    BILITOT 1.0 1.3 1.4  --  1.4  --           Microbiology Results (last 7  days)       Procedure Component Value Units Date/Time    Urine culture [[id number]] Collected: 12/18/22 1940    Order Status: Completed Specimen: Urine Updated: 12/20/22 0716     Urine Culture No Growth    Blood Culture [[id number]]  (Normal) Collected: 12/12/22 0948    Order Status: Completed Specimen: Blood, Venous Updated: 12/17/22 1000     CULTURE, BLOOD ([facility]) No Growth at 5 days    Blood Culture [[id number]]  (Normal) Collected: 12/12/22 0948    Order Status: Completed Specimen: Blood, Venous Updated: 12/17/22 1000     CULTURE, BLOOD ([facility]) No Growth at 5 days             See below for Radiology    Scheduled Med:   cloNIDine 0.1 mg/24 hr td ptwk  1 patch Transdermal Q7 Days    dextrose 50% in water (D50W)  25 g Intravenous Once    famotidine (PF)  20 mg Intravenous Daily    insulin regular  6 Units Intravenous Once    melatonin  6 mg Oral Nightly    mupirocin   Topical (Top) Daily        Continuous Infusions:   sodium chloride 0.9% 75 mL/hr at 12/21/22 1729    amino acid 4.25% - dextrose 5% solution 60 mL/hr at 12/21/22 2145    argatroban in 0.9 % sod chlor 0.5 mcg/kg/min (12/21/22 2235)        PRN Meds:  dextrose 10%, dextrose 10%, diazePAM, fentaNYL, glucagon (human recombinant), haloperidol lactate, hydrALAZINE, insulin aspart U-100, iopamidoL, LIDOcaine (PF) 10 mg/ml (1%), midazolam, ondansetron, sodium chloride 0.9%       Assessment/Plan:  Altered mental status likely 2/2 sepsis and metabolic encephalopathy- underlying advanced dementia   Acute renal failure/ ATN - resolved   Lactic acidosis likely 2/2 acute renal failure and sepsis, and portal venous thrombosis. Saddle PE  NSTEMI Type II in the setting of Acute COVID Infection, SMITHA, and IVC/ portal vein Thrombus/ saddle PE  COVID positive on admission to ICU- 12/12- day 10 of diagnosis - Pt can come out of isolation  Transminitis - trending down  Bladder cancer s/p resection in October 2022  CT ABD & pelvis 12/11/2022 shows heterogeneous hyperdense mass  lesion in the superior and left lateral aspect of the urinary bladder  Per patient's family at bedside, patient will be receiving additional imaging to stage bladder cancer  HX of HTN, DM II, TIA, aortic stenosis s/p TAVR, CAD s/p PCI, GERD, PARAMJIT  IVC and Portal Vein Thrombosis with High probability of Pulmonary Embolism with Marked RV Strain - On Heparin gtt  Thrombocytopenia- HIT negative--> still trending down  Saddle PE with right heart strain--> s/p pulmonary thrombectomy 12/21  Mild hyperkalemia probably due to clinimic E, resolved   Hematuria - blood thinners related- resolved  Fluid overload status        Admitted to ICU in 12/12, d/graded to hospital medicine team on 12/16  HIT ab negative, Plt continue to drop, will monitor closely, if start t bleed, will do plt transfusion  I spoke to Saeed Samaniego NP, recommended CTA Chest PE Protocol to rule our PE.   Spoke to Arlette NP with nephrology, agreed with CTA Chest. Recommended to continue NS at 75cc/hr atleast for 24 hrs post contrast  CTA Chest- Saddle pulmonary embolism with thrombi extending into the bilateral upper and lower lobes and evidence of right heart strain.  12/18- Heparin changed to Argatroban gtt- aptt per protocol, cards recommended to continue Argatroban for now and once able to swallow, switch to apixiban oral prior to discharge   Consulted Dr Urbina--> reordered Echo- showing EF o 65%, moderate tricuspid regurg, severe right ventricular enlargement.  Severe right atrial enlargement.  Mild mitral stenosis, Repeat Upper and Lower extremities u/s- official report pending  Case personally discussed with Dr. Urbina  12/21- S/P Pulm artery thrombectomy   Developed anasarca, d/c IVF, received Lasix 40 mg IV x 1 on 12/21 and again on 12/22  Monitor fluid levels  Patient apparently lives at home with family and has a significant amount of DME as well as support.  She has not really been assessed for any oral nutrition since she has been here  because of her increased oxygen requirements early in admission   Currently on supplemental oxygen via nasal cannula at 4.5 liters/minute  Consulted speech though pt was lethargic and not following commands over the weekend, so was unable to assess . She is still not safe to attempt MBS per speech therapy  Hyperkalemia resolved once clinimix changed to plain without electrolytes   Completed remdesivir (5 days) and dexamethasone (10 days)  Completed 7 days treatment with zosyn  Transaminases are beginning to trend down, monitor closely  Appreciate the help from Nephrology, cardiology services, vascular services. Nephro and Cards signed off  Apparently at baseline she become confuse form time to time per son but daughter reports differently stating she was conversing, able to transfer, able to feed herself. Reported her good and bad days with bad days being when she asks about her old friends if they are alive. Pt is established with Dr Montalvo who is treating her dementia   Daughter reported lately she has been depressed and her PCP started her on zoloft but they have not given it to her yet  Discussed valium 5 mg is a very high dose for her age gp and is considered high risk medicine and that we have decreased the dose to 2 mg daily as needed. Daughter reported all the doctors in the hospital has said the same thing but she has been on that dose for many years. Advised consulting with a Psychiatrist if he anxiety and depression is getting worse, verbalized understanding   Family on 12/21 requested GI consult and Peg tube placement, consulted GI --> plan to re evaluate next week.  UA shows hematuria, urine c/s negative x 24 hrs   Morning cbc, cmp, mag, PT/INR ordered     VTE prophylaxis: Argatroban infusion    Patient condition:  Guarded    Anticipated discharge and Disposition:   TBD    Critical care note:  Critical care diagnosis: On IV Argatroban due thrombocytopenia , prolonged fasting requiring Clinimix,  anasarca requiring IV lasix pushes   Critical care interventions: Hands-on evaluation, review of labs/radiographs/records and discussion with patient and family if present  Critical care time spent: 35 minutes        All diagnosis and differential diagnosis have been reviewed; assessment and plan has been documented; I have personally reviewed the labs and test results that are presently available; I have reviewed the patients medication list; I have reviewed the consulting providers response and recommendations. I have reviewed or attempted to review medical records based upon their availability    All of the patient's questions have been  addressed and answered. Patient's is agreeable to the above stated plan. I will continue to monitor closely and make adjustments to medical management as needed.  _____________________________________________________________________    Nutrition Status:    Radiology:  Cardiac catheterization  Procedure performed in the Invasive Lab    - See Procedure Log link below for nursing documentation    - See OpNote on Surgeries Tab for physician findings    - See Imaging Tab for radiologist dictation      Mukesh Denton MD  Department of Hospital Medicine   [facility]   12/22/2022

## 2022-12-23 LAB
ALBUMIN SERPL-MCNC: 2.6 G/DL (ref 3.4–4.8)
ALBUMIN/GLOB SERPL: 1 RATIO (ref 1.1–2)
ALP SERPL-CCNC: 113 UNIT/L (ref 40–150)
ALT SERPL-CCNC: 238 UNIT/L (ref 0–55)
APPEARANCE UR: CLEAR
APTT PPP: 46 SECONDS (ref 23.2–33.7)
APTT PPP: 47.9 SECONDS (ref 23.2–33.7)
APTT PPP: 49.3 SECONDS (ref 23.2–33.7)
AST SERPL-CCNC: 65 UNIT/L (ref 5–34)
BACTERIA #/AREA URNS AUTO: ABNORMAL /HPF
BASOPHILS # BLD AUTO: 0.02 X10(3)/MCL (ref 0–0.2)
BASOPHILS NFR BLD AUTO: 0.1 %
BILIRUB UR QL STRIP.AUTO: NEGATIVE MG/DL
BILIRUBIN DIRECT+TOT PNL SERPL-MCNC: 1.4 MG/DL
BUN SERPL-MCNC: 40.5 MG/DL (ref 9.8–20.1)
CALCIUM SERPL-MCNC: 9.1 MG/DL (ref 8.4–10.2)
CHLORIDE SERPL-SCNC: 109 MMOL/L (ref 98–107)
CO2 SERPL-SCNC: 22 MMOL/L (ref 23–31)
COLOR UR AUTO: YELLOW
CREAT SERPL-MCNC: 0.63 MG/DL (ref 0.55–1.02)
EOSINOPHIL # BLD AUTO: 0.01 X10(3)/MCL (ref 0–0.9)
EOSINOPHIL NFR BLD AUTO: 0 %
ERYTHROCYTE [DISTWIDTH] IN BLOOD BY AUTOMATED COUNT: 15.9 % (ref 11–14.5)
GFR SERPLBLD CREATININE-BSD FMLA CKD-EPI: >60 MLS/MIN/1.73/M2
GLOBULIN SER-MCNC: 2.6 GM/DL (ref 2.4–3.5)
GLUCOSE SERPL-MCNC: 196 MG/DL (ref 82–115)
GLUCOSE UR QL STRIP.AUTO: ABNORMAL MG/DL
HCT VFR BLD AUTO: 33.2 % (ref 37–47)
HGB BLD-MCNC: 10.4 GM/DL (ref 12–16)
IMM GRANULOCYTES # BLD AUTO: 0.12 X10(3)/MCL (ref 0–0.04)
IMM GRANULOCYTES NFR BLD AUTO: 0.6 %
INR BLD: 2.56 (ref 0–1.3)
KETONES UR QL STRIP.AUTO: NEGATIVE MG/DL
LEUKOCYTE ESTERASE UR QL STRIP.AUTO: ABNORMAL UNIT/L
LYMPHOCYTES # BLD AUTO: 0.56 X10(3)/MCL (ref 0.6–4.6)
LYMPHOCYTES NFR BLD AUTO: 2.7 %
MAGNESIUM SERPL-MCNC: 1.8 MG/DL (ref 1.6–2.6)
MCH RBC QN AUTO: 27.6 PG
MCHC RBC AUTO-ENTMCNC: 31.3 MG/DL (ref 33–36)
MCV RBC AUTO: 88.1 FL (ref 80–94)
MONOCYTES # BLD AUTO: 1.11 X10(3)/MCL (ref 0.1–1.3)
MONOCYTES NFR BLD AUTO: 5.4 %
NEUTROPHILS # BLD AUTO: 18.72 X10(3)/MCL (ref 2.1–9.2)
NEUTROPHILS NFR BLD AUTO: 91.2 %
NITRITE UR QL STRIP.AUTO: NEGATIVE
NRBC BLD AUTO-RTO: 0.7 % (ref 0–1)
PH UR STRIP.AUTO: 7 [PH]
PLATELET # BLD AUTO: 60 X10(3)/MCL (ref 140–371)
PMV BLD AUTO: ABNORMAL FL
POCT GLUCOSE: 184 MG/DL (ref 70–110)
POCT GLUCOSE: 210 MG/DL (ref 70–110)
POCT GLUCOSE: 228 MG/DL (ref 70–110)
POTASSIUM SERPL-SCNC: 3.1 MMOL/L (ref 3.5–5.1)
PROT SERPL-MCNC: 5.2 GM/DL (ref 5.8–7.6)
PROT UR QL STRIP.AUTO: ABNORMAL MG/DL
PROTHROMBIN TIME: 27 SECONDS (ref 12.5–14.5)
RBC # BLD AUTO: 3.77 X10(6)/MCL (ref 4.2–5.4)
RBC #/AREA URNS AUTO: 128 /HPF
RBC UR QL AUTO: ABNORMAL UNIT/L
SODIUM SERPL-SCNC: 141 MMOL/L (ref 136–145)
SP GR UR STRIP.AUTO: 1.02 (ref 1–1.03)
SQUAMOUS #/AREA URNS AUTO: <5 /HPF
UROBILINOGEN UR STRIP-ACNC: 1 MG/DL
WBC # SPEC AUTO: 20.5 X10(3)/MCL (ref 4.5–11.5)
WBC #/AREA URNS AUTO: 6 /HPF

## 2022-12-23 PROCEDURE — 36415 COLL VENOUS BLD VENIPUNCTURE: CPT | Performed by: INTERNAL MEDICINE

## 2022-12-23 PROCEDURE — 21400001 HC TELEMETRY ROOM

## 2022-12-23 PROCEDURE — 87040 BLOOD CULTURE FOR BACTERIA: CPT | Performed by: INTERNAL MEDICINE

## 2022-12-23 PROCEDURE — 27000221 HC OXYGEN, UP TO 24 HOURS

## 2022-12-23 PROCEDURE — 94761 N-INVAS EAR/PLS OXIMETRY MLT: CPT

## 2022-12-23 PROCEDURE — 83735 ASSAY OF MAGNESIUM: CPT | Performed by: INTERNAL MEDICINE

## 2022-12-23 PROCEDURE — 36415 COLL VENOUS BLD VENIPUNCTURE: CPT | Performed by: NURSE PRACTITIONER

## 2022-12-23 PROCEDURE — 80053 COMPREHEN METABOLIC PANEL: CPT | Performed by: INTERNAL MEDICINE

## 2022-12-23 PROCEDURE — 25000003 PHARM REV CODE 250: Performed by: INTERNAL MEDICINE

## 2022-12-23 PROCEDURE — 63600175 PHARM REV CODE 636 W HCPCS: Performed by: INTERNAL MEDICINE

## 2022-12-23 PROCEDURE — 85730 THROMBOPLASTIN TIME PARTIAL: CPT | Performed by: INTERNAL MEDICINE

## 2022-12-23 PROCEDURE — 81001 URINALYSIS AUTO W/SCOPE: CPT | Performed by: INTERNAL MEDICINE

## 2022-12-23 PROCEDURE — 85610 PROTHROMBIN TIME: CPT | Performed by: NURSE PRACTITIONER

## 2022-12-23 PROCEDURE — 25000003 PHARM REV CODE 250: Performed by: STUDENT IN AN ORGANIZED HEALTH CARE EDUCATION/TRAINING PROGRAM

## 2022-12-23 PROCEDURE — 85025 COMPLETE CBC W/AUTO DIFF WBC: CPT | Performed by: INTERNAL MEDICINE

## 2022-12-23 RX ORDER — CARVEDILOL 12.5 MG/1
25 TABLET ORAL 2 TIMES DAILY
Status: DISCONTINUED | OUTPATIENT
Start: 2022-12-23 | End: 2022-12-30 | Stop reason: HOSPADM

## 2022-12-23 RX ORDER — FUROSEMIDE 10 MG/ML
20 INJECTION INTRAMUSCULAR; INTRAVENOUS ONCE
Status: COMPLETED | OUTPATIENT
Start: 2022-12-23 | End: 2022-12-23

## 2022-12-23 RX ORDER — QUETIAPINE FUMARATE 25 MG/1
25 TABLET, FILM COATED ORAL 2 TIMES DAILY
Status: DISCONTINUED | OUTPATIENT
Start: 2022-12-23 | End: 2022-12-30 | Stop reason: HOSPADM

## 2022-12-23 RX ORDER — DIAZEPAM 2 MG/1
2 TABLET ORAL DAILY PRN
Status: DISCONTINUED | OUTPATIENT
Start: 2022-12-23 | End: 2022-12-30 | Stop reason: HOSPADM

## 2022-12-23 RX ADMIN — CARVEDILOL 25 MG: 12.5 TABLET, FILM COATED ORAL at 08:12

## 2022-12-23 RX ADMIN — RIVAROXABAN 15 MG: 15 TABLET, FILM COATED ORAL at 04:12

## 2022-12-23 RX ADMIN — FUROSEMIDE 20 MG: 10 INJECTION, SOLUTION INTRAMUSCULAR; INTRAVENOUS at 01:12

## 2022-12-23 RX ADMIN — INSULIN ASPART 2 UNITS: 100 INJECTION, SOLUTION INTRAVENOUS; SUBCUTANEOUS at 02:12

## 2022-12-23 RX ADMIN — QUETIAPINE FUMARATE 25 MG: 25 TABLET ORAL at 08:12

## 2022-12-23 RX ADMIN — HYDRALAZINE HYDROCHLORIDE 5 MG: 20 INJECTION INTRAMUSCULAR; INTRAVENOUS at 04:12

## 2022-12-23 RX ADMIN — PIPERACILLIN AND TAZOBACTAM 4.5 G: 4; .5 INJECTION, POWDER, LYOPHILIZED, FOR SOLUTION INTRAVENOUS; PARENTERAL at 02:12

## 2022-12-23 RX ADMIN — POTASSIUM PHOSPHATE, MONOBASIC AND POTASSIUM PHOSPHATE, DIBASIC 30 MMOL: 224; 236 INJECTION, SOLUTION, CONCENTRATE INTRAVENOUS at 01:12

## 2022-12-23 RX ADMIN — ARGATROBAN 0.5 MCG/KG/MIN: 50 INJECTION, SOLUTION INTRAVENOUS at 12:12

## 2022-12-23 RX ADMIN — LEUCINE, PHENYLALANINE, LYSINE, METHIONINE, ISOLEUCINE, VALINE, HISTIDINE, THREONINE, TRYPTOPHAN, ALANINE, GLYCINE, ARGININE, PROLINE, SERINE, TYROSINE, SODIUM ACETATE, DIBASIC POTASSIUM PHOSPHATE, MAGNESIUM CHLORIDE, SODIUM CHLORIDE, CALCIUM CHLORIDE, DEXTROSE
311; 238; 247; 170; 255; 247; 204; 179; 77; 880; 438; 489; 289; 213; 17; 297; 261; 51; 77; 33; 5 INJECTION INTRAVENOUS at 09:12

## 2022-12-23 RX ADMIN — MUPIROCIN: 20 OINTMENT TOPICAL at 09:12

## 2022-12-23 RX ADMIN — Medication 6 MG: at 08:12

## 2022-12-23 RX ADMIN — PIPERACILLIN AND TAZOBACTAM 4.5 G: 4; .5 INJECTION, POWDER, LYOPHILIZED, FOR SOLUTION INTRAVENOUS; PARENTERAL at 11:12

## 2022-12-23 RX ADMIN — FAMOTIDINE 20 MG: 10 INJECTION, SOLUTION INTRAVENOUS at 09:12

## 2022-12-23 NOTE — PROGRESS NOTES
[facility] Medicine Progress Note        Chief Complaint: Inpatient Follow-up for AMS, Covid    HPI: Miguel Angel Oliveros is an 89-year-old female with PMH of DMII, CAD s/p PCI, HTN, AS s/p TAVR, HLD, GERD, and PARAMJIT, who is admitted to [facility] ICU from regular unit after being found to have altered mental status and increased respiratory distress. She was initially admitted to [facility] on 2022 by [facility] after being transferred from [place] with the diagnosis of NSTEMI. At the time of encounter, patient states having lower abdominal discomfort, bladder fullness, and dysuria. She states that these symptoms have been persisting for the past few days accompanied by decreased urinary output. Per patient's family members at beside, she usually gets UTI several times each year and she has had 4 episodes of UTI in  (last UTI was 3 months ago).  Daughter reported that patient as baseline is able to converse with family member, recognizes everyone, able to sit at the table and feed herself, able to transfer herself from bed to wheelchair and wheelchair to chair with some assistance.  Stated she has some good days and bad days in a way that sometimes she asked about her old friends who have  but otherwise her dementia is not too bad.  She is known to Dr. Montalvo in outpatient setting.  Reported she was sick at home a week prior to admission to our facility.  Initially she went to ER because she was severely constipated which has since resolved, after that she went back to her PCP given she was short of breath and fatigued and lethargic.  Then she was brought here on  when she was very lethargic to the point that she needed max assist to move so they called EMS.  Daughter also reported in ICU on Thursday afternoon she was feeling her best, recognized her brother, sister, her grandkids.  But Thursday afternoon she started becoming more lethargic and sleepy and since then she  has not improved  CTA Chest- Saddle pulmonary embolism with thrombi extending into the bilateral upper and lower lobes and evidence of right heart strain.  12/18- Heparin changed to Argatroban gtt- aptt per protocol, cards recommended to continue Argatroban for now and once able to swallow, switch to apixiban oral prior to discharge   Consulted Dr Urbina--> reordered Echo- showing EF o 65%, moderate tricuspid regurg, severe right ventricular enlargement.  Severe right atrial enlargement.  Mild mitral stenosis, Repeat Upper and Lower extremities u/s- official report pending  Case personally discussed with Dr. Urbina  12/21- S/P Pulm artery thrombectomy   Developed anasarca, d/c IVF, received Lasix 40 mg IV x 1 on 12/21, again on 12/22 and today I will give her another 20 mg IV x 1   Hypokalemia noted probably due to lasix IV, ordered Kphos 30 mmoles IV x 1   Monitor fluid levels  Patient apparently lives at home with family and has a significant amount of DME as well as support.  She has not really been assessed for any oral nutrition since she has been here because of her increased oxygen requirements early in admission   Currently on supplemental oxygen via nasal cannula at 4 liters/minute, requested weaning oxygen as able   Consulted speech though pt was lethargic and not following commands over the weekend, so was unable to assess . She is still not safe to attempt MBS per speech therapy  Completed remdesivir (5 days) and dexamethasone (10 days)  Completed 7 days treatment with zosyn  Transaminases are beginning to trend down, monitor closely  Appreciate the help from Nephrology, cardiology services, vascular services. Nephro and Cards signed off      Interval Hx:   Pt is somewhat lethargic today, does opens her eyes but does not respond. Daughter at bedside    in the room   Discussed plan for PEG Tuesday next week per GI  Again discussed NG Tube for so we can start her meds and enteral feeds, daughter  agreeable   Discussed my concern about platelet trending down, discussed negative HIT antibodies.  Discussed will touch base with Cardiology regarding oral anticoagulation and for that we will need NG tube again.  Verbalized understanding and agreeable to plan  Case was discussed with patient's nurse, speech therapist and GI on the floor      Objective/physical exam:  General:  somewhat awake but mostly lethargic, left IJ present, obese  Chest: Clear to auscultation bilaterally, supplemental oxygen via nasal cannula  Heart: RRR, +S1, S2, no appreciable murmur  Abdomen: Soft, nontender, BS +  MSK: Warm, no lower extremity edema, no clubbing or cyanosis, hands in mittens  Neurologic:  pt is not following commands completely, lethargic    VITAL SIGNS: 24 HRS MIN & MAX LAST   Temp  Min: 96.9 °F (36.1 °C)  Max: 98.1 °F (36.7 °C) 98.1 °F (36.7 °C)   BP  Min: 144/77  Max: 181/83 (!) 181/83     Pulse  Min: 76  Max: 91  91   Resp  Min: 18  Max: 22 20   SpO2  Min: 96 %  Max: 100 % 97 %       Recent Labs   Lab 12/18/22  0931 12/19/22  0532 12/20/22  0408 12/21/22  0652 12/22/22  0058 12/23/22  0510   WBC 11.0 12.7* 13.6* 12.5* 14.2* 20.5*   RBC 3.74* 4.05* 4.08* 4.01* 3.84* 3.77*   HGB 10.4* 11.2* 11.4* 10.9* 10.6* 10.4*   HCT 33.7* 37.6 36.4* 35.8* 35.0* 33.2*   MCV 90.1 92.8 89.2 89.3 91.1 88.1   MCH 27.8 27.7 27.9 27.2 27.6 27.6   MCHC 30.9* 29.8* 31.3* 30.4* 30.3* 31.3*   RDW 15.6* 15.7* 15.7* 15.9* 16.1* 15.9*   * 84* 87* 76* 69* 60*   MPV 13.1* 13.5* 13.0*  --   --   --        Recent Labs   Lab 12/19/22  0209 12/19/22 2043 12/20/22  0409 12/22/22  0430 12/23/22  0510     --  140 143 141   K 5.6*  --  4.2 3.7 3.1*   CO2 17*  --  19* 20* 22*   BUN 52.2*  --  52.5* 53.8* 40.5*   CREATININE 1.06*  --  0.91 0.78 0.63   CALCIUM 8.9  --  8.9 9.3 9.1   PH  --  7.48*  --   --   --    MG 2.80*  --   --  2.20 1.80   ALBUMIN 2.9*  --  3.0*  --  2.6*   ALKPHOS 106  --  116  --  113   *  --  543*  --  238*   AST  136*  --  88*  --  65*   BILITOT 1.4  --  1.4  --  1.4          Microbiology Results (last 7 days)       Procedure Component Value Units Date/Time    Urine culture [[id number]] Collected: 12/18/22 1940    Order Status: Completed Specimen: Urine Updated: 12/20/22 0716     Urine Culture No Growth    Blood Culture [[id number]]  (Normal) Collected: 12/12/22 0948    Order Status: Completed Specimen: Blood, Venous Updated: 12/17/22 1000     CULTURE, BLOOD ([facility]) No Growth at 5 days    Blood Culture [[id number]]  (Normal) Collected: 12/12/22 0948    Order Status: Completed Specimen: Blood, Venous Updated: 12/17/22 1000     CULTURE, BLOOD ([facility]) No Growth at 5 days             See below for Radiology    Scheduled Med:   cloNIDine 0.1 mg/24 hr td ptwk  1 patch Transdermal Q7 Days    dextrose 50% in water (D50W)  25 g Intravenous Once    famotidine (PF)  20 mg Intravenous Daily    insulin regular  6 Units Intravenous Once    melatonin  6 mg Oral Nightly    mupirocin   Topical (Top) Daily        Continuous Infusions:   argatroban in 0.9 % sod chlor 0.5 mcg/kg/min (12/23/22 0011)        PRN Meds:  dextrose 10%, dextrose 10%, diazePAM, fentaNYL, glucagon (human recombinant), haloperidol lactate, hydrALAZINE, insulin aspart U-100, iopamidoL, LIDOcaine (PF) 10 mg/ml (1%), midazolam, ondansetron, sodium chloride 0.9%       Assessment/Plan:  Altered mental status likely 2/2 sepsis and metabolic encephalopathy- underlying advanced dementia also present   Acute renal failure/ ATN - resolved   Lactic acidosis likely 2/2 acute renal failure and sepsis, and portal venous thrombosis. Saddle PE  NSTEMI Type II in the setting of Acute COVID Infection, SMITHA, and IVC/ portal vein Thrombus/ saddle PE  COVID positive on admission to ICU- 12/12- day 10 of diagnosis - Pt out of isolation  Transminitis - trending down  Bladder cancer s/p resection in October 2022  CT ABD & pelvis 12/11/2022 shows heterogeneous hyperdense mass lesion in the superior and  left lateral aspect of the urinary bladder  Per patient's family at bedside, patient will be receiving additional imaging to stage bladder cancer  HX of HTN, DM II, TIA, aortic stenosis s/p TAVR, CAD s/p PCI, GERD, PARAMJIT  IVC and Portal Vein Thrombosis with High probability of Pulmonary Embolism with Marked RV Strain - On Heparin gtt  Thrombocytopenia- HIT negative--> still trending down  Saddle PE with right heart strain--> s/p pulmonary thrombectomy 12/21  Mild hyperkalemia probably due to clinimic E, resolved   Hematuria - blood thinners related- resolved  Fluid overload status- improved   Severe hypokalemia         Admitted to ICU in 12/12, d/graded to hospital medicine team on 12/16  HIT ab negative, Plt continue to drop, will monitor closely, if start t bleed, will do plt transfusion  CTA Chest- Saddle pulmonary embolism with thrombi extending into the bilateral upper and lower lobes and evidence of right heart strain.  12/18- Heparin changed to Argatroban gtt- aptt per protocol, cards recommended to continue Argatroban for now and once able to swallow, switch to apixiban oral prior to discharge   12/21- S/P Pulm artery thrombectomy   Developed anasarca, d/c IVF, received Lasix 40 mg IV x 1 on 12/21, again on 12/22 and today I will give her another 20 mg IV x 1   Hypokalemia noted probably due to lasix IV, ordered Kphos 30 mmoles IV x 1   Monitor fluid levels  Currently on supplemental oxygen via nasal cannula at 4 liters/minute, requested weaning oxygen as able   Consulted speech though pt was lethargic and not following commands over the weekend, so was unable to assess . She is still not safe to attempt MBS per speech therapy  Family on 12/21 requested GI consult and Peg tube placement, consulted GI --> plan for PEG on Tuesday next week.  In the meantime, discussed with daughter that her platelets are continuing to drop and she is on argatroban.  Again discussed NG tube, daughter is agreeable for NG tube for  now so we can restart her home medication as well as switch her to oral anticoagulation if Cardiology is agreeable.  Personally spoke to Dr. Love, recommended to start her on Xarelto 15 mg b.i.d. for 21 days and stop argatroban once we get the NG tube.  Ordered NG tube placement  UA shows hematuria, urine c/s negative x 24 hrs   Developed Leukocytosis again, 20K today--will order Blood cx and ua with C/S   Start zosyn again as she may be silently aspirating on her own secretions - day 1 of 7  Continue Clinimic E at 60 cc/hr  Dietician has already left us recommendations for NG Tube feeds--> Diabetisource goal rate 65ml/hr  I again had this conversation with the daughter that pt is very sick and she is still not out of the woods, she verbalized understanding   Patient apparently lives at home with family and has a significant amount of DME as well as support.  Apparently at baseline she become confuse form time to time per son but daughter reports differently stating she was conversing, able to transfer, able to feed herself. Reported her good and bad days with bad days being when she asks about her old friends if they are alive. Pt is established with Dr Montalvo who is treating her dementia   Morning cbc, bmp ordered     VTE prophylaxis: Argatroban infusion, will switch to Xarelto later today once NG tube is placed     Patient condition:  Guarded    Anticipated discharge and Disposition:   TBD    Critical care note:  Critical care diagnosis: On IV Argatroban , prolonged fasting requiring Clinimix E, anasarca requiring IV lasix pushes, Kphos IV for hypokalemia   Critical care interventions: Hands-on evaluation, review of labs/radiographs/records and discussion with patient and family if present  Critical care time spent: 40 minutes        All diagnosis and differential diagnosis have been reviewed; assessment and plan has been documented; I have personally reviewed the labs and test results that are presently  available; I have reviewed the patients medication list; I have reviewed the consulting providers response and recommendations. I have reviewed or attempted to review medical records based upon their availability    All of the patient's questions have been  addressed and answered. Patient's is agreeable to the above stated plan. I will continue to monitor closely and make adjustments to medical management as needed.  _____________________________________________________________________    Nutrition Status:    Radiology:  Cardiac catheterization  Procedure performed in the Invasive Lab    - See Procedure Log link below for nursing documentation    - See OpNote on Surgeries Tab for physician findings    - See Imaging Tab for radiologist dictation      Mukesh Denton MD  Department of Hospital Medicine   [facility]   12/23/2022

## 2022-12-23 NOTE — PT/OT/SLP PROGRESS
SLP attempting to see pt for dysphagia tx, however pt lethargic with increased difficulty remaining alert. Pt unsafe for PO trials at this time. SLP to continue to follow and treat as appropriate.

## 2022-12-23 NOTE — PROGRESS NOTES
Inpatient Nutrition Assessment    Admit Date: 12/11/2022   Total duration of encounter: 12 days     Nutrition Recommendation/Prescription     - diet per SLP recs once appropriate    - continue PPN until tolerating oral diet or tube feeding: Clinimix 4.25/5% @ 60ml/hr (490kcal, 61 gm protein)    - rec NG tube feeding if unable to begin oral diet:  Diabetisource goal rate 65ml/hr, free water flush rec 65ml every 3 hours  Provides:  1560kcal (100% est needs)  78 gm protein (113% est needs)  1580ml free water (100% est needs)    - continue medical management of electrolytes      Communication of Recommendations: reviewed with nurse    Nutrition Assessment     Malnutrition Assessment/Nutrition-Focused Physical Exam    Malnutrition in the context of chronic illness  Degree of Malnutrition: does not meet criteria  Energy Intake: unable to obtain  Interpretation of Weight Loss: does not meet criteria  Body Fat: does not meet criteria  Area of Body Fat Loss: does not meet criteria  Muscle Mass Loss: mild depletion  Area of Muscle Mass Loss: temple region - temporalis muscle  Fluid Accumulation: does not meet criteria  Edema: no edema present  Reduced  Strength: unable to obtain  A minimum of two characteristics is recommended for diagnosis of either severe or non-severe malnutrition.    Chart Review    Reason Seen: malnutrition screening tool    MST Score: 3  Have you recently lost weight without trying?: Yes: Unsure how much  Have you been eating poorly because of a decreased appetite?: Yes     Diagnosis:  Altered mental status likely 2/2 sepsis and metabolic encephalopathy  Acute renal failure   Lactic acidosis likely 2/2 acute renal failure and sepsis  NSTEMI   COVID positive   Transminitis     Relevant Medical History: CAD, dementia, depression, DM, GERD, high chol, HTN, TIA    Nutrition-Related Medications: NS @ 125ml/hr  Calorie Containing IV Medications: no significant kcals from medications at this  "time    Nutrition-Related Labs:  12/14 Na 134, Cl 94, BUN 87.2, Crea 2.81, Glu 150, Phos 7.9  12/19: K 5.6, Cl 110, BUN 52.2, Crea 1.06, GFR 50, Glu 310, Mg 2.8, ,   12/23: WBC 20.5, K 3.1, Cl 109, BUN 40.5, Glu 196, AST 65,     Diet/PN Order: Diet NPO  Amino acid 4.25% - dextrose 5% (CLINIMIX-E) solution (1L provides 42.5 gm AA, 50 gm CHO (170 kcal/L dextrose), Na 35, K 30, Mg 5, Ca 4.5, Acetate 70, Cl 39, Phos 15)  Oral Supplement Order: none  Tube Feeding Order: not applicable  Appetite/Oral Intake: NPO/NPO  Factors Affecting Nutritional Intake: NPO  Food/Amish/Cultural Preferences: none reported  Food Allergies: none reported    Skin Integrity: skin tear  Wound(s):      Altered Skin Integrity 12/17/22 0420 Right Antecubital-Tissue loss description: Partial thickness none noted    Comments    12/14/22: Plans for diet advancement per RN. No NG in place at this time. Noted MST, Previous EMR wts indicate wt gain over past 2 months. Unable to verify subjective info with pt, sleeping soundly.    12/19:  pt on PPN, failed MBS, has had very little nutrition since admit; rec tube feeding until able to begin oral diet    12/23: pt remains NPO, family agreeable to NG tube for now with plans for PEG placement next week; tube feeding to start today    Anthropometrics    Height: 5' 5" (165.1 cm)    Last Weight: 86.2 kg (190 lb) (12/18/22 0617) Weight Method: Bed Scale  BMI (Calculated): 31.6  BMI Classification: obese grade I (BMI 30-34.9)     Ideal Body Weight (IBW), Female: 125 lb     % Ideal Body Weight, Female (lb): 152 %                             Usual Weight Provided By: unable to obtain usual weight    Wt Readings from Last 5 Encounters:   12/18/22 86.2 kg (190 lb)   12/11/22 81.6 kg (180 lb)   12/01/22 81.6 kg (180 lb)   11/30/22 77.1 kg (170 lb)   10/15/22 77.1 kg (170 lb)     Weight Change(s) Since Admission:  Admit Weight: 86.2 kg (190 lb) (12/11/22 2120)    Estimated Needs    Weight Used " For Calorie Calculations: 86.2 kg (190 lb 0.6 oz)  Energy Calorie Requirements (kcal): 1564kcal (1.2 stress factor)  Energy Need Method: [place]-St Jeor  Weight Used For Protein Calculations: 86.2 kg (190 lb 0.6 oz)  Protein Requirements: 69gm (0.8g/kg)  Fluid Requirements (mL): 1564ml (1ml/kcal)  Temp: 98.2 °F (36.8 °C)       Enteral Nutrition    Patient not receiving enteral nutrition at this time.    Parenteral Nutrition    Standard Formula: Clinimix 4.25/5  Custom Formula: not applicable  Additives: none  Rate/Volume: 60ml/hr  Lipids: none  Total Nutrition Provided by Parenteral Nutrition:  Calories Provided  490 kcal/d, 31% needs   Protein Provided  61 g/d, 88% needs   Dextrose Provided  72 g/d,    Fluid Provided  1440 ml/d, 92% needs       Evaluation of Received Nutrient Intake    Calories: not meeting estimated needs  Protein: meeting estimated needs    Patient Education    Not applicable.    Nutrition Diagnosis     PES: Inadequate oral intake related to current condition as evidenced by NPO since admit. (continues)    Interventions/Goals     Intervention(s): general/healthful diet, commercial beverage, and collaboration with other providers  Goal: Meet greater than 75% of nutritional needs by follow-up. (goal not met)    Monitoring & Evaluation     Dietitian will monitor energy intake.  Nutrition Risk/Follow-Up: high (follow-up in 1-4 days)   Please consult if re-assessment needed sooner.

## 2022-12-24 LAB
ANION GAP SERPL CALC-SCNC: 8 MEQ/L
BASOPHILS # BLD AUTO: 0.01 X10(3)/MCL (ref 0–0.2)
BASOPHILS NFR BLD AUTO: 0.1 %
BUN SERPL-MCNC: 40.6 MG/DL (ref 9.8–20.1)
CALCIUM SERPL-MCNC: 8.4 MG/DL (ref 8.4–10.2)
CHLORIDE SERPL-SCNC: 106 MMOL/L (ref 98–107)
CO2 SERPL-SCNC: 26 MMOL/L (ref 23–31)
CREAT SERPL-MCNC: 0.77 MG/DL (ref 0.55–1.02)
CREAT/UREA NIT SERPL: 53
EOSINOPHIL # BLD AUTO: 0.04 X10(3)/MCL (ref 0–0.9)
EOSINOPHIL NFR BLD AUTO: 0.3 %
ERYTHROCYTE [DISTWIDTH] IN BLOOD BY AUTOMATED COUNT: 16 % (ref 11–14.5)
FERRITIN SERPL-MCNC: 396.95 NG/ML (ref 4.63–204)
FOLATE SERPL-MCNC: 13.7 NG/ML (ref 7–31.4)
GFR SERPLBLD CREATININE-BSD FMLA CKD-EPI: >60 MLS/MIN/1.73/M2
GLUCOSE SERPL-MCNC: 198 MG/DL (ref 82–115)
HCT VFR BLD AUTO: 28 % (ref 37–47)
HGB BLD-MCNC: 8.8 GM/DL (ref 12–16)
IGA SERPL-MCNC: 192 MG/DL (ref 69–517)
IGG SERPL-MCNC: 848 MG/DL (ref 522–1631)
IGM SERPL-MCNC: 113 MG/DL (ref 33–293)
IMM GRANULOCYTES # BLD AUTO: 0.1 X10(3)/MCL (ref 0–0.04)
IMM GRANULOCYTES NFR BLD AUTO: 0.7 %
IRON SATN MFR SERPL: 14 % (ref 20–50)
IRON SERPL-MCNC: 27 UG/DL (ref 50–170)
LYMPHOCYTES # BLD AUTO: 0.82 X10(3)/MCL (ref 0.6–4.6)
LYMPHOCYTES NFR BLD AUTO: 5.7 %
MCH RBC QN AUTO: 27.4 PG
MCHC RBC AUTO-ENTMCNC: 31.4 MG/DL (ref 33–36)
MCV RBC AUTO: 87.2 FL (ref 80–94)
MONOCYTES # BLD AUTO: 0.76 X10(3)/MCL (ref 0.1–1.3)
MONOCYTES NFR BLD AUTO: 5.2 %
NEUTROPHILS # BLD AUTO: 12.76 X10(3)/MCL (ref 2.1–9.2)
NEUTROPHILS NFR BLD AUTO: 88 %
NRBC BLD AUTO-RTO: 0.3 % (ref 0–1)
PLATELET # BLD AUTO: 43 X10(3)/MCL (ref 140–371)
PMV BLD AUTO: ABNORMAL FL
POCT GLUCOSE: 174 MG/DL (ref 70–110)
POCT GLUCOSE: 216 MG/DL (ref 70–110)
POCT GLUCOSE: 236 MG/DL (ref 70–110)
POTASSIUM SERPL-SCNC: 3.5 MMOL/L (ref 3.5–5.1)
RBC # BLD AUTO: 3.21 X10(6)/MCL (ref 4.2–5.4)
SODIUM SERPL-SCNC: 140 MMOL/L (ref 136–145)
TIBC SERPL-MCNC: 168 UG/DL (ref 70–310)
TIBC SERPL-MCNC: 195 UG/DL (ref 250–450)
TRANSFERRIN SERPL-MCNC: 182 MG/DL
WBC # SPEC AUTO: 14.5 X10(3)/MCL (ref 4.5–11.5)

## 2022-12-24 PROCEDURE — 25000003 PHARM REV CODE 250: Performed by: STUDENT IN AN ORGANIZED HEALTH CARE EDUCATION/TRAINING PROGRAM

## 2022-12-24 PROCEDURE — 25000003 PHARM REV CODE 250: Performed by: INTERNAL MEDICINE

## 2022-12-24 PROCEDURE — 85060 BLOOD SMEAR INTERPRETATION: CPT | Performed by: INTERNAL MEDICINE

## 2022-12-24 PROCEDURE — 99223 1ST HOSP IP/OBS HIGH 75: CPT | Mod: ,,, | Performed by: INTERNAL MEDICINE

## 2022-12-24 PROCEDURE — 82784 ASSAY IGA/IGD/IGG/IGM EACH: CPT | Performed by: INTERNAL MEDICINE

## 2022-12-24 PROCEDURE — 80048 BASIC METABOLIC PNL TOTAL CA: CPT | Performed by: INTERNAL MEDICINE

## 2022-12-24 PROCEDURE — 63600175 PHARM REV CODE 636 W HCPCS: Performed by: INTERNAL MEDICINE

## 2022-12-24 PROCEDURE — 21400001 HC TELEMETRY ROOM

## 2022-12-24 PROCEDURE — 99223 PR INITIAL HOSPITAL CARE,LEVL III: ICD-10-PCS | Mod: ,,, | Performed by: INTERNAL MEDICINE

## 2022-12-24 PROCEDURE — 82728 ASSAY OF FERRITIN: CPT | Performed by: INTERNAL MEDICINE

## 2022-12-24 PROCEDURE — 84165 PROTEIN E-PHORESIS SERUM: CPT | Performed by: INTERNAL MEDICINE

## 2022-12-24 PROCEDURE — 83521 IG LIGHT CHAINS FREE EACH: CPT | Performed by: INTERNAL MEDICINE

## 2022-12-24 PROCEDURE — 82746 ASSAY OF FOLIC ACID SERUM: CPT | Performed by: INTERNAL MEDICINE

## 2022-12-24 PROCEDURE — 85025 COMPLETE CBC W/AUTO DIFF WBC: CPT | Performed by: INTERNAL MEDICINE

## 2022-12-24 PROCEDURE — 92526 ORAL FUNCTION THERAPY: CPT

## 2022-12-24 PROCEDURE — 86334 IMMUNOFIX E-PHORESIS SERUM: CPT | Performed by: INTERNAL MEDICINE

## 2022-12-24 PROCEDURE — 36415 COLL VENOUS BLD VENIPUNCTURE: CPT | Performed by: INTERNAL MEDICINE

## 2022-12-24 PROCEDURE — 83550 IRON BINDING TEST: CPT | Performed by: INTERNAL MEDICINE

## 2022-12-24 RX ORDER — TALC
6 POWDER (GRAM) TOPICAL NIGHTLY
Status: DISCONTINUED | OUTPATIENT
Start: 2022-12-24 | End: 2022-12-30 | Stop reason: HOSPADM

## 2022-12-24 RX ORDER — FUROSEMIDE 10 MG/ML
20 INJECTION INTRAMUSCULAR; INTRAVENOUS ONCE
Status: COMPLETED | OUTPATIENT
Start: 2022-12-24 | End: 2022-12-24

## 2022-12-24 RX ADMIN — Medication 6 MG: at 09:12

## 2022-12-24 RX ADMIN — INSULIN ASPART 2 UNITS: 100 INJECTION, SOLUTION INTRAVENOUS; SUBCUTANEOUS at 12:12

## 2022-12-24 RX ADMIN — FAMOTIDINE 20 MG: 10 INJECTION, SOLUTION INTRAVENOUS at 09:12

## 2022-12-24 RX ADMIN — QUETIAPINE FUMARATE 25 MG: 25 TABLET ORAL at 09:12

## 2022-12-24 RX ADMIN — PIPERACILLIN AND TAZOBACTAM 4.5 G: 4; .5 INJECTION, POWDER, LYOPHILIZED, FOR SOLUTION INTRAVENOUS; PARENTERAL at 02:12

## 2022-12-24 RX ADMIN — INSULIN ASPART 1 UNITS: 100 INJECTION, SOLUTION INTRAVENOUS; SUBCUTANEOUS at 01:12

## 2022-12-24 RX ADMIN — RIVAROXABAN 15 MG: 15 TABLET, FILM COATED ORAL at 07:12

## 2022-12-24 RX ADMIN — PIPERACILLIN AND TAZOBACTAM 4.5 G: 4; .5 INJECTION, POWDER, LYOPHILIZED, FOR SOLUTION INTRAVENOUS; PARENTERAL at 07:12

## 2022-12-24 RX ADMIN — FUROSEMIDE 20 MG: 10 INJECTION, SOLUTION INTRAMUSCULAR; INTRAVENOUS at 11:12

## 2022-12-24 RX ADMIN — RIVAROXABAN 15 MG: 15 TABLET, FILM COATED ORAL at 05:12

## 2022-12-24 RX ADMIN — MUPIROCIN: 20 OINTMENT TOPICAL at 10:12

## 2022-12-24 RX ADMIN — CARVEDILOL 25 MG: 12.5 TABLET, FILM COATED ORAL at 09:12

## 2022-12-24 RX ADMIN — PIPERACILLIN AND TAZOBACTAM 4.5 G: 4; .5 INJECTION, POWDER, LYOPHILIZED, FOR SOLUTION INTRAVENOUS; PARENTERAL at 10:12

## 2022-12-24 NOTE — CONSULTS
[facility] - Oncology Acute  Hematology/Oncology  Consult Note      Consult Requested By: Mukesh Denton MD    Reason for Consult: Thrombocytopenia    SUBJECTIVE:     History of Present Illness:  Patient is a 89 y.o. female with h/o HX of HTN, DM II, TIA, aortic stenosis s/p TAVR, CAD s/p PCI, GERD, PARAMJIT, dementia.     She was initially admitted to [facility] on 12/11/2022 by [facility] after being transferred from [place] with the diagnosis of NSTEMI. She was transferred to ICU in 12/12 and back to the floor on 12/16/2022. Patient was COVID positive on admission.     Patient with abdominal discomfort and UTI.   CT A/P 12/11/2022: Heterogeneous hyperdense mass lesion in the superior and left lateral aspect of the urinary bladder measuring 4.8 x 3.4 cm. This is highly suspicious for malignancy.  This is grossly unchanged from the prior exam.  12/18/2022: URINARY BLADDER, BIOPSY:   A) AGGREGATES OF BLOOD, FIBRIN, MIXED INFLAMMATORY CELLS, RARE DEGENERATIVE EPITHELIOID CELLS AND FOCAL DYSTROPHIC CALCIFICATION.  B) NO EVIDENCE OF INTACT UROTHELIUM OR NEOPLASIA / MALIGNANCY IDENTIFIED.    CTA 12/18/2022: Saddle pulmonary embolism with thrombi extending into the bilateral upper and lower lobes and evidence of right heart strain.    She was started on Heparin a but changed to Argatroban gtt due to thrombocytopenia. She is  now on Xarelto 15mg per NG tube bid x 21 days on 12/23 and then change to xarelto 20 mg daily with   On 12/21- S/P Pulm artery thrombectomy     HIT ab negative.   Peripheral smear:  - Leukocytosis with absolute neutrophilia; no circulating blasts.   - Mild normocytic, normochromic anemia with marked anisopoikilocytosis including frequent elliptocytes, target cells, and echinocytes. Polychromasia is significantly increased including circulating nRBCs.   - Mild Thrombocytopenia.   Impression: Leukocytosis with neutrophilia can be seen in infections, drug reactions, chronic inflammatory disorders and can be  stress induced. Normocytic anemia can be seen in early iron deficiency anemia, anemia of chronic disease and acute blood loss. Thrombocytopenia can be due to decreased production, increased destruction (immune and non-immune mediated) or due to splenic sequestration.     I was consulted for thrombocytopenia.    Patient is seen in rounds this morning.  Family at bedside.  Patient is lying in bed and is not communicative.  History taken from family and electronic medical records.          Continuous Infusions:  Scheduled Meds:   carvediloL  25 mg Per NG tube BID    cloNIDine 0.1 mg/24 hr td ptwk  1 patch Transdermal Q7 Days    dextrose 50% in water (D50W)  25 g Intravenous Once    famotidine (PF)  20 mg Intravenous Daily    furosemide (LASIX) injection  20 mg Intravenous Once    insulin regular  6 Units Intravenous Once    melatonin  6 mg Per NG tube Nightly    mupirocin   Topical (Top) Daily    piperacillin-tazobactam (ZOSYN) IVPB  4.5 g Intravenous Q8H    QUEtiapine  25 mg Per NG tube BID    rivaroxaban  15 mg Per NG tube BID WM     PRN Meds:dextrose 10%, dextrose 10%, diazePAM, fentaNYL, glucagon (human recombinant), haloperidol lactate, hydrALAZINE, insulin aspart U-100, iopamidoL, LIDOcaine (PF) 10 mg/ml (1%), ondansetron, sodium chloride 0.9%    Past Medical History:   Diagnosis Date    Anxiety disorder     Aortic stenosis     Arthritis     Carpal tunnel syndrome     Coronary artery disease     Dementia     Depression     Diabetes mellitus     GERD (gastroesophageal reflux disease)     Hx of rectal polypectomy     Hypercholesterolemia     Hypertension     Obstructive sleep apnea     Respiratory distress     S/P TAVR (transcatheter aortic valve replacement)     TIA (transient ischemic attack)      Past Surgical History:   Procedure Laterality Date    CARDIAC SURGERY      CATARACT EXTRACTION      CORONARY STENT PLACEMENT      HYSTERECTOMY      INSERTION OF PACEMAKER      PARTIAL HYSTERECTOMY      PULMONARY  EMBOLISM SURGERY N/A 12/21/2022    Procedure: EMBOLECTOMY, ARTERY, PULMONARY;  Surgeon: Terri Urbina MD;  Location: [facility] CATH LAB;  Service: Peripheral Vascular;  Laterality: N/A;    RECTAL POLYPECTOMY      TONSILLECTOMY      TRANSCATHETER AORTIC VALVE REPLACEMENT (TAVR)       Family History   Problem Relation Age of Onset    Diabetes Mother     Stroke Father     Hyperlipidemia Father     Breast cancer Sister      Social History     Tobacco Use    Smoking status: Never    Smokeless tobacco: Never   Substance Use Topics    Alcohol use: Never    Drug use: Never       Review of patient's allergies indicates:   Allergen Reactions    Heparin analogues Other (See Comments)     HIT panel pending    Statins-hmg-coa reductase inhibitors      Other reaction(s): Joint pain    Bimatoprost      Other reaction(s): Eye redness    Cortisone     Dorzolamide-timolol     Gabapentin      Other reaction(s): Confusion    Hydrocortisone     Tafluprost (pf)       Current Facility-Administered Medications on File Prior to Encounter   Medication Dose Route Frequency Provider Last Rate Last Admin    betamethasone acetate-betamethasone sodium phosphate injection 6 mg  6 mg Intra-articular  Zina D Bouy, FNP   6 mg at 06/01/22 1345    betamethasone acetate-betamethasone sodium phosphate injection 6 mg  6 mg Intra-articular  Zina D Bouy, FNP   6 mg at 06/01/22 1345    LIDOcaine (PF) 20 mg/mL (2%) injection 5 mL  5 mL   Zina D Bouy, FNP   5 mL at 06/01/22 1345    LIDOcaine (PF) 20 mg/mL (2%) injection 5 mL  5 mL   Zina D Bouy, FNP   5 mL at 06/01/22 1345     Current Outpatient Medications on File Prior to Encounter   Medication Sig Dispense Refill    ascorbic acid, vitamin C, (VITAMIN C) 1000 MG tablet Take 1,000 mg by mouth once daily.      aspirin (ECOTRIN) 81 MG EC tablet Take 81 mg by mouth.      atorvastatin (LIPITOR) 40 MG tablet Take 40 mg by mouth once daily.      carvediloL (COREG) 25 MG tablet Take 25 mg by mouth 2  (two) times daily.      cloNIDine (CATAPRES) 0.1 MG tablet       clopidogreL (PLAVIX) 75 mg tablet Take 75 mg by mouth once daily.      isosorbide mononitrate (IMDUR) 30 MG 24 hr tablet 15 mg.      multivitamin/iron/folic acid (CENTRUM COMPLETE ORAL) Take by mouth.      QUEtiapine (SEROQUEL) 25 MG Tab Take 25 mg by mouth 2 (two) times daily.      ranolazine (RANEXA) 1,000 mg Tb12 Take 1,000 mg by mouth 2 (two) times daily.      TUMERIC-GING-OLIVE-OREG-CAPRYL ORAL Take by mouth.      nitroGLYCERIN (NITROSTAT) 0.4 MG SL tablet Place 0.4 mg under the tongue.         Review of Systems   Unable to perform ROS      OBJECTIVE:     Vital Signs (Most Recent)  Temp: 99.7 °F (37.6 °C) (12/24/22 0821)  Pulse: 87 (12/24/22 0821)  Resp: 16 (12/24/22 0821)  BP: 112/68 (12/24/22 0959)  SpO2: (!) 93 % (12/24/22 0821)    Pain Assessment: No pain reported at this time    Vital Signs Range (Last 24H):  Temp:  [98.2 °F (36.8 °C)-99.7 °F (37.6 °C)]   Pulse:  [82-99]   Resp:  [16-20]   BP: (112-178)/(61-83)   SpO2:  [88 %-96 %]     Physical Exam:  Physical Exam  Vitals and nursing note reviewed.   Constitutional:       General: She is awake. She is not in acute distress.     Appearance: She is ill-appearing.   HENT:      Head: Normocephalic and atraumatic.      Mouth/Throat:      Mouth: Mucous membranes are moist.   Eyes:      General: No scleral icterus.     Extraocular Movements: Extraocular movements intact.      Conjunctiva/sclera: Conjunctivae normal.      Pupils: Pupils are equal, round, and reactive to light.   Cardiovascular:      Rate and Rhythm: Normal rate and regular rhythm.   Pulmonary:      Breath sounds: Decreased air movement present. No rhonchi.   Abdominal:      General: There is no distension.      Palpations: Abdomen is soft. There is no mass.   Musculoskeletal:         General: No swelling or deformity.      Cervical back: Neck supple.   Lymphadenopathy:      Head:      Right side of head: No submandibular adenopathy.       Left side of head: No submandibular adenopathy.      Upper Body:      Right upper body: No supraclavicular or axillary adenopathy.      Left upper body: No supraclavicular or axillary adenopathy.      Lower Body: No right inguinal adenopathy. No left inguinal adenopathy.   Skin:     General: Skin is warm.      Coloration: Skin is not jaundiced.      Findings: No lesion or rash.      Nails: There is no clubbing.   Psychiatric:         Attention and Perception: She is inattentive.         Mood and Affect: Affect is flat.         Speech: She is noncommunicative.         Cognition and Memory: Cognition is impaired. Memory is impaired.       Laboratory:  CBC with Differential:  Recent Labs   Lab 12/24/22  0637   WBC 14.5*   RBC 3.21*   HCT 28.0*   HGB 8.8*   MCV 87.2   MCH 27.4   RDW 16.0*   PLT 43*     CMP:  Recent Labs   Lab 12/24/22  0637   CALCIUM 8.4      K 3.5   CO2 26   BUN 40.6*   CREATININE 0.77     BMP:   Recent Labs   Lab 12/24/22  0637   CALCIUM 8.4      K 3.5   CO2 26   BUN 40.6*   CREATININE 0.77     LFTs: No results for input(s): ALT, AST, ALKPHOS, BILITOT, PROT, ALBUMIN in the last 24 hours.  Haptoglobin: No results for input(s): HAPTOGLOBIN in the last 24 hours.  Tumor Markers: No results for input(s): PSA, CEA, , AFPTM, LE1464,  in the last 24 hours.    Invalid input(s): ALGTM  Immunology: No results for input(s): SPEP, TIANNA, OLIVER, FREELAMBDALI in the last 24 hours.  Coagulation: No results for input(s): PT, INR, APTT in the last 24 hours.  Specimen (24h ago, onward)      None          Microbiology Results (last 7 days)       Procedure Component Value Units Date/Time    Blood Culture [[id number]] Collected: 12/23/22 1046    Order Status: Resulted Specimen: Blood from Arm, Left Updated: 12/23/22 1113    Blood Culture [[id number]] Collected: 12/23/22 1046    Order Status: Resulted Specimen: Blood from Arm, Right Updated: 12/23/22 1113    Urine culture [[id number]] Collected: 12/18/22  1940    Order Status: Completed Specimen: Urine Updated: 12/20/22 0716     Urine Culture No Growth            Diagnostic Results:  Imaging Results              CT Head Without Contrast (Final result)  Result time 12/12/22 06:52:36      Final result by Chapin Ward MD (12/12/22 06:52:36)                   Impression:      No acute intracranial findings identified.    No significant discrepancy with overnight report.      Electronically signed by: Chapin Ward  Date:    12/12/2022  Time:    06:52               Narrative:    EXAMINATION:  CT HEAD WITHOUT CONTRAST    CLINICAL HISTORY:  Mental status change, unknown cause;    TECHNIQUE:  Sequential axial images were performed of the brain without contrast.    Dose product length of 1035 mGycm. Automated exposure control was utilized to minimize radiation dose.    COMPARISON:  December 1, 2022.    FINDINGS:  There is no intracranial mass effect, midline shift, hydrocephalus or hemorrhage. There is no sulcal effacement or low attenuation changes to suggest recent large vessel territory infarction. Chronic appearing periventricular and subcortical white matter low attenuation changes are present and are consistent with chronic microangiopathic ischemia. The ventricular system and sulcal markings prominence is consistent with atrophy. There is no acute extra axial fluid collection.  Right maxillary sinus lobulated mucoperiosteal thickening.  Otherwise, visualized paranasal sinuses are clear without mucosal thickening, polypoidal abnormality or air-fluid levels. Mastoid air cells aeration is optimal.                                          ASSESSMENT/PLAN:     Patient Active Problem List   Diagnosis    Bilateral primary osteoarthritis of knee    Severe late onset Alzheimer's dementia with agitation       -Thrombocytopenia       -Anemia       -Leukocytosis       -Saddle PE with Right heart strain--s/p pulmonary thrombectomy 12/21       -Portal vein thrombus       -NSTEMI        -COVID-19 positive on 12/12/2022    Plan  Completed remdesivir (5 days) and dexamethasone (10 days)  Completed 7 days treatment with zosyn    Abnormal blood counts multifactorial.  This is a patient with multiple medical problems including recent COVID infection, PE.      Anemia w/u  Continue to monitor closely  I will not recommend bone marrow biopsy at this time.  Transfuse platelets for any evidence of bleeding   Due to recent PE transfuse platelets to keep around 50k so she can continue anticoagulation  Hold anticoagulation for platelets < 40 k    Thank you for the consult    I will continue to follow    Deepika Ac MD  Hematology/Oncology  [facility]

## 2022-12-24 NOTE — PT/OT/SLP PROGRESS
Speech Language Pathology Department  Dysphagia Therapy Progress Note    Patient Name:  Miguel Angel Oliveros   MRN:  [id number]  Admitting Diagnosis: NSTEMI    Recommendations:     General recommendations:  dysphagia therapy  Diet recommendations:  NPO, Liquid Diet Level: NPO     Discharge recommendations:  nursing facility, skilled   Barriers to safe discharge:  severity of impairment and level of skilled assistance needed    Subjective     Patient  lethargic and arousable, but nonverbal .    Pain/Comfort: Pain Rating 1: 0/10    Spiritual/Cultural/Scientologist Beliefs/Practices that affect care: no    Respiratory Status: room air    Objective:     Oral Musculature Evaluation:  Oral Musculature: WFL  Dentition: scattered dentition  Secretion Management: adequate  Mucosal Quality: adequate  Mandibular Strength and Mobility: WFL  Oral Labial Strength and Mobility: WFL  Lingual Strength and Mobility: WFL    Therapeutic PO trials:  Consistency Fed By Oral Symptoms   Puree SLP Decreased lip closure  Reduced closure around utensil/straw  Absent bolus manipulation  Bolus removed by SLP     Assessment:     Pt continues to present with severe oral dysphagia negatively impacted by altered mental status and remains unsafe for PO diet.    Goals:   Multidisciplinary Problems       SLP Goals          Problem: SLP    Goal Priority Disciplines Outcome   SLP Goal     SLP Ongoing, Progressing   Description: LTG: Pt will tolerate LRD with no overt signs/sx of aspiration.    ST. Pt will tolerate puree trials with no overt signs/sx of aspiration and min cues to initiate swallow.   2. Pt will complete comprehensive assessment of swallow fx (MBS) as appropriate.                      Patient Education:     Pt's son  provided with verbal education regarding swallow function, risks of aspiration, and continued SLP POC.  Understanding was verbalized.    Plan:     Will continue to follow and tx as appropriate.    SLP Follow-Up:  Yes    Patient to be seen:  daily   Plan of Care expires:  01/16/23  Plan of Care reviewed with:  son       Time Tracking:     SLP Treatment Date:   12/24/22  Speech Start Time:  1215  Speech Stop Time:  1225     Speech Total Time (min):  10 min    Billable minutes:  Treatment of Swallow Dysfunction, 10 minutes        12/24/2022

## 2022-12-24 NOTE — PROGRESS NOTES
[facility] Medicine Progress Note        Chief Complaint: Inpatient Follow-up for AMS, Covid    HPI: Miguel Angel Oliveros is an 89-year-old female with PMH of DMII, CAD s/p PCI, HTN, AS s/p TAVR, HLD, GERD, and PARAMJIT, who is admitted to [facility] ICU from regular unit after being found to have altered mental status and increased respiratory distress. She was initially admitted to [facility] on 2022 by [facility] after being transferred from [place] with the diagnosis of NSTEMI. At the time of encounter, patient states having lower abdominal discomfort, bladder fullness, and dysuria. She states that these symptoms have been persisting for the past few days accompanied by decreased urinary output. Per patient's family members at beside, she usually gets UTI several times each year and she has had 4 episodes of UTI in  (last UTI was 3 months ago).  Daughter reported that patient as baseline is able to converse with family member, recognizes everyone, able to sit at the table and feed herself, able to transfer herself from bed to wheelchair and wheelchair to chair with some assistance.  Stated she has some good days and bad days in a way that sometimes she asked about her old friends who have  but otherwise her dementia is not too bad.  She is known to Dr. Montalvo in outpatient setting.  Reported she was sick at home a week prior to admission to our facility.  Initially she went to ER because she was severely constipated which has since resolved, after that she went back to her PCP given she was short of breath and fatigued and lethargic.  Then she was brought here on  when she was very lethargic to the point that she needed max assist to move so they called EMS.  Daughter also reported in ICU on Thursday afternoon she was feeling her best, recognized her brother, sister, her grandkids.  But Thursday afternoon she started becoming more lethargic and sleepy and since then she  has not improved  CTA Chest- Saddle pulmonary embolism with thrombi extending into the bilateral upper and lower lobes and evidence of right heart strain  12/18- Heparin changed to Argatroban gtt- aptt per protocol, cards recommended to continue Argatroban for now and once able to swallow, switch to apixiban oral prior to discharge   Consulted Dr Urbina--> reordered Echo- showing EF o 65%, moderate tricuspid regurg, severe right ventricular enlargement.  Severe right atrial enlargement.  Mild mitral stenosis, Repeat Upper and Lower extremities u/s- official report pending  Case personally discussed with Dr. Urbina  12/21- S/P Pulm artery thrombectomy   Developed anasarca, d/c IVF, received Lasix 40 mg IV x 1 on 12/21, again on 12/22 and today I will give her another 20 mg IV x 1   Hypokalemia noted probably due to lasix IV, ordered Kphos 30 mmoles IV x 1   Monitor fluid levels  Patient apparently lives at home with family and has a significant amount of DME as well as support.  She has not really been assessed for any oral nutrition since she has been here because of her increased oxygen requirements early in admission   Currently on supplemental oxygen via nasal cannula at 4 liters/minute, requested weaning oxygen as able   Consulted speech though pt was lethargic and not following commands over the weekend, so was unable to assess . She is still not safe to attempt MBS per speech therapy  Completed remdesivir (5 days) and dexamethasone (10 days)  Completed 7 days treatment with zosyn  Transaminases are beginning to trend down, monitor closely  Appreciate the help from Nephrology, cardiology services, vascular services. Nephro and Cards signed off  Developed leukocytosis again, concern she is aspirating her own secretions, started Zosyn again for 7 days  Leukocytosis trending down  Oxygen demand decreased to 1 lit       Interval Hx:   Pt is somewhat lethargic and somewhat awake but not following commands .    Daughter at bedside,  in the room requesting discontinuing  as 1 family member will remain with her throughout the day and night per daughter  Started tube feeds, currently at 35 cc/hour, discussed goal is 65 cc.  Also discussed that will make her NPO between the nigh of Monday and Tuesday so she can get her PEG tube if she remained lethargic till then  Case was discussed with patient's nurse on the floor      Objective/physical exam:  General:  somewhat awake but mostly lethargic, left IJ present, obese  Chest: Clear to auscultation bilaterally, supplemental oxygen via nasal cannula  Heart: RRR, +S1, S2, no appreciable murmur  Abdomen: Soft, nontender, BS +, NG tube in place with tube feeds going  MSK: Warm, 1+ pitting edema bilateral lower extremity, swelling bilateral hands improving  Neurologic:  pt is not following commands completely, lethargic    VITAL SIGNS: 24 HRS MIN & MAX LAST   Temp  Min: 98 °F (36.7 °C)  Max: 99.3 °F (37.4 °C) 98.9 °F (37.2 °C)   BP  Min: 113/67  Max: 178/83 124/61     Pulse  Min: 82  Max: 99  82   Resp  Min: 16  Max: 20 20   SpO2  Min: 88 %  Max: 98 % (!) 92 %       Recent Labs   Lab 12/18/22  0931 12/19/22  0532 12/20/22  0408 12/21/22  0652 12/22/22  0058 12/23/22  0510   WBC 11.0 12.7* 13.6* 12.5* 14.2* 20.5*   RBC 3.74* 4.05* 4.08* 4.01* 3.84* 3.77*   HGB 10.4* 11.2* 11.4* 10.9* 10.6* 10.4*   HCT 33.7* 37.6 36.4* 35.8* 35.0* 33.2*   MCV 90.1 92.8 89.2 89.3 91.1 88.1   MCH 27.8 27.7 27.9 27.2 27.6 27.6   MCHC 30.9* 29.8* 31.3* 30.4* 30.3* 31.3*   RDW 15.6* 15.7* 15.7* 15.9* 16.1* 15.9*   * 84* 87* 76* 69* 60*   MPV 13.1* 13.5* 13.0*  --   --   --        Recent Labs   Lab 12/19/22  0209 12/19/22 2043 12/20/22  0409 12/22/22  0430 12/23/22  0510     --  140 143 141   K 5.6*  --  4.2 3.7 3.1*   CO2 17*  --  19* 20* 22*   BUN 52.2*  --  52.5* 53.8* 40.5*   CREATININE 1.06*  --  0.91 0.78 0.63   CALCIUM 8.9  --  8.9 9.3 9.1   PH  --  7.48*  --   --   --    MG 2.80*   --   --  2.20 1.80   ALBUMIN 2.9*  --  3.0*  --  2.6*   ALKPHOS 106  --  116  --  113   *  --  543*  --  238*   *  --  88*  --  65*   BILITOT 1.4  --  1.4  --  1.4          Microbiology Results (last 7 days)       Procedure Component Value Units Date/Time    Blood Culture [[id number]] Collected: 12/23/22 1046    Order Status: Resulted Specimen: Blood from Arm, Left Updated: 12/23/22 1113    Blood Culture [[id number]] Collected: 12/23/22 1046    Order Status: Resulted Specimen: Blood from Arm, Right Updated: 12/23/22 1113    Urine culture [[id number]] Collected: 12/18/22 1940    Order Status: Completed Specimen: Urine Updated: 12/20/22 0716     Urine Culture No Growth    Blood Culture [[id number]]  (Normal) Collected: 12/12/22 0948    Order Status: Completed Specimen: Blood, Venous Updated: 12/17/22 1000     CULTURE, BLOOD ([facility]) No Growth at 5 days    Blood Culture [[id number]]  (Normal) Collected: 12/12/22 0948    Order Status: Completed Specimen: Blood, Venous Updated: 12/17/22 1000     CULTURE, BLOOD ([facility]) No Growth at 5 days             See below for Radiology    Scheduled Med:   carvediloL  25 mg Per NG tube BID    cloNIDine 0.1 mg/24 hr td ptwk  1 patch Transdermal Q7 Days    dextrose 50% in water (D50W)  25 g Intravenous Once    famotidine (PF)  20 mg Intravenous Daily    insulin regular  6 Units Intravenous Once    melatonin  6 mg Oral Nightly    mupirocin   Topical (Top) Daily    piperacillin-tazobactam (ZOSYN) IVPB  4.5 g Intravenous Q8H    QUEtiapine  25 mg Per NG tube BID    rivaroxaban  15 mg Per NG tube BID WM        Continuous Infusions:   Amino acid 4.25% - dextrose 5% (CLINIMIX-E) solution (1L provides 42.5 gm AA, 50 gm CHO (170 kcal/L dextrose), Na 35, K 30, Mg 5, Ca 4.5, Acetate 70, Cl 39, Phos 15) 60 mL/hr at 12/23/22 0932        PRN Meds:  dextrose 10%, dextrose 10%, diazePAM, fentaNYL, glucagon (human recombinant), haloperidol lactate, hydrALAZINE, insulin aspart U-100, iopamidoL,  LIDOcaine (PF) 10 mg/ml (1%), ondansetron, sodium chloride 0.9%       Assessment/Plan:  Altered mental status likely 2/2 sepsis and metabolic encephalopathy- underlying advanced dementia also present   Acute renal failure/ ATN - resolved   Lactic acidosis likely 2/2 acute renal failure and sepsis, and portal venous thrombosis. Saddle PE  NSTEMI Type II in the setting of Acute COVID Infection, SMITHA, and IVC/ portal vein Thrombus/ saddle PE  COVID positive on admission to ICU- 12/12- day 10 of diagnosis - Pt out of isolation  Transminitis - trending down  Bladder cancer s/p resection in October 2022  CT ABD & pelvis 12/11/2022 shows heterogeneous hyperdense mass lesion in the superior and left lateral aspect of the urinary bladder  Per patient's family at bedside, patient will be receiving additional imaging to stage bladder cancer  HX of HTN, DM II, TIA, aortic stenosis s/p TAVR, CAD s/p PCI, GERD, PARAMJIT  IVC and Portal Vein Thrombosis with High probability of Pulmonary Embolism with Marked RV Strain - On Heparin gtt  Thrombocytopenia- HIT negative--> still trending down  Saddle PE with right heart strain--> s/p pulmonary thrombectomy 12/21  Mild hyperkalemia probably due to clinimic E, resolved   Hematuria - blood thinners related- resolved  Anasarca- improved   Severe hypokalemia - resolved with replacement        Admitted to ICU in 12/12, d/graded to hospital medicine team on 12/16  HIT ab negative, Plt continue to drop, will monitor closely, if start t bleed, will do plt transfusion  CTA Chest- Saddle pulmonary embolism with thrombi extending into the bilateral upper and lower lobes and evidence of right heart strain.  12/18- Heparin changed to Argatroban gtt and now changed to Xarelto 15mg per NG tube bid x 21 days on 12/23 and then change to xarelto 20 mg daily with supper   12/21- S/P Pulm artery thrombectomy   Swelling is improving, I will give her another 20 mg IV x 1   Hypokalemia resolved with Kphos 30 mmoles  IV x 1   Monitor fluid levels  Currently on supplemental oxygen via nasal cannula at 1 liters/minute, requested weaning oxygen as able   Consulted speech though pt was lethargic and not following commands over the weekend, so was unable to assess . She is still not safe to attempt MBS per speech therapy  Family on 12/21 requested GI consult and Peg tube placement, consulted GI --> plan for PEG on Tuesday next week.  In the meantime, discussed with daughter that her platelets are continuing to drop and she is on argatroban.  Again discussed NG tube, daughter is agreeable for NG tube for now so we can restart her home medication as well as switch her to oral anticoagulation if Cardiology is agreeable.  Getting tube feeds with NG Tube now   UA shows hematuria, urine c/s negative   Developed Leukocytosis again, 20K on 12/23  Started zosyn again as she may be silently aspirating on her own secretions - day 2 of 7  Blood cultures xx 2 in process  UA not impressive for UTI  Continue Clinimic E at 60 cc/hr, will stop today as her tube feeds may be closer to the goal rate   Dietician has already left us recommendations for NG Tube feeds--> Diabetisource goal rate 65ml/hr with 65cc FWF q 3 hrs  I again had this conversation with the daughter that pt is very sick and she is still not out of the woods, she verbalized understanding   Patient apparently lives at home with family and has a significant amount of DME as well as support.  Apparently at baseline she become confuse form time to time per son but daughter reports differently stating she was conversing, able to transfer, able to feed herself. Reported her good and bad days with bad days being when she asks about her old friends if they are alive. Pt is established with Dr Montalvo who is treating her dementia   Morning cbc ordered     VTE prophylaxis: Xarelto per NG tube     Patient condition:  Guarded    Anticipated discharge and Disposition:   TBD    Critical care  note:  Critical care diagnosis: prolonged fasting requiring Clinimix E,  Anasarca requiring IV lasix pushes  Critical care interventions: Hands-on evaluation, review of labs/radiographs/records and discussion with patient and family if present  Critical care time spent: 40 minutes        All diagnosis and differential diagnosis have been reviewed; assessment and plan has been documented; I have personally reviewed the labs and test results that are presently available; I have reviewed the patients medication list; I have reviewed the consulting providers response and recommendations. I have reviewed or attempted to review medical records based upon their availability    All of the patient's questions have been  addressed and answered. Patient's is agreeable to the above stated plan. I will continue to monitor closely and make adjustments to medical management as needed.  _____________________________________________________________________    Nutrition Status:    Radiology:  X-Ray Abdomen AP 1 View  Narrative: EXAMINATION:  XR ABDOMEN AP 1 VIEW    CLINICAL HISTORY:  NG tube placement verification;    TECHNIQUE:  One view    COMPARISON:  December 23, 2022    FINDINGS:  There has been interval advancement of the enteric tube and the tip now is within the mid to distal gastric body.  There is adequate length of the tube within the stomach.  The intestinal gas pattern is nonspecific and nonobstructive.  Impression: Optimal placement of the enteric tube.    Electronically signed by: Chapin Ward  Date:    12/23/2022  Time:    20:13  X-Ray Abdomen AP 1 View  Narrative: EXAMINATION:  XR ABDOMEN AP 1 VIEW    CLINICAL HISTORY:  NGT placement;    TECHNIQUE:  AP view of the abdomen.    COMPARISON:  X-rays dated 12/01/2022    FINDINGS:  The nasogastric tube has its tip over the stomach.  Impression: Nasogastric tube with tip over the stomach.    Electronically signed by: Tiana  Marky  Date:    12/23/2022  Time:    15:14      Mukesh Denton MD  Department of Hospital Medicine   [facility]   12/24/2022

## 2022-12-25 LAB
ABO + RH BLD: NORMAL
APTT PPP: NORMAL S
BASOPHILS # BLD AUTO: 0.01 X10(3)/MCL (ref 0–0.2)
BASOPHILS NFR BLD AUTO: 0.1 %
BLD PROD TYP BPU: NORMAL
BLOOD UNIT EXPIRATION DATE: NORMAL
BLOOD UNIT TYPE CODE: 6200
CROSSMATCH INTERPRETATION: NORMAL
DISPENSE STATUS: NORMAL
EOSINOPHIL # BLD AUTO: 0.13 X10(3)/MCL (ref 0–0.9)
EOSINOPHIL NFR BLD AUTO: 1.1 %
ERYTHROCYTE [DISTWIDTH] IN BLOOD BY AUTOMATED COUNT: 16.2 % (ref 11–14.5)
ERYTHROCYTE [DISTWIDTH] IN BLOOD BY AUTOMATED COUNT: 16.3 % (ref 11–14.5)
HCT VFR BLD AUTO: 26.6 % (ref 37–47)
HCT VFR BLD AUTO: 27.7 % (ref 37–47)
HGB BLD-MCNC: 8.2 GM/DL (ref 12–16)
HGB BLD-MCNC: 8.8 GM/DL (ref 12–16)
IMM GRANULOCYTES # BLD AUTO: 0.07 X10(3)/MCL (ref 0–0.04)
IMM GRANULOCYTES NFR BLD AUTO: 0.6 %
INR BLD: NORMAL
LYMPHOCYTES # BLD AUTO: 0.62 X10(3)/MCL (ref 0.6–4.6)
LYMPHOCYTES NFR BLD AUTO: 5.2 %
MCH RBC QN AUTO: 27.1 PG
MCH RBC QN AUTO: 27.2 PG
MCHC RBC AUTO-ENTMCNC: 30.8 MG/DL (ref 33–36)
MCHC RBC AUTO-ENTMCNC: 31.8 MG/DL (ref 33–36)
MCV RBC AUTO: 85.8 FL (ref 80–94)
MCV RBC AUTO: 87.8 FL (ref 80–94)
MONOCYTES # BLD AUTO: 0.65 X10(3)/MCL (ref 0.1–1.3)
MONOCYTES NFR BLD AUTO: 5.4 %
NEUTROPHILS # BLD AUTO: 10.53 X10(3)/MCL (ref 2.1–9.2)
NEUTROPHILS NFR BLD AUTO: 87.6 %
NRBC BLD AUTO-RTO: 0 % (ref 0–1)
NRBC BLD AUTO-RTO: 0 % (ref 0–1)
PLATELET # BLD AUTO: 43 X10(3)/MCL (ref 140–371)
PLATELET # BLD AUTO: 55 X10(3)/MCL (ref 140–371)
PMV BLD AUTO: ABNORMAL FL
PMV BLD AUTO: ABNORMAL FL
POCT GLUCOSE: 195 MG/DL (ref 70–110)
POCT GLUCOSE: 205 MG/DL (ref 70–110)
POCT GLUCOSE: 223 MG/DL (ref 70–110)
PROTHROMBIN TIME: NORMAL S
RBC # BLD AUTO: 3.03 X10(6)/MCL (ref 4.2–5.4)
RBC # BLD AUTO: 3.23 X10(6)/MCL (ref 4.2–5.4)
UNIT NUMBER: NORMAL
WBC # SPEC AUTO: 10.8 X10(3)/MCL (ref 4.5–11.5)
WBC # SPEC AUTO: 12 X10(3)/MCL (ref 4.5–11.5)

## 2022-12-25 PROCEDURE — 25000003 PHARM REV CODE 250: Performed by: STUDENT IN AN ORGANIZED HEALTH CARE EDUCATION/TRAINING PROGRAM

## 2022-12-25 PROCEDURE — P9035 PLATELET PHERES LEUKOREDUCED: HCPCS | Performed by: INTERNAL MEDICINE

## 2022-12-25 PROCEDURE — 21400001 HC TELEMETRY ROOM

## 2022-12-25 PROCEDURE — 99232 PR SUBSEQUENT HOSPITAL CARE,LEVL II: ICD-10-PCS | Mod: ,,, | Performed by: INTERNAL MEDICINE

## 2022-12-25 PROCEDURE — 85027 COMPLETE CBC AUTOMATED: CPT | Performed by: INTERNAL MEDICINE

## 2022-12-25 PROCEDURE — 63600175 PHARM REV CODE 636 W HCPCS: Performed by: INTERNAL MEDICINE

## 2022-12-25 PROCEDURE — 25000003 PHARM REV CODE 250: Performed by: INTERNAL MEDICINE

## 2022-12-25 PROCEDURE — 83921 ORGANIC ACID SINGLE QUANT: CPT | Performed by: INTERNAL MEDICINE

## 2022-12-25 PROCEDURE — 85610 PROTHROMBIN TIME: CPT | Performed by: INTERNAL MEDICINE

## 2022-12-25 PROCEDURE — 99232 SBSQ HOSP IP/OBS MODERATE 35: CPT | Mod: ,,, | Performed by: INTERNAL MEDICINE

## 2022-12-25 PROCEDURE — 85025 COMPLETE CBC W/AUTO DIFF WBC: CPT | Performed by: INTERNAL MEDICINE

## 2022-12-25 PROCEDURE — 36430 TRANSFUSION BLD/BLD COMPNT: CPT

## 2022-12-25 PROCEDURE — 85730 THROMBOPLASTIN TIME PARTIAL: CPT | Performed by: INTERNAL MEDICINE

## 2022-12-25 PROCEDURE — 36415 COLL VENOUS BLD VENIPUNCTURE: CPT | Performed by: INTERNAL MEDICINE

## 2022-12-25 RX ORDER — HYDROCODONE BITARTRATE AND ACETAMINOPHEN 500; 5 MG/1; MG/1
TABLET ORAL
Status: DISCONTINUED | OUTPATIENT
Start: 2022-12-25 | End: 2022-12-30 | Stop reason: HOSPADM

## 2022-12-25 RX ORDER — ENOXAPARIN SODIUM 100 MG/ML
1 INJECTION SUBCUTANEOUS
Status: DISCONTINUED | OUTPATIENT
Start: 2022-12-25 | End: 2022-12-30

## 2022-12-25 RX ORDER — HEPARIN SODIUM,PORCINE/D5W 25000/250
0-40 INTRAVENOUS SOLUTION INTRAVENOUS CONTINUOUS
Status: DISCONTINUED | OUTPATIENT
Start: 2022-12-25 | End: 2022-12-25

## 2022-12-25 RX ADMIN — FAMOTIDINE 20 MG: 10 INJECTION, SOLUTION INTRAVENOUS at 09:12

## 2022-12-25 RX ADMIN — CARVEDILOL 25 MG: 12.5 TABLET, FILM COATED ORAL at 09:12

## 2022-12-25 RX ADMIN — INSULIN ASPART 2 UNITS: 100 INJECTION, SOLUTION INTRAVENOUS; SUBCUTANEOUS at 05:12

## 2022-12-25 RX ADMIN — QUETIAPINE FUMARATE 25 MG: 25 TABLET ORAL at 09:12

## 2022-12-25 RX ADMIN — MUPIROCIN: 20 OINTMENT TOPICAL at 09:12

## 2022-12-25 RX ADMIN — Medication 6 MG: at 09:12

## 2022-12-25 RX ADMIN — RIVAROXABAN 15 MG: 15 TABLET, FILM COATED ORAL at 07:12

## 2022-12-25 RX ADMIN — PIPERACILLIN AND TAZOBACTAM 4.5 G: 4; .5 INJECTION, POWDER, LYOPHILIZED, FOR SOLUTION INTRAVENOUS; PARENTERAL at 03:12

## 2022-12-25 RX ADMIN — PIPERACILLIN AND TAZOBACTAM 4.5 G: 4; .5 INJECTION, POWDER, LYOPHILIZED, FOR SOLUTION INTRAVENOUS; PARENTERAL at 05:12

## 2022-12-25 RX ADMIN — ENOXAPARIN SODIUM 90 MG: 100 INJECTION SUBCUTANEOUS at 09:12

## 2022-12-25 NOTE — PROGRESS NOTES
[facility] Medicine Progress Note        Chief Complaint: Inpatient Follow-up for AMS, Covid    HPI: Miguel Angel Oliveros is an 89-year-old female with PMH of DMII, CAD s/p PCI, HTN, AS s/p TAVR, HLD, GERD, and PARAMJIT, who is admitted to [facility] ICU from regular unit after being found to have altered mental status and increased respiratory distress. She was initially admitted to [facility] on 2022 by [facility] after being transferred from [place] with the diagnosis of NSTEMI. At the time of encounter, patient states having lower abdominal discomfort, bladder fullness, and dysuria. She states that these symptoms have been persisting for the past few days accompanied by decreased urinary output. Per patient's family members at beside, she usually gets UTI several times each year and she has had 4 episodes of UTI in  (last UTI was 3 months ago).  Daughter reported that patient as baseline is able to converse with family member, recognizes everyone, able to sit at the table and feed herself, able to transfer herself from bed to wheelchair and wheelchair to chair with some assistance.  Stated she has some good days and bad days in a way that sometimes she asked about her old friends who have  but otherwise her dementia is not too bad.  She is known to Dr. Montalvo in outpatient setting.  Reported she was sick at home a week prior to admission to our facility.  Initially she went to ER because she was severely constipated which has since resolved, after that she went back to her PCP given she was short of breath and fatigued and lethargic.  Then she was brought here on  when she was very lethargic to the point that she needed max assist to move so they called EMS.  Daughter also reported in ICU on Thursday afternoon she was feeling her best, recognized her brother, sister, her grandkids.  But Thursday afternoon she started becoming more lethargic and sleepy and since then she  has not improved  CTA Chest- Saddle pulmonary embolism with thrombi extending into the bilateral upper and lower lobes and evidence of right heart strain  12/18- Heparin changed to Argatroban gtt- aptt per protocol, cards recommended to continue Argatroban for now and once able to swallow, switch to apixiban oral prior to discharge   Consulted Dr Urbina--> reordered Echo- showing EF o 65%, moderate tricuspid regurg, severe right ventricular enlargement.  Severe right atrial enlargement.  Mild mitral stenosis, Repeat Upper and Lower extremities u/s- official report pending  Case personally discussed with Dr. Urbina  12/21- S/P Pulm artery thrombectomy   Developed anasarca, d/c IVF, received Lasix 40 mg IV x 1 on 12/21, again on 12/22 and today I will give her another 20 mg IV x 1   Hypokalemia noted probably due to lasix IV, ordered Kphos 30 mmoles IV x 1   Monitor fluid levels  Patient apparently lives at home with family and has a significant amount of DME as well as support.  She has not really been assessed for any oral nutrition since she has been here because of her increased oxygen requirements early in admission   Currently on supplemental oxygen via nasal cannula at 4 liters/minute, requested weaning oxygen as able   Consulted speech though pt was lethargic and not following commands over the weekend, so was unable to assess . She is still not safe to attempt MBS per speech therapy  Completed remdesivir (5 days) and dexamethasone (10 days)  Completed 7 days treatment with zosyn  Transaminases are beginning to trend down, monitor closely  Appreciate the help from Nephrology, cardiology services, vascular services. Nephro and Cards signed off  Developed leukocytosis again, concern she is aspirating her own secretions, started Zosyn again for 7 days  Leukocytosis trending down  12/24- on RA       Interval Hx:   Pt is still lethargic, not following commands .   Daughter at bedside,  in the room requesting  discontinuing  as 1 family member will remain with her throughout the day and night per daughter  Tube feeds at goal 65 cc/hr.  Also discussed that will make her NPO between the nigh of Monday and Tuesday so she can get her PEG tube if she remained lethargic till then  Case was discussed with patient's nurse on the floor      Objective/physical exam:  General:  mostly lethargic, left IJ present, obese  Chest: Clear to auscultation bilaterally on RA  Heart: RRR, +S1, S2, no appreciable murmur  Abdomen: Soft, nontender, BS +, NG tube in place with tube feeds going  MSK: Warm, trace pitting edema bilateral lower extremity, swelling bilateral hands resolved   Neurologic:  pt is not following commands,  lethargic    VITAL SIGNS: 24 HRS MIN & MAX LAST   Temp  Min: 98.6 °F (37 °C)  Max: 99.9 °F (37.7 °C) 98.7 °F (37.1 °C)   BP  Min: 102/64  Max: 115/72 103/64     Pulse  Min: 82  Max: 87  85   Resp  Min: 16  Max: 22 (!) 22   SpO2  Min: 90 %  Max: 95 % (!) 94 %       Recent Labs   Lab 12/18/22  0931 12/19/22  0532 12/20/22  0408 12/21/22  0652 12/22/22  0058 12/23/22  0510 12/24/22  0637   WBC 11.0 12.7* 13.6*   < > 14.2* 20.5* 14.5*   RBC 3.74* 4.05* 4.08*   < > 3.84* 3.77* 3.21*   HGB 10.4* 11.2* 11.4*   < > 10.6* 10.4* 8.8*   HCT 33.7* 37.6 36.4*   < > 35.0* 33.2* 28.0*   MCV 90.1 92.8 89.2   < > 91.1 88.1 87.2   MCH 27.8 27.7 27.9   < > 27.6 27.6 27.4   MCHC 30.9* 29.8* 31.3*   < > 30.3* 31.3* 31.4*   RDW 15.6* 15.7* 15.7*   < > 16.1* 15.9* 16.0*   * 84* 87*   < > 69* 60* 43*   MPV 13.1* 13.5* 13.0*  --   --   --   --     < > = values in this interval not displayed.       Recent Labs   Lab 12/19/22  0209 12/19/22  2043 12/20/22  0409 12/22/22  0430 12/23/22  0510 12/24/22  0637     --  140 143 141 140   K 5.6*  --  4.2 3.7 3.1* 3.5   CO2 17*  --  19* 20* 22* 26   BUN 52.2*  --  52.5* 53.8* 40.5* 40.6*   CREATININE 1.06*  --  0.91 0.78 0.63 0.77   CALCIUM 8.9  --  8.9 9.3 9.1 8.4   PH  --  7.48*  --   --    --   --    MG 2.80*  --   --  2.20 1.80  --    ALBUMIN 2.9*  --  3.0*  --  2.6*  --    ALKPHOS 106  --  116  --  113  --    *  --  543*  --  238*  --    *  --  88*  --  65*  --    BILITOT 1.4  --  1.4  --  1.4  --           Microbiology Results (last 7 days)       Procedure Component Value Units Date/Time    Blood Culture [[id number]]  (Normal) Collected: 12/23/22 1046    Order Status: Completed Specimen: Blood from Arm, Left Updated: 12/24/22 1200     CULTURE, BLOOD ([facility]) No Growth At 24 Hours    Blood Culture [[id number]]  (Normal) Collected: 12/23/22 1046    Order Status: Completed Specimen: Blood from Arm, Right Updated: 12/24/22 1200     CULTURE, BLOOD ([facility]) No Growth At 24 Hours    Urine culture [[id number]] Collected: 12/18/22 1940    Order Status: Completed Specimen: Urine Updated: 12/20/22 0716     Urine Culture No Growth             See below for Radiology    Scheduled Med:   carvediloL  25 mg Per NG tube BID    cloNIDine 0.1 mg/24 hr td ptwk  1 patch Transdermal Q7 Days    dextrose 50% in water (D50W)  25 g Intravenous Once    famotidine (PF)  20 mg Intravenous Daily    insulin regular  6 Units Intravenous Once    melatonin  6 mg Per NG tube Nightly    mupirocin   Topical (Top) Daily    piperacillin-tazobactam (ZOSYN) IVPB  4.5 g Intravenous Q8H    QUEtiapine  25 mg Per NG tube BID    rivaroxaban  15 mg Per NG tube BID WM        Continuous Infusions:         PRN Meds:  dextrose 10%, dextrose 10%, diazePAM, fentaNYL, glucagon (human recombinant), haloperidol lactate, hydrALAZINE, insulin aspart U-100, iopamidoL, LIDOcaine (PF) 10 mg/ml (1%), ondansetron, sodium chloride 0.9%       Assessment/Plan:  Altered mental status likely 2/2 sepsis and metabolic encephalopathy- underlying advanced dementia also present   Acute renal failure/ ATN - resolved   Lactic acidosis likely 2/2 acute renal failure and sepsis, and portal venous thrombosis. Saddle PE  NSTEMI Type II in the setting of Acute COVID  Infection, SMITHA, and IVC/ portal vein Thrombus/ saddle PE  COVID positive on admission to ICU- 12/12- day 10 of diagnosis - Pt out of isolation  Transminitis - trending down  Bladder cancer s/p resection in October 2022  CT ABD & pelvis 12/11/2022 shows heterogeneous hyperdense mass lesion in the superior and left lateral aspect of the urinary bladder  Per patient's family at bedside, patient will be receiving additional imaging to stage bladder cancer  HX of HTN, DM II, TIA, aortic stenosis s/p TAVR, CAD s/p PCI, GERD, PARAMJIT  IVC and Portal Vein Thrombosis with High probability of Pulmonary Embolism with Marked RV Strain - On Heparin gtt  Thrombocytopenia- HIT negative--> still trending down  Saddle PE with right heart strain--> s/p pulmonary thrombectomy 12/21  Mild hyperkalemia probably due to clinimic E, resolved   Hematuria - blood thinners related- resolved  Anasarca- improved   Severe hypokalemia - resolved with replacement        Admitted to ICU in 12/12, d/graded to hospital medicine team on 12/16  HIT ab negative, Plt continue to drop, will monitor closely, if start t bleed, will do plt transfusion  CTA Chest- Saddle pulmonary embolism with thrombi extending into the bilateral upper and lower lobes and evidence of right heart strain.  12/18- Heparin changed to Argatroban gtt and now changed to Xarelto 15mg per NG tube bid x 21 days on 12/23 and then change to xarelto 20 mg daily with supper   HIT AB  NEGATIVE   12/21- S/P Pulm artery thrombectomy   Swelling improved, will hold off further IV lasix   Hypokalemia resolved with replacement  Monitor fluid levels  Oxygen weaned off and she is on RA   Consulted speech though pt was lethargic and not following commands over the weekend, so was unable to assess . She is still not safe to attempt MBS per speech therapy  Family on 12/21 requested GI consult and Peg tube placement, consulted GI --> plan for PEG on Wednesday next week since she needs to eb off of Xarelto  x 48 hrs   Case personally discussed with , recommended to switch her to heparin drip now given her hit antibodies are negative and he will stop the drip 6 hours prior to the procedure on Wednesday.  Also spoke to Dr. Ac personally about the above recommendation and she agrees with heparin drip for now and after the procedure she can be switched back to Xarelto 15 mg b.i.d. for 21 days and then 20 mg daily  Start heparin drip with aptt normogram   NG Tube in place   Thrombocytopenia persists--> consulted hematology--> recommended to transfuse PLT if bleeding and to keep Plt >50 K, hold Oral AC if plt <40 K  Ordered Platelet transfusion 1 unit given Plt 43K  No signs of active bleeding though hb slowly trending down probably due to some hematuria post [place] exchange   Getting tube feeds with NG Tube now and at goal of 65 cc/hr with 65 cc FWF q 3 hrs   UA shows hematuria, urine c/s negative   Developed Leukocytosis again, 20K on 12/23--> now resolved   Started zosyn again as she may be silently aspirating on her own secretions - day 3 of 7  12/24- Blood cultures x2 negative to date   UA not impressive for UTI  Discontinue Clinimic give tube feed at goal   I again had this conversation with the daughter that pt is very sick and she is still not out of the woods, discussed discharge plan. Discussed hospice could be an option as well if this is her new norm. she verbalized understanding and reported she will talk to family after [place]   Patient apparently lives at home with family and has a significant amount of DME as well as support.  Apparently at baseline she become confuse form time to time per son but daughter reports differently stating she was conversing, able to transfer, able to feed herself. Reported her good and bad days with bad days being when she asks about her old friends if they are alive. Pt is established with Dr Montalvo who is treating her dementia   Morning cbc, cmp, aptt ordered      VTE prophylaxis: Xarelto per NG tube     Patient condition:  Guarded    Anticipated discharge and Disposition:   TBD    Critical care note:  Critical care diagnosis: Thrombocytopenia requiring PLT transfusion to keep Plt >50 k  Critical care interventions: Hands-on evaluation, review of labs/radiographs/records and discussion with patient and family if present  Critical care time spent: 40 minutes        All diagnosis and differential diagnosis have been reviewed; assessment and plan has been documented; I have personally reviewed the labs and test results that are presently available; I have reviewed the patients medication list; I have reviewed the consulting providers response and recommendations. I have reviewed or attempted to review medical records based upon their availability    All of the patient's questions have been  addressed and answered. Patient's is agreeable to the above stated plan. I will continue to monitor closely and make adjustments to medical management as needed.  _____________________________________________________________________    Nutrition Status:    Radiology:  CV Ultrasound doppler venous arms bilateral  There was no evidence of deep or superficial vein thrombosis in the   bilateral upper extremities.  CV Ultrasound doppler venous legs bilat  Technically difficult study secondary to patient condition, body habitus   and depth of vessel.  There was no evidence of deep or superficial vein thrombosis in the   bilateral lower extremities.      Mukesh Denton MD  Department of Hospital Medicine   [facility]   12/25/2022

## 2022-12-25 NOTE — PLAN OF CARE
Problem: Adult Inpatient Plan of Care  Goal: Plan of Care Review  Outcome: Ongoing, Not Progressing  Goal: Patient-Specific Goal (Individualized)  Outcome: Ongoing, Not Progressing  Goal: Absence of Hospital-Acquired Illness or Injury  Outcome: Ongoing, Not Progressing  Goal: Optimal Comfort and Wellbeing  Outcome: Ongoing, Not Progressing  Goal: Readiness for Transition of Care  Outcome: Ongoing, Not Progressing     Problem: Infection  Goal: Absence of Infection Signs and Symptoms  Outcome: Ongoing, Not Progressing     Problem: Skin Injury Risk Increased  Goal: Skin Health and Integrity  Outcome: Ongoing, Not Progressing     Problem: Fall Injury Risk  Goal: Absence of Fall and Fall-Related Injury  Outcome: Ongoing, Not Progressing     Problem: Pain Acute  Goal: Acceptable Pain Control and Functional Ability  Outcome: Ongoing, Not Progressing     Problem: Adjustment to Illness (Stroke, Ischemic/Transient Ischemic Attack)  Goal: Optimal Coping  Outcome: Ongoing, Not Progressing     Problem: Bowel Elimination Impaired (Stroke, Ischemic/Transient Ischemic Attack)  Goal: Effective Bowel Elimination  Outcome: Ongoing, Not Progressing     Problem: Cerebral Tissue Perfusion (Stroke, Ischemic/Transient Ischemic Attack)  Goal: Optimal Cerebral Tissue Perfusion  Outcome: Ongoing, Not Progressing     Problem: Cognitive Impairment (Stroke, Ischemic/Transient Ischemic Attack)  Goal: Optimal Cognitive Function  Outcome: Ongoing, Not Progressing     Problem: Communication Impairment (Stroke, Ischemic/Transient Ischemic Attack)  Goal: Improved Communication Skills  Outcome: Ongoing, Not Progressing     Problem: Functional Ability Impaired (Stroke, Ischemic/Transient Ischemic Attack)  Goal: Optimal Functional Ability  Outcome: Ongoing, Not Progressing     Problem: Respiratory Compromise (Stroke, Ischemic/Transient Ischemic Attack)  Goal: Effective Oxygenation and Ventilation  Outcome: Ongoing, Not Progressing     Problem:  Sensorimotor Impairment (Stroke, Ischemic/Transient Ischemic Attack)  Goal: Improved Sensorimotor Function  Outcome: Ongoing, Not Progressing     Problem: Swallowing Impairment (Stroke, Ischemic/Transient Ischemic Attack)  Goal: Optimal Eating and Swallowing without Aspiration  Outcome: Ongoing, Not Progressing     Problem: Urinary Elimination Impaired (Stroke, Ischemic/Transient Ischemic Attack)  Goal: Effective Urinary Elimination  Outcome: Ongoing, Not Progressing     Problem: Impaired Wound Healing  Goal: Optimal Wound Healing  Outcome: Ongoing, Not Progressing

## 2022-12-25 NOTE — PROGRESS NOTES
Gastroenterology Progress Note    Interval History:   No acute events overnight.  Patient tolerating NG tube.  No significant improvements in dysphagia.  Discussed PEG tube placement with family for which they agreed, though understanding the complexities of her thrombocytopenia and need for anticoagulation given her PTE.    ROS     Current Facility-Administered Medications   Medication Dose Route Frequency Provider Last Rate Last Admin    0.9%  NaCl infusion (for blood administration)   Intravenous Q24H PRN Mukesh Denton MD        carvediloL tablet 25 mg  25 mg Per NG tube BID Mukesh Denton MD   25 mg at 12/25/22 0919    cloNIDine 0.1 mg/24 hr td ptwk 1 patch  1 patch Transdermal Q7 Days Mukesh Denton MD   1 patch at 12/19/22 2009    dextrose 10% bolus 125 mL 125 mL  12.5 g Intravenous PRN Mukesh Denton MD        dextrose 10% bolus 250 mL 250 mL  25 g Intravenous PRN Mukesh Denton MD        dextrose 50% injection 25 g  25 g Intravenous Once CHANTELLE Corrales        diazePAM tablet 2 mg  2 mg Per NG tube Daily PRN Mukesh Denton MD        famotidine (PF) injection 20 mg  20 mg Intravenous Daily Oswaldo Alonso MD   20 mg at 12/25/22 0918    fentaNYL 50 mcg/mL injection    PRN Terri Urbina MD   25 mcg at 12/21/22 1127    glucagon (human recombinant) injection 1 mg  1 mg Intramuscular PRN Mukesh Denton MD        haloperidol lactate injection 2 mg  2 mg Intramuscular Nightly PRN NEERAJ Durant        heparin 25,000 units in dextrose 5% (100 units/ml) IV bolus from bag - ADDITIONAL PRN BOLUS - 30 units/kg  30 Units/kg (Adjusted) Intravenous PRN Mukesh Denton MD        heparin 25,000 units in dextrose 5% (100 units/ml) IV bolus from bag - ADDITIONAL PRN BOLUS - 60 units/kg  60 Units/kg (Adjusted) Intravenous PRN Mukesh Denton MD        heparin 25,000 units in dextrose 5% (100 units/ml) IV bolus from bag INITIAL BOLUS  80 Units/kg (Adjusted) Intravenous Once Mukesh Denton MD        heparin 25,000 units in dextrose 5% 250  mL (100 units/mL) infusion HIGH INTENSITY nomogram - LAF  0-40 Units/kg/hr (Adjusted) Intravenous Continuous Mukesh Denton MD        hydrALAZINE injection 5 mg  5 mg Intravenous Q4H PRN Mukesh Denton MD   5 mg at 12/23/22 1621    insulin aspart U-100 injection 0-5 Units  0-5 Units Subcutaneous Q6H PRN Mukesh Denton MD   2 Units at 12/25/22 0524    insulin regular injection 6 Units 0.06 mL  6 Units Intravenous Once Kourtney Sutton, AGNP        iopamidoL (ISOVUE-370) injection    PRN Terri Urbina MD   70 mL at 12/21/22 1312    LIDOcaine (PF) 10 mg/ml (1%) injection    PRN Terri Urbina MD   10 mL at 12/21/22 1128    melatonin tablet 6 mg  6 mg Per NG tube Nightly Mukesh Denton MD   6 mg at 12/24/22 2143    mupirocin 2 % ointment   Topical (Top) Daily Mukesh Denton MD   Given at 12/25/22 0919    ondansetron injection 4 mg  4 mg Intravenous Q8H PRN Oswaldo Alonso MD        piperacillin-tazobactam (ZOSYN) 4.5 g in dextrose 5 % in water (D5W) 5 % 100 mL IVPB (MB+)  4.5 g Intravenous Q8H Mukesh Denton MD   Stopped at 12/25/22 0924    QUEtiapine tablet 25 mg  25 mg Per NG tube BID Mukesh Denton MD   25 mg at 12/25/22 0919    sodium chloride 0.9% flush 10 mL  10 mL Intravenous PRN Oswaldo Alonso MD         Facility-Administered Medications Ordered in Other Encounters   Medication Dose Route Frequency Provider Last Rate Last Admin    betamethasone acetate-betamethasone sodium phosphate injection 6 mg  6 mg Intra-articular  Zina D Bouy, FNP   6 mg at 06/01/22 1345    betamethasone acetate-betamethasone sodium phosphate injection 6 mg  6 mg Intra-articular  Zina D Bouy, FNP   6 mg at 06/01/22 1345    LIDOcaine (PF) 20 mg/mL (2%) injection 5 mL  5 mL   Zina D Bouy, FNP   5 mL at 06/01/22 1345    LIDOcaine (PF) 20 mg/mL (2%) injection 5 mL  5 mL   NEERAJ Richardson   5 mL at 06/01/22 1345       Review of patient's allergies indicates:   Allergen Reactions    Statins-hmg-coa reductase inhibitors       "Other reaction(s): Joint pain    Bimatoprost      Other reaction(s): Eye redness    Cortisone     Dorzolamide-timolol     Gabapentin      Other reaction(s): Confusion    Hydrocortisone     Tafluprost (pf)            Physical Examination  /62   Pulse 90   Temp 97.6 °F (36.4 °C) (Axillary)   Resp 18   Ht 5' 5" (1.651 m)   Wt 86.2 kg (190 lb)   SpO2 99%   BMI 31.62 kg/m²   General appearance:  Chronically ill-appearing.  No distress  HENT: Normocephalic, atraumatic, neck symmetrical, no nasal discharge   Lungs: no respiratory distress. Comfortable on RA.  Heart: regular rate. No edema.  Abdomen:  Soft.  Nontender.  Extremities: extremities symmetric; no clubbing, cyanosis, or edema  Integument: Skin color, texture, turgor normal; no jaundice  Neurologic:  No acute change  Psychiatric:  No acute change    Recent Results (from the past 48 hour(s))   Urinalysis, Reflex to Urine Culture Urine, Clean Catch    Collection Time: 12/23/22  2:50 PM    Specimen: Urine   Result Value Ref Range    Color, UA Yellow Yellow, Light-Yellow, Dark Yellow, Gracie, Straw    Appearance, UA Clear Clear    Specific Gravity, UA 1.016 1.001 - 1.030    pH, UA 7.0 5.0 - 8.5    Protein, UA Trace (A) Negative mg/dL    Glucose, UA 1+ (A) Negative, Normal mg/dL    Ketones, UA Negative Negative mg/dL    Blood, UA 2+ (A) Negative unit/L    Bilirubin, UA Negative Negative mg/dL    Urobilinogen, UA 1.0 0.2, 1.0, Normal mg/dL    Nitrites, UA Negative Negative    Leukocyte Esterase, UA 1+ (A) Negative unit/L   Urinalysis, Microscopic    Collection Time: 12/23/22  2:50 PM   Result Value Ref Range    RBC,  (H) <=5 /HPF    WBC, UA 6 (H) <=5 /HPF    Squamous Epithelial Cells, UA <5 <=5 /HPF    Bacteria, UA None Seen None Seen, Rare, Occasional /HPF   APTT    Collection Time: 12/23/22  5:52 PM   Result Value Ref Range    PTT 47.9 (H) 23.2 - 33.7 seconds   POCT glucose    Collection Time: 12/24/22 12:52 AM   Result Value Ref Range    POCT " Glucose 236 (H) 70 - 110 mg/dL   Basic Metabolic Panel    Collection Time: 12/24/22  6:37 AM   Result Value Ref Range    Sodium Level 140 136 - 145 mmol/L    Potassium Level 3.5 3.5 - 5.1 mmol/L    Chloride 106 98 - 107 mmol/L    Carbon Dioxide 26 23 - 31 mmol/L    Glucose Level 198 (H) 82 - 115 mg/dL    Blood Urea Nitrogen 40.6 (H) 9.8 - 20.1 mg/dL    Creatinine 0.77 0.55 - 1.02 mg/dL    BUN/Creatinine Ratio 53     Calcium Level Total 8.4 8.4 - 10.2 mg/dL    Anion Gap 8.0 mEq/L    eGFR >60 mls/min/1.73/m2   CBC with Differential    Collection Time: 12/24/22  6:37 AM   Result Value Ref Range    WBC 14.5 (H) 4.5 - 11.5 x10(3)/mcL    RBC 3.21 (L) 4.20 - 5.40 x10(6)/mcL    Hgb 8.8 (L) 12.0 - 16.0 gm/dL    Hct 28.0 (L) 37.0 - 47.0 %    MCV 87.2 80.0 - 94.0 fL    MCH 27.4 pg    MCHC 31.4 (L) 33.0 - 36.0 mg/dL    RDW 16.0 (H) 11.0 - 14.5 %    Platelet 43 (LL) 140 - 371 x10(3)/mcL    MPV      Neut % 88.0 %    Lymph % 5.7 %    Mono % 5.2 %    Eos % 0.3 %    Basophil % 0.1 %    Lymph # 0.82 0.6 - 4.6 x10(3)/mcL    Neut # 12.76 (H) 2.1 - 9.2 x10(3)/mcL    Mono # 0.76 0.1 - 1.3 x10(3)/mcL    Eos # 0.04 0 - 0.9 x10(3)/mcL    Baso # 0.01 0 - 0.2 x10(3)/mcL    IG# 0.10 (H) 0 - 0.04 x10(3)/mcL    IG% 0.7 %    NRBC% 0.3 0 - 1 %   POCT glucose    Collection Time: 12/24/22 11:38 AM   Result Value Ref Range    POCT Glucose 216 (H) 70 - 110 mg/dL   Iron and TIBC    Collection Time: 12/24/22  3:48 PM   Result Value Ref Range    Iron Binding Capacity Unsaturated 168 70 - 310 ug/dL    Iron Level 27 (L) 50 - 170 ug/dL    Transferrin 182 mg/dL    Iron Binding Capacity Total 195 (L) 250 - 450 ug/dL    Iron Saturation 14 (L) 20 - 50 %   Ferritin    Collection Time: 12/24/22  3:48 PM   Result Value Ref Range    Ferritin Level 396.95 (H) 4.63 - 204.00 ng/mL   Folate    Collection Time: 12/24/22  3:48 PM   Result Value Ref Range    Folate Level 13.70 7.0 - 31.4 ng/mL   Immunoglobulins (IgG, IgA, IgM) Quantitative    Collection Time: 12/24/22   3:48 PM   Result Value Ref Range    IgG Level 848.00 522.00 - 1,631.00 mg/dL    IgA Level 192.0 69.0 - 517.0 mg/dL    IgM Level 113.0 33.0 - 293.0 mg/dL   POCT glucose    Collection Time: 12/24/22  4:50 PM   Result Value Ref Range    POCT Glucose 174 (H) 70 - 110 mg/dL   POCT glucose    Collection Time: 12/24/22  9:42 PM   Result Value Ref Range    POCT Glucose 195 (H) 70 - 110 mg/dL   POCT glucose    Collection Time: 12/25/22  4:07 AM   Result Value Ref Range    POCT Glucose 223 (H) 70 - 110 mg/dL   CBC Without Differential    Collection Time: 12/25/22  5:49 AM   Result Value Ref Range    WBC 10.8 4.5 - 11.5 x10(3)/mcL    RBC 3.03 (L) 4.20 - 5.40 x10(6)/mcL    Hgb 8.2 (L) 12.0 - 16.0 gm/dL    Hct 26.6 (L) 37.0 - 47.0 %    Platelet 43 (LL) 140 - 371 x10(3)/mcL    MCV 87.8 80.0 - 94.0 fL    MCH 27.1 pg    MCHC 30.8 (L) 33.0 - 36.0 mg/dL    RDW 16.3 (H) 11.0 - 14.5 %    MPV      NRBC% 0.0 0 - 1 %   Prepare Platelets 1 Dose    Collection Time: 12/25/22  9:41 AM   Result Value Ref Range    UNIT NUMBER [id number]     UNIT ABO/RH A POS     DISPENSE STATUS Issued     Unit Expiration [id number]     Product Code V9536O36     Unit Blood Type Code 6200     CROSSMATCH INTERPRETATION Not required    POCT glucose    Collection Time: 12/25/22 11:04 AM   Result Value Ref Range    POCT Glucose 205 (H) 70 - 110 mg/dL       CT Abdomen Pelvis With Contrast    Result Date: 11/30/2022  CT SCAN OF ABDOMEN AND PELVIS WITH CONTRAST: CPT 20805 Total DLP: 558 mGy-cm. Automatic exposure control was utilized to limit the radiation dose to the patient. Total contrast: 100.0 mL in unspecified peripheral vein. History: Acute onset of worsening abdominal pain and cramping with history of prior cholecystectomy and partial hysterectomy.  History of bladder neoplasm. Comparison: 11/17/2022 Technique: Multiple contiguous axial images were acquired from the base of the chest to the femoral trochanters with intravenous contrast administration. Oral  contrast was not administered. Findings: There is partially visualized subsegmental/discoid atelectasis versus pleuroparenchymal scarring in both lung bases.  The base of the heart is enlarged with AICD leads in postsurgical changes from cardiac valve replacement as well as atherosclerotic changes.  The subcentimeter cyst is nonenhancing adjacent to the gallbladder fossa in the anterior segment of the right lobe of the liver is unchanged.  The history describes prior cholecystectomy, but the gallbladder is still present.  Small nonenhancing renal cysts are similar in appearance.  The uterus is surgically absent.  Findings resemble a mild ileus secondary to constipation.  The appendix is visualized and is unremarkable.  No hyperdense nephroureterolithiasis or hydroureteronephrosis is present.  The heterogeneously enhancing mass at the left dome of the bladder is similar in appearance.  The other organs in the abdomen and pelvis are grossly unremarkable. There is no free fluid, focal fluid collections, free air, or significant mesenteric inflammatory stranding.     1. Findings resemble a mild ileus secondary to constipation. 2. Similar appearance of enhancing mass in the left dome of the bladder consistent with patient's history of bladder neoplasm. 3. The history reports prior cholecystectomy, but the gallbladder is still present. 4. Subcentimeter nonenhancing cyst in anterior segment right lobe of the liver is similar in appearance. 5. Nonenhancing bilateral renal cysts are similar in appearance.  No further imaging follow-up is indicated. 6. Status post hysterectomy. 7. Cardiomegaly with postsurgical changes, as above. Electronically signed by:     US Abdomen Limited_Liver    Result Date: 12/14/2022  EXAMINATION: US ABDOMEN LIMITED_LIVER CLINICAL HISTORY: Elevated AST/ALT; TECHNIQUE: Multiple sagittal and transverse images were obtained of the abdomen. COMPARISON: None FINDINGS: The pancreas appears grossly  unremarkable. The liver measures 15.5 cm.  No liver mass or lesion is seen.  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  Gallbladder wall measures 5 mm.  There is a polyp seen within the gallbladder.  It measures 5 mm x 5 mm.  There is some pericholecystic fluid seen.  Common bile duct measures 4.6 mm.  The right kidney was not able to be imaged due to patient cooperation.     Thrombosis of the main portal vein A polyp noted in the gallbladder with pericholecystic fluid seen and gallbladder wall thickening seen.  Cholecystitis should be excluded Electronically signed by: Mian Marvin Date:    12/14/2022 Time:    16:01    Assessment:   89-year-old female with multiple medical comorbidities transferred on 12/11 with NSTEMI, acute COVID, and no PTE.  Her hospitalization was further complicated by thrombocytopenia, thought secondary to hit.  She is continued to have significant dysphagia and is now warranting PEG tube.  As I discussed with the family, her barriers the PEG tube would be the Xarelto) she would have to be off it for 48 hours) and thrombocytopenia, she would need to have a platelet count greater than 50 to proceed PEG tube.  They remain in agreement for pursuing the PEG tube and understand the precautions necessary to limit her risk of post procedural bleeding.    Plan:  I discussed with Dr. Denton on stopping Xarelto and transitioning back to IV anticoagulation  At the earliest, we can pursue a PEG tube on 12/28 once she has been off Xarelto for 48 hours, we can hold heparin or argatroban for 6 hours pre PEG.  Continue platelet transfusion for goal greater than 50 per Hematology  We will follow-up on Tuesday with tentative plan for PEG on Wednesday.    Arcadio Carias MD  Gastroenterology

## 2022-12-25 NOTE — PLAN OF CARE
Problem: Adult Inpatient Plan of Care  Goal: Plan of Care Review  Outcome: Ongoing, Progressing  Flowsheets (Taken 12/24/2022 2229)  Plan of Care Reviewed With: daughter  Goal: Patient-Specific Goal (Individualized)  Outcome: Ongoing, Progressing  Goal: Absence of Hospital-Acquired Illness or Injury  Outcome: Ongoing, Progressing  Intervention: Identify and Manage Fall Risk  Flowsheets (Taken 12/24/2022 2229)  Safety Promotion/Fall Prevention:   assistive device/personal item within reach   Fall Risk reviewed with patient/family   lighting adjusted   medications reviewed   /camera at bedside  Intervention: Prevent Skin Injury  Flowsheets (Taken 12/24/2022 2229)  Body Position: supine  Skin Protection: incontinence pads utilized  Intervention: Prevent and Manage VTE (Venous Thromboembolism) Risk  Flowsheets (Taken 12/24/2022 2229)  VTE Prevention/Management: remove, assess skin, and reapply sequential compression device  Range of Motion: ROM (range of motion) performed  Intervention: Prevent Infection  Flowsheets (Taken 12/24/2022 2229)  Infection Prevention:   environmental surveillance performed   equipment surfaces disinfected   visitors restricted/screened   personal protective equipment utilized   rest/sleep promoted  Goal: Optimal Comfort and Wellbeing  Outcome: Ongoing, Progressing  Intervention: Monitor Pain and Promote Comfort  Flowsheets (Taken 12/24/2022 2229)  Pain Management Interventions:   care clustered   position adjusted   quiet environment facilitated   pillow support provided  Intervention: Provide Person-Centered Care  Flowsheets (Taken 12/24/2022 2229)  Trust Relationship/Rapport:   care explained   choices provided   emotional support provided   empathic listening provided   questions answered   thoughts/feelings acknowledged  Goal: Readiness for Transition of Care  Outcome: Ongoing, Progressing     Problem: Infection  Goal: Absence of Infection Signs and Symptoms  Outcome:  Ongoing, Progressing     Problem: Skin Injury Risk Increased  Goal: Skin Health and Integrity  Outcome: Ongoing, Progressing  Intervention: Optimize Skin Protection  Flowsheets (Taken 12/24/2022 2229)  Pressure Reduction Techniques: weight shift assistance provided  Pressure Reduction Devices:   alternating pressure pump (ADD)   heel offloading device utilized   positioning supports utilized  Skin Protection: incontinence pads utilized  Head of Bed (HOB) Positioning: HOB at 30-45 degrees     Problem: Fall Injury Risk  Goal: Absence of Fall and Fall-Related Injury  Outcome: Ongoing, Progressing     Problem: Pain Acute  Goal: Acceptable Pain Control and Functional Ability  Outcome: Ongoing, Progressing  Intervention: Develop Pain Management Plan  Flowsheets (Taken 12/24/2022 2229)  Pain Management Interventions:   care clustered   position adjusted   quiet environment facilitated   pillow support provided     Problem: Adjustment to Illness (Stroke, Ischemic/Transient Ischemic Attack)  Goal: Optimal Coping  Outcome: Ongoing, Progressing     Problem: Bowel Elimination Impaired (Stroke, Ischemic/Transient Ischemic Attack)  Goal: Effective Bowel Elimination  Outcome: Ongoing, Progressing     Problem: Cerebral Tissue Perfusion (Stroke, Ischemic/Transient Ischemic Attack)  Goal: Optimal Cerebral Tissue Perfusion  Outcome: Ongoing, Progressing     Problem: Cognitive Impairment (Stroke, Ischemic/Transient Ischemic Attack)  Goal: Optimal Cognitive Function  Outcome: Ongoing, Progressing     Problem: Communication Impairment (Stroke, Ischemic/Transient Ischemic Attack)  Goal: Improved Communication Skills  Outcome: Ongoing, Progressing     Problem: Functional Ability Impaired (Stroke, Ischemic/Transient Ischemic Attack)  Goal: Optimal Functional Ability  Outcome: Ongoing, Progressing     Problem: Respiratory Compromise (Stroke, Ischemic/Transient Ischemic Attack)  Goal: Effective Oxygenation and Ventilation  Outcome: Ongoing,  Progressing     Problem: Sensorimotor Impairment (Stroke, Ischemic/Transient Ischemic Attack)  Goal: Improved Sensorimotor Function  Outcome: Ongoing, Progressing     Problem: Swallowing Impairment (Stroke, Ischemic/Transient Ischemic Attack)  Goal: Optimal Eating and Swallowing without Aspiration  Outcome: Ongoing, Progressing     Problem: Urinary Elimination Impaired (Stroke, Ischemic/Transient Ischemic Attack)  Goal: Effective Urinary Elimination  Outcome: Ongoing, Progressing     Problem: Impaired Wound Healing  Goal: Optimal Wound Healing  Outcome: Ongoing, Progressing

## 2022-12-25 NOTE — PROGRESS NOTES
[facility] - Oncology Acute  Hematology/Oncology  Progress  Note      Consult Requested By: Mukesh Denton MD    Reason for Consult: Thrombocytopenia    SUBJECTIVE:     History of Present Illness:  Patient is a 89 y.o. female with h/o HX of HTN, DM II, TIA, aortic stenosis s/p TAVR, CAD s/p PCI, GERD, PARAMJIT, dementia.     She was initially admitted to [facility] on 12/11/2022 by [facility] after being transferred from [place] with the diagnosis of NSTEMI. She was transferred to ICU in 12/12 and back to the floor on 12/16/2022. Patient was COVID positive on admission.     Patient with abdominal discomfort and UTI.   CT A/P 12/11/2022: Heterogeneous hyperdense mass lesion in the superior and left lateral aspect of the urinary bladder measuring 4.8 x 3.4 cm. This is highly suspicious for malignancy.  This is grossly unchanged from the prior exam.  12/18/2022: URINARY BLADDER, BIOPSY:   A) AGGREGATES OF BLOOD, FIBRIN, MIXED INFLAMMATORY CELLS, RARE DEGENERATIVE EPITHELIOID CELLS AND FOCAL DYSTROPHIC CALCIFICATION.  B) NO EVIDENCE OF INTACT UROTHELIUM OR NEOPLASIA / MALIGNANCY IDENTIFIED.    CTA 12/18/2022: Saddle pulmonary embolism with thrombi extending into the bilateral upper and lower lobes and evidence of right heart strain.    She was started on Heparin a but changed to Argatroban gtt due to thrombocytopenia. She is  now on Xarelto 15mg per NG tube bid x 21 days on 12/23 and then change to xarelto 20 mg daily with   On 12/21- S/P Pulm artery thrombectomy     HIT ab negative.   Peripheral smear:  - Leukocytosis with absolute neutrophilia; no circulating blasts.   - Mild normocytic, normochromic anemia with marked anisopoikilocytosis including frequent elliptocytes, target cells, and echinocytes. Polychromasia is significantly increased including circulating nRBCs.   - Mild Thrombocytopenia.   Impression: Leukocytosis with neutrophilia can be seen in infections, drug reactions, chronic inflammatory disorders and can  be stress induced. Normocytic anemia can be seen in early iron deficiency anemia, anemia of chronic disease and acute blood loss. Thrombocytopenia can be due to decreased production, increased destruction (immune and non-immune mediated) or due to splenic sequestration.     I was consulted for thrombocytopenia.    Patient is seen in rounds this morning.  Family at bedside.  Patient is lying in bed and is not communicative.  History taken from family and electronic medical records.    12/25/2022: Leukocytosis resolved. Thrombocytopenia stable. Anemia relatively stable. Hb today 8.2, yesterday 8.8. Iron studies suggesting anemia of chronic disease      Continuous Infusions:  Scheduled Meds:   carvediloL  25 mg Per NG tube BID    cloNIDine 0.1 mg/24 hr td ptwk  1 patch Transdermal Q7 Days    dextrose 50% in water (D50W)  25 g Intravenous Once    famotidine (PF)  20 mg Intravenous Daily    insulin regular  6 Units Intravenous Once    melatonin  6 mg Per NG tube Nightly    mupirocin   Topical (Top) Daily    piperacillin-tazobactam (ZOSYN) IVPB  4.5 g Intravenous Q8H    QUEtiapine  25 mg Per NG tube BID    rivaroxaban  15 mg Per NG tube BID WM     PRN Meds:sodium chloride, dextrose 10%, dextrose 10%, diazePAM, fentaNYL, glucagon (human recombinant), haloperidol lactate, hydrALAZINE, insulin aspart U-100, iopamidoL, LIDOcaine (PF) 10 mg/ml (1%), ondansetron, sodium chloride 0.9%    Past Medical History:   Diagnosis Date    Anxiety disorder     Aortic stenosis     Arthritis     Carpal tunnel syndrome     Coronary artery disease     Dementia     Depression     Diabetes mellitus     GERD (gastroesophageal reflux disease)     Hx of rectal polypectomy     Hypercholesterolemia     Hypertension     Obstructive sleep apnea     Respiratory distress     S/P TAVR (transcatheter aortic valve replacement)     TIA (transient ischemic attack)      Past Surgical History:   Procedure Laterality Date    CARDIAC SURGERY      CATARACT  EXTRACTION      CORONARY STENT PLACEMENT      HYSTERECTOMY      INSERTION OF PACEMAKER      PARTIAL HYSTERECTOMY      PULMONARY EMBOLISM SURGERY N/A 12/21/2022    Procedure: EMBOLECTOMY, ARTERY, PULMONARY;  Surgeon: Terri Urbina MD;  Location: [facility] CATH LAB;  Service: Peripheral Vascular;  Laterality: N/A;    RECTAL POLYPECTOMY      TONSILLECTOMY      TRANSCATHETER AORTIC VALVE REPLACEMENT (TAVR)       Family History   Problem Relation Age of Onset    Diabetes Mother     Stroke Father     Hyperlipidemia Father     Breast cancer Sister      Social History     Tobacco Use    Smoking status: Never    Smokeless tobacco: Never   Substance Use Topics    Alcohol use: Never    Drug use: Never       Review of patient's allergies indicates:   Allergen Reactions    Heparin analogues Other (See Comments)     HIT panel pending    Statins-hmg-coa reductase inhibitors      Other reaction(s): Joint pain    Bimatoprost      Other reaction(s): Eye redness    Cortisone     Dorzolamide-timolol     Gabapentin      Other reaction(s): Confusion    Hydrocortisone     Tafluprost (pf)       Current Facility-Administered Medications on File Prior to Encounter   Medication Dose Route Frequency Provider Last Rate Last Admin    betamethasone acetate-betamethasone sodium phosphate injection 6 mg  6 mg Intra-articular  Zina D Bouy, FNP   6 mg at 06/01/22 1345    betamethasone acetate-betamethasone sodium phosphate injection 6 mg  6 mg Intra-articular  Zina D Bouy, FNP   6 mg at 06/01/22 1345    LIDOcaine (PF) 20 mg/mL (2%) injection 5 mL  5 mL   Zina D Bouy, FNP   5 mL at 06/01/22 1345    LIDOcaine (PF) 20 mg/mL (2%) injection 5 mL  5 mL   Zina D Bouy, FNP   5 mL at 06/01/22 1345     Current Outpatient Medications on File Prior to Encounter   Medication Sig Dispense Refill    ascorbic acid, vitamin C, (VITAMIN C) 1000 MG tablet Take 1,000 mg by mouth once daily.      aspirin (ECOTRIN) 81 MG EC tablet Take 81 mg by mouth.       atorvastatin (LIPITOR) 40 MG tablet Take 40 mg by mouth once daily.      carvediloL (COREG) 25 MG tablet Take 25 mg by mouth 2 (two) times daily.      cloNIDine (CATAPRES) 0.1 MG tablet       clopidogreL (PLAVIX) 75 mg tablet Take 75 mg by mouth once daily.      isosorbide mononitrate (IMDUR) 30 MG 24 hr tablet 15 mg.      multivitamin/iron/folic acid (CENTRUM COMPLETE ORAL) Take by mouth.      QUEtiapine (SEROQUEL) 25 MG Tab Take 25 mg by mouth 2 (two) times daily.      ranolazine (RANEXA) 1,000 mg Tb12 Take 1,000 mg by mouth 2 (two) times daily.      TUMERIC-GING-OLIVE-OREG-CAPRYL ORAL Take by mouth.      nitroGLYCERIN (NITROSTAT) 0.4 MG SL tablet Place 0.4 mg under the tongue.         Review of Systems   Unable to perform ROS      OBJECTIVE:     Vital Signs (Most Recent)  Temp: 99.7 °F (37.6 °C) (12/25/22 0723)  Pulse: 84 (12/25/22 0723)  Resp: 18 (12/25/22 0723)  BP: 107/67 (12/25/22 0919)  SpO2: (!) 92 % (12/25/22 0723)    Pain Assessment: No pain reported at this time    Vital Signs Range (Last 24H):  Temp:  [98.6 °F (37 °C)-99.7 °F (37.6 °C)]   Pulse:  [83-85]   Resp:  [18-22]   BP: (103-115)/(56-72)   SpO2:  [90 %-95 %]     Physical Exam:  Physical Exam  Vitals and nursing note reviewed.   Constitutional:       General: She is awake. She is not in acute distress.     Appearance: She is ill-appearing.   HENT:      Head: Normocephalic and atraumatic.      Mouth/Throat:      Mouth: Mucous membranes are moist.   Eyes:      General: No scleral icterus.     Extraocular Movements: Extraocular movements intact.      Conjunctiva/sclera: Conjunctivae normal.      Pupils: Pupils are equal, round, and reactive to light.   Cardiovascular:      Rate and Rhythm: Normal rate and regular rhythm.   Pulmonary:      Breath sounds: Decreased air movement present. No rhonchi.   Abdominal:      General: There is no distension.      Palpations: Abdomen is soft. There is no mass.   Musculoskeletal:         General: No swelling or  deformity.      Cervical back: Neck supple.   Lymphadenopathy:      Head:      Right side of head: No submandibular adenopathy.      Left side of head: No submandibular adenopathy.      Upper Body:      Right upper body: No supraclavicular or axillary adenopathy.      Left upper body: No supraclavicular or axillary adenopathy.      Lower Body: No right inguinal adenopathy. No left inguinal adenopathy.   Skin:     General: Skin is warm.      Coloration: Skin is not jaundiced.      Findings: No lesion or rash.      Nails: There is no clubbing.   Psychiatric:         Attention and Perception: She is inattentive.         Mood and Affect: Affect is flat.         Speech: She is noncommunicative.         Cognition and Memory: Cognition is impaired. Memory is impaired.       Laboratory:  CBC with Differential:  Recent Labs   Lab 12/25/22  0549   WBC 10.8   RBC 3.03*   HCT 26.6*   HGB 8.2*   MCV 87.8   MCH 27.1   RDW 16.3*   PLT 43*       CMP:  No results for input(s): GLU, CALCIUM, ALBUMIN, PROT, NA, K, CO2, CL, BUN, CREATININE, ALKPHOS, ALT, AST, BILITOT in the last 24 hours.    BMP:   No results for input(s): GLU, CALCIUM, NA, K, CO2, CL, BUN, CREATININE in the last 24 hours.    LFTs: No results for input(s): ALT, AST, ALKPHOS, BILITOT, PROT, ALBUMIN in the last 24 hours.  Haptoglobin: No results for input(s): HAPTOGLOBIN in the last 24 hours.  Tumor Markers: No results for input(s): PSA, CEA, , AFPTM, [id number],  in the last 24 hours.    Invalid input(s): ALGTM  Immunology: No results for input(s): SPEP, TIANNA, OLIVER, FREELAMBDALI in the last 24 hours.  Coagulation: No results for input(s): PT, INR, APTT in the last 24 hours.  Specimen (24h ago, onward)      None          Microbiology Results (last 7 days)       Procedure Component Value Units Date/Time    Blood Culture [[id number]]  (Normal) Collected: 12/23/22 1046    Order Status: Completed Specimen: Blood from Arm, Left Updated: 12/24/22 1200     CULTURE, BLOOD  ([facility]) No Growth At 24 Hours    Blood Culture [[id number]]  (Normal) Collected: 12/23/22 1046    Order Status: Completed Specimen: Blood from Arm, Right Updated: 12/24/22 1200     CULTURE, BLOOD ([facility]) No Growth At 24 Hours    Urine culture [[id number]] Collected: 12/18/22 1940    Order Status: Completed Specimen: Urine Updated: 12/20/22 0716     Urine Culture No Growth            Diagnostic Results:  Imaging Results              CT Head Without Contrast (Final result)  Result time 12/12/22 06:52:36      Final result by Chapin Ward MD (12/12/22 06:52:36)                   Impression:      No acute intracranial findings identified.    No significant discrepancy with overnight report.      Electronically signed by: Chapin Ward  Date:    12/12/2022  Time:    06:52               Narrative:    EXAMINATION:  CT HEAD WITHOUT CONTRAST    CLINICAL HISTORY:  Mental status change, unknown cause;    TECHNIQUE:  Sequential axial images were performed of the brain without contrast.    Dose product length of 1035 mGycm. Automated exposure control was utilized to minimize radiation dose.    COMPARISON:  December 1, 2022.    FINDINGS:  There is no intracranial mass effect, midline shift, hydrocephalus or hemorrhage. There is no sulcal effacement or low attenuation changes to suggest recent large vessel territory infarction. Chronic appearing periventricular and subcortical white matter low attenuation changes are present and are consistent with chronic microangiopathic ischemia. The ventricular system and sulcal markings prominence is consistent with atrophy. There is no acute extra axial fluid collection.  Right maxillary sinus lobulated mucoperiosteal thickening.  Otherwise, visualized paranasal sinuses are clear without mucosal thickening, polypoidal abnormality or air-fluid levels. Mastoid air cells aeration is optimal.                                          ASSESSMENT/PLAN:     Patient Active Problem List   Diagnosis     Bilateral primary osteoarthritis of knee    Severe late onset Alzheimer's dementia with agitation       -Thrombocytopenia       -Anemia       -Leukocytosis       -Saddle PE with Right heart strain--s/p pulmonary thrombectomy 12/21       -Portal vein thrombus       -NSTEMI       -COVID-19 positive on 12/12/2022    Plan  Completed remdesivir (5 days) and dexamethasone (10 days)  Completed 7 days treatment with zosyn    Abnormal blood counts multifactorial.  This is a patient with multiple medical problems including recent COVID infection, PE.    Leukocytosis resolved. Thrombocytopenia stable. Anemia relatively stable. Hb today 8.2, yesterday 8.8. Iron studies suggesting anemia of chronic disease  No new recommendations.    Continue to monitor counts closely  I will not recommend bone marrow biopsy at this time.  Transfuse platelets for any evidence of bleeding   Due to recent PE transfuse platelets to keep around 50k so she can continue anticoagulation  Hold anticoagulation for platelets < 40 k--platelets stable a 43k -      Deepika Ac MD  Hematology/Oncology  [facility]

## 2022-12-26 LAB
ALBUMIN SERPL-MCNC: 2 G/DL (ref 3.4–4.8)
ALBUMIN/GLOB SERPL: 0.8 RATIO (ref 1.1–2)
ALP SERPL-CCNC: 104 UNIT/L (ref 40–150)
ALT SERPL-CCNC: 81 UNIT/L (ref 0–55)
AST SERPL-CCNC: 31 UNIT/L (ref 5–34)
BILIRUBIN DIRECT+TOT PNL SERPL-MCNC: 1.4 MG/DL
BUN SERPL-MCNC: 33.9 MG/DL (ref 9.8–20.1)
CALCIUM SERPL-MCNC: 8.3 MG/DL (ref 8.4–10.2)
CHLORIDE SERPL-SCNC: 107 MMOL/L (ref 98–107)
CO2 SERPL-SCNC: 26 MMOL/L (ref 23–31)
CREAT SERPL-MCNC: 0.74 MG/DL (ref 0.55–1.02)
ERYTHROCYTE [DISTWIDTH] IN BLOOD BY AUTOMATED COUNT: 16.4 % (ref 11–14.5)
GFR SERPLBLD CREATININE-BSD FMLA CKD-EPI: >60 MLS/MIN/1.73/M2
GLOBULIN SER-MCNC: 2.6 GM/DL (ref 2.4–3.5)
GLUCOSE SERPL-MCNC: 179 MG/DL (ref 82–115)
HCT VFR BLD AUTO: 27 % (ref 37–47)
HEMATOLOGIST REVIEW: NORMAL
HGB BLD-MCNC: 8.3 GM/DL (ref 12–16)
MAGNESIUM SERPL-MCNC: 2 MG/DL (ref 1.6–2.6)
MCH RBC QN AUTO: 27.4 PG
MCHC RBC AUTO-ENTMCNC: 30.7 MG/DL (ref 33–36)
MCV RBC AUTO: 89.1 FL (ref 80–94)
NRBC BLD AUTO-RTO: 0 % (ref 0–1)
PLATELET # BLD AUTO: 74 X10(3)/MCL (ref 140–371)
PMV BLD AUTO: 12.4 FL (ref 9.4–12.4)
POCT GLUCOSE: 172 MG/DL (ref 70–110)
POCT GLUCOSE: 177 MG/DL (ref 70–110)
POCT GLUCOSE: 185 MG/DL (ref 70–110)
POCT GLUCOSE: 203 MG/DL (ref 70–110)
POCT GLUCOSE: 210 MG/DL (ref 70–110)
POCT GLUCOSE: 234 MG/DL (ref 70–110)
POTASSIUM SERPL-SCNC: 3.3 MMOL/L (ref 3.5–5.1)
PROT SERPL-MCNC: 4.6 GM/DL (ref 5.8–7.6)
RBC # BLD AUTO: 3.03 X10(6)/MCL (ref 4.2–5.4)
SODIUM SERPL-SCNC: 142 MMOL/L (ref 136–145)
WBC # SPEC AUTO: 10.1 X10(3)/MCL (ref 4.5–11.5)

## 2022-12-26 PROCEDURE — 85027 COMPLETE CBC AUTOMATED: CPT | Performed by: INTERNAL MEDICINE

## 2022-12-26 PROCEDURE — 83735 ASSAY OF MAGNESIUM: CPT | Performed by: INTERNAL MEDICINE

## 2022-12-26 PROCEDURE — 63600175 PHARM REV CODE 636 W HCPCS: Performed by: INTERNAL MEDICINE

## 2022-12-26 PROCEDURE — 80053 COMPREHEN METABOLIC PANEL: CPT | Performed by: INTERNAL MEDICINE

## 2022-12-26 PROCEDURE — 92526 ORAL FUNCTION THERAPY: CPT

## 2022-12-26 PROCEDURE — 25000003 PHARM REV CODE 250: Performed by: STUDENT IN AN ORGANIZED HEALTH CARE EDUCATION/TRAINING PROGRAM

## 2022-12-26 PROCEDURE — 36415 COLL VENOUS BLD VENIPUNCTURE: CPT | Performed by: INTERNAL MEDICINE

## 2022-12-26 PROCEDURE — 25000003 PHARM REV CODE 250: Performed by: INTERNAL MEDICINE

## 2022-12-26 PROCEDURE — 21400001 HC TELEMETRY ROOM

## 2022-12-26 RX ORDER — MEMANTINE HYDROCHLORIDE 5 MG/1
10 TABLET ORAL 2 TIMES DAILY
Status: DISCONTINUED | OUTPATIENT
Start: 2022-12-26 | End: 2022-12-30 | Stop reason: HOSPADM

## 2022-12-26 RX ORDER — FAMOTIDINE 10 MG/ML
20 INJECTION INTRAVENOUS 2 TIMES DAILY
Status: DISCONTINUED | OUTPATIENT
Start: 2022-12-26 | End: 2022-12-28

## 2022-12-26 RX ORDER — FUROSEMIDE 10 MG/ML
40 INJECTION INTRAMUSCULAR; INTRAVENOUS ONCE
Status: COMPLETED | OUTPATIENT
Start: 2022-12-26 | End: 2022-12-26

## 2022-12-26 RX ADMIN — POTASSIUM BICARBONATE 50 MEQ: 977.5 TABLET, EFFERVESCENT ORAL at 12:12

## 2022-12-26 RX ADMIN — ENOXAPARIN SODIUM 90 MG: 100 INJECTION SUBCUTANEOUS at 09:12

## 2022-12-26 RX ADMIN — PIPERACILLIN AND TAZOBACTAM 4.5 G: 4; .5 INJECTION, POWDER, LYOPHILIZED, FOR SOLUTION INTRAVENOUS; PARENTERAL at 06:12

## 2022-12-26 RX ADMIN — CARVEDILOL 25 MG: 12.5 TABLET, FILM COATED ORAL at 10:12

## 2022-12-26 RX ADMIN — Medication 6 MG: at 09:12

## 2022-12-26 RX ADMIN — CARVEDILOL 25 MG: 12.5 TABLET, FILM COATED ORAL at 09:12

## 2022-12-26 RX ADMIN — MUPIROCIN: 20 OINTMENT TOPICAL at 10:12

## 2022-12-26 RX ADMIN — PIPERACILLIN AND TAZOBACTAM 4.5 G: 4; .5 INJECTION, POWDER, LYOPHILIZED, FOR SOLUTION INTRAVENOUS; PARENTERAL at 11:12

## 2022-12-26 RX ADMIN — CLONIDINE 1 PATCH: 0.1 PATCH TRANSDERMAL at 10:12

## 2022-12-26 RX ADMIN — MEMANTINE 10 MG: 5 TABLET ORAL at 10:12

## 2022-12-26 RX ADMIN — FUROSEMIDE 40 MG: 10 INJECTION, SOLUTION INTRAMUSCULAR; INTRAVENOUS at 12:12

## 2022-12-26 RX ADMIN — PIPERACILLIN AND TAZOBACTAM 4.5 G: 4; .5 INJECTION, POWDER, LYOPHILIZED, FOR SOLUTION INTRAVENOUS; PARENTERAL at 03:12

## 2022-12-26 RX ADMIN — QUETIAPINE FUMARATE 25 MG: 25 TABLET ORAL at 10:12

## 2022-12-26 RX ADMIN — QUETIAPINE FUMARATE 25 MG: 25 TABLET ORAL at 09:12

## 2022-12-26 RX ADMIN — MEMANTINE 10 MG: 5 TABLET ORAL at 09:12

## 2022-12-26 RX ADMIN — ENOXAPARIN SODIUM 90 MG: 100 INJECTION SUBCUTANEOUS at 10:12

## 2022-12-26 RX ADMIN — FAMOTIDINE 20 MG: 10 INJECTION, SOLUTION INTRAVENOUS at 10:12

## 2022-12-26 RX ADMIN — FAMOTIDINE 20 MG: 10 INJECTION, SOLUTION INTRAVENOUS at 09:12

## 2022-12-26 RX ADMIN — PIPERACILLIN AND TAZOBACTAM 4.5 G: 4; .5 INJECTION, POWDER, LYOPHILIZED, FOR SOLUTION INTRAVENOUS; PARENTERAL at 01:12

## 2022-12-26 NOTE — PT/OT/SLP PROGRESS
Speech Language Pathology Department  Dysphagia Therapy Progress Note    Patient Name:  Miguel Angel Oliveros   MRN:  [id number]  Admitting Diagnosis: <principal problem not specified>    Recommendations:     General recommendations:  dysphagia therapy  Diet recommendations:  NPO, Liquid Diet Level: NPO   Aspiration precautions:  NPO    Discharge recommendations:  nursing facility, skilled   Barriers to safe discharge:  acuity of illness and severity of impairment    Subjective     Patient flat and lethargic.    Pain/Comfort: Pain Rating 1: 0/10    Spiritual/Cultural/Holiness Beliefs/Practices that affect care: no    Respiratory Status: room air     Objective:     Oral Musculature Evaluation:  Oral Musculature: WFL  Dentition: scattered dentition  Secretion Management: adequate  Mucosal Quality: adequate  Mandibular Strength and Mobility: WFL  Oral Labial Strength and Mobility: WFL  Lingual Strength and Mobility: WFL    Therapeutic PO trials:  Consistency Fed By Oral Symptoms Pharyngeal Symptoms   Puree SLP Decreased lip closure  Reduced closure around utensil  Prolonged bolus manipulation Cough after swallow   Multiple swallows with cueing   Wet vocal quality after swallow        Assessment:     Pt's granddaughter requested SLP reattempt PO trials as pt presented with increased alertness.     Pt continues to present with severe dysphagia negatively impacted by reduced mental status and remains unsafe for PO diet.     Goals:   Multidisciplinary Problems       SLP Goals          Problem: SLP    Goal Priority Disciplines Outcome   SLP Goal     SLP Ongoing, Progressing   Description: LTG: Pt will tolerate LRD with no overt signs/sx of aspiration.    ST. Pt will tolerate puree trials with no overt signs/sx of aspiration and min cues to initiate swallow.   2. Pt will complete comprehensive assessment of swallow fx (MBS) as appropriate.                      Patient Education:     Patient, daughter/s, and family  provided with verbal education regarding dysphagia signs/sx.  Understanding was verbalized, however additional teaching warranted.    Plan:     Will continue to follow and tx as appropriate.    SLP Follow-Up:  Yes   Patient to be seen:  daily   Plan of Care expires:  01/16/23  Plan of Care reviewed with:  patient, daughter, grandchild(vipin)       Time Tracking:     SLP Treatment Date:   12/26/22  Speech Start Time:  0915  Speech Stop Time:  0930     Speech Total Time (min):  15 min    Billable minutes:  Treatment of Swallow Dysfunction, 15 minutes        12/26/2022

## 2022-12-26 NOTE — PROGRESS NOTES
[facility] Medicine Progress Note        Chief Complaint: Inpatient Follow-up for AMS, Covid    HPI: Miguel Angel Oliveros is an 89-year-old female with PMH of DMII, CAD s/p PCI, HTN, AS s/p TAVR, HLD, GERD, and PARAMJIT, who is admitted to [facility] ICU from regular unit after being found to have altered mental status and increased respiratory distress. She was initially admitted to [facility] on 2022 by [facility] after being transferred from [place] with the diagnosis of NSTEMI. At the time of encounter, patient states having lower abdominal discomfort, bladder fullness, and dysuria. She states that these symptoms have been persisting for the past few days accompanied by decreased urinary output. Per patient's family members at beside, she usually gets UTI several times each year and she has had 4 episodes of UTI in  (last UTI was 3 months ago).  Daughter reported that patient as baseline is able to converse with family member, recognizes everyone, able to sit at the table and feed herself, able to transfer herself from bed to wheelchair and wheelchair to chair with some assistance.  Stated she has some good days and bad days in a way that sometimes she asked about her old friends who have  but otherwise her dementia is not too bad.  She is known to Dr. Montalvo in outpatient setting.  Reported she was sick at home a week prior to admission to our facility.  Initially she went to ER because she was severely constipated which has since resolved, after that she went back to her PCP given she was short of breath and fatigued and lethargic.  Then she was brought here on  when she was very lethargic to the point that she needed max assist to move so they called EMS.  Daughter also reported in ICU on Thursday afternoon she was feeling her best, recognized her brother, sister, her grandkids.  But Thursday afternoon she started becoming more lethargic and sleepy and since then she  has not improved  CTA Chest- Saddle pulmonary embolism with thrombi extending into the bilateral upper and lower lobes and evidence of right heart strain  12/18- Heparin changed to Argatroban gtt- aptt per protocol, cards recommended to continue Argatroban for now and once able to swallow, switch to apixiban oral prior to discharge   Consulted Dr Urbina--> reordered Echo- showing EF o 65%, moderate tricuspid regurg, severe right ventricular enlargement.  Severe right atrial enlargement.  Mild mitral stenosis, Repeat Upper and Lower extremities u/s- official report pending  Case personally discussed with Dr. Urbina  12/21- S/P Pulm artery thrombectomy   Developed anasarca, d/c IVF, received Lasix 40 mg IV x 1 on 12/21, again on 12/22 and today I will give her another 20 mg IV x 1   Hypokalemia noted probably due to lasix IV, ordered Kphos 30 mmoles IV x 1   Monitor fluid levels  Patient apparently lives at home with family and has a significant amount of DME as well as support.  She has not really been assessed for any oral nutrition since she has been here because of her increased oxygen requirements early in admission   Currently on supplemental oxygen via nasal cannula at 4 liters/minute, requested weaning oxygen as able   Consulted speech though pt was lethargic and not following commands over the weekend, so was unable to assess . She is still not safe to attempt MBS per speech therapy  Completed remdesivir (5 days) and dexamethasone (10 days)  Completed 7 days treatment with zosyn  Transaminases are beginning to trend down, monitor closely  Appreciate the help from Nephrology, cardiology services, vascular services. Nephro and Cards signed off  Developed leukocytosis again, concern she is aspirating her own secretions, started Zosyn again for 7 days  Leukocytosis trending down  12/24- on RA   12/25- Switched xarelto to lovenox 1 mg/kg BID in the interim to get PEG tube Wednesday morning, she will again have  to restart her xarelto 15 mg BID x 21 days and then 20 mg daily  12/26- woke up this morning, wanting to get out of the bed and wanted water, requested Speech to evaluate and pt would not answer or follow commands        Interval Hx:   Pt awoke this am, told me she is not much better but she wants togo home, want to get out the bed and asked for water.  Stated she needs to pee and does not want to urinate on herself, she has a purewick on   Daughter and grand daughter at bedside,  discontinued per daughter's request  Tube feeds at goal 65 cc/hr.  Also discussed that will make her NPO between the night of Tuesday and Wednesday so she can get her PEG tube  Wednesday morning   Case was discussed with patient's nurse on the floor      Objective/physical exam:  General:  awake, left IJ present, obese, mentation changes too quick  Chest: Clear to auscultation bilaterally on RA  Heart: RRR, +S1, S2, no appreciable murmur  Abdomen: Soft, nontender, BS +, NG tube in place with tube feeds going  MSK: Warm, trace pitting edema bilateral lower extremity, swelling bilateral hands only now limited to her fingers   Neurologic:  Awake earlier but now not following commands again    VITAL SIGNS: 24 HRS MIN & MAX LAST   Temp  Min: 96.9 °F (36.1 °C)  Max: 99.7 °F (37.6 °C) 96.9 °F (36.1 °C)   BP  Min: 102/57  Max: 137/73 129/70     Pulse  Min: 73  Max: 90  73   Resp  Min: 18  Max: 18 18   SpO2  Min: 89 %  Max: 99 % 96 %       Recent Labs   Lab 12/20/22  0408 12/21/22  0652 12/25/22  0549 12/25/22  1449 12/26/22  0413   WBC 13.6*   < > 10.8 12.0* 10.1   RBC 4.08*   < > 3.03* 3.23* 3.03*   HGB 11.4*   < > 8.2* 8.8* 8.3*   HCT 36.4*   < > 26.6* 27.7* 27.0*   MCV 89.2   < > 87.8 85.8 89.1   MCH 27.9   < > 27.1 27.2 27.4   MCHC 31.3*   < > 30.8* 31.8* 30.7*   RDW 15.7*   < > 16.3* 16.2* 16.4*   PLT 87*   < > 43* 55* 74*   MPV 13.0*  --   --   --  12.4    < > = values in this interval not displayed.       Recent Labs   Lab 12/19/22  7586  12/20/22  0409 12/20/22  0409 12/22/22  0430 12/23/22  0510 12/24/22  0637 12/26/22  0413   NA  --  140   < > 143 141 140 142   K  --  4.2   < > 3.7 3.1* 3.5 3.3*   CO2  --  19*   < > 20* 22* 26 26   BUN  --  52.5*   < > 53.8* 40.5* 40.6* 33.9*   CREATININE  --  0.91   < > 0.78 0.63 0.77 0.74   CALCIUM  --  8.9   < > 9.3 9.1 8.4 8.3*   PH 7.48*  --   --   --   --   --   --    MG  --   --   --  2.20 1.80  --  2.00   ALBUMIN  --  3.0*  --   --  2.6*  --  2.0*   ALKPHOS  --  116  --   --  113  --  104   ALT  --  543*  --   --  238*  --  81*   AST  --  88*  --   --  65*  --  31   BILITOT  --  1.4  --   --  1.4  --  1.4    < > = values in this interval not displayed.          Microbiology Results (last 7 days)       Procedure Component Value Units Date/Time    Blood Culture [[id number]]  (Normal) Collected: 12/23/22 1046    Order Status: Completed Specimen: Blood from Arm, Right Updated: 12/25/22 1200     CULTURE, BLOOD ([facility]) No Growth At 48 Hours    Blood Culture [[id number]]  (Normal) Collected: 12/23/22 1046    Order Status: Completed Specimen: Blood from Arm, Left Updated: 12/25/22 1200     CULTURE, BLOOD ([facility]) No Growth At 48 Hours    Urine culture [[id number]] Collected: 12/18/22 1940    Order Status: Completed Specimen: Urine Updated: 12/20/22 0716     Urine Culture No Growth             See below for Radiology    Scheduled Med:   carvediloL  25 mg Per NG tube BID    cloNIDine 0.1 mg/24 hr td ptwk  1 patch Transdermal Q7 Days    enoxaparin  1 mg/kg Subcutaneous Q12H    famotidine (PF)  20 mg Intravenous Daily    melatonin  6 mg Per NG tube Nightly    mupirocin   Topical (Top) Daily    piperacillin-tazobactam (ZOSYN) IVPB  4.5 g Intravenous Q8H    QUEtiapine  25 mg Per NG tube BID        Continuous Infusions:         PRN Meds:  sodium chloride, dextrose 10%, dextrose 10%, diazePAM, fentaNYL, glucagon (human recombinant), haloperidol lactate, hydrALAZINE, insulin aspart U-100, iopamidoL, LIDOcaine (PF) 10 mg/ml (1%),  ondansetron, sodium chloride 0.9%       Assessment/Plan:  Altered mental status likely 2/2 sepsis and metabolic encephalopathy- underlying advanced dementia also present   Acute renal failure/ ATN - resolved   Lactic acidosis likely 2/2 acute renal failure and sepsis, and portal venous thrombosis. Saddle PE  NSTEMI Type II in the setting of Acute COVID Infection, SMITHA, and IVC/ portal vein Thrombus/ saddle PE  COVID positive on admission to ICU- 12/12- day 10 of diagnosis - Pt out of isolation  Transminitis - trending down  Bladder cancer s/p resection in October 2022  CT ABD & pelvis 12/11/2022 shows heterogeneous hyperdense mass lesion in the superior and left lateral aspect of the urinary bladder  Per patient's family at bedside, patient will be receiving additional imaging to stage bladder cancer  HX of HTN, DM II, TIA, aortic stenosis s/p TAVR, CAD s/p PCI, GERD, PARAMJIT  IVC and Portal Vein Thrombosis with High probability of Pulmonary Embolism with Marked RV Strain - On Heparin gtt  Thrombocytopenia- HIT negative--> still trending down  Saddle PE with right heart strain--> s/p pulmonary thrombectomy 12/21  Mild hyperkalemia probably due to clinimic E, resolved   Hematuria - blood thinners related- resolved  Anasarca- improved   Hypokalemia - replaced         Admitted to ICU in 12/12, d/graded to hospital medicine team on 12/16  HIT ab negative, Plt continue to drop, will monitor closely, if start t bleed, will do plt transfusion  CTA Chest- Saddle pulmonary embolism with thrombi extending into the bilateral upper and lower lobes and evidence of right heart strain.  12/18- Heparin changed to Argatroban gtt and now changed to Xarelto 15mg per NG tube bid x 21 days on 12/23 and then change to xarelto 20 mg daily with supper   HIT AB  NEGATIVE   12/21- S/P Pulm artery thrombectomy   Swelling improved, will hold off further IV lasix   Hypokalemia resolved with replacement  Monitor fluid levels  Oxygen weaned off and  she is on RA   Consulted speech though pt was lethargic and not following commands over the weekend, so was unable to assess . She is still not safe to attempt MBS per speech therapy  Family on 12/21 requested GI consult and Peg tube placement, consulted GI --> plan for PEG on Wednesday next week since she needs to eb off of Xarelto x 48 hrs   Case personally discussed with Dr. Carias and Dr Ac, started lovenox 1mg/kg BID now and will hold the dose the night before peg tube and post peg placement she will need to be switched back to Xarelto 15 mg b.i.d. for 21 days and then 20 mg daily  NG Tube in place   Thrombocytopenia persists--> consulted hematology--> recommended to transfuse PLT if bleeding and to keep Plt >50 K, hold Oral AC if plt <40 K  Received Platelet transfusion 1 unit given Plt 43K on 12/25- Plt improved to 74  No signs of active bleeding though HB slowly trending down probably due to some hematuria post Hale exchange, monitor closely and transfuse if <8  Getting tube feeds with NG Tube now and at goal of 65 cc/hr with 65 cc FWF q 3 hrs   Developed Leukocytosis again, 20K on 12/23--> now resolved   Started zosyn again as she may be silently aspirating on her own secretions - day 4 of 7 12/24- Blood cultures x2 negative to date   UA not impressive for UTI  Discontinued Clinimic give tube feed at goal   Giver Effer K 50 meq per NG tube x 1 for K of 3.3  I again had this conversation with the daughter that pt is very sick and she is still not out of the woods, discussed discharge plan. Discussed hospice could be an option as well if this is her new norm. she verbalized understanding and reported she will talk to family after Earl   Patient apparently lives at home with family and has a significant amount of DME as well as support.  Apparently at baseline she become confuse form time to time per son but daughter reports differently stating she was conversing, able to transfer, able to feed  herself. Reported her good and bad days with bad days being when she asks about her old friends if they are alive. Pt is established with Dr Montalvo who is treating her dementia   Morning cbc, cmp ordered     VTE prophylaxis:  Lovenox therapeutic dose     Patient condition:  Guarded    Anticipated discharge and Disposition:   TBD    Critical care note:  Critical care diagnosis: Fluid overload status requiring IV lasix push  Critical care interventions: Hands-on evaluation, review of labs/radiographs/records and discussion with patient and family if present  Critical care time spent: 40 minutes        All diagnosis and differential diagnosis have been reviewed; assessment and plan has been documented; I have personally reviewed the labs and test results that are presently available; I have reviewed the patients medication list; I have reviewed the consulting providers response and recommendations. I have reviewed or attempted to review medical records based upon their availability    All of the patient's questions have been  addressed and answered. Patient's is agreeable to the above stated plan. I will continue to monitor closely and make adjustments to medical management as needed.  _____________________________________________________________________    Nutrition Status:    Radiology:  CV Ultrasound doppler venous arms bilateral  There was no evidence of deep or superficial vein thrombosis in the   bilateral upper extremities.  CV Ultrasound doppler venous legs bilat  Technically difficult study secondary to patient condition, body habitus   and depth of vessel.  There was no evidence of deep or superficial vein thrombosis in the   bilateral lower extremities.      Mukesh Denton MD  Department of Hospital Medicine   [facility]   12/26/2022

## 2022-12-26 NOTE — PT/OT/SLP PROGRESS
SLP attempting to see pt for dysphagia tx/PO trials as nurse and MD report pt with increased JESSICA today. Upon SLP arrival, pt refused to open her eyes and participate despite MAX verbal stimulation. SLP called nurse into the room to assist in motivating pt with no success. SLP notified MD. SLP to continue to follow and reattempt as appropriate.

## 2022-12-27 LAB
ALBUMIN % SPEP (OHS): 45.63
ALBUMIN SERPL-MCNC: 2.1 G/DL (ref 3.4–4.8)
ALBUMIN/GLOB SERPL: 0.8 RATIO (ref 1.1–2)
ALPHA 1 GLOB (OHS): 0.32 GM/DL
ALPHA 1 GLOB% (OHS): 6.86
ALPHA 2 GLOB % (OHS): 13.91
ALPHA 2 GLOB (OHS): 0.64 GM/DL
BASOPHILS # BLD AUTO: 0.02 X10(3)/MCL (ref 0–0.2)
BASOPHILS NFR BLD AUTO: 0.2 %
BETA GLOB (OHS): 0.75 GM/DL
BETA GLOB% (OHS): 16.38
EOSINOPHIL # BLD AUTO: 0.12 X10(3)/MCL (ref 0–0.9)
EOSINOPHIL NFR BLD AUTO: 1.3 %
ERYTHROCYTE [DISTWIDTH] IN BLOOD BY AUTOMATED COUNT: 16 % (ref 11–14.5)
GAMMA GLOBULIN % (OHS): 17.22
GAMMA GLOBULIN (OHS): 0.79 GM/DL
GLOBULIN SER-MCNC: 2.5 GM/DL (ref 2.4–3.5)
HCT VFR BLD AUTO: 29.3 % (ref 37–47)
HGB BLD-MCNC: 9.1 GM/DL (ref 12–16)
IMM GRANULOCYTES # BLD AUTO: 0.07 X10(3)/MCL (ref 0–0.04)
IMM GRANULOCYTES NFR BLD AUTO: 0.7 %
INR BLD: 1.25 (ref 0–1.3)
LYMPHOCYTES # BLD AUTO: 0.81 X10(3)/MCL (ref 0.6–4.6)
LYMPHOCYTES NFR BLD AUTO: 8.6 %
M SPIKE % (OHS): 1.46
M SPIKE (OHS): 0.07 GM/DL
MCH RBC QN AUTO: 27.5 PG
MCHC RBC AUTO-ENTMCNC: 31.1 MG/DL (ref 33–36)
MCV RBC AUTO: 88.5 FL (ref 80–94)
MONOCYTES # BLD AUTO: 0.58 X10(3)/MCL (ref 0.1–1.3)
MONOCYTES NFR BLD AUTO: 6.1 %
NEUTROPHILS # BLD AUTO: 7.85 X10(3)/MCL (ref 2.1–9.2)
NEUTROPHILS NFR BLD AUTO: 83.1 %
NRBC BLD AUTO-RTO: 0 % (ref 0–1)
PATH REV: NORMAL
PLATELET # BLD AUTO: 70 X10(3)/MCL (ref 140–371)
PMV BLD AUTO: ABNORMAL FL
POCT GLUCOSE: 218 MG/DL (ref 70–110)
PROT SERPL-MCNC: 4.6 GM/DL (ref 5.8–7.6)
PROTHROMBIN TIME: 15.6 SECONDS (ref 12.5–14.5)
RBC # BLD AUTO: 3.31 X10(6)/MCL (ref 4.2–5.4)
WBC # SPEC AUTO: 9.5 X10(3)/MCL (ref 4.5–11.5)

## 2022-12-27 PROCEDURE — 63600175 PHARM REV CODE 636 W HCPCS: Performed by: INTERNAL MEDICINE

## 2022-12-27 PROCEDURE — 99231 PR SUBSEQUENT HOSPITAL CARE,LEVL I: ICD-10-PCS | Mod: ,,, | Performed by: INTERNAL MEDICINE

## 2022-12-27 PROCEDURE — 85025 COMPLETE CBC W/AUTO DIFF WBC: CPT | Performed by: NURSE PRACTITIONER

## 2022-12-27 PROCEDURE — 85610 PROTHROMBIN TIME: CPT | Performed by: NURSE PRACTITIONER

## 2022-12-27 PROCEDURE — 99222 1ST HOSP IP/OBS MODERATE 55: CPT | Mod: ,,, | Performed by: NURSE PRACTITIONER

## 2022-12-27 PROCEDURE — 36415 COLL VENOUS BLD VENIPUNCTURE: CPT | Performed by: NURSE PRACTITIONER

## 2022-12-27 PROCEDURE — 21400001 HC TELEMETRY ROOM

## 2022-12-27 PROCEDURE — 25000003 PHARM REV CODE 250: Performed by: NURSE PRACTITIONER

## 2022-12-27 PROCEDURE — 99231 SBSQ HOSP IP/OBS SF/LOW 25: CPT | Mod: ,,, | Performed by: INTERNAL MEDICINE

## 2022-12-27 PROCEDURE — 25000003 PHARM REV CODE 250: Performed by: STUDENT IN AN ORGANIZED HEALTH CARE EDUCATION/TRAINING PROGRAM

## 2022-12-27 PROCEDURE — 99222 PR INITIAL HOSPITAL CARE,LEVL II: ICD-10-PCS | Mod: ,,, | Performed by: NURSE PRACTITIONER

## 2022-12-27 PROCEDURE — 25000003 PHARM REV CODE 250: Performed by: INTERNAL MEDICINE

## 2022-12-27 RX ORDER — ACETAMINOPHEN 325 MG/1
650 TABLET ORAL EVERY 6 HOURS PRN
Status: DISCONTINUED | OUTPATIENT
Start: 2022-12-27 | End: 2022-12-30 | Stop reason: HOSPADM

## 2022-12-27 RX ADMIN — CARVEDILOL 25 MG: 12.5 TABLET, FILM COATED ORAL at 10:12

## 2022-12-27 RX ADMIN — MEMANTINE 10 MG: 5 TABLET ORAL at 10:12

## 2022-12-27 RX ADMIN — ACETAMINOPHEN 650 MG: 325 TABLET, FILM COATED ORAL at 09:12

## 2022-12-27 RX ADMIN — FAMOTIDINE 20 MG: 10 INJECTION, SOLUTION INTRAVENOUS at 09:12

## 2022-12-27 RX ADMIN — FAMOTIDINE 20 MG: 10 INJECTION, SOLUTION INTRAVENOUS at 10:12

## 2022-12-27 RX ADMIN — PIPERACILLIN AND TAZOBACTAM 4.5 G: 4; .5 INJECTION, POWDER, LYOPHILIZED, FOR SOLUTION INTRAVENOUS; PARENTERAL at 06:12

## 2022-12-27 RX ADMIN — QUETIAPINE FUMARATE 25 MG: 25 TABLET ORAL at 10:12

## 2022-12-27 RX ADMIN — QUETIAPINE FUMARATE 25 MG: 25 TABLET ORAL at 09:12

## 2022-12-27 RX ADMIN — PIPERACILLIN AND TAZOBACTAM 4.5 G: 4; .5 INJECTION, POWDER, LYOPHILIZED, FOR SOLUTION INTRAVENOUS; PARENTERAL at 07:12

## 2022-12-27 RX ADMIN — MUPIROCIN: 20 OINTMENT TOPICAL at 09:12

## 2022-12-27 RX ADMIN — ENOXAPARIN SODIUM 90 MG: 100 INJECTION SUBCUTANEOUS at 10:12

## 2022-12-27 RX ADMIN — CARVEDILOL 25 MG: 12.5 TABLET, FILM COATED ORAL at 09:12

## 2022-12-27 RX ADMIN — Medication 6 MG: at 09:12

## 2022-12-27 RX ADMIN — MEMANTINE 10 MG: 5 TABLET ORAL at 09:12

## 2022-12-27 NOTE — PROGRESS NOTES
"Gastroenterology Progress Note    Subjective/Interval History:  H&H 9.1/29.3  Plt 70  No recent INR    Spoke with nurse regarding patient. Family is still agreeable to PEG placement.  Patient's daughter in room. She is concerned about NG tube placement and comfort for patient. She is agreeable to PEG placement tomorrow.    ROS:  Review of Systems   Unable to perform ROS: Mental acuity     Vital Signs:  BP (!) 144/72   Pulse 74   Temp 97.8 °F (36.6 °C) (Axillary)   Resp 18   Ht 5' 5" (1.651 m)   Wt 86.2 kg (190 lb)   SpO2 95%   BMI 31.62 kg/m²   Body mass index is 31.62 kg/m².    Physical Exam:  Physical Exam  Constitutional:       General: She is awake.      Appearance: She is obese. She is not ill-appearing.      Interventions: She is restrained.      Comments: gloves   HENT:      Head: Normocephalic and atraumatic.      Right Ear: External ear normal.      Left Ear: External ear normal.      Nose:      Comments: NG tube in place     Mouth/Throat:      Pharynx: Oropharynx is clear.   Eyes:      Conjunctiva/sclera: Conjunctivae normal.   Cardiovascular:      Rate and Rhythm: Normal rate.   Pulmonary:      Effort: Pulmonary effort is normal. No respiratory distress.   Skin:     General: Skin is warm and dry.   Neurological:      Mental Status: She is disoriented.       Labs:  Recent Results (from the past 24 hour(s))   POCT glucose    Collection Time: 12/26/22 12:16 PM   Result Value Ref Range    POCT Glucose 210 (H) 70 - 110 mg/dL   POCT glucose    Collection Time: 12/26/22  5:00 PM   Result Value Ref Range    POCT Glucose 177 (H) 70 - 110 mg/dL   POCT glucose    Collection Time: 12/26/22 11:17 PM   Result Value Ref Range    POCT Glucose 185 (H) 70 - 110 mg/dL   POCT glucose    Collection Time: 12/27/22  5:46 AM   Result Value Ref Range    POCT Glucose 218 (H) 70 - 110 mg/dL   CBC with Differential    Collection Time: 12/27/22  8:36 AM   Result Value Ref Range    WBC 9.5 4.5 - 11.5 x10(3)/mcL    RBC 3.31 (L) " 4.20 - 5.40 x10(6)/mcL    Hgb 9.1 (L) 12.0 - 16.0 gm/dL    Hct 29.3 (L) 37.0 - 47.0 %    MCV 88.5 80.0 - 94.0 fL    MCH 27.5 pg    MCHC 31.1 (L) 33.0 - 36.0 mg/dL    RDW 16.0 (H) 11.0 - 14.5 %    Platelet 70 (L) 140 - 371 x10(3)/mcL    MPV      Neut % 83.1 %    Lymph % 8.6 %    Mono % 6.1 %    Eos % 1.3 %    Basophil % 0.2 %    Lymph # 0.81 0.6 - 4.6 x10(3)/mcL    Neut # 7.85 2.1 - 9.2 x10(3)/mcL    Mono # 0.58 0.1 - 1.3 x10(3)/mcL    Eos # 0.12 0 - 0.9 x10(3)/mcL    Baso # 0.02 0 - 0.2 x10(3)/mcL    IG# 0.07 (H) 0 - 0.04 x10(3)/mcL    IG% 0.7 %    NRBC% 0.0 0 - 1 %         Assessment:  89-year-old female with multiple medical comorbidities transferred on 12/11 with NSTEMI, acute COVID, and no PTE.  Her hospitalization was further complicated by thrombocytopenia, thought secondary to hit.  She is continued to have significant dysphagia and is now warranting PEG tube.  As I discussed with the family, her barriers the PEG tube would be the Xarelto) she would have to be off it for 48 hours) and thrombocytopenia, she would need to have a platelet count greater than 50 to proceed PEG tube.  They remain in agreement for pursuing the PEG tube and understand the precautions necessary to limit her risk of post procedural bleeding.     Plan:  Discussed with Dr. Denton on stopping Xarelto and transitioning back to IV anticoagulation  At the earliest, we can pursue a PEG tube on 12/28 once she has been off Xarelto for 48 hours, we can hold heparin or argatroban for 6 hours pre PEG.  Continue platelet transfusion for goal greater than 50 per Hematology  We will plan tentatively for PEG tomorrow. Hold tube feedings at midnight tonight.  Labs in a.sharon Samaniego NP/Suzi Rubio PA-C acting as scribe for Arcadio Carias MD  Gastroenterology  [facility]

## 2022-12-27 NOTE — PROGRESS NOTES
[facility] - 9th Floor Med Surg  Wound Care    Patient Name:  Miguel Angel Oliveros   MRN:  [id number]  Date: 12/27/2022  Diagnosis: <principal problem not specified>    History:     Past Medical History:   Diagnosis Date    Anxiety disorder     Aortic stenosis     Arthritis     Carpal tunnel syndrome     Coronary artery disease     Dementia     Depression     Diabetes mellitus     GERD (gastroesophageal reflux disease)     Hx of rectal polypectomy     Hypercholesterolemia     Hypertension     Obstructive sleep apnea     Respiratory distress     S/P TAVR (transcatheter aortic valve replacement)     TIA (transient ischemic attack)        Social History     Socioeconomic History    Marital status:    Tobacco Use    Smoking status: Never    Smokeless tobacco: Never   Substance and Sexual Activity    Alcohol use: Never    Drug use: Never       Precautions:     Allergies as of 12/11/2022 - Reviewed 12/11/2022   Allergen Reaction Noted    Statins-hmg-coa reductase inhibitors  10/22/2019    Bimatoprost  02/29/2020    Cortisone  06/01/2022    Dorzolamide-timolol  02/29/2020    Gabapentin  06/01/2022    Hydrocortisone  10/22/2019    Tafluprost (pf)  02/29/2020       WOC Assessment Details/Treatment      12/27/22 1029        Altered Skin Integrity 12/17/22 0420 Right Antecubital   Date First Assessed/Time First Assessed: 12/17/22 0420   Altered Skin Integrity Present on Admission: suspected hospital acquired  Side: Right  Location: Antecubital   Wound Image    Description of Altered Skin Integrity Partial thickness tissue loss. Shallow open ulcer with a red or pink wound bed, without slough. Intact or Open/Ruptured Serum-filled blister.   Dressing Appearance Dry;Intact;Clean   Drainage Amount Scant   Drainage Characteristics/Odor Serosanguineous   Appearance Pink;Red   Tissue loss description Partial thickness   Red (%), Wound Tissue Color 100 %   Periwound Area Intact   Wound Edges Irregular   Wound  Length (cm) 8 cm   Wound Width (cm) 3 cm   Wound Surface Area (cm^2) 24 cm^2   Care Cleansed with:;Sterile normal saline   Dressing Applied;Foam  (bactroban)     WOCN follow up. Family at bedside. Nursing to continue with turning every two hours, wedge and floating heels. Improvement noted to skin tears. Continue with current treatment regimen. Will follow up.     12/27/2022

## 2022-12-27 NOTE — CONSULTS
Consults    Patient Name: Miguel Angel Oliveros   MRN: [id number]   Admission Date: 12/11/2022   Hospital Length of Stay: 16   Attending Provider: Handy Balderrama MD   Consulting Provider: Zulay PICKARD  Reason for Consult: Goals of Care  Primary Care Physician: Barrett Lopez MD     Principal Problem: <principal problem not specified>     Patient information was obtained from relative(s) and ER records.      Final diagnoses:  [I21.4] NSTEMI (non-ST elevated myocardial infarction) (Primary)  [R06.02] Shortness of breath  [R06.00] Dyspnea, unspecified type  [R09.02] Hypoxia  [N17.9] SMITHA (acute kidney injury)     Assessment/Plan:     I reviewed the patient and family's understanding of the seriousness of the illness and its expected prognosis. We discussed the patient's goals of care and treatment preferences. We discussed the difference between palliative and curative medicine. I explained the differences between home health, palliative and hospice care. I clarified current code status. I identified the surrogate decision maker or health care POA. I explained the difference between a living will (advanced directive) and LaPost. We discussed the patient's chosen code status as listed above and the contents of the LaPOST. I answered all questions and we formulated a plan including recommendations for symptom management and how to best achieve goals of care.       Advance Care Planning     Date: 12/27/2022    [facility]  I engaged the family in a conversation about advance care planning and we specifically addressed what the goals of care would be moving forward, in light of the patient's change in clinical status, specifically dysphagia and debility.  We did specifically address the patient's likely prognosis, which is fair .  We explored the patient's values and preferences for future care.  The family endorses that what is most important right now is to focus on curative/life-prolongation (regardless of  treatment burdens)    Accordingly, we have decided that the best plan to meet the patient's goals includes continuing with treatment    Met with patient, son Alex, daughter Arnel, ACE Tirado--introduced service and discussed patient's history and current condition.  Family informed that patient has 2 adult children that live with her, and she is wheelchair bound at baseline.  Daughter informed that she does not understand how patient came to be in this condition.  Discussed her advanced age, chronic conditions and extensive medical history.  Discussed her fragility and potential for decompensation.  Discussed options of PEG tube and pleasure feedings.         Advance Care Planning     Date: 12/27/2022    Code Status  In light of the patients advanced and life limiting illness,I engaged the the family in a conversation about the patient's preferences for care  at the very end of life.  Discussed code status with family and asked them if patient had ever completed POA or LW to which they state she has not.  Discussed resuscitation and the likely poor outcomes that would result. Discussed the importance of addressing this when patient is stable.      Family  reports they would like to meet at 1000 am tomorrow to discuss further.  Jason Park arrived and suggested meeting at 1300 to allow for siblings work schedules.  Suggested that they discuss further and I will call in the morning to verify time.  Updated staff nurse who informed that family has already made decision for PEG placement which is occurring tomorrow.  Nurse and I verified with family that they are proceeding with PEG placement.  Jason Park informed that patient has been trying to pull out NG tube.  Discussed with family that patients can pull out PEG tubes as well, and that patients still desire to eat and drink with tube.  Family verbalized understanding.  Informed family that I would call in am to verify time.                 History  of Present Illness:     Patient is a 88 yo female with a PMHx of DMII, CAD s/p PCI, HTN, Ass s/p TAVR, HLD, GERD and PARAMJIT who originally presented to [facility] on 122/11/22 as a transfer from [place] by [facility] with the diagnosis of NSTEMI.  She had presented to [facility] ED with complaints of abdominal discomfort, bladder fullness and dysuria with decreased urine output.  Family had reported that patient gets several UTI's a year and reports at baseline, patient recognizes family members, able to feed herself and able to transfer to  to chair with assistance.  CTA chest revealed saddle pulmonary embolism with thrombi extending into the bilateral upper and lower lobes with evidence of right heart strain.  She was admitted to the ICU and found to be Covid positive 12/12 for which she has completed remdesivir and steroids.        Nephrology, cardiology, vascular surgery consulted.On 12/21 she received pulmonary artery thrombectomy after which she developed anasarca.  Speech has been consulted but patient has been unable to follow commands.  Receiving tube feedings per NGT and family has agreed to PEG placement on 12/28/22.  Palliative care consulted to discuss goals of care and code status.     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 10/05/2022     Resolved Ambulatory Problems     Diagnosis Date Noted    No Resolved Ambulatory Problems     Past Medical History:   Diagnosis Date    Anxiety disorder     Aortic stenosis     Arthritis     Carpal tunnel syndrome     Coronary artery disease     Dementia     Depression     Diabetes mellitus     GERD (gastroesophageal reflux disease)     Hx of rectal polypectomy     Hypercholesterolemia     Hypertension     Obstructive sleep apnea     Respiratory distress     S/P TAVR (transcatheter aortic valve replacement)     TIA (transient ischemic attack)         Past  Surgical History:   Procedure Laterality Date    CARDIAC SURGERY      CATARACT EXTRACTION      CORONARY STENT PLACEMENT      HYSTERECTOMY      INSERTION OF PACEMAKER      PARTIAL HYSTERECTOMY      PULMONARY EMBOLISM SURGERY N/A 12/21/2022    Procedure: EMBOLECTOMY, ARTERY, PULMONARY;  Surgeon: Terri Urbina MD;  Location: [facility] CATH LAB;  Service: Peripheral Vascular;  Laterality: N/A;    RECTAL POLYPECTOMY      TONSILLECTOMY      TRANSCATHETER AORTIC VALVE REPLACEMENT (TAVR)          Review of patient's allergies indicates:   Allergen Reactions    Statins-hmg-coa reductase inhibitors      Other reaction(s): Joint pain    Bimatoprost      Other reaction(s): Eye redness    Cortisone     Dorzolamide-timolol     Gabapentin      Other reaction(s): Confusion    Hydrocortisone     Tafluprost (pf)           Current Facility-Administered Medications:     0.9%  NaCl infusion (for blood administration), , Intravenous, Q24H PRN, Mukesh Denton MD    carvediloL tablet 25 mg, 25 mg, Per NG tube, BID, Mukesh Denton MD, 25 mg at 12/27/22 1002    ceFAZolin (ANCEF) 1 g in dextrose 5 % in water (D5W) 5 % 50 mL IVPB (MB+), 1 g, Intravenous, Once, CHRISTINE Gavin    cloNIDine 0.1 mg/24 hr td ptwk 1 patch, 1 patch, Transdermal, Q7 Days, Mukesh Denton MD, 1 patch at 12/26/22 1030    dextrose 10% bolus 125 mL 125 mL, 12.5 g, Intravenous, PRN, Mukesh Denton MD    dextrose 10% bolus 250 mL 250 mL, 25 g, Intravenous, PRN, Mukesh Denton MD    diazePAM tablet 2 mg, 2 mg, Per NG tube, Daily PRN, Mukesh Denton MD    enoxaparin injection 90 mg, 1 mg/kg, Subcutaneous, Q12H, Mukesh Denton MD, 90 mg at 12/27/22 1002    famotidine (PF) injection 20 mg, 20 mg, Intravenous, BID, Oswaldo Alonso MD, 20 mg at 12/27/22 1005    glucagon (human recombinant) injection 1 mg, 1 mg, Intramuscular, PRN, Mukesh Denton MD    haloperidol lactate injection 2 mg, 2 mg, Intramuscular, Nightly PRN, NEERAJ Durant    hydrALAZINE injection 5 mg, 5 mg, Intravenous, Q4H  "PRN, Mukesh Denton MD, 5 mg at 12/23/22 1621    insulin aspart U-100 injection 0-5 Units, 0-5 Units, Subcutaneous, Q6H PRN, Mukesh Denton MD, 2 Units at 12/25/22 0524    melatonin tablet 6 mg, 6 mg, Per NG tube, Nightly, Mukesh Denton MD, 6 mg at 12/26/22 2157    memantine tablet 10 mg, 10 mg, Per NG tube, BID, Mukesh Denton MD, 10 mg at 12/27/22 1002    mupirocin 2 % ointment, , Topical (Top), Daily, Mukesh Denton MD, Given at 12/27/22 0900    ondansetron injection 4 mg, 4 mg, Intravenous, Q8H PRN, Oswaldo Alonso MD    piperacillin-tazobactam (ZOSYN) 4.5 g in dextrose 5 % in water (D5W) 5 % 100 mL IVPB (MB+), 4.5 g, Intravenous, Q8H, Mukesh Denton MD, Stopped at 12/27/22 1132    QUEtiapine tablet 25 mg, 25 mg, Per NG tube, BID, Mukesh Denton MD, 25 mg at 12/27/22 1002    sodium chloride 0.9% flush 10 mL, 10 mL, Intravenous, PRN, Oswaldo Alonso MD    Facility-Administered Medications Ordered in Other Encounters:     betamethasone acetate-betamethasone sodium phosphate injection 6 mg, 6 mg, Intra-articular, , Zina Weston, FNP, 6 mg at 06/01/22 1345    betamethasone acetate-betamethasone sodium phosphate injection 6 mg, 6 mg, Intra-articular, , Zina Weston, FNP, 6 mg at 06/01/22 1345    LIDOcaine (PF) 20 mg/mL (2%) injection 5 mL, 5 mL, , , Zina Weston, FNP, 5 mL at 06/01/22 1345    LIDOcaine (PF) 20 mg/mL (2%) injection 5 mL, 5 mL, , , Zina Weston, FNP, 5 mL at 06/01/22 1345     sodium chloride, dextrose 10%, dextrose 10%, diazePAM, glucagon (human recombinant), haloperidol lactate, hydrALAZINE, insulin aspart U-100, ondansetron, sodium chloride 0.9%     Family History   Problem Relation Age of Onset    Diabetes Mother     Stroke Father     Hyperlipidemia Father     Breast cancer Sister         Review of Systems   Unable to perform ROS: Dementia          Objective:   /71   Pulse 70   Temp 98.6 °F (37 °C) (Axillary)   Resp 18   Ht 5' 5" (1.651 m)   Wt 86.2 kg (190 lb)   SpO2 (!) 94%   BMI " "31.62 kg/m²      Physical Exam  Constitutional:       Appearance: She is ill-appearing.   HENT:      Head: Normocephalic.   Eyes:      Pupils: Pupils are equal, round, and reactive to light.   Cardiovascular:      Rate and Rhythm: Normal rate and regular rhythm.   Pulmonary:      Effort: Pulmonary effort is normal.      Breath sounds: Normal breath sounds.   Abdominal:      Palpations: Abdomen is soft.   Musculoskeletal:      Comments: sarcopenia   Neurological:      Mental Status: She is alert.      Comments: To person         FAMILY CONTACTS:     Review of Symptoms  Review of Symptoms      Symptom Assessment (ESAS 0-10 Scale)  Pain:  0  Dyspnea:  0  Anxiety:  0  Nausea:  0  Depression:  0  Anorexia:  0  Fatigue:  0  Insomnia:  0  Restlessness:  0  Agitation:  0         Bowel Management Plan (BMP):  Yes      Performance Status:  30    Living Arrangements:  Lives with family    Psychosocial/Cultural:   See Palliative Psychosocial Note: No  **Primary  to Follow**  Palliative Care  Consult: No    Spiritual:  F - Rachell and Belief:  Advent  I - Importance:  Yes  C - Community:  Yes  A - Address in Care:  Yes    Advance Care Planning   Advance Directives:   Living Will: No    Do Not Resuscitate Status: No    Medical Power of : No      Decision Making:  Family answered questions        PAINAD: States that she "hurts all over."     Caregiver burden formerly assessed: yes        > 50% of 60 min of encounter was spent in chart review, face to face discussion of goals of care, symptom assessment, coordination of care and emotional support.         Zulay Grace FNP, [facility]  Palliative Medicine  [facility] -  "

## 2022-12-27 NOTE — PT/OT/SLP PROGRESS
SLP attempting to see pt for dysphagia tx, however pt with reduced JESSICA and refusing PO trials of puree. Pt not appropriate at this time. Cont rec: NPO. SLP to continue to follow and will reattempt later as appropriate/schedule allows.

## 2022-12-27 NOTE — PROGRESS NOTES
EMR reviewed in detail.  Review progress note, nurse's notes, labs.  Thrombocytopenia improved. Platelets today 70k. She had platelet transfusion on 12/25/2022    Discussed case with hospitalist, Dr Balderrama, I agree with him and she will benefit of palliative care has been consulted.    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  Again I will not recommend bone marrow biopsy.  Thrombocytopenia multifactorial, patient with multiple medical problems including recent COVID infection, PE.   HIT antibodies neg    Continue to monitor closely  I will not recommend bone marrow biopsy at this time.  Transfuse platelets for any evidence of bleeding   Due to recent PE transfuse platelets to keep around 50k so she can continue anticoagulation  Hold anticoagulation for platelets < 40 k  Agree with palliative care consult    Deepika Ac MD  Hematology/Oncology

## 2022-12-27 NOTE — PROGRESS NOTES
[facility] Medicine Progress Note        Chief Complaint: Inpatient Follow-up for Encephalopathy     HPI:   Miguel Angel Oliveros is an 89-year-old female with PMH of DMII, CAD s/p PCI, HTN, AS s/p TAVR, HLD, GERD, and PARAMJIT, who is admitted to [facility] ICU from regular unit after being found to have altered mental status and increased respiratory distress. She was initially admitted to [facility] on 2022 by [facility] after being transferred from [place] with the diagnosis of NSTEMI. At the time of encounter, patient states having lower abdominal discomfort, bladder fullness, and dysuria. She states that these symptoms have been persisting for the past few days accompanied by decreased urinary output. Per patient's family members at beside, she usually gets UTI several times each year and she has had 4 episodes of UTI in  (last UTI was 3 months ago).  Daughter reported that patient as baseline is able to converse with family member, recognizes everyone, able to sit at the table and feed herself, able to transfer herself from bed to wheelchair and wheelchair to chair with some assistance.  Stated she has some good days and bad days in a way that sometimes she asked about her old friends who have  but otherwise her dementia is not too bad.  She is known to Dr. Montalvo in outpatient setting.  Reported she was sick at home a week prior to admission to our facility.  Initially she went to ER because she was severely constipated which has since resolved, after that she went back to her PCP given she was short of breath and fatigued and lethargic.  Then she was brought here on  when she was very lethargic to the point that she needed max assist to move so they called EMS.  Daughter also reported in ICU on Thursday afternoon she was feeling her best, recognized her brother, sister, her grandkids.  But Thursday afternoon she started becoming more lethargic and sleepy and since  then she has not improved  CTA Chest- Saddle pulmonary embolism with thrombi extending into the bilateral upper and lower lobes and evidence of right heart strain  12/18- Heparin changed to Argatroban gtt- aptt per protocol, cards recommended to continue Argatroban for now and once able to swallow, switch to apixiban oral prior to discharge   Consulted Dr Urbina--> reordered Echo- showing EF o 65%, moderate tricuspid regurg, severe right ventricular enlargement.  Severe right atrial enlargement.  Mild mitral stenosis, Repeat Upper and Lower extremities u/s- official report pending  Case personally discussed with Dr. Urbina  12/21- S/P Pulm artery thrombectomy   Developed anasarca, d/c IVF, received Lasix 40 mg IV x 1 on 12/21, again on 12/22 and today I will give her another 20 mg IV x 1   Hypokalemia noted probably due to lasix IV, ordered Kphos 30 mmoles IV x 1   Monitor fluid levels  Patient apparently lives at home with family and has a significant amount of DME as well as support.  She has not really been assessed for any oral nutrition since she has been here because of her increased oxygen requirements early in admission   Currently on supplemental oxygen via nasal cannula at 4 liters/minute, requested weaning oxygen as able   Consulted speech though pt was lethargic and not following commands over the weekend, so was unable to assess . She is still not safe to attempt MBS per speech therapy  Completed remdesivir (5 days) and dexamethasone (10 days)  Completed 7 days treatment with zosyn  Transaminases are beginning to trend down, monitor closely  Appreciate the help from Nephrology, cardiology services, vascular services. Nephro and Cards signed off  Developed leukocytosis again, concern she is aspirating her own secretions, started Zosyn again for 7 days  Leukocytosis trending down  12/24- on RA   12/25- Switched xarelto to lovenox 1 mg/kg BID in the interim to get PEG tube Wednesday morning, she will  again have to restart her xarelto 15 mg BID x 21 days and then 20 mg daily  12/26- woke up this morning, wanting to get out of the bed and wanted water, requested Speech to evaluate and pt would not answer or follow commands       Interval Hx:   Patient today very lethargic, non verbal. On NGT feedings. Has been afebrile.     Objective/physical exam:  General: Lethargic and non verbal   Chest: Clear to auscultation bilaterally  Heart: RRR, +S1, S2, no appreciable murmur  Abdomen: Soft, nontender, BS +    VITAL SIGNS: 24 HRS MIN & MAX LAST   Temp  Min: 97.2 °F (36.2 °C)  Max: 98.6 °F (37 °C) 98.6 °F (37 °C)   BP  Min: 123/71  Max: 144/72 123/71   Pulse  Min: 70  Max: 79  70   Resp  Min: 18  Max: 18 18   SpO2  Min: 92 %  Max: 98 % (!) 94 %         Recent Labs   Lab 12/25/22  1449 12/26/22  0413 12/27/22  0836   WBC 12.0* 10.1 9.5   RBC 3.23* 3.03* 3.31*   HGB 8.8* 8.3* 9.1*   HCT 27.7* 27.0* 29.3*   MCV 85.8 89.1 88.5   MCH 27.2 27.4 27.5   MCHC 31.8* 30.7* 31.1*   RDW 16.2* 16.4* 16.0*   PLT 55* 74* 70*   MPV  --  12.4  --        Recent Labs   Lab 12/22/22  0430 12/23/22  0510 12/24/22  0637 12/24/22  1555 12/26/22  0413    141 140  --  142   K 3.7 3.1* 3.5  --  3.3*   CO2 20* 22* 26  --  26   BUN 53.8* 40.5* 40.6*  --  33.9*   CREATININE 0.78 0.63 0.77  --  0.74   CALCIUM 9.3 9.1 8.4  --  8.3*   MG 2.20 1.80  --   --  2.00   ALBUMIN  --  2.6*  --  2.1* 2.0*   ALKPHOS  --  113  --   --  104   ALT  --  238*  --   --  81*   AST  --  65*  --   --  31   BILITOT  --  1.4  --   --  1.4          Microbiology Results (last 7 days)       Procedure Component Value Units Date/Time    Blood Culture [[id number]]  (Normal) Collected: 12/23/22 1046    Order Status: Completed Specimen: Blood from Arm, Left Updated: 12/27/22 1201     CULTURE, BLOOD ([facility]) No Growth At 96 Hours    Blood Culture [[id number]]  (Normal) Collected: 12/23/22 1046    Order Status: Completed Specimen: Blood from Arm, Right Updated: 12/27/22 1201      CULTURE, BLOOD ([facility]) No Growth At 96 Hours             See below for Radiology    Scheduled Med:   carvediloL  25 mg Per NG tube BID    ceFAZolin (ANCEF) IVPB  1 g Intravenous Once    cloNIDine 0.1 mg/24 hr td ptwk  1 patch Transdermal Q7 Days    enoxaparin  1 mg/kg Subcutaneous Q12H    famotidine (PF)  20 mg Intravenous BID    melatonin  6 mg Per NG tube Nightly    memantine  10 mg Per NG tube BID    mupirocin   Topical (Top) Daily    piperacillin-tazobactam (ZOSYN) IVPB  4.5 g Intravenous Q8H    QUEtiapine  25 mg Per NG tube BID        Continuous Infusions:       PRN Meds:  sodium chloride, dextrose 10%, dextrose 10%, diazePAM, glucagon (human recombinant), haloperidol lactate, hydrALAZINE, insulin aspart U-100, ondansetron, sodium chloride 0.9%       Assessment/Plan:  Altered mental status likely 2/2 sepsis and acute metabolic encephalopathy- underlying advanced dementia also present   NSTEMI Type II in the setting of Acute COVID Infection, SMITHA, and IVC/ portal vein Thrombus/ saddle PE  COVID positive on admission to ICU- 12/12- day 10 of diagnosis - Pt out of isolation  Bladder cancer s/p resection in October 2022  CT ABD & pelvis 12/11/2022 shows heterogeneous hyperdense mass lesion in the superior and left lateral aspect of the urinary bladder  Per patient's family at bedside, patient will be receiving additional imaging to stage bladder cancer  HX of HTN, DM II, TIA, aortic stenosis s/p TAVR, CAD s/p PCI, GERD, PARAMJIT  IVC and Portal Vein Thrombosis with High probability of Pulmonary Embolism with Marked RV Strain - On Heparin gtt  Thrombocytopenia- HIT negative--> still trending down  Saddle PE with right heart strain--> s/p pulmonary thrombectomy 12/21  Anasarca- improved   Hypokalemia - replaced     Acute renal failure/ ATN - resolved   Lactic acidosis likely 2/2 acute renal failure and sepsis: resolved   Mild hyperkalemia probably due to clinimic E, resolved   Hematuria - blood thinners related-  resolved      Plan:  Patient very lethargic and non verbal today   Per family patient did not sleep well last night  She is on NGT feedings. F/U by GI for peg placement   Has been afebrile  On IV zosyn day 5, will stop in am   Cont FD lovenox for PE   H&H stable  Renal functions stable  She is awaiting Peg placement   Discussed with family about goals of care, as of now they want to talk to rest of the family  Daughter does understand patients overall poor prognosis     Cont supportive care     Labs in am    Consulted palliative care today       Critical care note:  Critical care diagnosis: PE needing FD lovenox  Critical care interventions: Hands-on evaluation, review of labs/radiographs/records and discussion with patient and family if present  Critical care time spent: 35 minutes       VTE prophylaxis: Lovenox     Patient condition:  Guarded    Anticipated discharge and Disposition:         All diagnosis and differential diagnosis have been reviewed; assessment and plan has been documented; I have personally reviewed the labs and test results that are presently available; I have reviewed the patients medication list; I have reviewed the consulting providers response and recommendations. I have reviewed or attempted to review medical records based upon their availability    All of the patient's questions have been  addressed and answered. Patient's is agreeable to the above stated plan. I will continue to monitor closely and make adjustments to medical management as needed.  _____________________________________________________________________    Nutrition Status:    Radiology:  X-Ray Abdomen AP 1 View  Narrative: EXAMINATION:  XR ABDOMEN AP 1 VIEW    CLINICAL HISTORY:  NG tube placement;    TECHNIQUE:  AP view of the abdomen.    COMPARISON:  X-rays dated 12/23/2022    FINDINGS:  The nasogastric tube has its tip over the stomach.  Impression: Nasogastric tube with tip over the stomach.    Electronically signed  by: Tiana Negro  Date:    12/27/2022  Time:    07:53      Handy Balderrama MD   12/27/2022

## 2022-12-27 NOTE — PROGRESS NOTES
Inpatient Nutrition Assessment    Admit Date: 12/11/2022   Total duration of encounter: 16 days     Nutrition Recommendation/Prescription     - diet per SLP recs once appropriate    - rec NG tube feeding if unable to begin oral diet:  Diabetisource goal rate 65ml/hr, free water flush rec 65ml every 3 hours  Provides:  1560kcal (100% est needs)  78 gm protein (113% est needs)  1580ml free water (100% est needs)      Communication of Recommendations: reviewed with nurse    Nutrition Assessment     Malnutrition Assessment/Nutrition-Focused Physical Exam    Malnutrition in the context of chronic illness  Degree of Malnutrition: does not meet criteria  Energy Intake: unable to obtain  Interpretation of Weight Loss: does not meet criteria  Body Fat: does not meet criteria  Area of Body Fat Loss: does not meet criteria  Muscle Mass Loss: mild depletion  Area of Muscle Mass Loss: temple region - temporalis muscle  Fluid Accumulation: does not meet criteria  Edema: no edema present  Reduced  Strength: unable to obtain  A minimum of two characteristics is recommended for diagnosis of either severe or non-severe malnutrition.    Chart Review    Reason Seen: malnutrition screening tool    MST Score: 3  Have you recently lost weight without trying?: Yes: Unsure how much  Have you been eating poorly because of a decreased appetite?: Yes     Diagnosis:  Altered mental status likely 2/2 sepsis and metabolic encephalopathy  Acute renal failure   Lactic acidosis likely 2/2 acute renal failure and sepsis  NSTEMI   COVID positive   Transminitis     Relevant Medical History: CAD, dementia, depression, DM, GERD, high chol, HTN, TIA    Nutrition-Related Medications: NS @ 125ml/hr  Calorie Containing IV Medications: no significant kcals from medications at this time    Nutrition-Related Labs:  12/14 Na 134, Cl 94, BUN 87.2, Crea 2.81, Glu 150, Phos 7.9  12/19: K 5.6, Cl 110, BUN 52.2, Crea 1.06, GFR 50, Glu 310, Mg 2.8, , ALT  "709  12/23: WBC 20.5, K 3.1, Cl 109, BUN 40.5, Glu 196, AST 65,   12/26: K 3.3, BUN 33.9, Glu 179, Ca 8.3    Diet/PN Order: Diet NPO  Oral Supplement Order: none  Tube Feeding Order: not applicable  Appetite/Oral Intake: NPO/NPO  Factors Affecting Nutritional Intake: NPO  Food/[place]/Cultural Preferences: none reported  Food Allergies: none reported    Skin Integrity: abrasion, skin tear  Wound(s):      Altered Skin Integrity 12/17/22 0420 Right Antecubital-Tissue loss description: Partial thickness     Comments    12/14/22: Plans for diet advancement per RN. No NG in place at this time. Noted MST, Previous EMR wts indicate wt gain over past 2 months. Unable to verify subjective info with pt, sleeping soundly.    12/19:  pt on PPN, failed MBS, has had very little nutrition since admit; rec tube feeding until able to begin oral diet    12/23: pt remains NPO, family agreeable to NG tube for now with plans for PEG placement next week; tube feeding to start today    12/27 pt tolerating NG tube feeding; plans for PEG placement tomorrow    Anthropometrics    Height: 5' 5" (165.1 cm)    Last Weight: 86.2 kg (190 lb) (12/18/22 0617) Weight Method: Bed Scale  BMI (Calculated): 31.6  BMI Classification: obese grade I (BMI 30-34.9)     Ideal Body Weight (IBW), Female: 125 lb     % Ideal Body Weight, Female (lb): 152 %                             Usual Weight Provided By: unable to obtain usual weight    Wt Readings from Last 5 Encounters:   12/18/22 86.2 kg (190 lb)   12/11/22 81.6 kg (180 lb)   12/01/22 81.6 kg (180 lb)   11/30/22 77.1 kg (170 lb)   10/15/22 77.1 kg (170 lb)     Weight Change(s) Since Admission:  Admit Weight: 86.2 kg (190 lb) (12/11/22 2120)    Estimated Needs    Weight Used For Calorie Calculations: 86.2 kg (190 lb 0.6 oz)  Energy Calorie Requirements (kcal): 1564kcal (1.2 stress factor)  Energy Need Method: Murray- Jeor  Weight Used For Protein Calculations: 86.2 kg (190 lb 0.6 oz)  Protein " Requirements: 69gm (0.8g/kg)  Fluid Requirements (mL): 1564ml (1ml/kcal)  Temp: 98.6 °F (37 °C)       Enteral Nutrition    Formula: Diabetisource AC  Rate/Volume: 65ml/hr  Water Flushes: 65ml every 3 hours  Additives/Modulars: none at this time  Route: nasogastric tube  Method: continuous  Total Nutrition Provided by Tube Feeding, Additives, and Flushes:  Calories Provided  1560 kcal/d, 100% needs   Protein Provided  78 g/d, 113% needs   Fluid Provided  1580 ml/d, 100% needs   Continuous feeding calculations based on estimated 20 hr/d run time unless otherwise stated.    Parenteral Nutrition    Patient not receiving parenteral nutrition at this time.      Evaluation of Received Nutrient Intake    Calories: meeting estimated needs  Protein: meeting estimated needs    Patient Education    Not applicable.    Nutrition Diagnosis     PES: Inadequate oral intake related to current condition as evidenced by NPO since admit. (continues)    Interventions/Goals     Intervention(s): general/healthful diet, commercial beverage, and collaboration with other providers  Goal: Meet greater than 75% of nutritional needs by follow-up. (goal met)    Monitoring & Evaluation     Dietitian will monitor energy intake.  Nutrition Risk/Follow-Up: high (follow-up in 1-4 days)   Please consult if re-assessment needed sooner.

## 2022-12-28 ENCOUNTER — ANESTHESIA EVENT (OUTPATIENT)
Dept: ENDOSCOPY | Facility: HOSPITAL | Age: 87
DRG: 163 | End: 2022-12-28
Payer: MEDICARE

## 2022-12-28 ENCOUNTER — ANESTHESIA (OUTPATIENT)
Dept: ENDOSCOPY | Facility: HOSPITAL | Age: 87
DRG: 163 | End: 2022-12-28
Payer: MEDICARE

## 2022-12-28 LAB
BACTERIA BLD CULT: NORMAL
BACTERIA BLD CULT: NORMAL
BASOPHILS # BLD AUTO: 0.01 X10(3)/MCL (ref 0–0.2)
BASOPHILS NFR BLD AUTO: 0.2 %
EOSINOPHIL # BLD AUTO: 0.1 X10(3)/MCL (ref 0–0.9)
EOSINOPHIL NFR BLD AUTO: 1.5 %
ERYTHROCYTE [DISTWIDTH] IN BLOOD BY AUTOMATED COUNT: 16.4 % (ref 11–14.5)
HCT VFR BLD AUTO: 27 % (ref 37–47)
HGB BLD-MCNC: 8.3 GM/DL (ref 12–16)
IMM GRANULOCYTES # BLD AUTO: 0.05 X10(3)/MCL (ref 0–0.04)
IMM GRANULOCYTES NFR BLD AUTO: 0.8 %
INR BLD: 1.17 (ref 0–1.3)
KAPPA LC FREE SER-MCNC: 2.87 MG/DL (ref 0.33–1.94)
KAPPA LC FREE/LAMBDA FREE SER: 1.25 {RATIO} (ref 0.26–1.65)
LAMBDA LC FREE SERPL-MCNC: 2.29 MG/DL (ref 0.57–2.63)
LYMPHOCYTES # BLD AUTO: 0.93 X10(3)/MCL (ref 0.6–4.6)
LYMPHOCYTES NFR BLD AUTO: 14.3 %
MCH RBC QN AUTO: 26.9 PG
MCHC RBC AUTO-ENTMCNC: 30.7 MG/DL (ref 33–36)
MCV RBC AUTO: 87.4 FL (ref 80–94)
MONOCYTES # BLD AUTO: 0.41 X10(3)/MCL (ref 0.1–1.3)
MONOCYTES NFR BLD AUTO: 6.3 %
NEUTROPHILS # BLD AUTO: 4.99 X10(3)/MCL (ref 2.1–9.2)
NEUTROPHILS NFR BLD AUTO: 76.9 %
NRBC BLD AUTO-RTO: 0.3 % (ref 0–1)
PLATELET # BLD AUTO: 56 X10(3)/MCL (ref 140–371)
PMV BLD AUTO: ABNORMAL FL
POCT GLUCOSE: 133 MG/DL (ref 70–110)
POCT GLUCOSE: 184 MG/DL (ref 70–110)
POCT GLUCOSE: 187 MG/DL (ref 70–110)
PROTHROMBIN TIME: 14.8 SECONDS (ref 12.5–14.5)
RBC # BLD AUTO: 3.09 X10(6)/MCL (ref 4.2–5.4)
VIT B12 SERPL-MCNC: >1400 NG/L (ref 180–914)
WBC # SPEC AUTO: 6.5 X10(3)/MCL (ref 4.5–11.5)

## 2022-12-28 PROCEDURE — 99233 SBSQ HOSP IP/OBS HIGH 50: CPT | Mod: ,,, | Performed by: NURSE PRACTITIONER

## 2022-12-28 PROCEDURE — 63600175 PHARM REV CODE 636 W HCPCS: Performed by: INTERNAL MEDICINE

## 2022-12-28 PROCEDURE — 27201423 OPTIME MED/SURG SUP & DEVICES STERILE SUPPLY: Performed by: STUDENT IN AN ORGANIZED HEALTH CARE EDUCATION/TRAINING PROGRAM

## 2022-12-28 PROCEDURE — 25000003 PHARM REV CODE 250: Performed by: NURSE PRACTITIONER

## 2022-12-28 PROCEDURE — 21400001 HC TELEMETRY ROOM

## 2022-12-28 PROCEDURE — 36415 COLL VENOUS BLD VENIPUNCTURE: CPT | Performed by: NURSE PRACTITIONER

## 2022-12-28 PROCEDURE — 25000003 PHARM REV CODE 250: Performed by: INTERNAL MEDICINE

## 2022-12-28 PROCEDURE — 85610 PROTHROMBIN TIME: CPT | Performed by: NURSE PRACTITIONER

## 2022-12-28 PROCEDURE — 37000008 HC ANESTHESIA 1ST 15 MINUTES: Performed by: STUDENT IN AN ORGANIZED HEALTH CARE EDUCATION/TRAINING PROGRAM

## 2022-12-28 PROCEDURE — 25000003 PHARM REV CODE 250: Performed by: NURSE ANESTHETIST, CERTIFIED REGISTERED

## 2022-12-28 PROCEDURE — 43246 EGD PLACE GASTROSTOMY TUBE: CPT | Performed by: STUDENT IN AN ORGANIZED HEALTH CARE EDUCATION/TRAINING PROGRAM

## 2022-12-28 PROCEDURE — 37000009 HC ANESTHESIA EA ADD 15 MINS: Performed by: STUDENT IN AN ORGANIZED HEALTH CARE EDUCATION/TRAINING PROGRAM

## 2022-12-28 PROCEDURE — 85025 COMPLETE CBC W/AUTO DIFF WBC: CPT | Performed by: NURSE PRACTITIONER

## 2022-12-28 PROCEDURE — 88305 TISSUE EXAM BY PATHOLOGIST: CPT | Performed by: STUDENT IN AN ORGANIZED HEALTH CARE EDUCATION/TRAINING PROGRAM

## 2022-12-28 PROCEDURE — 63600175 PHARM REV CODE 636 W HCPCS: Performed by: NURSE ANESTHETIST, CERTIFIED REGISTERED

## 2022-12-28 PROCEDURE — 99233 PR SUBSEQUENT HOSPITAL CARE,LEVL III: ICD-10-PCS | Mod: ,,, | Performed by: NURSE PRACTITIONER

## 2022-12-28 RX ORDER — SODIUM CHLORIDE, SODIUM GLUCONATE, SODIUM ACETATE, POTASSIUM CHLORIDE AND MAGNESIUM CHLORIDE 30; 37; 368; 526; 502 MG/100ML; MG/100ML; MG/100ML; MG/100ML; MG/100ML
INJECTION, SOLUTION INTRAVENOUS CONTINUOUS
Status: CANCELLED | OUTPATIENT
Start: 2022-12-28 | End: 2023-01-27

## 2022-12-28 RX ORDER — PROPOFOL 10 MG/ML
VIAL (ML) INTRAVENOUS
Status: DISCONTINUED | OUTPATIENT
Start: 2022-12-28 | End: 2022-12-28

## 2022-12-28 RX ORDER — ONDANSETRON 2 MG/ML
4 INJECTION INTRAMUSCULAR; INTRAVENOUS DAILY PRN
Status: CANCELLED | OUTPATIENT
Start: 2022-12-28

## 2022-12-28 RX ORDER — PROPOFOL 10 MG/ML
VIAL (ML) INTRAVENOUS
Status: COMPLETED
Start: 2022-12-28 | End: 2022-12-28

## 2022-12-28 RX ORDER — SODIUM CHLORIDE, SODIUM LACTATE, POTASSIUM CHLORIDE, CALCIUM CHLORIDE 600; 310; 30; 20 MG/100ML; MG/100ML; MG/100ML; MG/100ML
INJECTION, SOLUTION INTRAVENOUS CONTINUOUS
Status: CANCELLED | OUTPATIENT
Start: 2022-12-28

## 2022-12-28 RX ORDER — PANTOPRAZOLE SODIUM 40 MG/1
40 FOR SUSPENSION ORAL DAILY
Status: DISCONTINUED | OUTPATIENT
Start: 2022-12-28 | End: 2022-12-30 | Stop reason: HOSPADM

## 2022-12-28 RX ORDER — LIDOCAINE HYDROCHLORIDE 20 MG/ML
INJECTION, SOLUTION EPIDURAL; INFILTRATION; INTRACAUDAL; PERINEURAL
Status: COMPLETED
Start: 2022-12-28 | End: 2022-12-28

## 2022-12-28 RX ADMIN — PIPERACILLIN AND TAZOBACTAM 4.5 G: 4; .5 INJECTION, POWDER, LYOPHILIZED, FOR SOLUTION INTRAVENOUS; PARENTERAL at 01:12

## 2022-12-28 RX ADMIN — SODIUM CHLORIDE, SODIUM GLUCONATE, SODIUM ACETATE, POTASSIUM CHLORIDE AND MAGNESIUM CHLORIDE: 526; 502; 368; 37; 30 INJECTION, SOLUTION INTRAVENOUS at 10:12

## 2022-12-28 RX ADMIN — Medication 6 MG: at 08:12

## 2022-12-28 RX ADMIN — ENOXAPARIN SODIUM 90 MG: 100 INJECTION SUBCUTANEOUS at 08:12

## 2022-12-28 RX ADMIN — PROPOFOL 30 MG: 10 INJECTION, EMULSION INTRAVENOUS at 11:12

## 2022-12-28 RX ADMIN — QUETIAPINE FUMARATE 25 MG: 25 TABLET ORAL at 08:12

## 2022-12-28 RX ADMIN — LIDOCAINE HYDROCHLORIDE 5 ML: 20 INJECTION, SOLUTION EPIDURAL; INFILTRATION; INTRACAUDAL; PERINEURAL at 10:12

## 2022-12-28 RX ADMIN — MEMANTINE 10 MG: 5 TABLET ORAL at 08:12

## 2022-12-28 RX ADMIN — PIPERACILLIN AND TAZOBACTAM 4.5 G: 4; .5 INJECTION, POWDER, LYOPHILIZED, FOR SOLUTION INTRAVENOUS; PARENTERAL at 10:12

## 2022-12-28 RX ADMIN — PROPOFOL 50 MG: 10 INJECTION, EMULSION INTRAVENOUS at 10:12

## 2022-12-28 RX ADMIN — CARVEDILOL 25 MG: 12.5 TABLET, FILM COATED ORAL at 08:12

## 2022-12-28 RX ADMIN — PROPOFOL 20 MG: 10 INJECTION, EMULSION INTRAVENOUS at 10:12

## 2022-12-28 RX ADMIN — ACETAMINOPHEN 650 MG: 325 TABLET, FILM COATED ORAL at 08:12

## 2022-12-28 NOTE — PLAN OF CARE
Problem: Adult Inpatient Plan of Care  Goal: Plan of Care Review  Outcome: Ongoing, Progressing  Goal: Patient-Specific Goal (Individualized)  Outcome: Ongoing, Progressing  Goal: Absence of Hospital-Acquired Illness or Injury  Outcome: Ongoing, Progressing  Goal: Optimal Comfort and Wellbeing  Outcome: Ongoing, Progressing  Goal: Readiness for Transition of Care  Outcome: Ongoing, Progressing     Problem: Infection  Goal: Absence of Infection Signs and Symptoms  Outcome: Ongoing, Progressing     Problem: Skin Injury Risk Increased  Goal: Skin Health and Integrity  Outcome: Ongoing, Progressing     Problem: Fall Injury Risk  Goal: Absence of Fall and Fall-Related Injury  Outcome: Ongoing, Progressing     Problem: Pain Acute  Goal: Acceptable Pain Control and Functional Ability  Outcome: Ongoing, Progressing     Problem: Adjustment to Illness (Stroke, Ischemic/Transient Ischemic Attack)  Goal: Optimal Coping  Outcome: Ongoing, Progressing     Problem: Bowel Elimination Impaired (Stroke, Ischemic/Transient Ischemic Attack)  Goal: Effective Bowel Elimination  Outcome: Ongoing, Progressing     Problem: Cerebral Tissue Perfusion (Stroke, Ischemic/Transient Ischemic Attack)  Goal: Optimal Cerebral Tissue Perfusion  Outcome: Ongoing, Progressing     Problem: Cognitive Impairment (Stroke, Ischemic/Transient Ischemic Attack)  Goal: Optimal Cognitive Function  Outcome: Ongoing, Progressing     Problem: Communication Impairment (Stroke, Ischemic/Transient Ischemic Attack)  Goal: Improved Communication Skills  Outcome: Ongoing, Progressing     Problem: Functional Ability Impaired (Stroke, Ischemic/Transient Ischemic Attack)  Goal: Optimal Functional Ability  Outcome: Ongoing, Progressing     Problem: Respiratory Compromise (Stroke, Ischemic/Transient Ischemic Attack)  Goal: Effective Oxygenation and Ventilation  Outcome: Ongoing, Progressing     Problem: Sensorimotor Impairment (Stroke, Ischemic/Transient Ischemic  Attack)  Goal: Improved Sensorimotor Function  Outcome: Ongoing, Progressing     Problem: Swallowing Impairment (Stroke, Ischemic/Transient Ischemic Attack)  Goal: Optimal Eating and Swallowing without Aspiration  Outcome: Ongoing, Progressing     Problem: Urinary Elimination Impaired (Stroke, Ischemic/Transient Ischemic Attack)  Goal: Effective Urinary Elimination  Outcome: Ongoing, Progressing     Problem: Impaired Wound Healing  Goal: Optimal Wound Healing  Outcome: Ongoing, Progressing     Problem: Coping Ineffective  Goal: Effective Coping  Outcome: Ongoing, Progressing

## 2022-12-28 NOTE — PLAN OF CARE
Problem: Adult Inpatient Plan of Care  Goal: Plan of Care Review  Outcome: Ongoing, Progressing  Goal: Patient-Specific Goal (Individualized)  Outcome: Ongoing, Progressing  Goal: Absence of Hospital-Acquired Illness or Injury  Outcome: Ongoing, Progressing  Goal: Optimal Comfort and Wellbeing  Outcome: Ongoing, Progressing  Goal: Readiness for Transition of Care  Outcome: Ongoing, Progressing     Problem: Infection  Goal: Absence of Infection Signs and Symptoms  Outcome: Ongoing, Progressing     Problem: Skin Injury Risk Increased  Goal: Skin Health and Integrity  Outcome: Ongoing, Progressing     Problem: Fall Injury Risk  Goal: Absence of Fall and Fall-Related Injury  Outcome: Ongoing, Progressing     Problem: Pain Acute  Goal: Acceptable Pain Control and Functional Ability  Outcome: Ongoing, Progressing     Problem: Adjustment to Illness (Stroke, Ischemic/Transient Ischemic Attack)  Goal: Optimal Coping  Outcome: Ongoing, Progressing

## 2022-12-28 NOTE — ANESTHESIA PREPROCEDURE EVALUATION
"                                                                                                             12/28/2022  Miguel Angel Oliveros is a 89 y.o., female presents for PEG tube    Per Dr. 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  Again I will not recommend bone marrow biopsy.  Thrombocytopenia multifactorial, patient with multiple medical problems including recent COVID infection, PE.   HIT antibodies neg     Continue to monitor closely  I will not recommend bone marrow biopsy at this time.  Transfuse platelets for any evidence of bleeding   Due to recent PE transfuse platelets to keep around 50k so she can continue anticoagulation  Hold anticoagulation for platelets < 40 k  Agree with palliative care consult     Deepika Ac MD  Hematology/Oncology"    "   Assessment/Plan:  Altered mental status likely 2/2 sepsis and acute metabolic encephalopathy- underlying advanced dementia also present   NSTEMI Type II in the setting of Acute COVID Infection, SMITHA, and IVC/ portal vein Thrombus/ saddle PE  COVID positive on admission to ICU- 12/12- day 10 of diagnosis - Pt out of isolation  Bladder cancer s/p resection in October 2022  a. CT ABD & pelvis 12/11/2022 shows heterogeneous hyperdense mass lesion in the superior and left lateral aspect of the urinary bladder  b. Per patient's family at bedside, patient will be receiving additional imaging to stage bladder cancer  HX of HTN, DM II, TIA, aortic stenosis s/p TAVR, CAD s/p PCI, GERD, PARAMJIT  IVC and Portal Vein Thrombosis with High probability of Pulmonary Embolism with " Marked RV Strain - On Heparin gtt  Thrombocytopenia- HIT negative--> still trending down  Saddle PE with right heart strain--> s/p pulmonary thrombectomy 12/21  Anasarca- improved   Hypokalemia - replaced      Acute renal failure/ ATN - resolved   Lactic acidosis likely 2/2 acute renal failure and sepsis: resolved   Mild hyperkalemia probably due to clinimic E, resolved   Hematuria - blood thinners related- resolved        Plan:  Patient very lethargic and non verbal today   Per family patient did not sleep well last night  She is on NGT feedings. F/U by GI for peg placement   Has been afebrile  On IV zosyn day 5, will stop in am   Cont FD lovenox for PE   H&H stable  Renal functions stable  She is awaiting Peg placement   Discussed with family about goals of care, as of now they want to talk to rest of the family  Daughter does understand patients overall poor prognosis      Cont supportive care      Labs in am     Consulted palliative care today      .   Latest Reference Range & Units 12/28/22 05:22   Hemoglobin 12.0 - 16.0 gm/dL 8.3 (L)   Hematocrit 37.0 - 47.0 % 27.0 (L)   Platelets 140 - 371 x10(3)/mcL 56 (L)   Protime 12.5 - 14.5 seconds 14.8 (H)   INR 0.00 - 1.30  1.17   (L): Data is abnormally low  (H): Data is abnormally high    Pre-op Assessment    I have reviewed the NPO Status.      Review of Systems      Physical Exam  General: Well nourished, Cooperative, Alert and Flat Affect  Elderly frail appearing- eyes open, non responsive to voice not communicative  Airway:  Mallampati: III   Mouth Opening: Normal  TM Distance: Normal  Tongue: Normal  Neck ROM: Normal ROM  Neck: Girth Increased    Dental:Top edentulous bottom intact  Chest/Lungs:  Clear to auscultation, Normal Respiratory Rate    Heart:  Rate: Normal  Rhythm: Regular Rhythm        Anesthesia Plan  Type of Anesthesia, risks & benefits discussed:    Anesthesia Type: Gen Natural Airway  Intra-op Monitoring Plan: Standard ASA Monitors  Post Op Pain  Control Plan: IV/PO Opioids PRN  Induction:  IV  Airway Plan: Direct  Informed Consent: Informed consent signed with the Patient representative and all parties understand the risks and agree with anesthesia plan.  All questions answered. Patient consented to blood products? No  ASA Score: 4  Day of Surgery Review of History & Physical: H&P Update referred to the surgeon/provider.  Anesthesia Plan Notes: Nasal cannula vs facemask supplemental oxygenation   For patients with PARAMJIT/obesity, may consider SuperNoval Nasal CPAP  Thrombocytopenic but per hematologist no transfusion needed at this level  Telephone consent from daughter Vivian George [phone]      Ready For Surgery From Anesthesia Perspective.     .

## 2022-12-28 NOTE — NURSING
Pt return to the floor from surgery, assessment done,  Dressing to abd is clean dry and intact, Large abd binder is in place, Reinforce teaching to family at bedside of NPO and able to use Peg after 4 hours.   Vital signs taken and charted

## 2022-12-28 NOTE — PROGRESS NOTES
[facility] Medicine Progress Note        Chief Complaint: Inpatient Follow-up for Encephalopathy     HPI:   Miguel Angel Oliveros is an 89-year-old female with PMH of DMII, CAD s/p PCI, HTN, AS s/p TAVR, HLD, GERD, and PARAMJIT, who is admitted to [facility] ICU from regular unit after being found to have altered mental status and increased respiratory distress. She was initially admitted to [facility] on 2022 by [facility] after being transferred from [place] with the diagnosis of NSTEMI. At the time of encounter, patient states having lower abdominal discomfort, bladder fullness, and dysuria. She states that these symptoms have been persisting for the past few days accompanied by decreased urinary output. Per patient's family members at beside, she usually gets UTI several times each year and she has had 4 episodes of UTI in  (last UTI was 3 months ago).  Daughter reported that patient as baseline is able to converse with family member, recognizes everyone, able to sit at the table and feed herself, able to transfer herself from bed to wheelchair and wheelchair to chair with some assistance.  Stated she has some good days and bad days in a way that sometimes she asked about her old friends who have  but otherwise her dementia is not too bad.  She is known to Dr. Montalvo in outpatient setting.  Reported she was sick at home a week prior to admission to our facility.  Initially she went to ER because she was severely constipated which has since resolved, after that she went back to her PCP given she was short of breath and fatigued and lethargic.  Then she was brought here on  when she was very lethargic to the point that she needed max assist to move so they called EMS.  Daughter also reported in ICU on Thursday afternoon she was feeling her best, recognized her brother, sister, her grandkids.  But Thursday afternoon she started becoming more lethargic and sleepy and since  then she has not improved  CTA Chest- Saddle pulmonary embolism with thrombi extending into the bilateral upper and lower lobes and evidence of right heart strain  12/18- Heparin changed to Argatroban gtt- aptt per protocol, cards recommended to continue Argatroban for now and once able to swallow, switch to apixiban oral prior to discharge   Consulted Dr Urbina--> reordered Echo- showing EF o 65%, moderate tricuspid regurg, severe right ventricular enlargement.  Severe right atrial enlargement.  Mild mitral stenosis, Repeat Upper and Lower extremities u/s- official report pending  Case personally discussed with Dr. Urbina  12/21- S/P Pulm artery thrombectomy   Developed anasarca, d/c IVF, received Lasix 40 mg IV x 1 on 12/21, again on 12/22 and today I will give her another 20 mg IV x 1   Hypokalemia noted probably due to lasix IV, ordered Kphos 30 mmoles IV x 1   Monitor fluid levels  Patient apparently lives at home with family and has a significant amount of DME as well as support.  She has not really been assessed for any oral nutrition since she has been here because of her increased oxygen requirements early in admission   Currently on supplemental oxygen via nasal cannula at 4 liters/minute, requested weaning oxygen as able   Consulted speech though pt was lethargic and not following commands over the weekend, so was unable to assess . She is still not safe to attempt MBS per speech therapy  Completed remdesivir (5 days) and dexamethasone (10 days)  Completed 7 days treatment with zosyn  Transaminases are beginning to trend down, monitor closely  Appreciate the help from Nephrology, cardiology services, vascular services. Nephro and Cards signed off  Developed leukocytosis again, concern she is aspirating her own secretions, started Zosyn again for 7 days  Leukocytosis trending down  On 12/25- Switched xarelto to lovenox 1 mg/kg BID in the interim to get PEG tube placement     Interval Hx:   Patient today  a little more awake, mumbling words. Not following verbal commands. S/P Peg tube placement today. No acute issues overnight. Has been afebrile.     Objective/physical exam:  General: Lethargic and mumbling words. Not following verbal commands.   Chest: Clear to auscultation bilaterally  Heart: RRR, +S1, S2, no appreciable murmur  Abdomen: Soft, nontender, BS +  Not moving ofelia LE    VITAL SIGNS: 24 HRS MIN & MAX LAST   Temp  Min: 97.3 °F (36.3 °C)  Max: 98.7 °F (37.1 °C) 97.4 °F (36.3 °C)   BP  Min: 90/52  Max: 174/73 (!) 165/62   Pulse  Min: 64  Max: 80  72   Resp  Min: 12  Max: 24 18   SpO2  Min: 94 %  Max: 100 % 99 %         Recent Labs   Lab 12/26/22  0413 12/27/22  0836 12/28/22  0522   WBC 10.1 9.5 6.5   RBC 3.03* 3.31* 3.09*   HGB 8.3* 9.1* 8.3*   HCT 27.0* 29.3* 27.0*   MCV 89.1 88.5 87.4   MCH 27.4 27.5 26.9   MCHC 30.7* 31.1* 30.7*   RDW 16.4* 16.0* 16.4*   PLT 74* 70* 56*   MPV 12.4  --   --        Recent Labs   Lab 12/22/22  0430 12/23/22  0510 12/24/22  0637 12/24/22  1555 12/26/22  0413    141 140  --  142   K 3.7 3.1* 3.5  --  3.3*   CO2 20* 22* 26  --  26   BUN 53.8* 40.5* 40.6*  --  33.9*   CREATININE 0.78 0.63 0.77  --  0.74   CALCIUM 9.3 9.1 8.4  --  8.3*   MG 2.20 1.80  --   --  2.00   ALBUMIN  --  2.6*  --  2.1* 2.0*   ALKPHOS  --  113  --   --  104   ALT  --  238*  --   --  81*   AST  --  65*  --   --  31   BILITOT  --  1.4  --   --  1.4          Microbiology Results (last 7 days)       Procedure Component Value Units Date/Time    Blood Culture [[id number]]  (Normal) Collected: 12/23/22 1046    Order Status: Completed Specimen: Blood from Arm, Left Updated: 12/28/22 1200     CULTURE, BLOOD ([facility]) No Growth at 5 days    Blood Culture [[id number]]  (Normal) Collected: 12/23/22 1046    Order Status: Completed Specimen: Blood from Arm, Right Updated: 12/28/22 1200     CULTURE, BLOOD ([facility]) No Growth at 5 days             See below for Radiology    Scheduled Med:   carvediloL  25 mg Per NG tube BID     ceFAZolin (ANCEF) IVPB  1 g Intravenous Once    cloNIDine 0.1 mg/24 hr td ptwk  1 patch Transdermal Q7 Days    enoxaparin  1 mg/kg Subcutaneous Q12H    famotidine (PF)  20 mg Intravenous BID    melatonin  6 mg Per NG tube Nightly    memantine  10 mg Per NG tube BID    mupirocin   Topical (Top) Daily    pantoprazole  40 mg Per G Tube Daily    piperacillin-tazobactam (ZOSYN) IVPB  4.5 g Intravenous Q8H    QUEtiapine  25 mg Per NG tube BID        Continuous Infusions:       PRN Meds:  sodium chloride, acetaminophen, dextrose 10%, dextrose 10%, diazePAM, glucagon (human recombinant), haloperidol lactate, hydrALAZINE, insulin aspart U-100, ondansetron, sodium chloride 0.9%       Assessment/Plan:  Altered mental status likely 2/2 sepsis and acute metabolic encephalopathy- underlying advanced dementia also present   NSTEMI Type II in the setting of Acute COVID Infection, SMITHA, and IVC/ portal vein Thrombus/ saddle PE  COVID positive on admission to ICU- 12/12- day 10 of diagnosis - Pt out of isolation  Bladder cancer s/p resection in October 2022  CT ABD & pelvis 12/11/2022 shows heterogeneous hyperdense mass lesion in the superior and left lateral aspect of the urinary bladder  Per patient's family at bedside, patient will be receiving additional imaging to stage bladder cancer  HX of HTN, DM II, TIA, aortic stenosis s/p TAVR, CAD s/p PCI, GERD, PARAMJIT  IVC and Portal Vein Thrombosis with High probability of Pulmonary Embolism with Marked RV Strain - On Heparin gtt  Thrombocytopenia- HIT negative--> still trending down  Saddle PE with right heart strain--> s/p pulmonary thrombectomy 12/21  Anasarca- improved   Hypokalemia - replaced     Acute renal failure/ ATN - resolved   Lactic acidosis likely 2/2 acute renal failure and sepsis: resolved   Mild hyperkalemia probably due to clinimic E, resolved   Hematuria - blood thinners related- resolved      Plan:  Patient continues to be weak and non verbal.   No signs of sepsis.  All iv antibiotics will be stopped   Has been afebrile  Peg tube placed today, will start diet when ok with GI team   Cont FD lovenox for PE   Platelets still low, monitor closely   H&H stable  Renal functions stable  Discussed with family about goals of care, as of now they want to talk to rest of the family  Daughter does understand patients overall poor prognosis     Cont supportive care     Labs in am    F/U on palliative care recommendations       Critical care note:  Critical care diagnosis: PE needing FD lovenox  Critical care interventions: Hands-on evaluation, review of labs/radiographs/records and discussion with patient and family if present  Critical care time spent: 35 minutes       VTE prophylaxis: Lovenox     Patient condition:  Guarded    Anticipated discharge and Disposition:         All diagnosis and differential diagnosis have been reviewed; assessment and plan has been documented; I have personally reviewed the labs and test results that are presently available; I have reviewed the patients medication list; I have reviewed the consulting providers response and recommendations. I have reviewed or attempted to review medical records based upon their availability    All of the patient's questions have been  addressed and answered. Patient's is agreeable to the above stated plan. I will continue to monitor closely and make adjustments to medical management as needed.  _____________________________________________________________________    Nutrition Status:    Radiology:  X-Ray Abdomen AP 1 View  Narrative: EXAMINATION:  XR ABDOMEN AP 1 VIEW    CLINICAL HISTORY:  NG tube placement;    TECHNIQUE:  AP view of the abdomen.    COMPARISON:  X-rays dated 12/23/2022    FINDINGS:  The nasogastric tube has its tip over the stomach.  Impression: Nasogastric tube with tip over the stomach.    Electronically signed by: Tiana Negro  Date:    12/27/2022  Time:    07:53      Handy Balderrama MD    12/28/2022

## 2022-12-28 NOTE — PROGRESS NOTES
Gastroenterology Progress Note    Interval History:   Patient more awake and alert today.  Though not oriented to place or time.  Daughters at bedside.  She remains in agreement with pursuing PEG tube.    ROS     Current Facility-Administered Medications   Medication Dose Route Frequency Provider Last Rate Last Admin    0.9%  NaCl infusion (for blood administration)   Intravenous Q24H PRN Mukesh Denton MD        acetaminophen tablet 650 mg  650 mg Per NG tube Q6H PRN NEERAJ Durant   650 mg at 12/27/22 2118    carvediloL tablet 25 mg  25 mg Per NG tube BID Mukesh Denton MD   25 mg at 12/27/22 2118    ceFAZolin (ANCEF) 1 g in dextrose 5 % in water (D5W) 5 % 50 mL IVPB (MB+)  1 g Intravenous Once CHRISTINE Gavin        cloNIDine 0.1 mg/24 hr td ptwk 1 patch  1 patch Transdermal Q7 Days Mukesh Denton MD   1 patch at 12/26/22 1030    dextrose 10% bolus 125 mL 125 mL  12.5 g Intravenous PRN Mukesh Denton MD        dextrose 10% bolus 250 mL 250 mL  25 g Intravenous PRN Mukesh Denton MD        diazePAM tablet 2 mg  2 mg Per NG tube Daily PRN Mukesh Denton MD        enoxaparin injection 90 mg  1 mg/kg Subcutaneous Q12H Mukesh Denton MD   90 mg at 12/27/22 1002    famotidine (PF) injection 20 mg  20 mg Intravenous BID Oswaldo Alonso MD   20 mg at 12/27/22 2130    glucagon (human recombinant) injection 1 mg  1 mg Intramuscular PRN Mukesh Denton MD        haloperidol lactate injection 2 mg  2 mg Intramuscular Nightly PRN NEERAJ Durant        hydrALAZINE injection 5 mg  5 mg Intravenous Q4H PRN Mkuesh Denton MD   5 mg at 12/23/22 1621    insulin aspart U-100 injection 0-5 Units  0-5 Units Subcutaneous Q6H PRN Mukesh Denton MD   2 Units at 12/25/22 0524    LIDOcaine (PF) 20 mg/mL (2%) 20 mg/mL (2 %) injection             melatonin tablet 6 mg  6 mg Per NG tube Nightly Mukesh Denton MD   6 mg at 12/27/22 2117    memantine tablet 10 mg  10 mg Per NG tube BID Mukesh Denton MD   10 mg at 12/27/22 2113    mupirocin 2 % ointment    "Topical (Top) Daily Mukesh Denton MD   Given at 12/27/22 0900    ondansetron injection 4 mg  4 mg Intravenous Q8H PRN Oswaldo Alonso MD        piperacillin-tazobactam (ZOSYN) 4.5 g in dextrose 5 % in water (D5W) 5 % 100 mL IVPB (MB+)  4.5 g Intravenous Q8H Mukesh Denton MD   Stopped at 12/28/22 0551    propofol (DIPRIVAN) 10 mg/mL IVP injection             QUEtiapine tablet 25 mg  25 mg Per NG tube BID Mukesh Denton MD   25 mg at 12/27/22 2117    sodium chloride 0.9% flush 10 mL  10 mL Intravenous PRN Oswaldo Alonso MD         Facility-Administered Medications Ordered in Other Encounters   Medication Dose Route Frequency Provider Last Rate Last Admin    betamethasone acetate-betamethasone sodium phosphate injection 6 mg  6 mg Intra-articular  Zina D Bouy, FNP   6 mg at 06/01/22 1345    betamethasone acetate-betamethasone sodium phosphate injection 6 mg  6 mg Intra-articular  Zina D Bouy, FNP   6 mg at 06/01/22 1345    LIDOcaine (PF) 20 mg/mL (2%) injection 5 mL  5 mL   Zina D Bouy, FNP   5 mL at 06/01/22 1345    LIDOcaine (PF) 20 mg/mL (2%) injection 5 mL  5 mL   Zina D Bouy, FNP   5 mL at 06/01/22 1345       Review of patient's allergies indicates:   Allergen Reactions    Statins-hmg-coa reductase inhibitors      Other reaction(s): Joint pain    Bimatoprost      Other reaction(s): Eye redness    Cortisone     Dorzolamide-timolol     Gabapentin      Other reaction(s): Confusion    Hydrocortisone     Tafluprost (pf)            Physical Examination  BP (!) 174/73 (BP Location: Left arm, Patient Position: Lying)   Pulse 70   Temp 97.3 °F (36.3 °C) (Tympanic)   Resp (!) 21   Ht 5' 5" (1.651 m)   Wt 86.2 kg (190 lb)   SpO2 100%   BMI 31.62 kg/m²   General appearance: alert, cooperative, no distress  Lungs: no respiratory distress. Comfortable on RA.  Heart: regular rate. No edema.  Abdomen:  Mild distention.  Soft otherwise.  Extremities: extremities symmetric; no clubbing, cyanosis, or " edema  Integument: Skin color, texture, turgor normal; no jaundice  Neurologic:  More alert.  Moving all extremities.  Psychiatric: no pressured speech; normal affect      Recent Results (from the past 48 hour(s))   POCT glucose    Collection Time: 12/26/22 12:16 PM   Result Value Ref Range    POCT Glucose 210 (H) 70 - 110 mg/dL   POCT glucose    Collection Time: 12/26/22  5:00 PM   Result Value Ref Range    POCT Glucose 177 (H) 70 - 110 mg/dL   POCT glucose    Collection Time: 12/26/22 11:17 PM   Result Value Ref Range    POCT Glucose 185 (H) 70 - 110 mg/dL   POCT glucose    Collection Time: 12/27/22  5:46 AM   Result Value Ref Range    POCT Glucose 218 (H) 70 - 110 mg/dL   CBC with Differential    Collection Time: 12/27/22  8:36 AM   Result Value Ref Range    WBC 9.5 4.5 - 11.5 x10(3)/mcL    RBC 3.31 (L) 4.20 - 5.40 x10(6)/mcL    Hgb 9.1 (L) 12.0 - 16.0 gm/dL    Hct 29.3 (L) 37.0 - 47.0 %    MCV 88.5 80.0 - 94.0 fL    MCH 27.5 pg    MCHC 31.1 (L) 33.0 - 36.0 mg/dL    RDW 16.0 (H) 11.0 - 14.5 %    Platelet 70 (L) 140 - 371 x10(3)/mcL    MPV      Neut % 83.1 %    Lymph % 8.6 %    Mono % 6.1 %    Eos % 1.3 %    Basophil % 0.2 %    Lymph # 0.81 0.6 - 4.6 x10(3)/mcL    Neut # 7.85 2.1 - 9.2 x10(3)/mcL    Mono # 0.58 0.1 - 1.3 x10(3)/mcL    Eos # 0.12 0 - 0.9 x10(3)/mcL    Baso # 0.02 0 - 0.2 x10(3)/mcL    IG# 0.07 (H) 0 - 0.04 x10(3)/mcL    IG% 0.7 %    NRBC% 0.0 0 - 1 %   Protime-INR    Collection Time: 12/27/22 10:27 AM   Result Value Ref Range    PT 15.6 (H) 12.5 - 14.5 seconds    INR 1.25 0.00 - 1.30   POCT glucose    Collection Time: 12/27/22  5:48 PM   Result Value Ref Range    POCT Glucose 184 (H) 70 - 110 mg/dL   POCT glucose    Collection Time: 12/28/22  1:37 AM   Result Value Ref Range    POCT Glucose 187 (H) 70 - 110 mg/dL   Protime-INR    Collection Time: 12/28/22  5:22 AM   Result Value Ref Range    PT 14.8 (H) 12.5 - 14.5 seconds    INR 1.17 0.00 - 1.30   CBC with Differential    Collection Time: 12/28/22   5:22 AM   Result Value Ref Range    WBC 6.5 4.5 - 11.5 x10(3)/mcL    RBC 3.09 (L) 4.20 - 5.40 x10(6)/mcL    Hgb 8.3 (L) 12.0 - 16.0 gm/dL    Hct 27.0 (L) 37.0 - 47.0 %    MCV 87.4 80.0 - 94.0 fL    MCH 26.9 pg    MCHC 30.7 (L) 33.0 - 36.0 mg/dL    RDW 16.4 (H) 11.0 - 14.5 %    Platelet 56 (L) 140 - 371 x10(3)/mcL    MPV      Neut % 76.9 %    Lymph % 14.3 %    Mono % 6.3 %    Eos % 1.5 %    Basophil % 0.2 %    Lymph # 0.93 0.6 - 4.6 x10(3)/mcL    Neut # 4.99 2.1 - 9.2 x10(3)/mcL    Mono # 0.41 0.1 - 1.3 x10(3)/mcL    Eos # 0.10 0 - 0.9 x10(3)/mcL    Baso # 0.01 0 - 0.2 x10(3)/mcL    IG# 0.05 (H) 0 - 0.04 x10(3)/mcL    IG% 0.8 %    NRBC% 0.3 0 - 1 %     CT Abdomen Pelvis With Contrast    Result Date: 11/30/2022  CT SCAN OF ABDOMEN AND PELVIS WITH CONTRAST: CPT 28434 Total DLP: 558 mGy-cm. Automatic exposure control was utilized to limit the radiation dose to the patient. Total contrast: 100.0 mL in unspecified peripheral vein. History: Acute onset of worsening abdominal pain and cramping with history of prior cholecystectomy and partial hysterectomy.  History of bladder neoplasm. Comparison: 11/17/2022 Technique: Multiple contiguous axial images were acquired from the base of the chest to the femoral trochanters with intravenous contrast administration. Oral contrast was not administered. Findings: There is partially visualized subsegmental/discoid atelectasis versus pleuroparenchymal scarring in both lung bases.  The base of the heart is enlarged with AICD leads in postsurgical changes from cardiac valve replacement as well as atherosclerotic changes.  The subcentimeter cyst is nonenhancing adjacent to the gallbladder fossa in the anterior segment of the right lobe of the liver is unchanged.  The history describes prior cholecystectomy, but the gallbladder is still present.  Small nonenhancing renal cysts are similar in appearance.  The uterus is surgically absent.  Findings resemble a mild ileus secondary to  constipation.  The appendix is visualized and is unremarkable.  No hyperdense nephroureterolithiasis or hydroureteronephrosis is present.  The heterogeneously enhancing mass at the left dome of the bladder is similar in appearance.  The other organs in the abdomen and pelvis are grossly unremarkable. There is no free fluid, focal fluid collections, free air, or significant mesenteric inflammatory stranding.     1. Findings resemble a mild ileus secondary to constipation. 2. Similar appearance of enhancing mass in the left dome of the bladder consistent with patient's history of bladder neoplasm. 3. The history reports prior cholecystectomy, but the gallbladder is still present. 4. Subcentimeter nonenhancing cyst in anterior segment right lobe of the liver is similar in appearance. 5. Nonenhancing bilateral renal cysts are similar in appearance.  No further imaging follow-up is indicated. 6. Status post hysterectomy. 7. Cardiomegaly with postsurgical changes, as above. Electronically signed by: Alex Alvarez MD Date:    11/30/2022 Time:    18:18    US Abdomen Limited_Liver    Result Date: 12/14/2022  EXAMINATION: US ABDOMEN LIMITED_LIVER CLINICAL HISTORY: Elevated AST/ALT; TECHNIQUE: Multiple sagittal and transverse images were obtained of the abdomen. COMPARISON: None FINDINGS: The pancreas appears grossly unremarkable. The liver measures 15.5 cm.  No liver mass or lesion is seen.  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  Gallbladder wall measures 5 mm.  There is a polyp seen within the gallbladder.  It measures 5 mm x 5 mm.  There is some pericholecystic fluid seen.  Common bile duct measures 4.6 mm.  The right kidney was not able to be imaged due to patient cooperation.     Thrombosis of the main portal vein A polyp noted in the gallbladder with pericholecystic fluid seen and gallbladder wall thickening seen.  Cholecystitis should be excluded Electronically signed by: Mian Marvin Date:    12/14/2022  Time:    16:01      CT Abdomen Pelvis  Without Contrast    Result Date: 12/11/2022  EXAMINATION: CT ABDOMEN PELVIS WITHOUT CONTRAST CLINICAL HISTORY: Abdominal pain, acute, nonlocalized;hx of bladder cancer; TECHNIQUE: Axial images of the abdomen and pelvis were obtained without IV contrast administration.  Coronal and sagittal reconstructions were provided.  Three dimensional and MIP images were obtained and evaluated.  Total DLP was 466 mGy-cm. Dose lowering technique and automated exposure control were utilized for this exam. COMPARISON: CT of the abdomen and pelvis 11/17/2022. FINDINGS: The bilateral lung bases are clear.  The heart is not enlarged. The liver is homogeneous in attenuation.  The gallbladder, spleen, pancreas, and adrenal glands are normal.  The bilateral kidneys are normal.  There is no hydronephrosis or nephrolithiasis.  There is a phlebolith in the right adnexal vein. The stomach and small bowel are decompressed.  The appendix is normal.  The colon is normal.  The uterus is surgically absent.  There is a poorly defined area of hyperdensity along the superior left lateral aspect of the urinary bladder measuring 4.8 x 3.4 cm.  There is no pelvic or retroperitoneal adenopathy.  The aorta is nonaneurysmal.  There is no lytic or blastic osseous lesion.     Heterogeneous hyperdense mass lesion in the superior and left lateral aspect of the urinary bladder.  This is highly suspicious for malignancy.  This is grossly unchanged from the prior exam. Electronically signed by: Glenn Hamilton MD Date:    12/11/2022 Time:    09:57      I have personally reviewed these labs and images    Assessment:   89-year-old female with multiple medical comorbidities transferred on 12/11 with NSTEMI, acute COVID, and no PTE.  Her hospitalization was further complicated by thrombocytopenia, thought secondary to hit.  She is continued to have significant dysphagia and is now warranting PEG tube.  She is been off Xarelto for 48  hours and last dose of Lovenox was yesterday.  Her platelet count is 53 today.  I discussed with the daughter at bedside, the most significant risk for this patient will be bleeding given her thrombocytopenia and need for anticoagulation.  Additionally, we discussed risks of the procedure including anesthesia complications, aspiration, perforation, adjacent organ injury, for which her daughter acknowledged.  She does agree with proceeding to PEG tube understanding all the risks of the procedure.       Plan:  PEG tube today in the endoscopy unit    Arcadio Carias MD  Gastroenterology

## 2022-12-28 NOTE — PROGRESS NOTES
Patient out of the unit. Undergoing PEG tube placement    I will be back tomorrow.    Deepika Ac MD  Hematology/Oncology

## 2022-12-28 NOTE — ANESTHESIA POSTPROCEDURE EVALUATION
Anesthesia Post Evaluation    Patient: Miguel Angel Oliveros    Procedure(s) Performed: Procedure(s) (LRB):  PEG (N/A)  EGD, WITH CLOSED BIOPSY    Final Anesthesia Type: general      Patient location during evaluation: PACU  Patient participation: No - Unable to Participate, Other Reason (see comments) (baseline noncommunicative)  Level of consciousness: awake  Post-procedure vital signs: reviewed and stable  Pain management: adequate  Airway patency: patent    PONV status at discharge: No PONV  Anesthetic complications: no      Cardiovascular status: hemodynamically stable  Respiratory status: unassisted  Hydration status: euvolemic  Follow-up not needed.          Vitals Value Taken Time   /71 12/28/22 1145   Temp ** 12/28/22 1205   Pulse 71 12/28/22 1145   Resp 24 12/28/22 1145   SpO2 99 % 12/28/22 1145         No case tracking events are documented in the log.      Pain/Bright Score: Pain Rating Prior to Med Admin: 3 (12/27/2022  9:18 PM)  Bright Score: 7 (12/28/2022 11:35 AM)

## 2022-12-28 NOTE — PROVATION PATIENT INSTRUCTIONS
Discharge Summary/Instructions after an Endoscopic Procedure  Patient Name: Miguel Angel Oliveros  Patient MRN: [id number]  Patient YOB: 1933  Wednesday, December 28, 2022  Arcadio Carias MD  Dear patient,  As a result of recent federal legislation (The Federal Cures Act), you may   receive lab or pathology results from your procedure in your [facility]   account before your physician is able to contact you. Your physician or   their representative will relay the results to you with their   recommendations at their soonest availability.  Thank you,  RESTRICTIONS:  During your procedure today, you received medications for sedation.  These   medications may affect your judgment, balance and coordination.  Therefore,   for 24 hours, you have the following restrictions:   - DO NOT drive a car, operate machinery, make legal/financial decisions,   sign important papers or drink alcohol.    ACTIVITY:  Today: no heavy lifting, straining or running due to procedural   sedation/anesthesia.  The following day: return to full activity including work.  DIET:  Eat and drink normally unless instructed otherwise.     TREATMENT FOR COMMON SIDE EFFECTS:  - Mild abdominal pain, nausea, belching, bloating or excessive gas:  rest,   eat lightly and use a heating pad.  - Sore Throat: treat with throat lozenges and/or gargle with warm salt   water.  - Because air was used during the procedure, expelling large amounts of air   from your rectum or belching is normal.  - If a bowel prep was taken, you may not have a bowel movement for 1-3 days.    This is normal.  SYMPTOMS TO WATCH FOR AND REPORT TO YOUR PHYSICIAN:  1. Abdominal pain or bloating, other than gas cramps.  2. Chest pain.  3. Back pain.  4. Signs of infection such as: chills or fever occurring within 24 hours   after the procedure.  5. Rectal bleeding, which would show as bright red, maroon, or black stools.   (A tablespoon of blood from the rectum is not serious, especially  if   hemorrhoids are present.)  6. Vomiting.  7. Weakness or dizziness.  GO DIRECTLY TO THE NEAREST EMERGENCY ROOM IF YOU HAVE ANY OF THE FOLLOWING:      Difficulty breathing              Chills and/or fever over 101 F   Persistent vomiting and/or vomiting blood   Severe abdominal pain   Severe chest pain   Black, tarry stools   Bleeding- more than one tablespoon   Any other symptom or condition that you feel may need urgent attention  Your doctor recommends these additional instructions:  If any biopsies were taken, your doctors clinic will contact you in 1 to 2   weeks with any results.  - Return patient to hospital [facility] for ongoing care.   - NPO.   - Resume Xarelto (rivaroxaban) at prior dose in 2 days.  Refer to primary   physician for further adjustment of therapy.   - Please follow the post-PEG recommendations including: Nutrition consult   for formula and volume, advance food and medications per primary care   provider, external bolster snug to abdominal wall, change dressing once per   day, NPO x4 hrs then water and Meds today, may use PEG in 12 hr for meds   and water, antibiotic ointment to site and clean site with soap and water   daily and dry thoroughly.  For questions, problems or results please call your physician - Arcadio Carias MD at Work:  [phone].  [facility] [place], EMERGENCY ROOM PHONE NUMBER: [phone]  IF A COMPLICATION OR EMERGENCY SITUATION ARISES AND YOU ARE UNABLE TO REACH   YOUR PHYSICIAN - GO DIRECTLY TO THE EMERGENCY ROOM.  MD Arcadio Kim MD  12/28/2022 11:34:49 AM  This report has been verified and signed electronically.  Dear patient,  As a result of recent federal legislation (The Federal Cures Act), you may   receive lab or pathology results from your procedure in your [facility]   account before your physician is able to contact you. Your physician or   their representative will relay the results to you with their   recommendations at their  soonest availability.  Thank you,  PROVATION

## 2022-12-28 NOTE — CONSULTS
Consults    Patient Name: Miguel Angel Oliveros   MRN: [id number]   Admission Date: 12/11/2022   Hospital Length of Stay: 17   Attending Provider: Handy Balderrama MD   Consulting Provider: Zulay PICKARD  Reason for Consult: Goals of Care  Primary Care Physician:  Barrett Lopez MD     Principal Problem: <principal problem not specified>       Final diagnoses:  [I21.4] NSTEMI (non-ST elevated myocardial infarction) (Primary)  [R06.02] Shortness of breath  [R06.00] Dyspnea, unspecified type  [R09.02] Hypoxia  [N17.9] SMITHA (acute kidney injury)      Assessment/Plan:     I reviewed the patient and family's understanding of the seriousness of the illness and its expected prognosis. We discussed the patient's goals of care and treatment preferences.        Advance Care Planning     Date: 12/28/2022    Today a meeting took place: bedside    Patient Participation: Patient is unable to participate     Attendees (Name and  Relationship to patient):  6 children and a DIL    Staff attendees (Name and  Role): Zulay palliative NP    ACP Conversation (General): Understanding of advance care planning and role of health care agent defined    Understanding of current condition       Code Status: Full Code    ACP Documents: None    Goals of care: The family endorses that what is most important right now is to focus on improvement in condition but with limits to invasive therapies    Accordingly, we have decided that the best plan to meet the patient's goals includes continuing with treatment                Family meeting held as requested to discuss goals of care and code status.  Discussed patient's debility, thrombocytopenia, bladder cancer, dysphagia, covid infection and PE.  Family reiterated that patient was alert and oriented before this hospitalization and was awaiting workup for bladder cancer.  Discussed that oncology is not recommendation bone marrow biopsy and patient is not in any condition to receive  chemotherapy.  Discussed that the PEG placement may address her nutrition but will likely not change the course of patient's other comorbidities.     Discussed code status and encouraged family to discuss whether patient would want intubation or resuscitation.  Discussed the likely poor outcomes that would result.     Family asking questions about LTAC and hospice.  Discussed LTAC, skilled, rehab and hospice services at length.  Daughter asking if patient could return to hospital with hospice.  Discussed that the goal of hospice is to provide symptom management and a peaceful death.  Discussed that patient's functional status has changed significantly and that transportation to multiple doctors may become burdensome for her.  Family expressed that patient was interactive with them before hospitalization.  Discussed patient's age, frailty and risk for decompensation and decline.  Encouraged family to call for any questions/concerns.          Interval History:     Patient lying in bed with multiple family members present.  Received PEG tube today.  I am holding family meeting today to discuss plan of care.       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 10/05/2022     Resolved Ambulatory Problems     Diagnosis Date Noted    No Resolved Ambulatory Problems     Past Medical History:   Diagnosis Date    Anxiety disorder     Aortic stenosis     Arthritis     Carpal tunnel syndrome     Coronary artery disease     Dementia     Depression     Diabetes mellitus     GERD (gastroesophageal reflux disease)     Hx of rectal polypectomy     Hypercholesterolemia     Hypertension     Obstructive sleep apnea     Respiratory distress     S/P TAVR (transcatheter aortic valve replacement)     TIA (transient ischemic attack)         Past Surgical History:   Procedure Laterality Date    CARDIAC SURGERY      CATARACT  EXTRACTION      CORONARY STENT PLACEMENT      HYSTERECTOMY      INSERTION OF PACEMAKER      PARTIAL HYSTERECTOMY      PULMONARY EMBOLISM SURGERY N/A 12/21/2022    Procedure: EMBOLECTOMY, ARTERY, PULMONARY;  Surgeon: Terri Urbina MD;  Location: [facility] CATH LAB;  Service: Peripheral Vascular;  Laterality: N/A;    RECTAL POLYPECTOMY      TONSILLECTOMY      TRANSCATHETER AORTIC VALVE REPLACEMENT (TAVR)          Review of patient's allergies indicates:   Allergen Reactions    Statins-hmg-coa reductase inhibitors      Other reaction(s): Joint pain    Bimatoprost      Other reaction(s): Eye redness    Cortisone     Dorzolamide-timolol     Gabapentin      Other reaction(s): Confusion    Hydrocortisone     Tafluprost (pf)           Current Facility-Administered Medications:     0.9%  NaCl infusion (for blood administration), , Intravenous, Q24H PRN, Mukesh Denton MD    acetaminophen tablet 650 mg, 650 mg, Per NG tube, Q6H PRN, NEERAJ Durant, 650 mg at 12/27/22 2118    carvediloL tablet 25 mg, 25 mg, Per NG tube, BID, Mukesh Denton MD, 25 mg at 12/27/22 2118    ceFAZolin (ANCEF) 1 g in dextrose 5 % in water (D5W) 5 % 50 mL IVPB (MB+), 1 g, Intravenous, Once, CHRISTINE Gavin    cloNIDine 0.1 mg/24 hr td ptwk 1 patch, 1 patch, Transdermal, Q7 Days, Mukesh Denton MD, 1 patch at 12/26/22 1030    dextrose 10% bolus 125 mL 125 mL, 12.5 g, Intravenous, PRN, Mukesh Denton MD    dextrose 10% bolus 250 mL 250 mL, 25 g, Intravenous, PRN, Mukesh Denton MD    diazePAM tablet 2 mg, 2 mg, Per NG tube, Daily PRN, Mukesh Denton MD    enoxaparin injection 90 mg, 1 mg/kg, Subcutaneous, Q12H, Mukesh Denton MD, 90 mg at 12/27/22 1002    glucagon (human recombinant) injection 1 mg, 1 mg, Intramuscular, PRN, Mukesh Denton MD    haloperidol lactate injection 2 mg, 2 mg, Intramuscular, Nightly PRN, MORGAN DurantP    hydrALAZINE injection 5 mg, 5 mg, Intravenous, Q4H PRN, Mukesh Denton MD, 5 mg at 12/23/22 1621    insulin aspart U-100 injection 0-5  "Units, 0-5 Units, Subcutaneous, Q6H PRN, Mukesh Denton MD, 2 Units at 12/25/22 0524    melatonin tablet 6 mg, 6 mg, Per NG tube, Nightly, Mukesh Denton MD, 6 mg at 12/27/22 2117    memantine tablet 10 mg, 10 mg, Per NG tube, BID, Mukesh Denton MD, 10 mg at 12/27/22 2117    mupirocin 2 % ointment, , Topical (Top), Daily, Mukesh Denton MD, Given at 12/27/22 0900    ondansetron injection 4 mg, 4 mg, Intravenous, Q8H PRN, Oswaldo Alonso MD    pantoprazole suspension 40 mg, 40 mg, Per G Tube, Daily, RAIMUNDO Zelaya    QUEtiapine tablet 25 mg, 25 mg, Per NG tube, BID, Mukesh Denton MD, 25 mg at 12/27/22 2117    sodium chloride 0.9% flush 10 mL, 10 mL, Intravenous, PRN, Oswaldo Alonso MD    Facility-Administered Medications Ordered in Other Encounters:     betamethasone acetate-betamethasone sodium phosphate injection 6 mg, 6 mg, Intra-articular, , Zina TESSY Bouy, FNP, 6 mg at 06/01/22 1345    betamethasone acetate-betamethasone sodium phosphate injection 6 mg, 6 mg, Intra-articular, , Zina TESSY Cuevasuy, FNP, 6 mg at 06/01/22 1345    LIDOcaine (PF) 20 mg/mL (2%) injection 5 mL, 5 mL, , , Zina TESSY Bouy, FNP, 5 mL at 06/01/22 1345    LIDOcaine (PF) 20 mg/mL (2%) injection 5 mL, 5 mL, , , Zina TESSY Bouy, FNP, 5 mL at 12/28/22 1054     sodium chloride, acetaminophen, dextrose 10%, dextrose 10%, diazePAM, glucagon (human recombinant), haloperidol lactate, hydrALAZINE, insulin aspart U-100, ondansetron, sodium chloride 0.9%     Family History   Problem Relation Age of Onset    Diabetes Mother     Stroke Father     Hyperlipidemia Father     Breast cancer Sister           Review of Systems   Unable to perform ROS: Mental status change          Objective:   BP (!) 165/62 (BP Location: Left arm, Patient Position: Lying)   Pulse 72   Temp 97.4 °F (36.3 °C)   Resp 18   Ht 5' 5" (1.651 m)   Wt 86.2 kg (190 lb)   SpO2 99%   BMI 31.62 kg/m²      Physical Exam   Constitutional: She appears ill.   HENT:   Head: Normocephalic. "   Eyes: Pupils are equal, round, and reactive to light.   Cardiovascular: Normal rate and regular rhythm. Pulmonary:      Effort: Pulmonary effort is normal.      Breath sounds: Normal breath sounds.     Abdominal: Soft.   Musculoskeletal:      Comments: sarcopenia   Neurological:   lethargic   Skin: Skin is warm.        Review of Symptoms  Review of Symptoms      Symptom Assessment (ESAS 0-10 Scale)  Pain:  0  Dyspnea:  0  Anxiety:  0  Nausea:  0  Depression:  0  Anorexia:  0  Fatigue:  0  Insomnia:  0  Restlessness:  0  Agitation:  0         Psychosocial/Cultural:   See Palliative Psychosocial Note: No      **Primary  to Follow**  Palliative Care  Consult: No    Advance Care Planning   Advance Directives:   Living Will: No    Do Not Resuscitate Status: No      Decision Making:  Family answered questions        PAINAD: lethargic    Caregiver burden formerly assessed: yes      No results displayed because visit has over 200 results.               > 50% of 45 min of encounter was spent in chart review, face to face discussion of goals of care, symptom assessment, coordination of care and emotional support.    Zulay Grace FNP, [facility]  Palliative Medicine  [facility]

## 2022-12-28 NOTE — TRANSFER OF CARE
"Anesthesia Transfer of Care Note    Patient: Miguel Angel Oliveros    Procedure(s) Performed: Procedure(s) (LRB):  PEG (N/A)    Patient location: GI    Anesthesia Type: MAC    Transport from OR: Transported from OR on room air with adequate spontaneous ventilation    Post pain: adequate analgesia    Post assessment: no apparent anesthetic complications    Post vital signs: stable    Level of consciousness: sedated    Nausea/Vomiting: no nausea/vomiting    Complications: none    Transfer of care protocol was followed      Last vitals:   Visit Vitals  BP (!) 90/52 (BP Location: Left arm, Patient Position: Lying)   Pulse 64   Temp 36.5 °C (97.7 °F)   Resp 18   Ht 5' 5" (1.651 m)   Wt 86.2 kg (190 lb)   SpO2 100%   BMI 31.62 kg/m²     "

## 2022-12-29 LAB
ESTROGEN SERPL-MCNC: NORMAL PG/ML
INSULIN SERPL-ACNC: NORMAL U[IU]/ML
LAB AP CLINICAL INFORMATION: NORMAL
LAB AP GROSS DESCRIPTION: NORMAL
LAB AP REPORT FOOTNOTES: NORMAL
POCT GLUCOSE: 107 MG/DL (ref 70–110)
POCT GLUCOSE: 108 MG/DL (ref 70–110)
POCT GLUCOSE: 111 MG/DL (ref 70–110)
POCT GLUCOSE: 91 MG/DL (ref 70–110)
T3RU NFR SERPL: NORMAL %

## 2022-12-29 PROCEDURE — 25000003 PHARM REV CODE 250: Performed by: PHYSICIAN ASSISTANT

## 2022-12-29 PROCEDURE — 21400001 HC TELEMETRY ROOM

## 2022-12-29 PROCEDURE — 25000003 PHARM REV CODE 250: Performed by: NURSE PRACTITIONER

## 2022-12-29 PROCEDURE — 25000003 PHARM REV CODE 250: Performed by: INTERNAL MEDICINE

## 2022-12-29 PROCEDURE — 92526 ORAL FUNCTION THERAPY: CPT

## 2022-12-29 PROCEDURE — 63600175 PHARM REV CODE 636 W HCPCS: Performed by: INTERNAL MEDICINE

## 2022-12-29 RX ADMIN — CARVEDILOL 25 MG: 12.5 TABLET, FILM COATED ORAL at 08:12

## 2022-12-29 RX ADMIN — MEMANTINE 10 MG: 5 TABLET ORAL at 08:12

## 2022-12-29 RX ADMIN — DIAZEPAM 2 MG: 2 TABLET ORAL at 03:12

## 2022-12-29 RX ADMIN — PANTOPRAZOLE SODIUM 40 MG: 40 GRANULE, DELAYED RELEASE ORAL at 08:12

## 2022-12-29 RX ADMIN — QUETIAPINE FUMARATE 25 MG: 25 TABLET ORAL at 08:12

## 2022-12-29 RX ADMIN — ENOXAPARIN SODIUM 90 MG: 100 INJECTION SUBCUTANEOUS at 08:12

## 2022-12-29 RX ADMIN — ACETAMINOPHEN 650 MG: 325 TABLET, FILM COATED ORAL at 11:12

## 2022-12-29 RX ADMIN — ACETAMINOPHEN 650 MG: 325 TABLET, FILM COATED ORAL at 05:12

## 2022-12-29 RX ADMIN — MUPIROCIN: 20 OINTMENT TOPICAL at 08:12

## 2022-12-29 RX ADMIN — Medication 6 MG: at 08:12

## 2022-12-29 NOTE — PLAN OF CARE
12/29/22 1139   Discharge Reassessment   Assessment Type Discharge Planning Reassessment   Discharge Plan discussed with: Adult children   Discharge Plan A Hospice/home   Discharge Plan B Hospice/home   Post-Acute Status   Post-Acute Authorization Hospice   [facility]

## 2022-12-29 NOTE — PT/OT/SLP PROGRESS
Speech Language Pathology Department  Dysphagia Therapy Progress Note    Patient Name:  Miguel Angel Oliveros   MRN:  [id number]  Admitting Diagnosis: NSTEMI    Recommendations:     General recommendations:  dysphagia therapy  Diet recommendations:  NPO, Liquid Diet Level: NPO   Aspiration precautions:  NPO    Discharge recommendations:  nursing facility, skilled   Barriers to safe discharge:  acuity of illness, severity of impairment, and limited insight into deficits    Subjective     Patient awake and alert.    Pain/Comfort: Pain Rating 1: 0/10    Spiritual/Cultural/Adventism Beliefs/Practices that affect care: no    Respiratory Status: room air     Objective:     Oral Musculature Evaluation:  Oral Musculature: WFL  Dentition: scattered dentition  Secretion Management: adequate  Mucosal Quality: adequate  Mandibular Strength and Mobility: WFL  Oral Labial Strength and Mobility: WFL  Lingual Strength and Mobility: WFL    Therapeutic Activities:  Pt tolerated puree trials with moderate cueing required to initiate swallow, multiple swallows, and cough after swallow.     Assessment:     Pt continues to present with dysphagia and remains unsafe for PO diet.     Goals:   Multidisciplinary Problems       SLP Goals          Problem: SLP    Goal Priority Disciplines Outcome   SLP Goal     SLP Ongoing, Progressing   Description: LTG: Pt will tolerate LRD with no overt signs/sx of aspiration.    ST. Pt will tolerate puree trials with no overt signs/sx of aspiration and min cues to initiate swallow.   2. Pt will complete comprehensive assessment of swallow fx (MBS) as appropriate.                      Patient Education:     Patient and daughter/s provided with verbal education regarding plan of care.  Understanding was verbalized.    Plan:     Will continue to follow and tx as appropriate.    SLP Follow-Up:  Yes   Patient to be seen:  daily   Plan of Care expires:  23  Plan of Care reviewed with:  patient,  daughter       Time Tracking:     SLP Treatment Date:   12/29/22  Speech Start Time:  0930  Speech Stop Time:  0945     Speech Total Time (min):  15 min    Billable minutes:  Treatment of Swallow Dysfunction, 15 minues        12/29/2022

## 2022-12-29 NOTE — PROGRESS NOTES
[facility] Medicine Progress Note        Chief Complaint: Inpatient Follow-up for Encephalopathy     HPI:   Miguel Angel Oliveros is an 89-year-old female with PMH of DMII, CAD s/p PCI, HTN, AS s/p TAVR, HLD, GERD, and PARAMJIT, who is admitted to [facility] ICU from regular unit after being found to have altered mental status and increased respiratory distress. She was initially admitted to [facility] on 2022 by [facility] after being transferred from [place] with the diagnosis of NSTEMI. At the time of encounter, patient states having lower abdominal discomfort, bladder fullness, and dysuria. She states that these symptoms have been persisting for the past few days accompanied by decreased urinary output. Per patient's family members at beside, she usually gets UTI several times each year and she has had 4 episodes of UTI in  (last UTI was 3 months ago).  Daughter reported that patient as baseline is able to converse with family member, recognizes everyone, able to sit at the table and feed herself, able to transfer herself from bed to wheelchair and wheelchair to chair with some assistance.  Stated she has some good days and bad days in a way that sometimes she asked about her old friends who have  but otherwise her dementia is not too bad.  She is known to Dr. Montalvo in outpatient setting.  Reported she was sick at home a week prior to admission to our facility.  Initially she went to ER because she was severely constipated which has since resolved, after that she went back to her PCP given she was short of breath and fatigued and lethargic.  Then she was brought here on  when she was very lethargic to the point that she needed max assist to move so they called EMS.  She had a CTA Chest done and showed Saddle pulmonary embolism with thrombi extending into the bilateral upper and lower lobes and evidence of right heart strain  She was initially started on iv Heparin and  later changed to Argatroban gtt- aptt per protocol, cards recommended to continue Argatroban for now and once able to swallow, switch to apixiban oral prior to discharge, Echo- showed EF o 65%, moderate tricuspid regurg, severe right ventricular enlargement.  Severe right atrial enlargement.  Mild mitral stenosis, Repeat Upper and Lower extremities u/s- official report pending  Case discussed with Dr. Urbina with vascular surgery and on 12/21- she had Pulmonary artery thrombectomy done.     She completed remdesivir (5 days) and dexamethasone (10 days), also Completed 7 days treatment with zosyn. There was no signs of sepsis.   On 12/25- Switched xarelto to lovenox 1 mg/kg BID in the interim to get PEG tube placement. Her mental status continued to be very sluggish, She was bedbound. With advanced age and poor functional status we consulted palliative care to discuss goals of care with family.     Interval Hx:   Patient today is more awake and able to utter few words. She is not following verbal commands. She is somnolent most of the time. Has been afebrile. Daughter at bedside. Peg feedings will be started today.     Objective/physical exam:  General: Lethargic but able to utter few words. Not following verbal commands.   Chest: Clear to auscultation bilaterally  Heart: RRR, +S1, S2, no appreciable murmur  Abdomen: Soft, nontender, BS +  Not moving ofelia LE    VITAL SIGNS: 24 HRS MIN & MAX LAST   Temp  Min: 97.4 °F (36.3 °C)  Max: 98.6 °F (37 °C) 97.9 °F (36.6 °C)   BP  Min: 90/52  Max: 171/74 128/71   Pulse  Min: 64  Max: 76  67   Resp  Min: 12  Max: 24 20   SpO2  Min: 94 %  Max: 100 % 95 %         Recent Labs   Lab 12/26/22  0413 12/27/22  0836 12/28/22  0522   WBC 10.1 9.5 6.5   RBC 3.03* 3.31* 3.09*   HGB 8.3* 9.1* 8.3*   HCT 27.0* 29.3* 27.0*   MCV 89.1 88.5 87.4   MCH 27.4 27.5 26.9   MCHC 30.7* 31.1* 30.7*   RDW 16.4* 16.0* 16.4*   PLT 74* 70* 56*   MPV 12.4  --   --        Recent Labs   Lab 12/23/22  0510  12/24/22  0637 12/24/22  1555 12/26/22  0413    140  --  142   K 3.1* 3.5  --  3.3*   CO2 22* 26  --  26   BUN 40.5* 40.6*  --  33.9*   CREATININE 0.63 0.77  --  0.74   CALCIUM 9.1 8.4  --  8.3*   MG 1.80  --   --  2.00   ALBUMIN 2.6*  --  2.1* 2.0*   ALKPHOS 113  --   --  104   *  --   --  81*   AST 65*  --   --  31   BILITOT 1.4  --   --  1.4          Microbiology Results (last 7 days)       Procedure Component Value Units Date/Time    Blood Culture [[id number]]  (Normal) Collected: 12/23/22 1046    Order Status: Completed Specimen: Blood from Arm, Left Updated: 12/28/22 1200     CULTURE, BLOOD ([facility]) No Growth at 5 days    Blood Culture [[id number]]  (Normal) Collected: 12/23/22 1046    Order Status: Completed Specimen: Blood from Arm, Right Updated: 12/28/22 1200     CULTURE, BLOOD ([facility]) No Growth at 5 days             See below for Radiology    Scheduled Med:   carvediloL  25 mg Per NG tube BID    ceFAZolin (ANCEF) IVPB  1 g Intravenous Once    cloNIDine 0.1 mg/24 hr td ptwk  1 patch Transdermal Q7 Days    enoxaparin  1 mg/kg Subcutaneous Q12H    melatonin  6 mg Per NG tube Nightly    memantine  10 mg Per NG tube BID    mupirocin   Topical (Top) Daily    pantoprazole  40 mg Per G Tube Daily    QUEtiapine  25 mg Per NG tube BID        Continuous Infusions:       PRN Meds:  sodium chloride, acetaminophen, dextrose 10%, dextrose 10%, diazePAM, glucagon (human recombinant), haloperidol lactate, hydrALAZINE, insulin aspart U-100, ondansetron, sodium chloride 0.9%       Assessment/Plan:  Altered mental status likely 2/2 sepsis and acute metabolic encephalopathy- underlying advanced dementia also present   NSTEMI Type II in the setting of Acute COVID Infection, SMITHA, and IVC/ portal vein Thrombus/ saddle PE: stable   COVID positive on admission to ICU- 12/12- day 10 of diagnosis - Pt out of isolation: stable   Bladder cancer s/p resection in October 2022  CT ABD & pelvis 12/11/2022 shows heterogeneous  hyperdense mass lesion in the superior and left lateral aspect of the urinary bladder  Per patient's family at bedside, patient will be receiving additional imaging to stage bladder cancer  HX of HTN, DM II, TIA, aortic stenosis s/p TAVR, CAD s/p PCI, GERD, PARAMJIT  IVC and Portal Vein Thrombosis with High probability of Pulmonary Embolism with Marked RV Strain - On Heparin gtt  Thrombocytopenia- HIT negative--> still trending down  Saddle PE with right heart strain--> s/p pulmonary thrombectomy 12/21  Anasarca- improved   Hypokalemia - replaced     Acute renal failure/ ATN - resolved   Lactic acidosis likely 2/2 acute renal failure and sepsis: resolved   Mild hyperkalemia probably due to clinimic E, resolved   Hematuria - blood thinners related- resolved      Plan:  Patient still lethargic but able to say few words.   Has been afebrile  Peg tube placed on 12/28/22 and feedings will be started today    Cont FD lovenox for PE   Platelets still low, monitor closely   H&H stable  Renal functions stable  Discussed with family about goals of care, as of now they want to talk to rest of the family  Daughter does understand patients overall poor prognosis     Cont supportive care     Labs in am    F/U on palliative care recommendations           VTE prophylaxis: Lovenox     Patient condition:  Guarded    Anticipated discharge and Disposition:   Dc to home with Hospice care  vs SNF      All diagnosis and differential diagnosis have been reviewed; assessment and plan has been documented; I have personally reviewed the labs and test results that are presently available; I have reviewed the patients medication list; I have reviewed the consulting providers response and recommendations. I have reviewed or attempted to review medical records based upon their availability    All of the patient's questions have been  addressed and answered. Patient's is agreeable to the above stated plan. I will continue to monitor closely and make  adjustments to medical management as needed.  _____________________________________________________________________    Nutrition Status:    Radiology:  X-Ray Abdomen AP 1 View  Narrative: EXAMINATION:  XR ABDOMEN AP 1 VIEW    CLINICAL HISTORY:  NG tube placement;    TECHNIQUE:  AP view of the abdomen.    COMPARISON:  X-rays dated 12/23/2022    FINDINGS:  The nasogastric tube has its tip over the stomach.  Impression: Nasogastric tube with tip over the stomach.    Electronically signed by: Tiana Negro  Date:    12/27/2022  Time:    07:53      Handy Balderrama MD   12/29/2022

## 2022-12-29 NOTE — PROGRESS NOTES
"Gastroenterology Progress Note    Subjective/Interval History:  Externally removable PEG placed yesterday. Skin marking noted to be 2.5cm at external bumper. There was some erythematous mucosa in antrum and duodenum.    Patient resting in bed with family at bedside. Abd binder in place. PEG site clean and dry without blood. Per nurse, very small amount of discharge at PEG site earlier when she changed dressing. Skin marking noted to be 2.0cm at skin. Gauze placed over external bumper and abd binder reapplied. Family educated about PEG maintenance and care. Tube feedings to be started today.    ROS:  Review of Systems   Unable to perform ROS: Mental acuity     Vital Signs:  /71   Pulse 67   Temp 97.9 °F (36.6 °C) (Axillary)   Resp 20   Ht 5' 5" (1.651 m)   Wt 86.2 kg (190 lb)   SpO2 95%   BMI 31.62 kg/m²   Body mass index is 31.62 kg/m².    Physical Exam:  Physical Exam  Constitutional:       General: She is not in acute distress.     Appearance: She is obese. She is not ill-appearing.   HENT:      Head: Normocephalic and atraumatic.      Right Ear: External ear normal.      Left Ear: External ear normal.      Nose: Nose normal.      Mouth/Throat:      Comments: Wound to upper lip  Cardiovascular:      Rate and Rhythm: Normal rate.   Pulmonary:      Effort: Pulmonary effort is normal. No respiratory distress.   Abdominal:      Comments: Abd binder in place. PEG site clean and dry without blood. Skin marking noted to be 2.0cm at skin.   Skin:     General: Skin is warm and dry.      Coloration: Skin is not pale.       Labs:  Recent Results (from the past 24 hour(s))   POCT glucose    Collection Time: 12/28/22  4:48 PM   Result Value Ref Range    POCT Glucose 133 (H) 70 - 110 mg/dL   POCT glucose    Collection Time: 12/29/22  1:15 AM   Result Value Ref Range    POCT Glucose 108 70 - 110 mg/dL   POCT glucose    Collection Time: 12/29/22  5:23 AM   Result Value Ref Range    POCT Glucose 107 70 - 110 mg/dL "         Assessment:  89-year-old female with multiple medical comorbidities transferred on 12/11 with NSTEMI, acute COVID, and no PTE.  Her hospitalization was further complicated by thrombocytopenia, thought secondary to hit.  She is continued to have significant dysphagia and is now warranting PEG tube.  As I discussed with the family, her barriers the PEG tube would be the Xarelto) she would have to be off it for 48 hours) and thrombocytopenia, she would need to have a platelet count greater than 50 to proceed PEG tube.  They remain in agreement for pursuing the PEG tube and understand the precautions necessary to limit her risk of post procedural bleeding.    PEG successfully placed 12/28/22     Plan:  Abd binder at all times  OK for meds/water/tube feedings via PEG tube  Antibiotic ointment to PEG site  Clean PEG site with soap and water daily and dry thoroughly  Gauze OVER external bumper  OK to resume Xarelto at prior dose tomorrow    No further GI recommendations at this time. GI will sign off. Please call with any questions.     Mirian Samaniego NP/Suzi Rubio PA-C acting as scribe for Arcadio Carias MD  Gastroenterology  [facility]

## 2022-12-30 VITALS
TEMPERATURE: 97 F | WEIGHT: 190 LBS | SYSTOLIC BLOOD PRESSURE: 154 MMHG | OXYGEN SATURATION: 95 % | HEIGHT: 65 IN | DIASTOLIC BLOOD PRESSURE: 78 MMHG | BODY MASS INDEX: 31.65 KG/M2 | RESPIRATION RATE: 21 BRPM | HEART RATE: 77 BPM

## 2022-12-30 PROBLEM — I26.99 ACUTE PULMONARY EMBOLISM: Status: ACTIVE | Noted: 2022-12-30

## 2022-12-30 LAB
ALBUMIN SERPL-MCNC: 2 G/DL (ref 3.4–4.8)
ALBUMIN/GLOB SERPL: 0.6 RATIO (ref 1.1–2)
ALP SERPL-CCNC: 89 UNIT/L (ref 40–150)
ALT SERPL-CCNC: 49 UNIT/L (ref 0–55)
AST SERPL-CCNC: 44 UNIT/L (ref 5–34)
BASOPHILS # BLD AUTO: 0.02 X10(3)/MCL (ref 0–0.2)
BASOPHILS NFR BLD AUTO: 0.3 %
BILIRUBIN DIRECT+TOT PNL SERPL-MCNC: 0.6 MG/DL
BUN SERPL-MCNC: 16.1 MG/DL (ref 9.8–20.1)
CALCIUM SERPL-MCNC: 8.4 MG/DL (ref 8.4–10.2)
CHLORIDE SERPL-SCNC: 108 MMOL/L (ref 98–107)
CO2 SERPL-SCNC: 24 MMOL/L (ref 23–31)
CREAT SERPL-MCNC: 0.64 MG/DL (ref 0.55–1.02)
EOSINOPHIL # BLD AUTO: 0.06 X10(3)/MCL (ref 0–0.9)
EOSINOPHIL NFR BLD AUTO: 1 %
ERYTHROCYTE [DISTWIDTH] IN BLOOD BY AUTOMATED COUNT: 17.2 % (ref 11–14.5)
GFR SERPLBLD CREATININE-BSD FMLA CKD-EPI: >60 MLS/MIN/1.73/M2
GLOBULIN SER-MCNC: 3.3 GM/DL (ref 2.4–3.5)
GLUCOSE SERPL-MCNC: 109 MG/DL (ref 82–115)
HCT VFR BLD AUTO: 28.2 % (ref 37–47)
HGB BLD-MCNC: 8.3 GM/DL (ref 12–16)
IMM GRANULOCYTES # BLD AUTO: 0.02 X10(3)/MCL (ref 0–0.04)
IMM GRANULOCYTES NFR BLD AUTO: 0.3 %
LYMPHOCYTES # BLD AUTO: 1.19 X10(3)/MCL (ref 0.6–4.6)
LYMPHOCYTES NFR BLD AUTO: 19.1 %
MCH RBC QN AUTO: 26.9 PG
MCHC RBC AUTO-ENTMCNC: 29.4 MG/DL (ref 33–36)
MCV RBC AUTO: 91.6 FL (ref 80–94)
MONOCYTES # BLD AUTO: 0.37 X10(3)/MCL (ref 0.1–1.3)
MONOCYTES NFR BLD AUTO: 5.9 %
NEUTROPHILS # BLD AUTO: 4.58 X10(3)/MCL (ref 2.1–9.2)
NEUTROPHILS NFR BLD AUTO: 73.4 %
NRBC BLD AUTO-RTO: 0 % (ref 0–1)
PLATELET # BLD AUTO: 47 X10(3)/MCL (ref 140–371)
PMV BLD AUTO: ABNORMAL FL
POCT GLUCOSE: 110 MG/DL (ref 70–110)
POCT GLUCOSE: 152 MG/DL (ref 70–110)
POCT GLUCOSE: 98 MG/DL (ref 70–110)
POTASSIUM SERPL-SCNC: 3.3 MMOL/L (ref 3.5–5.1)
PROT SERPL-MCNC: 5.3 GM/DL (ref 5.8–7.6)
RBC # BLD AUTO: 3.08 X10(6)/MCL (ref 4.2–5.4)
SODIUM SERPL-SCNC: 141 MMOL/L (ref 136–145)
WBC # SPEC AUTO: 6.2 X10(3)/MCL (ref 4.5–11.5)

## 2022-12-30 PROCEDURE — 80053 COMPREHEN METABOLIC PANEL: CPT | Performed by: INTERNAL MEDICINE

## 2022-12-30 PROCEDURE — 25000003 PHARM REV CODE 250: Performed by: PHYSICIAN ASSISTANT

## 2022-12-30 PROCEDURE — 63600175 PHARM REV CODE 636 W HCPCS: Performed by: INTERNAL MEDICINE

## 2022-12-30 PROCEDURE — 85025 COMPLETE CBC W/AUTO DIFF WBC: CPT | Performed by: INTERNAL MEDICINE

## 2022-12-30 PROCEDURE — 36415 COLL VENOUS BLD VENIPUNCTURE: CPT | Performed by: INTERNAL MEDICINE

## 2022-12-30 PROCEDURE — 25000003 PHARM REV CODE 250: Performed by: INTERNAL MEDICINE

## 2022-12-30 RX ORDER — POTASSIUM CHLORIDE 14.9 MG/ML
40 INJECTION INTRAVENOUS ONCE
Status: COMPLETED | OUTPATIENT
Start: 2022-12-30 | End: 2022-12-30

## 2022-12-30 RX ORDER — CLONIDINE 0.1 MG/24H
1 PATCH, EXTENDED RELEASE TRANSDERMAL
Qty: 4 PATCH | Refills: 11 | Status: ON HOLD | OUTPATIENT
Start: 2023-01-02 | End: 2023-02-12 | Stop reason: HOSPADM

## 2022-12-30 RX ADMIN — POTASSIUM CHLORIDE 40 MEQ: 14.9 INJECTION, SOLUTION INTRAVENOUS at 10:12

## 2022-12-30 RX ADMIN — PANTOPRAZOLE SODIUM 40 MG: 40 GRANULE, DELAYED RELEASE ORAL at 10:12

## 2022-12-30 RX ADMIN — MUPIROCIN: 20 OINTMENT TOPICAL at 10:12

## 2022-12-30 RX ADMIN — MEMANTINE 10 MG: 5 TABLET ORAL at 10:12

## 2022-12-30 RX ADMIN — CARVEDILOL 25 MG: 12.5 TABLET, FILM COATED ORAL at 10:12

## 2022-12-30 RX ADMIN — APIXABAN 10 MG: 5 TABLET, FILM COATED ORAL at 10:12

## 2022-12-30 RX ADMIN — QUETIAPINE FUMARATE 25 MG: 25 TABLET ORAL at 10:12

## 2022-12-30 NOTE — NURSING
Report called to Erin at [facility]. Patient will DC with IV x2 in place. CM to arrange ambulance transportation when IV KCL completes.

## 2022-12-30 NOTE — DISCHARGE SUMMARY
[facility] Hospital Medicine Discharge Summary    Admit Date: 12/11/2022  Discharge Date and Time: 12/30/20228:42 AM  Admitting Physician:  Team  Discharging Physician: Handy Balderrama MD.  Primary Care Physician: Barrett Lopez MD  Consults: Gastroenterology    Discharge Diagnoses:  Altered mental status likely 2/2 sepsis and acute metabolic encephalopathy- underlying advanced dementia also present   NSTEMI Type II in the setting of Acute COVID Infection, SMITHA, and IVC/ portal vein Thrombus/ saddle PE: stable   COVID positive on admission to ICU- 12/12- day 10 of diagnosis - Pt out of isolation: stable   Bladder cancer s/p resection in October 2022  CT ABD & pelvis 12/11/2022 shows heterogeneous hyperdense mass lesion in the superior and left lateral aspect of the urinary bladder  Per patient's family at bedside, patient will be receiving additional imaging to stage bladder cancer  HX of HTN, DM II, TIA, aortic stenosis s/p TAVR, CAD s/p PCI, GERD, PARAMJIT  IVC and Portal Vein Thrombosis with High probability of Pulmonary Embolism with Marked RV Strain - On Heparin gtt  Thrombocytopenia- HIT negative  Saddle PE with right heart strain--> s/p pulmonary thrombectomy 12/21  Anasarca- improved   Hypokalemia - replaced      Acute renal failure/ ATN - resolved   Lactic acidosis likely 2/2 acute renal failure and sepsis: resolved   Mild hyperkalemia probably due to clinimic E, resolved   Hematuria - resolved    Hospital Course:   Miguel Angel Oliveros is an 89-year-old female with PMH of DMII, CAD s/p PCI, HTN, AS s/p TAVR, HLD, GERD, and PARAMJIT, who is admitted to [facility] ICU from regular unit after being found to have altered mental status and increased respiratory distress. She was initially admitted to [facility] on 12/11/2022 by [facility] after being transferred from [place] with the diagnosis of NSTEMI. At the time of encounter, patient states having lower abdominal discomfort, bladder  fullness, and dysuria. She states that these symptoms have been persisting for the past few days accompanied by decreased urinary output. Per patient's family members at beside, she usually gets UTI several times each year and she has had 4 episodes of UTI in  (last UTI was 3 months ago).  Daughter reported that patient as baseline is able to converse with family member, recognizes everyone, able to sit at the table and feed herself, able to transfer herself from bed to wheelchair and wheelchair to chair with some assistance.  Stated she has some good days and bad days in a way that sometimes she asked about her old friends who have  but otherwise her dementia is not too bad.  She is known to Dr. Montalvo in outpatient setting.  Reported she was sick at home a week prior to admission to our facility.  Initially she went to ER because she was severely constipated which has since resolved, after that she went back to her PCP given she was short of breath and fatigued and lethargic.  Then she was brought here on  when she was very lethargic to the point that she needed max assist to move so they called EMS.  She had a CTA Chest done and showed Saddle pulmonary embolism with thrombi extending into the bilateral upper and lower lobes and evidence of right heart strain  She was initially started on iv Heparin and later changed to Argatroban gtt- aptt per protocol, cards recommended to continue Argatroban for now and once able to swallow, switch to apixiban oral prior to discharge, Echo- showed EF o 65%, moderate tricuspid regurg, severe right ventricular enlargement.  Severe right atrial enlargement.  Mild mitral stenosis, Repeat Upper and Lower extremities u/s- official report pending  Case discussed with Dr. Urbina with vascular surgery and on - she had Pulmonary artery thrombectomy done.      She completed remdesivir (5 days) and dexamethasone (10 days), also Completed 7 days treatment with zosyn.  There was no signs of sepsis.   On 12/25- Switched xarelto to lovenox 1 mg/kg BID in the interim to get PEG tube placement. Her mental status continued to be very sluggish, She was bedbound. With advanced age and poor functional status we consulted palliative care to discuss goals of care with family. Family decided to place patient in a hospice facility. Lovenox was stopped and she was started on Eliquis. Family understand the risk of bleeding with anticoagulation and they were ok with continuing with eliquis. She was accepted to in patient hospice care and was discharged.   Pt was seen and examined on the day of discharge  Vitals:  VITAL SIGNS: 24 HRS MIN & MAX LAST   Temp  Min: 97.6 °F (36.4 °C)  Max: 99.3 °F (37.4 °C) 99.3 °F (37.4 °C)   BP  Min: 119/69  Max: 176/68 (!) 146/69     Pulse  Min: 68  Max: 74  74   Resp  Min: 18  Max: 28 (!) 28     SpO2  Min: 94 %  Max: 97 % 95 %       Physical Exam:  Heart RRR  Lungs clear   Abdomen soft and non tender   Neuro: Lethargic, confused     Procedures Performed: No admission procedures for hospital encounter.     Significant Diagnostic Studies: See Full reports for all details    Recent Labs   Lab 12/26/22  0413 12/27/22  0836 12/28/22  0522 12/30/22  0535   WBC 10.1 9.5 6.5 6.2   RBC 3.03* 3.31* 3.09* 3.08*   HGB 8.3* 9.1* 8.3* 8.3*   HCT 27.0* 29.3* 27.0* 28.2*   MCV 89.1 88.5 87.4 91.6   MCH 27.4 27.5 26.9 26.9   MCHC 30.7* 31.1* 30.7* 29.4*   RDW 16.4* 16.0* 16.4* 17.2*   PLT 74* 70* 56* 47*   MPV 12.4  --   --   --        Recent Labs   Lab 12/24/22  0637 12/24/22  1555 12/26/22  0413 12/30/22  0535     --  142 141   K 3.5  --  3.3* 3.3*   CO2 26  --  26 24   BUN 40.6*  --  33.9* 16.1   CREATININE 0.77  --  0.74 0.64   CALCIUM 8.4  --  8.3* 8.4   MG  --   --  2.00  --    ALBUMIN  --  2.1* 2.0* 2.0*   ALKPHOS  --   --  104 89   ALT  --   --  81* 49   AST  --   --  31 44*   BILITOT  --   --  1.4 0.6        Microbiology Results (last 7 days)       Procedure  Component Value Units Date/Time    Blood Culture [[id number]]  (Normal) Collected: 12/23/22 1046    Order Status: Completed Specimen: Blood from Arm, Left Updated: 12/28/22 1200     CULTURE, BLOOD ([facility]) No Growth at 5 days    Blood Culture [[id number]]  (Normal) Collected: 12/23/22 1046    Order Status: Completed Specimen: Blood from Arm, Right Updated: 12/28/22 1200     CULTURE, BLOOD ([facility]) No Growth at 5 days             X-Ray Abdomen AP 1 View  Narrative: EXAMINATION:  XR ABDOMEN AP 1 VIEW    CLINICAL HISTORY:  NG tube placement;    TECHNIQUE:  AP view of the abdomen.    COMPARISON:  X-rays dated 12/23/2022    FINDINGS:  The nasogastric tube has its tip over the stomach.  Impression: Nasogastric tube with tip over the stomach.    Electronically signed by: Tiana Negro  Date:    12/27/2022  Time:    07:53         Medication List        START taking these medications      * apixaban 5 mg Tab  Commonly known as: ELIQUIS  Take 2 tablets (10 mg total) by mouth 2 (two) times daily. for 7 days     * apixaban 5 mg Tab  Commonly known as: ELIQUIS  Take 1 tablet (5 mg total) by mouth 2 (two) times daily.  Start taking on: January 6, 2023     cloNIDine 0.1 mg/24 hr td ptwk 0.1 mg/24 hr  Commonly known as: CATAPRES  Place 1 patch onto the skin every 7 days.  Start taking on: January 2, 2023           * This list has 2 medication(s) that are the same as other medications prescribed for you. Read the directions carefully, and ask your doctor or other care provider to review them with you.                CONTINUE taking these medications      ascorbic acid (vitamin C) 1000 MG tablet  Commonly known as: VITAMIN C     aspirin 81 MG EC tablet  Commonly known as: ECOTRIN     atorvastatin 40 MG tablet  Commonly known as: LIPITOR     carvediloL 25 MG tablet  Commonly known as: COREG     isosorbide mononitrate 30 MG 24 hr tablet  Commonly known as: IMDUR     QUEtiapine 25 MG Tab  Commonly known as: SEROQUEL            STOP taking  these medications      CENTRUM COMPLETE ORAL     cloNIDine 0.1 MG tablet  Commonly known as: CATAPRES     clopidogreL 75 mg tablet  Commonly known as: PLAVIX     docusate sodium 100 MG capsule  Commonly known as: COLACE     nitroGLYCERIN 0.4 MG SL tablet  Commonly known as: NITROSTAT     ondansetron 4 MG tablet  Commonly known as: ZOFRAN     ranolazine 1,000 mg Tb12  Commonly known as: RANEXA     TUMERIC-GING-OLIVE-OREG-CAPRYL ORAL               Where to Get Your Medications        These medications were sent to [id number] - [place] - [address] AT [facility] [address], [place] [address]      Phone: [phone]   cloNIDine 0.1 mg/24 hr td ptwk 0.1 mg/24 hr       Information about where to get these medications is not yet available    Ask your nurse or doctor about these medications  apixaban 5 mg Tab  apixaban 5 mg Tab          Explained in detail to the patient about the discharge plan, medications, and follow-up visits. Pt understands and agrees with the treatment plan  Discharge Disposition: Hospice care   Discharged Condition: stable  Diet-   Dietary Orders (From admission, onward)       Start     Ordered    12/29/22 1221  Tube Feedings/Formulas 65; Diabetiscource AC; Peg; 65; Every 3 hours  Continuous        Comments: Begin at 15ml/hr and increase by 10ml every 3-4 hours as tolerated to goal rate 65ml/hr   Question Answer Comment   Formula Rate (mL/hr): 65    Select Formula: Diabetiscource AC    Route: Peg    Free water flush volume (mL): 65    Free water flush frequency: Every 3 hours        12/29/22 1220    12/28/22 1133  Diet NPO  Diet effective now         12/28/22 1132                   Medications Per DC med rec  Activities as tolerated   Follow-up Information       [facility] Follow up.    Specialty: Home Health Services  Contact information:  [address] [place] [address]  [place] [address]  [phone]                           For further  questions contact hospitalist office    Discharge time 33 minutes    For worsening symptoms, chest pain, shortness of breath, increased abdominal pain, high grade fever, stroke or stroke like symptoms, immediately go to the nearest Emergency Room or call 911 as soon as possible.      Handy Robledo M.D, on 12/30/2022. at 8:42 AM.

## 2022-12-30 NOTE — PLAN OF CARE
12/30/22 1317   Final Note   Assessment Type Final Discharge Note   Anticipated Discharge Disposition HospiceHome   Post-Acute Status   Post-Acute Authorization Hospice   Hospice Status Set-up Complete/Auth obtained  (CHONG)

## 2023-01-01 ENCOUNTER — HOSPITAL ENCOUNTER (INPATIENT)
Facility: HOSPITAL | Age: 88
LOS: 1 days | Discharge: HOME | DRG: 812 | End: 2023-12-30
Attending: FAMILY MEDICINE | Admitting: FAMILY MEDICINE
Payer: MEDICARE

## 2023-01-01 VITALS
SYSTOLIC BLOOD PRESSURE: 141 MMHG | BODY MASS INDEX: 28.35 KG/M2 | RESPIRATION RATE: 19 BRPM | OXYGEN SATURATION: 96 % | HEIGHT: 63 IN | WEIGHT: 160 LBS | DIASTOLIC BLOOD PRESSURE: 68 MMHG | TEMPERATURE: 99 F | HEART RATE: 70 BPM

## 2023-01-01 DIAGNOSIS — I95.0 IDIOPATHIC HYPOTENSION: Primary | ICD-10-CM

## 2023-01-01 DIAGNOSIS — D64.9 ANEMIA: ICD-10-CM

## 2023-01-01 DIAGNOSIS — R07.9 CHEST PAIN: ICD-10-CM

## 2023-01-01 LAB
ABO + RH BLD: NORMAL
ABO + RH BLD: NORMAL
ALBUMIN SERPL-MCNC: 2.4 G/DL (ref 3.4–4.8)
ALBUMIN SERPL-MCNC: 2.4 G/DL (ref 3.4–4.8)
ALBUMIN/GLOB SERPL: 0.6 RATIO (ref 1.1–2)
ALBUMIN/GLOB SERPL: 0.6 RATIO (ref 1.1–2)
ALP SERPL-CCNC: 109 UNIT/L (ref 40–150)
ALP SERPL-CCNC: 117 UNIT/L (ref 40–150)
ALT SERPL-CCNC: 12 UNIT/L (ref 0–55)
ALT SERPL-CCNC: 13 UNIT/L (ref 0–55)
ANTIBODY IDENTIFICATION: NORMAL
ANTIBODY IDENTIFICATION: NORMAL
APPEARANCE UR: CLEAR
AST SERPL-CCNC: 18 UNIT/L (ref 5–34)
AST SERPL-CCNC: 20 UNIT/L (ref 5–34)
BACTERIA #/AREA URNS AUTO: ABNORMAL /HPF
BASOPHILS # BLD AUTO: 0.02 X10(3)/MCL
BASOPHILS # BLD AUTO: 0.03 X10(3)/MCL
BASOPHILS NFR BLD AUTO: 0.3 %
BASOPHILS NFR BLD AUTO: 0.4 %
BILIRUB SERPL-MCNC: 0.3 MG/DL
BILIRUB SERPL-MCNC: 0.6 MG/DL
BILIRUB UR QL STRIP.AUTO: NEGATIVE
BLD PROD TYP BPU: NORMAL
BLD PROD TYP BPU: NORMAL
BLOOD UNIT EXPIRATION DATE: NORMAL
BLOOD UNIT EXPIRATION DATE: NORMAL
BLOOD UNIT TYPE CODE: 5100
BLOOD UNIT TYPE CODE: 5100
BUN SERPL-MCNC: 16 MG/DL (ref 9.8–20.1)
BUN SERPL-MCNC: 19 MG/DL (ref 9.8–20.1)
CALCIUM SERPL-MCNC: 10.1 MG/DL (ref 8.4–10.2)
CALCIUM SERPL-MCNC: 9.2 MG/DL (ref 8.4–10.2)
CAOX CRY URNS QL MICRO: ABNORMAL /HPF
CHLORIDE SERPL-SCNC: 104 MMOL/L (ref 98–111)
CHLORIDE SERPL-SCNC: 107 MMOL/L (ref 98–111)
CO2 SERPL-SCNC: 27 MMOL/L (ref 23–31)
CO2 SERPL-SCNC: 29 MMOL/L (ref 23–31)
COLOR UR AUTO: YELLOW
CORTIS SERPL-SCNC: 10 UG/DL
CREAT SERPL-MCNC: 0.71 MG/DL (ref 0.55–1.02)
CREAT SERPL-MCNC: 0.73 MG/DL (ref 0.55–1.02)
CROSSMATCH INTERPRETATION: NORMAL
CROSSMATCH INTERPRETATION: NORMAL
DISPENSE STATUS: NORMAL
DISPENSE STATUS: NORMAL
EOSINOPHIL # BLD AUTO: 0.08 X10(3)/MCL (ref 0–0.9)
EOSINOPHIL # BLD AUTO: 0.09 X10(3)/MCL (ref 0–0.9)
EOSINOPHIL NFR BLD AUTO: 1.1 %
EOSINOPHIL NFR BLD AUTO: 1.4 %
ERYTHROCYTE [DISTWIDTH] IN BLOOD BY AUTOMATED COUNT: 16.3 % (ref 11.5–17)
ERYTHROCYTE [DISTWIDTH] IN BLOOD BY AUTOMATED COUNT: 17.7 % (ref 11.5–17)
FERRITIN SERPL-MCNC: 341.37 NG/ML (ref 4.63–204)
FOLATE SERPL-MCNC: 13.4 NG/ML (ref 7–31.4)
GFR SERPLBLD CREATININE-BSD FMLA CKD-EPI: >60 MLS/MIN/1.73/M2
GFR SERPLBLD CREATININE-BSD FMLA CKD-EPI: >60 MLS/MIN/1.73/M2
GLOBULIN SER-MCNC: 3.9 GM/DL (ref 2.4–3.5)
GLOBULIN SER-MCNC: 4 GM/DL (ref 2.4–3.5)
GLUCOSE SERPL-MCNC: 104 MG/DL (ref 75–121)
GLUCOSE SERPL-MCNC: 134 MG/DL (ref 75–121)
GLUCOSE UR QL STRIP.AUTO: NEGATIVE
GROUP & RH: ABNORMAL
HAPTOGLOB SERPL-MCNC: 327 MG/DL (ref 63–273)
HCT VFR BLD AUTO: 29.3 % (ref 37–47)
HCT VFR BLD AUTO: 34.6 % (ref 37–47)
HGB BLD-MCNC: 10.9 G/DL (ref 12–16)
HGB BLD-MCNC: 9 G/DL (ref 12–16)
IMM GRANULOCYTES # BLD AUTO: 0.02 X10(3)/MCL (ref 0–0.04)
IMM GRANULOCYTES # BLD AUTO: 0.03 X10(3)/MCL (ref 0–0.04)
IMM GRANULOCYTES NFR BLD AUTO: 0.3 %
IMM GRANULOCYTES NFR BLD AUTO: 0.4 %
INDIRECT COOMBS: ABNORMAL
IRON SATN MFR SERPL: 13 % (ref 20–50)
IRON SERPL-MCNC: 24 UG/DL (ref 50–170)
KETONES UR QL STRIP.AUTO: NEGATIVE
LEUKOCYTE ESTERASE UR QL STRIP.AUTO: ABNORMAL
LYMPHOCYTES # BLD AUTO: 1.16 X10(3)/MCL (ref 0.6–4.6)
LYMPHOCYTES # BLD AUTO: 1.32 X10(3)/MCL (ref 0.6–4.6)
LYMPHOCYTES NFR BLD AUTO: 18.1 %
LYMPHOCYTES NFR BLD AUTO: 19 %
MAGNESIUM SERPL-MCNC: 1.7 MG/DL (ref 1.6–2.6)
MAGNESIUM SERPL-MCNC: 1.8 MG/DL (ref 1.6–2.6)
MCH RBC QN AUTO: 26.3 PG (ref 27–31)
MCH RBC QN AUTO: 27.2 PG (ref 27–31)
MCHC RBC AUTO-ENTMCNC: 30.7 G/DL (ref 33–36)
MCHC RBC AUTO-ENTMCNC: 31.5 G/DL (ref 33–36)
MCV RBC AUTO: 85.7 FL (ref 80–94)
MCV RBC AUTO: 86.3 FL (ref 80–94)
MONOCYTES # BLD AUTO: 0.64 X10(3)/MCL (ref 0.1–1.3)
MONOCYTES # BLD AUTO: 0.69 X10(3)/MCL (ref 0.1–1.3)
MONOCYTES NFR BLD AUTO: 10.7 %
MONOCYTES NFR BLD AUTO: 9.2 %
NEUTROPHILS # BLD AUTO: 4.44 X10(3)/MCL (ref 2.1–9.2)
NEUTROPHILS # BLD AUTO: 4.86 X10(3)/MCL (ref 2.1–9.2)
NEUTROPHILS NFR BLD AUTO: 69.2 %
NEUTROPHILS NFR BLD AUTO: 69.9 %
NITRITE UR QL STRIP.AUTO: NEGATIVE
PH UR STRIP.AUTO: 6 [PH]
PHOSPHATE SERPL-MCNC: 3 MG/DL (ref 2.3–4.7)
PHOSPHATE SERPL-MCNC: 3.1 MG/DL (ref 2.3–4.7)
PLATELET # BLD AUTO: 152 X10(3)/MCL (ref 130–400)
PLATELET # BLD AUTO: 158 X10(3)/MCL (ref 130–400)
PMV BLD AUTO: 9.2 FL (ref 7.4–10.4)
PMV BLD AUTO: 9.8 FL (ref 7.4–10.4)
POCT GLUCOSE: 122 MG/DL (ref 70–110)
POTASSIUM SERPL-SCNC: 3.3 MMOL/L (ref 3.5–5.1)
POTASSIUM SERPL-SCNC: 3.9 MMOL/L (ref 3.5–5.1)
PROT SERPL-MCNC: 6.3 GM/DL (ref 5.8–7.6)
PROT SERPL-MCNC: 6.4 GM/DL (ref 5.8–7.6)
PROT UR QL STRIP.AUTO: NEGATIVE
RBC # BLD AUTO: 3.42 X10(6)/MCL (ref 4.2–5.4)
RBC # BLD AUTO: 4.01 X10(6)/MCL (ref 4.2–5.4)
RBC #/AREA URNS AUTO: ABNORMAL /HPF
RBC UR QL AUTO: ABNORMAL
SODIUM SERPL-SCNC: 142 MMOL/L (ref 132–146)
SODIUM SERPL-SCNC: 142 MMOL/L (ref 132–146)
SP GR UR STRIP.AUTO: 1.02 (ref 1–1.03)
SPECIMEN OUTDATE: ABNORMAL
SQUAMOUS #/AREA URNS AUTO: ABNORMAL /HPF
T4 FREE SERPL-MCNC: 0.94 NG/DL (ref 0.7–1.48)
TIBC SERPL-MCNC: 159 UG/DL (ref 70–310)
TIBC SERPL-MCNC: 183 UG/DL (ref 250–450)
TSH SERPL-ACNC: 1.15 UIU/ML (ref 0.35–4.94)
UNIT NUMBER: NORMAL
UNIT NUMBER: NORMAL
UROBILINOGEN UR STRIP-ACNC: 0.2
VIT B12 SERPL-MCNC: 524 PG/ML (ref 213–816)
WBC # SPEC AUTO: 6.42 X10(3)/MCL (ref 4.5–11.5)
WBC # SPEC AUTO: 6.96 X10(3)/MCL (ref 4.5–11.5)
WBC #/AREA URNS AUTO: ABNORMAL /HPF

## 2023-01-01 PROCEDURE — P9016 RBC LEUKOCYTES REDUCED: HCPCS | Performed by: FAMILY MEDICINE

## 2023-01-01 PROCEDURE — 80053 COMPREHEN METABOLIC PANEL: CPT | Performed by: FAMILY MEDICINE

## 2023-01-01 PROCEDURE — P9016 RBC LEUKOCYTES REDUCED: HCPCS

## 2023-01-01 PROCEDURE — 86850 RBC ANTIBODY SCREEN: CPT | Performed by: FAMILY MEDICINE

## 2023-01-01 PROCEDURE — 82533 TOTAL CORTISOL: CPT | Performed by: FAMILY MEDICINE

## 2023-01-01 PROCEDURE — 86922 COMPATIBILITY TEST ANTIGLOB: CPT | Performed by: FAMILY MEDICINE

## 2023-01-01 PROCEDURE — 86870 RBC ANTIBODY IDENTIFICATION: CPT | Performed by: FAMILY MEDICINE

## 2023-01-01 PROCEDURE — 83735 ASSAY OF MAGNESIUM: CPT | Performed by: FAMILY MEDICINE

## 2023-01-01 PROCEDURE — 11000001 HC ACUTE MED/SURG PRIVATE ROOM

## 2023-01-01 PROCEDURE — 82728 ASSAY OF FERRITIN: CPT | Performed by: FAMILY MEDICINE

## 2023-01-01 PROCEDURE — 84100 ASSAY OF PHOSPHORUS: CPT | Performed by: FAMILY MEDICINE

## 2023-01-01 PROCEDURE — 94761 N-INVAS EAR/PLS OXIMETRY MLT: CPT

## 2023-01-01 PROCEDURE — 83010 ASSAY OF HAPTOGLOBIN QUANT: CPT | Performed by: FAMILY MEDICINE

## 2023-01-01 PROCEDURE — 85025 COMPLETE CBC W/AUTO DIFF WBC: CPT | Performed by: FAMILY MEDICINE

## 2023-01-01 PROCEDURE — 63600175 PHARM REV CODE 636 W HCPCS: Performed by: FAMILY MEDICINE

## 2023-01-01 PROCEDURE — 36430 TRANSFUSION BLD/BLD COMPNT: CPT

## 2023-01-01 PROCEDURE — 84443 ASSAY THYROID STIM HORMONE: CPT | Performed by: FAMILY MEDICINE

## 2023-01-01 PROCEDURE — 30233N1 TRANSFUSION OF NONAUTOLOGOUS RED BLOOD CELLS INTO PERIPHERAL VEIN, PERCUTANEOUS APPROACH: ICD-10-PCS | Performed by: FAMILY MEDICINE

## 2023-01-01 PROCEDURE — 25000003 PHARM REV CODE 250: Performed by: FAMILY MEDICINE

## 2023-01-01 PROCEDURE — 84439 ASSAY OF FREE THYROXINE: CPT | Performed by: FAMILY MEDICINE

## 2023-01-01 PROCEDURE — 82746 ASSAY OF FOLIC ACID SERUM: CPT | Performed by: FAMILY MEDICINE

## 2023-01-01 PROCEDURE — 81003 URINALYSIS AUTO W/O SCOPE: CPT | Performed by: FAMILY MEDICINE

## 2023-01-01 PROCEDURE — 82607 VITAMIN B-12: CPT | Performed by: FAMILY MEDICINE

## 2023-01-01 PROCEDURE — 83540 ASSAY OF IRON: CPT | Performed by: FAMILY MEDICINE

## 2023-01-01 RX ORDER — ONDANSETRON 2 MG/ML
4 INJECTION INTRAMUSCULAR; INTRAVENOUS EVERY 8 HOURS PRN
Status: DISCONTINUED | OUTPATIENT
Start: 2023-01-01 | End: 2023-01-01 | Stop reason: HOSPADM

## 2023-01-01 RX ORDER — ACETAMINOPHEN 325 MG/1
650 TABLET ORAL EVERY 8 HOURS PRN
Status: DISCONTINUED | OUTPATIENT
Start: 2023-01-01 | End: 2023-01-01 | Stop reason: HOSPADM

## 2023-01-01 RX ORDER — SERTRALINE HYDROCHLORIDE 50 MG/1
50 TABLET, FILM COATED ORAL DAILY
Status: DISCONTINUED | OUTPATIENT
Start: 2023-01-01 | End: 2023-01-01 | Stop reason: HOSPADM

## 2023-01-01 RX ORDER — IBUPROFEN 200 MG
16 TABLET ORAL
Status: DISCONTINUED | OUTPATIENT
Start: 2023-01-01 | End: 2023-01-01 | Stop reason: HOSPADM

## 2023-01-01 RX ORDER — NITROGLYCERIN 0.4 MG/1
0.4 TABLET SUBLINGUAL EVERY 5 MIN PRN
Status: DISCONTINUED | OUTPATIENT
Start: 2023-01-01 | End: 2023-01-01 | Stop reason: HOSPADM

## 2023-01-01 RX ORDER — SODIUM CHLORIDE 0.9 % (FLUSH) 0.9 %
10 SYRINGE (ML) INJECTION EVERY 12 HOURS PRN
Status: DISCONTINUED | OUTPATIENT
Start: 2023-01-01 | End: 2023-01-01 | Stop reason: HOSPADM

## 2023-01-01 RX ORDER — ATORVASTATIN CALCIUM 40 MG/1
40 TABLET, FILM COATED ORAL DAILY
Status: DISCONTINUED | OUTPATIENT
Start: 2023-01-01 | End: 2023-01-01 | Stop reason: HOSPADM

## 2023-01-01 RX ORDER — ACETAMINOPHEN 325 MG/1
650 TABLET ORAL EVERY 6 HOURS PRN
Status: DISCONTINUED | OUTPATIENT
Start: 2023-01-01 | End: 2023-01-01 | Stop reason: HOSPADM

## 2023-01-01 RX ORDER — SODIUM CHLORIDE AND POTASSIUM CHLORIDE 300; 900 MG/100ML; MG/100ML
INJECTION, SOLUTION INTRAVENOUS CONTINUOUS
Status: DISCONTINUED | OUTPATIENT
Start: 2023-01-01 | End: 2023-01-01 | Stop reason: HOSPADM

## 2023-01-01 RX ORDER — QUETIAPINE FUMARATE 25 MG/1
25 TABLET, FILM COATED ORAL 2 TIMES DAILY
Status: DISCONTINUED | OUTPATIENT
Start: 2023-01-01 | End: 2023-01-01 | Stop reason: HOSPADM

## 2023-01-01 RX ORDER — HYOSCYAMINE SULFATE 0.12 MG/1
0.12 TABLET SUBLINGUAL DAILY
Status: DISCONTINUED | OUTPATIENT
Start: 2023-01-01 | End: 2023-01-01 | Stop reason: HOSPADM

## 2023-01-01 RX ORDER — SODIUM CHLORIDE 9 MG/ML
INJECTION, SOLUTION INTRAVENOUS CONTINUOUS
Status: DISCONTINUED | OUTPATIENT
Start: 2023-01-01 | End: 2023-01-01

## 2023-01-01 RX ORDER — GLUCAGON 1 MG
1 KIT INJECTION
Status: DISCONTINUED | OUTPATIENT
Start: 2023-01-01 | End: 2023-01-01 | Stop reason: HOSPADM

## 2023-01-01 RX ORDER — CARVEDILOL 25 MG/1
25 TABLET ORAL 2 TIMES DAILY
Status: DISCONTINUED | OUTPATIENT
Start: 2023-01-01 | End: 2023-01-01 | Stop reason: HOSPADM

## 2023-01-01 RX ORDER — LORAZEPAM 1 MG/1
1 TABLET ORAL EVERY 6 HOURS PRN
Status: DISCONTINUED | OUTPATIENT
Start: 2023-01-01 | End: 2023-01-01 | Stop reason: HOSPADM

## 2023-01-01 RX ORDER — INSULIN ASPART 100 [IU]/ML
0-10 INJECTION, SOLUTION INTRAVENOUS; SUBCUTANEOUS
Status: DISCONTINUED | OUTPATIENT
Start: 2023-01-01 | End: 2023-01-01 | Stop reason: HOSPADM

## 2023-01-01 RX ORDER — HYDROCODONE BITARTRATE AND ACETAMINOPHEN 500; 5 MG/1; MG/1
TABLET ORAL
Status: DISCONTINUED | OUTPATIENT
Start: 2023-01-01 | End: 2023-01-01 | Stop reason: HOSPADM

## 2023-01-01 RX ORDER — MEMANTINE HYDROCHLORIDE 5 MG/1
10 TABLET ORAL 2 TIMES DAILY
Status: DISCONTINUED | OUTPATIENT
Start: 2023-01-01 | End: 2023-01-01 | Stop reason: HOSPADM

## 2023-01-01 RX ORDER — PANTOPRAZOLE SODIUM 40 MG/1
40 TABLET, DELAYED RELEASE ORAL DAILY
Status: DISCONTINUED | OUTPATIENT
Start: 2023-01-01 | End: 2023-01-01 | Stop reason: HOSPADM

## 2023-01-01 RX ORDER — IBUPROFEN 200 MG
24 TABLET ORAL
Status: DISCONTINUED | OUTPATIENT
Start: 2023-01-01 | End: 2023-01-01 | Stop reason: HOSPADM

## 2023-01-01 RX ORDER — DORZOLAMIDE HYDROCHLORIDE AND TIMOLOL MALEATE 20; 5 MG/ML; MG/ML
1 SOLUTION/ DROPS OPHTHALMIC DAILY
Status: DISCONTINUED | OUTPATIENT
Start: 2023-01-01 | End: 2023-01-01 | Stop reason: HOSPADM

## 2023-01-01 RX ORDER — ISOSORBIDE MONONITRATE 30 MG/1
30 TABLET, EXTENDED RELEASE ORAL DAILY
Status: DISCONTINUED | OUTPATIENT
Start: 2023-01-01 | End: 2023-01-01 | Stop reason: HOSPADM

## 2023-01-01 RX ORDER — SENNOSIDES 8.6 MG/1
1 TABLET ORAL DAILY PRN
Status: DISCONTINUED | OUTPATIENT
Start: 2023-01-01 | End: 2023-01-01 | Stop reason: HOSPADM

## 2023-01-01 RX ORDER — POTASSIUM CHLORIDE 7.45 MG/ML
40 INJECTION INTRAVENOUS ONCE
Status: DISCONTINUED | OUTPATIENT
Start: 2023-01-01 | End: 2023-01-01

## 2023-01-01 RX ORDER — NALOXONE HCL 0.4 MG/ML
0.02 VIAL (ML) INJECTION
Status: DISCONTINUED | OUTPATIENT
Start: 2023-01-01 | End: 2023-01-01 | Stop reason: HOSPADM

## 2023-01-01 RX ADMIN — ATORVASTATIN CALCIUM 40 MG: 40 TABLET, FILM COATED ORAL at 09:12

## 2023-01-01 RX ADMIN — MEMANTINE HYDROCHLORIDE 10 MG: 5 TABLET ORAL at 09:12

## 2023-01-01 RX ADMIN — CARVEDILOL 25 MG: 25 TABLET, FILM COATED ORAL at 10:12

## 2023-01-01 RX ADMIN — MEMANTINE HYDROCHLORIDE 10 MG: 5 TABLET ORAL at 10:12

## 2023-01-01 RX ADMIN — DORZOLAMIDE HYDROCHLORIDE AND TIMOLOL MALEATE 1 DROP: 20; 5 SOLUTION/ DROPS OPHTHALMIC at 09:12

## 2023-01-01 RX ADMIN — QUETIAPINE FUMARATE 25 MG: 25 TABLET ORAL at 10:12

## 2023-01-01 RX ADMIN — SERTRALINE HYDROCHLORIDE 50 MG: 50 TABLET ORAL at 09:12

## 2023-01-01 RX ADMIN — PANTOPRAZOLE SODIUM 40 MG: 40 TABLET, DELAYED RELEASE ORAL at 09:12

## 2023-01-01 RX ADMIN — POTASSIUM CHLORIDE AND SODIUM CHLORIDE: 900; 300 INJECTION, SOLUTION INTRAVENOUS at 10:12

## 2023-01-01 RX ADMIN — HYOSCYAMINE SULFATE 0.12 MG: 0.12 TABLET ORAL at 09:12

## 2023-01-01 RX ADMIN — LORAZEPAM 1 MG: 1 TABLET ORAL at 12:12

## 2023-01-01 RX ADMIN — QUETIAPINE FUMARATE 25 MG: 25 TABLET ORAL at 09:12

## 2023-01-01 RX ADMIN — ISOSORBIDE MONONITRATE 30 MG: 30 TABLET, EXTENDED RELEASE ORAL at 09:12

## 2023-01-01 RX ADMIN — CARVEDILOL 25 MG: 25 TABLET, FILM COATED ORAL at 09:12

## 2023-01-05 NOTE — PHYSICIAN QUERY
PT Name: Miguel Angel Oliveros  MR #: [id number]     DOCUMENTATION CLARIFICATION     CDS/: Blanca Ferguson RN              Contact information: [email or web address]  This form is a permanent document in the medical record.     Query Date: January 5, 2023    By submitting this query, we are merely seeking further clarification of documentation.  Please utilize your independent clinical judgment when addressing the question(s) below.    The Medical Record contains the following   Indicators Supporting Clinical Findings   Location in Medical Record   x Heart Failure documented CHF exacerbation Consult 12/12       Hospital Medicine     x BNP  BNP 1,124 Consult 12/12       Hospital Medicine     x EF/Echo Hyperdynamic left ventricular systolic function, LVEF >70%  Severe right ventricular enlargement with moderately to severely reduced right ventricular systolic function.  Well seated bioprosthetic AV (TAVR) without significant stenosis or perivalvular leak. Peak AV 2.3 m/sec, MG 10 mmHg,dimensionless index 0.34.  Mild to moderate tricuspid regurgitation.  There is pulmonary hypertension. The estimated PA systolic pressure is 58 mmHg.  Large echodensities seen in IVC at RA junction, cannot completely exclude IVC thrombus.  The estimated PA systolic pressure is 58 mmHg.   ECHO 12/12   x Radiology findings Chest x-ray revealed cardiomegaly without acute cardiopulmonary abnormality.   Consult 12/12       Hospital Medicine   x Subjective/Objective Respiratory Conditions Respiratory:  Positive for shortness of breath. H&P 12/12      Hospital Medicine     x Recent/Current MI NSTEMI Type II in the setting of Acute COVID Infection, SMITHA, and IVC/ portal vein Thrombus/ saddle PE: stable  DC summary 12/30    Heart Transplant, LVAD     x Edema, JVD No edema.  Neck:  Supple, symmetrical, no tenderness, no JVD ED Provider note 12/11  Consult 12/12        Nephrology     Ascites     x Diuretics/Meds Furosemide IV 20 mg      Furosemide IV 40 mg        MAR 12/23, 12/24  MAR 12/21, 12/22, 12/26   x Other Treatment  12/21- S/P Pulm artery thrombectomy   Developed anasarca, d/c IVF, received Lasix 40 mg IV x 1 on 12/21 and again on 12/22   PN 12/22       Hospital Medcine    Other       Heart failure is a clinical diagnosis which includes symptomatic fluid retention, elevated intracardiac pressures, and/or the inability of the heart to deliver adequate blood flow.    Heart Failure with reduced Ejection Fraction (HFrEF) or Systolic Heart Failure (loses ability to contract normally, EF is <40%)    Heart Failure with preserved Ejection Fraction (HFpEF) or Diastolic Heart Failure (stiff ventricles, does not relax properly, EF is >50%)     Heart Failure with Combined Systolic and Diastolic Failure (stiff ventricles, does not relax properly and EF is <50%)    Mid-range or mildly reduced ejection fraction (HFmrEF) is classified as systolic heart failure.  Congestive heart failure with a recovered EF is classified as Diastolic Heart Failure.  Common clues to acute exacerbation:  Rapidly progressive symptoms (w/in 2 weeks of presentation), using IV diuretics, using supplemental O2, pulmonary edema on Xray, new or worsening pleural effusion, +JVD or other signs of volume overload, MI w/in 4 weeks, and/or BNP >500  The clinical guidelines noted are only system guidelines, and do not replace the providers clinical judgment.    Provider, please clarify/ specify the CHF exacerbation diagnosis associated with the above clinical findings.    [ x  ]  Acute Diastolic Heart Failure (HFpEF) - new diagnosis     [   ]  Acute on Chronic Diastolic Heart Failure (HFpEF) - worsening of CHF signs/symptoms in preexisting CHF     [   ]  Heart Failure Ruled Out     [   ]  Other (please specify): ___________________________________     [  ]  Clinically Undetermined           Please document in your progress notes daily for the duration of treatment until resolved and  include in your discharge summary.    References:  American Heart Association editorial staff. (2017, May). Ejection Fraction Heart Failure Measurement. American Heart Association. https://www.heart.org/en/health-topics/heart-failure/diagnosing-heart-failure/ejection-fraction-heart-failure-measurement#:~:text=Ejection%20fraction%20(EF)%20is%20a,pushed%20out%20with%20each%20heartbeat  JIMENEZ Soto (2020, December 15). Heart failure with preserved ejection fraction: Clinical manifestations and diagnosis. [employer]. https://www.[employer]az[employer].ClearApp/contents/heart-failure-with-preserved-ejection-fraction-clinical-manifestations-and-diagnosis.  ICD-10-CM/PCS Coding Clinic Third Quarter ICD-10, Effective with discharges: September 8, 2020 [facility] § Heart failure with mid-range or mildly reduced ejection fraction (2020).  ICD-10-CM/PCS Coding Clinic Third Quarter ICD-10, Effective with discharges: September 8, 2020 [facility] § Heart failure with recovered ejection fraction (2020).  Form No. 34611

## 2023-02-04 ENCOUNTER — HOSPITAL ENCOUNTER (EMERGENCY)
Facility: HOSPITAL | Age: 88
Discharge: HOME | End: 2023-02-04
Attending: EMERGENCY MEDICINE
Payer: OTHER MISCELLANEOUS

## 2023-02-04 VITALS
SYSTOLIC BLOOD PRESSURE: 177 MMHG | DIASTOLIC BLOOD PRESSURE: 97 MMHG | HEIGHT: 66 IN | HEART RATE: 110 BPM | WEIGHT: 190 LBS | BODY MASS INDEX: 30.53 KG/M2 | RESPIRATION RATE: 18 BRPM | OXYGEN SATURATION: 100 % | TEMPERATURE: 99 F

## 2023-02-04 DIAGNOSIS — D64.9 CHRONIC ANEMIA: ICD-10-CM

## 2023-02-04 DIAGNOSIS — R53.1 WEAKNESS: ICD-10-CM

## 2023-02-04 DIAGNOSIS — I82.5Z2 CHRONIC DEEP VEIN THROMBOSIS (DVT) OF DISTAL VEIN OF LEFT LOWER EXTREMITY: Primary | ICD-10-CM

## 2023-02-04 DIAGNOSIS — M79.89 LEG SWELLING: ICD-10-CM

## 2023-02-04 LAB
ALBUMIN SERPL-MCNC: 1.9 G/DL (ref 3.4–4.8)
ALBUMIN/GLOB SERPL: 0.4 RATIO (ref 1.1–2)
ALP SERPL-CCNC: 89 UNIT/L (ref 40–150)
ALT SERPL-CCNC: 7 UNIT/L (ref 0–55)
APPEARANCE UR: CLEAR
AST SERPL-CCNC: 19 UNIT/L (ref 5–34)
BACTERIA #/AREA URNS AUTO: ABNORMAL /HPF
BASOPHILS # BLD AUTO: 0.04 X10(3)/MCL (ref 0–0.2)
BASOPHILS NFR BLD AUTO: 0.4 %
BILIRUB UR QL STRIP.AUTO: NEGATIVE MG/DL
BILIRUBIN DIRECT+TOT PNL SERPL-MCNC: 0.3 MG/DL
BUN SERPL-MCNC: 10 MG/DL (ref 9.8–20.1)
CALCIUM SERPL-MCNC: 9.7 MG/DL (ref 8.4–10.2)
CHLORIDE SERPL-SCNC: 104 MMOL/L (ref 98–107)
CO2 SERPL-SCNC: 26 MMOL/L (ref 23–31)
COLOR UR AUTO: YELLOW
CREAT SERPL-MCNC: 0.66 MG/DL (ref 0.55–1.02)
EOSINOPHIL # BLD AUTO: 0.14 X10(3)/MCL (ref 0–0.9)
EOSINOPHIL NFR BLD AUTO: 1.4 %
ERYTHROCYTE [DISTWIDTH] IN BLOOD BY AUTOMATED COUNT: 21.1 % (ref 11.5–17)
GFR SERPLBLD CREATININE-BSD FMLA CKD-EPI: >60 MLS/MIN/1.73/M2
GLOBULIN SER-MCNC: 4.9 GM/DL (ref 2.4–3.5)
GLUCOSE SERPL-MCNC: 113 MG/DL (ref 82–115)
GLUCOSE UR QL STRIP.AUTO: NEGATIVE MG/DL
HCT VFR BLD AUTO: 28.5 % (ref 37–47)
HGB BLD-MCNC: 8.3 GM/DL (ref 12–16)
IMM GRANULOCYTES # BLD AUTO: 0.06 X10(3)/MCL (ref 0–0.04)
IMM GRANULOCYTES NFR BLD AUTO: 0.6 %
KETONES UR QL STRIP.AUTO: NEGATIVE MG/DL
LEUKOCYTE ESTERASE UR QL STRIP.AUTO: ABNORMAL UNIT/L
LYMPHOCYTES # BLD AUTO: 1.75 X10(3)/MCL (ref 0.6–4.6)
LYMPHOCYTES NFR BLD AUTO: 17.9 %
MCH RBC QN AUTO: 23.6 PG
MCHC RBC AUTO-ENTMCNC: 29.1 MG/DL (ref 33–36)
MCV RBC AUTO: 81 FL (ref 80–94)
MONOCYTES # BLD AUTO: 0.81 X10(3)/MCL (ref 0.1–1.3)
MONOCYTES NFR BLD AUTO: 8.3 %
NEUTROPHILS # BLD AUTO: 6.95 X10(3)/MCL (ref 2.1–9.2)
NEUTROPHILS NFR BLD AUTO: 71.4 %
NITRITE UR QL STRIP.AUTO: NEGATIVE
PH UR STRIP.AUTO: 7 [PH]
PLATELET # BLD AUTO: 442 X10(3)/MCL (ref 130–400)
PMV BLD AUTO: 8.8 FL (ref 7.4–10.4)
POTASSIUM SERPL-SCNC: 4.1 MMOL/L (ref 3.5–5.1)
PROT SERPL-MCNC: 6.8 GM/DL (ref 5.8–7.6)
PROT UR QL STRIP.AUTO: NEGATIVE MG/DL
RBC # BLD AUTO: 3.52 X10(6)/MCL (ref 4.2–5.4)
RBC #/AREA URNS AUTO: ABNORMAL /HPF
RBC UR QL AUTO: ABNORMAL UNIT/L
SODIUM SERPL-SCNC: 143 MMOL/L (ref 136–145)
SP GR UR STRIP.AUTO: 1.02
SQUAMOUS #/AREA URNS AUTO: ABNORMAL /HPF
TROPONIN I SERPL-MCNC: 0.03 NG/ML (ref 0–0.04)
UROBILINOGEN UR STRIP-ACNC: 0.2 MG/DL
WBC # SPEC AUTO: 9.8 X10(3)/MCL (ref 4.5–11.5)
WBC #/AREA URNS AUTO: ABNORMAL /HPF

## 2023-02-04 PROCEDURE — 80053 COMPREHEN METABOLIC PANEL: CPT | Performed by: EMERGENCY MEDICINE

## 2023-02-04 PROCEDURE — 81001 URINALYSIS AUTO W/SCOPE: CPT | Performed by: EMERGENCY MEDICINE

## 2023-02-04 PROCEDURE — 84484 ASSAY OF TROPONIN QUANT: CPT | Performed by: EMERGENCY MEDICINE

## 2023-02-04 PROCEDURE — 99284 EMERGENCY DEPT VISIT MOD MDM: CPT | Mod: 25

## 2023-02-04 PROCEDURE — 85025 COMPLETE CBC W/AUTO DIFF WBC: CPT | Performed by: EMERGENCY MEDICINE

## 2023-02-04 NOTE — ED PROVIDER NOTES
Encounter Date: 2023       History     Chief Complaint   Patient presents with    Leg Swelling     Pt brought per [facility] for left leg swelling, pt hospice patient, awake but confused.     Patient is brought in from home by ambulance left leg swelling patient says it has been ongoing for over a month.  She was seen diagnosed with a pulmonary embolus a proximally 2022 eventually she ended up with a thrombectomy in her lung.  And she ended up with a PEG tube.  During that hospital stay I think the  thrombectomy and the  PEG tube placement.  She is now on hospice service she is slightly confused unable to get good review of systems from her.  Daughter is present however    Daughter says the leg is swollen she is also complaining of feeling very weak.  The daughter is not know if the UTI which she finished antibiotics for his cleared up.  She just got out the hospital earlier this month.  She is on hospice care currently.   She is a full code, per the daughter.    The daughter says  lives with her no tobacco no alcohol no drugs retired.      Family history per the daughter mother  unknown probably just old age write for she got a PEG tube placed dad  of heart attack.  Daughter says mother has had COVID shots at least 3 pneumonia and flu up-to-date no tetanus.  Surgical history daughter says she is had a PEG tube pacemaker.  Transvenous aortic valve replacement.  She is had a hysterectomy.  She is  8 para 8.  She does have coronary artery disease borderline diabetes hypertension recent pulmonary embolus.  Primary healthcare provider Dr. Barrett Lopez.        Review of patient's allergies indicates:   Allergen Reactions    Statins-hmg-coa reductase inhibitors      Other reaction(s): Joint pain    Bimatoprost      Other reaction(s): Eye redness    Cortisone     Dorzolamide-timolol     Gabapentin      Other reaction(s): Confusion    Hydrocortisone     Tafluprost  (pf)      Past Medical History:   Diagnosis Date    Anxiety disorder     Aortic stenosis     Arthritis     Carpal tunnel syndrome     Coronary artery disease     Dementia     Depression     Diabetes mellitus     GERD (gastroesophageal reflux disease)     Hx of rectal polypectomy     Hypercholesterolemia     Hypertension     Obstructive sleep apnea     Respiratory distress     S/P TAVR (transcatheter aortic valve replacement)     TIA (transient ischemic attack)      Past Surgical History:   Procedure Laterality Date    CARDIAC SURGERY      CATARACT EXTRACTION      CORONARY STENT PLACEMENT      EGD, WITH CLOSED BIOPSY  12/28/2022    Procedure: EGD, WITH CLOSED BIOPSY;  Surgeon: Arcadio Carias MD;  Location: [facility] ENDOSCOPY;  Service: Gastroenterology;;    ESOPHAGOGASTRODUODENOSCOPY W/ PEG N/A 12/28/2022    Procedure: PEG;  Surgeon: Arcadio Carias MD;  Location: [facility] ENDOSCOPY;  Service: Gastroenterology;  Laterality: N/A;    HYSTERECTOMY      INSERTION OF PACEMAKER      PARTIAL HYSTERECTOMY      PULMONARY EMBOLISM SURGERY N/A 12/21/2022    Procedure: EMBOLECTOMY, ARTERY, PULMONARY;  Surgeon: Terri Urbina MD;  Location: [facility] CATH LAB;  Service: Peripheral Vascular;  Laterality: N/A;    RECTAL POLYPECTOMY      TONSILLECTOMY      TRANSCATHETER AORTIC VALVE REPLACEMENT (TAVR)       Family History   Problem Relation Age of Onset    Diabetes Mother     Stroke Father     Hyperlipidemia Father     Breast cancer Sister      Social History     Tobacco Use    Smoking status: Never    Smokeless tobacco: Never   Substance Use Topics    Alcohol use: Never    Drug use: Never     Review of Systems   Unable to perform ROS: Dementia     Physical Exam     Initial Vitals [02/04/23 0705]   BP Pulse Resp Temp SpO2   (!) 170/97 70 16 98.5 °F (36.9 °C) 98 %      MAP       --         Physical Exam    Nursing note and vitals reviewed.  Constitutional: She appears well-developed and well-nourished.   HENT:   Head:  Normocephalic and atraumatic.   Right Ear: Tympanic membrane and external ear normal.   Left Ear: Tympanic membrane and external ear normal.   Nose: Nose normal.   Mouth/Throat: Oropharynx is clear and moist and mucous membranes are normal.   Eyes: EOM are normal. Pupils are equal, round, and reactive to light.   Neck: Neck supple. No thyromegaly present. No tracheal deviation present. No JVD present.   Normal range of motion.  Cardiovascular:  Normal rate, regular rhythm and normal heart sounds.     Exam reveals no gallop and no friction rub.       No murmur heard.  Pulmonary/Chest: Breath sounds normal. No stridor. No respiratory distress. She has no wheezes. She has no rhonchi. She has no rales. She exhibits no tenderness.   Pacemaker present in the left upper chest   Abdominal: Abdomen is soft. Bowel sounds are normal. She exhibits no distension and no mass. There is no abdominal tenderness.   Peg tube present slightly left of midline upper abdomen   Musculoskeletal:         General: Edema present. No tenderness. Normal range of motion.      Cervical back: Normal range of motion and neck supple.      Comments: The entire left leg is swollen there is a compression stocking in place.  She is able to feel sensation appears to be intact dorsalis pedis posterior tibial pulses are intact it is probably 50% larger than the right leg warm well-perfused dorsalis pedis posterior tibial pulses are good sensation is good able to flex and extend at the ankle it would extend the toes.  No open sores     Lymphadenopathy:     She has no cervical adenopathy.   Neurological: She is alert and oriented to person, place, and time. She has normal strength and normal reflexes. No cranial nerve deficit. GCS score is 15. GCS eye subscore is 4. GCS verbal subscore is 5. GCS motor subscore is 6.   Maybe slightly confused she could take 1 off for confusion on her GCS of 14 or 15   Skin: Skin is warm and dry. Capillary refill takes 2 to 3  seconds. No rash noted.   Psychiatric: She has a normal mood and affect.   She seems to be very calm the only complaint she gives me is that the PEG tube site bothers her.  In the left leg is swollen       ED Course   Procedures  Labs Reviewed   COMPREHENSIVE METABOLIC PANEL - Abnormal; Notable for the following components:       Result Value    Albumin Level 1.9 (*)     Globulin 4.9 (*)     Albumin/Globulin Ratio 0.4 (*)     All other components within normal limits   URINALYSIS, REFLEX TO URINE CULTURE - Abnormal; Notable for the following components:    Blood, UA Moderate (*)     Leukocyte Esterase, UA Trace (*)     All other components within normal limits   CBC WITH DIFFERENTIAL - Abnormal; Notable for the following components:    RBC 3.52 (*)     Hgb 8.3 (*)     Hct 28.5 (*)     MCHC 29.1 (*)     RDW 21.1 (*)     Platelet 442 (*)     IG# 0.06 (*)     All other components within normal limits   URINALYSIS, MICROSCOPIC - Abnormal; Notable for the following components:    RBC, UA 21-50 (*)     Squamous Epithelial Cells, UA Few (*)     All other components within normal limits   TROPONIN I - Normal   CBC W/ AUTO DIFFERENTIAL    Narrative:     The following orders were created for panel order CBC auto differential.  Procedure                               Abnormality         Status                     ---------                               -----------         ------                     CBC with Differential[[id number]]        Abnormal            Final result                 Please view results for these tests on the individual orders.          Imaging Results              US Lower Extremity Veins Left (Final result)  Result time 02/04/23 09:40:04      Final result by Jomar Hamilton MD (02/04/23 09:40:04)                   Impression:      1. Deep venous thrombosis extending from the calf veins to the common femoral veins.  Findings called to Dr. Calderón by Dr. Hamilton via telephone at 09:39 on  02/04/2023      Electronically signed by: Jomar Hamilton MD  Date:    02/04/2023  Time:    09:40               Narrative:    EXAMINATION:  US LOWER EXTREMITY VEINS LEFT    CLINICAL HISTORY:  Other specified soft tissue disorders    TECHNIQUE:  Duplex and color flow Doppler evaluation and graded compression of the left lower extremity veins was performed.    COMPARISON:  None    FINDINGS:  Left thigh veins: There is near occlusive thrombus within the common femoral vein.  There is complete thrombosis of the femoral and greater saphenous veins.  There is near occlusive thrombus within the popliteal vein.  Posterior tibial vein is thrombosed.  The anterior tibial and peroneal veins are not seen.                                       Medications - No data to display  Medical Decision Making:   History:   I obtained history from: someone other than patient.       <> Summary of History: The daughter gives most of the history.  Weakness UTI do not know if it cleared up with antibiotics off antibiotics now for a week and the swollen left leg for a month now.  Full code status recently in the hospital with pulmonary embolus known coronary disease aortic valve replacement anticoagulated on Eliquis currently  Old Records Summarized: records from previous admission(s).       <> Summary of Records: Pulmonary thrombectomy on the 21st of December PEG tube placed on the 28th.  Initial Assessment:   Elderly female slightly confused she is pleasant she does not appear to be in any gross distress the swelling of the left leg is the most impressive finding probably 50% larger than the right with intact distal neurovascular function.  Heart is regular rate and rhythm pacemaker left upper chest lungs are clear abdomen has a PEG tube in it nontender no masses mucous membranes are moist  Differential Diagnosis:   DVT already on anticoagulation.  Lymphedema, simple edema.  Iliac syndrome with blockage of the vein and swelling because of  that.  Anemia with the weakness urinary tract infection with the weakness  Clinical Tests:   Lab Tests: Ordered  Radiological Study: Ordered  MDM  Problems addressed  Co-morbidities and/or factors adding to the complexity or risk for the patient:  Age obesity recent hospitalization with pulmonary embolus status post thrombectomy coronary disease  Problems addressed:  Swollen left leg weakness possible UTI  Acute problem/illness or progression/exacerbation of chronic problem with potential threat to life/bodily dysfunction?:  Possible DVT but already anticoagulated.  Weakness possibly anemic urinary tract infection possible  Differential diagnoses/problems considered: see above     Amount and/or Complexity of Data Reviewed  Independent Historian: daughter  (see above for summary)  External Data Reviewed: notes from previous admissions, notes from previous ED visits, and prior labs (see above for summary)  Risk and benefits of testing: discussed   Labs: Labs: ordered and reviewed  Radiology:Radiology: ordered and independent interpretation performed (see above or ED course)  ECG/medicine tests:Radiology: ordered and independent interpretation performed (see above or ED course)  none    Risk  OTC medications    Critical Care  none            ED Course as of 02/04/23 1012   Sat Feb 04, 2023   0857 Urine does not look to be grossly infected trace leukocyte esterase no bacteria no white cells seen.  Hemoglobin hematocrit 8.3 and 28.5 [DM]   0954 Positive DVT in the leg no evidence of rubra patient is already on Eliquis explained this to daughter [DM]   1009 You lab work appears to be very stable compared to the 30th of January chronic anemia 8.3.  Ultrasound showed a large deep venous thrombosis in the leg but there is no clinical evidence of rubra or acute changes that need to have the clot removed.  I did explain this to the daughter her urine is uninfected.  Continue present care I recommend they follow-up with the  vascular surgeon Antonio Urbina.  Who worked on the thrombectomy of her lung. 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

## 2023-02-04 NOTE — DISCHARGE INSTRUCTIONS
Continue use the support hose watch for intense redness changes or acute sudden progression of swelling  Follow-up with the vascular surgeon Dr. Antonio Urbina.

## 2023-02-09 ENCOUNTER — HOSPITAL ENCOUNTER (INPATIENT)
Facility: HOSPITAL | Age: 88
LOS: 3 days | Discharge: HOME HEALTH | DRG: 069 | End: 2023-02-12
Attending: FAMILY MEDICINE | Admitting: FAMILY MEDICINE
Payer: MEDICARE

## 2023-02-09 DIAGNOSIS — I10 PRIMARY HYPERTENSION: ICD-10-CM

## 2023-02-09 DIAGNOSIS — R53.81 DEBILITY: Primary | ICD-10-CM

## 2023-02-09 DIAGNOSIS — R41.82 ALTERED MENTAL STATUS: ICD-10-CM

## 2023-02-09 DIAGNOSIS — R41.0 CONFUSION: ICD-10-CM

## 2023-02-09 DIAGNOSIS — R07.9 CHEST PAIN: ICD-10-CM

## 2023-02-09 LAB
ALBUMIN SERPL-MCNC: 2 G/DL (ref 3.4–4.8)
ALBUMIN/GLOB SERPL: 0.5 RATIO (ref 1.1–2)
ALLENS TEST: ABNORMAL
ALP SERPL-CCNC: 82 UNIT/L (ref 40–150)
ALT SERPL-CCNC: 8 UNIT/L (ref 0–55)
APPEARANCE UR: ABNORMAL
AST SERPL-CCNC: 17 UNIT/L (ref 5–34)
BACTERIA #/AREA URNS AUTO: ABNORMAL /HPF
BASOPHILS # BLD AUTO: 0.04 X10(3)/MCL (ref 0–0.2)
BASOPHILS NFR BLD AUTO: 0.4 %
BILIRUB UR QL STRIP.AUTO: NEGATIVE MG/DL
BILIRUBIN DIRECT+TOT PNL SERPL-MCNC: 0.6 MG/DL
BNP BLD-MCNC: 84.2 PG/ML
BUN SERPL-MCNC: 11 MG/DL (ref 9.8–20.1)
CALCIUM SERPL-MCNC: 8.8 MG/DL (ref 8.4–10.2)
CHLORIDE SERPL-SCNC: 105 MMOL/L (ref 98–107)
CK SERPL-CCNC: 14 U/L (ref 29–168)
CO2 SERPL-SCNC: 29 MMOL/L (ref 23–31)
COLOR UR AUTO: YELLOW
CREAT SERPL-MCNC: 0.72 MG/DL (ref 0.55–1.02)
D DIMER PPP IA.FEU-MCNC: 2.11 UG/ML FEU (ref 0–0.5)
DELSYS: ABNORMAL
EOSINOPHIL # BLD AUTO: 0.15 X10(3)/MCL (ref 0–0.9)
EOSINOPHIL NFR BLD AUTO: 1.3 %
ERYTHROCYTE [DISTWIDTH] IN BLOOD BY AUTOMATED COUNT: 22.2 % (ref 11.5–17)
FLUAV AG UPPER RESP QL IA.RAPID: NOT DETECTED
FLUBV AG UPPER RESP QL IA.RAPID: NOT DETECTED
GFR SERPLBLD CREATININE-BSD FMLA CKD-EPI: >60 MLS/MIN/1.73/M2
GLOBULIN SER-MCNC: 4.3 GM/DL (ref 2.4–3.5)
GLUCOSE SERPL-MCNC: 107 MG/DL (ref 82–115)
GLUCOSE UR QL STRIP.AUTO: NEGATIVE MG/DL
HCO3 UR-SCNC: 27.5 MMOL/L (ref 24–28)
HCT VFR BLD AUTO: 29.1 % (ref 37–47)
HGB BLD-MCNC: 8.5 GM/DL (ref 12–16)
IMM GRANULOCYTES # BLD AUTO: 0.05 X10(3)/MCL (ref 0–0.04)
IMM GRANULOCYTES NFR BLD AUTO: 0.4 %
KETONES UR QL STRIP.AUTO: NEGATIVE MG/DL
LACTATE SERPL-SCNC: 1 MMOL/L (ref 0.5–2.2)
LEUKOCYTE ESTERASE UR QL STRIP.AUTO: ABNORMAL UNIT/L
LYMPHOCYTES # BLD AUTO: 1.95 X10(3)/MCL (ref 0.6–4.6)
LYMPHOCYTES NFR BLD AUTO: 17.5 %
MAGNESIUM SERPL-MCNC: 2 MG/DL (ref 1.6–2.6)
MCH RBC QN AUTO: 23.5 PG
MCHC RBC AUTO-ENTMCNC: 29.2 MG/DL (ref 33–36)
MCV RBC AUTO: 80.6 FL (ref 80–94)
MONOCYTES # BLD AUTO: 0.92 X10(3)/MCL (ref 0.1–1.3)
MONOCYTES NFR BLD AUTO: 8.3 %
NEUTROPHILS # BLD AUTO: 8.01 X10(3)/MCL (ref 2.1–9.2)
NEUTROPHILS NFR BLD AUTO: 72.1 %
NITRITE UR QL STRIP.AUTO: NEGATIVE
PCO2 BLDA: 33.7 MMHG (ref 35–45)
PH SMN: 7.52 [PH] (ref 7.35–7.45)
PH UR STRIP.AUTO: 7.5 [PH]
PHOSPHATE SERPL-MCNC: 3.4 MG/DL (ref 2.3–4.7)
PLATELET # BLD AUTO: 359 X10(3)/MCL (ref 130–400)
PMV BLD AUTO: 9.1 FL (ref 7.4–10.4)
PO2 BLDA: 72 MMHG (ref 80–100)
POC BE: 5 MMOL/L
POC SATURATED O2: 96 % (ref 95–100)
POC TCO2: 29 MMOL/L (ref 23–27)
POTASSIUM SERPL-SCNC: 3.4 MMOL/L (ref 3.5–5.1)
PREALB SERPL-MCNC: 9.3 MG/DL (ref 14–37)
PROT SERPL-MCNC: 6.3 GM/DL (ref 5.8–7.6)
PROT UR QL STRIP.AUTO: 30 MG/DL
RBC # BLD AUTO: 3.61 X10(6)/MCL (ref 4.2–5.4)
RBC #/AREA URNS AUTO: ABNORMAL /HPF
RBC UR QL AUTO: ABNORMAL UNIT/L
SAMPLE: ABNORMAL
SARS-COV-2 RNA RESP QL NAA+PROBE: NOT DETECTED
SITE: ABNORMAL
SODIUM SERPL-SCNC: 140 MMOL/L (ref 136–145)
SP GR UR STRIP.AUTO: 1.01
SQUAMOUS #/AREA URNS AUTO: ABNORMAL /HPF
T4 FREE SERPL-MCNC: 0.95 NG/DL (ref 0.7–1.48)
TSH SERPL-ACNC: 0.57 UIU/ML (ref 0.35–4.94)
UROBILINOGEN UR STRIP-ACNC: 0.2 MG/DL
WBC # SPEC AUTO: 11.1 X10(3)/MCL (ref 4.5–11.5)
WBC #/AREA URNS AUTO: ABNORMAL /HPF
YEAST URNS QL MICRO: ABNORMAL /HPF

## 2023-02-09 PROCEDURE — 93005 ELECTROCARDIOGRAM TRACING: CPT

## 2023-02-09 PROCEDURE — 93010 EKG 12-LEAD: ICD-10-PCS | Mod: GW,,, | Performed by: INTERNAL MEDICINE

## 2023-02-09 PROCEDURE — 36600 WITHDRAWAL OF ARTERIAL BLOOD: CPT

## 2023-02-09 PROCEDURE — 84443 ASSAY THYROID STIM HORMONE: CPT | Performed by: FAMILY MEDICINE

## 2023-02-09 PROCEDURE — 82550 ASSAY OF CK (CPK): CPT | Performed by: FAMILY MEDICINE

## 2023-02-09 PROCEDURE — 82803 BLOOD GASES ANY COMBINATION: CPT

## 2023-02-09 PROCEDURE — 83605 ASSAY OF LACTIC ACID: CPT | Performed by: FAMILY MEDICINE

## 2023-02-09 PROCEDURE — 81001 URINALYSIS AUTO W/SCOPE: CPT | Performed by: FAMILY MEDICINE

## 2023-02-09 PROCEDURE — 83735 ASSAY OF MAGNESIUM: CPT | Performed by: FAMILY MEDICINE

## 2023-02-09 PROCEDURE — 21400001 HC TELEMETRY ROOM

## 2023-02-09 PROCEDURE — 80053 COMPREHEN METABOLIC PANEL: CPT | Performed by: FAMILY MEDICINE

## 2023-02-09 PROCEDURE — 25000003 PHARM REV CODE 250: Performed by: FAMILY MEDICINE

## 2023-02-09 PROCEDURE — 85025 COMPLETE CBC W/AUTO DIFF WBC: CPT | Performed by: FAMILY MEDICINE

## 2023-02-09 PROCEDURE — 0240U COVID/FLU A&B PCR: CPT | Performed by: FAMILY MEDICINE

## 2023-02-09 PROCEDURE — 84100 ASSAY OF PHOSPHORUS: CPT | Performed by: FAMILY MEDICINE

## 2023-02-09 PROCEDURE — 84439 ASSAY OF FREE THYROXINE: CPT | Performed by: FAMILY MEDICINE

## 2023-02-09 PROCEDURE — 93010 ELECTROCARDIOGRAM REPORT: CPT | Mod: GW,,, | Performed by: INTERNAL MEDICINE

## 2023-02-09 PROCEDURE — 82533 TOTAL CORTISOL: CPT | Performed by: FAMILY MEDICINE

## 2023-02-09 PROCEDURE — 84134 ASSAY OF PREALBUMIN: CPT | Performed by: FAMILY MEDICINE

## 2023-02-09 PROCEDURE — 83880 ASSAY OF NATRIURETIC PEPTIDE: CPT | Performed by: FAMILY MEDICINE

## 2023-02-09 PROCEDURE — 85379 FIBRIN DEGRADATION QUANT: CPT | Performed by: FAMILY MEDICINE

## 2023-02-09 PROCEDURE — 99900035 HC TECH TIME PER 15 MIN (STAT)

## 2023-02-09 RX ORDER — SENNOSIDES 8.6 MG/1
8.6 TABLET ORAL DAILY PRN
Status: DISCONTINUED | OUTPATIENT
Start: 2023-02-09 | End: 2023-02-12 | Stop reason: HOSPADM

## 2023-02-09 RX ORDER — CLONIDINE 0.1 MG/24H
1 PATCH, EXTENDED RELEASE TRANSDERMAL
Status: DISCONTINUED | OUTPATIENT
Start: 2023-02-09 | End: 2023-02-12

## 2023-02-09 RX ORDER — CARVEDILOL 25 MG/1
25 TABLET ORAL 2 TIMES DAILY
Status: DISCONTINUED | OUTPATIENT
Start: 2023-02-09 | End: 2023-02-12 | Stop reason: HOSPADM

## 2023-02-09 RX ORDER — ASPIRIN 81 MG/1
81 TABLET ORAL DAILY
Status: DISCONTINUED | OUTPATIENT
Start: 2023-02-09 | End: 2023-02-10

## 2023-02-09 RX ORDER — QUETIAPINE FUMARATE 25 MG/1
25 TABLET, FILM COATED ORAL 2 TIMES DAILY
Status: DISCONTINUED | OUTPATIENT
Start: 2023-02-09 | End: 2023-02-09

## 2023-02-09 RX ORDER — QUETIAPINE FUMARATE 25 MG/1
50 TABLET, FILM COATED ORAL NIGHTLY
Status: DISCONTINUED | OUTPATIENT
Start: 2023-02-09 | End: 2023-02-10

## 2023-02-09 RX ORDER — ATORVASTATIN CALCIUM 40 MG/1
40 TABLET, FILM COATED ORAL DAILY
Status: DISCONTINUED | OUTPATIENT
Start: 2023-02-09 | End: 2023-02-12 | Stop reason: HOSPADM

## 2023-02-09 RX ORDER — ASCORBIC ACID 250 MG
1000 TABLET ORAL DAILY
Status: DISCONTINUED | OUTPATIENT
Start: 2023-02-09 | End: 2023-02-12 | Stop reason: HOSPADM

## 2023-02-09 RX ORDER — ISOSORBIDE MONONITRATE 30 MG/1
30 TABLET, EXTENDED RELEASE ORAL DAILY
Status: DISCONTINUED | OUTPATIENT
Start: 2023-02-09 | End: 2023-02-12 | Stop reason: HOSPADM

## 2023-02-09 RX ORDER — NALOXONE HCL 0.4 MG/ML
0.02 VIAL (ML) INJECTION
Status: DISCONTINUED | OUTPATIENT
Start: 2023-02-09 | End: 2023-02-12 | Stop reason: HOSPADM

## 2023-02-09 RX ORDER — SODIUM CHLORIDE 0.9 % (FLUSH) 0.9 %
10 SYRINGE (ML) INJECTION EVERY 12 HOURS PRN
Status: DISCONTINUED | OUTPATIENT
Start: 2023-02-09 | End: 2023-02-12 | Stop reason: HOSPADM

## 2023-02-09 RX ORDER — ACETAMINOPHEN 325 MG/1
650 TABLET ORAL EVERY 4 HOURS PRN
Status: DISCONTINUED | OUTPATIENT
Start: 2023-02-09 | End: 2023-02-12 | Stop reason: HOSPADM

## 2023-02-09 RX ADMIN — CARVEDILOL 25 MG: 25 TABLET, FILM COATED ORAL at 09:02

## 2023-02-09 RX ADMIN — APIXABAN 5 MG: 5 TABLET, FILM COATED ORAL at 09:02

## 2023-02-09 NOTE — PLAN OF CARE
Problem: Adult Inpatient Plan of Care  Goal: Plan of Care Review  Outcome: Ongoing, Progressing  Goal: Patient-Specific Goal (Individualized)  Outcome: Ongoing, Progressing  Goal: Absence of Hospital-Acquired Illness or Injury  Outcome: Ongoing, Progressing  Goal: Optimal Comfort and Wellbeing  Outcome: Ongoing, Progressing  Goal: Readiness for Transition of Care  Outcome: Ongoing, Progressing     Problem: Impaired Wound Healing  Goal: Optimal Wound Healing  Outcome: Ongoing, Progressing     Problem: Fatigue  Goal: Improved Activity Tolerance  Outcome: Ongoing, Progressing     Problem: Balance Impairment (Functional Deficit)  Goal: Improved Balance and Postural Control  Outcome: Ongoing, Progressing     Problem: Cognitive Impairment (Functional Deficit)  Goal: Optimal Functional [place]  Outcome: Ongoing, Progressing     Problem: Coordination Impairment (Functional Deficit)  Goal: Optimal Coordination  Outcome: Ongoing, Progressing     Problem: Muscle Strength Impairment (Functional Deficit)  Goal: Improved Muscle Strength  Outcome: Ongoing, Progressing     Problem: Muscle Tone Impairment (Functional Deficit)  Goal: Improved Muscle Tone  Outcome: Ongoing, Progressing     Problem: Range of Motion Impairment (Functional Deficit)  Goal: Optimal Range of Motion  Outcome: Ongoing, Progressing     Problem: Sensory Impairment (Functional Deficit)  Goal: Compensation for Sensory Deficit  Outcome: Ongoing, Progressing     Problem: Pain Acute  Goal: Acceptable Pain Control and Functional Ability  Outcome: Ongoing, Progressing

## 2023-02-09 NOTE — PLAN OF CARE
Problem: Adult Inpatient Plan of Care  Goal: Plan of Care Review  2/9/2023 1227 by Portia Zarco RN  Flowsheets (Taken 2/9/2023 1227)  Plan of Care Reviewed With:   caregiver   daughter  2/9/2023 1127 by Portia Zarco RN  Outcome: Ongoing, Progressing  Goal: Patient-Specific Goal (Individualized)  Outcome: Ongoing, Progressing  Goal: Absence of Hospital-Acquired Illness or Injury  Outcome: Ongoing, Progressing  Intervention: Prevent and Manage VTE (Venous Thromboembolism) Risk  Flowsheets (Taken 2/9/2023 1227)  Activity Management: Patient unable to perform activities  Goal: Optimal Comfort and Wellbeing  Outcome: Ongoing, Progressing  Goal: Readiness for Transition of Care  Outcome: Ongoing, Progressing     Problem: Impaired Wound Healing  Goal: Optimal Wound Healing  Outcome: Ongoing, Progressing  Intervention: Promote Wound Healing  Flowsheets (Taken 2/9/2023 1227)  Activity Management: Patient unable to perform activities     Problem: Fatigue  Goal: Improved Activity Tolerance  Outcome: Ongoing, Progressing  Intervention: Promote Improved Energy  Flowsheets (Taken 2/9/2023 1227)  Activity Management: Patient unable to perform activities     Problem: Balance Impairment (Functional Deficit)  Goal: Improved Balance and Postural Control  Outcome: Ongoing, Progressing  Intervention: Optimize Balance and Safe Activity  Flowsheets (Taken 2/9/2023 1227)  Activity Management: Patient unable to perform activities     Problem: Cognitive Impairment (Functional Deficit)  Goal: Optimal Functional [place]  Outcome: Ongoing, Progressing     Problem: Coordination Impairment (Functional Deficit)  Goal: Optimal Coordination  Outcome: Ongoing, Progressing     Problem: Muscle Strength Impairment (Functional Deficit)  Goal: Improved Muscle Strength  Outcome: Ongoing, Progressing     Problem: Muscle Tone Impairment (Functional Deficit)  Goal: Improved Muscle Tone  Outcome: Ongoing, Progressing     Problem: Range of Motion  Impairment (Functional Deficit)  Goal: Optimal Range of Motion  Outcome: Ongoing, Progressing     Problem: Sensory Impairment (Functional Deficit)  Goal: Compensation for Sensory Deficit  Outcome: Ongoing, Progressing     Problem: Pain Acute  Goal: Acceptable Pain Control and Functional Ability  Outcome: Ongoing, Progressing

## 2023-02-10 LAB
ALBUMIN SERPL-MCNC: 1.8 G/DL (ref 3.4–4.8)
ALBUMIN/GLOB SERPL: 0.5 RATIO (ref 1.1–2)
ALP SERPL-CCNC: 73 UNIT/L (ref 40–150)
ALT SERPL-CCNC: <5 UNIT/L (ref 0–55)
AMMONIA PLAS-MSCNC: <13.5 UMOL/L (ref 18–72)
AST SERPL-CCNC: 14 UNIT/L (ref 5–34)
BASOPHILS # BLD AUTO: 0.06 X10(3)/MCL (ref 0–0.2)
BASOPHILS NFR BLD AUTO: 0.7 %
BILIRUBIN DIRECT+TOT PNL SERPL-MCNC: 0.4 MG/DL
BUN SERPL-MCNC: 12 MG/DL (ref 9.8–20.1)
CALCIUM SERPL-MCNC: 8.6 MG/DL (ref 8.4–10.2)
CHLORIDE SERPL-SCNC: 104 MMOL/L (ref 98–107)
CO2 SERPL-SCNC: 27 MMOL/L (ref 23–31)
COLOR STL: NORMAL
CONSISTENCY STL: NORMAL
CORTIS SERPL IA-MCNC: 14 MCG/DL
CREAT SERPL-MCNC: 0.56 MG/DL (ref 0.55–1.02)
EOSINOPHIL # BLD AUTO: 0.16 X10(3)/MCL (ref 0–0.9)
EOSINOPHIL NFR BLD AUTO: 1.9 %
ERYTHROCYTE [DISTWIDTH] IN BLOOD BY AUTOMATED COUNT: 22.4 % (ref 11.5–17)
FERRITIN SERPL-MCNC: 191.71 NG/ML (ref 4.63–204)
FOLATE SERPL-MCNC: 17.6 NG/ML (ref 7–31.4)
GFR SERPLBLD CREATININE-BSD FMLA CKD-EPI: >60 MLS/MIN/1.73/M2
GLOBULIN SER-MCNC: 3.5 GM/DL (ref 2.4–3.5)
GLUCOSE SERPL-MCNC: 98 MG/DL (ref 82–115)
HAPTOGLOB SERPL-MCNC: 262 MG/DL (ref 63–273)
HCT VFR BLD AUTO: 25.6 % (ref 37–47)
HEMOCCULT SP1 STL QL: NEGATIVE
HGB BLD-MCNC: 7.6 GM/DL (ref 12–16)
IMM GRANULOCYTES # BLD AUTO: 0.04 X10(3)/MCL (ref 0–0.04)
IMM GRANULOCYTES NFR BLD AUTO: 0.5 %
IRON SATN MFR SERPL: 7 % (ref 20–50)
IRON SERPL-MCNC: 10 UG/DL (ref 50–170)
LYMPHOCYTES # BLD AUTO: 1.96 X10(3)/MCL (ref 0.6–4.6)
LYMPHOCYTES NFR BLD AUTO: 23.3 %
MAGNESIUM SERPL-MCNC: 2.1 MG/DL (ref 1.6–2.6)
MCH RBC QN AUTO: 23.8 PG
MCHC RBC AUTO-ENTMCNC: 29.7 MG/DL (ref 33–36)
MCV RBC AUTO: 80 FL (ref 80–94)
MONOCYTES # BLD AUTO: 0.84 X10(3)/MCL (ref 0.1–1.3)
MONOCYTES NFR BLD AUTO: 10 %
NEUTROPHILS # BLD AUTO: 5.36 X10(3)/MCL (ref 2.1–9.2)
NEUTROPHILS NFR BLD AUTO: 63.6 %
NRBC BLD AUTO-RTO: 0 %
PHOSPHATE SERPL-MCNC: 3.4 MG/DL (ref 2.3–4.7)
PLATELET # BLD AUTO: 313 X10(3)/MCL (ref 130–400)
PMV BLD AUTO: 9.4 FL (ref 7.4–10.4)
POTASSIUM SERPL-SCNC: 3.5 MMOL/L (ref 3.5–5.1)
PROT SERPL-MCNC: 5.3 GM/DL (ref 5.8–7.6)
RBC # BLD AUTO: 3.2 X10(6)/MCL (ref 4.2–5.4)
RET# (OHS): 0.09 (ref 0.02–0.08)
RETICULOCYTE COUNT AUTOMATED (OLG): 2.85 % (ref 1.1–2.1)
SODIUM SERPL-SCNC: 139 MMOL/L (ref 136–145)
TIBC SERPL-MCNC: 136 UG/DL (ref 70–310)
TIBC SERPL-MCNC: 146 UG/DL (ref 250–450)
TRANSFERRIN SERPL-MCNC: 138 MG/DL
TROPONIN I SERPL-MCNC: 0.04 NG/ML (ref 0–0.04)
VIT B12 SERPL-MCNC: 1564 PG/ML (ref 213–816)
WBC # SPEC AUTO: 8.4 X10(3)/MCL (ref 4.5–11.5)

## 2023-02-10 PROCEDURE — 25500020 PHARM REV CODE 255: Performed by: FAMILY MEDICINE

## 2023-02-10 PROCEDURE — 83010 ASSAY OF HAPTOGLOBIN QUANT: CPT | Performed by: FAMILY MEDICINE

## 2023-02-10 PROCEDURE — 82728 ASSAY OF FERRITIN: CPT | Performed by: FAMILY MEDICINE

## 2023-02-10 PROCEDURE — 82270 OCCULT BLOOD FECES: CPT | Performed by: FAMILY MEDICINE

## 2023-02-10 PROCEDURE — 85045 AUTOMATED RETICULOCYTE COUNT: CPT | Performed by: FAMILY MEDICINE

## 2023-02-10 PROCEDURE — 82746 ASSAY OF FOLIC ACID SERUM: CPT | Performed by: FAMILY MEDICINE

## 2023-02-10 PROCEDURE — 63600175 PHARM REV CODE 636 W HCPCS: Performed by: FAMILY MEDICINE

## 2023-02-10 PROCEDURE — 85025 COMPLETE CBC W/AUTO DIFF WBC: CPT | Performed by: FAMILY MEDICINE

## 2023-02-10 PROCEDURE — 80053 COMPREHEN METABOLIC PANEL: CPT | Performed by: FAMILY MEDICINE

## 2023-02-10 PROCEDURE — 84484 ASSAY OF TROPONIN QUANT: CPT | Performed by: FAMILY MEDICINE

## 2023-02-10 PROCEDURE — 83550 IRON BINDING TEST: CPT | Performed by: FAMILY MEDICINE

## 2023-02-10 PROCEDURE — 21400001 HC TELEMETRY ROOM

## 2023-02-10 PROCEDURE — 25000003 PHARM REV CODE 250: Performed by: FAMILY MEDICINE

## 2023-02-10 PROCEDURE — 83735 ASSAY OF MAGNESIUM: CPT | Performed by: FAMILY MEDICINE

## 2023-02-10 PROCEDURE — 82607 VITAMIN B-12: CPT | Performed by: FAMILY MEDICINE

## 2023-02-10 PROCEDURE — 94761 N-INVAS EAR/PLS OXIMETRY MLT: CPT

## 2023-02-10 PROCEDURE — 97161 PT EVAL LOW COMPLEX 20 MIN: CPT

## 2023-02-10 PROCEDURE — 82140 ASSAY OF AMMONIA: CPT | Performed by: FAMILY MEDICINE

## 2023-02-10 PROCEDURE — 84100 ASSAY OF PHOSPHORUS: CPT | Performed by: FAMILY MEDICINE

## 2023-02-10 RX ORDER — QUETIAPINE FUMARATE 25 MG/1
25 TABLET, FILM COATED ORAL 2 TIMES DAILY
Status: DISCONTINUED | OUTPATIENT
Start: 2023-02-10 | End: 2023-02-12 | Stop reason: HOSPADM

## 2023-02-10 RX ADMIN — ISOSORBIDE MONONITRATE 30 MG: 30 TABLET, EXTENDED RELEASE ORAL at 09:02

## 2023-02-10 RX ADMIN — IOPAMIDOL 100 ML: 755 INJECTION, SOLUTION INTRAVENOUS at 03:02

## 2023-02-10 RX ADMIN — CARVEDILOL 25 MG: 25 TABLET, FILM COATED ORAL at 09:02

## 2023-02-10 RX ADMIN — SODIUM CHLORIDE 125 MG: 9 INJECTION, SOLUTION INTRAVENOUS at 01:02

## 2023-02-10 RX ADMIN — DIATRIZOATE MEGLUMINE AND DIATRIZOATE SODIUM 30 ML: 660; 100 LIQUID ORAL; RECTAL at 03:02

## 2023-02-10 RX ADMIN — APIXABAN 5 MG: 5 TABLET, FILM COATED ORAL at 08:02

## 2023-02-10 RX ADMIN — ASPIRIN 81 MG: 81 TABLET, COATED ORAL at 09:02

## 2023-02-10 RX ADMIN — APIXABAN 5 MG: 5 TABLET, FILM COATED ORAL at 09:02

## 2023-02-10 RX ADMIN — CARVEDILOL 25 MG: 25 TABLET, FILM COATED ORAL at 08:02

## 2023-02-10 RX ADMIN — QUETIAPINE 25 MG: 25 TABLET ORAL at 08:02

## 2023-02-10 RX ADMIN — Medication 1000 MG: at 09:02

## 2023-02-10 RX ADMIN — ATORVASTATIN CALCIUM 40 MG: 40 TABLET, FILM COATED ORAL at 09:02

## 2023-02-10 NOTE — H&P
[facility]                     [address] [id number]    PATIENT NAME:       WENDY HICKS  YOB: 1933  CSN:                [id number]   MRN:                [id number]  ADMIT DATE:         02/09/2023 10:41:00  PHYSICIAN:          Barrett Lopez MD                        HISTORY AND PHYSICAL      HISTORY OF PRESENT ILLNESS:  She is an elderly female who was recently on   hospice following a stroke and a pulmonary embolus.  She had stopped eating.    They had put a PEG tube.  Since returning home, she is now eating again.  PEG   tube was removed, and they had contacted me about getting back to home health   care and off of hospice.  Unfortunately, today she developed some left-sided   weakness, presented to the office per ambulance, and was directly admitted for   mental status changes and a possible impending stroke versus TIA.    PAST MEDICAL HISTORY:  Significant for recent hospitalization with a saddle   embolus.  As stated above, she was on hospice.  She has also had multiple   TIAs/CVAs.  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  She had bladder cancer and   they were going to remove this cancer; however, her illness interrupted these   plans.    PAST SURGERIES:  Include PEG tube insertion, cataract surgeries bilaterally,   colonoscopies, EGD, hysterectomy, polypectomy, stent placement per Dr. Aguilera.  She   also has had a TAVR, tonsillectomy, transurethral resection/biopsy of a bladder   tumor.  She had an embolectomy.   She had a pacemaker inserted .    MEDICATIONS:  Currently include:  1. Claritin.  2. Mucinex.  3. Ipratropium bromide.  4. Hydrocodone 5/325.  5. Docusate.  6. Isosorbide mononitrate 30 mg 1 p.o. q. day.  7. Vitamin C.  8. Latanoprost 0.005% solution both eyes 1 drop each evening.    9. Zofran 4 mg p.o. q. day as needed for nausea.    10. Ranolazine 1000 mg twice a day.  11. Coreg 25 mg twice a day.  12. Aspirin 81 mg 1 p.o. q. day.  13. Nitroglycerin 0.4 mg sublingual q.5 minutes p.r.n. chest pain.    14. She was on Eliquis, but stopped it secondary to the hematuria.    15. Tumeric 500 mg daily.  16. Plavix 75 mg 1 p.o. q. day.  17. Linzess 145 mg 1 p.o. q. day.  18. Clonidine patch 0.1 transdermal q.24 hours.    19. Memantine 10 mg twice a day.    20. Furosemide 40 mg 1 p.o. q. day.  21. Seroquel 25 mg b.i.d.  22. Atorvastatin 40 mg 1 p.o. q. day.  23. Diazepam 5 mg as needed for anxiety.  24. Lactulose 20 g or 30 cc daily for bowels.  25. Sertraline 50 mg 1 p.o. q. day.    26. She also was on levofloxacin 500 mg recently for a UTI.    FAMILY MEDICAL HISTORY:  Father and Mother are both .  One son had a   brain tumor.    SOCIAL HISTORY:  No tobacco, alcohol, or drugs.  She is , lives at home   with her children.    ALLERGIES:  INCLUDE STATINS, ZETIA, CORTISONE, AND PRALUENT.     REVIEW OF SYSTEMS:  Otherwise negative.    IMMUNIZATIONS:  Up to date.    CODE STATUS:  Currently is DNR.    PHYSICAL EXAMINATION:  VITALS:  Stable.  She is afebrile.  She has very slurred speech and is difficult   to get a good history from her.  Vivian, her daughter, and German, her son,   at bedside.    HEENT:  Thick tongue with dry oral mucosa.    NECK:  Supple, full range of motion.  No significant adenopathy.  HEART:  Distant, regular rate and rhythm with 3/6 systolic ejection murmur.    LUNGS:  Clear to auscultation bilaterally.    ABDOMEN:  Obese, nontender.  EXTREMITIES:  No significant edema.  She has severe  osteoarthritic changes of   her hands and knees and feet.    NEUROLOGICAL:  She is alert, but I do not know how oriented she is, and her   stream of speech is not normal.  She has some weakness of the left side, 4/5   weakness on the right.  She has a minimal amount of facial droop on the left   side as well.    /BREASTS/RECTAL:  Deferred at this time.      Labs, x-rays, medications are on the chart and reconciled.    ASSESSMENT:    1. History of cerebrovascular accidents and transient ischemic attacks in the   past with mental status changes and left-sided weakness.  This is a new   complaint for the patient.  2. Anxiety, depression.  3. Underlying dementia, but it is difficult to ascertain how severe it is as she   does not follow a conversation very well.  4. Recent saddle embolism of her lungs.  5. Left leg deep venous thrombosis.  6. Bladder cancer with history of hematuria.  7. Multiple other medical problems as per problem list.    PLAN:  Get a CT of her abdomen and pelvis in the morning to re-evaluate the   bladder cancer.  Continue current care, rehydrating the patient and adjusting   her medications where we can.  I restarted her Eliquis.  Follow up in the   morning.  Get PT to evaluate her in the morning as well.        ______________________________  MD NILS Unger/LEEANNE  DD:  02/10/2023  Time:  02:24PM  DT:  02/10/2023  Time:  02:50PM  Job #:  [id number]      HISTORY AND PHYSICAL

## 2023-02-10 NOTE — PT/OT/SLP EVAL
Physical Therapy Evaluation    Patient Name:  Miguel Angel Oliveros   MRN:  [id number]    Recommendations:     Discharge Recommendations: home with home health, nursing facility, skilled   Discharge Equipment Recommendations:     Barriers to discharge: None    Assessment:     Miguel Angel Oliveros is a 89 y.o. female admitted with a medical diagnosis of <principal problem not specified>.  She presents with the following impairments/functional limitations: weakness, impaired endurance, impaired functional mobility, impaired balance, impaired self care skills.    Pt from home where she lives with her daughter and son. According to her daughter, ever since January of this year, she has had a significant decline and now requires a ramiro lift to transfer OOB. She also stated that when her sister is available, she will transfer the patient by picking her up and having the patient hold onto the back of her neck. The family would benefit from proper transfer training to ensure safety for patient and caregivers. Today, the pt required maximal assistance for bed mobility. Once sitting EOB, she had difficulty holding proper upright posture and required verbal and tactile cues to maintain sitting balance. She then required total assistance to transfer from bed to chair. Pt stated multiple times that she was tired and worn out.     Rehab Prognosis: Fair and Poor; patient would benefit from acute skilled PT services to address these deficits and reach maximum level of function.    Recent Surgery: * No surgery found *      Plan:     During this hospitalization, patient to be seen daily to address the identified rehab impairments via gait training, therapeutic activities, therapeutic exercises and progress toward the following goals:    Plan of Care Expires:  03/10/23    Subjective     Chief Complaint: fatigue  Patient/Family Comments/goals: to learn how to properly transfer her  Pain/Comfort:  Pain Rating 1: 0/10    Patients  cultural, spiritual, Adventist conflicts given the current situation:      Living Environment:  Pt lives with family members who provide 24hr care  Prior to admission, patients level of function was dependent, non-ambulatory.  Equipment used at home: lift device.      Objective:     Communicated with nurse prior to session.  Patient found HOB elevated with    upon PT entry to room.    General Precautions: Standard, fall  Orthopedic Precautions:    Braces:    Respiratory Status: Room air    Exams:  RLE Strength: Deficits: 2/5  LLE Strength: Deficits: 2/5    Functional Mobility:  Bed Mobility:     Supine to Sit: maximal assistance  Transfers:     Bed to Chair: total assistance with  no AD  using  Stand Pivot  Balance: Min-max A sitting EOB      AM-PAC 6 CLICK MOBILITY  Total Score:        Treatment & Education:  See above    Patient left up in chair with all lines intact and call button in reach.    GOALS:   Multidisciplinary Problems       Physical Therapy Goals          Problem: Physical Therapy    Goal Priority Disciplines Outcome Goal Variances Interventions   Physical Therapy Goal     PT, PT/OT Ongoing, Progressing     Description: Goals to be met by: 3/10/23     Patient will increase functional independence with mobility by performin. Supine to sit with MInimal Assistance  2. Bed to chair transfer with Moderate Assistance using No Assistive Device  3. Increased functional strength to 3/5 for increase ability to assist with ADLs and transfers.                         History:     Past Medical History:   Diagnosis Date    Anxiety disorder     Aortic stenosis     Arthritis     Carpal tunnel syndrome     Coronary artery disease     Dementia     Depression     Diabetes mellitus     GERD (gastroesophageal reflux disease)     Hx of rectal polypectomy     Hypercholesterolemia     Hypertension     Obstructive sleep apnea     Respiratory distress     S/P TAVR (transcatheter aortic valve replacement)     TIA  (transient ischemic attack)        Past Surgical History:   Procedure Laterality Date    CARDIAC SURGERY      CATARACT EXTRACTION      CORONARY STENT PLACEMENT      EGD, WITH CLOSED BIOPSY  12/28/2022    Procedure: EGD, WITH CLOSED BIOPSY;  Surgeon: Arcadio Carias MD;  Location: [facility] ENDOSCOPY;  Service: Gastroenterology;;    ESOPHAGOGASTRODUODENOSCOPY W/ PEG N/A 12/28/2022    Procedure: PEG;  Surgeon: Arcadio Carias MD;  Location: [facility] ENDOSCOPY;  Service: Gastroenterology;  Laterality: N/A;    HYSTERECTOMY      INSERTION OF PACEMAKER      PARTIAL HYSTERECTOMY      PULMONARY EMBOLISM SURGERY N/A 12/21/2022    Procedure: EMBOLECTOMY, ARTERY, PULMONARY;  Surgeon: Terri Urbina MD;  Location: [facility] CATH LAB;  Service: Peripheral Vascular;  Laterality: N/A;    RECTAL POLYPECTOMY      TONSILLECTOMY      TRANSCATHETER AORTIC VALVE REPLACEMENT (TAVR)         Time Tracking:     PT Received On: 02/10/23  PT Start Time: 1034     PT Stop Time: 1054  PT Total Time (min): 20 min     Billable Minutes: Evaluation 20      02/10/2023

## 2023-02-10 NOTE — PLAN OF CARE
Problem: Adult Inpatient Plan of Care  Goal: Plan of Care Review  Outcome: Ongoing, Progressing  Goal: Patient-Specific Goal (Individualized)  Outcome: Ongoing, Progressing  Goal: Absence of Hospital-Acquired Illness or Injury  Outcome: Ongoing, Progressing  Goal: Optimal Comfort and Wellbeing  Outcome: Ongoing, Progressing  Goal: Readiness for Transition of Care  Outcome: Ongoing, Progressing     Problem: Impaired Wound Healing  Goal: Optimal Wound Healing  Outcome: Ongoing, Progressing     Problem: Fatigue  Goal: Improved Activity Tolerance  Outcome: Ongoing, Progressing     Problem: Balance Impairment (Functional Deficit)  Goal: Improved Balance and Postural Control  Outcome: Ongoing, Progressing     Problem: Cognitive Impairment (Functional Deficit)  Goal: Optimal Functional [place]  Outcome: Ongoing, Progressing     Problem: Coordination Impairment (Functional Deficit)  Goal: Optimal Coordination  Outcome: Ongoing, Progressing     Problem: Muscle Strength Impairment (Functional Deficit)  Goal: Improved Muscle Strength  Outcome: Ongoing, Progressing     Problem: Muscle Tone Impairment (Functional Deficit)  Goal: Improved Muscle Tone  Outcome: Ongoing, Progressing     Problem: Range of Motion Impairment (Functional Deficit)  Goal: Optimal Range of Motion  Outcome: Ongoing, Progressing     Problem: Sensory Impairment (Functional Deficit)  Goal: Compensation for Sensory Deficit  Outcome: Ongoing, Progressing     Problem: Pain Acute  Goal: Acceptable Pain Control and Functional Ability  Outcome: Ongoing, Progressing     Problem: Fall Injury Risk  Goal: Absence of Fall and Fall-Related Injury  Outcome: Ongoing, Progressing     Problem: Skin Injury Risk Increased  Goal: Skin Health and Integrity  Outcome: Ongoing, Progressing     Problem: Balance Impairment (Functional Deficit)  Goal: Improved Balance and Postural Control  Outcome: Ongoing, Progressing     Problem: Muscle Strength Impairment (Functional  Deficit)  Goal: Improved Muscle Strength  Outcome: Ongoing, Progressing

## 2023-02-10 NOTE — PROGRESS NOTES
[facility]                     [address] [address]    PATIENT NAME:       WENDY HICKS  YOB: 1933  CSN:                [id number]   MRN:                [id number]  ADMIT DATE:         02/09/2023 10:41:00  PHYSICIAN:          Barrett Lopez MD                            PROGRESS NOTE    DATE:  02/10/2023 00:00:00    She remains in the hospital secondary to her change in mental status yesterday   with left-sided weakness.  PT evaluated her this morning and got her up.  She is   now sitting in a chair.  She has a Hale catheter and she has some red-tinged   urine.  Again, she has difficult to communicate with. Daughter thinks she is   doing somewhat better.  She did stay awake late into the night, but then slept   pretty well.    PHYSICAL EXAMINATION:  VITAL SIGNS:  Blood pressure is 100/58, she is afebrile.  Pulse ox 97%.  Labs,   x-rays, medications are on the chart and reviewed.      HEART:  Distant, regular rate and rhythm with 2/6 systolic ejection murmur.    LUNGS:  Clear to auscultation bilaterally.    ABDOMEN:  Nontender, nondistended.  Normoactive bowel sounds.    EXTREMITIES:  No significant edema.  She remains with some minimal weakness on   the left compared to the right.    ASSESSMENT:    1. Mental status changes with left-sided weakness.  She states she has a sensory   deficit of the left side as well.  I am not able to discern this.  2. History of anxiety and depression.  3. Anemia with iron deficiency.  4. Low potassium.  5. Bladder cancer with hematuria.  6. Hypotension.  7. Underlying diabetes.  8. Multiple other medical problems, as previously stated.    PLAN:  Continue current care.  Get a CT of her abdomen and pelvis, and give   Ferrlecit IV.  Transfuse tomorrow if her hemoglobin gets any lower.        ______________________________  Barrett Lopez MD    PBS/AQS  DD:   02/10/2023  Time:  02:28PM  DT:  02/10/2023  Time:  02:38PM  Job #:  [id number]      PROGRESS NOTE

## 2023-02-11 LAB
ABO + RH BLD: NORMAL
ABO + RH BLD: NORMAL
ALBUMIN SERPL-MCNC: 1.8 G/DL (ref 3.4–4.8)
ALBUMIN/GLOB SERPL: 0.5 RATIO (ref 1.1–2)
ALP SERPL-CCNC: 74 UNIT/L (ref 40–150)
ALT SERPL-CCNC: <5 UNIT/L (ref 0–55)
AST SERPL-CCNC: 16 UNIT/L (ref 5–34)
BASOPHILS # BLD AUTO: 0.03 X10(3)/MCL (ref 0–0.2)
BASOPHILS NFR BLD AUTO: 0.3 %
BILIRUBIN DIRECT+TOT PNL SERPL-MCNC: 0.3 MG/DL
BLD PROD TYP BPU: NORMAL
BLD PROD TYP BPU: NORMAL
BLOOD UNIT EXPIRATION DATE: NORMAL
BLOOD UNIT EXPIRATION DATE: NORMAL
BLOOD UNIT TYPE CODE: 5100
BLOOD UNIT TYPE CODE: 5100
BUN SERPL-MCNC: 11 MG/DL (ref 9.8–20.1)
CALCIUM SERPL-MCNC: 8.8 MG/DL (ref 8.4–10.2)
CHLORIDE SERPL-SCNC: 103 MMOL/L (ref 98–107)
CO2 SERPL-SCNC: 26 MMOL/L (ref 23–31)
CREAT SERPL-MCNC: 0.57 MG/DL (ref 0.55–1.02)
CROSSMATCH INTERPRETATION: NORMAL
CROSSMATCH INTERPRETATION: NORMAL
DISPENSE STATUS: NORMAL
DISPENSE STATUS: NORMAL
EOSINOPHIL # BLD AUTO: 0.14 X10(3)/MCL (ref 0–0.9)
EOSINOPHIL NFR BLD AUTO: 1.5 %
ERYTHROCYTE [DISTWIDTH] IN BLOOD BY AUTOMATED COUNT: 22.2 % (ref 11.5–17)
GFR SERPLBLD CREATININE-BSD FMLA CKD-EPI: >60 MLS/MIN/1.73/M2
GLOBULIN SER-MCNC: 3.8 GM/DL (ref 2.4–3.5)
GLUCOSE SERPL-MCNC: 105 MG/DL (ref 82–115)
GROUP & RH: NORMAL
HCT VFR BLD AUTO: 24.8 % (ref 37–47)
HGB BLD-MCNC: 7.2 GM/DL (ref 12–16)
IMM GRANULOCYTES # BLD AUTO: 0.04 X10(3)/MCL (ref 0–0.04)
IMM GRANULOCYTES NFR BLD AUTO: 0.4 %
INDIRECT COOMBS GEL: NORMAL
LYMPHOCYTES # BLD AUTO: 2.03 X10(3)/MCL (ref 0.6–4.6)
LYMPHOCYTES NFR BLD AUTO: 22.1 %
MAGNESIUM SERPL-MCNC: 2.2 MG/DL (ref 1.6–2.6)
MCH RBC QN AUTO: 23.4 PG
MCHC RBC AUTO-ENTMCNC: 29 MG/DL (ref 33–36)
MCV RBC AUTO: 80.5 FL (ref 80–94)
MONOCYTES # BLD AUTO: 0.8 X10(3)/MCL (ref 0.1–1.3)
MONOCYTES NFR BLD AUTO: 8.7 %
NEUTROPHILS # BLD AUTO: 6.13 X10(3)/MCL (ref 2.1–9.2)
NEUTROPHILS NFR BLD AUTO: 67 %
PHOSPHATE SERPL-MCNC: 3 MG/DL (ref 2.3–4.7)
PLATELET # BLD AUTO: 338 X10(3)/MCL (ref 130–400)
PMV BLD AUTO: 10.1 FL (ref 7.4–10.4)
POTASSIUM SERPL-SCNC: 3.2 MMOL/L (ref 3.5–5.1)
PROT SERPL-MCNC: 5.6 GM/DL (ref 5.8–7.6)
RBC # BLD AUTO: 3.08 X10(6)/MCL (ref 4.2–5.4)
SODIUM SERPL-SCNC: 138 MMOL/L (ref 136–145)
UNIT NUMBER: NORMAL
UNIT NUMBER: NORMAL
WBC # SPEC AUTO: 9.2 X10(3)/MCL (ref 4.5–11.5)

## 2023-02-11 PROCEDURE — 97530 THERAPEUTIC ACTIVITIES: CPT

## 2023-02-11 PROCEDURE — 25000003 PHARM REV CODE 250: Performed by: FAMILY MEDICINE

## 2023-02-11 PROCEDURE — 86900 BLOOD TYPING SEROLOGIC ABO: CPT | Performed by: FAMILY MEDICINE

## 2023-02-11 PROCEDURE — 85025 COMPLETE CBC W/AUTO DIFF WBC: CPT | Performed by: FAMILY MEDICINE

## 2023-02-11 PROCEDURE — 21400001 HC TELEMETRY ROOM

## 2023-02-11 PROCEDURE — 86920 COMPATIBILITY TEST SPIN: CPT | Performed by: FAMILY MEDICINE

## 2023-02-11 PROCEDURE — 36430 TRANSFUSION BLD/BLD COMPNT: CPT

## 2023-02-11 PROCEDURE — 80053 COMPREHEN METABOLIC PANEL: CPT | Performed by: FAMILY MEDICINE

## 2023-02-11 PROCEDURE — 84100 ASSAY OF PHOSPHORUS: CPT | Performed by: FAMILY MEDICINE

## 2023-02-11 PROCEDURE — 83735 ASSAY OF MAGNESIUM: CPT | Performed by: FAMILY MEDICINE

## 2023-02-11 PROCEDURE — 94761 N-INVAS EAR/PLS OXIMETRY MLT: CPT

## 2023-02-11 PROCEDURE — P9016 RBC LEUKOCYTES REDUCED: HCPCS | Performed by: FAMILY MEDICINE

## 2023-02-11 RX ORDER — MEMANTINE HYDROCHLORIDE 5 MG/1
10 TABLET ORAL 2 TIMES DAILY
Status: DISCONTINUED | OUTPATIENT
Start: 2023-02-11 | End: 2023-02-12 | Stop reason: HOSPADM

## 2023-02-11 RX ORDER — HYDROCODONE BITARTRATE AND ACETAMINOPHEN 500; 5 MG/1; MG/1
TABLET ORAL
Status: DISCONTINUED | OUTPATIENT
Start: 2023-02-11 | End: 2023-02-12 | Stop reason: HOSPADM

## 2023-02-11 RX ORDER — PANTOPRAZOLE SODIUM 40 MG/1
40 TABLET, DELAYED RELEASE ORAL DAILY
Status: DISCONTINUED | OUTPATIENT
Start: 2023-02-11 | End: 2023-02-12 | Stop reason: HOSPADM

## 2023-02-11 RX ORDER — CYPROHEPTADINE HYDROCHLORIDE 4 MG/1
4 TABLET ORAL NIGHTLY
Status: DISCONTINUED | OUTPATIENT
Start: 2023-02-11 | End: 2023-02-12 | Stop reason: HOSPADM

## 2023-02-11 RX ADMIN — MEMANTINE 10 MG: 5 TABLET ORAL at 09:02

## 2023-02-11 RX ADMIN — APIXABAN 5 MG: 5 TABLET, FILM COATED ORAL at 09:02

## 2023-02-11 RX ADMIN — ATORVASTATIN CALCIUM 40 MG: 40 TABLET, FILM COATED ORAL at 09:02

## 2023-02-11 RX ADMIN — ISOSORBIDE MONONITRATE 30 MG: 30 TABLET, EXTENDED RELEASE ORAL at 09:02

## 2023-02-11 RX ADMIN — QUETIAPINE 25 MG: 25 TABLET ORAL at 09:02

## 2023-02-11 RX ADMIN — CARVEDILOL 25 MG: 25 TABLET, FILM COATED ORAL at 09:02

## 2023-02-11 RX ADMIN — Medication 1000 MG: at 09:02

## 2023-02-11 RX ADMIN — PANTOPRAZOLE SODIUM 40 MG: 40 TABLET, DELAYED RELEASE ORAL at 02:02

## 2023-02-11 RX ADMIN — CYPROHEPTADINE HYDROCHLORIDE 4 MG: 4 TABLET ORAL at 09:02

## 2023-02-11 NOTE — PT/OT/SLP PROGRESS
Physical Therapy Treatment    Patient Name:  Miguel Angel Oliveros   MRN:  [id number]    Recommendations:     Discharge Recommendations: home with home health, nursing facility, skilled  Discharge Equipment Recommendations:    Barriers to discharge: None    Assessment:     Miguel Angel Oliveros is a 89 y.o. female admitted with a medical diagnosis of AMS. She presents with the following impairments/functional limitations: weakness, impaired endurance, impaired balance, impaired functional mobility.    Patient drowsy today and difficulty communicating.  Agreeable to get to chair with encouragement from daughter.  Requiring total A for transfer.    Rehab Prognosis: Good; patient would benefit from acute skilled PT services to address these deficits and reach maximum level of function.    Recent Surgery: * No surgery found *      Plan:     During this hospitalization, patient to be seen daily to address the identified rehab impairments via gait training, therapeutic activities, therapeutic exercises and progress toward the following goals:    Plan of Care Expires:  03/10/23    Subjective     Chief Complaint: weakness  Patient/Family Comments/goals: improve strength  Pain/Comfort:  Pain Rating 1: 0/10      Objective:     Communicated with nurse prior to session.  Patient found HOB elevated with   upon PT entry to room.     General Precautions: Standard, fall  Orthopedic Precautions:    Braces:    Respiratory Status: Room air     Functional Mobility:  Bed Mobility:     Scooting: maximal assistance  Supine to Sit: maximal assistance  Transfers:     Bed to Chair: total assistance with  no AD  using  Squat Pivot  Balance: sitting balance mod A      AM-PAC 6 CLICK MOBILITY          Treatment & Education:  UE therex with handweights provided by daughter - patient required assistance to complete 10 reps of bicep curls and shoulder flexion.    Patient left up in chair with call button in reach and daughter and nurse  present..    GOALS:   Multidisciplinary Problems       Physical Therapy Goals          Problem: Physical Therapy    Goal Priority Disciplines Outcome Goal Variances Interventions   Physical Therapy Goal     PT, PT/OT Ongoing, Progressing     Description: Goals to be met by: 3/10/23     Patient will increase functional independence with mobility by performin. Supine to sit with MInimal Assistance  2. Bed to chair transfer with Moderate Assistance using No Assistive Device  3. Increased functional strength to 3/5 for increase ability to assist with ADLs and transfers.                         Time Tracking:     PT Received On: 23  PT Start Time: 935     PT Stop Time: 50  PT Total Time (min): 15 min     Billable Minutes: Therapeutic Activity 15    Treatment Type: Treatment  PT/PTA: PT           2023

## 2023-02-12 VITALS
SYSTOLIC BLOOD PRESSURE: 155 MMHG | HEART RATE: 73 BPM | OXYGEN SATURATION: 96 % | DIASTOLIC BLOOD PRESSURE: 74 MMHG | TEMPERATURE: 98 F | RESPIRATION RATE: 18 BRPM

## 2023-02-12 PROBLEM — I10 HYPERTENSION: Status: ACTIVE | Noted: 2023-02-12

## 2023-02-12 PROBLEM — D50.9 ANEMIA, IRON DEFICIENCY: Status: ACTIVE | Noted: 2023-02-12

## 2023-02-12 PROBLEM — E44.1 MALNUTRITION OF MILD DEGREE: Status: ACTIVE | Noted: 2023-02-12

## 2023-02-12 PROBLEM — R41.82 ALTERED MENTAL STATUS: Status: ACTIVE | Noted: 2023-02-12

## 2023-02-12 PROBLEM — I82.423 ACUTE DEEP VEIN THROMBOSIS (DVT) OF ILIAC VEIN OF BOTH LOWER EXTREMITIES: Status: ACTIVE | Noted: 2023-02-12

## 2023-02-12 PROBLEM — C67.9 BLADDER CANCER: Status: ACTIVE | Noted: 2023-02-12

## 2023-02-12 PROBLEM — R53.81 DEBILITY: Status: ACTIVE | Noted: 2023-02-12

## 2023-02-12 LAB
ALBUMIN SERPL-MCNC: 1.7 G/DL (ref 3.4–4.8)
ALBUMIN/GLOB SERPL: 0.5 RATIO (ref 1.1–2)
ALP SERPL-CCNC: 76 UNIT/L (ref 40–150)
ALT SERPL-CCNC: <5 UNIT/L (ref 0–55)
AST SERPL-CCNC: 17 UNIT/L (ref 5–34)
BASOPHILS # BLD AUTO: 0.04 X10(3)/MCL (ref 0–0.2)
BASOPHILS NFR BLD AUTO: 0.4 %
BILIRUBIN DIRECT+TOT PNL SERPL-MCNC: 0.5 MG/DL
BUN SERPL-MCNC: 9 MG/DL (ref 9.8–20.1)
CALCIUM SERPL-MCNC: 8.6 MG/DL (ref 8.4–10.2)
CHLORIDE SERPL-SCNC: 103 MMOL/L (ref 98–107)
CO2 SERPL-SCNC: 23 MMOL/L (ref 23–31)
CREAT SERPL-MCNC: 0.61 MG/DL (ref 0.55–1.02)
EOSINOPHIL # BLD AUTO: 0.24 X10(3)/MCL (ref 0–0.9)
EOSINOPHIL NFR BLD AUTO: 2.6 %
ERYTHROCYTE [DISTWIDTH] IN BLOOD BY AUTOMATED COUNT: 19.9 % (ref 11.5–17)
GFR SERPLBLD CREATININE-BSD FMLA CKD-EPI: >60 MLS/MIN/1.73/M2
GLOBULIN SER-MCNC: 3.6 GM/DL (ref 2.4–3.5)
GLUCOSE SERPL-MCNC: 100 MG/DL (ref 82–115)
HCT VFR BLD AUTO: 29.7 % (ref 37–47)
HGB BLD-MCNC: 9.2 GM/DL (ref 12–16)
IMM GRANULOCYTES # BLD AUTO: 0.03 X10(3)/MCL (ref 0–0.04)
IMM GRANULOCYTES NFR BLD AUTO: 0.3 %
LYMPHOCYTES # BLD AUTO: 2.41 X10(3)/MCL (ref 0.6–4.6)
LYMPHOCYTES NFR BLD AUTO: 26.5 %
MAGNESIUM SERPL-MCNC: 2.1 MG/DL (ref 1.6–2.6)
MCH RBC QN AUTO: 25.3 PG
MCHC RBC AUTO-ENTMCNC: 31 MG/DL (ref 33–36)
MCV RBC AUTO: 81.6 FL (ref 80–94)
MONOCYTES # BLD AUTO: 0.87 X10(3)/MCL (ref 0.1–1.3)
MONOCYTES NFR BLD AUTO: 9.6 %
NEUTROPHILS # BLD AUTO: 5.5 X10(3)/MCL (ref 2.1–9.2)
NEUTROPHILS NFR BLD AUTO: 60.6 %
PHOSPHATE SERPL-MCNC: 3.1 MG/DL (ref 2.3–4.7)
PLATELET # BLD AUTO: 294 X10(3)/MCL (ref 130–400)
PMV BLD AUTO: 9.9 FL (ref 7.4–10.4)
POTASSIUM SERPL-SCNC: 3.3 MMOL/L (ref 3.5–5.1)
PROT SERPL-MCNC: 5.3 GM/DL (ref 5.8–7.6)
RBC # BLD AUTO: 3.64 X10(6)/MCL (ref 4.2–5.4)
SODIUM SERPL-SCNC: 136 MMOL/L (ref 136–145)
WBC # SPEC AUTO: 9.1 X10(3)/MCL (ref 4.5–11.5)

## 2023-02-12 PROCEDURE — 85025 COMPLETE CBC W/AUTO DIFF WBC: CPT | Performed by: FAMILY MEDICINE

## 2023-02-12 PROCEDURE — 83735 ASSAY OF MAGNESIUM: CPT | Performed by: FAMILY MEDICINE

## 2023-02-12 PROCEDURE — 84100 ASSAY OF PHOSPHORUS: CPT | Performed by: FAMILY MEDICINE

## 2023-02-12 PROCEDURE — 94761 N-INVAS EAR/PLS OXIMETRY MLT: CPT

## 2023-02-12 PROCEDURE — 80053 COMPREHEN METABOLIC PANEL: CPT | Performed by: FAMILY MEDICINE

## 2023-02-12 PROCEDURE — 25000003 PHARM REV CODE 250: Performed by: FAMILY MEDICINE

## 2023-02-12 PROCEDURE — 97530 THERAPEUTIC ACTIVITIES: CPT

## 2023-02-12 RX ORDER — PANTOPRAZOLE SODIUM 40 MG/1
40 TABLET, DELAYED RELEASE ORAL DAILY
Qty: 30 TABLET | Refills: 11 | Status: SHIPPED | OUTPATIENT
Start: 2023-02-13 | End: 2024-02-13

## 2023-02-12 RX ORDER — CYPROHEPTADINE HYDROCHLORIDE 4 MG/1
4 TABLET ORAL NIGHTLY
Qty: 30 TABLET | Refills: 2 | Status: SHIPPED | OUTPATIENT
Start: 2023-02-12 | End: 2023-03-06

## 2023-02-12 RX ORDER — SENNOSIDES 8.6 MG/1
1 TABLET ORAL DAILY PRN
Start: 2023-02-12

## 2023-02-12 RX ORDER — MEMANTINE HYDROCHLORIDE 10 MG/1
10 TABLET ORAL 2 TIMES DAILY
Qty: 60 TABLET | Refills: 11 | Status: SHIPPED | OUTPATIENT
Start: 2023-02-12 | End: 2024-02-12

## 2023-02-12 RX ORDER — ACETAMINOPHEN 325 MG/1
650 TABLET ORAL EVERY 4 HOURS PRN
Refills: 0
Start: 2023-02-12 | End: 2023-10-27

## 2023-02-12 RX ADMIN — CARVEDILOL 25 MG: 25 TABLET, FILM COATED ORAL at 08:02

## 2023-02-12 RX ADMIN — PANTOPRAZOLE SODIUM 40 MG: 40 TABLET, DELAYED RELEASE ORAL at 08:02

## 2023-02-12 RX ADMIN — ISOSORBIDE MONONITRATE 30 MG: 30 TABLET, EXTENDED RELEASE ORAL at 08:02

## 2023-02-12 RX ADMIN — Medication 1000 MG: at 08:02

## 2023-02-12 RX ADMIN — MEMANTINE 10 MG: 5 TABLET ORAL at 08:02

## 2023-02-12 RX ADMIN — APIXABAN 5 MG: 5 TABLET, FILM COATED ORAL at 08:02

## 2023-02-12 RX ADMIN — QUETIAPINE 25 MG: 25 TABLET ORAL at 08:02

## 2023-02-12 RX ADMIN — ATORVASTATIN CALCIUM 40 MG: 40 TABLET, FILM COATED ORAL at 08:02

## 2023-02-12 NOTE — PT/OT/SLP PROGRESS
Physical Therapy Treatment    Patient Name:  Miguel Angel Oliveros   MRN:  [id number]    Recommendations:     Discharge Recommendations: home with home health, nursing facility, skilled  Discharge Equipment Recommendations:    Barriers to discharge: None    Assessment:     Miguel Angel Oliveros is a 89 y.o. female admitted with a medical diagnosis of <principal problem not specified>.  She presents with the following impairments/functional limitations: weakness, impaired endurance, impaired balance, impaired functional mobility.    Patient more alert today and able to hold sitting balance better with less assistance.  Still requiring max/total A for transfer to chair.    Rehab Prognosis: Good; patient would benefit from acute skilled PT services to address these deficits and reach maximum level of function.    Recent Surgery: * No surgery found *      Plan:     During this hospitalization, patient to be seen daily to address the identified rehab impairments via gait training, therapeutic activities, therapeutic exercises and progress toward the following goals:    Plan of Care Expires:  03/10/23    Subjective     Chief Complaint: weakness    Objective:     Communicated with nurse prior to session.  Patient found HOB elevated with   upon PT entry to room.     General Precautions: Standard, fall  Orthopedic Precautions:    Braces:    Respiratory Status: Room air     Functional Mobility:  Bed Mobility:     Supine to Sit: moderate assistance  Transfers:     Bed to Chair: total assistance with  no AD  using  Squat Pivot  Balance: sitting balance min/mod A      AM-PAC 6 CLICK MOBILITY          Treatment & Education:  BLE AROM, UE strengthening with hand weights    Patient left up in chair with call button in reach and daughter present..    GOALS:   Multidisciplinary Problems       Physical Therapy Goals          Problem: Physical Therapy    Goal Priority Disciplines Outcome Goal Variances Interventions   Physical Therapy  Goal     PT, PT/OT Ongoing, Progressing     Description: Goals to be met by: 3/10/23     Patient will increase functional independence with mobility by performin. Supine to sit with MInimal Assistance  2. Bed to chair transfer with Moderate Assistance using No Assistive Device  3. Increased functional strength to 3/5 for increase ability to assist with ADLs and transfers.                         Time Tracking:     PT Received On: 23  PT Start Time: 1020     PT Stop Time: 1040  PT Total Time (min): 20 min     Billable Minutes: Therapeutic Activity 20    Treatment Type: Treatment  PT/PTA: PT           2023

## 2023-02-12 NOTE — PROGRESS NOTES
[facility]                     [address] [address]    PATIENT NAME:       WENDY IHCKS  YOB: 1933  CSN:                [id number]   MRN:                [id number]  ADMIT DATE:         02/09/2023 10:41:00  PHYSICIAN:          Barrett Lopez MD                            PROGRESS NOTE    DATE:      SUBJECTIVE:  She remains in the hospital secondary to multiple medical problems.    Her mentation is improving.  She is using her left upper extremity more.  She   continues to say her left lower extremity is weak and occasionally numb.  She is   lying in bed.  Daughter, Vivian, is at the bedside.  She has been confused   as of late, but seems oriented to me.  She has dry mouth and slurred speech.    Labs, x-rays, medications are on the chart and reviewed.    OBJECTIVE:  VITAL SIGNS:  The patient is afebrile.  Blood pressure 116/68, pulse   84, pulse ox 96% on room air.    HEART:  Regular rate and rhythm with occasional ectopy.    LUNGS:  Clear to auscultation bilaterally.    ABDOMEN:  Nontender, nondistended.  Normoactive bowel sounds.    EXTREMITIES:  No significant edema.    ASSESSMENT:    1. Anemia with symptoms.  2. Iron deficiency.  We gave her Ferrlecit yesterday.  3. Low potassium.  4. Heme-negative stools.  5. Left-sided weakness and numbness, improving.  6. Slurred speech and very dry mouth.  7. Altered mental status with periods of confusion.  8. Bladder cancer with CT showing 5 cm mass in the bladder.  The catheter has   some blood clot, but no significant bleeding otherwise.  9. 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  10. Multiple other medical problems as per problem list.    PLAN:  Continue current care.  She is not eating very well.  We will start   Periactin.  Transfuse patient.  Recheck a.m. labs.  Give Protonix for stress   ulcer  prevention.        ______________________________  MD NILS Unger/LEEANNE  DD:  02/11/2023  Time:  07:00PM  DT:  02/11/2023  Time:  07:31PM  Job #:  [id number]      PROGRESS NOTE

## 2023-02-13 ENCOUNTER — PATIENT OUTREACH (OUTPATIENT)
Dept: ADMINISTRATIVE | Facility: CLINIC | Age: 88
End: 2023-02-13
Payer: MEDICARE

## 2023-02-13 NOTE — PROGRESS NOTES
C3 nurse spoke with Miguel Angel Oliveros for a TCC post hospital discharge follow up call. The patient does not have a scheduled HOSFU appointment with Barrett Lopez MD within 5-7 days post hospital discharge date 2/12/23. C3 nurse was unable to schedule HOSFU appointment in [facility].  Family will call to schedule appt.      DaughterLatrice also reported patient taking the following medications: centrum silver daily, dorzolamide eye drop to right eye only twice daily, Azo PRN bladder spasms, Ativan 5mg PRN anxiety, Linzess PRN constipation, Nitroglycerin PRN chest pain, Zofran 4mg PRN N/V, Lactulose 15ml PRN constipation, Furosemide 40mg PRN edema, Hyoscyomine bladder spasms, clonidine PRN for systolic >100    Medications unable to be reconciled as they were not on patient's medication list.

## 2023-02-13 NOTE — PROGRESS NOTES
C3 nurse spoke with patient's son, German.  Son stated he was not familiar with medications and it would be best to speak with Latrcie, patient's daughter but that she was at work and he would have her call me when she was available.  Contact # provided.  Patient does not have a follow up appointment scheduled.

## 2023-02-17 NOTE — DISCHARGE SUMMARY
[facility]                     [address] [address]    PATIENT NAME:       WENDY HICKS  YOB: 1933  CSN:                [id number]   MRN:                [id number]  ADMIT DATE:         02/09/2023 10:41:00  PHYSICIAN:          Barrett Lopez MD                          DISCHARGE SUMMARY    DATE OF DISCHARGE:  02/12/2023 14:00:00    CONTINUATION:      HOSPITAL COURSE:  During her hospitalization, she became more responsive.  She   remained with slurred speech.  She did have some hypotension during   hospitalization and her clonidine patch was discontinued.  The left-sided   weakness and numbness improved.  Because of her iron deficiency, she was also   given some Ferrlecit.  She was restarted on Eliquis secondary to her bilateral   femoral vein DVTs and a previous saddle thrombus embolus.  She had a Hale   catheter secondary to her severe debility, and at time of discharge her urine   was still clear, but she had passed a few clots in her urine. Today she was in   an improved state of health with continued severe debility and slurred speech,   but mentally she was more alert and asking to go home.  She was discharged with   home health care and physical therapy at home.    Prior to hospitalization, she had been on hospice since her last hospitalization   when she was diagnosed with her saddle thrombus embolus.  Family requested   coming off hospice and going back to home health since patient's condition had   improved some.  At home she has a hospital bed and a large bedside commode,   which they would like to keep.  Currently, the patient remains severely   debilitated and needs a bedside commode, large, secondary to her inability to   move readily to the bathroom.  She is confined to a single room due to her   debility.    She needs a hospital bed because of her severe debility and she  requires   positioning in her upper body in ways not feasible in an ordinary bed.  She also   has an aspiration risk and requires a head of her bed elevated 30 degrees or   more due to possibility of aspiration.    ASSESSMENT:    1. Anemia with symptoms requiring blood transfusion and iron deficiency   requiring Ferrlecit infusion.  2. Left-sided weakness and numbness, possibly due to a transient ischemic   attack.  3. CT of her head showing chronic changes with microvascular changes as well.  4. Slurred speech secondary to her transient ischemic attack and she has a very   dry mouth.  5. Hypertension with hypotension during hospitalization.  We discontinued her   clonidine patch.  6. Low potassium; replacing.  7. Severe debility and weakness secondary to her severe osteoarthritis and her   overall medical condition.  8. Bladder cancer.  CT showed a 5 cm mass without obvious signs of metastatic   changes.  9. Small left-sided pleural effusion.  10. Deep vein thrombosis bilateral femoral veins with previous pulmonary   embolus; we put her back on Eliquis.  11. Hematuria with a Hale catheter currently.  12. Altered mental status with underlying dementia and periods of increased   confusion.  13. Malnutrition.  14. Increased aspiration risk.  15. History of anxiety and depression.  16. Underlying diabetes.    PLAN:    1. Discharge patient to home.    2. Will switch her back to [facility] and send paperwork to   continue to have an electric bed at the house along with a large bedside   commode.   3. Follow up outpatient with Dr. Elena as they would like to check in with   getting the bladder cancer treated again.   4. Check in with Vascular MD for possible Pramod filter placement.   5. Continue PT at the house as tolerated.   6. Follow up in my office in 1 week.    DIET RESTRICTION:  Soft, mechanical, cardiac, diabetic.    MEDICATIONS:  As follows:  1. Eliquis 5 mg twice a day.  2. Vitamin C 1000 mg  once a day.  3. Tylenol as needed every 4 hours for temperature or pain.    4. Lipitor 40 mg 1 p.o. daily.   5. Coreg 25 mg twice a day.  6. Periactin 4 mg q.h.s.  7. Isosorbide mononitrate 15 mg once a day.    8. Namenda 10 mg twice a day.   9. Seroquel 25 mg twice a day.   10. Protonix 40 mg once a day.   11. Senna 8.6 mg 1 p.o. daily p.r.n. constipation.  12. Discontinue aspirin and clonidine patch.        ______________________________  MD NILS Unger/AQS  DD:  02/16/2023  Time:  03:58PM  DT:  02/16/2023  Time:  04:41PM  Job #:  [id number]      DISCHARGE SUMMARY

## 2023-02-19 ENCOUNTER — HOSPITAL ENCOUNTER (EMERGENCY)
Facility: HOSPITAL | Age: 88
Discharge: HOME | End: 2023-02-19
Attending: STUDENT IN AN ORGANIZED HEALTH CARE EDUCATION/TRAINING PROGRAM
Payer: MEDICARE

## 2023-02-19 VITALS
DIASTOLIC BLOOD PRESSURE: 87 MMHG | WEIGHT: 185 LBS | RESPIRATION RATE: 18 BRPM | BODY MASS INDEX: 29.73 KG/M2 | TEMPERATURE: 99 F | HEART RATE: 72 BPM | HEIGHT: 66 IN | SYSTOLIC BLOOD PRESSURE: 120 MMHG | OXYGEN SATURATION: 100 %

## 2023-02-19 DIAGNOSIS — T83.511A URINARY TRACT INFECTION ASSOCIATED WITH INDWELLING URETHRAL CATHETER, INITIAL ENCOUNTER: Primary | ICD-10-CM

## 2023-02-19 DIAGNOSIS — N39.0 URINARY TRACT INFECTION ASSOCIATED WITH INDWELLING URETHRAL CATHETER, INITIAL ENCOUNTER: Primary | ICD-10-CM

## 2023-02-19 DIAGNOSIS — E83.42 HYPOMAGNESEMIA: ICD-10-CM

## 2023-02-19 LAB
ALBUMIN SERPL-MCNC: 2.2 G/DL (ref 3.4–4.8)
ALBUMIN/GLOB SERPL: 0.6 RATIO (ref 1.1–2)
ALP SERPL-CCNC: 88 UNIT/L (ref 40–150)
ALT SERPL-CCNC: 8 UNIT/L (ref 0–55)
APPEARANCE UR: ABNORMAL
AST SERPL-CCNC: 20 UNIT/L (ref 5–34)
BACTERIA #/AREA URNS AUTO: ABNORMAL /HPF
BASOPHILS # BLD AUTO: 0.04 X10(3)/MCL (ref 0–0.2)
BASOPHILS NFR BLD AUTO: 0.6 %
BILIRUB UR QL STRIP.AUTO: ABNORMAL MG/DL
BILIRUBIN DIRECT+TOT PNL SERPL-MCNC: 0.3 MG/DL
BUN SERPL-MCNC: 12 MG/DL (ref 9.8–20.1)
CALCIUM SERPL-MCNC: 9.1 MG/DL (ref 8.4–10.2)
CAOX CRY URNS QL MICRO: ABNORMAL /HPF
CHLORIDE SERPL-SCNC: 105 MMOL/L (ref 98–107)
CO2 SERPL-SCNC: 26 MMOL/L (ref 23–31)
COLOR UR AUTO: ABNORMAL
CREAT SERPL-MCNC: 0.69 MG/DL (ref 0.55–1.02)
EOSINOPHIL # BLD AUTO: 0.19 X10(3)/MCL (ref 0–0.9)
EOSINOPHIL NFR BLD AUTO: 2.8 %
ERYTHROCYTE [DISTWIDTH] IN BLOOD BY AUTOMATED COUNT: 20.7 % (ref 11.5–17)
GFR SERPLBLD CREATININE-BSD FMLA CKD-EPI: >60 MLS/MIN/1.73/M2
GLOBULIN SER-MCNC: 4 GM/DL (ref 2.4–3.5)
GLUCOSE SERPL-MCNC: 171 MG/DL (ref 82–115)
GLUCOSE UR QL STRIP.AUTO: NEGATIVE MG/DL
HCT VFR BLD AUTO: 36.6 % (ref 37–47)
HGB BLD-MCNC: 11 G/DL (ref 12–16)
IMM GRANULOCYTES # BLD AUTO: 0.02 X10(3)/MCL (ref 0–0.04)
IMM GRANULOCYTES NFR BLD AUTO: 0.3 %
KETONES UR QL STRIP.AUTO: ABNORMAL MG/DL
LEUKOCYTE ESTERASE UR QL STRIP.AUTO: ABNORMAL UNIT/L
LYMPHOCYTES # BLD AUTO: 1.78 X10(3)/MCL (ref 0.6–4.6)
LYMPHOCYTES NFR BLD AUTO: 26.5 %
MAGNESIUM SERPL-MCNC: 1.5 MG/DL (ref 1.6–2.6)
MCH RBC QN AUTO: 24.9 PG
MCHC RBC AUTO-ENTMCNC: 30.1 G/DL (ref 33–36)
MCV RBC AUTO: 83 FL (ref 80–94)
MONOCYTES # BLD AUTO: 0.44 X10(3)/MCL (ref 0.1–1.3)
MONOCYTES NFR BLD AUTO: 6.6 %
NEUTROPHILS # BLD AUTO: 4.24 X10(3)/MCL (ref 2.1–9.2)
NEUTROPHILS NFR BLD AUTO: 63.2 %
NITRITE UR QL STRIP.AUTO: POSITIVE
PH UR STRIP.AUTO: 6 [PH]
PLATELET # BLD AUTO: 219 X10(3)/MCL (ref 130–400)
PMV BLD AUTO: 9.8 FL (ref 7.4–10.4)
POTASSIUM SERPL-SCNC: 3.2 MMOL/L (ref 3.5–5.1)
PROT SERPL-MCNC: 6.2 GM/DL (ref 5.8–7.6)
PROT UR QL STRIP.AUTO: >=300 MG/DL
RBC # BLD AUTO: 4.41 X10(6)/MCL (ref 4.2–5.4)
RBC #/AREA URNS AUTO: >100 /HPF
RBC UR QL AUTO: ABNORMAL UNIT/L
SODIUM SERPL-SCNC: 141 MMOL/L (ref 136–145)
SP GR UR STRIP.AUTO: 1.02
SQUAMOUS #/AREA URNS AUTO: ABNORMAL /HPF
UROBILINOGEN UR STRIP-ACNC: 1 MG/DL
WBC # SPEC AUTO: 6.7 X10(3)/MCL (ref 4.5–11.5)
WBC #/AREA URNS AUTO: ABNORMAL /HPF

## 2023-02-19 PROCEDURE — 81001 URINALYSIS AUTO W/SCOPE: CPT | Performed by: STUDENT IN AN ORGANIZED HEALTH CARE EDUCATION/TRAINING PROGRAM

## 2023-02-19 PROCEDURE — 96368 THER/DIAG CONCURRENT INF: CPT

## 2023-02-19 PROCEDURE — 25500020 PHARM REV CODE 255: Performed by: STUDENT IN AN ORGANIZED HEALTH CARE EDUCATION/TRAINING PROGRAM

## 2023-02-19 PROCEDURE — 83735 ASSAY OF MAGNESIUM: CPT | Performed by: STUDENT IN AN ORGANIZED HEALTH CARE EDUCATION/TRAINING PROGRAM

## 2023-02-19 PROCEDURE — 96366 THER/PROPH/DIAG IV INF ADDON: CPT

## 2023-02-19 PROCEDURE — 80053 COMPREHEN METABOLIC PANEL: CPT | Performed by: STUDENT IN AN ORGANIZED HEALTH CARE EDUCATION/TRAINING PROGRAM

## 2023-02-19 PROCEDURE — 25000003 PHARM REV CODE 250: Performed by: STUDENT IN AN ORGANIZED HEALTH CARE EDUCATION/TRAINING PROGRAM

## 2023-02-19 PROCEDURE — 96365 THER/PROPH/DIAG IV INF INIT: CPT

## 2023-02-19 PROCEDURE — 99285 EMERGENCY DEPT VISIT HI MDM: CPT | Mod: 25

## 2023-02-19 PROCEDURE — 63600175 PHARM REV CODE 636 W HCPCS: Performed by: STUDENT IN AN ORGANIZED HEALTH CARE EDUCATION/TRAINING PROGRAM

## 2023-02-19 PROCEDURE — 85025 COMPLETE CBC W/AUTO DIFF WBC: CPT | Performed by: STUDENT IN AN ORGANIZED HEALTH CARE EDUCATION/TRAINING PROGRAM

## 2023-02-19 RX ORDER — CEFUROXIME AXETIL 500 MG/1
500 TABLET ORAL EVERY 12 HOURS
Qty: 20 TABLET | Refills: 0 | Status: SHIPPED | OUTPATIENT
Start: 2023-02-19 | End: 2023-03-01

## 2023-02-19 RX ORDER — MAGNESIUM SULFATE HEPTAHYDRATE 40 MG/ML
2 INJECTION, SOLUTION INTRAVENOUS
Status: COMPLETED | OUTPATIENT
Start: 2023-02-19 | End: 2023-02-19

## 2023-02-19 RX ADMIN — CEFTRIAXONE SODIUM 1 G: 1 INJECTION, POWDER, FOR SOLUTION INTRAMUSCULAR; INTRAVENOUS at 02:02

## 2023-02-19 RX ADMIN — IOPAMIDOL 100 ML: 755 INJECTION, SOLUTION INTRAVENOUS at 01:02

## 2023-02-19 RX ADMIN — MAGNESIUM SULFATE HEPTAHYDRATE 2 G: 40 INJECTION, SOLUTION INTRAVENOUS at 01:02

## 2023-02-19 NOTE — ED PROVIDER NOTES
Encounter Date: 2/19/2023       History     Chief Complaint   Patient presents with    Weakness     Weakness, received blood transfusion last week.      HPI.    89-year-old female with a past medical history of bladder cancer, hypertension, diabetes, GERD, history of DVT and PE on Eliquis, dementia and obesity who presents emergency department for weakness.  Daughter states she is concerned because a lot more blood is in the urine than normal.  States that she was diagnosed with bladder cancer 4 months ago.  A month as her diagnosis she called COVID and was hospitalized for a long time.  She was then placed on hospice.  She improved and taken off hospice and is now trying to follow back up with her oncologist to see if she is strong enough for chemotherapy or radiation.  Patient did come to emergency department/admitted about a week or so ago for a blood transfusion.  She is concerned that she might need another 1.  No fevers or chills.    Review of patient's allergies indicates:   Allergen Reactions    Statins-hmg-coa reductase inhibitors      Other reaction(s): Joint pain    Bimatoprost      Other reaction(s): Eye redness    Cortisone     Dorzolamide-timolol     Gabapentin      Other reaction(s): Confusion    Hydrocortisone     Tafluprost (pf)      Past Medical History:   Diagnosis Date    Anxiety disorder     Aortic stenosis     Arthritis     Carpal tunnel syndrome     Coronary artery disease     Dementia     Depression     Diabetes mellitus     GERD (gastroesophageal reflux disease)     Hx of rectal polypectomy     Hypercholesterolemia     Hypertension     Obstructive sleep apnea     Respiratory distress     S/P TAVR (transcatheter aortic valve replacement)     TIA (transient ischemic attack)      Past Surgical History:   Procedure Laterality Date    CARDIAC SURGERY      CATARACT EXTRACTION      CORONARY STENT PLACEMENT      EGD, WITH CLOSED BIOPSY  12/28/2022    Procedure: EGD, WITH CLOSED BIOPSY;  Surgeon:  Arcadio Carias MD;  Location: [facility] ENDOSCOPY;  Service: Gastroenterology;;    ESOPHAGOGASTRODUODENOSCOPY W/ PEG N/A 12/28/2022    Procedure: PEG;  Surgeon: Arcadio Carias MD;  Location: [facility] ENDOSCOPY;  Service: Gastroenterology;  Laterality: N/A;    HYSTERECTOMY      INSERTION OF PACEMAKER      PARTIAL HYSTERECTOMY      PULMONARY EMBOLISM SURGERY N/A 12/21/2022    Procedure: EMBOLECTOMY, ARTERY, PULMONARY;  Surgeon: Terri Urbina MD;  Location: [facility] CATH LAB;  Service: Peripheral Vascular;  Laterality: N/A;    RECTAL POLYPECTOMY      TONSILLECTOMY      TRANSCATHETER AORTIC VALVE REPLACEMENT (TAVR)       Family History   Problem Relation Age of Onset    Diabetes Mother     Stroke Father     Hyperlipidemia Father     Breast cancer Sister      Social History     Tobacco Use    Smoking status: Never    Smokeless tobacco: Never   Substance Use Topics    Alcohol use: Never    Drug use: Never     Review of Systems   Constitutional:  Positive for fatigue. Negative for fever.   Respiratory:  Negative for cough and shortness of breath.    Cardiovascular:  Negative for chest pain.   Gastrointestinal:  Positive for abdominal pain. Negative for constipation, diarrhea, nausea and vomiting.   Genitourinary:  Positive for hematuria.   All other systems reviewed and are negative.    Physical Exam     Initial Vitals [02/19/23 1228]   BP Pulse Resp Temp SpO2   (!) 147/85 85 18 98.5 °F (36.9 °C) 100 %      MAP       --         Physical Exam    Nursing note and vitals reviewed.  Constitutional: She appears well-developed and well-nourished. No distress.   Cardiovascular:  Normal rate and regular rhythm.           Pulmonary/Chest: Breath sounds normal. No respiratory distress.   Abdominal: Abdomen is soft. Bowel sounds are normal. There is abdominal tenderness in the suprapubic area.   Genitourinary:    Genitourinary Comments: Urinary catheter in place     Musculoskeletal:         General: No tenderness. Normal range  of motion.     Neurological: She is alert and oriented to person, place, and time.   Skin: Skin is warm. Capillary refill takes less than 2 seconds.   Psychiatric: She has a normal mood and affect. Thought content normal.       ED Course   Procedures  Labs Reviewed   COMPREHENSIVE METABOLIC PANEL - Abnormal; Notable for the following components:       Result Value    Potassium Level 3.2 (*)     Glucose Level 171 (*)     Albumin Level 2.2 (*)     Globulin 4.0 (*)     Albumin/Globulin Ratio 0.6 (*)     All other components within normal limits   URINALYSIS, REFLEX TO URINE CULTURE - Abnormal; Notable for the following components:    Color, UA Orange (*)     Appearance, UA Cloudy (*)     Protein, UA >=300 (*)     Ketones, UA Trace (*)     Blood, UA Large (*)     Bilirubin, UA Small (*)     Nitrites, UA Positive (*)     Leukocyte Esterase, UA Trace (*)     All other components within normal limits   MAGNESIUM - Abnormal; Notable for the following components:    Magnesium Level 1.50 (*)     All other components within normal limits   CBC WITH DIFFERENTIAL - Abnormal; Notable for the following components:    Hgb 11.0 (*)     Hct 36.6 (*)     MCHC 30.1 (*)     RDW 20.7 (*)     All other components within normal limits   URINALYSIS, MICROSCOPIC - Abnormal; Notable for the following components:    Bacteria, UA Few (*)     Calcium Oxalate Crystals, UA Rare (*)     RBC, UA >100 (*)     WBC, UA 6-10 (*)     All other components within normal limits   CBC W/ AUTO DIFFERENTIAL    Narrative:     The following orders were created for panel order CBC auto differential.  Procedure                               Abnormality         Status                     ---------                               -----------         ------                     CBC with Differential[[id number]]        Abnormal            Final result                 Please view results for these tests on the individual orders.          Imaging Results              CT  Abdomen Pelvis With Contrast (Final result)  Result time 02/19/23 13:49:42      Final result by Glenn Hamilton MD (02/19/23 13:49:42)                   Impression:      Unchanged appearance of the heterogeneous bladder mass.    Unchanged appearance of the bilateral common femoral venous DVTs.      Electronically signed by: Glenn Hamilton MD  Date:    02/19/2023  Time:    13:49               Narrative:    EXAMINATION:  CT ABDOMEN PELVIS WITH CONTRAST    CLINICAL HISTORY:  UTI, recurrent/complicated (Female);    TECHNIQUE:  Axial images of the abdomen and pelvis were obtained with IV contrast administration.  Coronal and sagittal reconstructions were provided.  Three dimensional and MIP images were obtained and evaluated.  Total DLP was 398 mGy-cm. Dose lowering technique and automated exposure control were utilized for this exam.    COMPARISON:  CT of the abdomen and pelvis 02/10/2023.    FINDINGS:  There is an unchanged small left pleural effusion with minimal left lower lobe atelectasis.  The right lung base is clear.  The heart is normal in size.    The liver is homogeneous in attenuation.  The portal vein is patent.  The gallbladder is contracted.  The spleen, pancreas, and adrenal glands are normal.  There are simple renal cysts bilaterally.  There is no hydronephrosis or nephrolithiasis.    The stomach and small bowel are decompressed.  There is no bowel obstruction.  The colon is normal.  There is irregular enhancing heterogeneous soft tissue density in the region of the dome of the urinary bladder.  This has not significantly changed.  There is no pelvic or retroperitoneal adenopathy.  The bilateral femoral venous thrombi are again noted.  There is no lytic or blastic osseous lesion.                                       Medications   magnesium sulfate 2g in water 50mL IVPB (premix) (0 g Intravenous Stopped 2/19/23 1515)   iopamidoL (ISOVUE-370) injection 100 mL (100 mLs Intravenous Given 2/19/23 1343)    cefTRIAXone (ROCEPHIN) 1 g in dextrose 5 % in water (D5W) 5 % 50 mL IVPB (MB+) (0 g Intravenous Stopped 2/19/23 1445)     Medical Decision Making:   Differential Diagnosis:   UTI, anemia, metabolic derangement, dehydration, hematuria   Medical Decision Making  Problems Addressed:  Urinary tract infection associated with indwelling urethral catheter, initial encounter: acute illness or injury    Amount and/or Complexity of Data Reviewed  External Data Reviewed: labs and notes.  Labs: ordered. Decision-making details documented in ED Course.  Radiology: ordered.    Risk  OTC drugs.  Prescription drug management.  Decision regarding hospitalization.              ED Course as of 02/19/23 1642   Sun Feb 19, 2023   1250 WBC: 6.7 [BS]   1250 Hemoglobin(!): 11.0 [BS]   1250 Hematocrit(!): 36.6 [BS]   1251 RDW(!): 20.7 [BS]   1251 Platelets: 219 [BS]   1321 Leukocytes, UA(!): Trace [BS]   1321 NITRITE UA(!): Positive [BS]   1406 Will treat as urinary tract infection.  With catheter in place will give a dose of Rocephin prior to discharge with oral antibiotics. [BS]   1413 Urine freely flowing in the catheter.  She will follow-up with urologist Monday [BS]   1641 No clotting in the urinary catheter.  Will discharge.  Strict precautions given daughter understands. [BS]      ED Course User Index  [BS] Bib Garber MD                 Clinical Impression:   Final diagnoses:  [T83.511A, N39.0] Urinary tract infection associated with indwelling urethral catheter, initial encounter (Primary)  [E83.42] Hypomagnesemia        ED Disposition Condition    Discharge Stable          ED Prescriptions       Medication Sig Dispense Start Date End Date Auth. Provider    cefUROXime (CEFTIN) 500 MG tablet Take 1 tablet (500 mg total) by mouth every 12 (twelve) hours. for 10 days 20 tablet 2/19/2023 3/1/2023 Bib Garber MD          Follow-up Information       Follow up With Specialties Details Why Contact Mich ARMAS  [facility] Schedule an appointment as soon as possible for a visit   [address] [place] [address]  [phone]      [facility] - Emergency Dept Emergency Medicine Go to  If symptoms worsen [address] [place] [id number] [phone]    Follow-up with urologist                 Bib Garber MD  02/19/23 7628

## 2023-03-01 ENCOUNTER — HOSPITAL ENCOUNTER (OUTPATIENT)
Facility: HOSPITAL | Age: 88
Discharge: HOME | End: 2023-03-01
Attending: SPECIALIST | Admitting: SPECIALIST
Payer: MEDICARE

## 2023-03-01 VITALS
HEART RATE: 73 BPM | RESPIRATION RATE: 14 BRPM | TEMPERATURE: 98 F | HEIGHT: 65 IN | WEIGHT: 162.69 LBS | OXYGEN SATURATION: 99 % | BODY MASS INDEX: 27.1 KG/M2 | DIASTOLIC BLOOD PRESSURE: 71 MMHG | SYSTOLIC BLOOD PRESSURE: 138 MMHG

## 2023-03-01 DIAGNOSIS — Z86.711 HISTORY OF PULMONARY EMBOLISM: Primary | Chronic | ICD-10-CM

## 2023-03-01 DIAGNOSIS — I82.412 THROMBOSIS OF LEFT FEMORAL VEIN: ICD-10-CM

## 2023-03-01 LAB — POCT GLUCOSE: 103 MG/DL (ref 70–110)

## 2023-03-01 PROCEDURE — 63600175 PHARM REV CODE 636 W HCPCS: Performed by: SPECIALIST

## 2023-03-01 PROCEDURE — 25000003 PHARM REV CODE 250: Performed by: SPECIALIST

## 2023-03-01 PROCEDURE — 37191 INS ENDOVAS VENA CAVA FILTR: CPT | Performed by: SPECIALIST

## 2023-03-01 PROCEDURE — 25500020 PHARM REV CODE 255: Performed by: SPECIALIST

## 2023-03-01 PROCEDURE — 99153 MOD SED SAME PHYS/QHP EA: CPT | Performed by: SPECIALIST

## 2023-03-01 PROCEDURE — C1880 VENA CAVA FILTER: HCPCS | Performed by: SPECIALIST

## 2023-03-01 PROCEDURE — C1769 GUIDE WIRE: HCPCS | Performed by: SPECIALIST

## 2023-03-01 PROCEDURE — C1773 RET DEV, INSERTABLE: HCPCS | Performed by: SPECIALIST

## 2023-03-01 PROCEDURE — 99152 MOD SED SAME PHYS/QHP 5/>YRS: CPT | Performed by: SPECIALIST

## 2023-03-01 DEVICE — DENALI®  VENA CAVA FILTER JUGULAR/SUBCLAVIAN
Type: IMPLANTABLE DEVICE | Site: VENA CAVA | Status: FUNCTIONAL
Brand: DENALI® VENA CAVA FILTER

## 2023-03-01 RX ORDER — SODIUM CHLORIDE 9 MG/ML
INJECTION, SOLUTION INTRAVENOUS ONCE
Status: DISCONTINUED | OUTPATIENT
Start: 2023-03-01 | End: 2023-03-01 | Stop reason: HOSPADM

## 2023-03-01 RX ORDER — MIDAZOLAM HYDROCHLORIDE 1 MG/ML
INJECTION INTRAMUSCULAR; INTRAVENOUS
Status: DISCONTINUED | OUTPATIENT
Start: 2023-03-01 | End: 2023-03-01 | Stop reason: HOSPADM

## 2023-03-01 RX ORDER — LIDOCAINE HYDROCHLORIDE 20 MG/ML
INJECTION, SOLUTION INFILTRATION; PERINEURAL
Status: DISCONTINUED | OUTPATIENT
Start: 2023-03-01 | End: 2023-03-01 | Stop reason: HOSPADM

## 2023-03-01 RX ORDER — HYDROCODONE BITARTRATE AND ACETAMINOPHEN 5; 325 MG/1; MG/1
1 TABLET ORAL EVERY 4 HOURS PRN
Status: DISCONTINUED | OUTPATIENT
Start: 2023-03-01 | End: 2023-03-01 | Stop reason: HOSPADM

## 2023-03-01 RX ORDER — SERTRALINE HYDROCHLORIDE 50 MG/1
50 TABLET, FILM COATED ORAL DAILY
COMMUNITY

## 2023-03-01 RX ORDER — FENTANYL CITRATE 50 UG/ML
INJECTION, SOLUTION INTRAMUSCULAR; INTRAVENOUS
Status: DISCONTINUED | OUTPATIENT
Start: 2023-03-01 | End: 2023-03-01 | Stop reason: HOSPADM

## 2023-03-01 RX ORDER — DIPHENHYDRAMINE HYDROCHLORIDE 50 MG/ML
INJECTION INTRAMUSCULAR; INTRAVENOUS
Status: DISCONTINUED | OUTPATIENT
Start: 2023-03-01 | End: 2023-03-01 | Stop reason: HOSPADM

## 2023-03-01 RX ORDER — SYRING-NEEDL,DISP,INSUL,0.3 ML 29 G X1/2"
400 SYRINGE, EMPTY DISPOSABLE MISCELLANEOUS ONCE
COMMUNITY
End: 2023-03-27

## 2023-03-01 RX ORDER — SODIUM CHLORIDE 0.9 % (FLUSH) 0.9 %
10 SYRINGE (ML) INJECTION
Status: DISCONTINUED | OUTPATIENT
Start: 2023-03-01 | End: 2023-03-01 | Stop reason: HOSPADM

## 2023-03-01 RX ORDER — LORAZEPAM 1 MG/1
1 TABLET ORAL EVERY 6 HOURS PRN
COMMUNITY

## 2023-03-01 NOTE — DISCHARGE INSTRUCTIONS
Leave dermabond on until it falls off.  Okay to wash with running water, but do not submerge in water.  No antibiotic ointments.  Call MD for signs of infection (redness, swelling, odor  or yellow/green drainage around site)    Follow-up with  Appointments as needed

## 2023-03-01 NOTE — OP NOTE
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  I could not adequately adjust the filter and ultimately chose to remove it.  A new filter was placed in an infrarenal position with successful appropriate deployment and positioning.      Description of Technical Procedures: Mrs. Oliveros was taken to the cath lab where her right neck was prepped and draped in the usual sterile fashion.  We did not elect a femoral access approach as the facility did not have any femoral versions of the [place] filter.  B-mode ultrasound was utilized to identify the right internal jugular vein which was easily compressible.  A needle was utilized to cannulate the vein and a J-wire was advanced and passed down into the inferior vena cava.  A small skin incision was made and we passed our Alley delivery sheath into the inferior vena cava.  Venography was performed demonstrating a patent inferior vena cava.  The level of the renal veins was noted.  There was no evidence of thrombosis.  We selected an infrarenal location for filter deployment deployed the filter however the lower longer legs of the filter did not fully open.  It appeared the legs were tangled with one another.   We tried multiple manipulations with a wire as well as a catheter.  We are able to get some movement in the filter but could not get appropriate expansion of the lower  legs of the fistula.  We then attempted to snare and capture the filter and redeployed this also was unsuccessful an getting deployment of the filter legs.  We ultimately snared and removed the current filter and then placed a separate new Alley filter.  This filter deployed appropriately with proper release and extension of the upper and lower legs.  Completion venography was appropriate with no evidence of complication.  Our sheath system was removed and pressure was held. This completed our procedure.    Significant Surgical Tasks Conducted by the Assistant(s), if Applicable: none    Estimated Blood Loss (EBL): * No values recorded between 3/1/2023  7:10 AM and 3/1/2023  8:07 AM *           Implants:   Implant Name Type Inv. Item Serial No.  Lot No. LRB No. Used Action   FILTER VENA CAVA JUG 8.4F 55CM - [id number] IVC Filter FILTER VENA CAVA JUG 8.4F 55CM  [employer] [id number] N/A 1 Explanted   FILTER VENA CAVA JUG 8.4F 55CM - [id number] IVC Filter FILTER VENA CAVA JUG 8.4F 55CM  [employer] [id number] N/A 1 Implanted       Specimens:   Specimen (24h ago, onward)      None

## 2023-03-01 NOTE — Clinical Note
Initial IVC Filter and delivery system removed over the wire. New filter and delivery system advanced over the wire into IVC

## 2023-03-03 DIAGNOSIS — R53.82 CHRONIC FATIGUE, UNSPECIFIED: ICD-10-CM

## 2023-03-03 DIAGNOSIS — R97.8 OTHER ABNORMAL TUMOR MARKERS: ICD-10-CM

## 2023-03-03 DIAGNOSIS — C67.9 MALIGNANT NEOPLASM OF URINARY BLADDER, UNSPECIFIED SITE: Primary | ICD-10-CM

## 2023-03-03 NOTE — PROGRESS NOTES
HEMATOLOGY / ONCOLOGY   CLINIC NOTE     ONCOLOGICAL HISTORY:     Diagnosis:  - Bladder cancer   - DVT/PE s/p IVC filter       Subjective:       Chief Complaint: Bladder Cancer (NPO---- Bladder Cancer)    HPI    Miguel Angel Citizen Domo  89 y.o.  female with past medical history significant for dementia, hypertension, coronary artery disease, degenerative disc disease, type 2 diabetes, gastroesophageal reflux disease, obstructive sleep apnea, glaucoma, vertigo, severe aortic stenosis s/p TAVR, stroke, DVT, PE, anemia referred for management of bladder cancer.      Patient was previously diagnosed for high grade bladder cancer when presented with blood in the urine. It was resected in November of 2022. She was then placed in hospice by the family but later on she was taken out of the hospice and then admitted with new onset DVT/PE s/p IVC filter. During that admission she was seen by Urology and noted to have a chronic Hale with hematuria. CT during admission showed suspicion for progression of bladder cancer.     Discussed with urology, they would plan to do a cystoscopy under anesthesia for evaluation of this lesion on the CT scan to rule out a clot or recurrence of the cancer and then consider palliative resection and palliative radiation.        Interval History:         Past Medical History:   Diagnosis Date    Anxiety disorder     Aortic stenosis     Arthritis     Carpal tunnel syndrome     Coronary artery disease     Dementia     Depression     Diabetes mellitus     GERD (gastroesophageal reflux disease)     Hx of rectal polypectomy     Hypercholesterolemia     Hypertension     Obstructive sleep apnea     Respiratory distress     S/P TAVR (transcatheter aortic valve replacement)     TIA (transient ischemic attack)       Past Surgical History:   Procedure Laterality Date    CARDIAC SURGERY      CATARACT EXTRACTION      CORONARY STENT PLACEMENT      EGD, WITH CLOSED BIOPSY  12/28/2022    Procedure: EGD,  WITH CLOSED BIOPSY;  Surgeon: Arcadio Carias MD;  Location: [facility] ENDOSCOPY;  Service: Gastroenterology;;    ESOPHAGOGASTRODUODENOSCOPY W/ PEG N/A 12/28/2022    Procedure: PEG;  Surgeon: Arcadio Carias MD;  Location: [facility] ENDOSCOPY;  Service: Gastroenterology;  Laterality: N/A;    HYSTERECTOMY      INSERTION OF INFERIOR VENA CAVAL FILTER N/A 3/1/2023    Procedure: Insertion, Filter, Inferior Vena Cava;  Surgeon: Terri Urbina MD;  Location: [facility] CATH LAB;  Service: Peripheral Vascular;  Laterality: N/A;  IVC FILTER PLACEMENT    INSERTION OF PACEMAKER      PARTIAL HYSTERECTOMY      PULMONARY EMBOLISM SURGERY N/A 12/21/2022    Procedure: EMBOLECTOMY, ARTERY, PULMONARY;  Surgeon: Terri Urbina MD;  Location: [facility] CATH LAB;  Service: Peripheral Vascular;  Laterality: N/A;    RECTAL POLYPECTOMY      TONSILLECTOMY      TRANSCATHETER AORTIC VALVE REPLACEMENT (TAVR)       Social History     Socioeconomic History    Marital status:    Tobacco Use    Smoking status: Never    Smokeless tobacco: Never   Substance and Sexual Activity    Alcohol use: Never    Drug use: Never      Family History   Problem Relation Age of Onset    Diabetes Mother     Stroke Father     Hyperlipidemia Father     Breast cancer Sister       Review of patient's allergies indicates:   Allergen Reactions    Statins-hmg-coa reductase inhibitors      Other reaction(s): Joint pain    Bimatoprost      Other reaction(s): Eye redness    Gabapentin      Other reaction(s): Confusion    Lisinopril     Tafluprost (pf)       Review of Systems   Unable to perform ROS: Dementia       Objective:        Vitals:    03/06/23 1054   BP: (!) 165/77   Pulse: 72   Resp: 20   Temp: 97.2 °F (36.2 °C)        Physical Exam  Constitutional:       General: She is not in acute distress.     Appearance: She is well-developed. She is not ill-appearing.   HENT:      Head: Normocephalic and atraumatic.      Mouth/Throat:      Mouth: Mucous membranes are  moist.   Eyes:      Extraocular Movements: Extraocular movements intact.      Conjunctiva/sclera: Conjunctivae normal.   Cardiovascular:      Rate and Rhythm: Normal rate and regular rhythm.      Heart sounds: Normal heart sounds.   Pulmonary:      Effort: Pulmonary effort is normal. No respiratory distress.      Breath sounds: Normal breath sounds. No wheezing.   Abdominal:      General: Bowel sounds are normal. There is no distension.      Palpations: Abdomen is soft.      Tenderness: There is no abdominal tenderness.   Musculoskeletal:         General: Normal range of motion.      Cervical back: Normal range of motion and neck supple.      Left lower leg: Edema present.   Skin:     General: Skin is warm.   Neurological:      General: No focal deficit present.      Mental Status: She is alert. Mental status is at baseline.      Cranial Nerves: No cranial nerve deficit.   Psychiatric:         Mood and Affect: Mood normal.         LABS / IMAGING        CT A/P 12/11/2022: Heterogeneous hyperdense mass lesion in the superior and left lateral aspect of the urinary bladder measuring 4.8 x 3.4 cm. This is highly suspicious for malignancy.  This is grossly unchanged from the prior exam.  12/18/2022: URINARY BLADDER, BIOPSY:   A) AGGREGATES OF BLOOD, FIBRIN, MIXED INFLAMMATORY CELLS, RARE DEGENERATIVE EPITHELIOID CELLS AND FOCAL DYSTROPHIC CALCIFICATION.  B) NO EVIDENCE OF INTACT UROTHELIUM OR NEOPLASIA / MALIGNANCY IDENTIFIED.     CTA 12/18/2022: Saddle pulmonary embolism with thrombi extending into the bilateral upper and lower lobes and evidence of right heart strain.      02/10/2023 CT A/P: 1. Heterogeneous 5 cm bladder mass.  No definitive metastatic disease identified.   2. Bilateral femoral vein DVTs.  3. Small left pleural effusion.  Two lower lung nodules appear new since December and will need to be followed on future exams.        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  Detrusor muscle not present.  - 12/21/2022 urinary bladder biopsy:  Aggregates of blood, fibrin, mixed inflammatory cells, rare degenerative epithelioid cells and focal dystrophic calcification.  No evidence of intact urothelial or neoplasia/malignancy identified  02/10/2023 CT A/P: Heterogeneous 5 cm bladder mass. Two lower lung nodules appear new since December and will need to be followed on future exams.    Plan:     - Discussed with urology, they would plan to do a cystoscopy under anesthesia for evaluation of this lesion on the CT scan to rule out a clot or recurrence of the cancer and then consider palliative resection and palliative radiation.  - PET scan requested to rule out metastatic disease as noted to have new lung nodules   - Per discussion with the family, they would not consider chemotherapy   - Repeat labs on follow up: CBC, CMP, Mg  - MD / LABS VISIT - 3 WEEKS to follow up on urology recommendations     The patient was seen, interviewed and examined. Pertinent lab and radiology studies were reviewed. Pt instructed to call should develop concerning signs/symptoms or have further questions.       Portions of the record may have been created with voice recognition software. Occasional wrong-word or sound-a-like substitutions may have occurred due to the inherent limitations of voice recognition software. Read the chart carefully and recognize, using context, where substitutions have occurred.    Lane Smith MD  Hematology / Oncology

## 2023-03-06 ENCOUNTER — OFFICE VISIT (OUTPATIENT)
Dept: HEMATOLOGY/ONCOLOGY | Facility: CLINIC | Age: 88
End: 2023-03-06
Payer: MEDICARE

## 2023-03-06 VITALS
SYSTOLIC BLOOD PRESSURE: 165 MMHG | RESPIRATION RATE: 20 BRPM | TEMPERATURE: 97 F | BODY MASS INDEX: 27.82 KG/M2 | WEIGHT: 167 LBS | HEIGHT: 65 IN | DIASTOLIC BLOOD PRESSURE: 77 MMHG | OXYGEN SATURATION: 98 % | HEART RATE: 72 BPM

## 2023-03-06 DIAGNOSIS — C67.9 MALIGNANT NEOPLASM OF URINARY BLADDER, UNSPECIFIED SITE: Primary | ICD-10-CM

## 2023-03-06 PROCEDURE — 99999 PR PBB SHADOW E&M-EST. PATIENT-LVL V: ICD-10-PCS | Mod: PBBFAC,,, | Performed by: INTERNAL MEDICINE

## 2023-03-06 PROCEDURE — 99999 PR PBB SHADOW E&M-EST. PATIENT-LVL V: CPT | Mod: PBBFAC,,, | Performed by: INTERNAL MEDICINE

## 2023-03-06 PROCEDURE — 99214 PR OFFICE/OUTPT VISIT, EST, LEVL IV, 30-39 MIN: ICD-10-PCS | Mod: S$PBB,,, | Performed by: INTERNAL MEDICINE

## 2023-03-06 PROCEDURE — 99214 OFFICE O/P EST MOD 30 MIN: CPT | Mod: S$PBB,,, | Performed by: INTERNAL MEDICINE

## 2023-03-06 PROCEDURE — 99215 OFFICE O/P EST HI 40 MIN: CPT | Mod: PBBFAC | Performed by: INTERNAL MEDICINE

## 2023-03-06 RX ORDER — BISACODYL 10 MG
10 SUPPOSITORY, RECTAL RECTAL DAILY PRN
COMMUNITY
End: 2023-03-27

## 2023-03-06 RX ORDER — FUROSEMIDE 40 MG/1
40 TABLET ORAL DAILY PRN
Status: ON HOLD | COMMUNITY
End: 2023-11-11 | Stop reason: HOSPADM

## 2023-03-06 RX ORDER — MEGESTROL ACETATE 40 MG/ML
SUSPENSION ORAL
COMMUNITY
Start: 2023-03-02 | End: 2023-09-14 | Stop reason: CLARIF

## 2023-03-06 RX ORDER — NITROGLYCERIN 0.4 MG/1
0.4 TABLET SUBLINGUAL EVERY 5 MIN PRN
COMMUNITY

## 2023-03-06 RX ORDER — CLONIDINE 0.1 MG/24H
1 PATCH, EXTENDED RELEASE TRANSDERMAL DAILY PRN
COMMUNITY
End: 2023-03-27

## 2023-03-06 RX ORDER — PUMPKIN SEED EXTRACT/SOY GERM 300 MG
CAPSULE ORAL
COMMUNITY
End: 2023-09-14 | Stop reason: CLARIF

## 2023-03-06 RX ORDER — ONDANSETRON 4 MG/1
4 TABLET, FILM COATED ORAL EVERY 8 HOURS PRN
COMMUNITY

## 2023-03-06 RX ORDER — PHENAZOPYRIDINE HYDROCHLORIDE 100 MG/1
TABLET, FILM COATED ORAL
COMMUNITY
Start: 2023-03-02 | End: 2023-09-14 | Stop reason: CLARIF

## 2023-03-06 RX ORDER — ACETAMINOPHEN, DIPHENHYDRAMINE HCL, PHENYLEPHRINE HCL 325; 25; 5 MG/1; MG/1; MG/1
10 TABLET ORAL NIGHTLY
COMMUNITY
End: 2023-03-27

## 2023-03-09 ENCOUNTER — TELEPHONE (OUTPATIENT)
Dept: HEMATOLOGY/ONCOLOGY | Facility: CLINIC | Age: 88
End: 2023-03-09
Payer: MEDICARE

## 2023-03-09 NOTE — TELEPHONE ENCOUNTER
----- Message from Jeanne Mendoza sent at 3/9/2023 12:52 PM CST -----  Regarding: RE: Scheduling  PET/CT scheduled 03/21/23 @ [facility] w/ 1:45 arrival    Patients son Manoj) scheduled appt w/ central scheduling  ----- Message -----  From: Lane Smith MD  Sent: 3/6/2023  11:39 AM CST  To: Jeanne Mendoza  Subject: Scheduling                                       PET scan           Thank you,     Lane Smith MD  Hematology / Oncology

## 2023-03-09 NOTE — PROCEDURES
"Miguel Angel Oliveros is a 89 y.o. female patient.    Temp: 97.2 °F (36.2 °C) (12/30/22 1130)  Pulse: 77 (12/30/22 1130)  Resp: (!) 21 (12/30/22 1130)  BP: (!) 154/78 (12/30/22 1130)  SpO2: 95 % (12/30/22 1130)  Weight: 86.2 kg (190 lb) (12/18/22 0617)  Height: 5' 5" (165.1 cm) (12/18/22 0617)  Mallampati Scale: Class III  ASA Classification: Class 3    Central Line    Date/Time: 3/9/2023 4:24 AM  Performed by: Antonio Corbett MD  Authorized by: Antonio Corbett MD     [place] procedure was performed[place]  [facility]  Consent Done ?:  Emergent Situation  Time out complete?: Verified correct patient, procedure, equipment, staff, and site/side    Indications:  Med administration, vascular access and hemodynamic monitoring  Anesthesia:  Local infiltration  Local anesthetic:  Lidocaine 1% without epinephrine  Preparation:  Skin prepped with ChloraPrep  Skin prep agent dried: Skin prep agent completely dried prior to procedure    Sterile barriers: All five maximal sterile barriers used - gloves, gown, cap, mask and large sterile sheet    Hand hygiene: Hand hygiene performed immediately prior to central venous catheter insertion    Location:  Right internal jugular  Catheter type:  Triple lumen  Catheter size:  7.5 Fr  Ultrasound guidance: Yes    Vessel Caliber:  Medium   patent  Comprressibility:  Normal  Doppler:  Not done  Needle advanced into vessel with real time ultrasound guidance.    Guidewire confirmed in vessel.    Steril sheath on probe.    Sterile gel used.  Manometry: No    Number of attempts:  1  Securement:  Line sutured, chlorhexidine patch, sterile dressing applied and blood return through all ports  Complications: No    Estimated blood loss (mL):  1  XRay:  Placement verified by x-ray, no pneumothorax on x-ray, tip termination and successful placement  Adverse Events:  NoneTermination Site: superior vena cava    3/9/2023    "

## 2023-03-13 NOTE — DISCHARGE SUMMARY
[facility] - Cath Lab Services  Discharge Note  Short Stay    Procedure(s) (LRB):  Insertion, Filter, Inferior Vena Cava (N/A)      OUTCOME: Patient tolerated treatment/procedure well without complication and is now ready for discharge.    DISPOSITION: Home or Self Care    FINAL DIAGNOSIS:  <principal problem not specified>    FOLLOWUP: In clinic    DISCHARGE INSTRUCTIONS:    Discharge Procedure Orders   Diet general     Keep surgical extremity elevated     Wound care routine (specify)   Order Comments: Leave dermabond in place until it falls off;  Ok to wash with soap under running water but do not submerge.  Do not use antibiotics ointment.     Call MD for:  temperature >100.4     Call MD for:  redness, tenderness, or signs of infection (pain, swelling, redness, odor or green/yellow discharge around incision site)     Nursing communication   Order Comments: Patient must lay flat for 2 hours after procedure     Activity as tolerated         Clinical Reference Documents Added to Patient Instructions         Document    VENA CAVA FILTER PLACEMENT (ENGLISH)            TIME SPENT ON DISCHARGE: 5 minutes

## 2023-03-20 DIAGNOSIS — C67.9 MALIGNANT NEOPLASM OF URINARY BLADDER, UNSPECIFIED SITE: Primary | ICD-10-CM

## 2023-03-20 DIAGNOSIS — R97.8 OTHER ABNORMAL TUMOR MARKERS: ICD-10-CM

## 2023-03-21 ENCOUNTER — HOSPITAL ENCOUNTER (OUTPATIENT)
Dept: RADIOLOGY | Facility: HOSPITAL | Age: 88
Discharge: HOME | End: 2023-03-21
Attending: INTERNAL MEDICINE
Payer: MEDICARE

## 2023-03-21 DIAGNOSIS — C67.9 MALIGNANT NEOPLASM OF URINARY BLADDER, UNSPECIFIED SITE: ICD-10-CM

## 2023-03-21 PROCEDURE — A9552 F18 FDG: HCPCS

## 2023-03-21 PROCEDURE — 78815 PET IMAGE W/CT SKULL-THIGH: CPT | Mod: TC

## 2023-03-24 ENCOUNTER — APPOINTMENT (OUTPATIENT)
Dept: LAB | Facility: HOSPITAL | Age: 88
End: 2023-03-24
Attending: SPECIALIST
Payer: MEDICARE

## 2023-03-24 DIAGNOSIS — N39.0 URINARY TRACT INFECTION, SITE NOT SPECIFIED: Primary | ICD-10-CM

## 2023-03-24 LAB
APPEARANCE UR: ABNORMAL
BACTERIA #/AREA URNS AUTO: ABNORMAL /HPF
BILIRUB UR QL STRIP.AUTO: NEGATIVE MG/DL
COLOR UR AUTO: ABNORMAL
GLUCOSE UR QL STRIP.AUTO: NEGATIVE MG/DL
KETONES UR QL STRIP.AUTO: NEGATIVE MG/DL
LEUKOCYTE ESTERASE UR QL STRIP.AUTO: ABNORMAL UNIT/L
NITRITE UR QL STRIP.AUTO: POSITIVE
PH UR STRIP.AUTO: 8.5 [PH]
PROT UR QL STRIP.AUTO: ABNORMAL MG/DL
RBC #/AREA URNS AUTO: >100 /HPF
RBC UR QL AUTO: ABNORMAL UNIT/L
SP GR UR STRIP.AUTO: 1.02
SQUAMOUS #/AREA URNS AUTO: ABNORMAL /HPF
UROBILINOGEN UR STRIP-ACNC: 0.2 MG/DL
WBC #/AREA URNS AUTO: ABNORMAL /HPF

## 2023-03-24 PROCEDURE — 87186 SC STD MICRODIL/AGAR DIL: CPT

## 2023-03-24 PROCEDURE — 87088 URINE BACTERIA CULTURE: CPT

## 2023-03-24 PROCEDURE — 81001 URINALYSIS AUTO W/SCOPE: CPT

## 2023-03-26 LAB — BACTERIA UR CULT: ABNORMAL

## 2023-03-27 ENCOUNTER — LAB VISIT (OUTPATIENT)
Dept: LAB | Facility: HOSPITAL | Age: 88
End: 2023-03-27
Attending: INTERNAL MEDICINE
Payer: MEDICARE

## 2023-03-27 ENCOUNTER — OFFICE VISIT (OUTPATIENT)
Dept: HEMATOLOGY/ONCOLOGY | Facility: CLINIC | Age: 88
End: 2023-03-27
Payer: MEDICARE

## 2023-03-27 VITALS
HEART RATE: 73 BPM | BODY MASS INDEX: 27.82 KG/M2 | DIASTOLIC BLOOD PRESSURE: 68 MMHG | WEIGHT: 167 LBS | RESPIRATION RATE: 18 BRPM | OXYGEN SATURATION: 96 % | SYSTOLIC BLOOD PRESSURE: 159 MMHG | HEIGHT: 65 IN | TEMPERATURE: 98 F

## 2023-03-27 DIAGNOSIS — R97.8 OTHER ABNORMAL TUMOR MARKERS: ICD-10-CM

## 2023-03-27 DIAGNOSIS — C67.9 MALIGNANT NEOPLASM OF URINARY BLADDER, UNSPECIFIED SITE: Primary | ICD-10-CM

## 2023-03-27 DIAGNOSIS — C67.9 MALIGNANT NEOPLASM OF URINARY BLADDER, UNSPECIFIED SITE: ICD-10-CM

## 2023-03-27 LAB
ALBUMIN SERPL-MCNC: 2.6 G/DL (ref 3.4–4.8)
ALBUMIN/GLOB SERPL: 0.7 RATIO (ref 1.1–2)
ALP SERPL-CCNC: 76 UNIT/L (ref 40–150)
ALT SERPL-CCNC: 11 UNIT/L (ref 0–55)
AST SERPL-CCNC: 17 UNIT/L (ref 5–34)
BASOPHILS # BLD AUTO: 0.06 X10(3)/MCL (ref 0–0.2)
BASOPHILS NFR BLD AUTO: 0.7 %
BILIRUBIN DIRECT+TOT PNL SERPL-MCNC: 0.3 MG/DL
BUN SERPL-MCNC: 17 MG/DL (ref 9.8–20.1)
CALCIUM SERPL-MCNC: 9.7 MG/DL (ref 8.4–10.2)
CHLORIDE SERPL-SCNC: 111 MMOL/L (ref 98–107)
CO2 SERPL-SCNC: 26 MMOL/L (ref 23–31)
CREAT SERPL-MCNC: 0.75 MG/DL (ref 0.55–1.02)
EOSINOPHIL # BLD AUTO: 0.16 X10(3)/MCL (ref 0–0.9)
EOSINOPHIL NFR BLD AUTO: 1.8 %
ERYTHROCYTE [DISTWIDTH] IN BLOOD BY AUTOMATED COUNT: 20.5 % (ref 11.5–17)
GFR SERPLBLD CREATININE-BSD FMLA CKD-EPI: >60 MLS/MIN/1.73/M2
GLOBULIN SER-MCNC: 3.8 GM/DL (ref 2.4–3.5)
GLUCOSE SERPL-MCNC: 115 MG/DL (ref 82–115)
HCT VFR BLD AUTO: 32.9 % (ref 37–47)
HGB BLD-MCNC: 10 G/DL (ref 12–16)
IMM GRANULOCYTES # BLD AUTO: 0.03 X10(3)/MCL (ref 0–0.04)
IMM GRANULOCYTES NFR BLD AUTO: 0.3 %
LYMPHOCYTES # BLD AUTO: 1.87 X10(3)/MCL (ref 0.6–4.6)
LYMPHOCYTES NFR BLD AUTO: 20.9 %
MAGNESIUM SERPL-MCNC: 2.2 MG/DL (ref 1.6–2.6)
MCH RBC QN AUTO: 25.6 PG (ref 27–31)
MCHC RBC AUTO-ENTMCNC: 30.4 G/DL (ref 33–36)
MCV RBC AUTO: 84.1 FL (ref 80–94)
MONOCYTES # BLD AUTO: 0.76 X10(3)/MCL (ref 0.1–1.3)
MONOCYTES NFR BLD AUTO: 8.5 %
NEUTROPHILS # BLD AUTO: 6.08 X10(3)/MCL (ref 2.1–9.2)
NEUTROPHILS NFR BLD AUTO: 67.8 %
PLATELET # BLD AUTO: 160 X10(3)/MCL (ref 130–400)
PMV BLD AUTO: 10.9 FL (ref 7.4–10.4)
POTASSIUM SERPL-SCNC: 3.4 MMOL/L (ref 3.5–5.1)
PROT SERPL-MCNC: 6.4 GM/DL (ref 5.8–7.6)
RBC # BLD AUTO: 3.91 X10(6)/MCL (ref 4.2–5.4)
SODIUM SERPL-SCNC: 144 MMOL/L (ref 136–145)
WBC # SPEC AUTO: 9 X10(3)/MCL (ref 4.5–11.5)

## 2023-03-27 PROCEDURE — 99203 PR OFFICE/OUTPT VISIT, NEW, LEVL III, 30-44 MIN: ICD-10-PCS | Mod: S$PBB,,, | Performed by: INTERNAL MEDICINE

## 2023-03-27 PROCEDURE — 36415 COLL VENOUS BLD VENIPUNCTURE: CPT

## 2023-03-27 PROCEDURE — 99215 OFFICE O/P EST HI 40 MIN: CPT | Mod: PBBFAC | Performed by: INTERNAL MEDICINE

## 2023-03-27 PROCEDURE — 83735 ASSAY OF MAGNESIUM: CPT

## 2023-03-27 PROCEDURE — 99999 PR PBB SHADOW E&M-EST. PATIENT-LVL V: ICD-10-PCS | Mod: PBBFAC,,, | Performed by: INTERNAL MEDICINE

## 2023-03-27 PROCEDURE — 99999 PR PBB SHADOW E&M-EST. PATIENT-LVL V: CPT | Mod: PBBFAC,,, | Performed by: INTERNAL MEDICINE

## 2023-03-27 PROCEDURE — 80053 COMPREHEN METABOLIC PANEL: CPT

## 2023-03-27 PROCEDURE — 85025 COMPLETE CBC W/AUTO DIFF WBC: CPT

## 2023-03-27 PROCEDURE — 99203 OFFICE O/P NEW LOW 30 MIN: CPT | Mod: S$PBB,,, | Performed by: INTERNAL MEDICINE

## 2023-03-27 RX ORDER — DIAZEPAM 5 MG/1
1 TABLET ORAL EVERY MORNING
COMMUNITY
End: 2023-03-27

## 2023-03-27 RX ORDER — DOXYCYCLINE HYCLATE 100 MG
100 TABLET ORAL 2 TIMES DAILY
COMMUNITY
Start: 2023-03-24 | End: 2023-09-14 | Stop reason: CLARIF

## 2023-03-27 RX ORDER — ASCORBIC ACID 500 MG
1 TABLET ORAL DAILY
COMMUNITY
Start: 2022-12-11 | End: 2023-03-27

## 2023-03-27 RX ORDER — DORZOLAMIDE HYDROCHLORIDE AND TIMOLOL MALEATE 20; 5 MG/ML; MG/ML
1 SOLUTION/ DROPS OPHTHALMIC DAILY
COMMUNITY
Start: 2022-12-11

## 2023-03-27 RX ORDER — CALCIUM CARBONATE/VITAMIN D3 500-10/5ML
1 LIQUID (ML) ORAL DAILY
COMMUNITY
Start: 2023-03-06

## 2023-03-27 RX ORDER — MELATONIN 10 MG
1 CAPSULE ORAL NIGHTLY
COMMUNITY
Start: 2023-03-06 | End: 2023-09-14 | Stop reason: CLARIF

## 2023-03-27 RX ORDER — CLONIDINE HYDROCHLORIDE 0.1 MG/1
0.1 TABLET ORAL DAILY PRN
Status: ON HOLD | COMMUNITY
Start: 2023-03-06 | End: 2023-11-11 | Stop reason: HOSPADM

## 2023-03-27 RX ORDER — MULTIVIT,IRON,MINERALS/LUTEIN
1 TABLET ORAL DAILY
COMMUNITY
Start: 2022-12-11

## 2023-03-27 NOTE — PROGRESS NOTES
HEMATOLOGY / ONCOLOGY   CLINIC NOTE     ONCOLOGICAL HISTORY:     Diagnosis:  - Bladder cancer   - DVT/PE s/p IVC filter       Subjective:       Chief Complaint: Bladder Cancer (F/U with scan and lab. Pt family reports nausea and constipation.)    HPI    Maudry Citizen Domo  89 y.o.  female with past medical history significant for dementia, hypertension, coronary artery disease, degenerative disc disease, type 2 diabetes, gastroesophageal reflux disease, obstructive sleep apnea, glaucoma, vertigo, severe aortic stenosis s/p TAVR, stroke, DVT, PE, anemia referred for management of bladder cancer.      Patient was previously diagnosed for high grade bladder cancer when presented with blood in the urine. It was resected in November of 2022. She was then placed in hospice by the family but later on she was taken out of the hospice and then admitted with new onset DVT/PE s/p IVC filter. During that admission she was seen by Urology and noted to have a chronic Hale with hematuria. CT during admission showed suspicion for progression of bladder cancer.     Discussed with urology, they would plan to do a cystoscopy under anesthesia for evaluation of this lesion on the CT scan to rule out a clot or recurrence of the cancer and then consider palliative resection and palliative radiation.  A PET CT was ordered prior to cystoscopy and patient is her today 3/27/23 to go over the results    3/21/23 PET/CT:  FINDINGS:  Head and Neck: Brain demonstrates normal metabolism.  However, FDG-PET has an approximate 60% sensitivity for brain metastases which are best detected by MRI with gadolinium.  Physiologic uptake is in the neck.     Chest: No FDG avid pulmonary lesions are present.  None hypermetabolic airspace opacity is identified in the lingula.  Image number 108 no enlarged or FDG avid lymph nodes are in the chest.     Abdomen and Pelvis: Physiologic uptake is in the liver, spleen, urinary system and bowel. No enlarged  or FDG avid lymph nodes are in the abdomen or pelvis. Adrenal glands are normal.  IVC filter is noted.  On the CT component asymmetric appearance of the bladder wall is identified which may represent known bladder wall carcinoma.  No evidence for abnormal hypermetabolic activity in the pelvis.  Nonenlarged pelvic sidewall lymph nodes are identified.     Musculoskeletal: No FDG avid osseous lesions are present.  Diffuse muscular uptake is identified which may limited evaluation.     Impression:     1. Masslike lesions identified in the bladder.  Adjacent urine limits evaluation.  2. No evidence for extracapsular extension nor evidence for locally advanced disease.  3. No evidence for metastatic disease.    Interval History:   3/27/23:  Here to go over PET/CT being performed to rule out metastatic disease.         Past Medical History:   Diagnosis Date    Anxiety disorder     Aortic stenosis     Arthritis     Carpal tunnel syndrome     Coronary artery disease     Dementia     Depression     Diabetes mellitus     GERD (gastroesophageal reflux disease)     Hx of rectal polypectomy     Hypercholesterolemia     Hypertension     Obstructive sleep apnea     Respiratory distress     S/P TAVR (transcatheter aortic valve replacement)     TIA (transient ischemic attack)       Past Surgical History:   Procedure Laterality Date    CARDIAC SURGERY      CATARACT EXTRACTION      CORONARY STENT PLACEMENT      EGD, WITH CLOSED BIOPSY  12/28/2022    Procedure: EGD, WITH CLOSED BIOPSY;  Surgeon: Arcadio Carias MD;  Location: [facility] ENDOSCOPY;  Service: Gastroenterology;;    ESOPHAGOGASTRODUODENOSCOPY W/ PEG N/A 12/28/2022    Procedure: PEG;  Surgeon: Arcadio Carias MD;  Location: [facility] ENDOSCOPY;  Service: Gastroenterology;  Laterality: N/A;    HYSTERECTOMY      INSERTION OF INFERIOR VENA CAVAL FILTER N/A 3/1/2023    Procedure: Insertion, Filter, Inferior Vena Cava;  Surgeon: Terri Urbian MD;  Location: [facility] CATH LAB;   Service: Peripheral Vascular;  Laterality: N/A;  IVC FILTER PLACEMENT    INSERTION OF PACEMAKER      PARTIAL HYSTERECTOMY      PULMONARY EMBOLISM SURGERY N/A 12/21/2022    Procedure: EMBOLECTOMY, ARTERY, PULMONARY;  Surgeon: Terri Urbina MD;  Location: [facility] CATH LAB;  Service: Peripheral Vascular;  Laterality: N/A;    RECTAL POLYPECTOMY      TONSILLECTOMY      TRANSCATHETER AORTIC VALVE REPLACEMENT (TAVR)       Social History     Socioeconomic History    Marital status:    Tobacco Use    Smoking status: Never    Smokeless tobacco: Never   Substance and Sexual Activity    Alcohol use: Never    Drug use: Never      Family History   Problem Relation Age of Onset    Diabetes Mother     Stroke Father     Hyperlipidemia Father     Breast cancer Sister       Review of patient's allergies indicates:   Allergen Reactions    Statins-hmg-coa reductase inhibitors      Other reaction(s): Joint pain    Bimatoprost      Other reaction(s): Eye redness    Gabapentin      Other reaction(s): Confusion    Lisinopril     Rosuvastatin Itching    Tafluprost (pf)       Review of Systems   Unable to perform ROS: Dementia       Objective:        Vitals:    03/27/23 1306   BP: (!) 159/68   Pulse: 73   Resp: 18   Temp: 98.2 °F (36.8 °C)          Physical Exam  Constitutional:       General: She is not in acute distress.     Appearance: She is well-developed. She is not ill-appearing.   HENT:      Head: Normocephalic and atraumatic.      Mouth/Throat:      Mouth: Mucous membranes are moist.   Eyes:      Extraocular Movements: Extraocular movements intact.      Conjunctiva/sclera: Conjunctivae normal.   Cardiovascular:      Rate and Rhythm: Normal rate and regular rhythm.      Heart sounds: Normal heart sounds.   Pulmonary:      Effort: Pulmonary effort is normal. No respiratory distress.      Breath sounds: Normal breath sounds. No wheezing.   Abdominal:      General: Bowel sounds are normal. There is no distension.       Palpations: Abdomen is soft.      Tenderness: There is no abdominal tenderness.   Musculoskeletal:         General: Normal range of motion.      Cervical back: Normal range of motion and neck supple.      Left lower leg: Edema present.   Skin:     General: Skin is warm.   Neurological:      General: No focal deficit present.      Mental Status: She is alert. Mental status is at baseline.      Cranial Nerves: No cranial nerve deficit.   Psychiatric:         Mood and Affect: Mood normal.         LABS / IMAGING        CT A/P 12/11/2022: Heterogeneous hyperdense mass lesion in the superior and left lateral aspect of the urinary bladder measuring 4.8 x 3.4 cm. This is highly suspicious for malignancy.  This is grossly unchanged from the prior exam.  12/18/2022: URINARY BLADDER, BIOPSY:   A) AGGREGATES OF BLOOD, FIBRIN, MIXED INFLAMMATORY CELLS, RARE DEGENERATIVE EPITHELIOID CELLS AND FOCAL DYSTROPHIC CALCIFICATION.  B) NO EVIDENCE OF INTACT UROTHELIUM OR NEOPLASIA / MALIGNANCY IDENTIFIED.     CTA 12/18/2022: Saddle pulmonary embolism with thrombi extending into the bilateral upper and lower lobes and evidence of right heart strain.      02/10/2023 CT A/P: 1. Heterogeneous 5 cm bladder mass.  No definitive metastatic disease identified.   2. Bilateral femoral vein DVTs.  3. Small left pleural effusion.  Two lower lung nodules appear new since December and will need to be followed on future exams.  3/21/2023 PET/CT:  Brain demonstrates normal metabolism.  However, FDG-PET has an approximate 60% sensitivity for brain metastases which are best detected by MRI with gadolinium.  Physiologic uptake is in the neck.   Chest:  No FDG avid pulmonary lesions are present.  None hypermetabolic airspace opacity is identified in the lingula.  Image number 108 no enlarged or FDG avid lymph nodes are in the chest.   Abdomen and Pelvis:  Physiologic uptake is in the liver, spleen, urinary system and bowel. No enlarged or FDG avid lymph  nodes are in the abdomen or pelvis. Adrenal glands are normal.  IVC filter is noted.  On the CT component asymmetric appearance of the bladder wall is identified which may represent known bladder wall carcinoma.  No evidence for abnormal hypermetabolic activity in the pelvis.  Nonenlarged pelvic sidewall lymph nodes are identified.   Musculoskeletal:  No FDG avid osseous lesions are present.  Diffuse muscular uptake is identified which may limited evaluation.     Impression:   1. Masslike lesions identified in the bladder.  Adjacent urine limits evaluation.  2. No evidence for extracapsular extension nor evidence for locally advanced disease.  3. No evidence for metastatic disease.         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  Detrusor muscle not present.  - 12/21/2022 urinary bladder biopsy:  Aggregates of blood, fibrin, mixed inflammatory cells, rare degenerative epithelioid cells and focal dystrophic calcification.  No evidence of intact urothelial or neoplasia/malignancy identified  02/10/2023 CT A/P: Heterogeneous 5 cm bladder mass. Two lower lung nodules appear new since December and will need to be followed on future exams.    Plan:     - Discussed with urology, they would plan to do a cystoscopy under anesthesia for evaluation of this lesion on the CT scan to rule out a clot or recurrence of the cancer and then consider palliative resection and palliative radiation.  - PET scan requested to rule out metastatic disease as noted to have new lung nodules reveals no lung lesions and no metatstatic disease  - Per discussion with the family, they would not consider chemotherapy   -Refer back to urology  Phone visit in a month with family to assure all going as planned and to update records.    The patient was seen,  interviewed and examined. Pertinent lab and radiology studies were reviewed. Pt instructed to call should develop concerning signs/symptoms or have further questions.       Portions of the record may have been created with voice recognition software. Occasional wrong-word or sound-a-like substitutions may have occurred due to the inherent limitations of voice recognition software. Read the chart carefully and recognize, using context, where substitutions have occurred.    Lane Smith MD  Hematology / Oncology

## 2023-03-29 DIAGNOSIS — C67.9 MALIGNANT NEOPLASM OF URINARY BLADDER, UNSPECIFIED SITE: Primary | ICD-10-CM

## 2023-04-01 ENCOUNTER — HOSPITAL ENCOUNTER (EMERGENCY)
Facility: HOSPITAL | Age: 88
Discharge: HOME | End: 2023-04-01
Attending: EMERGENCY MEDICINE
Payer: MEDICARE

## 2023-04-01 VITALS
BODY MASS INDEX: 26.99 KG/M2 | HEART RATE: 86 BPM | WEIGHT: 162 LBS | SYSTOLIC BLOOD PRESSURE: 124 MMHG | TEMPERATURE: 98 F | DIASTOLIC BLOOD PRESSURE: 68 MMHG | OXYGEN SATURATION: 97 % | RESPIRATION RATE: 18 BRPM | HEIGHT: 65 IN

## 2023-04-01 DIAGNOSIS — K59.00 CONSTIPATION, UNSPECIFIED CONSTIPATION TYPE: Primary | ICD-10-CM

## 2023-04-01 DIAGNOSIS — F03.90 DEMENTIA, UNSPECIFIED DEMENTIA SEVERITY, UNSPECIFIED DEMENTIA TYPE, UNSPECIFIED WHETHER BEHAVIORAL, PSYCHOTIC, OR MOOD DISTURBANCE OR ANXIETY: ICD-10-CM

## 2023-04-01 DIAGNOSIS — C67.9 MALIGNANT NEOPLASM OF URINARY BLADDER, UNSPECIFIED SITE: ICD-10-CM

## 2023-04-01 DIAGNOSIS — I26.99 PULMONARY EMBOLISM, UNSPECIFIED CHRONICITY, UNSPECIFIED PULMONARY EMBOLISM TYPE, UNSPECIFIED WHETHER ACUTE COR PULMONALE PRESENT: ICD-10-CM

## 2023-04-01 DIAGNOSIS — I82.592 CHRONIC DEEP VEIN THROMBOSIS (DVT) OF OTHER VEIN OF LEFT LOWER EXTREMITY: ICD-10-CM

## 2023-04-01 PROCEDURE — 99284 EMERGENCY DEPT VISIT MOD MDM: CPT

## 2023-04-01 PROCEDURE — 25000003 PHARM REV CODE 250: Performed by: EMERGENCY MEDICINE

## 2023-04-01 RX ORDER — LIDOCAINE HYDROCHLORIDE 20 MG/ML
10 SOLUTION OROPHARYNGEAL
Status: COMPLETED | OUTPATIENT
Start: 2023-04-01 | End: 2023-04-01

## 2023-04-01 RX ADMIN — LIDOCAINE HYDROCHLORIDE 10 ML: 20 SOLUTION ORAL; TOPICAL at 05:04

## 2023-04-01 NOTE — ED PROVIDER NOTES
Encounter Date: 2023       History     Chief Complaint   Patient presents with    Constipation     Pt last BM Wednesday, family gave x2 enemas at home. Pt c/o abdominal pain.      Ms. Oliveros is a 89-year-old female who had bladder cancer resected in December then had complications including a large deep venous thrombosis left leg in a large pulmonary embolus that required a thrombectomy.  She is now had some apparent recurrence of this cancer had a workup done in Dr. oLng the urologist record is planning on going back in and seeing if this is blood clot adherent to the bladder wall or this is recurrent cancer.  She presents today because she is complaining of constipation now the daughter gave 2 fleets enemas and removed a lot of stool from the rectum she said but she feels like the still some there she is had her mother was ashwin take her to the emergency department so she brought her.    Daughter provides the information due to the mother's dementia.    She does not use tobacco alcohol or drugs she is  lives with a daughter and a son.    She was on hospice but is no longer a hospice patient.    She  had COVID & pneumonia shots She does not take flu shots She has not had a tetanus.    She had bladder cancer resected pacemaker placement and  hysterectomy   The daughter said she is a  8 para 8.    Dr. Long is her urologist Dr. Barrett Lopez is her primary care   mom  with diabetic complications dad  of heart attack.  Allergies reviewed in the chart    Review of patient's allergies indicates:   Allergen Reactions    Statins-hmg-coa reductase inhibitors      Other reaction(s): Joint pain    Bimatoprost      Other reaction(s): Eye redness    Gabapentin      Other reaction(s): Confusion    Lisinopril     Rosuvastatin Itching    Tafluprost (pf)      Past Medical History:   Diagnosis Date    Anxiety disorder     Aortic stenosis     Arthritis     Carpal tunnel syndrome     Coronary  artery disease     Dementia     Depression     Diabetes mellitus     GERD (gastroesophageal reflux disease)     Hx of rectal polypectomy     Hypercholesterolemia     Hypertension     Obstructive sleep apnea     Respiratory distress     S/P TAVR (transcatheter aortic valve replacement)     TIA (transient ischemic attack)      Past Surgical History:   Procedure Laterality Date    CARDIAC SURGERY      CATARACT EXTRACTION      CORONARY STENT PLACEMENT      EGD, WITH CLOSED BIOPSY  12/28/2022    Procedure: EGD, WITH CLOSED BIOPSY;  Surgeon: Arcadio Carias MD;  Location: [facility] ENDOSCOPY;  Service: Gastroenterology;;    ESOPHAGOGASTRODUODENOSCOPY W/ PEG N/A 12/28/2022    Procedure: PEG;  Surgeon: Arcadio Carias MD;  Location: [facility] ENDOSCOPY;  Service: Gastroenterology;  Laterality: N/A;    HYSTERECTOMY      INSERTION OF INFERIOR VENA CAVAL FILTER N/A 3/1/2023    Procedure: Insertion, Filter, Inferior Vena Cava;  Surgeon: Terri Urbina MD;  Location: [facility] CATH LAB;  Service: Peripheral Vascular;  Laterality: N/A;  IVC FILTER PLACEMENT    INSERTION OF PACEMAKER      PARTIAL HYSTERECTOMY      PULMONARY EMBOLISM SURGERY N/A 12/21/2022    Procedure: EMBOLECTOMY, ARTERY, PULMONARY;  Surgeon: Terri Urbina MD;  Location: [facility] CATH LAB;  Service: Peripheral Vascular;  Laterality: N/A;    RECTAL POLYPECTOMY      TONSILLECTOMY      TRANSCATHETER AORTIC VALVE REPLACEMENT (TAVR)       Family History   Problem Relation Age of Onset    Diabetes Mother     Stroke Father     Hyperlipidemia Father     Breast cancer Sister      Social History     Tobacco Use    Smoking status: Never    Smokeless tobacco: Never   Substance Use Topics    Alcohol use: Never    Drug use: Never     Review of Systems   Unable to perform ROS: Dementia     Physical Exam     Initial Vitals [04/01/23 1630]   BP Pulse Resp Temp SpO2   124/68 86 18 98 °F (36.7 °C) 97 %      MAP       --         Physical Exam    Nursing note and vitals  reviewed.  Constitutional: She appears well-developed and well-nourished. She is not diaphoretic. No distress.   Has some dementia very sweet to talk to   HENT:   Head: Normocephalic and atraumatic.   Nose: Nose normal.   Mouth/Throat: Oropharynx is clear and moist.   Eyes: Pupils are equal, round, and reactive to light.   Neck: Neck supple.   Normal range of motion.  Cardiovascular:  Normal rate and regular rhythm.           Pulmonary/Chest: Breath sounds normal. No respiratory distress.   Abdominal: Abdomen is soft. Bowel sounds are normal. She exhibits no distension and no mass. There is no abdominal tenderness. There is no rebound and no guarding.   Genitourinary:    Genitourinary Comments: Her rectal evaluation done with RN present lidocaine uses lubricant shows that there is no stool that can be palpated.  The fleets enemas in the daughter did a good job disimpacted her.  But her  mother says is still stool up there and I imagine there is higher than we can reach     Musculoskeletal:         General: No tenderness or edema. Normal range of motion.      Cervical back: Normal range of motion and neck supple.      Comments: Left leg is swollen more than the right left leg had a large DVT it has gone down in swelling compared to when I saw her February 4th     Neurological: She is alert.   GCS 14   Skin: Skin is warm and dry. Capillary refill takes 2 to 3 seconds.   Psychiatric: She has a normal mood and affect. Thought content normal.       ED Course   Procedures  Labs Reviewed - No data to display       Imaging Results    None          Medications   LIDOcaine HCl 2% oral solution 10 mL (10 mLs Oral Given 4/1/23 1700)     Medical Decision Making:   History:   I obtained history from: someone other than patient.       <> Summary of History: Daughter gave the history about the constipation they were using some lactulose and some Linzess she had a bowel movement on Wednesday things seem to be going well so they  backed off these medications.  We did have the discussion about restarting these medications.    Daughter said that she gave her enemas and got some of the stool out but mom said there was still some there so she brought her to the ED  Initial Assessment:   Elderly female demented very pleasant no distress stable vital signs fully awake oriented does not appear to be in any distress  Differential Diagnosis:   Fecal impaction constipation diminished colon transit time  ED Management:  Explained to the daughter we would attempt to disimpact her.  However when rechecked her rectum the rectum was clean.  No stool was found  MDM  Problems addressed  Co-morbidities and/or factors adding to the complexity or risk for the patient:  Dementia advanced age recurrent bladder cancer suspected  Problems addressed:  Constipation fecal impaction  Acute problem/illness or progression/exacerbation of chronic problem with potential threat to life/bodily dysfunction?:  Constipation anticoagulated dementia age  Differential diagnoses/problems considered: see above     Amount and/or Complexity of Data Reviewed  Independent Historian: daughter  (see above for summary)  External Data Reviewed: notes from previous ED visits (see above for summary)  Risk and benefits of testing: discussed   Labs: Labs: ordered and reviewed  Radiology:Radiology: ordered and independent interpretation performed (see above or ED course)  ECG/medicine tests:Radiology: ordered and independent interpretation performed (see above or ED course)  none    Risk  OTC medications  Shared decision making     Critical Care  none                         Clinical Impression:   Final diagnoses:  [K59.00] Constipation, unspecified constipation type (Primary)  [C67.9] Malignant neoplasm of urinary bladder, unspecified site  [I26.99] Pulmonary embolism, unspecified chronicity, unspecified pulmonary embolism type, unspecified whether acute cor pulmonale present  [I82.592] Chronic  deep vein thrombosis (DVT) of other vein of left lower extremity  [F03.90] Dementia, unspecified dementia severity, unspecified dementia type, unspecified whether behavioral, psychotic, or mood disturbance or anxiety        ED Disposition Condition    Discharge Stable          ED Prescriptions    None       Follow-up Information       Follow up With Specialties Details Why Contact Info    Barrett Lopez MD Family Medicine In 3 days  [address] [place] [address]  [phone]               Prabhakar Calderón MD  04/01/23 9997

## 2023-04-01 NOTE — DISCHARGE INSTRUCTIONS
Warm soapy enemas is what she needs.    Warm water warm just like you would give a child a bottle  Castile soap is the appropriate type soap   plain warm tap water is fine    Try to hold the liquid up in her colon as long as possible to reach high enough and to soften the stool    Return for any emergency problem    Allow the water to run in under gravity pressure elevate the bag and let it run do not force it in

## 2023-04-14 ENCOUNTER — DOCUMENTATION ONLY (OUTPATIENT)
Dept: HEMATOLOGY/ONCOLOGY | Facility: CLINIC | Age: 88
End: 2023-04-14
Payer: MEDICARE

## 2023-04-18 ENCOUNTER — HOSPITAL ENCOUNTER (EMERGENCY)
Facility: HOSPITAL | Age: 88
Discharge: HOME | End: 2023-04-18
Attending: EMERGENCY MEDICINE
Payer: MEDICARE

## 2023-04-18 VITALS
DIASTOLIC BLOOD PRESSURE: 89 MMHG | WEIGHT: 165 LBS | BODY MASS INDEX: 32.39 KG/M2 | HEIGHT: 60 IN | SYSTOLIC BLOOD PRESSURE: 152 MMHG | HEART RATE: 79 BPM | OXYGEN SATURATION: 98 % | RESPIRATION RATE: 16 BRPM | TEMPERATURE: 98 F

## 2023-04-18 DIAGNOSIS — R31.9 URINARY TRACT INFECTION WITH HEMATURIA, SITE UNSPECIFIED: ICD-10-CM

## 2023-04-18 DIAGNOSIS — R53.1 GENERALIZED WEAKNESS: Primary | ICD-10-CM

## 2023-04-18 DIAGNOSIS — R05.9 COUGH, UNSPECIFIED TYPE: ICD-10-CM

## 2023-04-18 DIAGNOSIS — N39.0 URINARY TRACT INFECTION WITH HEMATURIA, SITE UNSPECIFIED: ICD-10-CM

## 2023-04-18 LAB
ALBUMIN SERPL-MCNC: 2.6 G/DL (ref 3.4–4.8)
ALBUMIN/GLOB SERPL: 0.6 RATIO (ref 1.1–2)
ALP SERPL-CCNC: 87 UNIT/L (ref 40–150)
ALT SERPL-CCNC: 10 UNIT/L (ref 0–55)
APPEARANCE UR: ABNORMAL
AST SERPL-CCNC: 21 UNIT/L (ref 5–34)
BACTERIA #/AREA URNS AUTO: ABNORMAL /HPF
BASOPHILS # BLD AUTO: 0.04 X10(3)/MCL (ref 0–0.2)
BASOPHILS NFR BLD AUTO: 0.5 %
BILIRUB UR QL STRIP.AUTO: NEGATIVE MG/DL
BILIRUBIN DIRECT+TOT PNL SERPL-MCNC: 0.5 MG/DL
BNP BLD-MCNC: 212.1 PG/ML
BUN SERPL-MCNC: 10 MG/DL (ref 9.8–20.1)
CALCIUM SERPL-MCNC: 9.8 MG/DL (ref 8.4–10.2)
CHLORIDE SERPL-SCNC: 103 MMOL/L (ref 98–107)
CO2 SERPL-SCNC: 26 MMOL/L (ref 23–31)
COLOR UR AUTO: YELLOW
CREAT SERPL-MCNC: 0.75 MG/DL (ref 0.55–1.02)
EOSINOPHIL # BLD AUTO: 0.25 X10(3)/MCL (ref 0–0.9)
EOSINOPHIL NFR BLD AUTO: 3.2 %
ERYTHROCYTE [DISTWIDTH] IN BLOOD BY AUTOMATED COUNT: 17.6 % (ref 11.5–17)
FLUAV AG UPPER RESP QL IA.RAPID: NOT DETECTED
FLUBV AG UPPER RESP QL IA.RAPID: NOT DETECTED
GFR SERPLBLD CREATININE-BSD FMLA CKD-EPI: >60 MLS/MIN/1.73/M2
GLOBULIN SER-MCNC: 4.1 GM/DL (ref 2.4–3.5)
GLUCOSE SERPL-MCNC: 149 MG/DL (ref 82–115)
GLUCOSE UR QL STRIP.AUTO: NEGATIVE MG/DL
HCT VFR BLD AUTO: 32.3 % (ref 37–47)
HGB BLD-MCNC: 9.8 G/DL (ref 12–16)
IMM GRANULOCYTES # BLD AUTO: 0.03 X10(3)/MCL (ref 0–0.04)
IMM GRANULOCYTES NFR BLD AUTO: 0.4 %
KETONES UR QL STRIP.AUTO: NEGATIVE MG/DL
LEUKOCYTE ESTERASE UR QL STRIP.AUTO: ABNORMAL UNIT/L
LYMPHOCYTES # BLD AUTO: 1.67 X10(3)/MCL (ref 0.6–4.6)
LYMPHOCYTES NFR BLD AUTO: 21.4 %
MCH RBC QN AUTO: 25.5 PG (ref 27–31)
MCHC RBC AUTO-ENTMCNC: 30.3 G/DL (ref 33–36)
MCV RBC AUTO: 84.1 FL (ref 80–94)
MONOCYTES # BLD AUTO: 0.66 X10(3)/MCL (ref 0.1–1.3)
MONOCYTES NFR BLD AUTO: 8.5 %
NEUTROPHILS # BLD AUTO: 5.15 X10(3)/MCL (ref 2.1–9.2)
NEUTROPHILS NFR BLD AUTO: 66 %
NITRITE UR QL STRIP.AUTO: NEGATIVE
PH UR STRIP.AUTO: 7 [PH]
PLATELET # BLD AUTO: 170 X10(3)/MCL (ref 130–400)
PMV BLD AUTO: 10.3 FL (ref 7.4–10.4)
POTASSIUM SERPL-SCNC: 3.7 MMOL/L (ref 3.5–5.1)
PROT SERPL-MCNC: 6.7 GM/DL (ref 5.8–7.6)
PROT UR QL STRIP.AUTO: NEGATIVE MG/DL
RBC # BLD AUTO: 3.84 X10(6)/MCL (ref 4.2–5.4)
RBC #/AREA URNS AUTO: ABNORMAL /HPF
RBC UR QL AUTO: ABNORMAL UNIT/L
SARS-COV-2 RNA RESP QL NAA+PROBE: NOT DETECTED
SODIUM SERPL-SCNC: 137 MMOL/L (ref 136–145)
SP GR UR STRIP.AUTO: 1.02
SQUAMOUS #/AREA URNS AUTO: ABNORMAL /HPF
TROPONIN I SERPL-MCNC: 0.04 NG/ML (ref 0–0.04)
UROBILINOGEN UR STRIP-ACNC: 0.2 MG/DL
WBC # SPEC AUTO: 7.8 X10(3)/MCL (ref 4.5–11.5)
WBC #/AREA URNS AUTO: ABNORMAL /HPF

## 2023-04-18 PROCEDURE — 87186 SC STD MICRODIL/AGAR DIL: CPT | Performed by: INTERNAL MEDICINE

## 2023-04-18 PROCEDURE — 63600175 PHARM REV CODE 636 W HCPCS: Performed by: EMERGENCY MEDICINE

## 2023-04-18 PROCEDURE — 99284 EMERGENCY DEPT VISIT MOD MDM: CPT | Mod: 25

## 2023-04-18 PROCEDURE — 80053 COMPREHEN METABOLIC PANEL: CPT | Performed by: INTERNAL MEDICINE

## 2023-04-18 PROCEDURE — 84484 ASSAY OF TROPONIN QUANT: CPT | Performed by: EMERGENCY MEDICINE

## 2023-04-18 PROCEDURE — 85025 COMPLETE CBC W/AUTO DIFF WBC: CPT | Performed by: INTERNAL MEDICINE

## 2023-04-18 PROCEDURE — 0240U COVID/FLU A&B PCR: CPT | Performed by: EMERGENCY MEDICINE

## 2023-04-18 PROCEDURE — 96374 THER/PROPH/DIAG INJ IV PUSH: CPT

## 2023-04-18 PROCEDURE — 83880 ASSAY OF NATRIURETIC PEPTIDE: CPT | Performed by: EMERGENCY MEDICINE

## 2023-04-18 PROCEDURE — 81001 URINALYSIS AUTO W/SCOPE: CPT | Performed by: INTERNAL MEDICINE

## 2023-04-18 PROCEDURE — 87088 URINE BACTERIA CULTURE: CPT | Performed by: INTERNAL MEDICINE

## 2023-04-18 RX ORDER — FUROSEMIDE 10 MG/ML
40 INJECTION INTRAMUSCULAR; INTRAVENOUS
Status: COMPLETED | OUTPATIENT
Start: 2023-04-18 | End: 2023-04-18

## 2023-04-18 RX ORDER — CIPROFLOXACIN 500 MG/1
500 TABLET ORAL 2 TIMES DAILY
Qty: 20 TABLET | Refills: 0 | Status: SHIPPED | OUTPATIENT
Start: 2023-04-18 | End: 2023-04-25

## 2023-04-18 RX ADMIN — FUROSEMIDE 40 MG: 10 INJECTION, SOLUTION INTRAMUSCULAR; INTRAVENOUS at 07:04

## 2023-04-18 NOTE — ED PROVIDER NOTES
ED PROVIDER NOTE  4/18/2023    CHIEF COMPLAINT:   Chief Complaint   Patient presents with    Weakness     Weakness with head and chest congestion for last 2-3 days       HISTORY OF PRESENT ILLNESS:   Miguel Angel Oliveros is a 89 y.o. female who presents with chief complaint Cough and congestion.  Onset was about 2-3 days ago whenever she began having minimally productive cough associated with sinus congestion, generalized weakness, loss of appetite.  Nothing makes symptoms better or worse.  No known sick contacts.  She does endorse having some bladder spasms and hematuria since having a cystoscopy performed a couple of weeks ago.  Most of the history is provided by son due to patient dementia.    The history is provided by the patient and a relative. The history is limited by the condition of the patient.       REVIEW OF SYSTEMS: as noted in the HPI.  NURSING NOTES REVIEWED      PAST MEDICAL/SURGICAL HISTORY:   Past Medical History:   Diagnosis Date    Anxiety disorder     Aortic stenosis     Arthritis     Carpal tunnel syndrome     Coronary artery disease     Dementia     Depression     Diabetes mellitus     GERD (gastroesophageal reflux disease)     Hx of rectal polypectomy     Hypercholesterolemia     Hypertension     Obstructive sleep apnea     Respiratory distress     S/P TAVR (transcatheter aortic valve replacement)     TIA (transient ischemic attack)       Past Surgical History:   Procedure Laterality Date    CARDIAC SURGERY      CATARACT EXTRACTION      CORONARY STENT PLACEMENT      EGD, WITH CLOSED BIOPSY  12/28/2022    Procedure: EGD, WITH CLOSED BIOPSY;  Surgeon: Arcadio Carias MD;  Location: [facility] ENDOSCOPY;  Service: Gastroenterology;;    ESOPHAGOGASTRODUODENOSCOPY W/ PEG N/A 12/28/2022    Procedure: PEG;  Surgeon: Arcadio Carias MD;  Location: [facility] ENDOSCOPY;  Service: Gastroenterology;  Laterality: N/A;    HYSTERECTOMY      INSERTION OF INFERIOR VENA CAVAL FILTER N/A 3/1/2023     Procedure: Insertion, Filter, Inferior Vena Cava;  Surgeon: Terri Urbina MD;  Location: [facility] CATH LAB;  Service: Peripheral Vascular;  Laterality: N/A;  IVC FILTER PLACEMENT    INSERTION OF PACEMAKER      PARTIAL HYSTERECTOMY      PULMONARY EMBOLISM SURGERY N/A 12/21/2022    Procedure: EMBOLECTOMY, ARTERY, PULMONARY;  Surgeon: Terri Urbina MD;  Location: [facility] CATH LAB;  Service: Peripheral Vascular;  Laterality: N/A;    RECTAL POLYPECTOMY      TONSILLECTOMY      TRANSCATHETER AORTIC VALVE REPLACEMENT (TAVR)         FAMILY HISTORY:   Family History   Problem Relation Age of Onset    Diabetes Mother     Stroke Father     Hyperlipidemia Father     Breast cancer Sister        SOCIAL HISTORY:   Social History     Tobacco Use    Smoking status: Never    Smokeless tobacco: Never   Substance Use Topics    Alcohol use: Never    Drug use: Never       ALLERGIES:   Review of patient's allergies indicates:   Allergen Reactions    Statins-hmg-coa reductase inhibitors      Other reaction(s): Joint pain    Bimatoprost      Other reaction(s): Eye redness    Gabapentin      Other reaction(s): Confusion    Lisinopril     Rosuvastatin Itching    Tafluprost (pf)        PHYSICAL EXAM:  Initial Vitals   BP Pulse Resp Temp SpO2   04/18/23 1551 04/18/23 1551 04/18/23 1551 04/18/23 1551 04/18/23 1858   (!) 152/89 80 16 97.6 °F (36.4 °C) (!) 94 %      MAP       --                Physical Exam    Nursing note and vitals reviewed.  Constitutional: Vital signs are normal. She appears well-developed and well-nourished.   HENT:   Head: Normocephalic and atraumatic.   Nose: Nose normal.   Mouth/Throat: Uvula is midline and mucous membranes are normal.   Eyes: EOM are normal. Pupils are equal, round, and reactive to light.   Neck: Trachea normal. Neck supple.   Normal range of motion.  Cardiovascular:  Normal rate, regular rhythm, intact distal pulses and normal pulses.           Pulmonary/Chest: Effort normal. She has rales in the  right lower field and the left lower field.   Abdominal: Abdomen is soft. Bowel sounds are normal. There is no abdominal tenderness. There is no rebound and no guarding.   Musculoskeletal:         General: Normal range of motion.      Cervical back: Normal range of motion and neck supple.      Right lower le+ Pitting Edema present.      Left lower le+ Pitting Edema present.     Neurological: She is alert. GCS eye subscore is 4. GCS verbal subscore is 4. GCS motor subscore is 6.   Skin: Skin is warm and dry.   Psychiatric: She has a normal mood and affect. Her speech is normal and behavior is normal. Judgment and thought content normal. Cognition and memory are impaired.       RESULTS:  Labs Reviewed   COMPREHENSIVE METABOLIC PANEL - Abnormal; Notable for the following components:       Result Value    Glucose Level 149 (*)     Albumin Level 2.6 (*)     Globulin 4.1 (*)     Albumin/Globulin Ratio 0.6 (*)     All other components within normal limits   URINALYSIS, REFLEX TO URINE CULTURE - Abnormal; Notable for the following components:    Appearance, UA Slightly Cloudy (*)     Blood, UA 3+ (*)     Leukocyte Esterase, UA 3+ (*)     All other components within normal limits   CBC WITH DIFFERENTIAL - Abnormal; Notable for the following components:    RBC 3.84 (*)     Hgb 9.8 (*)     Hct 32.3 (*)     MCH 25.5 (*)     MCHC 30.3 (*)     RDW 17.6 (*)     All other components within normal limits   B-TYPE NATRIURETIC PEPTIDE - Abnormal; Notable for the following components:    Natriuretic Peptide 212.1 (*)     All other components within normal limits   URINALYSIS, MICROSCOPIC - Abnormal; Notable for the following components:    Bacteria, UA Few (*)     RBC, UA 11-20 (*)     WBC, UA 11-20 (*)     Squamous Epithelial Cells, UA Few (*)     All other components within normal limits   COVID/FLU A&B PCR - Normal    Narrative:     The Xpert Xpress SARS-CoV-2/FLU/RSV plus is a rapid, multiplexed real-time PCR test intended  for the simultaneous qualitative detection and differentiation of SARS-CoV-2, Influenza A, Influenza B, and respiratory syncytial virus (RSV) viral RNA in either nasopharyngeal swab or nasal swab specimens.         TROPONIN I - Normal   CULTURE, URINE   CBC W/ AUTO DIFFERENTIAL    Narrative:     The following orders were created for panel order CBC Auto Differential.  Procedure                               Abnormality         Status                     ---------                               -----------         ------                     CBC with Differential[[id number]]        Abnormal            Final result                 Please view results for these tests on the individual orders.     Imaging Results              X-Ray Chest AP Portable (Final result)  Result time 04/18/23 20:07:24      Final result by Arcadio Cook MD (04/18/23 20:07:24)                   Impression:      No acute findings.      Electronically signed by: Arcadio Cook  Date:    04/18/2023  Time:    20:07               Narrative:    EXAMINATION:  XR CHEST AP PORTABLE    CLINICAL HISTORY:  cough;    COMPARISON:  9 February 2023    FINDINGS:  Portable frontal view of the chest was obtained. Stable left-sided pacing device.  Heart size upper limit normal.  There is aortic atherosclerosis.  Lungs are grossly clear.  No pneumothorax.                        Wet Read by Kye Smallwood DO (04/18/23 18:46:50, [facility] - Emergency Dept, Emergency Medicine)    No dense lobar consolidation. No pneumothorax.                                     PROCEDURES:  Procedures    ECG:       ED COURSE AND MEDICAL DECISION MAKING:  Medications   furosemide injection 40 mg (40 mg Intravenous Given 4/18/23 1938)        Medical Decision Making  89-year-old female who presents with generalized weakness along with cough and congestion over the past couple of days.  No fevers.  She has some bibasilar rales on exam with lower extremity edema, so she was given  a dose of IV Lasix.  BNP elevated at 212.  Troponin negative.  CBC shows no leukocytosis leukopenia, hemoglobin 9.8 not significantly changed from 10 last month.  UA shows evidence of infection with 3+ leukocyte esterase pyuria, hematuria, and bacteria, this was sent for culture.  Review of previous urine cultures back in March showed Pseudomonas aeruginosa is sensitive to ciprofloxacin.  We will discharge with Cipro 500 mg b.i.d. for 7 days.  Instructed to continue Lasix as needed.  Recommend close outpatient follow up with PCP.  Given strict ED return precautions. I have spoken with the patient and/or caregivers. I have explained the patient's condition, diagnoses and treatment plan based on the information available to me at this time. I have answered the patient's and/or caregiver's questions and addressed any concerns. The patient and/or caregivers have as good an understanding of the patient's diagnosis, condition and treatment plan as can be expected at this point. The vital signs have been stable. The patient's condition is stable and appropriate for discharge from the emergency department.     The patient will pursue further outpatient evaluation with the primary care physician or other designated or consulting physician as outlined in the discharge instructions. The patient and/or caregivers are agreeable to this plan of care and follow-up instructions have been explained in detail. The patient and/or caregivers have received these instructions in written format and have expressed an understanding of the discharge instructions. The patient and/or caregivers are aware that any significant change in condition or worsening of symptoms should prompt an immediate return to this or the closest emergency department or a call to 911.    Amount and/or Complexity of Data Reviewed  External Data Reviewed: labs, radiology and ECG.  Labs: ordered.  Radiology: ordered and independent interpretation  performed.    Risk  Prescription drug management.        CLINICAL IMPRESSION:  1. Generalized weakness    2. Cough, unspecified type    3. Urinary tract infection with hematuria, site unspecified        DISPOSITION:   ED Disposition Condition    Discharge Stable            ED Prescriptions       Medication Sig Dispense Start Date End Date Auth. Provider    ciprofloxacin HCl (CIPRO) 500 MG tablet Take 1 tablet (500 mg total) by mouth 2 (two) times daily. for 7 days 20 tablet 4/18/2023 4/25/2023 Kye Smallwood DO          Follow-up Information    None            Kye Smallwood DO  04/18/23 4280

## 2023-04-19 ENCOUNTER — HOSPITAL ENCOUNTER (EMERGENCY)
Facility: HOSPITAL | Age: 88
Discharge: OTHER INSTITUTION | End: 2023-04-19
Attending: INTERNAL MEDICINE
Payer: MEDICARE

## 2023-04-19 VITALS
OXYGEN SATURATION: 96 % | HEIGHT: 60 IN | DIASTOLIC BLOOD PRESSURE: 79 MMHG | BODY MASS INDEX: 32 KG/M2 | RESPIRATION RATE: 15 BRPM | HEART RATE: 75 BPM | TEMPERATURE: 98 F | SYSTOLIC BLOOD PRESSURE: 132 MMHG | WEIGHT: 163 LBS

## 2023-04-19 DIAGNOSIS — I95.9 HYPOTENSIVE EPISODE: ICD-10-CM

## 2023-04-19 DIAGNOSIS — R31.9 HEMATURIA, UNSPECIFIED TYPE: Primary | ICD-10-CM

## 2023-04-19 DIAGNOSIS — D64.9 ANEMIA, UNSPECIFIED TYPE: ICD-10-CM

## 2023-04-19 DIAGNOSIS — R07.9 CHEST PAIN: ICD-10-CM

## 2023-04-19 LAB
ALBUMIN SERPL-MCNC: 2.3 G/DL (ref 3.4–4.8)
ALBUMIN/GLOB SERPL: 0.6 RATIO (ref 1.1–2)
ALP SERPL-CCNC: 75 UNIT/L (ref 40–150)
ALT SERPL-CCNC: 10 UNIT/L (ref 0–55)
AST SERPL-CCNC: 23 UNIT/L (ref 5–34)
BASOPHILS # BLD AUTO: 0.02 X10(3)/MCL (ref 0–0.2)
BASOPHILS NFR BLD AUTO: 0.3 %
BILIRUBIN DIRECT+TOT PNL SERPL-MCNC: 0.3 MG/DL
BNP BLD-MCNC: 117.8 PG/ML
BUN SERPL-MCNC: 14 MG/DL (ref 9.8–20.1)
CALCIUM SERPL-MCNC: 9.3 MG/DL (ref 8.4–10.2)
CHLORIDE SERPL-SCNC: 103 MMOL/L (ref 98–107)
CO2 SERPL-SCNC: 25 MMOL/L (ref 23–31)
CREAT SERPL-MCNC: 1 MG/DL (ref 0.55–1.02)
EOSINOPHIL # BLD AUTO: 0.29 X10(3)/MCL (ref 0–0.9)
EOSINOPHIL NFR BLD AUTO: 4.1 %
ERYTHROCYTE [DISTWIDTH] IN BLOOD BY AUTOMATED COUNT: 17.8 % (ref 11.5–17)
GFR SERPLBLD CREATININE-BSD FMLA CKD-EPI: 54 MLS/MIN/1.73/M2
GLOBULIN SER-MCNC: 3.8 GM/DL (ref 2.4–3.5)
GLUCOSE SERPL-MCNC: 132 MG/DL (ref 82–115)
HCT VFR BLD AUTO: 28.8 % (ref 37–47)
HEMOCCULT SP1 STL QL: NEGATIVE
HGB BLD-MCNC: 8.8 G/DL (ref 12–16)
IMM GRANULOCYTES # BLD AUTO: 0.03 X10(3)/MCL (ref 0–0.04)
IMM GRANULOCYTES NFR BLD AUTO: 0.4 %
LACTATE SERPL-SCNC: 1.8 MMOL/L (ref 0.5–2.2)
LYMPHOCYTES # BLD AUTO: 1.68 X10(3)/MCL (ref 0.6–4.6)
LYMPHOCYTES NFR BLD AUTO: 23.6 %
MCH RBC QN AUTO: 25.4 PG (ref 27–31)
MCHC RBC AUTO-ENTMCNC: 30.6 G/DL (ref 33–36)
MCV RBC AUTO: 83.2 FL (ref 80–94)
MONOCYTES # BLD AUTO: 0.8 X10(3)/MCL (ref 0.1–1.3)
MONOCYTES NFR BLD AUTO: 11.2 %
NEUTROPHILS # BLD AUTO: 4.31 X10(3)/MCL (ref 2.1–9.2)
NEUTROPHILS NFR BLD AUTO: 60.4 %
PLATELET # BLD AUTO: 165 X10(3)/MCL (ref 130–400)
PMV BLD AUTO: 10.4 FL (ref 7.4–10.4)
POTASSIUM SERPL-SCNC: 3.4 MMOL/L (ref 3.5–5.1)
PROT SERPL-MCNC: 6.1 GM/DL (ref 5.8–7.6)
RBC # BLD AUTO: 3.46 X10(6)/MCL (ref 4.2–5.4)
SODIUM SERPL-SCNC: 136 MMOL/L (ref 136–145)
TROPONIN I SERPL-MCNC: 0.03 NG/ML (ref 0–0.04)
WBC # SPEC AUTO: 7.1 X10(3)/MCL (ref 4.5–11.5)

## 2023-04-19 PROCEDURE — 84484 ASSAY OF TROPONIN QUANT: CPT | Performed by: INTERNAL MEDICINE

## 2023-04-19 PROCEDURE — 93005 ELECTROCARDIOGRAM TRACING: CPT

## 2023-04-19 PROCEDURE — 99285 EMERGENCY DEPT VISIT HI MDM: CPT | Mod: 25

## 2023-04-19 PROCEDURE — 83605 ASSAY OF LACTIC ACID: CPT | Performed by: INTERNAL MEDICINE

## 2023-04-19 PROCEDURE — 82272 OCCULT BLD FECES 1-3 TESTS: CPT | Performed by: INTERNAL MEDICINE

## 2023-04-19 PROCEDURE — 87040 BLOOD CULTURE FOR BACTERIA: CPT | Performed by: INTERNAL MEDICINE

## 2023-04-19 PROCEDURE — 85025 COMPLETE CBC W/AUTO DIFF WBC: CPT | Performed by: INTERNAL MEDICINE

## 2023-04-19 PROCEDURE — 80053 COMPREHEN METABOLIC PANEL: CPT | Performed by: INTERNAL MEDICINE

## 2023-04-19 PROCEDURE — 93010 ELECTROCARDIOGRAM REPORT: CPT | Mod: ,,, | Performed by: INTERNAL MEDICINE

## 2023-04-19 PROCEDURE — 83880 ASSAY OF NATRIURETIC PEPTIDE: CPT | Performed by: INTERNAL MEDICINE

## 2023-04-19 PROCEDURE — 25000003 PHARM REV CODE 250: Performed by: INTERNAL MEDICINE

## 2023-04-19 PROCEDURE — 93010 EKG 12-LEAD: ICD-10-PCS | Mod: ,,, | Performed by: INTERNAL MEDICINE

## 2023-04-19 RX ADMIN — TRANEXAMIC ACID 1000 MG: 100 INJECTION INTRAVENOUS at 05:04

## 2023-04-19 RX ADMIN — POTASSIUM BICARBONATE 50 MEQ: 977.5 TABLET, EFFERVESCENT ORAL at 05:04

## 2023-04-19 NOTE — ED PROVIDER NOTES
Encounter Date: 4/19/2023  History from medics, history limited due to patient being sort of disoriented     History     Chief Complaint   Patient presents with    Weakness     Pt c/o weakness, pt hypotensive with EMS.      HPI    89 y.o. female  has a past medical history of Anxiety disorder, Aortic stenosis, Arthritis, Carpal tunnel syndrome, Coronary artery disease, Dementia, Depression, Diabetes mellitus, GERD (gastroesophageal reflux disease), rectal polypectomy, Hypercholesterolemia, Hypertension, Obstructive sleep apnea, Respiratory distress, S/P TAVR (transcatheter aortic valve replacement), and TIA (transient ischemic attack). Presenting with  Weakness (Pt c/o weakness, pt hypotensive with EMS. )      Review of patient's allergies indicates:   Allergen Reactions    Statins-hmg-coa reductase inhibitors      Other reaction(s): Joint pain    Bimatoprost      Other reaction(s): Eye redness    Gabapentin      Other reaction(s): Confusion    Lisinopril     Rosuvastatin Itching    Tafluprost (pf)      Current Outpatient Medications   Medication Instructions    acetaminophen (TYLENOL) 650 mg, Oral, Every 4 hours PRN    apixaban (ELIQUIS) 5 mg, Oral, 2 times daily    ascorbic acid (vitamin C) (VITAMIN C) 1,000 mg, Oral, Daily    atorvastatin (LIPITOR) 40 mg, Oral, Daily    carvediloL (COREG) 25 mg, Oral, 2 times daily    ciprofloxacin HCl (CIPRO) 500 mg, Oral, 2 times daily    cloNIDine (CATAPRES) 0.1 mg, Oral, Daily PRN    dorzolamide-timolol 2-0.5% (COSOPT) 22.3-6.8 mg/mL ophthalmic solution 1 drop, Both Eyes, Daily    doxycycline (VIBRA-TABS) 100 mg, Oral, 2 times daily    furosemide (LASIX) 40 mg, Oral, Daily PRN    isosorbide mononitrate (IMDUR) 30 MG 24 hr tablet 15 mg.    linaCLOtide (LINZESS) 145 mcg, Oral, Daily PRN    LORazepam (ATIVAN) 1 mg, Oral, Every 6 hours PRN    magnesium oxide 400 mg magnesium Cap 1 tablet, Oral, Daily    megestroL (MEGACE) 400 mg/10 mL (40 mg/mL) Susp SHAKE LIQUID AND TAKE 10 ML  BY MOUTH EVERY DAY    melatonin 10 mg Cap 1 tablet, Oral, Nightly    memantine (NAMENDA) 10 mg, Oral, 2 times daily    multivit-min-iron-FA-lutein (CENTRUM SILVER WOMEN) 8 mg iron-400 mcg-300 mcg Tab 1 Act, Oral, Daily    multivitamin/iron/folic acid (CENTRUM COMPLETE ORAL) Oral    nitroGLYCERIN (NITROSTAT) 0.4 mg, Sublingual, Every 5 min PRN    ondansetron (ZOFRAN) 4 mg, Oral, Every 8 hours PRN    pantoprazole (PROTONIX) 40 mg, Oral, Daily    phenazopyridine (PYRIDIUM) 100 MG tablet TAKE 1 TABLET BY MOUTH TWICE DAILY AFTER MEALS FOR 14 DAYS    pumpkin seed extract-soy germ (AZO BLADDER CONTROL) 300 mg Cap Oral    QUEtiapine (SEROQUEL) 25 mg, Oral, 2 times daily    senna (SENOKOT) 8.6 mg tablet 1 tablet, Oral, Daily PRN    sertraline (ZOLOFT) 50 mg, Oral, Daily       Past Medical History:   Diagnosis Date    Anxiety disorder     Aortic stenosis     Arthritis     Carpal tunnel syndrome     Coronary artery disease     Dementia     Depression     Diabetes mellitus     GERD (gastroesophageal reflux disease)     Hx of rectal polypectomy     Hypercholesterolemia     Hypertension     Obstructive sleep apnea     Respiratory distress     S/P TAVR (transcatheter aortic valve replacement)     TIA (transient ischemic attack)      Past Surgical History:   Procedure Laterality Date    CARDIAC SURGERY      CATARACT EXTRACTION      CORONARY STENT PLACEMENT      EGD, WITH CLOSED BIOPSY  12/28/2022    Procedure: EGD, WITH CLOSED BIOPSY;  Surgeon: Arcadio Carias MD;  Location: [facility] ENDOSCOPY;  Service: Gastroenterology;;    ESOPHAGOGASTRODUODENOSCOPY W/ PEG N/A 12/28/2022    Procedure: PEG;  Surgeon: Arcadio Carias MD;  Location: [facility] ENDOSCOPY;  Service: Gastroenterology;  Laterality: N/A;    HYSTERECTOMY      INSERTION OF INFERIOR VENA CAVAL FILTER N/A 3/1/2023    Procedure: Insertion, Filter, Inferior Vena Cava;  Surgeon: Terri Urbina MD;  Location: [facility] CATH LAB;  Service: Peripheral Vascular;  Laterality: N/A;   IVC FILTER PLACEMENT    INSERTION OF PACEMAKER      PARTIAL HYSTERECTOMY      PULMONARY EMBOLISM SURGERY N/A 12/21/2022    Procedure: EMBOLECTOMY, ARTERY, PULMONARY;  Surgeon: Terri Urbina MD;  Location: [facility] CATH LAB;  Service: Peripheral Vascular;  Laterality: N/A;    RECTAL POLYPECTOMY      TONSILLECTOMY      TRANSCATHETER AORTIC VALVE REPLACEMENT (TAVR)       Family History   Problem Relation Age of Onset    Diabetes Mother     Stroke Father     Hyperlipidemia Father     Breast cancer Sister      Social History     Tobacco Use    Smoking status: Never    Smokeless tobacco: Never   Substance Use Topics    Alcohol use: Never    Drug use: Never     Review of Systems   Unable to perform ROS: Dementia   Constitutional:  Positive for fatigue. Negative for fever.   Respiratory:  Negative for chest tightness and shortness of breath.    Cardiovascular:  Negative for chest pain.   Gastrointestinal:  Negative for abdominal pain, diarrhea and vomiting.   Endocrine: Positive for cold intolerance.   Skin:  Negative for color change.   Neurological:  Positive for weakness.     Physical Exam     Initial Vitals [04/19/23 1509]   BP Pulse Resp Temp SpO2   132/64 83 18 98.4 °F (36.9 °C) 96 %      MAP       --         Physical Exam    Nursing note and vitals reviewed.  Constitutional: She appears well-developed and well-nourished. No distress.   HENT:   Head: Atraumatic.   Eyes: EOM are normal.   Neck: Neck supple.   Cardiovascular:  Normal rate, regular rhythm and normal heart sounds.           Pulmonary/Chest: Breath sounds normal. No respiratory distress.   Abdominal: Abdomen is soft. Bowel sounds are normal. She exhibits no distension. There is no abdominal tenderness. There is no rebound and no guarding.   Musculoskeletal:         General: No tenderness or edema.      Cervical back: Neck supple.     Neurological: She is alert.   Skin: Skin is warm and dry.       ED Course   Procedures    Orders Placed This Encounter    Procedures    Blood Culture    Blood Culture    X-ray Chest AP Portable    Comprehensive metabolic panel    CBC auto differential    Troponin I    Brain natriuretic peptide    Lactic Acid, Plasma    CBC with Differential    Occult blood x 3, stool    Diet NPO    Vital signs    Cardiac Monitoring - Adult    Oxygen Continuous    Pulse Oximetry Continuous    EKG 12-LEAD    Insert saline lock    PFC Facilitated Request     Medications   potassium bicarbonate disintegrating tablet 50 mEq (has no administration in time range)   tranexamic acid (CYKLOKAPRON) 1,000 mg in sodium chloride 0.9 % 100 mL IVPB (MB+) (has no administration in time range)     Admission on 04/19/2023   Component Date Value Ref Range Status    Sodium Level 04/19/2023 136  136 - 145 mmol/L Final    Potassium Level 04/19/2023 3.4 (L)  3.5 - 5.1 mmol/L Final    Chloride 04/19/2023 103  98 - 107 mmol/L Final    Carbon Dioxide 04/19/2023 25  23 - 31 mmol/L Final    Glucose Level 04/19/2023 132 (H)  82 - 115 mg/dL Final    Blood Urea Nitrogen 04/19/2023 14.0  9.8 - 20.1 mg/dL Final    Creatinine 04/19/2023 1.00  0.55 - 1.02 mg/dL Final    Calcium Level Total 04/19/2023 9.3  8.4 - 10.2 mg/dL Final    Protein Total 04/19/2023 6.1  5.8 - 7.6 gm/dL Final    Albumin Level 04/19/2023 2.3 (L)  3.4 - 4.8 g/dL Final    Globulin 04/19/2023 3.8 (H)  2.4 - 3.5 gm/dL Final    Albumin/Globulin Ratio 04/19/2023 0.6 (L)  1.1 - 2.0 ratio Final    Bilirubin Total 04/19/2023 0.3  <=1.5 mg/dL Final    Alkaline Phosphatase 04/19/2023 75  40 - 150 unit/L Final    Alanine Aminotransferase 04/19/2023 10  0 - 55 unit/L Final    Aspartate Aminotransferase 04/19/2023 23  5 - 34 unit/L Final    eGFR 04/19/2023 54  mls/min/1.73/m2 Final    Troponin-I 04/19/2023 0.030  0.000 - 0.045 ng/mL Final    Natriuretic Peptide 04/19/2023 117.8 (H)  <=100.0 pg/mL Final    Lactic Acid Level 04/19/2023 1.8  0.5 - 2.2 mmol/L Final    WBC 04/19/2023 7.1  4.5 - 11.5 x10(3)/mcL Final    RBC  04/19/2023 3.46 (L)  4.20 - 5.40 x10(6)/mcL Final    Hgb 04/19/2023 8.8 (L)  12.0 - 16.0 g/dL Final    Hct 04/19/2023 28.8 (L)  37.0 - 47.0 % Final    MCV 04/19/2023 83.2  80.0 - 94.0 fL Final    MCH 04/19/2023 25.4 (L)  27.0 - 31.0 pg Final    MCHC 04/19/2023 30.6 (L)  33.0 - 36.0 g/dL Final    RDW 04/19/2023 17.8 (H)  11.5 - 17.0 % Final    Platelet 04/19/2023 165  130 - 400 x10(3)/mcL Final    MPV 04/19/2023 10.4  7.4 - 10.4 fL Final    Neut % 04/19/2023 60.4  % Final    Lymph % 04/19/2023 23.6  % Final    Mono % 04/19/2023 11.2  % Final    Eos % 04/19/2023 4.1  % Final    Basophil % 04/19/2023 0.3  % Final    Lymph # 04/19/2023 1.68  0.6 - 4.6 x10(3)/mcL Final    Neut # 04/19/2023 4.31  2.1 - 9.2 x10(3)/mcL Final    Mono # 04/19/2023 0.80  0.1 - 1.3 x10(3)/mcL Final    Eos # 04/19/2023 0.29  0 - 0.9 x10(3)/mcL Final    Baso # 04/19/2023 0.02  0 - 0.2 x10(3)/mcL Final    IG# 04/19/2023 0.03  0 - 0.04 x10(3)/mcL Final    IG% 04/19/2023 0.4  % Final    Occult Blood Stool 1 04/19/2023 Negative  Negative Final       Labs Reviewed   COMPREHENSIVE METABOLIC PANEL - Abnormal; Notable for the following components:       Result Value    Potassium Level 3.4 (*)     Glucose Level 132 (*)     Albumin Level 2.3 (*)     Globulin 3.8 (*)     Albumin/Globulin Ratio 0.6 (*)     All other components within normal limits   B-TYPE NATRIURETIC PEPTIDE - Abnormal; Notable for the following components:    Natriuretic Peptide 117.8 (*)     All other components within normal limits   CBC WITH DIFFERENTIAL - Abnormal; Notable for the following components:    RBC 3.46 (*)     Hgb 8.8 (*)     Hct 28.8 (*)     MCH 25.4 (*)     MCHC 30.6 (*)     RDW 17.8 (*)     All other components within normal limits   TROPONIN I - Normal   LACTIC ACID, PLASMA - Normal   OCCULT BLOOD X 3, STOOL - Normal   BLOOD CULTURE OLG   BLOOD CULTURE OLG   CBC W/ AUTO DIFFERENTIAL    Narrative:     The following orders were created for panel order CBC auto  differential.  Procedure                               Abnormality         Status                     ---------                               -----------         ------                     CBC with Differential[[id number]]        Abnormal            Final result                 Please view results for these tests on the individual orders.        ECG Results              EKG 12-LEAD (Preliminary result)  Result time 04/19/23 15:40:11      Wet Read by Jyoti Granados MD (04/19/23 15:40:11, [facility] - Emergency Dept, Emergency Medicine)    EKG: Independently reviewed and / or Interpreted by Jyoti Granados MD. independently as Normal Sinus Rhythm, Rate 72, Normal Intervals., No STEMI, no other acute abnormality identified., Left Axis Deviation                                   Imaging Results              X-ray Chest AP Portable (Final result)  Result time 04/19/23 16:00:22      Final result by Prabhakar Rojas MD (04/19/23 16:00:22)                   Impression:      1. No active cardiopulmonary disease identified      Electronically signed by: Prabhakar Rojas  Date:    04/19/2023  Time:    16:00               Narrative:    EXAMINATION:  XR CHEST AP PORTABLE    CLINICAL HISTORY:  Chest Pain;, .    COMPARISON:  04/18/2023    FINDINGS:  An AP view or more reveals the heart to be borderline enlarged.  The trachea to the right of midline.  Atherosclerosis is seen within the aorta.  A cardiac valve prosthetic is identified.  No infiltrate or effusion is seen.  A cardiac device is noted to the left chest.  Degenerative changes are evident at the thoracic spine                                       Medications   potassium bicarbonate disintegrating tablet 50 mEq (has no administration in time range)   tranexamic acid (CYKLOKAPRON) 1,000 mg in sodium chloride 0.9 % 100 mL IVPB (MB+) (has no administration in time range)     Medical Decision Making:   Initial Assessment:   89 y.o. female  has a past medical  history of Anxiety disorder, Aortic stenosis, Arthritis, Carpal tunnel syndrome, Coronary artery disease, Dementia, Depression, Diabetes mellitus, GERD (gastroesophageal reflux disease), rectal polypectomy, Hypercholesterolemia, Hypertension, Obstructive sleep apnea, Respiratory distress, S/P TAVR (transcatheter aortic valve replacement), and TIA (transient ischemic attack). Presenting with  Weakness (Pt c/o weakness, pt hypotensive with EMS. )    Patient has bladder CA, and according to the notes from Oncology she is not interested in chemotherapy apparently.  She used to be on hospice, but she is taken out of the hospice, today patient has weakness, so they called the ambulance and they found that patient has hypo tension, they gave her about 250 cc of normal saline and her blood pressure is 132/64.  Patient is not able to give any particular history because of some confusion.  She was seen yesterday also with generalized weakness, and she was discharged home again today she just has generalized weakness and she did have a low blood pressure which has come up nicely with minimal IV fluid infusion.  I am going to do a detailed workup again and decide further.  Clinical Tests:   Lab Tests: Ordered and Reviewed  Radiological Study: Ordered and Reviewed  Medical Tests: Ordered and Reviewed           ED Course as of 04/19/23 1709 Wed Apr 19, 2023   1640 Patient's H&H has gone down, and when I asked patient if she is bleeding from somewhere, she says it is mainly in the urine only, her son also reports that she is bleeding in the urine, she is supposed to go to Dr. Long day after tomorrow for bladder surgery. [GQ]   1641 Patient's H&H is the [GQ]   1642 Hemoglobin(!): 8.8 [GQ]   1642 Hematocrit(!): 28.8  She was 11 and 37  February 19, so essentially she has gradually gone down, and of course she is having active urinary tract bleeding, so the workup is essentially okay she is not dehydrated or does not have any  major electrolyte abnormalities, her blood pressure is maintained now, we do not have any beds in the hospital, I will talk to her son who tells me that they have to go and get surgery on the bladder done in [place] next Tuesday. [GQ]   1649 We do not have urology coverage, and she does not want the surgery done here in any case. [GQ]   1704 I talked to Dr. Brad Barrett, and he says you can sent patient over to the emergency room, they can evaluate and get the urologist to see the patient also and then decide from there for further management.  I will let patient's son know about that. [GQ]   1708 I am going to give TXA 1 dose to the patient in the hope that it will stop the bleeding in her bladder. [GQ]      ED Course User Index  [GQ] Jyoti Granados MD                 Clinical Impression:   Final diagnoses:  [R07.9] Chest pain  [R31.9] Hematuria, unspecified type (Primary)  [I95.9] Hypotensive episode  [D64.9] Anemia, unspecified type        ED Disposition Condition    Transfer to Another Facility Stable                Jyoti Granados MD  04/19/23 5940

## 2023-04-22 LAB — BACTERIA UR CULT: ABNORMAL

## 2023-04-25 LAB
BACTERIA BLD CULT: NORMAL
BACTERIA BLD CULT: NORMAL

## 2023-05-16 NOTE — PLAN OF CARE
DCP assessment completed with jeyson Porter [phone] as below. Patient resides with German and daughter Latrice in a SS home with 1 entry step (has portable ramp). Patient requires assistance with all ADLs at baseline. DME includes WC, bed rails and grab bars. German states patient primarily spends most of the day in her recliner but is able to transfer to her WC with assistance and take a few steps. Transportation provided per children. Patient is current with [facility] (). German hopes for patient to return home once medically ready for discharge. States there are 7 children total who are able to offer support. Amendable to placement if recommended. Needs TBD pending hospital course. CM will remain available for discharge needs that may arise.        12/16/22 1020   Discharge Assessment   Assessment Type Discharge Planning Assessment   Confirmed/corrected address, phone number and insurance Yes   Confirmed Demographics Correct on Facesheet   Source of Information family   If unable to respond/provide information was family/caregiver contacted? Yes   Contact Name/Number jeyson Porter; [phone]   When was your last doctors appointment?   (Dr. Lopez/Friday PTA)   Communicated TERRY with patient/caregiver Date not available/Unable to determine   People in Home child(vipin), adult   Do you expect to return to your current living situation? Yes   Do you have help at home or someone to help you manage your care at home? Yes   Who are your caregiver(s) and their phone number(s)? son and daughter   Prior to hospitilization cognitive status: Alert/Oriented   Current cognitive status: Unable to Assess   Walking or Climbing Stairs ambulation difficulty, requires equipment   Mobility Management WC   Dressing/Bathing bathing difficulty, assistance 1 person   Do you have any problems with: Needs other help   Specify other help transportation per children   Home Accessibility wheelchair accessible;stairs to enter home  Health Maintenance Due   Topic Date Due   • Colorectal Cancer Screen-  Never done   • DTaP/Tdap/Td Vaccine (3 - Td or Tdap) 10/31/2022       Patient is due for topics listed above, she wishes to proceed with Mammogram, but is not proceeding with Immunization(s) Dtap/Tdap/Td and Colorectal Cancer Screening: Colonoscopy at this time.      Number of Stairs, Main Entrance one   Home Layout Able to live on 1st floor   Equipment Currently Used at Home wheelchair   Readmission within 30 days? No   Patient currently being followed by outpatient case management? No   Do you currently have service(s) that help you manage your care at home? Yes   Name and Contact number of agency [facility]   Is the pt/caregiver preference to resume services with current agency Yes   Do you have prescription coverage? Yes   Coverage Medicare/[employer]   Do you have any problems affording any of your prescribed medications? No   Is the patient taking medications as prescribed? yes   Who is going to help you get home at discharge? son or daughter   How do you get to doctors appointments? family or friend will provide   Discharge Plan A Home Health   Discharge Plan B Skilled Nursing Facility   DME Needed Upon Discharge  other (see comments)  (TBD)   Discharge Plan discussed with: Adult children   Discharge Barriers Identified None     Martinez Salinas, RN Case Manager

## 2023-06-05 PROBLEM — I26.99 ACUTE PULMONARY EMBOLISM: Status: RESOLVED | Noted: 2022-12-30 | Resolved: 2023-06-05

## 2023-07-24 DIAGNOSIS — C67.4 MALIGNANT NEOPLASM OF POSTERIOR WALL OF URINARY BLADDER: Primary | ICD-10-CM

## 2023-08-08 ENCOUNTER — TELEPHONE (OUTPATIENT)
Dept: NEUROLOGY | Facility: CLINIC | Age: 88
End: 2023-08-08
Payer: MEDICARE

## 2023-08-08 NOTE — TELEPHONE ENCOUNTER
LINETTE LVM for pt's CG in an attempt to inform him of the Care Ecosystem Program.  LINETTE will make second attempt 8/11/23 and remains available.

## 2023-08-11 ENCOUNTER — TELEPHONE (OUTPATIENT)
Dept: NEUROLOGY | Facility: CLINIC | Age: 88
End: 2023-08-11
Payer: MEDICARE

## 2023-08-11 NOTE — TELEPHONE ENCOUNTER
LINETTE made second call to  to offer Care Eco program.  He said he was on his way to have oral surgery and would like a call Monday.  LINETTE will call 8/14/23.

## 2023-08-14 ENCOUNTER — OUTPATIENT CASE MANAGEMENT (OUTPATIENT)
Dept: NEUROLOGY | Facility: CLINIC | Age: 88
End: 2023-08-14
Payer: MEDICARE

## 2023-08-14 NOTE — PROGRESS NOTES
Subject was consented today (8/14/23) for the following study:     Study title: Care EcoSystem  IRB #: [id number]  IRB approval date: 12/12/2022  Sponsor: [employer]    Screening of inclusion/exclusion criteria evaluation has been reviewed by Elo Aguilera LCSW. At this time, the subject meets all inclusion and does not meet any one of the exclusion criteria.       INFORMED CONSENT PROCESS/ INVOLVEMENT IN CARE/ PROXY   Present for discussion: German Oliveros  Does subject have capacity to consent per evaluation: No  Is LAR Consenting for Subject: Yes      LAR Determined by: Power of    If applicable, next of kin: Adult Child      Subject does not have capacity -  POA  POA signs forms, subject signs as well if able    CONSENT:  Verbal Consent: yes  Written Consent: no     Prior to the Informed Consent (IC) being signed, or any protocol required testing, procedure, or intervention being performed, the following was done or discussed:    Purpose of the Study, Qualifications to Participate: YES/NO: Yes  Study Design, Schedule and Procedures: YES/NO: Yes  Risks, Benefits, Alternative Treatments, Compensation and Costs: YES/NO: Yes  Confidentiality and HIPAA Authorization for Release of Medical Records for the research trial/subject's right/study related injury: YES/NO: Yes  Study related contact information: YES/NO: Yes  Voluntary Participation and Withdrawal from the research trial at any time: YES/NO: Yes  Optional samples/procedures (if applicable): Not applicable.  Patient has been offered the opportunity to ask questions regarding the study and all questions were answered satisfactorily: YES/NO: Yes  Patient and/or LAR verbalizes understanding of the study/procedures and agrees to participate: YES/NO: Yes  CRC and PI contact information given to LAR and/or patient: No - does not apply. Verbal consent documented in [employer].   Signed copy given to patient and/or LAR: No - does not apply. Participant Information sheet  sent via phone  Copy in patient's chart and original uploaded to Epic: No - documented in [employer]        Person Obtaining Consent: Elo Aguilera LCSW  Witness: Liliane Gustafson  Subject Study ID: [id number]  Assigned Care Team Navigator: Elo Aguilera LCSW

## 2023-08-25 ENCOUNTER — OUTPATIENT CASE MANAGEMENT (OUTPATIENT)
Dept: NEUROLOGY | Facility: CLINIC | Age: 88
End: 2023-08-25
Payer: MEDICARE

## 2023-08-25 NOTE — PROGRESS NOTES
SW completed care eco baseline questionnaires with CG/sonGerman. Care plan call is scheduled for 8/28/23. SW remains available.

## 2023-08-29 ENCOUNTER — OUTPATIENT CASE MANAGEMENT (OUTPATIENT)
Dept: NEUROLOGY | Facility: CLINIC | Age: 88
End: 2023-08-29
Payer: MEDICARE

## 2023-08-29 NOTE — PROGRESS NOTES
[facility] Dementia Care Management Plan - BASELINE     Assigned Care Team Navigator: Elo Aguilera LCSW  Referring Provider: Dr. Samuel  Primary Care: Barrett Lopez MD     Protocol: The Care [facility] Effectiveness Study  Identifier: [id number]  IRB#: 2022.247  PI: Dr. Glenn Dooley, PhD  CO-I: Dr. Carolyn Boswell, PsyD  Version Date: 2022  Pt Study ID: [id number]  Visit Month: M1    Timeline:   (*Note for CTN - complete timeline using completed or planned date for study events. Add any important events (i.e. Withdrawals, Lost to Follow Up, Adverse Events, etc.).  Consent: Completed(23)  Baseline questionnaires: Completed(23)  6-month questionnaires: Planned(24)  12-month questionnaires: Planned(24)    Patient Demographic Information     Name: Miguel Angel Oliveros  Preferred Name: Ms. Michelle  MRN: [id number]  : 10/4/1933  Age: 89 y.o.  Gender: female  Race: Black or   Ethnicity: Not  or /a  Level of Education: Less than High School   Occupational Status/History: housewife/part-time school cafeteria    Communication Preferences     Language: English   Please contact primary caregiver by Phone for scheduling purposes.   Please send information and forms via E-mail    Caregiver(s) and Social History     Family Status: Pt had 8 children, 1 passed away 4 years ago.  One lives in [place] and the others live near pt. Family relationships are close and siblings cooperate in the care of the pt.   Current Living Situation: One daughter and CG/Son are living with pt  Support System: Grandchildren and children  Patient Hobbies/Activities: No  Patient Stressors (e.g., Pain): Hearing is bad, she gets frustrated and agitated  Current Programs/Services: HH, PT      Caregiver Name  Role Relationship Main Contact #   German Oliveros Primary Son [phone]                           Medical History     Providers   (Relevant to dementia care) Dr. De La Cruz    Types of help wanted   (at baseline) Information about dementia and what to expect in the future, Ideas for coping with the stress of caregiving, Caregiving strategies for helping Test do as much as he/she still can, Ideas for managing behavior symptoms, Recreational or purposeful activity ideas, Advice about safety risks like falls, wandering, or poor judgment, Advice about medications, Information about caregiving support services like in-home care, adult day care, or care facilities, Information about programs that might help pay for caregiving services, Information about medical, legal, and/or financial planning, and Help with back-up or crisis planning   Concerns and Goals   (from caregiver and PWD) Cg is concerned about pt's memory and her outbursts when she is agitated   Type of Dementia and Stage  (all that apply) Dementia Type: Alzheimer's Disease  Stage: Severe  MMSE    Date: 10/2022           No data to display                   Medication Review (at baseline)   Medication reviewed with caregiver German Oliveros.   Information is based off patient chart and patient and/or caregiver self-report as of (8/29/23)  *Make note of any changes to current medication list.*    Current Outpatient Medications   Medication Instructions    acetaminophen (TYLENOL) 650 mg, Oral, Every 4 hours PRN    apixaban (ELIQUIS) 5 mg, Oral, 2 times daily    ascorbic acid (vitamin C) (VITAMIN C) 1,000 mg, Oral, Daily    atorvastatin (LIPITOR) 40 mg, Oral, Daily    carvediloL (COREG) 25 mg, Oral, 2 times daily    cloNIDine (CATAPRES) 0.1 mg, Oral, Daily PRN    dorzolamide-timolol 2-0.5% (COSOPT) 22.3-6.8 mg/mL ophthalmic solution 1 drop, Both Eyes, Daily    doxycycline (VIBRA-TABS) 100 mg, Oral, 2 times daily ### no longer taking ###    furosemide (LASIX) 40 mg, Oral, Daily PRN    isosorbide mononitrate (IMDUR) 30 MG 24 hr tablet 15 mg.      linaCLOtide (LINZESS) 145 mcg, Oral, Daily PRN    LORazepam (ATIVAN) 1 mg, Oral, Every 6  "hours PRN    magnesium oxide 400 mg magnesium Cap 1 tablet, Oral, Daily    megestroL (MEGACE) 400 mg/10 mL (40 mg/mL) Susp SHAKE LIQUID AND TAKE 10 ML BY MOUTH EVERY DAY  ### no longer taking ###    melatonin 10 mg Cap 1 tablet, Oral, Nightly  ### no longer taking ###    memantine (NAMENDA) 10 mg, Oral, 2 times daily    multivit-min-iron-FA-lutein (CENTRUM SILVER WOMEN) 8 mg iron-400 mcg-300 mcg Tab 1 Act, Oral, Daily    multivitamin/iron/folic acid (CENTRUM COMPLETE ORAL) Oral    nitroGLYCERIN (NITROSTAT) 0.4 mg, Sublingual, Every 5 min PRN    ondansetron (ZOFRAN) 4 mg, Oral, Every 8 hours PRN    pantoprazole (PROTONIX) 40 mg, Oral, Daily    phenazopyridine (PYRIDIUM) 100 MG tablet TAKE 1 TABLET BY MOUTH TWICE DAILY AFTER MEALS FOR 14 DAYS  ### no longer taking  ###    pumpkin seed extract-soy germ (AZO BLADDER CONTROL) 300 mg Cap Oral  ### not taking ###    QUEtiapine (SEROQUEL) 25 mg, Oral, 2 times daily    senna (SENOKOT) 8.6 mg tablet 1 tablet, Oral, Daily PRN    sertraline (ZOLOFT) 50 mg, Oral, Daily            Over the counter medications: NO  Vitamins, supplements: K  Caffeine, alcohol, tobacco, marijuana products: no      THE FOLLOWING CONCERNS WERE IDENTIFIED:  Medication management: no  Access/cost: No  Side effects: Yes - Cg said Seroquel causes confusion  Recent changes: No  Polypharmacy (>9 routine prescription medications?): Yes   Behaviors/Sleep: Yes - Pt has had some sundowning.  Can go a week with no symptoms, but is sundowning at times. CG said pt doesn't recognize she is in her own home and refuses to get out of her WC until someone takes her "home."  Other symptoms (falls/incontinence/diarrhea/swelling/itching/weight loss): Yes - Pt has had some weight loss due to lack of appetite and complains of nausea 3 to 4 times per week. Family is NOT using zofran, PRN.       PLAN:  CTN to send medication list to PharmD this week. Review will be addended by PharmD.   CTN will route pharmacist " recommendations to Barrett Lopez MD   CTN will share and discuss pharmacist recommendations with caregiver during next scheduled call.         Advanced Care Planning      Living Will: No:        Copy on chart: No  [place]: No:      Medical Power of : No:        Copy on chart: No   Financial Power of : Yes      Copy on chart: No   Agent's Name: German Oliveros       Goals of Care      Patient Values: Reduced Pain    Future Care Plans:   - Long term care goal: Home  - Resources available to pay for care: Income/Private Pay and Insurance Coverage       Current Concerns - BASELINE     Primary Concern(s)  (Immediate needs) Bladder cancer/Dementia   Cognitive Concerns  (Include decision making capacity) Others make pt's decisions for her.  CG said that pt eventually not being able to remember him is a big concern   Primary Behavior Concerns  (Symptoms, triggers, strategies) Sundowning   Functional  (Include PWD daily routine and sensory - vision/hearing - information) No concerns         8/25/2023     8:17 AM   LA-GWEP   Memory and Recall Mild to moderate memory loss, more noticeable for recent events, interferes with performing everyday activities   Orientation Only oriented to their name, although may recognize family members   Decision Making and Problem Solving Abilities Unable to make decisions or solve problems, others make nearly all decisions for patient   Activities Outside the Home No pretense of independent function outside the home, appears well enough to be taken to activities outside the family home but generally needs to be accompanied   Function at Home and Hobby Activities No meaningful function in household chores or with prior hobbies   Toileting and Personal Hygeine Requires significant help with personal care and hygiene, frequent incontinence   Behavior and Personality Changes Severe behavior or personality changes, making interactions with others often unpleasant or avoided    Language and Communication Abilities Moderate to severe impairments in speech production or comprehension, has difficulty communicating thoughts to others, limited ability to read or write   Mood Severe issues with sadness, depression, anxiety, nervousness or loss of interest/motivation   Attention and Concentration Significant portion of the day is spent sleeping, not paying attention to environment, when having a           8/25/2023     8:18 AM   NPIQ RFS   WHO IS FILLING OUT FORM? Caregiver   Does this patient have false beliefs, such as thinking that others are stealing from him/her or planning to harm him/her in some way? Yes   Delusions Severity 3   Delusion Distress 3   Does this patient have hallucinations such as false visions or voices? Baird she/he seem to hear or see things that are not present? Yes   Hallucination Severity 2   Hallucination Distress 3   Is the patient resistive to help from others at times, or hard to handle? Yes   Agitation Agression Severity 2   Agitation/Agression Distress 3   Does the patient seem sad or say that he/she is depressed? Yes   Depression/Dysphoria Severity 2   Depression/Dysphoria Distress 3   Does the patient become upset when  from you? Does he/she have any other signs of nervousness such as shortness of breath, sighing, being unable tor elax, or feeling excessively tense? Yes   Anxiety Severity 2   Anxiety Distress 3   Does the patient appear to feel good or act excessively happy? No   Does this patient seem less interested in his/her usual activities or in the activities and plans of others? Yes   Apathy/Indifference Severity 1   Apathy/Indifference Distress 3   Does this patient seem to act cumpolsively, for example, talking to strangers as if she/he knows them, or saying things that may hurt people's feelings? No   Is the patient impatient and cranky? Does he/she have difficulty coping with delays or waiting for planned activities? Yes    Irritability/Liability Severity 1   Irritability/Liability Distress 3   Does the patient engage in repetitive activities such as pacing around the house, handling buttons, wrapping string, or doing other things repeatedly? No   Does this patient awaken you during the night, rise too early in the morning, or take excessive naps during the day? Yes   Nightime Behavior Severity 2   Nightime Behavior Distress 4   Has the patient lost or gained weight, or had a change in the type of food he/she likes? Yes   Apetitie/Eating Severity 2   Apetite/Eating Distress 3   NPI Total Severity Score 17   NPI Total Distress Score 28         Monthly Questions     Falls in the past month: 0  UTIs in the past month: 0  Hospital encounters in the past month (reported by caregiver): 0      CTN Plan & Recommendations     CTN provided the following resources/recommendations for: Caregiver Discussed the grieving process.      Next steps: Send CG information on the grieving as he said he likes to read things on his own.  Send med list to Pharm D for recs.

## 2023-09-14 NOTE — PROGRESS NOTES
Care Ecosystem Medication Review - PharmD    [facility] patient. Medication review completed by Care Team Navigator (CTN) and Cecile Gates PharmD to identify dementia specific medication concerns, as well as opportunities to reduce polypharmacy, high risk medications, and fall risk as needed.     Current Outpatient Medications on File Prior to Visit   Medication Sig    acetaminophen (TYLENOL) 325 MG tablet Take 2 tablets (650 mg total) by mouth every 4 (four) hours as needed for Pain or Temperature greater than.    apixaban (ELIQUIS) 5 mg Tab Take 1 tablet (5 mg total) by mouth 2 (two) times daily.    ascorbic acid, vitamin C, (VITAMIN C) 1000 MG tablet Take 1,000 mg by mouth once daily.    atorvastatin (LIPITOR) 40 MG tablet Take 40 mg by mouth once daily.    carvediloL (COREG) 25 MG tablet Take 25 mg by mouth 2 (two) times daily.    cloNIDine (CATAPRES) 0.1 MG tablet Take 0.1 mg by mouth daily as needed.    dorzolamide-timolol 2-0.5% (COSOPT) 22.3-6.8 mg/mL ophthalmic solution Place 1 drop into both eyes once daily.    furosemide (LASIX) 40 MG tablet Take 40 mg by mouth daily as needed.    linaCLOtide (LINZESS) 145 mcg Cap capsule Take 145 mcg by mouth daily as needed.    LORazepam (ATIVAN) 1 MG tablet Take 1 mg by mouth every 6 (six) hours as needed for Anxiety.    magnesium oxide 400 mg magnesium Cap Take 1 tablet by mouth once daily.    memantine (NAMENDA) 10 MG Tab Take 1 tablet (10 mg total) by mouth 2 (two) times daily.    multivit-min-iron-FA-lutein (CENTRUM SILVER WOMEN) 8 mg iron-400 mcg-300 mcg Tab Take 1 Act by mouth once daily.    nitroGLYCERIN (NITROSTAT) 0.4 MG SL tablet Place 0.4 mg under the tongue every 5 (five) minutes as needed for Chest pain.    ondansetron (ZOFRAN) 4 MG tablet Take 4 mg by mouth every 8 (eight) hours as needed for Nausea.    pantoprazole (PROTONIX) 40 MG tablet Take 1 tablet (40 mg total) by mouth once daily.    QUEtiapine (SEROQUEL) 25 MG Tab Take 25 mg by mouth 2  (two) times daily.    senna (SENOKOT) 8.6 mg tablet Take 1 tablet by mouth daily as needed for Constipation.    sertraline (ZOLOFT) 50 MG tablet Take 50 mg by mouth once daily.    isosorbide mononitrate (IMDUR) 30 MG 24 hr tablet 15 mg.      PharmD Recommendations:    If she is having weight loss is there a reason she stopped the megace which helps with weight gain? Why is the family not giving her the zofran for the nausea?  Would try to minimize the usage of the lorazepam, that is more likely to cause confusion/sundowning than the seroquel.   Updated Epic med list to reflect CTN notes and fill history.    Time spent delivering care (in minutes): 20    Plan:  Pharmacist to route recommendations to CTN.

## 2023-09-28 ENCOUNTER — OUTPATIENT CASE MANAGEMENT (OUTPATIENT)
Dept: NEUROLOGY | Facility: CLINIC | Age: 88
End: 2023-09-28
Payer: MEDICARE

## 2023-09-28 NOTE — PROGRESS NOTES
LINETTE sent pharmD review to CG via email.  Barrett Lopez MD (PCP) is not able to be reached through Epic.  Attempts to reach his office were unsuccessful due to constant busy signal.  LINETTE asked CG to share information with PCP.

## 2023-09-29 NOTE — PROGRESS NOTES
"Protocol: The Care [facility] Effectiveness Study  Identifier: [id number]  IRB#: 2022.247  PI: Dr. Glenn Dooley, PhD  CO-I: Dr. Carolyn Boswell PsyD  Version Date: 12/05/2022  Pt Study ID: [id number]  Visit Month: M2     Timeline:   (*Note for CTN - complete timeline using completed or planned date for study events. Add any important events (i.e. Withdrawals, Lost to Follow Up, Adverse Events, etc.).  Consent: Completed(8/23/23)  Baseline questionnaires: Completed(8/23/23)  6-month questionnaires: Planned(2/23/24)  12-month questionnaires: Planned(8/23/24)      Visit Note:   Spoke with caregiver German  (Son) for month 2 visit. Cg said pt is more forgetful and weaker.  He said she spends a lot of time in bed (sleeping more).  Pt cannot walk, but forgets she can't walk.  She recently got out of bed in the middle of the night and fell.  She did not injure herself.  SW will send bed alarm via mail.  Cg discussed grief and the difficulty of watching his mother decline.  Talk therapy was discussed.  SW will send a copy of the book "[place] Someone with Dementia."  No other needs expressed at this time.  SW remains available.       Falls in the past month: 1  UTIs in the past month: 0  Hospital encounters in the past month (reported by caregiver): 0      Next monthly visit scheduled for: 10/27/23. Caregiver knows how to reach CTN, and is encouraged to do so, as needed before our next scheduled call.     Action items for CTN: Send bed alarm and book.        "

## 2023-10-04 ENCOUNTER — HOSPITAL ENCOUNTER (OUTPATIENT)
Dept: RADIOLOGY | Facility: HOSPITAL | Age: 88
Discharge: HOME | End: 2023-10-04
Attending: RADIOLOGY
Payer: MEDICARE

## 2023-10-04 DIAGNOSIS — C67.4 MALIGNANT NEOPLASM OF POSTERIOR WALL OF URINARY BLADDER: ICD-10-CM

## 2023-10-04 LAB
CREAT SERPL-MCNC: 0.9 MG/DL (ref 0.5–1.4)
SAMPLE: NORMAL

## 2023-10-04 PROCEDURE — 71260 CT THORAX DX C+: CPT | Mod: TC

## 2023-10-04 PROCEDURE — 25500020 PHARM REV CODE 255

## 2023-10-04 PROCEDURE — 74178 CT ABD&PLV WO CNTR FLWD CNTR: CPT | Mod: TC

## 2023-10-04 RX ADMIN — IOPAMIDOL 100 ML: 755 INJECTION, SOLUTION INTRAVENOUS at 09:10

## 2023-10-06 ENCOUNTER — HOSPITAL ENCOUNTER (EMERGENCY)
Facility: HOSPITAL | Age: 88
Discharge: HOME | End: 2023-10-06
Attending: EMERGENCY MEDICINE
Payer: MEDICARE

## 2023-10-06 VITALS
WEIGHT: 163 LBS | DIASTOLIC BLOOD PRESSURE: 76 MMHG | RESPIRATION RATE: 18 BRPM | HEART RATE: 64 BPM | HEIGHT: 64 IN | OXYGEN SATURATION: 98 % | TEMPERATURE: 97 F | SYSTOLIC BLOOD PRESSURE: 135 MMHG | BODY MASS INDEX: 27.83 KG/M2

## 2023-10-06 DIAGNOSIS — R91.8 MULTIPLE PULMONARY NODULES DETERMINED BY COMPUTED TOMOGRAPHY OF LUNG: Primary | ICD-10-CM

## 2023-10-06 DIAGNOSIS — R54 ADVANCED AGE: ICD-10-CM

## 2023-10-06 DIAGNOSIS — R53.1 GENERALIZED WEAKNESS: ICD-10-CM

## 2023-10-06 DIAGNOSIS — R62.7 FAILURE TO THRIVE IN ADULT: ICD-10-CM

## 2023-10-06 PROCEDURE — 99281 EMR DPT VST MAYX REQ PHY/QHP: CPT

## 2023-10-06 NOTE — DISCHARGE INSTRUCTIONS
Continue prior medications and care    You may try MiraLax a  full cap or half a cap dissolved in water as per the instructions on the bottle to help bowels move   you may also try Fleet's enema    Please follow-up with your doctor next week regarding lab results done 10/04/2023 and CT results

## 2023-10-06 NOTE — ED PROVIDER NOTES
Encounter Date: 10/6/2023       History     Chief Complaint   Patient presents with    Weakness     Weakness and fatigue   started today    saw PCP and has had labs and cat scan this week     89-year-old female who has some dementia presents emergency department family said she is complaining that her pacemaker is moving too much in her chest family is concerned that she may have a UTI she maybe constipated said her vital signs were stable went home health was there she had low-grade fever 99.1 she felt weak this afternoon they thought the brain in the emergency department she did have a small bowel movement yesterday.  She takes Linzess.  Has not had any action  today yet.    Patient has had a bladder cancer resected  of this year.  Then they had to do radiation treatments.  She has chronic hypertension coronary disease sleep apnea mild dementia according to family hypertension she has DVT still anticoagulated.  She is had tumor resection of bladder x2 transaortic aortic valve replacement.  She is had a pacemaker left upper chest hysterectomy bladder cancer removed twice a day then radiation treatment for persistent hematuria.  She is lost about 15 lb since all of this was going on in December and January.  She sees Dr. Blakely fellow in on  which was this past Wednesday she had multiple tests done.  Family does not have the results  Mother is  dementia dad is  from stroke she is a  8 para 8 and 2 of the daughters are here with her.  She is  lives with a son and daughter.  She is retired.  Nacho Long  is her urologist Dr. Barrett Lopez is her primary care doctor    She has multiple allergies please see extensive list on chart      Review of patient's allergies indicates:   Allergen Reactions    Statins-hmg-coa reductase inhibitors      Other reaction(s): Joint pain    Bimatoprost      Other reaction(s): Eye redness    Gabapentin      Other  reaction(s): Confusion    Lisinopril     Rosuvastatin Itching    Tafluprost (pf)      Past Medical History:   Diagnosis Date    Anxiety disorder     Aortic stenosis     Arthritis     Carpal tunnel syndrome     Coronary artery disease     Dementia     Depression     Diabetes mellitus     GERD (gastroesophageal reflux disease)     Hx of rectal polypectomy     Hypercholesterolemia     Hypertension     Obstructive sleep apnea     Respiratory distress     S/P TAVR (transcatheter aortic valve replacement)     TIA (transient ischemic attack)      Past Surgical History:   Procedure Laterality Date    CARDIAC SURGERY      CATARACT EXTRACTION      CORONARY STENT PLACEMENT      EGD, WITH CLOSED BIOPSY  12/28/2022    Procedure: EGD, WITH CLOSED BIOPSY;  Surgeon: Arcadio Carias MD;  Location: [facility] ENDOSCOPY;  Service: Gastroenterology;;    ESOPHAGOGASTRODUODENOSCOPY W/ PEG N/A 12/28/2022    Procedure: PEG;  Surgeon: Arcadio Carias MD;  Location: [facility] ENDOSCOPY;  Service: Gastroenterology;  Laterality: N/A;    HYSTERECTOMY      INSERTION OF INFERIOR VENA CAVAL FILTER N/A 3/1/2023    Procedure: Insertion, Filter, Inferior Vena Cava;  Surgeon: Terri Urbina MD;  Location: [facility] CATH LAB;  Service: Peripheral Vascular;  Laterality: N/A;  IVC FILTER PLACEMENT    INSERTION OF PACEMAKER      PARTIAL HYSTERECTOMY      PULMONARY EMBOLISM SURGERY N/A 12/21/2022    Procedure: EMBOLECTOMY, ARTERY, PULMONARY;  Surgeon: Terri Urbina MD;  Location: [facility] CATH LAB;  Service: Peripheral Vascular;  Laterality: N/A;    RECTAL POLYPECTOMY      TONSILLECTOMY      TRANSCATHETER AORTIC VALVE REPLACEMENT (TAVR)       Family History   Problem Relation Age of Onset    Diabetes Mother     Stroke Father     Hyperlipidemia Father     Breast cancer Sister      Social History     Tobacco Use    Smoking status: Never    Smokeless tobacco: Never   Substance Use Topics    Alcohol use: Never    Drug use: Never     Review of Systems    Constitutional:  Positive for appetite change and unexpected weight change.   HENT: Negative.     Eyes: Negative.    Respiratory: Negative.     Cardiovascular: Negative.    Gastrointestinal:  Positive for constipation.   Endocrine: Negative.    Genitourinary: Negative.    Musculoskeletal: Negative.    Skin: Negative.    Allergic/Immunologic: Negative.    Neurological:  Positive for dizziness.   Hematological: Negative.    Psychiatric/Behavioral: Negative.     All other systems reviewed and are negative.      Physical Exam     Initial Vitals [10/06/23 1521]   BP Pulse Resp Temp SpO2   (!) 162/73 65 16 96.7 °F (35.9 °C) 99 %      MAP       --         Physical Exam    Nursing note and vitals reviewed.  Constitutional: She appears well-developed and well-nourished.   Elderly female very pleasant very hard of hearing   HENT:   Head: Normocephalic and atraumatic.   Right Ear: Tympanic membrane and external ear normal.   Left Ear: Tympanic membrane and external ear normal.   Nose: Nose normal.   Mouth/Throat: Oropharynx is clear and moist and mucous membranes are normal.   Eyes: EOM are normal. Pupils are equal, round, and reactive to light.   Neck: Neck supple. No thyromegaly present. No tracheal deviation present. No JVD present.   Normal range of motion.  Cardiovascular:  Normal rate and regular rhythm.     Exam reveals no gallop and no friction rub.       Murmur heard.  Pulmonary/Chest: Breath sounds normal. No stridor. No respiratory distress. She has no wheezes. She has no rhonchi. She has no rales. She exhibits no tenderness.   The pacemaker is nontender is not moving inappropriately in the chest wall   Abdominal: Abdomen is soft. Bowel sounds are normal. She exhibits no distension and no mass. There is no abdominal tenderness.   Genitourinary:    Genitourinary Comments: No CVA tenderness rectal evaluation revealed no fecal impaction no tenderness lori-colored stool only     Musculoskeletal:         General: No  tenderness or edema. Normal range of motion.      Cervical back: Normal range of motion and neck supple.     Lymphadenopathy:     She has no cervical adenopathy.   Neurological: She is alert. She has normal strength. No cranial nerve deficit. GCS score is 15. GCS eye subscore is 4. GCS verbal subscore is 5. GCS motor subscore is 6.   She is awake she is pleasant she is oriented To her person and her family   Skin: Skin is warm and dry. Capillary refill takes less than 2 seconds. No rash noted.   Psychiatric: She has a normal mood and affect. Her behavior is normal. Judgment and thought content normal.         ED Course   Procedures  Labs Reviewed - No data to display       Imaging Results    None          Medications - No data to display  Medical Decision Making    89-year-old female who has some dementia presents emergency department family said she is complaining that her pacemaker is moving too much in her chest family is concerned that she may have a UTI she maybe constipated said her vital signs were stable went home health was there she had low-grade fever 99.1 she felt weak this afternoon they thought the brain in the emergency department she did have a small bowel movement yesterday.  She takes Linzess.  Has not had any action  today yet.    Patient has had a bladder cancer resected December January of this year.  Then they had to do radiation treatments.  She has chronic hypertension coronary disease sleep apnea mild dementia according to family hypertension she has DVT still anticoagulated.  She is had tumor resection of bladder x2 transaortic aortic valve replacement.  She is had a pacemaker left upper chest hysterectomy bladder cancer removed twice a day then radiation treatment for persistent hematuria.  She is lost about 15 lb since all of this was going on in December and January.  She sees Dr. Blakely fellow in on October 4th which was this past Wednesday she had multiple tests done.  Family does  not have the results  Mother is  dementia dad is  from stroke she is a  8 para 8 and 2 of the daughters are here with her.  She is  lives with a son and daughter.  She is retired.  Nacho Long  is her urologist Dr. Barrett Lopez is her primary care doctor    She has multiple allergies please see extensive list on chart      Benign exam elderly female pleasant very hard of hearing heart is regular rate and rhythm with brief murmur lungs are clear abdomen is benign rectal evaluation reveals no impaction neurologically she is appropriate except for the dementia.    Amount and/or Complexity of Data Reviewed  Independent Historian: caregiver  External Data Reviewed: labs, radiology and notes.     Details: I reviewed notes from the ED when I saw her back in December I reviewed lab work and CT chest abdomen pelvis with contrast done on   Discussion of management or test interpretation with external provider(s): Differential diagnosis Metastatic cancer that is now spread to the lungs.  Dehydration, urinary tract infection, symptomatic anemia, failure to thrive in 89-year-old.    Risk  Risk Details: MDM  Problems addressed  Co-morbidities and/or factors adding to the complexity or risk for the patient:  Probably metastatic cancer   Problems addressed:  Advanced age old age  Acute problem/illness or progression/exacerbation of chronic problem with potential threat to life/bodily dysfunction?:  Metastatic cancer  Differential diagnoses/problems considered: see above     Amount and/or Complexity of Data Reviewed  Independent Historian: daughter  (see above for summary)  External Data Reviewed: notes from previous ED visits, prior labs, and prior imaging (see above for summary)  Risk and benefits of testing: discussed   Labs: Labs: ordered and reviewed  Radiology:Radiology: ordered and independent interpretation performed (see above or ED course)  ECG/medicine tests:Radiology:  ordered and independent interpretation performed (see above or ED course)  none    Risk  Diagnosis or treatment significantly impacted by social determinants of health: none   Shared decision making     Critical Care  none       Critical Care  Total time providing critical care: 0 minutes               ED Course as of 10/06/23 1803   Fri Oct 06, 2023   1742 There is extensive lab work CBC CMP urinalysis from the 4th all those tests are benign the urine is uninfected hemoglobin is 10.3 there is a CT of her abdomen pelvis done.  In the bladder cancer is not present on the scan there is calcifications where it was however the chest CT that was done shows multiple about 12 round lesions in her chest consistent with metastatic cancer.  She had a PET scan done in March and these lesions were not there.    I talked to 2 of the daughters plainly I gave him the results of the blood work and the abdominal pelvis scan I gave him the results of the chest CT I told him think this is metastatic cancer maybe well from the bladder and wants know what the next step was I explained to them I think the patient can be discharged home I recommended they use MiraLax if they think she is constipated I recommend they follow up with Dr. Lopez on Monday to discuss next steps they seemed very comfortable with that.  They were discharged home [DM]      ED Course User Index  [DM] Prabhakar Calderón MD                    Clinical Impression:   Final diagnoses:  [R62.7] Failure to thrive in adult  [R54] Advanced age  [R91.8] Multiple pulmonary nodules determined by computed tomography of lung - Consistent with metastatic cancer unknown primary (Primary)  [R53.1] Generalized weakness        ED Disposition Condition    Discharge Stable          ED Prescriptions    None       Follow-up Information       Follow up With Specialties Details Why Contact Info    Barrett Lopez MD Family Medicine In 2 days As needed [address]  [place] [id number]  [phone]               Prabhakar Calderón MD  10/06/23 2004

## 2023-10-27 ENCOUNTER — HOSPITAL ENCOUNTER (INPATIENT)
Facility: HOSPITAL | Age: 88
LOS: 2 days | Discharge: HOME | DRG: 641 | End: 2023-10-29
Attending: EMERGENCY MEDICINE | Admitting: HOSPITALIST
Payer: MEDICARE

## 2023-10-27 ENCOUNTER — OUTPATIENT CASE MANAGEMENT (OUTPATIENT)
Dept: NEUROLOGY | Facility: CLINIC | Age: 88
End: 2023-10-27
Payer: MEDICARE

## 2023-10-27 DIAGNOSIS — R55 SYNCOPE: ICD-10-CM

## 2023-10-27 DIAGNOSIS — R53.1 GENERALIZED WEAKNESS: ICD-10-CM

## 2023-10-27 DIAGNOSIS — I95.9 HYPOTENSION, UNSPECIFIED HYPOTENSION TYPE: ICD-10-CM

## 2023-10-27 DIAGNOSIS — R55 SYNCOPE, UNSPECIFIED SYNCOPE TYPE: Primary | ICD-10-CM

## 2023-10-27 DIAGNOSIS — E86.0 DEHYDRATION: ICD-10-CM

## 2023-10-27 DIAGNOSIS — R79.89 ELEVATED BRAIN NATRIURETIC PEPTIDE (BNP) LEVEL: ICD-10-CM

## 2023-10-27 LAB
ALBUMIN SERPL-MCNC: 2.2 G/DL (ref 3.4–4.8)
ALBUMIN/GLOB SERPL: 0.6 RATIO (ref 1.1–2)
ALP SERPL-CCNC: 98 UNIT/L (ref 40–150)
ALT SERPL-CCNC: 11 UNIT/L (ref 0–55)
APTT PPP: 38.6 SECONDS (ref 23.2–33.7)
AST SERPL-CCNC: 21 UNIT/L (ref 5–34)
BASOPHILS # BLD AUTO: 0.03 X10(3)/MCL
BASOPHILS NFR BLD AUTO: 0.4 %
BILIRUB SERPL-MCNC: 0.3 MG/DL
BNP BLD-MCNC: 155.9 PG/ML
BUN SERPL-MCNC: 15.2 MG/DL (ref 9.8–20.1)
CALCIUM SERPL-MCNC: 8.6 MG/DL (ref 8.4–10.2)
CHLORIDE SERPL-SCNC: 105 MMOL/L (ref 98–111)
CO2 SERPL-SCNC: 28 MMOL/L (ref 23–31)
CREAT SERPL-MCNC: 0.82 MG/DL (ref 0.55–1.02)
EOSINOPHIL # BLD AUTO: 0.07 X10(3)/MCL (ref 0–0.9)
EOSINOPHIL NFR BLD AUTO: 0.9 %
ERYTHROCYTE [DISTWIDTH] IN BLOOD BY AUTOMATED COUNT: 14.3 % (ref 11.5–17)
FERRITIN SERPL-MCNC: 203.08 NG/ML (ref 4.63–204)
FLUAV AG UPPER RESP QL IA.RAPID: NOT DETECTED
FLUBV AG UPPER RESP QL IA.RAPID: NOT DETECTED
FOLATE SERPL-MCNC: 14 NG/ML (ref 7–31.4)
GFR SERPLBLD CREATININE-BSD FMLA CKD-EPI: >60 MLS/MIN/1.73/M2
GLOBULIN SER-MCNC: 3.4 GM/DL (ref 2.4–3.5)
GLUCOSE SERPL-MCNC: 119 MG/DL (ref 75–121)
HCT VFR BLD AUTO: 26.8 % (ref 37–47)
HCT VFR BLD AUTO: 28.8 % (ref 37–47)
HGB BLD-MCNC: 8.4 G/DL (ref 12–16)
HGB BLD-MCNC: 9 G/DL (ref 12–16)
IMM GRANULOCYTES # BLD AUTO: 0.03 X10(3)/MCL (ref 0–0.04)
IMM GRANULOCYTES NFR BLD AUTO: 0.4 %
INR PPP: 1.4
IRON SATN MFR SERPL: 9 % (ref 20–50)
IRON SERPL-MCNC: 15 UG/DL (ref 50–170)
LACTATE SERPL-SCNC: 1.1 MMOL/L (ref 0.5–2.2)
LYMPHOCYTES # BLD AUTO: 0.97 X10(3)/MCL (ref 0.6–4.6)
LYMPHOCYTES NFR BLD AUTO: 12.4 %
MCH RBC QN AUTO: 26.7 PG (ref 27–31)
MCHC RBC AUTO-ENTMCNC: 31.3 G/DL (ref 33–36)
MCV RBC AUTO: 85.1 FL (ref 80–94)
MONOCYTES # BLD AUTO: 0.72 X10(3)/MCL (ref 0.1–1.3)
MONOCYTES NFR BLD AUTO: 9.2 %
NEUTROPHILS # BLD AUTO: 5.99 X10(3)/MCL (ref 2.1–9.2)
NEUTROPHILS NFR BLD AUTO: 76.7 %
NRBC BLD AUTO-RTO: 0 %
PLATELET # BLD AUTO: 167 X10(3)/MCL (ref 130–400)
PMV BLD AUTO: 10.9 FL (ref 7.4–10.4)
POTASSIUM SERPL-SCNC: 4 MMOL/L (ref 3.5–5.1)
PROT SERPL-MCNC: 5.6 GM/DL (ref 5.8–7.6)
PROTHROMBIN TIME: 17.2 SECONDS (ref 12.5–14.5)
RBC # BLD AUTO: 3.15 X10(6)/MCL (ref 4.2–5.4)
SARS-COV-2 RNA RESP QL NAA+PROBE: NOT DETECTED
SODIUM SERPL-SCNC: 138 MMOL/L (ref 132–146)
TIBC SERPL-MCNC: 157 UG/DL (ref 70–310)
TIBC SERPL-MCNC: 172 UG/DL (ref 250–450)
TRANSFERRIN SERPL-MCNC: 155 MG/DL
TROPONIN I SERPL-MCNC: 0.01 NG/ML (ref 0–0.04)
VIT B12 SERPL-MCNC: 1182 PG/ML (ref 213–816)
WBC # SPEC AUTO: 7.81 X10(3)/MCL (ref 4.5–11.5)

## 2023-10-27 PROCEDURE — 85610 PROTHROMBIN TIME: CPT | Performed by: EMERGENCY MEDICINE

## 2023-10-27 PROCEDURE — 63600175 PHARM REV CODE 636 W HCPCS: Performed by: EMERGENCY MEDICINE

## 2023-10-27 PROCEDURE — 85730 THROMBOPLASTIN TIME PARTIAL: CPT | Performed by: EMERGENCY MEDICINE

## 2023-10-27 PROCEDURE — 93010 ELECTROCARDIOGRAM REPORT: CPT | Mod: ,,, | Performed by: INTERNAL MEDICINE

## 2023-10-27 PROCEDURE — 85025 COMPLETE CBC W/AUTO DIFF WBC: CPT | Performed by: EMERGENCY MEDICINE

## 2023-10-27 PROCEDURE — 96360 HYDRATION IV INFUSION INIT: CPT

## 2023-10-27 PROCEDURE — 99285 EMERGENCY DEPT VISIT HI MDM: CPT | Mod: 25

## 2023-10-27 PROCEDURE — 93005 ELECTROCARDIOGRAM TRACING: CPT

## 2023-10-27 PROCEDURE — 83605 ASSAY OF LACTIC ACID: CPT | Performed by: EMERGENCY MEDICINE

## 2023-10-27 PROCEDURE — 93010 EKG 12-LEAD: ICD-10-PCS | Mod: ,,, | Performed by: INTERNAL MEDICINE

## 2023-10-27 PROCEDURE — 84484 ASSAY OF TROPONIN QUANT: CPT | Performed by: EMERGENCY MEDICINE

## 2023-10-27 PROCEDURE — 82607 VITAMIN B-12: CPT | Performed by: INTERNAL MEDICINE

## 2023-10-27 PROCEDURE — 11000001 HC ACUTE MED/SURG PRIVATE ROOM

## 2023-10-27 PROCEDURE — 25000003 PHARM REV CODE 250: Performed by: INTERNAL MEDICINE

## 2023-10-27 PROCEDURE — 82746 ASSAY OF FOLIC ACID SERUM: CPT | Performed by: INTERNAL MEDICINE

## 2023-10-27 PROCEDURE — 21400001 HC TELEMETRY ROOM

## 2023-10-27 PROCEDURE — 85014 HEMATOCRIT: CPT | Performed by: INTERNAL MEDICINE

## 2023-10-27 PROCEDURE — 80053 COMPREHEN METABOLIC PANEL: CPT | Performed by: EMERGENCY MEDICINE

## 2023-10-27 PROCEDURE — 83540 ASSAY OF IRON: CPT | Performed by: INTERNAL MEDICINE

## 2023-10-27 PROCEDURE — 0240U COVID/FLU A&B PCR: CPT | Performed by: EMERGENCY MEDICINE

## 2023-10-27 PROCEDURE — 83880 ASSAY OF NATRIURETIC PEPTIDE: CPT | Performed by: EMERGENCY MEDICINE

## 2023-10-27 PROCEDURE — 82728 ASSAY OF FERRITIN: CPT | Performed by: INTERNAL MEDICINE

## 2023-10-27 RX ORDER — ASCORBIC ACID 250 MG
1000 TABLET ORAL DAILY
Status: DISCONTINUED | OUTPATIENT
Start: 2023-10-28 | End: 2023-10-29 | Stop reason: HOSPADM

## 2023-10-27 RX ORDER — INSULIN ASPART 100 [IU]/ML
0-5 INJECTION, SOLUTION INTRAVENOUS; SUBCUTANEOUS
Status: DISCONTINUED | OUTPATIENT
Start: 2023-10-27 | End: 2023-10-29 | Stop reason: HOSPADM

## 2023-10-27 RX ORDER — SERTRALINE HYDROCHLORIDE 50 MG/1
50 TABLET, FILM COATED ORAL DAILY
Status: DISCONTINUED | OUTPATIENT
Start: 2023-10-28 | End: 2023-10-29 | Stop reason: HOSPADM

## 2023-10-27 RX ORDER — PANTOPRAZOLE SODIUM 40 MG/1
40 TABLET, DELAYED RELEASE ORAL DAILY
Status: DISCONTINUED | OUTPATIENT
Start: 2023-10-28 | End: 2023-10-29 | Stop reason: HOSPADM

## 2023-10-27 RX ORDER — TALC
6 POWDER (GRAM) TOPICAL NIGHTLY PRN
Status: DISCONTINUED | OUTPATIENT
Start: 2023-10-27 | End: 2023-10-29 | Stop reason: HOSPADM

## 2023-10-27 RX ORDER — QUETIAPINE FUMARATE 25 MG/1
25 TABLET, FILM COATED ORAL 2 TIMES DAILY
Status: DISCONTINUED | OUTPATIENT
Start: 2023-10-27 | End: 2023-10-29 | Stop reason: HOSPADM

## 2023-10-27 RX ORDER — GLUCAGON 1 MG
1 KIT INJECTION
Status: DISCONTINUED | OUTPATIENT
Start: 2023-10-27 | End: 2023-10-29 | Stop reason: HOSPADM

## 2023-10-27 RX ORDER — IBUPROFEN 200 MG
16 TABLET ORAL
Status: DISCONTINUED | OUTPATIENT
Start: 2023-10-27 | End: 2023-10-29 | Stop reason: HOSPADM

## 2023-10-27 RX ORDER — HYOSCYAMINE SULFATE 0.125 MG
TABLET ORAL
COMMUNITY
Start: 2023-09-18

## 2023-10-27 RX ORDER — CARVEDILOL 12.5 MG/1
25 TABLET ORAL 2 TIMES DAILY
Status: DISCONTINUED | OUTPATIENT
Start: 2023-10-28 | End: 2023-10-29 | Stop reason: HOSPADM

## 2023-10-27 RX ORDER — SODIUM CHLORIDE 0.9 % (FLUSH) 0.9 %
10 SYRINGE (ML) INJECTION
Status: DISCONTINUED | OUTPATIENT
Start: 2023-10-27 | End: 2023-10-29 | Stop reason: HOSPADM

## 2023-10-27 RX ORDER — IBUPROFEN 200 MG
24 TABLET ORAL
Status: DISCONTINUED | OUTPATIENT
Start: 2023-10-27 | End: 2023-10-29 | Stop reason: HOSPADM

## 2023-10-27 RX ORDER — LORAZEPAM 0.5 MG/1
0.5 TABLET ORAL EVERY 6 HOURS PRN
Status: DISCONTINUED | OUTPATIENT
Start: 2023-10-27 | End: 2023-10-29 | Stop reason: HOSPADM

## 2023-10-27 RX ADMIN — SODIUM CHLORIDE, POTASSIUM CHLORIDE, SODIUM LACTATE AND CALCIUM CHLORIDE 1000 ML: 600; 310; 30; 20 INJECTION, SOLUTION INTRAVENOUS at 05:10

## 2023-10-27 RX ADMIN — QUETIAPINE FUMARATE 25 MG: 25 TABLET ORAL at 11:10

## 2023-10-27 RX ADMIN — LORAZEPAM 0.5 MG: 0.5 TABLET ORAL at 11:10

## 2023-10-27 NOTE — PROGRESS NOTES
In an attempt to complete monthly [facility] [employer] check-in, LINETTE LVM for CG and remains available.  LINETTE will make second attempt 11/1/23

## 2023-10-27 NOTE — ED PROVIDER NOTES
Encounter Date: 10/27/2023       History     Chief Complaint   Patient presents with    Altered Mental Status     Pt found unresponsive by family at home, EMS reports hypotensive on arrival, given 500mL IVF bolus. Pt now awake, alert, no distress, behavior appropriate, follows commands.      The history is provided by the patient, the EMS personnel and a relative. The history is limited by the condition of the patient. No  was used.     89y/o female arrives by [facility] after syncopal episode at home. Patient is wheelchair bound, daughters as primary caretakers. They report she was given AM medications, including BP meds, today as usual when around 1pm daughter found patient unresponsive in [facility] and called EMS. Reported BP upon arrival 50/20 by daughter. Recent events include recent Miralax use resulting in diarrhea over the last 4 days with poor oral intake and lethargy. Denies active bleeding or previous syncopal episodes, change in speech.    Review of patient's allergies indicates:   Allergen Reactions    Statins-hmg-coa reductase inhibitors      Other reaction(s): Joint pain    Bimatoprost      Other reaction(s): Eye redness    Gabapentin      Other reaction(s): Confusion    Lisinopril     Rosuvastatin Itching    Tafluprost (pf)      Past Medical History:   Diagnosis Date    Anxiety disorder     Aortic stenosis     Arthritis     Carpal tunnel syndrome     Coronary artery disease     Dementia     Depression     Diabetes mellitus     GERD (gastroesophageal reflux disease)     Hx of rectal polypectomy     Hypercholesterolemia     Hypertension     Obstructive sleep apnea     Respiratory distress     S/P TAVR (transcatheter aortic valve replacement)     TIA (transient ischemic attack)      Past Surgical History:   Procedure Laterality Date    CARDIAC SURGERY      CATARACT EXTRACTION      CORONARY STENT PLACEMENT      EGD, WITH CLOSED BIOPSY  12/28/2022    Procedure: EGD, WITH CLOSED BIOPSY;   Surgeon: Arcadio Carias MD;  Location: [facility] ENDOSCOPY;  Service: Gastroenterology;;    ESOPHAGOGASTRODUODENOSCOPY W/ PEG N/A 12/28/2022    Procedure: PEG;  Surgeon: Arcadio Carias MD;  Location: [facility] ENDOSCOPY;  Service: Gastroenterology;  Laterality: N/A;    HYSTERECTOMY      INSERTION OF INFERIOR VENA CAVAL FILTER N/A 3/1/2023    Procedure: Insertion, Filter, Inferior Vena Cava;  Surgeon: Terri Urbina MD;  Location: [facility] CATH LAB;  Service: Peripheral Vascular;  Laterality: N/A;  IVC FILTER PLACEMENT    INSERTION OF PACEMAKER      PARTIAL HYSTERECTOMY      PULMONARY EMBOLISM SURGERY N/A 12/21/2022    Procedure: EMBOLECTOMY, ARTERY, PULMONARY;  Surgeon: Terri Urbina MD;  Location: [facility] CATH LAB;  Service: Peripheral Vascular;  Laterality: N/A;    RECTAL POLYPECTOMY      TONSILLECTOMY      TRANSCATHETER AORTIC VALVE REPLACEMENT (TAVR)       Family History   Problem Relation Age of Onset    Diabetes Mother     Stroke Father     Hyperlipidemia Father     Breast cancer Sister      Social History     Tobacco Use    Smoking status: Never    Smokeless tobacco: Never   Substance Use Topics    Alcohol use: Never    Drug use: Never     Review of Systems   Unable to perform ROS: Dementia       Physical Exam     Initial Vitals [10/27/23 1530]   BP Pulse Resp Temp SpO2   123/74 70 16 98.6 °F (37 °C) 95 %      MAP       --         Physical Exam    Nursing note and vitals reviewed.  Constitutional: She appears well-developed and well-nourished. She is not diaphoretic. No distress.   HENT:   Head: Normocephalic and atraumatic.   Nose: Nose normal.   Mouth/Throat: Oropharynx is clear and moist. Mucous membranes are dry.   Eyes: Conjunctivae and EOM are normal. Pupils are equal, round, and reactive to light.   Neck: Trachea normal. Neck supple.   Normal range of motion.  Cardiovascular:  Normal rate, regular rhythm, normal heart sounds and intact distal pulses.           No murmur heard.  Pulmonary/Chest:  Breath sounds normal. No respiratory distress. She has no wheezes. She has no rhonchi. She has no rales. She exhibits no tenderness.   Abdominal: Abdomen is soft. Bowel sounds are normal. She exhibits no distension and no mass. There is no abdominal tenderness. There is no rebound and no guarding.   Musculoskeletal:         General: No tenderness or edema. Normal range of motion.      Cervical back: Normal range of motion and neck supple.      Lumbar back: Normal. Normal range of motion.     Neurological: She is alert and oriented to person, place, and time. She has normal strength. No cranial nerve deficit or sensory deficit. GCS score is 15. GCS eye subscore is 4. GCS verbal subscore is 5. GCS motor subscore is 6.   CNII-XII grossly intact  Follows commands and moves all extremities with ease.    Skin: Skin is warm and dry. Capillary refill takes 2 to 3 seconds. No abscess noted. No erythema. No pallor.   Psychiatric: She has a normal mood and affect. Her behavior is normal. Judgment and thought content normal.         ED Course   Procedures  Labs Reviewed   COMPREHENSIVE METABOLIC PANEL - Abnormal; Notable for the following components:       Result Value    Protein Total 5.6 (*)     Albumin Level 2.2 (*)     Albumin/Globulin Ratio 0.6 (*)     All other components within normal limits   B-TYPE NATRIURETIC PEPTIDE - Abnormal; Notable for the following components:    Natriuretic Peptide 155.9 (*)     All other components within normal limits   CBC WITH DIFFERENTIAL - Abnormal; Notable for the following components:    RBC 3.15 (*)     Hgb 8.4 (*)     Hct 26.8 (*)     MCH 26.7 (*)     MCHC 31.3 (*)     MPV 10.9 (*)     All other components within normal limits   APTT - Abnormal; Notable for the following components:    PTT 38.6 (*)     All other components within normal limits   PROTIME-INR - Abnormal; Notable for the following components:    PT 17.2 (*)     INR 1.4 (*)     All other components within normal limits    COVID/FLU A&B PCR - Normal    Narrative:     The Xpert Xpress SARS-CoV-2/FLU/RSV plus is a rapid, multiplexed real-time PCR test intended for the simultaneous qualitative detection and differentiation of SARS-CoV-2, Influenza A, Influenza B, and respiratory syncytial virus (RSV) viral RNA in either nasopharyngeal swab or nasal swab specimens.         LACTIC ACID, PLASMA - Normal   TROPONIN I - Normal   CBC W/ AUTO DIFFERENTIAL    Narrative:     The following orders were created for panel order CBC auto differential.  Procedure                               Abnormality         Status                     ---------                               -----------         ------                     CBC with Differential[[id number]]       Abnormal            Final result                 Please view results for these tests on the individual orders.   URINALYSIS, REFLEX TO URINE CULTURE     EKG Readings: (Independently Interpreted)   Initial Reading: No STEMI. Rhythm: Normal Sinus Rhythm. Conduction: Normal. Axis: Normal.       Imaging Results              X-Ray Chest AP Portable (Final result)  Result time 10/27/23 16:50:05      Final result by Arcadio Cook MD (10/27/23 16:50:05)                   Impression:      No acute findings.  Scattered pulmonary nodules better evaluated on prior chest CT.      Electronically signed by: Arcadio Cook  Date:    10/27/2023  Time:    16:50               Narrative:    EXAMINATION:  XR CHEST AP PORTABLE    CLINICAL HISTORY:  weakness;    COMPARISON:  4 October 2023    FINDINGS:  Portable frontal view of the chest was obtained. Heart and mediastinum are unchanged.  There are pulmonary nodules which were seen on a recent chest CT.  There is no new focal consolidation.  No pneumothorax.                                       CT Head Without Contrast (Final result)  Result time 10/27/23 16:26:41      Final result by Chapin Ward MD (10/27/23 16:26:41)                   Impression:      No  acute intracranial findings identified.      Electronically signed by: Chapin Ward  Date:    10/27/2023  Time:    16:26               Narrative:    EXAMINATION:  CT HEAD WITHOUT CONTRAST    CLINICAL HISTORY:  Syncope, recurrent;    TECHNIQUE:  Sequential axial images were performed of the brain without contrast.    Dose product length of 953 mGycm. Automated exposure control was utilized to minimize radiation dose.    COMPARISON:  February 9, 2023.    FINDINGS:  There is no intracranial mass effect, midline shift, hydrocephalus or hemorrhage. There is no sulcal effacement or low attenuation changes to suggest recent large vessel territory infarction. Chronic appearing periventricular and subcortical white matter low attenuation changes are present and are consistent with marked chronic microangiopathic ischemia. The ventricular system and sulcal markings prominence is consistent with atrophy. There is no acute extra axial fluid collection.  Right maxillary sinus mild mucoperiosteal thickening.  Otherwise, visualized paranasal sinuses are clear without mucosal thickening, polypoidal abnormality or air-fluid levels. Mastoid air cells aeration is optimal.                                    X-Rays:   Independently Interpreted Readings:   Chest X-Ray: Normal heart size.  No infiltrates.  No acute abnormalities.     Medications   lactated ringers bolus 1,000 mL (1,000 mLs Intravenous New Bag 10/27/23 1729)     Medical Decision Making  Unwitnessed new syncopal episode with progressive weakness. CT head negative for hemorrhagic stroke. Negative troponin with unremarkable EKG low suspicion for MI etiology. Appears stable, recommend admit for further evaluation.   Differential diagnosis, dehydration, bk, electrolyte derangement, anemia, dysrhythmia, cva, tia, sepsis,pneumonia  Cbc, cmp, lactic trop, ua, coags, covid/flu, cxr, ekg, ct head ordered and reviewed per above    Problems Addressed:  Dehydration: acute illness or  injury that poses a threat to life or bodily functions  Generalized weakness: acute illness or injury that poses a threat to life or bodily functions  Hypotension, unspecified hypotension type: acute illness or injury that poses a threat to life or bodily functions  Syncope: undiagnosed new problem with uncertain prognosis  Syncope, unspecified syncope type: acute illness or injury that poses a threat to life or bodily functions    Amount and/or Complexity of Data Reviewed  Independent Historian: caregiver     Details: daughter  External Data Reviewed: notes.  Labs: ordered.  Radiology: ordered and independent interpretation performed.  ECG/medicine tests: ordered and independent interpretation performed.    Risk  OTC drugs.  Prescription drug management.  Decision regarding hospitalization.              Attending Attestation:   Physician Attestation Statement for Resident:  As the supervising MD   Physician Attestation Statement: I have personally seen and examined this patient.   I agree with the above history.  -: See ed course for attestation   As the supervising MD I agree with the above PE.     As the supervising MD I agree with the above treatment, course, plan, and disposition.    I have reviewed and agree with the residents interpretation of the following: lab data, x-rays, CT scans and EKG.  I have reviewed the following: old records at this facility.                ED Course as of 10/27/23 1822   Fri Oct 27, 2023   1631 Dr. Vigil supervisory attestation  I performed substantive portion of MDM. I performed a history, physical exam, reviewed pertinent available previous records and discussed plan of care with resident I had face to face time evaluation of the patient    HPI:  89-year-old female with a history of bladder cancer due to start immunotherapy early next month presents ED for evaluation of loss of consciousness.  Earlier in the week she had been constipated that was not relieved with Linzess  but after taking MiraLax had 2-3 days of 3 or 4 watery bowel movements a day with decreased oral intake.  Since then she was had progressive weakness and family has been trying to hydrate her with Pedialyte however this morning she was slightly more confused than her normal and had a syncopal episode with reported blood pressure in the 50s.  At this time she denies any complaints.  Her daughter at bedside lives with her and I spoke with another daughter on the phone who was present for the above incident  PE:  Normocephalic, atraumatic  Regular rate, lungs clear to auscultation bilaterally  Abdomen is soft and nontender  Cranial nerves 2-12 intact intact strength in all extremities  MDM:  workup including cbc, cmp, bnp, trop, ua, ct head, cxr  Suspect dehydration will administer IV fluids and re-evaluate [BS]      ED Course User Index  [BS] Sanam Vigil MD               Medical Decision Making:   History:   Old Medical Records: I decided to obtain old medical records.  Old Records Summarized: records from previous admission(s).       <> Summary of Records: Seen for pulmonary nodules in the past and dementia  Initial Assessment:   See hpi  Independently Interpreted Test(s):   I have ordered and independently interpreted X-rays - see prior notes.  I have ordered and independently interpreted EKG Reading(s) - see prior notes  Clinical Tests:   Lab Tests: Ordered and Reviewed  Radiological Study: Ordered and Reviewed  Medical Tests: Ordered and Reviewed  Other:   I have discussed this case with another health care provider.      Clinical Impression:   Final diagnoses:  [R55] Syncope  [R55] Syncope, unspecified syncope type (Primary)        ED Disposition Condition    Admit Stable                Luana Dietz MD  Resident  10/27/23 1822       Sanam Vigil MD  10/27/23 1980

## 2023-10-28 LAB
ALBUMIN SERPL-MCNC: 2.4 G/DL (ref 3.4–4.8)
ALBUMIN/GLOB SERPL: 0.7 RATIO (ref 1.1–2)
ALP SERPL-CCNC: 107 UNIT/L (ref 40–150)
ALT SERPL-CCNC: 13 UNIT/L (ref 0–55)
AST SERPL-CCNC: 18 UNIT/L (ref 5–34)
AV INDEX (PROSTH): 0.42
AV MEAN GRADIENT: 19 MMHG
AV PEAK GRADIENT: 36 MMHG
AV VALVE AREA BY VELOCITY RATIO: 0.99 CM²
AV VALVE AREA: 1.08 CM²
AV VELOCITY RATIO: 0.39
BASOPHILS # BLD AUTO: 0.03 X10(3)/MCL
BASOPHILS NFR BLD AUTO: 0.4 %
BILIRUB SERPL-MCNC: 0.4 MG/DL
BSA FOR ECHO PROCEDURE: 1.8 M2
BUN SERPL-MCNC: 10.7 MG/DL (ref 9.8–20.1)
CALCIUM SERPL-MCNC: 8.9 MG/DL (ref 8.4–10.2)
CHLORIDE SERPL-SCNC: 104 MMOL/L (ref 98–111)
CO2 SERPL-SCNC: 28 MMOL/L (ref 23–31)
CREAT SERPL-MCNC: 0.68 MG/DL (ref 0.55–1.02)
CV ECHO LV RWT: 0.47 CM
DOP CALC AO PEAK VEL: 3.01 M/S
DOP CALC AO VTI: 58.1 CM
DOP CALC LVOT AREA: 2.5 CM2
DOP CALC LVOT DIAMETER: 1.8 CM
DOP CALC LVOT PEAK VEL: 1.17 M/S
DOP CALC LVOT STROKE VOLUME: 62.57 CM3
DOP CALC MV VTI: 41 CM
DOP CALCLVOT PEAK VEL VTI: 24.6 CM
E WAVE DECELERATION TIME: 322 MSEC
E/A RATIO: 0.69
E/E' RATIO: 22.4 M/S
ECHO LV POSTERIOR WALL: 1.01 CM (ref 0.6–1.1)
EOSINOPHIL # BLD AUTO: 0.09 X10(3)/MCL (ref 0–0.9)
EOSINOPHIL NFR BLD AUTO: 1.3 %
ERYTHROCYTE [DISTWIDTH] IN BLOOD BY AUTOMATED COUNT: 14.3 % (ref 11.5–17)
FRACTIONAL SHORTENING: 38 % (ref 28–44)
GFR SERPLBLD CREATININE-BSD FMLA CKD-EPI: >60 MLS/MIN/1.73/M2
GLOBULIN SER-MCNC: 3.6 GM/DL (ref 2.4–3.5)
GLUCOSE SERPL-MCNC: 106 MG/DL (ref 75–121)
HCT VFR BLD AUTO: 30.5 % (ref 37–47)
HGB BLD-MCNC: 9.4 G/DL (ref 12–16)
IMM GRANULOCYTES # BLD AUTO: 0.03 X10(3)/MCL (ref 0–0.04)
IMM GRANULOCYTES NFR BLD AUTO: 0.4 %
INTERVENTRICULAR SEPTUM: 1.15 CM (ref 0.6–1.1)
LEFT ATRIUM SIZE: 3.8 CM
LEFT ATRIUM VOLUME INDEX MOD: 36.1 ML/M2
LEFT ATRIUM VOLUME MOD: 63.6 CM3
LEFT INTERNAL DIMENSION IN SYSTOLE: 2.7 CM (ref 2.1–4)
LEFT VENTRICLE DIASTOLIC VOLUME INDEX: 47.95 ML/M2
LEFT VENTRICLE DIASTOLIC VOLUME: 84.4 ML
LEFT VENTRICLE MASS INDEX: 91 G/M2
LEFT VENTRICLE SYSTOLIC VOLUME INDEX: 15.3 ML/M2
LEFT VENTRICLE SYSTOLIC VOLUME: 27 ML
LEFT VENTRICULAR INTERNAL DIMENSION IN DIASTOLE: 4.33 CM (ref 3.5–6)
LEFT VENTRICULAR MASS: 160.49 G
LV LATERAL E/E' RATIO: 22.4 M/S
LV SEPTAL E/E' RATIO: 22.4 M/S
LVOT MG: 3 MMHG
LVOT MV: 0.83 CM/S
LYMPHOCYTES # BLD AUTO: 0.96 X10(3)/MCL (ref 0.6–4.6)
LYMPHOCYTES NFR BLD AUTO: 13.7 %
MCH RBC QN AUTO: 26.4 PG (ref 27–31)
MCHC RBC AUTO-ENTMCNC: 30.8 G/DL (ref 33–36)
MCV RBC AUTO: 85.7 FL (ref 80–94)
MONOCYTES # BLD AUTO: 0.64 X10(3)/MCL (ref 0.1–1.3)
MONOCYTES NFR BLD AUTO: 9.1 %
MV MEAN GRADIENT: 5 MMHG
MV PEAK A VEL: 1.62 M/S
MV PEAK E VEL: 1.12 M/S
MV PEAK GRADIENT: 11 MMHG
MV VALVE AREA BY CONTINUITY EQUATION: 1.53 CM2
NEUTROPHILS # BLD AUTO: 5.26 X10(3)/MCL (ref 2.1–9.2)
NEUTROPHILS NFR BLD AUTO: 75.1 %
NRBC BLD AUTO-RTO: 0 %
OHS LV EJECTION FRACTION SIMPSONS BIPLANE MOD: 63 %
PLATELET # BLD AUTO: 186 X10(3)/MCL (ref 130–400)
PMV BLD AUTO: 11.7 FL (ref 7.4–10.4)
POCT GLUCOSE: 105 MG/DL (ref 70–110)
POCT GLUCOSE: 108 MG/DL (ref 70–110)
POTASSIUM SERPL-SCNC: 3.7 MMOL/L (ref 3.5–5.1)
PROT SERPL-MCNC: 6 GM/DL (ref 5.8–7.6)
RBC # BLD AUTO: 3.56 X10(6)/MCL (ref 4.2–5.4)
RIGHT VENTRICULAR END-DIASTOLIC DIMENSION: 2.89 CM
SODIUM SERPL-SCNC: 141 MMOL/L (ref 132–146)
TDI LATERAL: 0.05 M/S
TDI SEPTAL: 0.05 M/S
TDI: 0.05 M/S
TRICUSPID ANNULAR PLANE SYSTOLIC EXCURSION: 2.38 CM
WBC # SPEC AUTO: 7.01 X10(3)/MCL (ref 4.5–11.5)
Z-SCORE OF LEFT VENTRICULAR DIMENSION IN END DIASTOLE: -1.17
Z-SCORE OF LEFT VENTRICULAR DIMENSION IN END SYSTOLE: -0.86

## 2023-10-28 PROCEDURE — 85025 COMPLETE CBC W/AUTO DIFF WBC: CPT | Performed by: INTERNAL MEDICINE

## 2023-10-28 PROCEDURE — 25000003 PHARM REV CODE 250: Performed by: INTERNAL MEDICINE

## 2023-10-28 PROCEDURE — 80053 COMPREHEN METABOLIC PANEL: CPT | Performed by: INTERNAL MEDICINE

## 2023-10-28 PROCEDURE — 25000003 PHARM REV CODE 250: Performed by: STUDENT IN AN ORGANIZED HEALTH CARE EDUCATION/TRAINING PROGRAM

## 2023-10-28 PROCEDURE — 21400001 HC TELEMETRY ROOM

## 2023-10-28 PROCEDURE — 63600175 PHARM REV CODE 636 W HCPCS: Performed by: STUDENT IN AN ORGANIZED HEALTH CARE EDUCATION/TRAINING PROGRAM

## 2023-10-28 RX ORDER — MEMANTINE HYDROCHLORIDE 5 MG/1
10 TABLET ORAL 2 TIMES DAILY
Status: DISCONTINUED | OUTPATIENT
Start: 2023-10-28 | End: 2023-10-29 | Stop reason: HOSPADM

## 2023-10-28 RX ORDER — AMLODIPINE BESYLATE 2.5 MG/1
2.5 TABLET ORAL DAILY
Status: DISCONTINUED | OUTPATIENT
Start: 2023-10-28 | End: 2023-10-29 | Stop reason: HOSPADM

## 2023-10-28 RX ADMIN — Medication 1000 MG: at 08:10

## 2023-10-28 RX ADMIN — CARVEDILOL 25 MG: 12.5 TABLET, FILM COATED ORAL at 08:10

## 2023-10-28 RX ADMIN — QUETIAPINE FUMARATE 25 MG: 25 TABLET ORAL at 08:10

## 2023-10-28 RX ADMIN — SODIUM CHLORIDE, POTASSIUM CHLORIDE, SODIUM LACTATE AND CALCIUM CHLORIDE 500 ML: 600; 310; 30; 20 INJECTION, SOLUTION INTRAVENOUS at 10:10

## 2023-10-28 RX ADMIN — SERTRALINE HYDROCHLORIDE 50 MG: 50 TABLET ORAL at 08:10

## 2023-10-28 RX ADMIN — AMLODIPINE BESYLATE 2.5 MG: 2.5 TABLET ORAL at 09:10

## 2023-10-28 RX ADMIN — MEMANTINE 10 MG: 5 TABLET ORAL at 08:10

## 2023-10-28 RX ADMIN — LORAZEPAM 0.5 MG: 0.5 TABLET ORAL at 09:10

## 2023-10-28 RX ADMIN — PANTOPRAZOLE SODIUM 40 MG: 40 TABLET, DELAYED RELEASE ORAL at 08:10

## 2023-10-28 NOTE — NURSING
Nurses Note -- 4 Eyes      10/28/2023   1:34 AM      Skin assessed during: Admit      [x] No Altered Skin Integrity Present    []Prevention Measures Documented      [] Yes- Altered Skin Integrity Present or Discovered   [] LDA Added if Not in Epic (Describe Wound)   [] New Altered Skin Integrity was Present on Admit and Documented in LDA   [] Wound Image Taken    Wound Care Consulted? No    Attending Nurse:  Tamera Roldan RN/Staff Member:   Cindy Rubalcava

## 2023-10-28 NOTE — H&P
[facility] Hospital Medicine History & Physical Examination       Patient Name: Miguel Angel Oliveros  MRN: [id number]  Patient Class: IP- Inpatient   Admission Date: 10/27/2023  3:38 PM  Length of Stay: 0  Admitting Service: Hospital Medicine   Attending Physician: Jomar Samaniego MD   Primary Care Provider: Barrett Lopez MD  History source: EMR, patient and/or patient's family    CHIEF COMPLAINT   Altered Mental Status (Pt found unresponsive by family at home, EMS reports hypotensive on arrival, given 500mL IVF bolus. Pt now awake, alert, no distress, behavior appropriate, follows commands. )    HISTORY OF PRESENT ILLNESS:   Patient is a 89-year-old female with dementia, bladder cancer status post radiation awaiting immunotherapy with known lung metastases, CAD, VHD, HTN, DM2, PARAMJIT, SSS status post ppm and additional past medical history as below who was brought in for further evaluation after family members found her unresponsive.  Reportedly patient had been constipated and took MiraLax after which she had 3 or 4 days loose stools.  Family members noted she was less talkative than usual then refused to eat or drink and then ultimately was unresponsive in her chair at which time EMS was activated and she was found to have a systolic blood pressure in the 50s on arrival.  She was given IV fluids in route.      On arrival to the ER she was hemodynamically stable and afebrile.  Laboratory work showed a hemoglobin of hemoglobin of 8.4 (prior being 10.1 a few weeks ago), elevated BNP.  Chest x-ray showed scattered pulmonary nodules and CT head was unremarkable.  She was admitted to the hospitalist service for further management.  Currently patient is back to baseline with no complaints.    PAST MEDICAL HISTORY:   Bladder cancer, metastatic (lungs), awaiting immunotherapy  CAD/PCI  VHD, AS s/p TAVR  DVT/PE, status post IVC filter, on Eliquis  HTN  DM2  GERD  PARAMJIT   SSS status  post ppm  HLP  Dementia    PAST SURGICAL HISTORY:     Past Surgical History:   Procedure Laterality Date    CARDIAC SURGERY      CATARACT EXTRACTION      CORONARY STENT PLACEMENT      EGD, WITH CLOSED BIOPSY  12/28/2022    Procedure: EGD, WITH CLOSED BIOPSY;  Surgeon: Arcadio Carias MD;  Location: [facility] ENDOSCOPY;  Service: Gastroenterology;;    ESOPHAGOGASTRODUODENOSCOPY W/ PEG N/A 12/28/2022    Procedure: PEG;  Surgeon: Arcadio Carias MD;  Location: [facility] ENDOSCOPY;  Service: Gastroenterology;  Laterality: N/A;    HYSTERECTOMY      INSERTION OF INFERIOR VENA CAVAL FILTER N/A 3/1/2023    Procedure: Insertion, Filter, Inferior Vena Cava;  Surgeon: Terri Urbina MD;  Location: [facility] CATH LAB;  Service: Peripheral Vascular;  Laterality: N/A;  IVC FILTER PLACEMENT    INSERTION OF PACEMAKER      PARTIAL HYSTERECTOMY      PULMONARY EMBOLISM SURGERY N/A 12/21/2022    Procedure: EMBOLECTOMY, ARTERY, PULMONARY;  Surgeon: Terri Urbina MD;  Location: [facility] CATH LAB;  Service: Peripheral Vascular;  Laterality: N/A;    RECTAL POLYPECTOMY      TONSILLECTOMY      TRANSCATHETER AORTIC VALVE REPLACEMENT (TAVR)         ALLERGIES:   Statins-hmg-coa reductase inhibitors, Bimatoprost, Gabapentin, Lisinopril, Rosuvastatin, and Tafluprost (pf)    FAMILY HISTORY:   Reviewed and non-contributory     SOCIAL HISTORY:     Social History     Tobacco Use    Smoking status: Never    Smokeless tobacco: Never   Substance Use Topics    Alcohol use: Never        HOME MEDICATIONS:     Prior to Admission medications    Medication Sig Start Date End Date Taking? Authorizing Provider   apixaban (ELIQUIS) 5 mg Tab Take 1 tablet (5 mg total) by mouth 2 (two) times daily. 1/6/23  Yes Handy Balderrama MD   ascorbic acid, vitamin C, (VITAMIN C) 1000 MG tablet Take 1,000 mg by mouth once daily.   Yes Provider, Historical   carvediloL (COREG) 25 MG tablet Take 25 mg by mouth 2 (two) times daily. 5/4/22  Yes Provider, Historical    isosorbide mononitrate (IMDUR) 30 MG 24 hr tablet 15 mg.   Yes Provider, Historical   LORazepam (ATIVAN) 1 MG tablet Take 1 mg by mouth every 6 (six) hours as needed for Anxiety.   Yes Provider, Historical   magnesium oxide 400 mg magnesium Cap Take 1 tablet by mouth once daily. 3/6/23  Yes Provider, Historical   memantine (NAMENDA) 10 MG Tab Take 1 tablet (10 mg total) by mouth 2 (two) times daily. 2/12/23 2/12/24 Yes Barrett Lopez MD   pantoprazole (PROTONIX) 40 MG tablet Take 1 tablet (40 mg total) by mouth once daily. 2/13/23 2/13/24 Yes Barrett Lopez MD   QUEtiapine (SEROQUEL) 25 MG Tab Take 25 mg by mouth 2 (two) times daily. 5/6/22  Yes Provider, Historical   sertraline (ZOLOFT) 50 MG tablet Take 50 mg by mouth once daily.   Yes Provider, Historical   acetaminophen (TYLENOL) 325 MG tablet Take 2 tablets (650 mg total) by mouth every 4 (four) hours as needed for Pain or Temperature greater than. 2/12/23   Barrett Lopez MD   atorvastatin (LIPITOR) 40 MG tablet Take 40 mg by mouth once daily. 5/4/22   Provider, Historical   cloNIDine (CATAPRES) 0.1 MG tablet Take 0.1 mg by mouth daily as needed. 3/6/23   Provider, Historical   dorzolamide-timolol 2-0.5% (COSOPT) 22.3-6.8 mg/mL ophthalmic solution Place 1 drop into both eyes once daily. 12/11/22   Provider, Historical   furosemide (LASIX) 40 MG tablet Take 40 mg by mouth daily as needed.    Provider, Historical   hyoscyamine (ANASPAZ,LEVSIN) 0.125 mg Tab TAKE 1 TABLET BY MOUTH EVERY 4 HOURS AS NEEDED FOR BLADDER PAIN 9/18/23   Provider, Historical   linaCLOtide (LINZESS) 145 mcg Cap capsule Take 145 mcg by mouth daily as needed.    Provider, Historical   methenamine-sodium biphosphonate (UROQUID) 500-500 mg per tablet Take 1 tablet by mouth every morning.    Provider, Historical   multivit-min-iron-FA-lutein (CENTRUM SILVER WOMEN) 8 mg iron-400 mcg-300 mcg Tab Take 1 Act by mouth once daily. 12/11/22   Provider, Historical  "  nitroGLYCERIN (NITROSTAT) 0.4 MG SL tablet Place 0.4 mg under the tongue every 5 (five) minutes as needed for Chest pain.    Provider, Historical   ondansetron (ZOFRAN) 4 MG tablet Take 4 mg by mouth every 8 (eight) hours as needed for Nausea.    Provider, Historical   senna (SENOKOT) 8.6 mg tablet Take 1 tablet by mouth daily as needed for Constipation. 2/12/23   Barrett Lopez MD       REVIEW OF SYSTEMS:   Except as documented, all other systems reviewed and negative     PHYSICAL EXAM:   T 98.1 °F (36.7 °C)   BP (!) 158/79   P 68   RR 13   O2 100 %  GENERAL: awake, alert, oriented and in no acute distress, non-toxic appearing   HEENT: normocephalic atraumatic   NECK: supple   LUNGS: Clear bilaterally, no wheezing or rales, no accessory muscle use   CVS: Regular rate and rhythm, normal peripheral perfusion  ABD: Soft, non-tender, non-distended, bowel sounds present  EXTREMITIES: no clubbing or cyanosis  SKIN: Warm, dry.   NEURO: alert and oriented, grossly without focal deficits   PSYCHIATRIC: Cooperative    LABS AND IMAGING:     Recent Labs     10/27/23  1646   WBC 7.81   RBC 3.15*   HGB 8.4*   HCT 26.8*   MCV 85.1   MCH 26.7*   MCHC 31.3*   RDW 14.3        Recent Labs     10/27/23  1646   LACTIC 1.1     Recent Labs     10/27/23  1646   INR 1.4*     No results for input(s): "HGBA1C", "CHOL", "TRIG", "LDL", "VLDL", "HDL" in the last 72 hours.   Recent Labs     10/27/23  1646      K 4.0   CHLORIDE 105   CO2 28   BUN 15.2   CREATININE 0.82   GLUCOSE 119   CALCIUM 8.6   ALBUMIN 2.2*   GLOBULIN 3.4   ALKPHOS 98   ALT 11   AST 21   BILITOT 0.3     Recent Labs     10/27/23  1646   .9*   TROPONINI 0.014          X-Ray Chest AP Portable  Narrative: EXAMINATION:  XR CHEST AP PORTABLE    CLINICAL HISTORY:  weakness;    COMPARISON:  4 October 2023    FINDINGS:  Portable frontal view of the chest was obtained. Heart and mediastinum are unchanged.  There are pulmonary nodules which were seen on a " recent chest CT.  There is no new focal consolidation.  No pneumothorax.  Impression: No acute findings.  Scattered pulmonary nodules better evaluated on prior chest CT.    Electronically signed by: Arcadio Cook  Date:    10/27/2023  Time:    16:50  CT Head Without Contrast  Narrative: EXAMINATION:  CT HEAD WITHOUT CONTRAST    CLINICAL HISTORY:  Syncope, recurrent;    TECHNIQUE:  Sequential axial images were performed of the brain without contrast.    Dose product length of 953 mGycm. Automated exposure control was utilized to minimize radiation dose.    COMPARISON:  February 9, 2023.    FINDINGS:  There is no intracranial mass effect, midline shift, hydrocephalus or hemorrhage. There is no sulcal effacement or low attenuation changes to suggest recent large vessel territory infarction. Chronic appearing periventricular and subcortical white matter low attenuation changes are present and are consistent with marked chronic microangiopathic ischemia. The ventricular system and sulcal markings prominence is consistent with atrophy. There is no acute extra axial fluid collection.  Right maxillary sinus mild mucoperiosteal thickening.  Otherwise, visualized paranasal sinuses are clear without mucosal thickening, polypoidal abnormality or air-fluid levels. Mastoid air cells aeration is optimal.  Impression: No acute intracranial findings identified.    Electronically signed by: Chapin Ward  Date:    10/27/2023  Time:    16:26      ASSESSMENT & PLAN:   Hypotension with syncope-suspect IVVD  Laxative induced diarrhea contributing to above  Acute on chronic anemia     HX:  DVT/PE on Eliquis, CAD, VHD, dementia, bladder cancer with pulmonary metastasis awaiting immunotherapy, HTN, DM2, GERD    -hold Eliquis, check FOBT and anemia labs   -continue close HD monitoring overnight   -echocardiogram given multiple cardiac comorbidities  -resume home meds pending med rec    DVT prophylaxis: SCDs  (resume Eliquis when GIB  excluded)  Code status: full    If patient was admitted under observational status it is with my approval/permission.     At least 55 min was spent on this history and physical.  Time seen: 10PM 10/27/23  Jomar Samaniego MD

## 2023-10-29 VITALS
SYSTOLIC BLOOD PRESSURE: 106 MMHG | OXYGEN SATURATION: 96 % | RESPIRATION RATE: 18 BRPM | BODY MASS INDEX: 28.35 KG/M2 | WEIGHT: 160 LBS | HEIGHT: 63 IN | TEMPERATURE: 98 F | HEART RATE: 68 BPM | DIASTOLIC BLOOD PRESSURE: 61 MMHG

## 2023-10-29 LAB
ANION GAP SERPL CALC-SCNC: 8 MEQ/L
BASOPHILS # BLD AUTO: 0.02 X10(3)/MCL
BASOPHILS NFR BLD AUTO: 0.3 %
BUN SERPL-MCNC: 9 MG/DL (ref 9.8–20.1)
CALCIUM SERPL-MCNC: 8.7 MG/DL (ref 8.4–10.2)
CHLORIDE SERPL-SCNC: 104 MMOL/L (ref 98–111)
CO2 SERPL-SCNC: 27 MMOL/L (ref 23–31)
CREAT SERPL-MCNC: 0.68 MG/DL (ref 0.55–1.02)
CREAT/UREA NIT SERPL: 13
EOSINOPHIL # BLD AUTO: 0.06 X10(3)/MCL (ref 0–0.9)
EOSINOPHIL NFR BLD AUTO: 1 %
ERYTHROCYTE [DISTWIDTH] IN BLOOD BY AUTOMATED COUNT: 14.3 % (ref 11.5–17)
GFR SERPLBLD CREATININE-BSD FMLA CKD-EPI: >60 MLS/MIN/1.73/M2
GLUCOSE SERPL-MCNC: 101 MG/DL (ref 75–121)
HCT VFR BLD AUTO: 29.2 % (ref 37–47)
HGB BLD-MCNC: 9.2 G/DL (ref 12–16)
IMM GRANULOCYTES # BLD AUTO: 0.03 X10(3)/MCL (ref 0–0.04)
IMM GRANULOCYTES NFR BLD AUTO: 0.5 %
LEFT CCA DIST DIAS: 12 CM/S
LEFT CCA DIST SYS: 80 CM/S
LEFT CCA PROX DIAS: 9 CM/S
LEFT CCA PROX SYS: 73 CM/S
LEFT ECA DIAS: 0 CM/S
LEFT ECA SYS: 96 CM/S
LEFT ICA DIST DIAS: 13 CM/S
LEFT ICA DIST SYS: 71 CM/S
LEFT ICA MID DIAS: 10 CM/S
LEFT ICA MID SYS: 57 CM/S
LEFT ICA PROX DIAS: 13 CM/S
LEFT ICA PROX SYS: 86 CM/S
LEFT VERTEBRAL SYS: 36 CM/S
LYMPHOCYTES # BLD AUTO: 1.18 X10(3)/MCL (ref 0.6–4.6)
LYMPHOCYTES NFR BLD AUTO: 19.7 %
MAGNESIUM SERPL-MCNC: 1.8 MG/DL (ref 1.6–2.6)
MCH RBC QN AUTO: 26.1 PG (ref 27–31)
MCHC RBC AUTO-ENTMCNC: 31.5 G/DL (ref 33–36)
MCV RBC AUTO: 83 FL (ref 80–94)
MONOCYTES # BLD AUTO: 0.58 X10(3)/MCL (ref 0.1–1.3)
MONOCYTES NFR BLD AUTO: 9.7 %
NEUTROPHILS # BLD AUTO: 4.13 X10(3)/MCL (ref 2.1–9.2)
NEUTROPHILS NFR BLD AUTO: 68.8 %
NRBC BLD AUTO-RTO: 0 %
OHS CV CAROTID RIGHT ICA EDV HIGHEST: 17
OHS CV CAROTID ULTRASOUND LEFT ICA/CCA RATIO: 1.08
OHS CV CAROTID ULTRASOUND RIGHT ICA/CCA RATIO: 0.93
OHS CV PV CAROTID LEFT HIGHEST CCA: 80
OHS CV PV CAROTID LEFT HIGHEST ICA: 86
OHS CV PV CAROTID RIGHT HIGHEST CCA: 88
OHS CV PV CAROTID RIGHT HIGHEST ICA: 82
OHS CV US CAROTID LEFT HIGHEST EDV: 13
PHOSPHATE SERPL-MCNC: 3.5 MG/DL (ref 2.3–4.7)
PLATELET # BLD AUTO: 178 X10(3)/MCL (ref 130–400)
PMV BLD AUTO: 10.9 FL (ref 7.4–10.4)
POCT GLUCOSE: 117 MG/DL (ref 70–110)
POCT GLUCOSE: 91 MG/DL (ref 70–110)
POTASSIUM SERPL-SCNC: 3.6 MMOL/L (ref 3.5–5.1)
RBC # BLD AUTO: 3.52 X10(6)/MCL (ref 4.2–5.4)
RIGHT CCA DIST DIAS: 8 CM/S
RIGHT CCA DIST SYS: 88 CM/S
RIGHT CCA PROX DIAS: 7 CM/S
RIGHT CCA PROX SYS: 71 CM/S
RIGHT ECA DIAS: 0 CM/S
RIGHT ECA SYS: 103 CM/S
RIGHT ICA DIST DIAS: 12 CM/S
RIGHT ICA DIST SYS: 59 CM/S
RIGHT ICA MID DIAS: 17 CM/S
RIGHT ICA MID SYS: 75 CM/S
RIGHT ICA PROX DIAS: 15 CM/S
RIGHT ICA PROX SYS: 82 CM/S
RIGHT VERTEBRAL DIAS: 9 CM/S
RIGHT VERTEBRAL SYS: 49 CM/S
SODIUM SERPL-SCNC: 139 MMOL/L (ref 132–146)
WBC # SPEC AUTO: 6 X10(3)/MCL (ref 4.5–11.5)

## 2023-10-29 PROCEDURE — 25000003 PHARM REV CODE 250: Performed by: STUDENT IN AN ORGANIZED HEALTH CARE EDUCATION/TRAINING PROGRAM

## 2023-10-29 PROCEDURE — 80048 BASIC METABOLIC PNL TOTAL CA: CPT | Performed by: STUDENT IN AN ORGANIZED HEALTH CARE EDUCATION/TRAINING PROGRAM

## 2023-10-29 PROCEDURE — 25000003 PHARM REV CODE 250: Performed by: INTERNAL MEDICINE

## 2023-10-29 PROCEDURE — 85025 COMPLETE CBC W/AUTO DIFF WBC: CPT | Performed by: STUDENT IN AN ORGANIZED HEALTH CARE EDUCATION/TRAINING PROGRAM

## 2023-10-29 PROCEDURE — 84100 ASSAY OF PHOSPHORUS: CPT | Performed by: STUDENT IN AN ORGANIZED HEALTH CARE EDUCATION/TRAINING PROGRAM

## 2023-10-29 PROCEDURE — 83735 ASSAY OF MAGNESIUM: CPT | Performed by: STUDENT IN AN ORGANIZED HEALTH CARE EDUCATION/TRAINING PROGRAM

## 2023-10-29 RX ADMIN — MEMANTINE 10 MG: 5 TABLET ORAL at 09:10

## 2023-10-29 RX ADMIN — PANTOPRAZOLE SODIUM 40 MG: 40 TABLET, DELAYED RELEASE ORAL at 09:10

## 2023-10-29 RX ADMIN — CARVEDILOL 25 MG: 12.5 TABLET, FILM COATED ORAL at 09:10

## 2023-10-29 RX ADMIN — LORAZEPAM 0.5 MG: 0.5 TABLET ORAL at 01:10

## 2023-10-29 RX ADMIN — Medication 1000 MG: at 09:10

## 2023-10-29 RX ADMIN — QUETIAPINE FUMARATE 25 MG: 25 TABLET ORAL at 09:10

## 2023-10-29 RX ADMIN — AMLODIPINE BESYLATE 2.5 MG: 2.5 TABLET ORAL at 09:10

## 2023-10-29 RX ADMIN — SERTRALINE HYDROCHLORIDE 50 MG: 50 TABLET ORAL at 09:10

## 2023-10-29 NOTE — PROGRESS NOTES
Inpatient Nutrition Evaluation    Admit Date: 10/27/2023   Total duration of encounter: 2 days    Nutrition Recommendation/Prescription     Continue diabetic diet.    Vanilla Boost GC TID. 190 kcals and 14 g protein per serving.     Nutrition Assessment     Chart Review    Reason Seen: malnutrition screening tool (MST)    Malnutrition Screening Tool Results   Have you recently lost weight without trying?: Unsure  Have you been eating poorly because of a decreased appetite?: No   MST Score: 2     Diagnosis:  Syncope 2/2 Intravascular Depletion  Hypotension 2/2 Intravascular Depletion  Normocytic Anemia  Bladder cancer, metastatic (lungs), awaiting immunotherapy  CAD/PCI  VHD, AS s/p TAVR  DVT/PE, status post IVC filter, on Eliquis  HTN  DM2  GERD  PARAMJIT   SSS status post ppm  HLP  Dementia    Relevant Medical History:   Past Medical History:   Diagnosis Date    Anxiety disorder     Aortic stenosis     Arthritis     Carpal tunnel syndrome     Coronary artery disease     Dementia     Depression     Diabetes mellitus     GERD (gastroesophageal reflux disease)     Hx of rectal polypectomy     Hypercholesterolemia     Hypertension     Obstructive sleep apnea     Respiratory distress     S/P TAVR (transcatheter aortic valve replacement)     TIA (transient ischemic attack)        Review of patient's allergies indicates:   Allergen Reactions    Statins-hmg-coa reductase inhibitors      Other reaction(s): Joint pain    Bimatoprost      Other reaction(s): Eye redness    Gabapentin      Other reaction(s): Confusion    Lisinopril     Rosuvastatin Itching    Tafluprost (pf)         Nutrition-Related Medications:    amLODIPine  2.5 mg Oral Daily    ascorbic acid (vitamin C)  1,000 mg Oral Daily    carvediloL  25 mg Oral BID    memantine  10 mg Oral BID    pantoprazole  40 mg Oral Daily    QUEtiapine  25 mg Oral BID    sertraline  50 mg Oral Daily      Insulin PRN     Calorie Containing IV Medications: no significant kcals from  "medications at this time    Nutrition-Related Labs:  Hemoglobin A1c   Date Value Ref Range Status   10/04/2023 5.5 <=7.0 % Final   07/15/2022 4.9 <=7.0 % Final   05/04/2020 5.5 <<=7.0 % Final     10/29/2023: Magnesium Level 1.80 mg/dL (Ref range: 1.60 - 2.60 mg/dL); Phosphorus Level 3.5 mg/dL (Ref range: 2.3 - 4.7 mg/dL); Potassium Level 3.6 mmol/L (Ref range: 3.5 - 5.1 mmol/L); Sodium Level 139 mmol/L (Ref range: 132 - 146 mmol/L)   Recent Labs   Lab 10/27/23  1646 10/27/23  2137 10/28/23  0511 10/29/23  0850   WBC 7.81  --  7.01 6.00   RBC 3.15*  --  3.56* 3.52*   HGB 8.4* 9.0* 9.4* 9.2*   HCT 26.8* 28.8* 30.5* 29.2*   MCV 85.1  --  85.7 83.0   MCH 26.7*  --  26.4* 26.1*   MCHC 31.3*  --  30.8* 31.5*     Recent Labs   Lab 10/27/23  1646 10/28/23  0511 10/29/23  0704    141 139   K 4.0 3.7 3.6   CO2 28 28 27   BUN 15.2 10.7 9.0*   CREATININE 0.82 0.68 0.68   GLUCOSE 119 106 101   CALCIUM 8.6 8.9 8.7   MG  --   --  1.80   ALBUMIN 2.2* 2.4*  --    ALKPHOS 98 107  --    ALT 11 13  --    AST 21 18  --    BILITOT 0.3 0.4  --        Diet Order: Diet diabetic  Oral Supplement Order: none  Appetite/Oral Intake: fair/50-75% of meals  Factors Affecting Nutritional Intake: none identified  Food/Hindu/Cultural Preferences: none reported  Food Allergies: none reported       Wound(s):   n/a    Comments    10/28/23: Pt w/ dementia, family did most of the talking, they report general decline in appetite over extended period of time, has lost weight over that time but not at a rate consistent with malnutrition criteria (10 lbs/6% over 1 year, 7 lbs/4% over 7 months), well nourished per physical exam, chews/swallows well, had some diarrhea for 3-4 days prior to admit 2/2 laxatives - improved, no GI complaints. PO intake encouraged, agrees to vanilla Boost GC.      Anthropometrics    Height: 5' 2.99" (160 cm)    Last Weight: 72.6 kg (160 lb) (10/28/23 1120) Weight Method: Standard Scale  BMI (Calculated): 28.4  BMI " Classification: overweight (BMI 25-29.9)     Ideal Body Weight (IBW), Female: 114.95 lb     % Ideal Body Weight, Female (lb): 139.19 %                             Usual Weight Provided By: patient, family/caregiver, and EMR weight history    Wt Readings from Last 5 Encounters:   10/28/23 72.6 kg (160 lb)   10/06/23 73.9 kg (163 lb)   04/19/23 73.9 kg (163 lb)   04/19/23 73.9 kg (162 lb 14.7 oz)   04/18/23 74.8 kg (165 lb)   10/5/22: 170 lbs     Weight Change(s) Since Admission:  Admit Weight: 72.6 kg (160 lb) (10/27/23 1530)      Patient Education    Not applicable.    Monitoring & Evaluation     Dietitian will monitor food and beverage intake, weight, electrolyte/renal panel, and gastrointestinal profile.  Nutrition Risk/Follow-Up: low (follow-up in 5-7 days)  Patients assigned 'low nutrition risk' status do not qualify for a full nutritional assessment but will be monitored and re-evaluated in a 5-7 day time period. Please consult if re-evaluation needed sooner.

## 2023-10-29 NOTE — PROGRESS NOTES
[facility] Hospital Medicine Progress Note        Chief Complaint: Inpatient Follow-up for     HPI:   89-year-old female with dementia, bladder cancer status post radiation awaiting immunotherapy with known lung metastases, CAD, VHD, HTN, DM2, PARAMJIT, SSS status post ppm and additional past medical history as below who was brought in for further evaluation after family members found her unresponsive.  Reportedly patient had been constipated and took MiraLax after which she had 3 or 4 days loose stools.  Family members noted she was less talkative than usual then refused to eat or drink and then ultimately was unresponsive in her chair at which time EMS was activated and she was found to have a systolic blood pressure in the 50s on arrival.  She was given IV fluids in route.       On arrival to the ER she was hemodynamically stable and afebrile.  Laboratory work showed a hemoglobin of hemoglobin of 8.4 (prior being 10.1 a few weeks ago), elevated BNP.  Chest x-ray showed scattered pulmonary nodules and CT head was unremarkable.  Received IV LR in ER. She was admitted to the hospitalist service for further management. Asymptomatic once admitted & at baseline mentation. Echocardiogram done which showed EF 60-65%, grade I DD, mild AS, mild MS, mild MR, mild MT. US Carotids ordered which was unremarkable.      Interval Hx:   Today, Mrs Oliveros stated she was doing well and had no new complaints. Consulted PT for DC planning, patient wheel chair bound at home. Will give IVF boluses as needed.    Case was discussed with patient's nurse and  on the floor.    Objective/physical exam:  General: alert lady lying comfortably in bed, in no acute distress\  HENT: oral and oropharyngeal mucosa moist, pink, with no erythema or exudates, no ear pain or discharge  Neck: normal neck movement, no lymph nodes or swellings, no JVD or Carotid bruit  Respiratory: clear breathing sounds bilaterally,  no crackles, rales, ronchi or wheezes  Cardiovascular: clear S1 and S2, no murmurs, rubs or gallops  Peripheral Vascular: no lesions, ulcers or erosions, normal peripheral pulses and no pedal edema  Gastrointestinal: soft, non-tender, non-distended abdomen, no guarding, rigidity or rebound tenderness, normal bowel sounds  Integumentary: normal skin color, no rashes or lesions  Neuro: AAO x 3; motor strength 5/5 in B/L UEs & LEs; sensation intact to gross and fine touch B/L; CN II-XII grossly intact    VITAL SIGNS: 24 HRS MIN & MAX LAST   Temp  Min: 97.4 °F (36.3 °C)  Max: 99 °F (37.2 °C) 99 °F (37.2 °C)   BP  Min: 151/73  Max: 188/73 (!) 188/73   Pulse  Min: 64  Max: 85  75   Resp  Min: 16  Max: 19 18   SpO2  Min: 96 %  Max: 100 % 99 %     I have reviewed the following labs:  Recent Labs   Lab 10/27/23  1646 10/27/23  2137 10/28/23  0511   WBC 7.81  --  7.01   RBC 3.15*  --  3.56*   HGB 8.4* 9.0* 9.4*   HCT 26.8* 28.8* 30.5*   MCV 85.1  --  85.7   MCH 26.7*  --  26.4*   MCHC 31.3*  --  30.8*   RDW 14.3  --  14.3     --  186   MPV 10.9*  --  11.7*     Recent Labs   Lab 10/27/23  1646 10/28/23  0511    141   K 4.0 3.7   CO2 28 28   BUN 15.2 10.7   CREATININE 0.82 0.68   CALCIUM 8.6 8.9   ALBUMIN 2.2* 2.4*   ALKPHOS 98 107   ALT 11 13   AST 21 18   BILITOT 0.3 0.4     Assessment/Plan:  Syncope 2/2 Intravascular Depletion  Hypotension 2/2 Intravascular Depletion  Normocytic Anemia  Bladder cancer, metastatic (lungs), awaiting immunotherapy  CAD/PCI  VHD, AS s/p TAVR  DVT/PE, status post IVC filter, on Eliquis  HTN  DM2  GERD  PARAMJIT   SSS status post ppm  HLP  Dementia    Patient continues to be admitted for close monitoring  Reporting no new complaints  Will give IVF boluses as needed for hypotension  PT consulted for DC planning  Continued on Ascorbic Acid, Coreg 25 mg BID, Memantine 10 mg BID, Protonix 40 mg, Quetiapine 25 mg QHS, Sertraline 50 mg  ISS LD ordered  Continue monitoring symptoms    VTE  prophylaxis: SCDs    Patient condition:  Stable    Anticipated discharge and Disposition:     Pending    All diagnosis and differential diagnosis have been reviewed; assessment and plan has been documented; I have personally reviewed the labs and test results that are presently available; I have reviewed the patients medication list; I have reviewed the consulting providers response and recommendations. I have reviewed or attempted to review medical records based upon their availability    All of the patient's questions have been  addressed and answered. Patient's is agreeable to the above stated plan. I will continue to monitor closely and make adjustments to medical management as needed.  _____________________________________________________________________    Radiology:  I have personally reviewed the following imaging and agree with the radiologist.     Echo    Left Ventricle: The left ventricle is normal in size. There is   concentric hypertrophy. There is normal systolic function with a visually   estimated ejection fraction of 60 - 65%. Grade I diastolic dysfunction.    Right Ventricle: Normal right ventricular cavity size. Systolic   function is normal.    Aortic Valve: The aortic valve is a trileaflet valve. There is mild   stenosis. Aortic valve area by VTI is 1.08 cm². Aortic valve peak velocity   is 3.01 m/s. Mean gradient is 19 mmHg. The dimensionless index is 0.42.    Mitral Valve: There is mild mitral annular calcification present. There   is mild stenosis. The mean pressure gradient across the mitral valve is 5   mmHg at a heart rate of  bpm. There is mild regurgitation.    Pulmonic Valve: There is mild regurgitation.      Timi Solorzano MD   10/28/2023

## 2023-10-30 ENCOUNTER — PATIENT OUTREACH (OUTPATIENT)
Dept: ADMINISTRATIVE | Facility: CLINIC | Age: 88
End: 2023-10-30
Payer: MEDICARE

## 2023-10-30 LAB — POCT GLUCOSE: 104 MG/DL (ref 70–110)

## 2023-10-30 NOTE — PROGRESS NOTES
C3 nurse attempted to contact Miguel Angel Oliveros for a TCC post hospital discharge follow up call.  Spoke with daughterSnehal who stated she was not familiar with patient's medications and would be best to speak with daughterLatrice. C3 nurse attempted to contact Latrice alonso.  No answer.  Voicemail left. The patient does not have a scheduled HOSFU appointment, and the pt does not have an [facility] PCP.

## 2023-10-31 NOTE — PROGRESS NOTES
Protocol: [employer] Effectiveness Study  Identifier: [id number]  IRB#: 2022.247  PI: Dr. Glenn Dooley, PhD  CO-I: Dr. Carolyn Boswell PsyD  Version Date: 12/05/2022  Pt Study ID: [id number]  Visit Month: M3     Timeline:   (*Note for CTN - complete timeline using completed or planned date for study events. Add any important events (i.e. Withdrawals, Lost to Follow Up, Adverse Events, etc.).  Consent: Completed(8/23/23)  Baseline questionnaires: Completed(8/23/23)  6-month questionnaires: Planned(2/23/24)  12-month questionnaires: Planned(8/23/24)        Visit Note:   Spoke with caregiver German  (Son) for month 3 visit.  CG said pt was airlifted to [facility] due to low blood pressure.  She is home now and doing better.  Family is monitoring pt's BP at home.  CG said pt has a hospital follow up appt tomorrow with PCP.  CG denied any current needs but noted that his sister would like to speak with SW.  CG sister is off of work tomorrow and will call SW.  SW remains available.      Falls in the past month: 0  UTIs in the past month: 0  Hospital encounters in the past month (reported by caregiver): 0        Next monthly visit scheduled for: 11/29/23. Caregiver knows how to reach CTN, and is encouraged to do so, as needed before our next scheduled call.      Action items for CTN: Continue to follow

## 2023-10-31 NOTE — PROGRESS NOTES
C3 nurse spoke with Miguel Angel Oliveros's daughter, Latrice for a TCC post hospital discharge follow up call. The patient has a scheduled HOSFU appointment with Barrett Lopez MD (Family Medicine) on 11/1/23 @ 3:30.

## 2023-11-09 ENCOUNTER — HOSPITAL ENCOUNTER (INPATIENT)
Facility: HOSPITAL | Age: 88
LOS: 1 days | Discharge: HOME | DRG: 641 | End: 2023-11-11
Attending: FAMILY MEDICINE | Admitting: FAMILY MEDICINE
Payer: MEDICARE

## 2023-11-09 DIAGNOSIS — I95.0 IDIOPATHIC HYPOTENSION: Primary | ICD-10-CM

## 2023-11-09 DIAGNOSIS — E86.0 DEHYDRATION: ICD-10-CM

## 2023-11-09 DIAGNOSIS — R07.9 CHEST PAIN: ICD-10-CM

## 2023-11-09 LAB
ALBUMIN SERPL-MCNC: 2 G/DL (ref 3.4–4.8)
ALBUMIN/GLOB SERPL: 0.5 RATIO (ref 1.1–2)
ALP SERPL-CCNC: 97 UNIT/L (ref 40–150)
ALT SERPL-CCNC: 21 UNIT/L (ref 0–55)
AST SERPL-CCNC: 26 UNIT/L (ref 5–34)
BASOPHILS # BLD AUTO: 0.02 X10(3)/MCL
BASOPHILS NFR BLD AUTO: 0.3 %
BILIRUB SERPL-MCNC: 0.3 MG/DL
BUN SERPL-MCNC: 16 MG/DL (ref 9.8–20.1)
CALCIUM SERPL-MCNC: 9 MG/DL (ref 8.4–10.2)
CHLORIDE SERPL-SCNC: 105 MMOL/L (ref 98–111)
CO2 SERPL-SCNC: 25 MMOL/L (ref 23–31)
CREAT SERPL-MCNC: 0.78 MG/DL (ref 0.55–1.02)
EOSINOPHIL # BLD AUTO: 0.06 X10(3)/MCL (ref 0–0.9)
EOSINOPHIL NFR BLD AUTO: 0.8 %
ERYTHROCYTE [DISTWIDTH] IN BLOOD BY AUTOMATED COUNT: 15 % (ref 11.5–17)
GFR SERPLBLD CREATININE-BSD FMLA CKD-EPI: >60 MLS/MIN/1.73/M2
GLOBULIN SER-MCNC: 4 GM/DL (ref 2.4–3.5)
GLUCOSE SERPL-MCNC: 103 MG/DL (ref 75–121)
GROUP & RH: ABNORMAL
HCT VFR BLD AUTO: 24.9 % (ref 37–47)
HGB BLD-MCNC: 7.9 G/DL (ref 12–16)
IMM GRANULOCYTES # BLD AUTO: 0.04 X10(3)/MCL (ref 0–0.04)
IMM GRANULOCYTES NFR BLD AUTO: 0.5 %
INDIRECT COOMBS GEL: ABNORMAL
LYMPHOCYTES # BLD AUTO: 0.93 X10(3)/MCL (ref 0.6–4.6)
LYMPHOCYTES NFR BLD AUTO: 12.3 %
MAGNESIUM SERPL-MCNC: 2.1 MG/DL (ref 1.6–2.6)
MCH RBC QN AUTO: 26.6 PG (ref 27–31)
MCHC RBC AUTO-ENTMCNC: 31.7 G/DL (ref 33–36)
MCV RBC AUTO: 83.8 FL (ref 80–94)
MONOCYTES # BLD AUTO: 0.76 X10(3)/MCL (ref 0.1–1.3)
MONOCYTES NFR BLD AUTO: 10.1 %
NEUTROPHILS # BLD AUTO: 5.73 X10(3)/MCL (ref 2.1–9.2)
NEUTROPHILS NFR BLD AUTO: 76 %
PHOSPHATE SERPL-MCNC: 3.4 MG/DL (ref 2.3–4.7)
PLATELET # BLD AUTO: 198 X10(3)/MCL (ref 130–400)
PMV BLD AUTO: 9.6 FL (ref 7.4–10.4)
POTASSIUM SERPL-SCNC: 3.5 MMOL/L (ref 3.5–5.1)
PROT SERPL-MCNC: 6 GM/DL (ref 5.8–7.6)
RBC # BLD AUTO: 2.97 X10(6)/MCL (ref 4.2–5.4)
SODIUM SERPL-SCNC: 138 MMOL/L (ref 132–146)
SPECIMEN OUTDATE: ABNORMAL
WBC # SPEC AUTO: 7.54 X10(3)/MCL (ref 4.5–11.5)

## 2023-11-09 PROCEDURE — 25000003 PHARM REV CODE 250: Performed by: FAMILY MEDICINE

## 2023-11-09 PROCEDURE — 85025 COMPLETE CBC W/AUTO DIFF WBC: CPT | Performed by: FAMILY MEDICINE

## 2023-11-09 PROCEDURE — 86850 RBC ANTIBODY SCREEN: CPT | Performed by: FAMILY MEDICINE

## 2023-11-09 PROCEDURE — G0378 HOSPITAL OBSERVATION PER HR: HCPCS

## 2023-11-09 PROCEDURE — 80053 COMPREHEN METABOLIC PANEL: CPT | Performed by: FAMILY MEDICINE

## 2023-11-09 PROCEDURE — G0379 DIRECT REFER HOSPITAL OBSERV: HCPCS

## 2023-11-09 PROCEDURE — 84100 ASSAY OF PHOSPHORUS: CPT | Performed by: FAMILY MEDICINE

## 2023-11-09 PROCEDURE — 86870 RBC ANTIBODY IDENTIFICATION: CPT | Performed by: FAMILY MEDICINE

## 2023-11-09 PROCEDURE — 83735 ASSAY OF MAGNESIUM: CPT | Performed by: FAMILY MEDICINE

## 2023-11-09 RX ORDER — GLUCAGON 1 MG
1 KIT INJECTION
Status: DISCONTINUED | OUTPATIENT
Start: 2023-11-09 | End: 2023-11-11 | Stop reason: HOSPADM

## 2023-11-09 RX ORDER — NITROGLYCERIN 0.4 MG/1
0.4 TABLET SUBLINGUAL EVERY 5 MIN PRN
Status: DISCONTINUED | OUTPATIENT
Start: 2023-11-09 | End: 2023-11-11 | Stop reason: HOSPADM

## 2023-11-09 RX ORDER — MEMANTINE HYDROCHLORIDE 5 MG/1
10 TABLET ORAL 2 TIMES DAILY
Status: DISCONTINUED | OUTPATIENT
Start: 2023-11-09 | End: 2023-11-11 | Stop reason: HOSPADM

## 2023-11-09 RX ORDER — SENNOSIDES 8.6 MG/1
1 TABLET ORAL DAILY PRN
Status: DISCONTINUED | OUTPATIENT
Start: 2023-11-09 | End: 2023-11-11 | Stop reason: HOSPADM

## 2023-11-09 RX ORDER — NALOXONE HCL 0.4 MG/ML
0.02 VIAL (ML) INJECTION
Status: DISCONTINUED | OUTPATIENT
Start: 2023-11-09 | End: 2023-11-11 | Stop reason: HOSPADM

## 2023-11-09 RX ORDER — HYDROCODONE BITARTRATE AND ACETAMINOPHEN 500; 5 MG/1; MG/1
TABLET ORAL
Status: DISCONTINUED | OUTPATIENT
Start: 2023-11-09 | End: 2023-11-11 | Stop reason: HOSPADM

## 2023-11-09 RX ORDER — LORAZEPAM 1 MG/1
1 TABLET ORAL EVERY 6 HOURS PRN
Status: DISCONTINUED | OUTPATIENT
Start: 2023-11-09 | End: 2023-11-11 | Stop reason: HOSPADM

## 2023-11-09 RX ORDER — DORZOLAMIDE HYDROCHLORIDE AND TIMOLOL MALEATE 20; 5 MG/ML; MG/ML
1 SOLUTION/ DROPS OPHTHALMIC DAILY
Status: DISCONTINUED | OUTPATIENT
Start: 2023-11-10 | End: 2023-11-11 | Stop reason: HOSPADM

## 2023-11-09 RX ORDER — SERTRALINE HYDROCHLORIDE 50 MG/1
50 TABLET, FILM COATED ORAL DAILY
Status: DISCONTINUED | OUTPATIENT
Start: 2023-11-10 | End: 2023-11-10

## 2023-11-09 RX ORDER — IBUPROFEN 200 MG
16 TABLET ORAL
Status: DISCONTINUED | OUTPATIENT
Start: 2023-11-09 | End: 2023-11-11 | Stop reason: HOSPADM

## 2023-11-09 RX ORDER — SODIUM CHLORIDE 9 MG/ML
INJECTION, SOLUTION INTRAVENOUS CONTINUOUS
Status: DISCONTINUED | OUTPATIENT
Start: 2023-11-09 | End: 2023-11-10

## 2023-11-09 RX ORDER — PANTOPRAZOLE SODIUM 40 MG/1
40 TABLET, DELAYED RELEASE ORAL DAILY
Status: DISCONTINUED | OUTPATIENT
Start: 2023-11-10 | End: 2023-11-11 | Stop reason: HOSPADM

## 2023-11-09 RX ORDER — SODIUM CHLORIDE 0.9 % (FLUSH) 0.9 %
10 SYRINGE (ML) INJECTION EVERY 12 HOURS PRN
Status: DISCONTINUED | OUTPATIENT
Start: 2023-11-09 | End: 2023-11-11 | Stop reason: HOSPADM

## 2023-11-09 RX ORDER — IBUPROFEN 200 MG
24 TABLET ORAL
Status: DISCONTINUED | OUTPATIENT
Start: 2023-11-09 | End: 2023-11-11 | Stop reason: HOSPADM

## 2023-11-09 RX ORDER — QUETIAPINE FUMARATE 25 MG/1
25 TABLET, FILM COATED ORAL 2 TIMES DAILY
Status: DISCONTINUED | OUTPATIENT
Start: 2023-11-09 | End: 2023-11-11 | Stop reason: HOSPADM

## 2023-11-09 RX ORDER — ATORVASTATIN CALCIUM 40 MG/1
40 TABLET, FILM COATED ORAL EVERY OTHER DAY
Status: DISCONTINUED | OUTPATIENT
Start: 2023-11-10 | End: 2023-11-11 | Stop reason: HOSPADM

## 2023-11-09 RX ADMIN — QUETIAPINE FUMARATE 25 MG: 25 TABLET ORAL at 09:11

## 2023-11-09 RX ADMIN — MEMANTINE HYDROCHLORIDE 10 MG: 5 TABLET ORAL at 09:11

## 2023-11-09 RX ADMIN — SODIUM CHLORIDE: 9 INJECTION, SOLUTION INTRAVENOUS at 05:11

## 2023-11-09 RX ADMIN — APIXABAN 5 MG: 5 TABLET, FILM COATED ORAL at 09:11

## 2023-11-09 RX ADMIN — LORAZEPAM 1 MG: 1 TABLET ORAL at 09:11

## 2023-11-10 LAB
ABO + RH BLD: NORMAL
ABO + RH BLD: NORMAL
ALBUMIN SERPL-MCNC: 2 G/DL (ref 3.4–4.8)
ALBUMIN/GLOB SERPL: 0.5 RATIO (ref 1.1–2)
ALP SERPL-CCNC: 96 UNIT/L (ref 40–150)
ALT SERPL-CCNC: 19 UNIT/L (ref 0–55)
AMORPH PHOS CRY URNS QL MICRO: ABNORMAL /HPF
ANTIBODY IDENTIFICATION: NORMAL
APPEARANCE UR: CLEAR
APPEARANCE UR: CLEAR
AST SERPL-CCNC: 25 UNIT/L (ref 5–34)
BACTERIA #/AREA URNS AUTO: ABNORMAL /HPF
BACTERIA #/AREA URNS AUTO: NORMAL /HPF
BASOPHILS # BLD AUTO: 0.03 X10(3)/MCL
BASOPHILS NFR BLD AUTO: 0.4 %
BILIRUB SERPL-MCNC: 0.4 MG/DL
BILIRUB UR QL STRIP.AUTO: NEGATIVE
BILIRUB UR QL STRIP.AUTO: NEGATIVE
BLD PROD TYP BPU: NORMAL
BLD PROD TYP BPU: NORMAL
BLOOD UNIT EXPIRATION DATE: NORMAL
BLOOD UNIT EXPIRATION DATE: NORMAL
BLOOD UNIT TYPE CODE: 5100
BLOOD UNIT TYPE CODE: 5100
BUN SERPL-MCNC: 12 MG/DL (ref 9.8–20.1)
CALCIUM SERPL-MCNC: 8.7 MG/DL (ref 8.4–10.2)
CHLORIDE SERPL-SCNC: 108 MMOL/L (ref 98–111)
CO2 SERPL-SCNC: 22 MMOL/L (ref 23–31)
COLOR UR AUTO: YELLOW
COLOR UR AUTO: YELLOW
CREAT SERPL-MCNC: 0.77 MG/DL (ref 0.55–1.02)
CROSSMATCH INTERPRETATION: NORMAL
CROSSMATCH INTERPRETATION: NORMAL
DISPENSE STATUS: NORMAL
DISPENSE STATUS: NORMAL
EOSINOPHIL # BLD AUTO: 0.04 X10(3)/MCL (ref 0–0.9)
EOSINOPHIL NFR BLD AUTO: 0.5 %
ERYTHROCYTE [DISTWIDTH] IN BLOOD BY AUTOMATED COUNT: 15.2 % (ref 11.5–17)
GFR SERPLBLD CREATININE-BSD FMLA CKD-EPI: >60 MLS/MIN/1.73/M2
GLOBULIN SER-MCNC: 3.9 GM/DL (ref 2.4–3.5)
GLUCOSE SERPL-MCNC: 128 MG/DL (ref 75–121)
GLUCOSE UR QL STRIP.AUTO: ABNORMAL
GLUCOSE UR QL STRIP.AUTO: NEGATIVE
HCT VFR BLD AUTO: 25.7 % (ref 37–47)
HGB BLD-MCNC: 8.4 G/DL (ref 12–16)
IMM GRANULOCYTES # BLD AUTO: 0.02 X10(3)/MCL (ref 0–0.04)
IMM GRANULOCYTES NFR BLD AUTO: 0.3 %
KETONES UR QL STRIP.AUTO: NEGATIVE
KETONES UR QL STRIP.AUTO: NEGATIVE
LEUKOCYTE ESTERASE UR QL STRIP.AUTO: ABNORMAL
LEUKOCYTE ESTERASE UR QL STRIP.AUTO: ABNORMAL
LYMPHOCYTES # BLD AUTO: 0.78 X10(3)/MCL (ref 0.6–4.6)
LYMPHOCYTES NFR BLD AUTO: 10.5 %
MAGNESIUM SERPL-MCNC: 2 MG/DL (ref 1.6–2.6)
MCH RBC QN AUTO: 26.7 PG (ref 27–31)
MCHC RBC AUTO-ENTMCNC: 32.7 G/DL (ref 33–36)
MCV RBC AUTO: 81.6 FL (ref 80–94)
MONOCYTES # BLD AUTO: 0.5 X10(3)/MCL (ref 0.1–1.3)
MONOCYTES NFR BLD AUTO: 6.8 %
NEUTROPHILS # BLD AUTO: 6.03 X10(3)/MCL (ref 2.1–9.2)
NEUTROPHILS NFR BLD AUTO: 81.5 %
NITRITE UR QL STRIP.AUTO: NEGATIVE
NITRITE UR QL STRIP.AUTO: NEGATIVE
PH UR STRIP.AUTO: 7.5 [PH]
PH UR STRIP.AUTO: 7.5 [PH]
PHOSPHATE SERPL-MCNC: 3 MG/DL (ref 2.3–4.7)
PLATELET # BLD AUTO: 217 X10(3)/MCL (ref 130–400)
PMV BLD AUTO: 10.2 FL (ref 7.4–10.4)
POTASSIUM SERPL-SCNC: 3.5 MMOL/L (ref 3.5–5.1)
PROT SERPL-MCNC: 5.9 GM/DL (ref 5.8–7.6)
PROT UR QL STRIP.AUTO: NEGATIVE
PROT UR QL STRIP.AUTO: NEGATIVE
RBC # BLD AUTO: 3.15 X10(6)/MCL (ref 4.2–5.4)
RBC #/AREA URNS AUTO: ABNORMAL /HPF
RBC #/AREA URNS AUTO: NORMAL /HPF
RBC UR QL AUTO: ABNORMAL
RBC UR QL AUTO: NEGATIVE
SODIUM SERPL-SCNC: 138 MMOL/L (ref 132–146)
SP GR UR STRIP.AUTO: 1.01 (ref 1–1.03)
SP GR UR STRIP.AUTO: 1.01 (ref 1–1.03)
SQUAMOUS #/AREA URNS AUTO: ABNORMAL /HPF
SQUAMOUS #/AREA URNS AUTO: NORMAL /HPF
UNIT NUMBER: NORMAL
UNIT NUMBER: NORMAL
UROBILINOGEN UR STRIP-ACNC: 0.2
UROBILINOGEN UR STRIP-ACNC: 0.2
WBC # SPEC AUTO: 7.4 X10(3)/MCL (ref 4.5–11.5)
WBC #/AREA URNS AUTO: ABNORMAL /HPF
WBC #/AREA URNS AUTO: NORMAL /HPF

## 2023-11-10 PROCEDURE — 36430 TRANSFUSION BLD/BLD COMPNT: CPT

## 2023-11-10 PROCEDURE — 86922 COMPATIBILITY TEST ANTIGLOB: CPT | Performed by: FAMILY MEDICINE

## 2023-11-10 PROCEDURE — P9016 RBC LEUKOCYTES REDUCED: HCPCS | Performed by: FAMILY MEDICINE

## 2023-11-10 PROCEDURE — 81001 URINALYSIS AUTO W/SCOPE: CPT | Performed by: FAMILY MEDICINE

## 2023-11-10 PROCEDURE — 25000003 PHARM REV CODE 250: Performed by: FAMILY MEDICINE

## 2023-11-10 PROCEDURE — 83735 ASSAY OF MAGNESIUM: CPT | Performed by: FAMILY MEDICINE

## 2023-11-10 PROCEDURE — 85025 COMPLETE CBC W/AUTO DIFF WBC: CPT | Performed by: FAMILY MEDICINE

## 2023-11-10 PROCEDURE — 80053 COMPREHEN METABOLIC PANEL: CPT | Performed by: FAMILY MEDICINE

## 2023-11-10 PROCEDURE — 84100 ASSAY OF PHOSPHORUS: CPT | Performed by: FAMILY MEDICINE

## 2023-11-10 PROCEDURE — 11000001 HC ACUTE MED/SURG PRIVATE ROOM

## 2023-11-10 RX ORDER — ISOSORBIDE MONONITRATE 30 MG/1
30 TABLET, EXTENDED RELEASE ORAL DAILY
Status: DISCONTINUED | OUTPATIENT
Start: 2023-11-10 | End: 2023-11-11 | Stop reason: HOSPADM

## 2023-11-10 RX ORDER — DIPHENHYDRAMINE HCL 25 MG
25 CAPSULE ORAL EVERY 6 HOURS PRN
Status: DISCONTINUED | OUTPATIENT
Start: 2023-11-10 | End: 2023-11-11 | Stop reason: HOSPADM

## 2023-11-10 RX ORDER — DIPHENHYDRAMINE HCL 25 MG
25 CAPSULE ORAL EVERY 6 HOURS PRN
Status: DISCONTINUED | OUTPATIENT
Start: 2023-11-10 | End: 2023-11-10

## 2023-11-10 RX ORDER — ACETAMINOPHEN 325 MG/1
650 TABLET ORAL EVERY 6 HOURS PRN
Status: DISCONTINUED | OUTPATIENT
Start: 2023-11-10 | End: 2023-11-11 | Stop reason: HOSPADM

## 2023-11-10 RX ORDER — SERTRALINE HYDROCHLORIDE 50 MG/1
100 TABLET, FILM COATED ORAL DAILY
Status: DISCONTINUED | OUTPATIENT
Start: 2023-11-11 | End: 2023-11-11 | Stop reason: HOSPADM

## 2023-11-10 RX ORDER — CARVEDILOL 25 MG/1
25 TABLET ORAL 2 TIMES DAILY
Status: DISCONTINUED | OUTPATIENT
Start: 2023-11-10 | End: 2023-11-11 | Stop reason: HOSPADM

## 2023-11-10 RX ADMIN — QUETIAPINE FUMARATE 25 MG: 25 TABLET ORAL at 09:11

## 2023-11-10 RX ADMIN — PANTOPRAZOLE SODIUM 40 MG: 40 TABLET, DELAYED RELEASE ORAL at 09:11

## 2023-11-10 RX ADMIN — APIXABAN 5 MG: 5 TABLET, FILM COATED ORAL at 09:11

## 2023-11-10 RX ADMIN — DIPHENHYDRAMINE HYDROCHLORIDE 25 MG: 25 CAPSULE ORAL at 01:11

## 2023-11-10 RX ADMIN — MEMANTINE HYDROCHLORIDE 10 MG: 5 TABLET ORAL at 09:11

## 2023-11-10 RX ADMIN — SERTRALINE HYDROCHLORIDE 50 MG: 50 TABLET ORAL at 09:11

## 2023-11-10 RX ADMIN — CARVEDILOL 25 MG: 25 TABLET, FILM COATED ORAL at 08:11

## 2023-11-10 RX ADMIN — APIXABAN 5 MG: 5 TABLET, FILM COATED ORAL at 08:11

## 2023-11-10 RX ADMIN — MEMANTINE HYDROCHLORIDE 10 MG: 5 TABLET ORAL at 08:11

## 2023-11-10 RX ADMIN — QUETIAPINE FUMARATE 25 MG: 25 TABLET ORAL at 08:11

## 2023-11-10 RX ADMIN — ACETAMINOPHEN 650 MG: 325 TABLET, FILM COATED ORAL at 01:11

## 2023-11-10 RX ADMIN — ATORVASTATIN CALCIUM 40 MG: 40 TABLET, FILM COATED ORAL at 09:11

## 2023-11-10 RX ADMIN — DORZOLAMIDE HYDROCHLORIDE AND TIMOLOL MALEATE 1 DROP: 20; 5 SOLUTION/ DROPS OPHTHALMIC at 09:11

## 2023-11-10 NOTE — H&P
[facility]                     [address] [facility] [address]    PATIENT NAME:       MIGUEL ANGEL OLIVEROS  YOB: 1933  CSN:                [id number]   MRN:                [id number]  ADMIT DATE:         11/09/2023 16:18:00  PHYSICIAN:          Barrett Lopez MD                        HISTORY AND PHYSICAL      HISTORY OF PRESENT ILLNESS:  Ms. Miguel Angel Oliveros presents to our office with low   blood pressures.  She has had a history of same recently.  She is weak and most   recently had been flown to [facility] via helicopter from our ER here in   [place] secondary to dehydration.  Records are on the chart.  She is accompanied   by her son, German.  She had a recent fall at home with a head contusion and   left thumb pain.  She has been admitted for workup of her hypotension and a   contusion of her head and thumb.    MEDICATIONS:  Currently include Eliquis 5 mg half of a tablet once a day.    Potassium 10 mEq 1 p.o. daily.  She takes Valium 5 mg twice a day as needed for   confusion or anxiety.  She takes dorzolamide-timolol eye drops 1 drop to   affected eye twice a day, Lasix 40 mg once a day, magnesium 250 mg 1 p.o. daily,   Namenda 10 mg twice a day, methenamine-sodium phosphate 500-500 mg 1 p.o.   daily, multivitamin 1 p.o. daily, nitroglycerin 0.4 mg sublingual q.5 minutes.    Seroquel 25 mg twice a day, one in the p.m., half of the morning.  Zoloft 50 mg   1 p.o. daily.  Vitamin C 500 mg 1 p.o. daily.  Zofran 4 mg 1 q.6 hours p.r.n.   nausea, vomiting.  Isosorbide mononitrate 30 mg 1 a day, Linzess 145 mcg 1 p.o.   daily, lorazepam 1 mg 1 p.o. as needed twice a day for anxiety.  Levsin 0.125 mg   1 p.o. daily as needed for bladder spasms.  Atorvastatin 40 mg 1 p.o. daily.    MiraLAX 17 g in 8 ounces of water daily for bowel movements.  Megace 400 mg per   10 mL suspension 10 mL daily.  Coreg 6.25 mg 1  twice a day.  Protonix 40 mg 1   p.o. daily.    IMMUNIZATIONS:  She has not had her flu shot this year.  She had her pneumonia   shots in the past.  She had a Zostavax in the past.  She has not had a Shingrix.    She has not had an RSV vaccine.    CODE STATUS:  Full so far.    FAMILY MEDICAL HISTORY:  Significant for father being .  Mother   .  One son is  secondary to brain tumor.    PAST MEDICAL HISTORY:  Significant for anxiety, depression, arthritis   generalized, hypertension, coronary artery disease, diabetes type 2.  She has   had multiple falls at home, gastroesophageal reflux disease, hyperlipidemia,   heart murmur secondary to aortic valve insufficiency, obstructive sleep apnea,   open-angle glaucoma bilateral eyes.  She has vertigo aortic stenosis which is   severe, vitamin D deficiency, and bladder cancer.  She has had a pulmonary   embolism.    SURGERIES:  In past include a stent placement per Dr. Aguilera, left anterior   descending coronary artery and right coronary artery in .  She had a   polypectomy of her colon , hysterectomy, EGD, cataract surgery,   tonsillectomy, TAVR and fluid flow articular injection of left knee under   ultrasound guidance in .  She also had an embolectomy of the pulmonary   artery.  Transurethral resection of bladder tumor per Dr. Long  and an   inferior vena cava filter/Pramod filter placement in .  She had a TURBT   in  also.    SOCIAL HISTORY:  She lives at home.  She is .  Does not smoke.  Does not   drink.  Does not use drugs.    PHYSICAL EXAMINATION:  VITAL SIGNS:  Her blood pressure is low as stated before.  Other vitals on the   chart.  She is afebrile.  HEENT:  Shows a dry oral mucosa.  NECK:  Supple.  Full range of motion.  No significant adenopathy.    GENERAL:  She is in a wheelchair, very weak appearing.     HEART:  Regular rate and rhythm with a 3/6 systolic ejection murmur.   LUNGS:  Minimal crackles  in the bases.    Neck:  No significant adenopathy.  ABDOMEN:  Soft.  EXTREMITIES:  No significant edema.    , RECTAL, BREASTS:  Examination deferred at this time.    NEUROLOGICAL:  She is very weak.  She is alert, but she is a poor historian.    ASSESSMENT:    1. Intravascular volume depletion with hypotension.  2. Generalized weakness.  3. Bladder cancer/metastatic to lungs.  She has been through her first dose of   immunotherapy for chemotherapy.  4. Status post fall with a contusion to the head.  Currently, her head is   atraumatic.  5. Thumb trauma.  She does have a left thumb with some bruising and edema.  6. Multiple other medical problems as per problem list.    PLAN:  Admit patient to hospital.  Get a CT of her head.  No contrast.  Get an   x-ray of her thumb.  Rule out fracture.  Get baseline labs.  Give IV fluids.    Hold blood pressure medicines for right now.        ______________________________  MD NILS Unger/LEEANNE  DD:  11/10/2023  Time:  09:25AM  DT:  11/10/2023  Time:  10:56AM  Job #:  [id number]      HISTORY AND PHYSICAL

## 2023-11-10 NOTE — PLAN OF CARE
Routine DC screening done with daughter at bedside. She lives in home with pt/mother. Pt does not ambulate. Transfers from bed to wheelchair. Has all needed DME. Daughter helps with set up/assistance for ADLs but pt is able to feed self, clean self, etc. Daughter drives. Current with [facility]. PCP- Jessica. Rx- [facility] in [place].

## 2023-11-10 NOTE — PLAN OF CARE
Problem: Adult Inpatient Plan of Care  Goal: Plan of Care Review  Outcome: Ongoing, Progressing  Goal: Patient-Specific Goal (Individualized)  Outcome: Ongoing, Progressing  Goal: Absence of Hospital-Acquired Illness or Injury  Outcome: Ongoing, Progressing  Goal: Optimal Comfort and Wellbeing  Outcome: Ongoing, Progressing  Goal: Readiness for Transition of Care  Outcome: Ongoing, Progressing     Problem: Fall Injury Risk  Goal: Absence of Fall and Fall-Related Injury  Outcome: Ongoing, Progressing     Problem: Skin Injury Risk Increased  Goal: Skin Health and Integrity  Outcome: Ongoing, Progressing     Problem: Behavioral Health Comorbidity  Goal: Maintenance of Behavioral Health Symptom Control  Outcome: Ongoing, Progressing     Problem: Hypertension Comorbidity  Goal: Blood Pressure in Desired Range  Outcome: Ongoing, Progressing     Problem: Osteoarthritis Comorbidity  Goal: Maintenance of Osteoarthritis Symptom Control  Outcome: Ongoing, Progressing     Problem: Pain Acute  Goal: Acceptable Pain Control and Functional Ability  Outcome: Ongoing, Progressing     Problem: Fatigue  Goal: Improved Activity Tolerance  Outcome: Ongoing, Progressing     Problem: Diabetes Comorbidity  Goal: Blood Glucose Level Within Targeted Range  Outcome: Ongoing, Progressing     Problem: Balance Impairment (Functional Deficit)  Goal: Improved Balance and Postural Control  Outcome: Ongoing, Progressing     Problem: Cognitive Impairment (Functional Deficit)  Goal: Optimal Functional [place]  Outcome: Ongoing, Progressing     Problem: Coordination Impairment (Functional Deficit)  Goal: Optimal Coordination  Outcome: Ongoing, Progressing     Problem: Muscle Strength Impairment (Functional Deficit)  Goal: Improved Muscle Strength  Outcome: Ongoing, Progressing     Problem: Muscle Tone Impairment (Functional Deficit)  Goal: Improved Muscle Tone  Outcome: Ongoing, Progressing     Problem: Range of Motion Impairment (Functional  Deficit)  Goal: Optimal Range of Motion  Outcome: Ongoing, Progressing     Problem: Sensory Impairment (Functional Deficit)  Goal: Compensation for Sensory Deficit  Outcome: Ongoing, Progressing     Problem: Anemia  Goal: Anemia Symptom Improvement  Outcome: Ongoing, Progressing     Problem: Fluid Volume Deficit  Goal: Fluid Balance  Outcome: Ongoing, Progressing

## 2023-11-11 VITALS
HEART RATE: 81 BPM | RESPIRATION RATE: 18 BRPM | SYSTOLIC BLOOD PRESSURE: 165 MMHG | WEIGHT: 160.06 LBS | DIASTOLIC BLOOD PRESSURE: 54 MMHG | BODY MASS INDEX: 28.36 KG/M2 | OXYGEN SATURATION: 97 % | TEMPERATURE: 98 F

## 2023-11-11 PROBLEM — D64.89 OTHER SPECIFIED ANEMIAS: Status: ACTIVE | Noted: 2023-11-11

## 2023-11-11 PROBLEM — I95.0 IDIOPATHIC HYPOTENSION: Status: ACTIVE | Noted: 2023-11-11

## 2023-11-11 LAB
ALBUMIN SERPL-MCNC: 2.1 G/DL (ref 3.4–4.8)
ALBUMIN/GLOB SERPL: 0.5 RATIO (ref 1.1–2)
ALP SERPL-CCNC: 103 UNIT/L (ref 40–150)
ALT SERPL-CCNC: 21 UNIT/L (ref 0–55)
AST SERPL-CCNC: 27 UNIT/L (ref 5–34)
BASOPHILS # BLD AUTO: 0.03 X10(3)/MCL
BASOPHILS NFR BLD AUTO: 0.4 %
BILIRUB SERPL-MCNC: 1 MG/DL
BUN SERPL-MCNC: 10 MG/DL (ref 9.8–20.1)
CALCIUM SERPL-MCNC: 9.2 MG/DL (ref 8.4–10.2)
CHLORIDE SERPL-SCNC: 109 MMOL/L (ref 98–111)
CO2 SERPL-SCNC: 22 MMOL/L (ref 23–31)
CREAT SERPL-MCNC: 0.66 MG/DL (ref 0.55–1.02)
EOSINOPHIL # BLD AUTO: 0.07 X10(3)/MCL (ref 0–0.9)
EOSINOPHIL NFR BLD AUTO: 0.9 %
ERYTHROCYTE [DISTWIDTH] IN BLOOD BY AUTOMATED COUNT: 15 % (ref 11.5–17)
GFR SERPLBLD CREATININE-BSD FMLA CKD-EPI: >60 MLS/MIN/1.73/M2
GLOBULIN SER-MCNC: 4.2 GM/DL (ref 2.4–3.5)
GLUCOSE SERPL-MCNC: 95 MG/DL (ref 75–121)
HCT VFR BLD AUTO: 32.8 % (ref 37–47)
HGB BLD-MCNC: 10.8 G/DL (ref 12–16)
IMM GRANULOCYTES # BLD AUTO: 0.03 X10(3)/MCL (ref 0–0.04)
IMM GRANULOCYTES NFR BLD AUTO: 0.4 %
LYMPHOCYTES # BLD AUTO: 0.92 X10(3)/MCL (ref 0.6–4.6)
LYMPHOCYTES NFR BLD AUTO: 12 %
MAGNESIUM SERPL-MCNC: 2 MG/DL (ref 1.6–2.6)
MCH RBC QN AUTO: 26.7 PG (ref 27–31)
MCHC RBC AUTO-ENTMCNC: 32.9 G/DL (ref 33–36)
MCV RBC AUTO: 81 FL (ref 80–94)
MONOCYTES # BLD AUTO: 0.62 X10(3)/MCL (ref 0.1–1.3)
MONOCYTES NFR BLD AUTO: 8.1 %
NEUTROPHILS # BLD AUTO: 6.02 X10(3)/MCL (ref 2.1–9.2)
NEUTROPHILS NFR BLD AUTO: 78.2 %
PHOSPHATE SERPL-MCNC: 3.1 MG/DL (ref 2.3–4.7)
PLATELET # BLD AUTO: 226 X10(3)/MCL (ref 130–400)
PMV BLD AUTO: 10.4 FL (ref 7.4–10.4)
POTASSIUM SERPL-SCNC: 3.4 MMOL/L (ref 3.5–5.1)
PROT SERPL-MCNC: 6.3 GM/DL (ref 5.8–7.6)
RBC # BLD AUTO: 4.05 X10(6)/MCL (ref 4.2–5.4)
SODIUM SERPL-SCNC: 140 MMOL/L (ref 132–146)
WBC # SPEC AUTO: 7.69 X10(3)/MCL (ref 4.5–11.5)

## 2023-11-11 PROCEDURE — 83735 ASSAY OF MAGNESIUM: CPT | Performed by: FAMILY MEDICINE

## 2023-11-11 PROCEDURE — 85025 COMPLETE CBC W/AUTO DIFF WBC: CPT | Performed by: FAMILY MEDICINE

## 2023-11-11 PROCEDURE — 80053 COMPREHEN METABOLIC PANEL: CPT | Performed by: FAMILY MEDICINE

## 2023-11-11 PROCEDURE — 84100 ASSAY OF PHOSPHORUS: CPT | Performed by: FAMILY MEDICINE

## 2023-11-11 PROCEDURE — 25000003 PHARM REV CODE 250: Performed by: FAMILY MEDICINE

## 2023-11-11 RX ORDER — ACETAMINOPHEN 325 MG/1
650 TABLET ORAL EVERY 6 HOURS PRN
Refills: 0
Start: 2023-11-11

## 2023-11-11 RX ADMIN — QUETIAPINE FUMARATE 25 MG: 25 TABLET ORAL at 10:11

## 2023-11-11 RX ADMIN — ISOSORBIDE MONONITRATE 30 MG: 30 TABLET, EXTENDED RELEASE ORAL at 10:11

## 2023-11-11 RX ADMIN — CARVEDILOL 25 MG: 25 TABLET, FILM COATED ORAL at 10:11

## 2023-11-11 RX ADMIN — APIXABAN 5 MG: 5 TABLET, FILM COATED ORAL at 10:11

## 2023-11-11 RX ADMIN — DORZOLAMIDE HYDROCHLORIDE AND TIMOLOL MALEATE 1 DROP: 20; 5 SOLUTION/ DROPS OPHTHALMIC at 10:11

## 2023-11-11 RX ADMIN — PANTOPRAZOLE SODIUM 40 MG: 40 TABLET, DELAYED RELEASE ORAL at 10:11

## 2023-11-11 RX ADMIN — LORAZEPAM 1 MG: 1 TABLET ORAL at 05:11

## 2023-11-11 RX ADMIN — MEMANTINE HYDROCHLORIDE 10 MG: 5 TABLET ORAL at 10:11

## 2023-11-11 RX ADMIN — SERTRALINE HYDROCHLORIDE 100 MG: 50 TABLET ORAL at 10:11

## 2023-11-11 NOTE — PLAN OF CARE
Problem: Adult Inpatient Plan of Care  Goal: Plan of Care Review  Outcome: Ongoing, Progressing  Goal: Patient-Specific Goal (Individualized)  Outcome: Ongoing, Progressing  Goal: Absence of Hospital-Acquired Illness or Injury  Outcome: Ongoing, Progressing  Goal: Optimal Comfort and Wellbeing  Outcome: Ongoing, Progressing  Goal: Readiness for Transition of Care  Outcome: Ongoing, Progressing     Problem: Fall Injury Risk  Goal: Absence of Fall and Fall-Related Injury  Outcome: Ongoing, Progressing     Problem: Skin Injury Risk Increased  Goal: Skin Health and Integrity  Outcome: Ongoing, Progressing     Problem: Behavioral Health Comorbidity  Goal: Maintenance of Behavioral Health Symptom Control  Outcome: Ongoing, Progressing     Problem: Hypertension Comorbidity  Goal: Blood Pressure in Desired Range  Outcome: Ongoing, Progressing     Problem: Osteoarthritis Comorbidity  Goal: Maintenance of Osteoarthritis Symptom Control  Outcome: Ongoing, Progressing     Problem: Pain Acute  Goal: Acceptable Pain Control and Functional Ability  Outcome: Ongoing, Progressing     Problem: Fatigue  Goal: Improved Activity Tolerance  Outcome: Ongoing, Progressing     Problem: Diabetes Comorbidity  Goal: Blood Glucose Level Within Targeted Range  Outcome: Ongoing, Progressing     Problem: Balance Impairment (Functional Deficit)  Goal: Improved Balance and Postural Control  Outcome: Ongoing, Progressing     Problem: Cognitive Impairment (Functional Deficit)  Goal: Optimal Functional [place]  Outcome: Ongoing, Progressing     Problem: Coordination Impairment (Functional Deficit)  Goal: Optimal Coordination  Outcome: Ongoing, Progressing     Problem: Muscle Strength Impairment (Functional Deficit)  Goal: Improved Muscle Strength  Outcome: Ongoing, Progressing     Problem: Muscle Tone Impairment (Functional Deficit)  Goal: Improved Muscle Tone  Outcome: Ongoing, Progressing     Problem: Range of Motion Impairment (Functional  Deficit)  Goal: Optimal Range of Motion  Outcome: Ongoing, Progressing     Problem: Sensory Impairment (Functional Deficit)  Goal: Compensation for Sensory Deficit  Outcome: Ongoing, Progressing     Problem: Anemia  Goal: Anemia Symptom Improvement  Outcome: Ongoing, Progressing     Problem: Fluid Volume Deficit  Goal: Fluid Balance  Outcome: Ongoing, Progressing

## 2023-11-19 NOTE — PROGRESS NOTES
[facility]                     [address] [id number]    PATIENT NAME:       WENDY OLIVEROS  YOB: 1933  CSN:                [id number]   MRN:                [id number]  ADMIT DATE:         11/09/2023 16:18:00  PHYSICIAN:          Barrett Lopez MD                            PROGRESS NOTE    DATE:  11/10/2023 00:00:00    SUBJECTIVE:  Ms. Oliveros remains very weak.  She is not short of breath,   however.  She is lying in bed.  Family is at the bedside.     Labs, x-rays, and medications are on the chart and reviewed.    OBJECTIVE:  VITAL SIGNS:  Vitals are reviewed. She is afebrile.    HEART:  Distant, regular rate and rhythm.  LUNGS:  Minimal crackles in the bases.  ABDOMEN:  Obese and nontender.  EXTREMITIES:  No significant edema.    ASSESSMENT:    1. Symptomatic anemia.  She has multiple antibodies to the blood products.    Therefore, we just not getting her transfusions started.  2. Metastatic bladder cancer.  3. Confusion-improved today.  4. Generalized weakness.  5. Hypotension, improved today.    PLAN:  Continue current care and follow up in the morning.  Possibly discharge   then, if stable.        ______________________________  Barrett Lopez MD    PBS/AQS  DD:  11/19/2023  Time:  11:25AM  DT:  11/19/2023  Time:  01:51PM  Job #:  [id number]      PROGRESS NOTE

## 2023-11-19 NOTE — DISCHARGE SUMMARY
[facility]                     [address] [facility] [address]    PATIENT NAME:       WENDY HICKS  YOB: 1933  CSN:                [id number]   MRN:                [id number]  ADMIT DATE:         11/09/2023 16:18:00  PHYSICIAN:          Barrett Lopez MD                          DISCHARGE SUMMARY    DATE OF DISCHARGE:  11/11/2023 13:09:00    HOSPITAL COURSE:  She presented to my office with increased lethargy and a low   blood pressure.  She had recently been very dehydrated and hypotensive and she   had to be flown to [facility] by helicopter.  I suspected she was volume   depleted again and she was admitted.  After hospitalization, basic labs showed   that instead she was anemic.  Her H and H were 7.9 and 24.9, and of course, she   was symptomatic.  Her platelets and white blood cell count were normal.  She had   antibodies to her blood products.  Therefore, it took a little while to get her   blood transfused, but she was transfused with 2 units packed red blood cells   and her H and H prior to discharge were 10.8 and 32.8.  She felt much better.    Her potassium was borderline low during hospitalization and this will be   followed up outpatient.  Her blood sugars were essentially okay.  Her albumin   was low at 2.1, and her urinalysis was clear.  CT of her head because of   increased confusion showed chronic changes.  No acute intracranial process was   identified.  She does have bladder cancer, which is metastatic.  She also had a   fall recently with the left hand injury, primarily her left thumb and 1st   finger.  The radiologist felt she may have a fracture of the distal aspect of   one of the proximal phalanx, possibly the 4th one.  Clinically, she had no   fracture there.  Therefore, we just monitored.  She was discharged in stable   condition on November 11, 2023, to follow up outpatient  with me.    ASSESSMENT:    1. Anemia, requiring blood transfusion secondary to being symptomatic.  2. Hypotension on admit.  3. Decreased mental status on admit, secondary to that stated above.  4. Metastatic bladder cancer, currently undergoing treatment.  5. Advanced age.  She is primarily in a wheelchair or in the bed.  6. Status post fall with left hand trauma.  She has bruising of the thumb area,   but no obvious fractures.    PLAN:  Discharge the patient to home.  Follow up with me in 1 week.  Continue   her diet and activity as tolerated.  She has a decreased appetite and has   difficulty gaining weight.  We will discuss this further at the clinic visit.    Continue sertraline 50 mg 1 p.o. daily, senna 8.6 mg 1 p.o. daily for   constipation.  Seroquel, changed it to 25 mg twice a day.  Protonix 40 mg 1 p.o.   daily, Zofran 4 mg take 1 every 8 hours as needed for nausea.  Nitroglycerin   0.4 mg sublingual q.5 minutes p.r.n. chest pain.  Centrum Silver 1 p.o. daily.    She takes Uroqid 1 p.o. every morning 500-500 mg.  magnesium oxide 400 mg daily,   Namenda 10 mg twice a day, Ativan 1 mg every 6 hours as needed for anxiety,   Linzess 145 mcg 1 p.o. daily, isosorbide mononitrate 30 mg take half at bedtime.    Hyoscyamine 0.125 mg 1 p.o. q.4 hours p.r.n. bladder pain.    Dorzolamide-timolol 2-0.5% one drop both eyes daily.  Coreg 25 mg twice a day,   atorvastatin 40 mg 1 p.o. daily, vitamin C 1000 mg daily, Eliquis 5 mg twice a   day, Tylenol as needed.        ______________________________  Barrett Lopez MD    PBS/AQS  DD:  11/19/2023  Time:  11:24AM  DT:  11/19/2023  Time:  01:59PM  Job #:  [id number]      DISCHARGE SUMMARY

## 2023-11-29 ENCOUNTER — OUTPATIENT CASE MANAGEMENT (OUTPATIENT)
Dept: NEUROLOGY | Facility: CLINIC | Age: 88
End: 2023-11-29
Payer: MEDICARE

## 2023-11-29 NOTE — PROGRESS NOTES
Protocol: [employer] Effectiveness Study  Identifier: [id number]  IRB#: 2022.247  PI: Dr. Glenn Dooley, PhD  CO-I: Dr. Carolyn Boswell PsyD  Version Date: 12/05/2022  Pt Study ID: [id number]  Visit Month: M4     Timeline:   (*Note for CTN - complete timeline using completed or planned date for study events. Add any important events (i.e. Withdrawals, Lost to Follow Up, Adverse Events, etc.).  Consent: Completed(8/23/23)  Baseline questionnaires: Completed(8/23/23)  6-month questionnaires: Planned(2/23/24)  12-month questionnaires: Planned(8/23/24)        Visit Note:   Spoke with caregiver German  (Son) for month 4 visit.  CG said his sister is working on engaging [employer] services.  Pt is stable and tolerating immunotherapy for lung cancer well.  Cg denied any current needs.      Falls in the past month: 0  UTIs in the past month: 0  Hospital encounters in the past month (reported by caregiver): 0        Next monthly visit scheduled for: 12/29/23. Caregiver knows how to reach CTN, and is encouraged to do so, as needed before our next scheduled call.      Action items for CTN: Continue to follow

## 2023-12-01 NOTE — DISCHARGE SUMMARY
[facility] Hospital Medicine Discharge Summary    Admit Date: 10/27/2023  Discharge Date and Time: 10/29/2023  Admitting Physician:  Team  Discharging Physician: Timi Solorzano MD.  Primary Care Physician: Barrett Lopez MD  Consults: Hospital Medicine    Discharge Diagnoses:  Syncope 2/2 Intravascular Depletion  Hypotension 2/2 Intravascular Depletion  Normocytic Anemia  Bladder cancer, metastatic (lungs), awaiting immunotherapy  CAD/PCI  VHD, AS s/p TAVR  DVT/PE, status post IVC filter, on Eliquis  HTN  DM2  GERD  PARAMJIT   SSS status post ppm  HLP  Dementia    Hospital Course:   89-year-old female with dementia, bladder cancer status post radiation awaiting immunotherapy with known lung metastases, CAD, VHD, HTN, DM2, PARAMJIT, SSS status post ppm and additional past medical history as below who was brought in for further evaluation after family members found her unresponsive.  Reportedly patient had been constipated and took MiraLax after which she had 3 or 4 days loose stools.  Family members noted she was less talkative than usual then refused to eat or drink and then ultimately was unresponsive in her chair at which time EMS was activated and she was found to have a systolic blood pressure in the 50s on arrival.  She was given IV fluids in route.       On arrival to the ER she was hemodynamically stable and afebrile.  Laboratory work showed a hemoglobin of hemoglobin of 8.4 (prior being 10.1 a few weeks ago), elevated BNP.  Chest x-ray showed scattered pulmonary nodules and CT head was unremarkable.  Received IV LR in ER. She was admitted to the hospitalist service for further management. Asymptomatic once admitted & at baseline mentation. Echocardiogram done which showed EF 60-65%, grade I DD, mild AS, mild MS, mild MR, mild CA. US Carotids ordered which was unremarkable. Patient wheel chair bound at home; did not wish to work with PT. Has family support at home.    Pt was seen  "and examined on the day of discharge. She stated she was doing well and had no new complaints. Planned for DC today.    Vitals:  VITAL SIGNS: 24 HRS MIN & MAX LAST   No data recorded 97.5 °F (36.4 °C)   No data recorded 106/61   No data recorded  68   No data recorded 18   No data recorded 96 %       Physical Exam:  General: alert lady lying comfortably in bed, in no acute distress\  HENT: oral and oropharyngeal mucosa moist, pink, with no erythema or exudates, no ear pain or discharge  Neck: normal neck movement, no lymph nodes or swellings, no JVD or Carotid bruit  Respiratory: clear breathing sounds bilaterally, no crackles, rales, ronchi or wheezes  Cardiovascular: clear S1 and S2, no murmurs, rubs or gallops  Peripheral Vascular: no lesions, ulcers or erosions, normal peripheral pulses and no pedal edema  Gastrointestinal: soft, non-tender, non-distended abdomen, no guarding, rigidity or rebound tenderness, normal bowel sounds  Integumentary: normal skin color, no rashes or lesions  Neuro: AAO x 3; motor strength 5/5 in B/L UEs & LEs; sensation intact to gross and fine touch B/L; CN II-XII grossly intact     Procedures Performed: No admission procedures for hospital encounter.     Significant Diagnostic Studies: See Full reports for all details    No results for input(s): "WBC", "RBC", "HGB", "HCT", "MCV", "MCH", "MCHC", "RDW", "PLT", "MPV", "GRAN", "LYMPH", "MONO", "BASO", "NRBC" in the last 168 hours.    No results for input(s): "NA", "K", "CL", "CO2", "ANIONGAP", "BUN", "CREATININE", "GLU", "CALCIUM", "PH", "MG", "ALBUMIN", "PROT", "ALKPHOS", "ALT", "AST", "BILITOT" in the last 168 hours.     Microbiology Results (last 7 days)       ** No results found for the last 168 hours. **             X-Ray Hand 3 view Left  Narrative: EXAMINATION:  XR HAND COMPLETE 3 VIEW LEFT    CLINICAL HISTORY:  trauma;    TECHNIQUE:  Radiographs of the left hand with AP, lateral and oblique  views.    COMPARISON:  No prior imaging " available for comparison    FINDINGS:  Degenerative changes with periarticular osteophytes greatest at the proximal interphalangeal joints.  Degenerative changes of the 1st CMC joints with osteophytes.  Fracture of the distal aspect of the proximal phalanx seen on lateral image of unknown digit possibly the 4th digit.  Correlate with digital pain.  Dedicated finger imaging may be obtained if needed.  Impression: Distal aspect of the proximal phalanx fracture as above of unknown digit.  Degenerative changes noted.    Electronically signed by: Catalino Goznalez  Date:    11/09/2023  Time:    20:43  CT Head Without Contrast  Narrative: CT HEAD WITHOUT CONTRAST:    CPT 04661    Total DLP: 844 mGy-cm  Automatic exposure control was utilized to limit the radiation dose to the patient.    History: Altered mental status with head trauma, not otherwise specified.    Comparison: 10/27/2023.    Technique: Multiple contiguous axial images were acquired from the base of the skull the vertex without contrast administration.    Findings:  There are diffuse cerebral and cerebellar parenchymal involutional changes with resultant similar prominence of the ventricles and basal cisterns. There is extensive asymmetric low density without obvious mass-effect throughout the bilateral supratentorial white matter. The gray-white junctions are maintained. No other cerebral or cerebellar parenchymal abnormality is identified. There is no hemorrhage, midline shift, significant mass-effect, or extra-axial fluid collection. There are calcifications of the distal internal carotid and vertebrobasilar arteries. The partially visualized paranasal sinuses are clear.  There are small left and trace right mastoid effusions inferiorly..  The calvarium is intact.  No fractures are identified in the visualized osseous structures.  Impression: Impression:  1. No acute intracranial process is identified.  No fractures or in the visualized osseous  structures.  2.  Extensive asymmetric white matter microvascular ischemic changes.  3. Diffuse involutional changes.    Electronically signed by: Alex Alvarez MD  Date:    11/09/2023  Time:    18:35         Medication List        CONTINUE taking these medications      apixaban 5 mg Tab  Commonly known as: ELIQUIS  Take 1 tablet (5 mg total) by mouth 2 (two) times daily.     ascorbic acid (vitamin C) 1000 MG tablet  Commonly known as: VITAMIN C     atorvastatin 40 MG tablet  Commonly known as: LIPITOR     carvediloL 25 MG tablet  Commonly known as: COREG     CENTRUM SILVER WOMEN 8 mg iron-400 mcg-50 mcg Tab  Generic drug: multivit-min-iron-FA-vit K-lut     dorzolamide-timolol 2-0.5% 22.3-6.8 mg/mL ophthalmic solution  Commonly known as: COSOPT     hyoscyamine 0.125 mg Tab  Commonly known as: ANASPAZ,LEVSIN     isosorbide mononitrate 30 MG 24 hr tablet  Commonly known as: IMDUR     linaCLOtide 145 mcg Cap capsule  Commonly known as: LINZESS     LORazepam 1 MG tablet  Commonly known as: ATIVAN     magnesium oxide 400 mg magnesium Cap     memantine 10 MG Tab  Commonly known as: NAMENDA  Take 1 tablet (10 mg total) by mouth 2 (two) times daily.     methenamine-sodium biphosphonate 500-500 mg per tablet  Commonly known as: UROQUID     nitroGLYCERIN 0.4 MG SL tablet  Commonly known as: NITROSTAT     ondansetron 4 MG tablet  Commonly known as: ZOFRAN     pantoprazole 40 MG tablet  Commonly known as: PROTONIX  Take 1 tablet (40 mg total) by mouth once daily.     QUEtiapine 25 MG Tab  Commonly known as: SEROQUEL     senna 8.6 mg tablet  Commonly known as: SENOKOT  Take 1 tablet by mouth daily as needed for Constipation.     sertraline 50 MG tablet  Commonly known as: ZOLOFT               Explained in detail to the patient about the discharge plan, medications, and follow-up visits. Pt understands and agrees with the treatment plan  Discharge Disposition: Home or Self Care   Discharged Condition: stable  Diet-    Medications  Per DC med rec  Activities as tolerated   Follow-up Information       Barrett Lopez MD. Schedule an appointment as soon as possible for a visit in 1 week(s).    Specialty: Family Medicine  Contact information:  [address] [place] [address] [phone]                           For further questions contact hospitalist office    Discharge time 33 minutes    For worsening symptoms, chest pain, shortness of breath, increased abdominal pain, high grade fever, stroke or stroke like symptoms, immediately go to the nearest Emergency Room or call 911 as soon as possible.      Timi Michelle M.D.

## 2023-12-26 ENCOUNTER — OUTPATIENT CASE MANAGEMENT (OUTPATIENT)
Dept: NEUROLOGY | Facility: CLINIC | Age: 88
End: 2023-12-26
Payer: MEDICARE

## 2023-12-26 NOTE — PROGRESS NOTES
Protocol: The [facility] Effectiveness Study  Identifier: [id number]  IRB#: 2022.247  PI: Dr. Glenn Dooley, PhD  CO-I: Dr. Carolyn Boswell PsyD  Version Date: 12/05/2022  Pt Study ID: [id number]  Visit Month: M5     Timeline:   (*Note for CTN - complete timeline using completed or planned date for study events. Add any important events (i.e. Withdrawals, Lost to Follow Up, Adverse Events, etc.).  Consent: Completed(8/23/23)  Baseline questionnaires: Completed(8/23/23)  6-month questionnaires: Planned(2/23/24)  12-month questionnaires: Planned(8/23/24)        Visit Note:   Spoke with caregiver German  (Son) for month 5 visit.  CG said pt has been feeling the effects of cancer treatment.  He said she has been tired and nauseated.  In addition, she has does not believe she has cancer and becomes agitated and insists the CG call the doctor to confirm her Dx. SW and CG had a discussion about how to distract and pivot and to keep pt going to her cancer treatments instead of refusing them like she has threatened to do. CG verbalized understanding of this method and agreed to try it.  He will call SW for troubleshooting as needed.      Falls in the past month: 0  UTIs in the past month: 0  Hospital encounters in the past month (reported by caregiver): 0        Next monthly visit scheduled for: 1/26/24. Caregiver knows how to reach CTN, and is encouraged to do so, as needed before our next scheduled call.      Action items for CTN: Continue to follow

## 2023-12-30 NOTE — H&P
[facility]                     [address] [address]    PATIENT NAME:       WENDY HICKS  YOB: 1933  CSN:                [id number]   MRN:                [id number]  ADMIT DATE:         12/29/2023 16:32:00  PHYSICIAN:          Barrett Lopez MD                        HISTORY AND PHYSICAL      HISTORY OF PRESENT ILLNESS:  She is a 89-year-old female who presents to office,   feeling very weak and confused.  She has been on chemotherapy for a lung   metastasis from a previously diagnosed bladder cancer.  Over the last 2 weeks   since her last chemotherapy, she has been worsening.    PAST MEDICAL HISTORY:  Significant for the bladder cancer, chronic anemia with   requirement of blood transfusions in the past.  She has anxiety and depression,   osteoarthritis, hypertension, coronary artery disease, degenerative disk   disease, type 2 diabetes.  She is debilitated and a fall risk.  She has   gastroesophageal reflux disease, hyperlipidemia, nonrheumatic aortic valve   insufficiency, obstructive sleep apnea, open-angle glaucoma, vertigo, vitamin D   deficiency, and aortic stenosis in the past, which is severe.  Pulmonary   embolism.    CURRENT MEDICATIONS:  On the chart and reviewed and reconciled.  She takes   dronabinol 2.5 mg at bedtime, multivitamin 1 p.o. daily, senna 8.6 mg 1 p.o.   daily, Tylenol as needed, vitamin C 1000 mg 1 p.o. daily, dorzolamide 1 drop   bilateral eyes twice a day, nitroglycerin 0.4 mg sublingual q.5 minutes p.r.n.   chest pain, sertraline 50 mg 1 p.o. daily, Zofran 4 mg 1 p.o. daily as needed   for nausea and vomiting, isosorbide mononitrate 30 mg 1 p.o. daily, atorvastatin   40 mg 1 p.o. every other day, MiraLAX 17 g in 8 ounces of water daily, Coreg   6.125 mg twice a day, Protonix 40 mg 1 p.o. daily, Levsin 0.125 mg 1 p.o. every   4 hours as needed for bladder spasms or  pain, Eliquis 5 mg 1/2 q day,  Lorazepam 1 mg twice a day.    PAST SURGICAL HISTORY:  Include cataract surgeries.  She has had bladder   surgery/cystectomy.  She had a TAVR in the past, tonsillectomy, stent placements   by Dr. Aguilera in her left anterior descending artery and right coronary artery.    She has had colonoscopy with polypectomy and a hysterectomy.  EGD in the past.    She also had a left knee injection with a fluid flow.  She had transurethral   resection of bladder tumor and an embolectomy of her pulmonary artery.  She had   a Pramod filter insertion.  She also had previous blood transfusion.  She   had a pacemaker insertion as well.    FAMILY MEDICAL HISTORY:  Father is .  Mother is .  Children, 1   is  secondary to brain tumor.    SOCIAL HISTORY:  She never smoked.  Does not drink alcohol.  She is .  No   drugs.    ALLERGIES:  CRESTOR, LIPITOR, EVEN THOUGH SHE DOES TAKE LIPITOR.  PRAVACHOL,   ZETIA, CORTISONE, LIVALO, PRALUENT.  THESE ARE ALL INTOLERANT TYPE ALLERGIES.     CODE STATUS:  DNR.    PHYSICAL EXAMINATION:  VITAL SIGNS:  Stable.  GENERAL:  She is in wheelchair very tired-appearing.  Dehydrated appearing.  HEENT:  Shows dry oral mucosa.  NECK:  Supple.  Full range of motion.  No significant adenopathy.  HEART:  Regular rate and rhythm with 2/6 systolic ejection murmur.  LUNGS:  Clear to auscultation bilaterally.  ABDOMEN:  Soft, nontender.  EXTREMITIES:  No significant edema.   RECTAL BREASTS:  Examination deferred at this time.  NEUROLOGICAL:  She is alert, but she is a poor historian.  She is oriented to   place and time.    ASSESSMENT:    1. Dehydration.  2. Iron deficiency anemia.  3. Metastatic lung cancer secondary to bladder cancer.  4. Chemotherapy.  5. Underlying diabetes.  6. Previous pulmonary embolism.  7. Multiple other medical problems as per problem list.    PLAN:  Admit the patient to hospital.  Get baseline labs.  Get an x-ray of chest    and abdomen.  Transfuse if needed.  Continue home medications.        ______________________________  MD NILS Unger/LEEANNE  DD:  12/29/2023  Time:  06:54PM  DT:  12/29/2023  Time:  08:29PM  Job #:  [id number]      HISTORY AND PHYSICAL

## 2024-01-01 ENCOUNTER — HOSPITAL ENCOUNTER (INPATIENT)
Facility: HOSPITAL | Age: 89
LOS: 1 days | DRG: 871 | End: 2024-04-18
Attending: INTERNAL MEDICINE | Admitting: INTERNAL MEDICINE
Payer: MEDICARE

## 2024-01-01 ENCOUNTER — PATIENT OUTREACH (OUTPATIENT)
Dept: ADMINISTRATIVE | Facility: CLINIC | Age: 89
End: 2024-01-01
Payer: MEDICARE

## 2024-01-01 ENCOUNTER — OUTPATIENT CASE MANAGEMENT (OUTPATIENT)
Dept: NEUROLOGY | Facility: CLINIC | Age: 89
End: 2024-01-01
Payer: MEDICARE

## 2024-01-01 ENCOUNTER — HOSPITAL ENCOUNTER (OUTPATIENT)
Dept: RADIOLOGY | Facility: HOSPITAL | Age: 89
Discharge: HOME | End: 2024-01-23
Attending: STUDENT IN AN ORGANIZED HEALTH CARE EDUCATION/TRAINING PROGRAM
Payer: MEDICARE

## 2024-01-01 ENCOUNTER — HOSPITAL ENCOUNTER (INPATIENT)
Facility: HOSPITAL | Age: 89
LOS: 5 days | Discharge: HOME HEALTH | DRG: 871 | End: 2024-02-27
Attending: INTERNAL MEDICINE | Admitting: INTERNAL MEDICINE
Payer: MEDICARE

## 2024-01-01 ENCOUNTER — HOSPITAL ENCOUNTER (EMERGENCY)
Facility: HOSPITAL | Age: 89
Discharge: HOME | End: 2024-01-27
Attending: INTERNAL MEDICINE
Payer: MEDICARE

## 2024-01-01 VITALS
BODY MASS INDEX: 31 KG/M2 | SYSTOLIC BLOOD PRESSURE: 116 MMHG | HEART RATE: 111 BPM | TEMPERATURE: 100 F | OXYGEN SATURATION: 91 % | WEIGHT: 175 LBS | RESPIRATION RATE: 20 BRPM | DIASTOLIC BLOOD PRESSURE: 66 MMHG

## 2024-01-01 VITALS
OXYGEN SATURATION: 98 % | DIASTOLIC BLOOD PRESSURE: 74 MMHG | RESPIRATION RATE: 18 BRPM | HEART RATE: 67 BPM | BODY MASS INDEX: 30.94 KG/M2 | TEMPERATURE: 98 F | SYSTOLIC BLOOD PRESSURE: 134 MMHG | WEIGHT: 174.63 LBS | HEIGHT: 63 IN

## 2024-01-01 VITALS
RESPIRATION RATE: 16 BRPM | DIASTOLIC BLOOD PRESSURE: 84 MMHG | TEMPERATURE: 97 F | WEIGHT: 168 LBS | HEART RATE: 82 BPM | HEIGHT: 64 IN | SYSTOLIC BLOOD PRESSURE: 148 MMHG | BODY MASS INDEX: 28.68 KG/M2 | OXYGEN SATURATION: 100 %

## 2024-01-01 DIAGNOSIS — Z51.5 ADMISSION FOR HOSPICE CARE: ICD-10-CM

## 2024-01-01 DIAGNOSIS — C67.9 PRIMARY CANCER OF BLADDER WITH METASTASIS TO OTHER SITE: ICD-10-CM

## 2024-01-01 DIAGNOSIS — A41.9 SEPSIS, DUE TO UNSPECIFIED ORGANISM, UNSPECIFIED WHETHER ACUTE ORGAN DYSFUNCTION PRESENT: Primary | ICD-10-CM

## 2024-01-01 DIAGNOSIS — R50.9 FEVER: ICD-10-CM

## 2024-01-01 DIAGNOSIS — S93.601A FOOT SPRAIN, RIGHT, INITIAL ENCOUNTER: Primary | ICD-10-CM
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
## 2024-01-01 DIAGNOSIS — C67.4 MALIGNANT NEOPLASM OF POSTERIOR WALL OF URINARY BLADDER: Primary | ICD-10-CM

## 2024-01-01 DIAGNOSIS — C67.4 MALIGNANT NEOPLASM OF POSTERIOR WALL OF URINARY BLADDER: ICD-10-CM

## 2024-01-01 DIAGNOSIS — J18.9 PNEUMONIA OF BOTH LUNGS DUE TO INFECTIOUS ORGANISM, UNSPECIFIED PART OF LUNG: Primary | ICD-10-CM

## 2024-01-01 DIAGNOSIS — I50.9 CONGESTIVE HEART FAILURE, UNSPECIFIED HF CHRONICITY, UNSPECIFIED HEART FAILURE TYPE: ICD-10-CM

## 2024-01-01 DIAGNOSIS — I10 HYPERTENSION, UNSPECIFIED TYPE: ICD-10-CM

## 2024-01-01 LAB
ABS NEUT CALC (OHS): 21.5 X10(3)/MCL (ref 2.1–9.2)
ABS NEUT CALC (OHS): 22.32 X10(3)/MCL (ref 2.1–9.2)
ALBUMIN SERPL-MCNC: 1.6 G/DL (ref 3.4–4.8)
ALBUMIN SERPL-MCNC: 1.7 G/DL (ref 3.4–4.8)
ALBUMIN/GLOB SERPL: 0.3 RATIO (ref 1.1–2)
ALBUMIN/GLOB SERPL: 0.4 RATIO (ref 1.1–2)
ALP SERPL-CCNC: 147 UNIT/L (ref 40–150)
ALP SERPL-CCNC: 148 UNIT/L (ref 40–150)
ALT SERPL-CCNC: 35 UNIT/L (ref 0–55)
ALT SERPL-CCNC: 9 UNIT/L (ref 0–55)
ANION GAP SERPL CALC-SCNC: 10 MEQ/L
ANION GAP SERPL CALC-SCNC: 11 MEQ/L
ANION GAP SERPL CALC-SCNC: 11 MEQ/L
ANION GAP SERPL CALC-SCNC: 12 MEQ/L
ANISOCYTOSIS BLD QL SMEAR: SLIGHT
APPEARANCE UR: CLEAR
AST SERPL-CCNC: 24 UNIT/L (ref 5–34)
AST SERPL-CCNC: 36 UNIT/L (ref 5–34)
BACTERIA #/AREA URNS AUTO: ABNORMAL /HPF
BACTERIA BLD CULT: NORMAL
BACTERIA BLD CULT: NORMAL
BASOPHILS # BLD AUTO: 0.02 X10(3)/MCL
BASOPHILS # BLD AUTO: 0.03 X10(3)/MCL
BASOPHILS # BLD AUTO: 0.04 X10(3)/MCL
BASOPHILS # BLD AUTO: 0.04 X10(3)/MCL
BASOPHILS NFR BLD AUTO: 0.1 %
BASOPHILS NFR BLD AUTO: 0.2 %
BASOPHILS NFR BLD AUTO: 0.2 %
BASOPHILS NFR BLD AUTO: 0.3 %
BILIRUB SERPL-MCNC: 0.4 MG/DL
BILIRUB SERPL-MCNC: 0.7 MG/DL
BILIRUB UR QL STRIP.AUTO: NEGATIVE
BNP BLD-MCNC: 380.5 PG/ML
BUN SERPL-MCNC: 13 MG/DL (ref 9.8–20.1)
BUN SERPL-MCNC: 18 MG/DL (ref 9.8–20.1)
BUN SERPL-MCNC: 20 MG/DL (ref 9.8–20.1)
BUN SERPL-MCNC: 23 MG/DL (ref 9.8–20.1)
BUN SERPL-MCNC: 24 MG/DL (ref 9.8–20.1)
BUN SERPL-MCNC: 25 MG/DL (ref 9.8–20.1)
CALCIUM SERPL-MCNC: 8.7 MG/DL (ref 8.4–10.2)
CALCIUM SERPL-MCNC: 8.7 MG/DL (ref 8.4–10.2)
CALCIUM SERPL-MCNC: 8.9 MG/DL (ref 8.4–10.2)
CALCIUM SERPL-MCNC: 8.9 MG/DL (ref 8.4–10.2)
CALCIUM SERPL-MCNC: 9 MG/DL (ref 8.4–10.2)
CALCIUM SERPL-MCNC: 9.8 MG/DL (ref 8.4–10.2)
CHLORIDE SERPL-SCNC: 101 MMOL/L (ref 98–111)
CHLORIDE SERPL-SCNC: 101 MMOL/L (ref 98–111)
CHLORIDE SERPL-SCNC: 97 MMOL/L (ref 98–111)
CHLORIDE SERPL-SCNC: 98 MMOL/L (ref 98–111)
CHLORIDE SERPL-SCNC: 99 MMOL/L (ref 98–111)
CHLORIDE SERPL-SCNC: 99 MMOL/L (ref 98–111)
CO2 SERPL-SCNC: 20 MMOL/L (ref 23–31)
CO2 SERPL-SCNC: 22 MMOL/L (ref 23–31)
CO2 SERPL-SCNC: 24 MMOL/L (ref 23–31)
CO2 SERPL-SCNC: 25 MMOL/L (ref 23–31)
CO2 SERPL-SCNC: 26 MMOL/L (ref 23–31)
CO2 SERPL-SCNC: 28 MMOL/L (ref 23–31)
COLOR UR AUTO: YELLOW
CREAT SERPL-MCNC: 0.54 MG/DL (ref 0.55–1.02)
CREAT SERPL-MCNC: 0.59 MG/DL (ref 0.55–1.02)
CREAT SERPL-MCNC: 0.59 MG/DL (ref 0.55–1.02)
CREAT SERPL-MCNC: 0.62 MG/DL (ref 0.55–1.02)
CREAT SERPL-MCNC: 0.63 MG/DL (ref 0.55–1.02)
CREAT SERPL-MCNC: 0.68 MG/DL (ref 0.55–1.02)
CREAT/UREA NIT SERPL: 31
CREAT/UREA NIT SERPL: 32
CREAT/UREA NIT SERPL: 37
CREAT/UREA NIT SERPL: 37
EOSINOPHIL # BLD AUTO: 0.07 X10(3)/MCL (ref 0–0.9)
EOSINOPHIL # BLD AUTO: 0.11 X10(3)/MCL (ref 0–0.9)
EOSINOPHIL # BLD AUTO: 0.16 X10(3)/MCL (ref 0–0.9)
EOSINOPHIL # BLD AUTO: 0.29 X10(3)/MCL (ref 0–0.9)
EOSINOPHIL NFR BLD AUTO: 0.5 %
EOSINOPHIL NFR BLD AUTO: 0.6 %
EOSINOPHIL NFR BLD AUTO: 1 %
EOSINOPHIL NFR BLD AUTO: 2.3 %
ERYTHROCYTE [DISTWIDTH] IN BLOOD BY AUTOMATED COUNT: 15.4 % (ref 11.5–17)
ERYTHROCYTE [DISTWIDTH] IN BLOOD BY AUTOMATED COUNT: 15.7 % (ref 11.5–17)
ERYTHROCYTE [DISTWIDTH] IN BLOOD BY AUTOMATED COUNT: 15.8 % (ref 11.5–17)
ERYTHROCYTE [DISTWIDTH] IN BLOOD BY AUTOMATED COUNT: 16.2 % (ref 11.5–17)
ERYTHROCYTE [DISTWIDTH] IN BLOOD BY AUTOMATED COUNT: 16.4 % (ref 11.5–17)
ERYTHROCYTE [DISTWIDTH] IN BLOOD BY AUTOMATED COUNT: 17.8 % (ref 11.5–17)
GFR SERPLBLD CREATININE-BSD FMLA CKD-EPI: >60 MLS/MIN/1.73/M2
GLOBULIN SER-MCNC: 4.7 GM/DL (ref 2.4–3.5)
GLOBULIN SER-MCNC: 4.7 GM/DL (ref 2.4–3.5)
GLUCOSE SERPL-MCNC: 107 MG/DL (ref 75–121)
GLUCOSE SERPL-MCNC: 117 MG/DL (ref 75–121)
GLUCOSE SERPL-MCNC: 123 MG/DL (ref 75–121)
GLUCOSE SERPL-MCNC: 89 MG/DL (ref 75–121)
GLUCOSE SERPL-MCNC: 90 MG/DL (ref 75–121)
GLUCOSE SERPL-MCNC: 94 MG/DL (ref 75–121)
GLUCOSE UR QL STRIP.AUTO: NEGATIVE
HCT VFR BLD AUTO: 21 % (ref 37–47)
HCT VFR BLD AUTO: 21.7 % (ref 37–47)
HCT VFR BLD AUTO: 22.6 % (ref 37–47)
HCT VFR BLD AUTO: 23.1 % (ref 37–47)
HCT VFR BLD AUTO: 24.6 % (ref 37–47)
HCT VFR BLD AUTO: 30 % (ref 37–47)
HGB BLD-MCNC: 7.2 G/DL (ref 12–16)
HGB BLD-MCNC: 7.5 G/DL (ref 12–16)
HGB BLD-MCNC: 7.7 G/DL (ref 12–16)
HGB BLD-MCNC: 8 G/DL (ref 12–16)
HGB BLD-MCNC: 8 G/DL (ref 12–16)
HGB BLD-MCNC: 9.7 G/DL (ref 12–16)
HYPOCHROMIA BLD QL SMEAR: SLIGHT
IMM GRANULOCYTES # BLD AUTO: 0.21 X10(3)/MCL (ref 0–0.04)
IMM GRANULOCYTES # BLD AUTO: 0.36 X10(3)/MCL (ref 0–0.04)
IMM GRANULOCYTES # BLD AUTO: 0.44 X10(3)/MCL (ref 0–0.04)
IMM GRANULOCYTES # BLD AUTO: 0.59 X10(3)/MCL (ref 0–0.04)
IMM GRANULOCYTES NFR BLD AUTO: 1.7 %
IMM GRANULOCYTES NFR BLD AUTO: 2.4 %
IMM GRANULOCYTES NFR BLD AUTO: 2.4 %
IMM GRANULOCYTES NFR BLD AUTO: 3.6 %
KETONES UR QL STRIP.AUTO: NEGATIVE
LACTATE SERPL-SCNC: 2.6 MMOL/L (ref 0.5–2.2)
LACTATE SERPL-SCNC: 2.6 MMOL/L (ref 0.5–2.2)
LACTATE SERPL-SCNC: 4.2 MMOL/L (ref 0.5–2.2)
LACTATE SERPL-SCNC: 4.2 MMOL/L (ref 0.5–2.2)
LEUKOCYTE ESTERASE UR QL STRIP.AUTO: NEGATIVE
LYMPHOCYTES # BLD AUTO: 0.67 X10(3)/MCL (ref 0.6–4.6)
LYMPHOCYTES # BLD AUTO: 0.75 X10(3)/MCL (ref 0.6–4.6)
LYMPHOCYTES # BLD AUTO: 0.82 X10(3)/MCL (ref 0.6–4.6)
LYMPHOCYTES # BLD AUTO: 0.82 X10(3)/MCL (ref 0.6–4.6)
LYMPHOCYTES NFR BLD AUTO: 4.5 %
LYMPHOCYTES NFR BLD AUTO: 5 %
LYMPHOCYTES NFR BLD AUTO: 5 %
LYMPHOCYTES NFR BLD AUTO: 5.4 %
LYMPHOCYTES NFR BLD MANUAL: 0.98 X10(3)/MCL
LYMPHOCYTES NFR BLD MANUAL: 1.4 X10(3)/MCL
LYMPHOCYTES NFR BLD MANUAL: 4 % (ref 13–40)
LYMPHOCYTES NFR BLD MANUAL: 6 % (ref 13–40)
MCH RBC QN AUTO: 26.4 PG (ref 27–31)
MCH RBC QN AUTO: 27.6 PG (ref 27–31)
MCH RBC QN AUTO: 27.9 PG (ref 27–31)
MCH RBC QN AUTO: 28.2 PG (ref 27–31)
MCH RBC QN AUTO: 28.3 PG (ref 27–31)
MCH RBC QN AUTO: 28.3 PG (ref 27–31)
MCHC RBC AUTO-ENTMCNC: 30.5 G/DL (ref 33–36)
MCHC RBC AUTO-ENTMCNC: 32.3 G/DL (ref 33–36)
MCHC RBC AUTO-ENTMCNC: 34.3 G/DL (ref 33–36)
MCHC RBC AUTO-ENTMCNC: 34.6 G/DL (ref 33–36)
MCHC RBC AUTO-ENTMCNC: 35.4 G/DL (ref 33–36)
MCHC RBC AUTO-ENTMCNC: 35.5 G/DL (ref 33–36)
MCV RBC AUTO: 79.6 FL (ref 80–94)
MCV RBC AUTO: 79.8 FL (ref 80–94)
MCV RBC AUTO: 80.5 FL (ref 80–94)
MCV RBC AUTO: 80.5 FL (ref 80–94)
MCV RBC AUTO: 86.6 FL (ref 80–94)
MCV RBC AUTO: 87.5 FL (ref 80–94)
MONOCYTES # BLD AUTO: 0.77 X10(3)/MCL (ref 0.1–1.3)
MONOCYTES # BLD AUTO: 1.04 X10(3)/MCL (ref 0.1–1.3)
MONOCYTES # BLD AUTO: 1.18 X10(3)/MCL (ref 0.1–1.3)
MONOCYTES # BLD AUTO: 1.44 X10(3)/MCL (ref 0.1–1.3)
MONOCYTES NFR BLD AUTO: 6.2 %
MONOCYTES NFR BLD AUTO: 6.9 %
MONOCYTES NFR BLD AUTO: 7.2 %
MONOCYTES NFR BLD AUTO: 7.8 %
MONOCYTES NFR BLD MANUAL: 0.47 X10(3)/MCL (ref 0.1–1.3)
MONOCYTES NFR BLD MANUAL: 1.23 X10(3)/MCL (ref 0.1–1.3)
MONOCYTES NFR BLD MANUAL: 2 % (ref 2–11)
MONOCYTES NFR BLD MANUAL: 5 % (ref 2–11)
NEUTROPHILS # BLD AUTO: 10.5 X10(3)/MCL (ref 2.1–9.2)
NEUTROPHILS # BLD AUTO: 12.79 X10(3)/MCL (ref 2.1–9.2)
NEUTROPHILS # BLD AUTO: 13.71 X10(3)/MCL (ref 2.1–9.2)
NEUTROPHILS # BLD AUTO: 15.56 X10(3)/MCL (ref 2.1–9.2)
NEUTROPHILS NFR BLD AUTO: 83 %
NEUTROPHILS NFR BLD AUTO: 84.2 %
NEUTROPHILS NFR BLD AUTO: 84.6 %
NEUTROPHILS NFR BLD AUTO: 84.9 %
NEUTROPHILS NFR BLD MANUAL: 91 % (ref 47–80)
NEUTROPHILS NFR BLD MANUAL: 91 % (ref 47–80)
NEUTS BAND NFR BLD MANUAL: 1 % (ref 0–11)
NITRITE UR QL STRIP.AUTO: NEGATIVE
NRBC BLD AUTO-RTO: 0.3 %
PH UR STRIP.AUTO: 6.5 [PH]
PLATELET # BLD AUTO: 112 X10(3)/MCL (ref 130–400)
PLATELET # BLD AUTO: 117 X10(3)/MCL (ref 130–400)
PLATELET # BLD AUTO: 365 X10(3)/MCL (ref 130–400)
PLATELET # BLD AUTO: 64 X10(3)/MCL (ref 130–400)
PLATELET # BLD AUTO: 88 X10(3)/MCL (ref 130–400)
PLATELET # BLD AUTO: 98 X10(3)/MCL (ref 130–400)
PLATELET # BLD EST: ABNORMAL 10*3/UL
PLATELET # BLD EST: ADEQUATE 10*3/UL
PMV BLD AUTO: 10.5 FL (ref 7.4–10.4)
PMV BLD AUTO: 11 FL (ref 7.4–10.4)
PMV BLD AUTO: 11.8 FL (ref 7.4–10.4)
PMV BLD AUTO: 12.1 FL (ref 7.4–10.4)
PMV BLD AUTO: 9.8 FL (ref 7.4–10.4)
PMV BLD AUTO: ABNORMAL FL
POIKILOCYTOSIS BLD QL SMEAR: SLIGHT
POTASSIUM SERPL-SCNC: 2.8 MMOL/L (ref 3.5–5.1)
POTASSIUM SERPL-SCNC: 2.9 MMOL/L (ref 3.5–5.1)
POTASSIUM SERPL-SCNC: 3.3 MMOL/L (ref 3.5–5.1)
POTASSIUM SERPL-SCNC: 3.4 MMOL/L (ref 3.5–5.1)
POTASSIUM SERPL-SCNC: 3.4 MMOL/L (ref 3.5–5.1)
POTASSIUM SERPL-SCNC: 3.6 MMOL/L (ref 3.5–5.1)
PROT SERPL-MCNC: 6.3 GM/DL (ref 5.8–7.6)
PROT SERPL-MCNC: 6.4 GM/DL (ref 5.8–7.6)
PROT UR QL STRIP.AUTO: NEGATIVE
RBC # BLD AUTO: 2.61 X10(6)/MCL (ref 4.2–5.4)
RBC # BLD AUTO: 2.72 X10(6)/MCL (ref 4.2–5.4)
RBC # BLD AUTO: 2.84 X10(6)/MCL (ref 4.2–5.4)
RBC # BLD AUTO: 2.84 X10(6)/MCL (ref 4.2–5.4)
RBC # BLD AUTO: 2.87 X10(6)/MCL (ref 4.2–5.4)
RBC # BLD AUTO: 3.43 X10(6)/MCL (ref 4.2–5.4)
RBC #/AREA URNS AUTO: ABNORMAL /HPF
RBC MORPH BLD: ABNORMAL
RBC MORPH BLD: ABNORMAL
RBC UR QL AUTO: ABNORMAL
SODIUM SERPL-SCNC: 132 MMOL/L (ref 132–146)
SODIUM SERPL-SCNC: 133 MMOL/L (ref 132–146)
SODIUM SERPL-SCNC: 133 MMOL/L (ref 132–146)
SODIUM SERPL-SCNC: 134 MMOL/L (ref 132–146)
SODIUM SERPL-SCNC: 135 MMOL/L (ref 132–146)
SODIUM SERPL-SCNC: 141 MMOL/L (ref 132–146)
SP GR UR STRIP.AUTO: 1.01 (ref 1–1.03)
SQUAMOUS #/AREA URNS AUTO: ABNORMAL /HPF
TARGETS BLD QL SMEAR: SLIGHT
UROBILINOGEN UR STRIP-ACNC: 0.2
WBC # SPEC AUTO: 12.47 X10(3)/MCL (ref 4.5–11.5)
WBC # SPEC AUTO: 15.05 X10(3)/MCL (ref 4.5–11.5)
WBC # SPEC AUTO: 16.5 X10(3)/MCL (ref 4.5–11.5)
WBC # SPEC AUTO: 18.39 X10(3)/MCL (ref 4.5–11.5)
WBC # SPEC AUTO: 23.37 X10(3)/MCL (ref 4.5–11.5)
WBC # SPEC AUTO: 24.53 X10(3)/MCL (ref 4.5–11.5)
WBC #/AREA URNS AUTO: ABNORMAL /HPF

## 2024-01-01 PROCEDURE — 63600175 PHARM REV CODE 636 W HCPCS: Performed by: INTERNAL MEDICINE

## 2024-01-01 PROCEDURE — 83605 ASSAY OF LACTIC ACID: CPT | Performed by: INTERNAL MEDICINE

## 2024-01-01 PROCEDURE — 96360 HYDRATION IV INFUSION INIT: CPT

## 2024-01-01 PROCEDURE — 81003 URINALYSIS AUTO W/O SCOPE: CPT | Performed by: INTERNAL MEDICINE

## 2024-01-01 PROCEDURE — A4216 STERILE WATER/SALINE, 10 ML: HCPCS | Performed by: INTERNAL MEDICINE

## 2024-01-01 PROCEDURE — 21400001 HC TELEMETRY ROOM

## 2024-01-01 PROCEDURE — 96361 HYDRATE IV INFUSION ADD-ON: CPT

## 2024-01-01 PROCEDURE — 25000003 PHARM REV CODE 250: Performed by: INTERNAL MEDICINE

## 2024-01-01 PROCEDURE — 11000001 HC ACUTE MED/SURG PRIVATE ROOM

## 2024-01-01 PROCEDURE — 96375 TX/PRO/DX INJ NEW DRUG ADDON: CPT

## 2024-01-01 PROCEDURE — 27000221 HC OXYGEN, UP TO 24 HOURS

## 2024-01-01 PROCEDURE — 94761 N-INVAS EAR/PLS OXIMETRY MLT: CPT

## 2024-01-01 PROCEDURE — 97161 PT EVAL LOW COMPLEX 20 MIN: CPT

## 2024-01-01 PROCEDURE — 96374 THER/PROPH/DIAG INJ IV PUSH: CPT

## 2024-01-01 PROCEDURE — 36410 VNPNXR 3YR/> PHY/QHP DX/THER: CPT

## 2024-01-01 PROCEDURE — 99900035 HC TECH TIME PER 15 MIN (STAT)

## 2024-01-01 PROCEDURE — 85007 BL SMEAR W/DIFF WBC COUNT: CPT | Performed by: INTERNAL MEDICINE

## 2024-01-01 PROCEDURE — 87040 BLOOD CULTURE FOR BACTERIA: CPT | Performed by: INTERNAL MEDICINE

## 2024-01-01 PROCEDURE — 76937 US GUIDE VASCULAR ACCESS: CPT

## 2024-01-01 PROCEDURE — 25000003 PHARM REV CODE 250: Performed by: EMERGENCY MEDICINE

## 2024-01-01 PROCEDURE — 99285 EMERGENCY DEPT VISIT HI MDM: CPT | Mod: 25

## 2024-01-01 PROCEDURE — 80053 COMPREHEN METABOLIC PANEL: CPT | Performed by: INTERNAL MEDICINE

## 2024-01-01 PROCEDURE — 85025 COMPLETE CBC W/AUTO DIFF WBC: CPT | Performed by: INTERNAL MEDICINE

## 2024-01-01 PROCEDURE — 25500020 PHARM REV CODE 255

## 2024-01-01 PROCEDURE — 85027 COMPLETE CBC AUTOMATED: CPT | Performed by: INTERNAL MEDICINE

## 2024-01-01 PROCEDURE — 80048 BASIC METABOLIC PNL TOTAL CA: CPT | Performed by: INTERNAL MEDICINE

## 2024-01-01 PROCEDURE — 83880 ASSAY OF NATRIURETIC PEPTIDE: CPT | Performed by: INTERNAL MEDICINE

## 2024-01-01 PROCEDURE — 99282 EMERGENCY DEPT VISIT SF MDM: CPT | Mod: 25

## 2024-01-01 PROCEDURE — C1751 CATH, INF, PER/CENT/MIDLINE: HCPCS

## 2024-01-01 PROCEDURE — 74177 CT ABD & PELVIS W/CONTRAST: CPT | Mod: TC

## 2024-01-01 RX ORDER — ONDANSETRON 4 MG/1
4 TABLET, FILM COATED ORAL DAILY PRN
Status: ON HOLD | COMMUNITY
Start: 2023-03-06 | End: 2024-01-01

## 2024-01-01 RX ORDER — FUROSEMIDE 10 MG/ML
20 INJECTION INTRAMUSCULAR; INTRAVENOUS DAILY
Status: DISCONTINUED | OUTPATIENT
Start: 2024-01-01 | End: 2024-01-01 | Stop reason: HOSPADM

## 2024-01-01 RX ORDER — POTASSIUM CHLORIDE 20 MEQ/1
20 TABLET, EXTENDED RELEASE ORAL DAILY
Status: DISCONTINUED | OUTPATIENT
Start: 2024-01-01 | End: 2024-01-01 | Stop reason: HOSPADM

## 2024-01-01 RX ORDER — PLANT STANOL ESTER 450 MG
1 TABLET ORAL DAILY
Status: ON HOLD | COMMUNITY
End: 2024-01-01

## 2024-01-01 RX ORDER — LORAZEPAM 1 MG/1
1 TABLET ORAL EVERY 6 HOURS PRN
Status: ON HOLD | COMMUNITY
End: 2024-01-01

## 2024-01-01 RX ORDER — CLONIDINE 0.2 MG/24H
1 PATCH, EXTENDED RELEASE TRANSDERMAL
Status: DISCONTINUED | OUTPATIENT
Start: 2024-01-01 | End: 2024-01-01 | Stop reason: HOSPADM

## 2024-01-01 RX ORDER — SODIUM CHLORIDE 0.9 % (FLUSH) 0.9 %
10 SYRINGE (ML) INJECTION EVERY 8 HOURS
Status: DISCONTINUED | OUTPATIENT
Start: 2024-01-01 | End: 2024-01-01 | Stop reason: HOSPADM

## 2024-01-01 RX ORDER — ACETAMINOPHEN 325 MG/1
650 TABLET ORAL EVERY 8 HOURS PRN
Status: DISCONTINUED | OUTPATIENT
Start: 2024-01-01 | End: 2024-01-01 | Stop reason: HOSPADM

## 2024-01-01 RX ORDER — AMOXICILLIN AND CLAVULANATE POTASSIUM 875; 125 MG/1; MG/1
1 TABLET, FILM COATED ORAL 2 TIMES DAILY
Qty: 14 TABLET | Refills: 0 | Status: SHIPPED | OUTPATIENT
Start: 2024-01-01 | End: 2024-01-01

## 2024-01-01 RX ORDER — MORPHINE SULFATE 4 MG/ML
4 INJECTION, SOLUTION INTRAMUSCULAR; INTRAVENOUS EVERY 4 HOURS PRN
Status: DISCONTINUED | OUTPATIENT
Start: 2024-01-01 | End: 2024-01-01 | Stop reason: HOSPADM

## 2024-01-01 RX ORDER — ACETAMINOPHEN 650 MG/1
650 SUPPOSITORY RECTAL ONCE
Status: COMPLETED | OUTPATIENT
Start: 2024-01-01 | End: 2024-01-01

## 2024-01-01 RX ORDER — CIPROFLOXACIN 500 MG/1
500 TABLET ORAL 2 TIMES DAILY
Status: ON HOLD | COMMUNITY
Start: 2024-01-01 | End: 2024-01-01

## 2024-01-01 RX ORDER — BISACODYL 10 MG/1
10 SUPPOSITORY RECTAL DAILY PRN
Status: ON HOLD | COMMUNITY
Start: 2024-01-01 | End: 2024-01-01

## 2024-01-01 RX ORDER — DEXTROSE, SODIUM CHLORIDE, SODIUM LACTATE, POTASSIUM CHLORIDE, AND CALCIUM CHLORIDE 5; .6; .31; .03; .02 G/100ML; G/100ML; G/100ML; G/100ML; G/100ML
INJECTION, SOLUTION INTRAVENOUS
Status: COMPLETED | OUTPATIENT
Start: 2024-01-01 | End: 2024-01-01

## 2024-01-01 RX ORDER — SODIUM CHLORIDE 0.9 % (FLUSH) 0.9 %
10 SYRINGE (ML) INJECTION EVERY 6 HOURS
Status: DISCONTINUED | OUTPATIENT
Start: 2024-01-01 | End: 2024-01-01 | Stop reason: HOSPADM

## 2024-01-01 RX ORDER — FENTANYL 12.5 UG/1
1 PATCH TRANSDERMAL
Status: ON HOLD | COMMUNITY
Start: 2024-01-01 | End: 2024-01-01

## 2024-01-01 RX ORDER — LANOLIN ALCOHOL/MO/W.PET/CERES
1 CREAM (GRAM) TOPICAL EVERY MORNING
Status: ON HOLD | COMMUNITY
End: 2024-01-01

## 2024-01-01 RX ORDER — POTASSIUM CHLORIDE 20 MEQ/1
40 TABLET, EXTENDED RELEASE ORAL ONCE
Status: DISCONTINUED | OUTPATIENT
Start: 2024-01-01 | End: 2024-01-01

## 2024-01-01 RX ORDER — HYOSCYAMINE SULFATE 0.12 MG/1
0.12 TABLET SUBLINGUAL EVERY 4 HOURS PRN
Status: ON HOLD | COMMUNITY
Start: 2024-01-01 | End: 2024-01-01

## 2024-01-01 RX ORDER — POTASSIUM CHLORIDE 20 MEQ/1
20 TABLET, EXTENDED RELEASE ORAL DAILY
Qty: 5 TABLET | Refills: 0 | Status: SHIPPED | OUTPATIENT
Start: 2024-01-01 | End: 2024-01-01

## 2024-01-01 RX ORDER — ISOSORBIDE DINITRATE 30 MG/1
30 TABLET ORAL DAILY
COMMUNITY
Start: 2024-01-01 | End: 2024-01-01 | Stop reason: CLARIF

## 2024-01-01 RX ORDER — NITROGLYCERIN 0.4 MG/1
TABLET SUBLINGUAL
Status: ON HOLD | COMMUNITY
Start: 2023-03-06 | End: 2024-01-01

## 2024-01-01 RX ORDER — CARVEDILOL 12.5 MG/1
12.5 TABLET ORAL 2 TIMES DAILY
Status: DISCONTINUED | OUTPATIENT
Start: 2024-01-01 | End: 2024-01-01 | Stop reason: HOSPADM

## 2024-01-01 RX ORDER — MORPHINE SULFATE 20 MG/ML
5 SOLUTION ORAL EVERY 4 HOURS PRN
Status: ON HOLD | COMMUNITY
Start: 2024-01-01 | End: 2024-01-01

## 2024-01-01 RX ORDER — POTASSIUM CHLORIDE 7.45 MG/ML
40 INJECTION INTRAVENOUS ONCE
Status: COMPLETED | OUTPATIENT
Start: 2024-01-01 | End: 2024-01-01

## 2024-01-01 RX ORDER — SODIUM CHLORIDE 9 MG/ML
1000 INJECTION, SOLUTION INTRAVENOUS
Status: COMPLETED | OUTPATIENT
Start: 2024-01-01 | End: 2024-01-01

## 2024-01-01 RX ORDER — ISOSORBIDE MONONITRATE 30 MG/1
30 TABLET, EXTENDED RELEASE ORAL DAILY
Status: ON HOLD | COMMUNITY
Start: 2023-04-11 | End: 2024-01-01

## 2024-01-01 RX ORDER — HYDRALAZINE HYDROCHLORIDE 20 MG/ML
10 INJECTION INTRAMUSCULAR; INTRAVENOUS EVERY 6 HOURS PRN
Status: DISCONTINUED | OUTPATIENT
Start: 2024-01-01 | End: 2024-01-01 | Stop reason: HOSPADM

## 2024-01-01 RX ORDER — HYDROMORPHONE HYDROCHLORIDE 2 MG/ML
1 INJECTION, SOLUTION INTRAMUSCULAR; INTRAVENOUS; SUBCUTANEOUS EVERY 4 HOURS PRN
Status: DISCONTINUED | OUTPATIENT
Start: 2024-01-01 | End: 2024-01-01 | Stop reason: HOSPADM

## 2024-01-01 RX ORDER — FUROSEMIDE 10 MG/ML
40 INJECTION INTRAMUSCULAR; INTRAVENOUS ONCE
Status: COMPLETED | OUTPATIENT
Start: 2024-01-01 | End: 2024-01-01

## 2024-01-01 RX ORDER — POTASSIUM CHLORIDE 7.45 MG/ML
10 INJECTION INTRAVENOUS
Status: COMPLETED | OUTPATIENT
Start: 2024-01-01 | End: 2024-01-01

## 2024-01-01 RX ORDER — ASPIRIN 81 MG
81 TABLET,CHEWABLE ORAL DAILY
Status: ON HOLD | COMMUNITY
Start: 2024-01-01 | End: 2024-01-01

## 2024-01-01 RX ORDER — SODIUM CHLORIDE 0.9 % (FLUSH) 0.9 %
10 SYRINGE (ML) INJECTION
Status: DISCONTINUED | OUTPATIENT
Start: 2024-01-01 | End: 2024-01-01 | Stop reason: HOSPADM

## 2024-01-01 RX ORDER — LABETALOL HYDROCHLORIDE 5 MG/ML
10 INJECTION, SOLUTION INTRAVENOUS
Status: COMPLETED | OUTPATIENT
Start: 2024-01-01 | End: 2024-01-01

## 2024-01-01 RX ORDER — PANTOPRAZOLE SODIUM 40 MG/1
1 TABLET, DELAYED RELEASE ORAL EVERY MORNING
Status: ON HOLD | COMMUNITY
End: 2024-01-01

## 2024-01-01 RX ORDER — SERTRALINE HYDROCHLORIDE 50 MG/1
1 TABLET, FILM COATED ORAL EVERY MORNING
Status: ON HOLD | COMMUNITY
End: 2024-01-01

## 2024-01-01 RX ORDER — CLONIDINE 0.2 MG/24H
1 PATCH, EXTENDED RELEASE TRANSDERMAL
Qty: 4 PATCH | Refills: 11 | Status: ON HOLD | OUTPATIENT
Start: 2024-01-01 | End: 2024-01-01

## 2024-01-01 RX ADMIN — PIPERACILLIN SODIUM AND TAZOBACTAM SODIUM 4.5 G: 4; .5 INJECTION, POWDER, LYOPHILIZED, FOR SOLUTION INTRAVENOUS at 07:02

## 2024-01-01 RX ADMIN — APIXABAN 2.5 MG: 2.5 TABLET, FILM COATED ORAL at 08:02

## 2024-01-01 RX ADMIN — Medication 10 ML: at 09:02

## 2024-01-01 RX ADMIN — ACETAMINOPHEN 650 MG: 325 TABLET, FILM COATED ORAL at 09:02

## 2024-01-01 RX ADMIN — ACETAMINOPHEN 650 MG: 325 TABLET, FILM COATED ORAL at 01:02

## 2024-01-01 RX ADMIN — CARVEDILOL 12.5 MG: 12.5 TABLET, FILM COATED ORAL at 09:02

## 2024-01-01 RX ADMIN — PIPERACILLIN SODIUM AND TAZOBACTAM SODIUM 4.5 G: 4; .5 INJECTION, POWDER, LYOPHILIZED, FOR SOLUTION INTRAVENOUS at 10:02

## 2024-01-01 RX ADMIN — POTASSIUM CHLORIDE 40 MEQ: 7.46 INJECTION, SOLUTION INTRAVENOUS at 11:02

## 2024-01-01 RX ADMIN — HYDROMORPHONE HYDROCHLORIDE 1 MG: 2 INJECTION INTRAMUSCULAR; INTRAVENOUS; SUBCUTANEOUS at 07:04

## 2024-01-01 RX ADMIN — ACETAMINOPHEN 650 MG: 650 SUPPOSITORY RECTAL at 11:02

## 2024-01-01 RX ADMIN — HYDROMORPHONE HYDROCHLORIDE 1 MG: 2 INJECTION INTRAMUSCULAR; INTRAVENOUS; SUBCUTANEOUS at 02:04

## 2024-01-01 RX ADMIN — PIPERACILLIN SODIUM AND TAZOBACTAM SODIUM 4.5 G: 4; .5 INJECTION, POWDER, LYOPHILIZED, FOR SOLUTION INTRAVENOUS at 05:02

## 2024-01-01 RX ADMIN — APIXABAN 2.5 MG: 2.5 TABLET, FILM COATED ORAL at 09:02

## 2024-01-01 RX ADMIN — PIPERACILLIN SODIUM AND TAZOBACTAM SODIUM 4.5 G: 4; .5 INJECTION, POWDER, LYOPHILIZED, FOR SOLUTION INTRAVENOUS at 12:02

## 2024-01-01 RX ADMIN — PIPERACILLIN SODIUM AND TAZOBACTAM SODIUM 4.5 G: 4; .5 INJECTION, POWDER, LYOPHILIZED, FOR SOLUTION INTRAVENOUS at 01:02

## 2024-01-01 RX ADMIN — FUROSEMIDE 20 MG: 10 INJECTION, SOLUTION INTRAVENOUS at 10:02

## 2024-01-01 RX ADMIN — HYDRALAZINE HYDROCHLORIDE 10 MG: 20 INJECTION INTRAMUSCULAR; INTRAVENOUS at 09:02

## 2024-01-01 RX ADMIN — PIPERACILLIN SODIUM AND TAZOBACTAM SODIUM 4.5 G: 4; .5 INJECTION, POWDER, LYOPHILIZED, FOR SOLUTION INTRAVENOUS at 05:04

## 2024-01-01 RX ADMIN — LABETALOL HYDROCHLORIDE 10 MG: 5 INJECTION, SOLUTION INTRAVENOUS at 09:02

## 2024-01-01 RX ADMIN — CARVEDILOL 12.5 MG: 12.5 TABLET, FILM COATED ORAL at 01:02

## 2024-01-01 RX ADMIN — Medication 10 ML: at 05:04

## 2024-01-01 RX ADMIN — FUROSEMIDE 20 MG: 10 INJECTION, SOLUTION INTRAVENOUS at 08:02

## 2024-01-01 RX ADMIN — MORPHINE SULFATE 4 MG: 4 INJECTION, SOLUTION INTRAMUSCULAR; INTRAVENOUS at 08:02

## 2024-01-01 RX ADMIN — Medication 10 ML: at 07:02

## 2024-01-01 RX ADMIN — CARVEDILOL 12.5 MG: 12.5 TABLET, FILM COATED ORAL at 10:02

## 2024-01-01 RX ADMIN — SODIUM CHLORIDE, SODIUM LACTATE, POTASSIUM CHLORIDE, CALCIUM CHLORIDE AND DEXTROSE MONOHYDRATE: 5; 600; 310; 30; 20 INJECTION, SOLUTION INTRAVENOUS at 03:04

## 2024-01-01 RX ADMIN — PIPERACILLIN SODIUM AND TAZOBACTAM SODIUM 4.5 G: 4; .5 INJECTION, POWDER, LYOPHILIZED, FOR SOLUTION INTRAVENOUS at 01:04

## 2024-01-01 RX ADMIN — POTASSIUM CHLORIDE 10 MEQ: 7.46 INJECTION, SOLUTION INTRAVENOUS at 02:02

## 2024-01-01 RX ADMIN — MORPHINE SULFATE 4 MG: 4 INJECTION, SOLUTION INTRAMUSCULAR; INTRAVENOUS at 03:02

## 2024-01-01 RX ADMIN — CLONIDINE 1 PATCH: 0.2 PATCH TRANSDERMAL at 08:02

## 2024-01-01 RX ADMIN — HYDROMORPHONE HYDROCHLORIDE 1 MG: 2 INJECTION INTRAMUSCULAR; INTRAVENOUS; SUBCUTANEOUS at 12:04

## 2024-01-01 RX ADMIN — PIPERACILLIN SODIUM AND TAZOBACTAM SODIUM 4.5 G: 4; .5 INJECTION, POWDER, LYOPHILIZED, FOR SOLUTION INTRAVENOUS at 06:02

## 2024-01-01 RX ADMIN — Medication 10 ML: at 10:02

## 2024-01-01 RX ADMIN — SODIUM CHLORIDE 2586 ML: 9 INJECTION, SOLUTION INTRAVENOUS at 08:02

## 2024-01-01 RX ADMIN — POTASSIUM CHLORIDE 20 MEQ: 1500 TABLET, EXTENDED RELEASE ORAL at 09:02

## 2024-01-01 RX ADMIN — APIXABAN 2.5 MG: 2.5 TABLET, FILM COATED ORAL at 10:02

## 2024-01-01 RX ADMIN — Medication 10 ML: at 01:02

## 2024-01-01 RX ADMIN — PIPERACILLIN SODIUM AND TAZOBACTAM SODIUM 4.5 G: 4; .5 INJECTION, POWDER, LYOPHILIZED, FOR SOLUTION INTRAVENOUS at 04:02

## 2024-01-01 RX ADMIN — FUROSEMIDE 20 MG: 10 INJECTION, SOLUTION INTRAVENOUS at 09:02

## 2024-01-01 RX ADMIN — PIPERACILLIN SODIUM AND TAZOBACTAM SODIUM 4.5 G: 4; .5 INJECTION, POWDER, LYOPHILIZED, FOR SOLUTION INTRAVENOUS at 09:04

## 2024-01-01 RX ADMIN — Medication 10 ML: at 02:02

## 2024-01-01 RX ADMIN — PIPERACILLIN SODIUM AND TAZOBACTAM SODIUM 4.5 G: 4; .5 INJECTION, POWDER, LYOPHILIZED, FOR SOLUTION INTRAVENOUS at 02:02

## 2024-01-01 RX ADMIN — SODIUM CHLORIDE 1000 ML: 9 INJECTION, SOLUTION INTRAVENOUS at 10:02

## 2024-01-01 RX ADMIN — POTASSIUM CHLORIDE 10 MEQ: 7.46 INJECTION, SOLUTION INTRAVENOUS at 09:02

## 2024-01-01 RX ADMIN — PIPERACILLIN SODIUM AND TAZOBACTAM SODIUM 4.5 G: 4; .5 INJECTION, POWDER, LYOPHILIZED, FOR SOLUTION INTRAVENOUS at 09:02

## 2024-01-01 RX ADMIN — APIXABAN 2.5 MG: 2.5 TABLET, FILM COATED ORAL at 01:02

## 2024-01-01 RX ADMIN — FUROSEMIDE 40 MG: 10 INJECTION, SOLUTION INTRAMUSCULAR; INTRAVENOUS at 01:02

## 2024-01-01 RX ADMIN — POTASSIUM CHLORIDE 10 MEQ: 7.46 INJECTION, SOLUTION INTRAVENOUS at 05:02

## 2024-01-01 RX ADMIN — Medication 10 ML: at 11:02

## 2024-01-01 RX ADMIN — IOPAMIDOL 100 ML: 755 INJECTION, SOLUTION INTRAVENOUS at 03:01

## 2024-01-01 RX ADMIN — POTASSIUM CHLORIDE 10 MEQ: 7.46 INJECTION, SOLUTION INTRAVENOUS at 04:02

## 2024-01-01 RX ADMIN — Medication 10 ML: at 09:04

## 2024-01-01 RX ADMIN — CARVEDILOL 12.5 MG: 12.5 TABLET, FILM COATED ORAL at 08:02

## 2024-01-01 RX ADMIN — Medication 10 ML: at 05:02

## 2024-01-01 RX ADMIN — Medication 10 ML: at 04:02

## 2024-01-02 NOTE — DISCHARGE SUMMARY
[facility]                     [address] [address]    PATIENT NAME:       WENDY HICKS  YOB: 1933  CSN:                [id number]   MRN:                [id number]  ADMIT DATE:         12/29/2023 16:32:00  PHYSICIAN:          Barrett Lopez MD                          DISCHARGE SUMMARY    DATE OF DISCHARGE:  12/30/2023 14:41:00    HOSPITAL COURSE:  She presented to my office with increased weakness and   presumed dehydration.  She also has iron deficiency anemia and she is on   chemotherapy for metastatic lung cancer secondary to bladder cancer.  She has   underlying diabetes and history of pulmonary embolus.  She is on Eliquis.  After   hospitalization, baseline labs were done.  It was noted that her   blood/hemoglobin has been dropping approximately a 1 g each time we check it.    Even though her hemoglobin was 9, she was symptomatic and also dehydrated.  Iron   studies did show iron deficiency anemia, but ferritin looks good.  Haptoglobin   was elevated slightly at 3:27.  Potassium was low at 3.3.  This was replaced.    She was given IV normal saline with potassium and following morning, her BUN and   creatinine had improved to 16 and 0.71.  Glucose is good.  Albumin is good.    White blood cell count is stable.  H and H came up to 10.9 and 34.6, and   platelets 152,000.  She will be discharged in stable condition.    ASSESSMENT:    1. Intravascular volume depletion with weakness.  2. Iron deficiency anemia.  3. On chemotherapy for metastatic lung disease from bladder cancer.  4. Fatigue.    PLAN:  Discharge the patient home.  Continue home medications as prior to   hospitalization, including Tylenol, Eliquis 5 mg half a day, ascorbic acid 1000   mg once a day, Lipitor 40 mg 1 p.o. daily, Coreg 6.25 mg twice a day, Cosopt eye   drops both eyes once daily, Levsin 0.125 every 4 hours as needed  for bladder   spasm, isosorbide mononitrate 15 mg once a day, Linzess 145 mcg 1 p.o. daily,   Lorazepam 1 mg every 6 hours p.r.n. anxiety, multivitamin 1 p.o. daily,   nitroglycerin 0.4 mg sublingual q.5 minutes p.r.n. chest pain, Zofran 4 mg every   8 hours as needed for nausea, Protonix 40 mg 1 p.o. daily, Seroquel 25 mg twice   a day, senna 1 daily for constipation, and Zoloft 50 mg 1 p.o. daily.    ACTIVITY:  As tolerated.    DIET:  Cardiac diabetic diet.    FOLLOWUP:  One week.        ______________________________  MD NILS Unger/BRAYANS  DD:  01/02/2024  Time:  12:26PM  DT:  01/02/2024  Time:  01:16PM  Job #:  [id number]      DISCHARGE SUMMARY

## 2024-01-27 NOTE — ED PROVIDER NOTES
Encounter Date: 1/27/2024       History     Chief Complaint   Patient presents with    Leg Pain     R leg and foot pain   twisted while moving off BSC 3 days ago     See MDM    The history is provided by the patient and a relative. No  was used.     Review of patient's allergies indicates:   Allergen Reactions    Statins-hmg-coa reductase inhibitors      Other reaction(s): Joint pain    Bimatoprost      Other reaction(s): Eye redness    Gabapentin      Other reaction(s): Confusion    Lisinopril     Rosuvastatin Itching    Tafluprost (pf)      Past Medical History:   Diagnosis Date    Anemia, iron deficiency 02/12/2023    Anxiety disorder     Aortic stenosis     Arthritis     Bladder cancer 02/12/2023    Cancer     Carpal tunnel syndrome     Coronary artery disease     Dementia     Depression     Diabetes mellitus     GERD (gastroesophageal reflux disease)     Hx of rectal polypectomy     Hypercholesterolemia     Hypertension     Obstructive sleep apnea     Respiratory distress     S/P TAVR (transcatheter aortic valve replacement)     TIA (transient ischemic attack)      Past Surgical History:   Procedure Laterality Date    CARDIAC SURGERY      CATARACT EXTRACTION      CORONARY STENT PLACEMENT      EGD, WITH CLOSED BIOPSY  12/28/2022    Procedure: EGD, WITH CLOSED BIOPSY;  Surgeon: Arcadio Carias MD;  Location: [facility] ENDOSCOPY;  Service: Gastroenterology;;    ESOPHAGOGASTRODUODENOSCOPY W/ PEG N/A 12/28/2022    Procedure: PEG;  Surgeon: Arcadio Carias MD;  Location: [facility] ENDOSCOPY;  Service: Gastroenterology;  Laterality: N/A;    HYSTERECTOMY      INSERTION OF INFERIOR VENA CAVAL FILTER N/A 3/1/2023    Procedure: Insertion, Filter, Inferior Vena Cava;  Surgeon: Terri Urbina MD;  Location: [facility] CATH LAB;  Service: Peripheral Vascular;  Laterality: N/A;  IVC FILTER PLACEMENT    INSERTION OF PACEMAKER      PARTIAL HYSTERECTOMY      PULMONARY EMBOLISM SURGERY N/A 12/21/2022     Procedure: EMBOLECTOMY, ARTERY, PULMONARY;  Surgeon: Terri Urbina MD;  Location: [facility] CATH LAB;  Service: Peripheral Vascular;  Laterality: N/A;    RECTAL POLYPECTOMY      TONSILLECTOMY      TRANSCATHETER AORTIC VALVE REPLACEMENT (TAVR)       Family History   Problem Relation Age of Onset    Diabetes Mother     Stroke Father     Hyperlipidemia Father     Breast cancer Sister      Social History     Tobacco Use    Smoking status: Never    Smokeless tobacco: Never   Substance Use Topics    Alcohol use: Never    Drug use: Never     Review of Systems   Constitutional:  Negative for fever.   Respiratory:  Negative for cough and shortness of breath.    Cardiovascular:  Negative for chest pain.   Gastrointestinal:  Negative for abdominal pain.   Genitourinary:  Negative for difficulty urinating and dysuria.   Musculoskeletal:  Negative for gait problem.   Skin:  Negative for color change.   Neurological:  Negative for dizziness, speech difficulty and headaches.   Psychiatric/Behavioral:  Negative for hallucinations and suicidal ideas.    All other systems reviewed and are negative.      Physical Exam     Initial Vitals [01/27/24 1245]   BP Pulse Resp Temp SpO2   (!) 138/55 78 16 97.3 °F (36.3 °C) 96 %      MAP       --         Physical Exam    Nursing note and vitals reviewed.  Constitutional: She appears well-developed and well-nourished.   HENT:   Head: Normocephalic.   Eyes: EOM are normal.   Neck:   Normal range of motion.  Cardiovascular:  Normal rate, regular rhythm, normal heart sounds and intact distal pulses.           Pulmonary/Chest: Breath sounds normal. No respiratory distress.   Abdominal: Abdomen is soft. Bowel sounds are normal. There is no abdominal tenderness.   Musculoskeletal:         General: Normal range of motion.      Cervical back: Normal range of motion.      Comments: Tenderness to right foot and right lower leg.     Neurological: She is alert and oriented to person, place, and time.    Skin: Skin is warm and dry.   Psychiatric: She has a normal mood and affect. Her behavior is normal. Judgment and thought content normal.         ED Course   Procedures  Labs Reviewed - No data to display       Imaging Results              X-Ray Foot Complete Right (Final result)  Result time 01/27/24 13:35:46      Final result by Chandu Jimenez MD (01/27/24 13:35:46)                   Impression:      No acute osseous finding.      Electronically signed by: Chandu Jimenez MD  Date:    01/27/2024  Time:    13:35               Narrative:    EXAMINATION:  Right foot three views    CLINICAL HISTORY:  Pain    COMPARISON:  None    FINDINGS:  First metatarsal osteotomy with single screw noted in place.  There is no acute fracture seen.  There is no erosion or osseous destruction seen.  Calcaneal spurring noted.  Vascular calcifications are seen.                                       X-Ray Tibia Fibula 2 View Right (Final result)  Result time 01/27/24 13:33:51      Final result by Chandu Jimenez MD (01/27/24 13:33:51)                   Impression:      No acute osseous finding.  Right knee DJD.    Partially seen suprapatellar joint effusion.      Electronically signed by: Chandu Jimenez MD  Date:    01/27/2024  Time:    13:33               Narrative:    EXAMINATION:  Right tibia and fibula the two views    CLINICAL HISTORY:  Pain    COMPARISON:  None    FINDINGS:  There is no acute fracture subluxation.  Moderate to severe tricompartmental degenerative changes of the knee are partially seen.  There is right knee joint effusion noted.                                       Medications - No data to display  Medical Decision Making  Historian:  Patient and family.  Patient is a 89-year-old female  that presents with fall that has been present 2 days ago. Associated symptoms right foot and right lower leg pain. Surrounding information is nothing. Exacerbated by nothing. Relieved by nothing. Patient treatment prior to  arrival none. Risk factors include none. Other history pertaining to this complaint nothing.   Assessment:  See physical exam.  DD:  Foot sprain, foot fracture, lower leg contusion, lower leg fracture  ED Course: History was obtained.  Physical was performed.  X-ray shows no acute fracture.  Will place patient walking boot. Medical or surgical consults:  None. Social determinants that affect healthcare:  None.       Amount and/or Complexity of Data Reviewed  Independent Historian:      Details: Daughter gave information about present injury  Radiology: ordered.     Details: No acute findings                                      Clinical Impression:  Final diagnoses:  [W19.XXXA] Fall  [S93.601A] Foot sprain, right, initial encounter (Primary)          ED Disposition Condition    Discharge Stable          ED Prescriptions    None       Follow-up Information       Follow up With Specialties Details Why Contact Info    Your Primary Care Provider  Call in 3 days ed follow up              Zain Bush, NEERAJ  01/27/24 5765

## 2024-02-12 NOTE — PROGRESS NOTES
Care [facility]cosystem Follow-up Surveys:  ANNEMARIE Gustafson completed 6 month surveys with caregiver German (Son) on 2/12/2024.   Surveys documented in RedCap and Epic Flowsheets.      ClinCard sent/loaded: yes  Advised caregiver to use ClinCard within the first three months to avoid any fees or deductions. Requested caregiver save card to be reloaded once 12-month surveys are completed.   Caregiver expressed understanding.

## 2024-02-23 NOTE — PLAN OF CARE
Patient daughter says that the fly will discuss options for d/c home and will let us know what they decide.  She does not want hospice.. Informed her that we can arrange palliative care and home health if they decide.

## 2024-02-23 NOTE — ED PROVIDER NOTES
Encounter Date: 2/22/2024  History from children and minimal from patient and an extensive chart review     History     Chief Complaint   Patient presents with    Altered Mental Status     Pt brought in by [facility] with c/o AMS, tachycardia and weakness.     HPI    Miguel Angel Oliveros is 89 y.o. female who  has a past medical history of Anemia, iron deficiency (02/12/2023), Anxiety disorder, Aortic stenosis, Arthritis, Bladder cancer (02/12/2023), Cancer, Carpal tunnel syndrome, Coronary artery disease, Dementia, Depression, Diabetes mellitus, GERD (gastroesophageal reflux disease), rectal polypectomy, Hypercholesterolemia, Hypertension, Obstructive sleep apnea, Respiratory distress, S/P TAVR (transcatheter aortic valve replacement), and TIA (transient ischemic attack). arrives in ER with c/o Altered Mental Status (Pt brought in by [facility] with c/o AMS, tachycardia and weakness.)    Review of patient's allergies indicates:   Allergen Reactions    Statins-hmg-coa reductase inhibitors      Other reaction(s): Joint pain    Bimatoprost      Other reaction(s): Eye redness    Gabapentin      Other reaction(s): Confusion    Lisinopril     Rosuvastatin Itching    Tafluprost (pf)      Past Medical History:   Diagnosis Date    Anemia, iron deficiency 02/12/2023    Anxiety disorder     Aortic stenosis     Arthritis     Bladder cancer 02/12/2023    Cancer     Carpal tunnel syndrome     Coronary artery disease     Dementia     Depression     Diabetes mellitus     GERD (gastroesophageal reflux disease)     Hx of rectal polypectomy     Hypercholesterolemia     Hypertension     Obstructive sleep apnea     Respiratory distress     S/P TAVR (transcatheter aortic valve replacement)     TIA (transient ischemic attack)      Past Surgical History:   Procedure Laterality Date    CARDIAC SURGERY      CATARACT EXTRACTION      CORONARY STENT PLACEMENT      EGD, WITH CLOSED BIOPSY  12/28/2022    Procedure: EGD, WITH CLOSED BIOPSY;   Surgeon: Arcadio Carias MD;  Location: [facility] ENDOSCOPY;  Service: Gastroenterology;;    ESOPHAGOGASTRODUODENOSCOPY W/ PEG N/A 12/28/2022    Procedure: PEG;  Surgeon: Arcadio Carias MD;  Location: [facility] ENDOSCOPY;  Service: Gastroenterology;  Laterality: N/A;    HYSTERECTOMY      INSERTION OF INFERIOR VENA CAVAL FILTER N/A 3/1/2023    Procedure: Insertion, Filter, Inferior Vena Cava;  Surgeon: Terri Urbina MD;  Location: [facility] CATH LAB;  Service: Peripheral Vascular;  Laterality: N/A;  IVC FILTER PLACEMENT    INSERTION OF PACEMAKER      PARTIAL HYSTERECTOMY      PULMONARY EMBOLISM SURGERY N/A 12/21/2022    Procedure: EMBOLECTOMY, ARTERY, PULMONARY;  Surgeon: Terri Urbina MD;  Location: [facility] CATH LAB;  Service: Peripheral Vascular;  Laterality: N/A;    RECTAL POLYPECTOMY      TONSILLECTOMY      TRANSCATHETER AORTIC VALVE REPLACEMENT (TAVR)       Family History   Problem Relation Age of Onset    Diabetes Mother     Stroke Father     Hyperlipidemia Father     Breast cancer Sister      Social History     Tobacco Use    Smoking status: Never    Smokeless tobacco: Never   Substance Use Topics    Alcohol use: Never    Drug use: Never     Review of Systems   Unable to perform ROS: Other (Generalized weakness on part of the patient and she does not talk much except for saying that she has pain when I asked her.)   Constitutional:  Positive for fever.   Gastrointestinal:  Positive for abdominal pain.   Psychiatric/Behavioral:  Positive for confusion.        Physical Exam     Initial Vitals [02/22/24 1850]   BP Pulse Resp Temp SpO2   (!) 178/95 (!) 112 (!) 22 (!) 100.7 °F (38.2 °C) 100 %      MAP       --         Physical Exam    Constitutional: She appears well-developed. No distress.   Eyes: EOM are normal.   Neck: Neck supple.   Cardiovascular:  Regular rhythm.           Tachycardia   Pulmonary/Chest: No respiratory distress. She has no wheezes. She has no rhonchi. She has no rales.   Abdominal:  Abdomen is soft. She exhibits no distension. There is abdominal tenderness. There is no rebound and no guarding.   Musculoskeletal:         General: No tenderness or edema.      Cervical back: Neck supple.     Neurological: She is alert.   Skin: Skin is dry.       ED Course   Critical Care    Date/Time: 2/22/2024 10:08 PM    Performed by: Jyoti Granados MD  Authorized by: Jyoti Granados MD  Direct patient critical care time: 50 minutes  Total critical care time (exclusive of procedural time) : 50 minutes  Critical care was necessary to treat or prevent imminent or life-threatening deterioration of the following conditions: sepsis and dehydration.  Critical care was time spent personally by me on the following activities: discussions with consultants, development of treatment plan with patient or surrogate, evaluation of patient's response to treatment, examination of patient, obtaining history from patient or surrogate, ordering and performing treatments and interventions, ordering and review of laboratory studies, ordering and review of radiographic studies, pulse oximetry, re-evaluation of patient's condition and review of old charts.        Orders Placed This Encounter   Procedures    Critical Care    Blood culture #1 **CANNOT BE ORDERED STAT**    Blood culture #2 **CANNOT BE ORDERED STAT**    X-Ray Chest 1 View    X-Ray Chest 1 View    CBC auto differential    Comprehensive metabolic panel    Urinalysis, Reflex to Urine Culture    CBC with Differential    Manual Differential    Lactic acid, plasma    Lactic Acid, Plasma    Urinalysis, Microscopic    Diet Low Sodium, 2gm    Vital signs    Intake and output    Notify physician     Place sequential compression device    DNR (Do Not Resuscitate)    Inpatient consult to Social Work/Case Management    Pulse Oximetry Q4H    Insert Saline lock IV    Admit to Inpatient     Medications   sodium chloride 0.9% bolus 1,000 mL 1,000 mL (1,000 mLs Intravenous Not Given  2/22/24 1945)   morphine injection 4 mg (4 mg Intravenous Given 2/22/24 2031)   piperacillin-tazobactam (ZOSYN) 4.5 g in dextrose 5 % in water (D5W) 100 mL IVPB (MB+) (4.5 g Intravenous New Bag 2/22/24 2142)   hydrALAZINE injection 10 mg (10 mg Intravenous Given 2/22/24 2141)   carvediloL tablet 12.5 mg (12.5 mg Oral Not Given 2/22/24 2154)   sodium chloride 0.9% flush 10 mL (has no administration in time range)   acetaminophen tablet 650 mg (has no administration in time range)   0.9%  NaCl infusion (1,000 mLs Intravenous New Bag 2/22/24 2219)   sodium chloride 0.9% bolus 2,586 mL 2,586 mL (0 mLs Intravenous Stopped 2/22/24 2131)   labetaloL injection 10 mg (10 mg Intravenous Given 2/22/24 2159)     Admission on 02/22/2024   Component Date Value Ref Range Status    Sodium Level 02/22/2024 133  132 - 146 mmol/L Final    Potassium Level 02/22/2024 3.6  3.5 - 5.1 mmol/L Final    Chloride 02/22/2024 101  98 - 111 mmol/L Final    Carbon Dioxide 02/22/2024 20 (L)  23 - 31 mmol/L Final    Glucose Level 02/22/2024 89  75 - 121 mg/dL Final    Blood Urea Nitrogen 02/22/2024 24.0 (H)  9.8 - 20.1 mg/dL Final    Creatinine 02/22/2024 0.59  0.55 - 1.02 mg/dL Final    Calcium Level Total 02/22/2024 9.8  8.4 - 10.2 mg/dL Final    Protein Total 02/22/2024 6.4  5.8 - 7.6 gm/dL Final    Albumin Level 02/22/2024 1.7 (L)  3.4 - 4.8 g/dL Final    Globulin 02/22/2024 4.7 (H)  2.4 - 3.5 gm/dL Final    Albumin/Globulin Ratio 02/22/2024 0.4 (L)  1.1 - 2.0 ratio Final    Bilirubin Total 02/22/2024 0.7  <=1.5 mg/dL Final    Alkaline Phosphatase 02/22/2024 147  40 - 150 unit/L Final    Alanine Aminotransferase 02/22/2024 35  0 - 55 unit/L Final    Aspartate Aminotransferase 02/22/2024 36 (H)  5 - 34 unit/L Final    eGFR 02/22/2024 >60  mls/min/1.73/m2 Final    Color, UA 02/22/2024 Yellow  Yellow, Light-Yellow, Dark Yellow, Gracie, Straw Final    Appearance, UA 02/22/2024 Clear  Clear Final    Specific Gravity, UA 02/22/2024 1.015  1.005 - 1.030  Final    pH, UA 02/22/2024 6.5  5.0 - 8.5 Final    Protein, UA 02/22/2024 Negative  Negative Final    Glucose, UA 02/22/2024 Negative  Negative, Normal Final    Ketones, UA 02/22/2024 Negative  Negative Final    Blood, UA 02/22/2024 3+ (A)  Negative Final    Bilirubin, UA 02/22/2024 Negative  Negative Final    Urobilinogen, UA 02/22/2024 0.2  0.2, 1.0, Normal Final    Nitrites, UA 02/22/2024 Negative  Negative Final    Leukocyte Esterase, UA 02/22/2024 Negative  Negative Final    WBC 02/22/2024 23.37 (H)  4.50 - 11.50 x10(3)/mcL Final    RBC 02/22/2024 3.43 (L)  4.20 - 5.40 x10(6)/mcL Final    Hgb 02/22/2024 9.7 (L)  12.0 - 16.0 g/dL Final    Hct 02/22/2024 30.0 (L)  37.0 - 47.0 % Final    MCV 02/22/2024 87.5  80.0 - 94.0 fL Final    MCH 02/22/2024 28.3  27.0 - 31.0 pg Final    MCHC 02/22/2024 32.3 (L)  33.0 - 36.0 g/dL Final    RDW 02/22/2024 16.2  11.5 - 17.0 % Final    Platelet 02/22/2024 98 (L)  130 - 400 x10(3)/mcL Final    MPV 02/22/2024 11.0 (H)  7.4 - 10.4 fL Final    NRBC% 02/22/2024 0.3  % Final    Neutrophils % 02/22/2024 91 (H)  47 - 80 % Final    Bands % 02/22/2024 1  0 - 11 % Final    Lymphs % 02/22/2024 6 (L)  13 - 40 % Final    Monocytes % 02/22/2024 2  2 - 11 % Final    Neutrophils Abs Calc 02/22/2024 21.5004 (H)  2.1 - 9.2 x10(3)/mcL Final    Lymphs Abs 02/22/2024 1.4022  0.6 - 4.6 x10(3)/mcL Final    Monocytes Abs 02/22/2024 0.4674  0.1 - 1.3 x10(3)/mcL Final    Platelets 02/22/2024 Decreased (A)  Normal, Adequate Final    RBC Morph 02/22/2024 Abnormal (A)  Normal Final    Anisocytosis 02/22/2024 Slight (A)  (none) Final    Poikilocytosis 02/22/2024 Slight (A)  (none) Final    Target Cells 02/22/2024 Slight (A)  (none) Final    Lactic Acid Level 02/22/2024 4.2 (HH)  0.5 - 2.2 mmol/L Final    Bacteria, UA 02/22/2024 None Seen  None Seen, Rare, Occasional /HPF Final    RBC, UA 02/22/2024 21-50 (A)  None Seen, 0-2, 3-5, 0-5 /HPF Final    WBC, UA 02/22/2024 0-2  None Seen, 0-2, 3-5, 0-5 /HPF Final     Squamous Epithelial Cells, UA 02/22/2024 Few (A)  None Seen, Rare, Occasional, Occ /HPF Final    Lactic Acid Level 02/22/2024 4.2 (HH)  0.5 - 2.2 mmol/L Final       Labs Reviewed   COMPREHENSIVE METABOLIC PANEL - Abnormal; Notable for the following components:       Result Value    Carbon Dioxide 20 (*)     Blood Urea Nitrogen 24.0 (*)     Albumin Level 1.7 (*)     Globulin 4.7 (*)     Albumin/Globulin Ratio 0.4 (*)     Aspartate Aminotransferase 36 (*)     All other components within normal limits   URINALYSIS, REFLEX TO URINE CULTURE - Abnormal; Notable for the following components:    Blood, UA 3+ (*)     All other components within normal limits   CBC WITH DIFFERENTIAL - Abnormal; Notable for the following components:    WBC 23.37 (*)     RBC 3.43 (*)     Hgb 9.7 (*)     Hct 30.0 (*)     MCHC 32.3 (*)     Platelet 98 (*)     MPV 11.0 (*)     All other components within normal limits   MANUAL DIFFERENTIAL - Abnormal; Notable for the following components:    Neutrophils % 91 (*)     Lymphs % 6 (*)     Neutrophils Abs Calc 21.5004 (*)     Platelets Decreased (*)     RBC Morph Abnormal (*)     Anisocytosis Slight (*)     Poikilocytosis Slight (*)     Target Cells Slight (*)     All other components within normal limits   LACTIC ACID, PLASMA - Abnormal; Notable for the following components:    Lactic Acid Level 4.2 (*)     All other components within normal limits   URINALYSIS, MICROSCOPIC - Abnormal; Notable for the following components:    RBC, UA 21-50 (*)     Squamous Epithelial Cells, UA Few (*)     All other components within normal limits   LACTIC ACID, PLASMA - Abnormal; Notable for the following components:    Lactic Acid Level 4.2 (*)     All other components within normal limits   BLOOD CULTURE [facility]   BLOOD CULTURE [facility]   CBC W/ AUTO DIFFERENTIAL    Narrative:     The following orders were created for panel order CBC auto differential.  Procedure                               Abnormality         Status                      ---------                               -----------         ------                     CBC with Differential[[id number]]       Abnormal            Final result               Manual Differential[[id number]]         Abnormal            Final result                 Please view results for these tests on the individual orders.          Imaging Results              X-Ray Chest 1 View (In process)                      X-Ray Chest 1 View (Preliminary result)  Result time 02/22/24 22:08:13      Wet Read by Jyoti Granados MD (02/22/24 22:08:13, [facility] - Emergency Dept, Emergency Medicine)    Poor technique, repeat chest x-ray again                                     Medications   sodium chloride 0.9% bolus 1,000 mL 1,000 mL (1,000 mLs Intravenous Not Given 2/22/24 1945)   morphine injection 4 mg (4 mg Intravenous Given 2/22/24 2031)   piperacillin-tazobactam (ZOSYN) 4.5 g in dextrose 5 % in water (D5W) 100 mL IVPB (MB+) (4.5 g Intravenous New Bag 2/22/24 2142)   hydrALAZINE injection 10 mg (10 mg Intravenous Given 2/22/24 2141)   carvediloL tablet 12.5 mg (12.5 mg Oral Not Given 2/22/24 2154)   sodium chloride 0.9% flush 10 mL (has no administration in time range)   acetaminophen tablet 650 mg (has no administration in time range)   0.9%  NaCl infusion (1,000 mLs Intravenous New Bag 2/22/24 2219)   sodium chloride 0.9% bolus 2,586 mL 2,586 mL (0 mLs Intravenous Stopped 2/22/24 2131)   labetaloL injection 10 mg (10 mg Intravenous Given 2/22/24 2159)     Medical Decision Making    Miguel Angel Oliveros is 89 y.o. female who  has a past medical history of Anemia, iron deficiency (02/12/2023), Anxiety disorder, Aortic stenosis, Arthritis, Bladder cancer (02/12/2023), Cancer, Carpal tunnel syndrome, Coronary artery disease, Dementia, Depression, Diabetes mellitus, GERD (gastroesophageal reflux disease), rectal polypectomy, Hypercholesterolemia, Hypertension, Obstructive sleep apnea, Respiratory  distress, S/P TAVR (transcatheter aortic valve replacement), and TIA (transient ischemic attack). arrives in ER with c/o Altered Mental Status (Pt brought in by [facility] with c/o AMS, tachycardia and weakness.)    Patient is essentially brought in today with complaint of altered mental status, which apparently has been going on for a few weeks, and essentially family reports that patient was discharged from our [facility] yesterday with the current condition,     I went over the notes extensively from our [facility] where she was seen by multiple specialists while she was admitted there for 2 weeks for the same condition but she has.    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, and patient's daughter reports that she was on immune modulating medication, but yesterday she was told by the oncologist that the medicine is not working, and he wants to see her in the office in 2 weeks, also I saw the note that patient has the deep vein thrombosis, she was seen by infectious diseases, and they feel that the fever is non infectious in origin, she was given IV antibiotics for UTI which was essentially improved, they felt that the fever is coming from her bladder cancer/DVT/lung Mets, and they did not feel that patient needs any further antibiotic therapy and according to family they were not sent home with some antibiotics, patient also has superior mesenteric artery partial occlusion,    I see in the notes that they were offered hospice for the patient but family was not ready for that at that time according to the note.    Today I talked to patient's family where they had 7 siblings in the emergency room today and I talked to 6 of them because 1 of them felt that she does not have to here whatever I have to say and she would rather sit with mom and rest of them were in the room when I talked to them in detail informed them about the findings from different  doctors and notes from different doctors in [facility].    After a prolonged discussion with the family they told me that she is already DNR, but they want her to be admitted in the hospital and they want us to contact [facility] hospice in [place] to transfer her to inpatient hospice, they said that the patient was in [facility] last year.    When I talked to the patient patient says that she has the some pain mainly in the lower abdomen, but otherwise she has not able to offer any particular complaints, I advised the family that I will do a basic workup on her, and I will see if I find something new on her, and in any case I will talk to the hospitalist and admit her in the hospital for them to arrange for inpatient hospice care for her.  I also informed them that if it is okay to give her pain medication even if it may harm her and then they requested that yes it is okay with them to give morphine if needed for pain to the patient.  Since patient is in pain according to her I will go ahead and give her a small dose of morphine to help her.    Problems Addressed:  Hypertension, unspecified type:     Details: Given hydralazine and labetalol has patient was not able to take her blood pressure medicine.  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  Radiology: ordered.    Risk  Prescription drug management.  Risk Details: ECHO 10/2023  Study Result    · Left Ventricle: The left ventricle is normal in size. There is concentric hypertrophy. There is normal systolic function with a visually estimated ejection fraction of 60 - 65%. Grade I diastolic dysfunction.  · Right Ventricle: Normal right ventricular  cavity size. Systolic function is normal.  · Aortic Valve: The aortic valve is a trileaflet valve. There is mild stenosis. Aortic valve area by VTI is 1.08 cm². Aortic valve peak velocity is 3.01 m/s. Mean gradient is 19 mmHg. The dimensionless index is 0.42.  · Mitral Valve: There is mild mitral annular calcification present. There is mild stenosis. The mean pressure gradient across the mitral valve is 5 mmHg at a heart rate of bpm. There is mild regurgitation.  · Pulmonic Valve: There is mild regurgitation.        Additional MDM:   Sepsis:   This patient does have evidence of infective focus  My overall impression is sepsis.  Source: Unknown  Antibiotics given- Antibiotics     Patient Encounter Information Not Found      Latest lactate reviewed- 4.2  Organ dysfunction indicated by none    Fluid challenge Actual Body weight- Patient will receive 30ml/kg actual body weight to calculate fluid bolus for treatment of septic shock.     Post- resuscitation assessment Yes Perfusion exam was performed within 6 hours of septic shock presentation after bolus shows Adequate tissue perfusion assessed by non-invasive monitoring       Will not need pressors as patient's blood pressure is significantly elevated when elevated mean arterial pressure.  With appropriate for fusion.  Source control achieved by:  Zosyn                ED Course as of 02/22/24 2252   Thu Feb 22, 2024 2115 Since patient has fever, her lactic acid is elevated, her white blood cell count is high, although she does not have any urinary tract infection at this time, and she has been having fever for some time now, and she was discharged yesterday from the outside hospital, I will go ahead and start her on antibiotic and wait for the blood cultures to come back which I have drawn already, her lactic acid is elevated, she is getting 30 mL/kg bolus, she does have some elevated BUN, creatinine is normal, her last echocardiogram in October 2023 showed an EF of  65%, I am going to admit her in the hospital, and I will consult  to arrange for hospice care for her. [GQ]   2117 Patient's blood pressure is actually elevated, I am going to go ahead and give her some hydralazine, and will keep her on oxygen, [GQ]   2122 Patient's blood pressure is elevated, she has good capillary refill bilateral upper and lower extremities, she is awake and answers simple questions, she has been given morphine to help her with the pain [GQ]   2122 I am giving her hydralazine for her blood pressure and carvedilol also for her blood pressure, [GQ]   2133 I talked to Dr. Almonte and is okay with admission [GQ]   2135 Patient has been running fever for some time, and according to ID note her cultures were negative and urine was clean so she did not need anymore antibiotics but now patient's white count is elevated lactic acid is elevated she got 1% bands, I will go ahead and give her IV antibiotics for broad-spectrum, will wait for the blood cultures again if the blood cultures are negative they can stop the antibiotics. [GQ]   2242 Lactic Acid Level(!!): 4.2 [GQ]   2243 Patient's lactic acid remains 4.2, she got the fluid bolus, she is on maintenance IV fluids, she is getting IV antibiotic,    She has good capillary refill she actually had a elevated blood pressure, a to give her couple of medicines to bring her blood pressure down last blood pressure is 155/69,    She is going to the floor for further management. 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

## 2024-02-23 NOTE — PROGRESS NOTES
[facility] Medicine Progress Note        Chief Complaint: Inpatient Follow-up for     HPI:   History was essentially by the patient's daughter was present at the time of my evaluation.  Patient unable to provide any medical history due to altered mental status.  Patient is a 89 y.o. 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    Patient was just recently discharged from a local hospital wher she was managed for 2 weeks due to probable sepsis with recurrence of fever, generalized body weakness and poor oral intake with altered mental status.  Patient's overall condition was not released significantly improving.  Family was advised on hospice care.  Patient was released yesterday from the local facility.    Since released yesterday she continued to have recurrence of fever, worsening weakness and altered mental status.  Patient has been having poor oral intake.  Patient was brought to the ER for further evaluation.  Family had agreed for transfer to inpatient hospice care facility at this time.  Patient's daughter was also concerned about anasarca especially bilateral lower extremity swelling.  She has also been having shortness of breath.  Currently on 2 L of nasal cannula oxygen therapy.    February 23, 2024-patient is confused, afebrile, I had a long conversation with the daughter about poor prognosis secondary to stage IV bladder cancer and Ag 90, daughter want to pursue palliative care      Case was discussed with patient's nurse and  on the floor.    Objective/physical  exam:  General:  Confused  Chest: Clear to auscultation bilaterally  Heart: RRR, +S1, S2, no appreciable murmur  Abdomen: Soft, nontender, BS +  MSK: Warm, no lower extremity edema, no clubbing or cyanosis  Neurologic:  Confused    VITAL SIGNS: 24 HRS MIN & MAX LAST   Temp  Min: 98.5 °F (36.9 °C)  Max: 100.7 °F (38.2 °C) 98.5 °F (36.9 °C)   BP  Min: 148/70  Max: 213/104 (!) 159/78   Pulse  Min: 95  Max: 116  97   Resp  Min: 14  Max: 38 (!) 24   SpO2  Min: 89 %  Max: 100 % (!) 93 %     I have reviewed the following labs:  Recent Labs   Lab 02/22/24 1951   WBC 23.37*   RBC 3.43*   HGB 9.7*   HCT 30.0*   MCV 87.5   MCH 28.3   MCHC 32.3*   RDW 16.2   PLT 98*   MPV 11.0*     Recent Labs   Lab 02/20/24  0910 02/21/24  0520 02/22/24 1951   * 135* 133   K 3.3* 4.2 3.6    106  --    CO2 16* 16* 20*   ANIONGAP 11 13  --    BUN 20 19 24.0*   CREATININE 0.71 0.64 0.59   CALCIUM 8.8 9.4 9.8   ALBUMIN  --   --  1.7*   ALKPHOS  --   --  147   ALT  --   --  35   AST  --   --  36*   BILITOT  --   --  0.7     Microbiology Results (last 7 days)       Procedure Component Value Units Date/Time    Blood culture #2 **CANNOT BE ORDERED STAT** [[id number]] Collected: 02/22/24 2018    Order Status: Resulted Specimen: Blood Updated: 02/22/24 2021    Blood culture #1 **CANNOT BE ORDERED STAT** [[id number]] Collected: 02/22/24 2018    Order Status: Resulted Specimen: Blood Updated: 02/22/24 2021             See below for Radiology    Scheduled Med:   apixaban  2.5 mg Oral BID    carvediloL  12.5 mg Oral BID    cloNIDine 0.2 mg/24 hr td ptwk  1 patch Transdermal Q7 Days    furosemide (LASIX) injection  20 mg Intravenous Daily    piperacillin-tazobactam (Zosyn) IV (PEDS and ADULTS) (extended infusion is not appropriate)  4.5 g Intravenous Q8H    sodium chloride 0.9%  10 mL Intravenous Q8H      Continuous Infusions:     PRN Meds:  acetaminophen, hydrALAZINE, morphine     Assessment/Plan:  Stage IV bladder cancer   Metastatic cancer  to lung  Pneumonia   Acute on chronic diastolic CHF exacerbation   Anasarca   History of deep vein thrombosis   Chronic thrombocytopenia   Severe sepsis present on admit       Continue IV Zosyn  Continue Lasix 20 mg IV q.d.   Continue Eliquis for DVT   Consult  for a palliative care   Patient is on DNR comfort measures only   Check CBC BMP in a.m.    VTE prophylaxis:     Patient condition:  Stable/Fair/Guarded/ Serious/ Critical    Anticipated discharge and Disposition:         All diagnosis and differential diagnosis have been reviewed; assessment and plan has been documented; I have personally reviewed the labs and test results that are presently available; I have reviewed the patients medication list; I have reviewed the consulting providers response and recommendations. I have reviewed or attempted to review medical records based upon their availability    All of the patient's questions have been  addressed and answered. Patient's is agreeable to the above stated plan. I will continue to monitor closely and make adjustments to medical management as needed.  _____________________________________________________________________    Nutrition Status:    Radiology:  I have personally reviewed the following imaging and agree with the radiologist.     X-Ray Chest 1 View  Narrative: EXAMINATION:  XR CHEST 1 VIEW    CLINICAL HISTORY:  Fever, unspecified    TECHNIQUE:  Single frontal view of the chest was performed.    COMPARISON:  12/29/2023    FINDINGS:  LINES AND TUBES: Dual lead cardiac pacer device is in place via left subclavian approach with leads overlying the right atrium and right ventricle.  EKG/telemetry leads overlie the chest.    MEDIASTINUM AND FLY: Cardiac silhouette is enlarged. There is a TAVR prosthesis.  There are calcifications at the mitral valve annulus.  Aortic atherosclerosis.    LUNGS: There are new patchy interstitial and confluent alveolar opacities within the  lungs.    PLEURA:Small left pleural effusion.No pneumothorax.    OTHER: No acute osseous abnormality.  Impression: Interstitial and confluent alveolar opacities within the lungs with small left pleural effusion.  Differential considerations include multifocal pneumonia, edema or ARDS.  Correlate with BNP.    Electronically signed by: Tiffanie Smart  Date:    02/23/2024  Time:    07:50  X-Ray Chest 1 View  Narrative: EXAMINATION:  XR CHEST 1 VIEW    CLINICAL HISTORY:  Fever, unspecified    TECHNIQUE:  Single frontal view of the chest was performed.    COMPARISON:  02/22/2024    FINDINGS:  LINES AND TUBES: Dual lead cardiac pacer device is in place via left subclavian approach with leads overlying the right atrium and right ventricle.  EKG/telemetry leads overlie the chest.    MEDIASTINUM AND FLY: Cardiac silhouette is enlarged. There is a TAVR prosthesis.  Aortic atherosclerosis.  There are calcifications in the region of the mitral valve annulus.    LUNGS: Persistent multilobar interstitial and confluent alveolar opacities.    PLEURA:Persistent left pleural effusion.No pneumothorax.    OTHER: No acute osseous abnormality.  Impression: No change from prior.    Electronically signed by: Tiffanie Smart  Date:    02/23/2024  Time:    07:49      Kiara Davenport MD  Department of Hospital Medicine   [facility]   02/23/2024

## 2024-02-23 NOTE — PROGRESS NOTES
Inpatient Nutrition Evaluation    Admit Date: 2/22/2024   Total duration of encounter: 1 day    Nutrition Recommendation/Prescription     Noted pt's pt with poor diagnosis and pt's daughter would like to pursue palliative care.    Recommend continue current diet as tolerated.  If aggressive nutrition warranted/desired, RD available to provide enteral/parenteral recs.       RD following and available as needed. Thank you.     Nutrition Assessment     Chart Review    Reason Seen: continuous nutrition monitoring and malnutrition screening tool (MST)    Malnutrition Screening Tool Results   Have you recently lost weight without trying?: Unsure  Have you been eating poorly because of a decreased appetite?: No   MST Score: 2     Diagnosis:  Stage IV bladder cancer   Metastatic cancer to lung  Pneumonia   Acute on chronic diastolic CHF exacerbation   Anasarca   History of deep vein thrombosis   Chronic thrombocytopenia   Severe sepsis present on admit     Relevant Medical History:   Anemia, iron deficiency 02/12/2023     Anxiety disorder      Aortic stenosis      Arthritis      Bladder cancer 02/12/2023     Cancer      Carpal tunnel syndrome      Coronary artery disease      Dementia      Depression      Diabetes mellitus      GERD (gastroesophageal reflux disease)      Hx of rectal polypectomy      Hypercholesterolemia      Hypertension      Obstructive sleep apnea      Respiratory distress      S/P TAVR (transcatheter aortic valve replacement)      TIA (transient ischemic attack)          Nutrition-Related Medications:   Lasix.     Nutrition-Related Labs:  2/23: No new labs today.   2/22: WBC 23.37(H); H/H 9.7/30.0(L); Lactic Acid Level 4.2(H); Cl 20(L); BUN 24(H); Alb 1.7(L).    Diet Order: Diet Low Sodium, 2gm  Oral Supplement Order: Boost Plus  Appetite/Oral Intake: poor/0-25% of meals  Factors Affecting Nutritional Intake: impaired cognitive status/motor control  Food/Gnosticism/Cultural Preferences: none  "reported  Food Allergies: none reported    Skin Integrity: bruised (ecchymotic) (healing breakdown to sacral area,dark discoloration to right ear,healing bruise to right foot)  Wound(s):   Noted.     Comments    2/23: Pt with poor appetite/intake. Confused today. Weight gain noted; however, pt has hx of CHF. Receiving Lasix. Noted poor prognosis secondary to Stage IV bladder cancer and  pt's daughter would like to purse Hospice/Palliative care. RD available to provide recs if aggressive nutrition desired.     Anthropometrics    Height: 5' 3" (160 cm) Height Method: Stated  Last Weight: 79.2 kg (174 lb 9.7 oz) (02/22/24 2301) Weight Method: Bed Scale  BMI (Calculated): 30.9  BMI Classification: obese grade I (BMI 30-34.9)     Ideal Body Weight (IBW), Female: 115 lb     % Ideal Body Weight, Female (lb): 151.83 %                             Usual Weight Provided By: EMR weight history    Wt Readings from Last 5 Encounters:   02/22/24 79.2 kg (174 lb 9.7 oz)   01/27/24 76.2 kg (168 lb)   12/29/23 72.6 kg (160 lb)   11/10/23 72.6 kg (160 lb 0.9 oz)   10/28/23 72.6 kg (160 lb)     Weight Change(s) Since Admission:  Admit Weight: 86.2 kg (190 lb) (02/22/24 1850)      Patient Education    Not applicable.    Monitoring & Evaluation     Dietitian will monitor food and beverage intake, energy intake, weight, weight change, electrolyte/renal panel, glucose/endocrine profile, and gastrointestinal profile.  Nutrition Risk/Follow-Up: low (follow-up in 5-7 days)  Patients assigned 'low nutrition risk' status do not qualify for a full nutritional assessment but will be monitored and re-evaluated in a 5-7 day time period. Please consult if re-evaluation needed sooner.  "

## 2024-02-23 NOTE — H&P
Chief Complaint; altered mental status for the past 2 weeks    HPI:   History was essentially by the patient's daughter was present at the time of my evaluation.  Patient unable to provide any medical history due to altered mental status.  Patient is a 89 y.o. 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    Patient was just recently discharged from a local hospital wher she was managed for 2 weeks due to probable sepsis with recurrence of fever, generalized body weakness and poor oral intake with altered mental status.  Patient's overall condition was not released significantly improving.  Family was advised on hospice care.  Patient was released yesterday from the local facility.    Since released yesterday she continued to have recurrence of fever, worsening weakness and altered mental status.  Patient has been having poor oral intake.  Patient was brought to the ER for further evaluation.  Family had agreed for transfer to inpatient hospice care facility at this time.  Patient's daughter was also concerned about anasarca especially bilateral lower extremity swelling.  She has also been having shortness of breath.  Currently on 2 L of nasal cannula oxygen therapy.    PCP Barrett Lopez MD MD        Past Medical History:   Diagnosis Date    Anemia, iron deficiency 02/12/2023    Anxiety disorder     Aortic stenosis     Arthritis     Bladder cancer 02/12/2023    Cancer     Carpal tunnel syndrome     Coronary artery disease     Dementia     Depression     Diabetes mellitus     GERD (gastroesophageal reflux disease)     Hx of rectal  polypectomy     Hypercholesterolemia     Hypertension     Obstructive sleep apnea     Respiratory distress     S/P TAVR (transcatheter aortic valve replacement)     TIA (transient ischemic attack)         Past Surgical History:   Procedure Laterality Date    CARDIAC SURGERY      CATARACT EXTRACTION      CORONARY STENT PLACEMENT      EGD, WITH CLOSED BIOPSY  12/28/2022    Procedure: EGD, WITH CLOSED BIOPSY;  Surgeon: Arcadio Carias MD;  Location: [facility] ENDOSCOPY;  Service: Gastroenterology;;    ESOPHAGOGASTRODUODENOSCOPY W/ PEG N/A 12/28/2022    Procedure: PEG;  Surgeon: Arcadio Carias MD;  Location: [facility] ENDOSCOPY;  Service: Gastroenterology;  Laterality: N/A;    HYSTERECTOMY      INSERTION OF INFERIOR VENA CAVAL FILTER N/A 3/1/2023    Procedure: Insertion, Filter, Inferior Vena Cava;  Surgeon: Terri Urbina MD;  Location: [facility] CATH LAB;  Service: Peripheral Vascular;  Laterality: N/A;  IVC FILTER PLACEMENT    INSERTION OF PACEMAKER      PARTIAL HYSTERECTOMY      PULMONARY EMBOLISM SURGERY N/A 12/21/2022    Procedure: EMBOLECTOMY, ARTERY, PULMONARY;  Surgeon: Terri Urbina MD;  Location: [facility] CATH LAB;  Service: Peripheral Vascular;  Laterality: N/A;    RECTAL POLYPECTOMY      TONSILLECTOMY      TRANSCATHETER AORTIC VALVE REPLACEMENT (TAVR)          Medications Prior to Admission   Medication Sig Dispense Refill Last Dose    acetaminophen (TYLENOL) 325 MG tablet Take 2 tablets (650 mg total) by mouth every 6 (six) hours as needed.  0     apixaban (ELIQUIS) 5 mg Tab Take 1 tablet (5 mg total) by mouth 2 (two) times daily.       ascorbic acid, vitamin C, (VITAMIN C) 1000 MG tablet Take 1,000 mg by mouth once daily.       atorvastatin (LIPITOR) 40 MG tablet Take 40 mg by mouth once daily.       carvediloL (COREG) 25 MG tablet Take 6.25 mg by mouth 2 (two) times daily.       dorzolamide-timolol 2-0.5% (COSOPT) 22.3-6.8 mg/mL ophthalmic solution Place 1 drop into both eyes once daily.        hyoscyamine (ANASPAZ,LEVSIN) 0.125 mg Tab TAKE 1 TABLET BY MOUTH EVERY 4 HOURS AS NEEDED FOR BLADDER PAIN       isosorbide mononitrate (IMDUR) 30 MG 24 hr tablet 15 mg.       linaCLOtide (LINZESS) 145 mcg Cap capsule Take 145 mcg by mouth daily as needed.       LORazepam (ATIVAN) 1 MG tablet Take 1 mg by mouth every 6 (six) hours as needed for Anxiety.       magnesium oxide 400 mg magnesium Cap Take 1 tablet by mouth once daily.       memantine (NAMENDA) 10 MG Tab Take 1 tablet (10 mg total) by mouth 2 (two) times daily. 60 tablet 11     methenamine-sodium biphosphonate (UROQUID) 500-500 mg per tablet Take 1 tablet by mouth every morning.       multivit-min-iron-FA-lutein (CENTRUM SILVER WOMEN) 8 mg iron-400 mcg-300 mcg Tab Take 1 Act by mouth once daily.       nitroGLYCERIN (NITROSTAT) 0.4 MG SL tablet Place 0.4 mg under the tongue every 5 (five) minutes as needed for Chest pain.       ondansetron (ZOFRAN) 4 MG tablet Take 4 mg by mouth every 8 (eight) hours as needed for Nausea.       pantoprazole (PROTONIX) 40 MG tablet Take 1 tablet (40 mg total) by mouth once daily. 30 tablet 11     QUEtiapine (SEROQUEL) 25 MG Tab Take 25 mg by mouth 2 (two) times daily.       senna (SENOKOT) 8.6 mg tablet Take 1 tablet by mouth daily as needed for Constipation.       sertraline (ZOLOFT) 50 MG tablet Take 50 mg by mouth once daily.          Review of patient's allergies indicates:   Allergen Reactions    Statins-hmg-coa reductase inhibitors      Other reaction(s): Joint pain    Bimatoprost      Other reaction(s): Eye redness    Gabapentin      Other reaction(s): Confusion    Lisinopril     Rosuvastatin Itching    Tafluprost (pf)         Social History     Tobacco Use    Smoking status: Never    Smokeless tobacco: Never   Substance Use Topics    Alcohol use: Never        Family History   Problem Relation Age of Onset    Diabetes Mother     Stroke Father     Hyperlipidemia Father     Breast cancer Sister        Review of  "Systems  Unable to obtain.             Objective:     No intake/output data recorded.    BP (!) 155/64   Pulse 103   Temp (!) 100.4 °F (38 °C) (Axillary)   Resp (!) 28   Ht (P) 5' 3" (1.6 m)   Wt 86.2 kg (190 lb)   SpO2 (!) 92%   BMI (P) 33.66 kg/m²     General Appearance:    Lethargic not in acute distress   HEENT:   atraumatic, PERRL, EOM intact, conjuctiva pink, sclera anicteric, oropharynx moist, no lesions noted   Neck:    Supple, no JVD, no carotid bruits, no lymphadenopathy or thyromegaly noted       Pulmonary:   Coarse to auscultation bilaterally, reduced breath sounds bileterally.   Cardiovascular:    Regular rate and rhythm, S1 S2 normal   Abdomen:     Soft, non-tender,nondistended, bowel sounds active all four quadrants, no masses, no organomegaly   Extremities:  Bilateral lower extremities edema, no cyanosis or clubbing noted       Skin:   No bruises noted.       Neurologic: Lethargic and does not follow simple command.                 Data Review :   Labs:    CBC:   Lab Results   Component Value Date    WBC 23.37 (H) 02/22/2024    WBC 14 12/20/2022    RBC 3.43 (L) 02/22/2024    HGB 9.7 (L) 02/22/2024    HGB 8.8 (L) 04/26/2023    HCT 30.0 (L) 02/22/2024    HCT 37.0 (L) 05/02/2020    PLT 98 (L) 02/22/2024     CMP:   Lab Results   Component Value Date     02/22/2024     (L) 02/21/2024    K 3.6 02/22/2024    K 4.2 02/21/2024     02/21/2024    CO2 20 (L) 02/22/2024    CO2 16 (L) 02/21/2024    BUN 24.0 (H) 02/22/2024    BUN 19 02/21/2024    CREATININE 0.59 02/22/2024    CREATININE 0.64 02/21/2024    CALCIUM 9.8 02/22/2024    CALCIUM 9.4 02/21/2024    ALKPHOS 147 02/22/2024    AST 36 (H) 02/22/2024    ALT 35 02/22/2024    ALBUMIN 1.7 (L) 02/22/2024    BILITOT 0.7 02/22/2024     Cardiac markers:   Lab Results   Component Value Date    .9 (H) 10/27/2023    BNP 60 02/29/2020       Radiology:  Micro:  No components found for: "BLOODCX", "SPUTUMCX", " ""URINECX"    Radiology:  [unfilled]        Assessment & Plan:   1. Acute hypoxic respiratory failure; likely related to acute on chronic diastolic heart failure/metastatic lung cancer/possible concurrent pneumonia.  Currently on 2 L of nasal cannula oxygen therapy.  Chest x-ray shows bilateral infiltrates.  Concerning for metastatic lung disease/possible pneumonia/pulmonary edema.    2. Recurrence of fever; likely related to cancer fever versus probable underlying sepsis.  Currently on IV Zosyn.    3. Probable acute on chronic diastolic heart failure; continue low-dose IV Lasix.  Continue to monitor input and output.    4. Anasarca with bilateral lower extremity edema, likely related to acute on chronic diastolic heart failure with hypoalbuminemia.  Continue IV Lasix.    5. History of bladder cancer with metastasis to the lung; continue supportive care.    6. History of DVT; continue Eliquis    7. Chronic thrombocytopenia; probably related to sepsis.    8. Probable sepsis POA, continue current antibiotic therapy.  Likely source from pneumonia.    9. Probable healthcare associated pneumonia.  Continue IV Zosyn for now.    10. Maintain the patient on DVT prophylaxis by way of eliquis    Code status; DNR.      Disposition; patient will be placed on observation status.  Patient is being planned for discharge to inpatient hospice care tomorrow.    This note was completed using voice recognition software and transcription errors may occur.    This Encounter was via Telemedicine (Both audio and visual ) and from [place], [place].  Patient was located in [place].    Evaluation  of the patient was done alongside the patient's nursing staff     Patient's daughter updated.    No Need for further labs as agreed by the patient's daughter      Claudia Almonte  2/23/2024  12:09 AM    "

## 2024-02-23 NOTE — PLAN OF CARE
Problem: Adult Inpatient Plan of Care  Goal: Plan of Care Review  Outcome: Ongoing, Progressing  Goal: Patient-Specific Goal (Individualized)  Outcome: Ongoing, Progressing  Goal: Absence of Hospital-Acquired Illness or Injury  Outcome: Ongoing, Progressing  Goal: Optimal Comfort and Wellbeing  Outcome: Ongoing, Progressing  Goal: Readiness for Transition of Care  Outcome: Ongoing, Progressing     Problem: Skin Injury Risk Increased  Goal: Skin Health and Integrity  Outcome: Ongoing, Progressing     Problem: Fall Injury Risk  Goal: Absence of Fall and Fall-Related Injury  Outcome: Ongoing, Progressing     Problem: Fluid Imbalance (Pneumonia)  Goal: Fluid Balance  Outcome: Ongoing, Progressing     Problem: Infection (Pneumonia)  Goal: Resolution of Infection Signs and Symptoms  Outcome: Ongoing, Progressing     Problem: Respiratory Compromise (Pneumonia)  Goal: Effective Oxygenation and Ventilation  Outcome: Ongoing, Progressing     Problem: Diabetes Comorbidity  Goal: Blood Glucose Level Within Targeted Range  Outcome: Ongoing, Progressing     Problem: Infection  Goal: Absence of Infection Signs and Symptoms  Outcome: Ongoing, Progressing     Problem: End-of-Life Care  Goal: Comfort, Peace and Preserved Dignity  Outcome: Ongoing, Progressing

## 2024-02-23 NOTE — PLAN OF CARE
Problem: Adult Inpatient Plan of Care  Goal: Plan of Care Review  Outcome: Ongoing, Progressing  Goal: Patient-Specific Goal (Individualized)  Outcome: Ongoing, Progressing  Goal: Absence of Hospital-Acquired Illness or Injury  Outcome: Ongoing, Progressing  Goal: Optimal Comfort and Wellbeing  Outcome: Ongoing, Progressing  Goal: Readiness for Transition of Care  Outcome: Ongoing, Progressing      INTERVAL HPI/OVERNIGHT EVENTS:  Patient seen and examined at bedside.   Pt resting comfortably. No acute complaints.   Patient is NPO. Denies N/V.   States that abdominal pain is about the same as yesterday, at 7/10.   Admits to flatus/ bm/ voiding.   Patient denies chest pain, shortness of breath, fever, chills.     MEDICATIONS  (STANDING):  dextrose 5% + sodium chloride 0.45%. 1000 milliLiter(s) (125 mL/Hr) IV Continuous <Continuous>  dextrose 5%. 1000 milliLiter(s) (50 mL/Hr) IV Continuous <Continuous>  dextrose 5%. 1000 milliLiter(s) (100 mL/Hr) IV Continuous <Continuous>  dextrose 50% Injectable 25 Gram(s) IV Push once  dextrose 50% Injectable 12.5 Gram(s) IV Push once  dextrose 50% Injectable 25 Gram(s) IV Push once  glucagon  Injectable 1 milliGRAM(s) IntraMuscular once  insulin lispro (ADMELOG) corrective regimen sliding scale   SubCutaneous three times a day before meals  piperacillin/tazobactam IVPB.. 3.375 Gram(s) IV Intermittent every 8 hours    MEDICATIONS  (PRN):  dextrose Oral Gel 15 Gram(s) Oral once PRN Blood Glucose LESS THAN 70 milliGRAM(s)/deciliter  morphine  - Injectable 2 milliGRAM(s) IV Push every 4 hours PRN Moderate Pain (4 - 6)  ondansetron Injectable 4 milliGRAM(s) IV Push every 6 hours PRN Nausea and/or Vomiting      Vital Signs Last 24 Hrs  T(C): 37.4 (23 May 2022 05:15), Max: 37.4 (23 May 2022 05:15)  T(F): 99.3 (23 May 2022 05:15), Max: 99.3 (23 May 2022 05:15)  HR: 73 (23 May 2022 05:15) (66 - 76)  BP: 118/59 (23 May 2022 05:15) (110/65 - 120/69)  RR: 17 (23 May 2022 05:15) (17 - 17)  SpO2: 95% (23 May 2022 05:15) (95% - 96%)    Physical:  General: A&Ox3. NAD.  Respiratory: non labored  Skin: warm  Abdomen: soft, nondistended, tender to palpation in the RLQ and mild LLQ tenderness, no rebound tenderness, no guarding  Extremities: non edematous, no calf pain bilaterally    I&O's Detail    22 May 2022 07:01  -  23 May 2022 07:00  --------------------------------------------------------  IN:    dextrose 5% + sodium chloride 0.45%: 1500 mL  Total IN: 1500 mL    OUT:  Total OUT: 0 mL    Total NET: 1500 mL          LABS:                        11.7   11.42 )-----------( 201      ( 23 May 2022 07:21 )             35.7             05-23    137  |  104  |  6<L>  ----------------------------<  142<H>  3.5   |  26  |  0.70    Ca    8.8      23 May 2022 07:21    TPro  7.0  /  Alb  2.7<L>  /  TBili  0.3  /  DBili  x   /  AST  18  /  ALT  21  /  AlkPhos  70  05-21    PT/INR - ( 21 May 2022 20:52 )   PT: 14.4 sec;   INR: 1.21 ratio         PTT - ( 21 May 2022 20:52 )  PTT:34.0 sec     INTERVAL HPI/OVERNIGHT EVENTS:  Patient seen and examined at bedside.   Pt resting comfortably. No acute complaints.   Patient is NPO. Denies N/V.   States that abdominal pain is about the same as yesterday, at 7/10.   Admits to flatus/ bm/ voiding.   Patient denies chest pain, shortness of breath, fever, chills.     As per patients outpatient GI, Dr. Mando Hull. Patient had colonoscopy in 2021 with findings consistent for diverticulosis.     MEDICATIONS  (STANDING):  dextrose 5% + sodium chloride 0.45%. 1000 milliLiter(s) (125 mL/Hr) IV Continuous <Continuous>  dextrose 5%. 1000 milliLiter(s) (50 mL/Hr) IV Continuous <Continuous>  dextrose 5%. 1000 milliLiter(s) (100 mL/Hr) IV Continuous <Continuous>  dextrose 50% Injectable 25 Gram(s) IV Push once  dextrose 50% Injectable 12.5 Gram(s) IV Push once  dextrose 50% Injectable 25 Gram(s) IV Push once  glucagon  Injectable 1 milliGRAM(s) IntraMuscular once  insulin lispro (ADMELOG) corrective regimen sliding scale   SubCutaneous three times a day before meals  piperacillin/tazobactam IVPB.. 3.375 Gram(s) IV Intermittent every 8 hours    MEDICATIONS  (PRN):  dextrose Oral Gel 15 Gram(s) Oral once PRN Blood Glucose LESS THAN 70 milliGRAM(s)/deciliter  morphine  - Injectable 2 milliGRAM(s) IV Push every 4 hours PRN Moderate Pain (4 - 6)  ondansetron Injectable 4 milliGRAM(s) IV Push every 6 hours PRN Nausea and/or Vomiting      Vital Signs Last 24 Hrs  T(C): 37.4 (23 May 2022 05:15), Max: 37.4 (23 May 2022 05:15)  T(F): 99.3 (23 May 2022 05:15), Max: 99.3 (23 May 2022 05:15)  HR: 73 (23 May 2022 05:15) (66 - 76)  BP: 118/59 (23 May 2022 05:15) (110/65 - 120/69)  RR: 17 (23 May 2022 05:15) (17 - 17)  SpO2: 95% (23 May 2022 05:15) (95% - 96%)    Physical:  General: A&Ox3. NAD.  Respiratory: non labored  Skin: warm  Abdomen: soft, nondistended, tender to palpation in the RLQ and mild LLQ tenderness, no rebound tenderness, no guarding  Extremities: non edematous, no calf pain bilaterally    I&O's Detail    22 May 2022 07:01  -  23 May 2022 07:00  --------------------------------------------------------  IN:    dextrose 5% + sodium chloride 0.45%: 1500 mL  Total IN: 1500 mL    OUT:  Total OUT: 0 mL    Total NET: 1500 mL          LABS:                        11.7   11.42 )-----------( 201      ( 23 May 2022 07:21 )             35.7             05-23    137  |  104  |  6<L>  ----------------------------<  142<H>  3.5   |  26  |  0.70    Ca    8.8      23 May 2022 07:21    TPro  7.0  /  Alb  2.7<L>  /  TBili  0.3  /  DBili  x   /  AST  18  /  ALT  21  /  AlkPhos  70  05-21    PT/INR - ( 21 May 2022 20:52 )   PT: 14.4 sec;   INR: 1.21 ratio         PTT - ( 21 May 2022 20:52 )  PTT:34.0 sec

## 2024-02-23 NOTE — PLAN OF CARE
Problem: Adult Inpatient Plan of Care  Goal: Plan of Care Review  Outcome: Ongoing, Progressing  Goal: Patient-Specific Goal (Individualized)  Outcome: Ongoing, Progressing  Goal: Absence of Hospital-Acquired Illness or Injury  Outcome: Ongoing, Progressing  Goal: Optimal Comfort and Wellbeing  Outcome: Ongoing, Progressing  Goal: Readiness for Transition of Care  Outcome: Ongoing, Progressing     Problem: Skin Injury Risk Increased  Goal: Skin Health and Integrity  Outcome: Ongoing, Progressing     Problem: Fall Injury Risk  Goal: Absence of Fall and Fall-Related Injury  Outcome: Ongoing, Progressing     Problem: Fluid Imbalance (Pneumonia)  Goal: Fluid Balance  Outcome: Ongoing, Progressing     Problem: Infection (Pneumonia)  Goal: Resolution of Infection Signs and Symptoms  Outcome: Ongoing, Progressing     Problem: Respiratory Compromise (Pneumonia)  Goal: Effective Oxygenation and Ventilation  Outcome: Ongoing, Progressing     Problem: Diabetes Comorbidity  Goal: Blood Glucose Level Within Targeted Range  Outcome: Ongoing, Progressing     Problem: Infection  Goal: Absence of Infection Signs and Symptoms  Outcome: Ongoing, Progressing     Problem: End-of-Life Care  Goal: Comfort, Peace and Preserved Dignity  Outcome: Ongoing, Progressing     Problem: Fever  Goal: Body Temperature in Desired Range  Outcome: Ongoing, Progressing

## 2024-02-23 NOTE — PROGRESS NOTES
[facility] Medical Surgical Unit  Wound Care    Patient Name: Miguel Angel Oliveros  MRN: [id number]  Date: 2/23/2024  Diagnosis: <principal problem not specified>      Subjective:           Patient ID: Miguel Angel Oliveros is a 89 y.o. female.    Chief Complaint: Altered Mental Status (Pt brought in by [facility] with c/o AMS, tachycardia and weakness.)      HPI      Past Medical History:     1. Sepsis, due to unspecified organism, unspecified whether acute organ dysfunction present    2. Fever    3. Hypertension, unspecified type    4. Primary cancer of bladder with metastasis to other site    5. Admission for hospice care      Wound Assessment:           Altered Skin Integrity 02/23/24 1304 Coccyx (Active)   02/23/24 1304   Altered Skin Integrity Present on Admission - Did Patient arrive to the hospital with altered skin?: yes   Side:    Orientation:    Location: Coccyx   Wound Number:    Is this injury device related?:    Primary Wound Type:    Description of Altered Skin Integrity:    Ankle-Brachial Index:    Pulses:    Removal Indication and Assessment:    Wound Outcome:    (Retired) Wound Length (cm):    (Retired) Wound Width (cm):    (Retired) Depth (cm):    Wound Description (Comments):    Removal Indications:    Wound Image   02/23/24 1304   Dressing Appearance Dried drainage 02/23/24 1304   Drainage Amount Scant 02/23/24 1304   Appearance Pink;Moist 02/23/24 1304   Periwound Area Dry 02/23/24 1304   Wound Length (cm) 1 cm 02/23/24 1304   Wound Width (cm) 0.5 cm 02/23/24 1304   Wound Depth (cm) 0.2 cm 02/23/24 1304   Wound Volume (cm^3) 0.1 cm^3 02/23/24 1304   Wound Surface Area (cm^2) 0.5 cm^2 02/23/24 1304   Dressing Applied 02/23/24 1304           Plan:     Daily dressing changes per nursing staff, re-evaluation per wound care in 5-7 days        Recommendations:   Coccyx: keep clean and dry, apply silver alginate to wound bed, cover with bordered foam, change daily, turn patient Q2  hours, patient to wear heel protectors at all times        Time spent in room:     Rose Marie Loving RN

## 2024-02-24 NOTE — PROGRESS NOTES
Hospital Medicine Progress Note        Chief Complaint: Inpatient Follow-up for     HPI:   History was essentially by the patient's daughter was present at the time of my evaluation.  Patient unable to provide any medical history due to altered mental status.  Patient is a 89 y.o. 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    Patient was just recently discharged from a local hospital wher she was managed for 2 weeks due to probable sepsis with recurrence of fever, generalized body weakness and poor oral intake with altered mental status.  Patient's overall condition was not released significantly improving.  Family was advised on hospice care.  Patient was released yesterday from the local facility.    Since released yesterday she continued to have recurrence of fever, worsening weakness and altered mental status.  Patient has been having poor oral intake.  Patient was brought to the ER for further evaluation.  Family had agreed for transfer to inpatient hospice care facility at this time.  Patient's daughter was also concerned about anasarca especially bilateral lower extremity swelling.  She has also been having shortness of breath.  Currently on 2 L of nasal cannula oxygen therapy.    February 23, 2024-patient is confused, afebrile, I had a long conversation with the daughter about poor prognosis secondary to stage IV bladder cancer and Ag 90, daughter want to pursue palliative care    February 24, 2024-patient is more alert compared to yesterday, WBC improved to 18 from 20/3, a blood culture was negative, patient is  afebrile,    Case was discussed with patient's nurse and  on the floor.    Objective/physical exam:  General:  Lethargic  Chest: Clear to auscultation bilaterally  Heart: RRR, +S1, S2, no appreciable murmur  Abdomen: Soft, nontender, BS +  MSK: Warm, bilateral lower extremity 2+ edema, no clubbing or cyanosis  Neurologic:  Lethargic    VITAL SIGNS: 24 HRS MIN & MAX LAST   Temp  Min: 99 °F (37.2 °C)  Max: 101.5 °F (38.6 °C) 99.9 °F (37.7 °C)   BP  Min: 135/64  Max: 187/76 (!) 187/76   Pulse  Min: 86  Max: 118  (!) 118   Resp  Min: 18  Max: 18 18   SpO2  Min: 93 %  Max: 96 % (!) 93 %     I have reviewed the following labs:  Recent Labs   Lab 02/22/24 1951 02/24/24 0315   WBC 23.37* 18.39*   RBC 3.43* 2.87*   HGB 9.7* 8.0*   HCT 30.0* 23.1*   MCV 87.5 80.5   MCH 28.3 27.9   MCHC 32.3* 34.6   RDW 16.2 15.8   PLT 98* 64*   MPV 11.0* 10.5*       Recent Labs   Lab 02/20/24  0910 02/21/24  0520 02/22/24 1951 02/24/24 0315   * 135* 133 133   K 3.3* 4.2 3.6 2.9*    106  --   --    CO2 16* 16* 20* 22*   ANIONGAP 11 13  --   --    BUN 20 19 24.0* 25.0*   CREATININE 0.71 0.64 0.59 0.68   CALCIUM 8.8 9.4 9.8 8.9   ALBUMIN  --   --  1.7*  --    ALKPHOS  --   --  147  --    ALT  --   --  35  --    AST  --   --  36*  --    BILITOT  --   --  0.7  --        Microbiology Results (last 7 days)       Procedure Component Value Units Date/Time    Blood culture #1 **CANNOT BE ORDERED STAT** [[id number]]  (Normal) Collected: 02/22/24 2018    Order Status: Completed Specimen: Blood Updated: 02/24/24 0901     CULTURE, BLOOD ([facility]) No Growth At 24 Hours    Blood culture #2 **CANNOT BE ORDERED STAT** [[id number]]  (Normal) Collected: 02/22/24 2018    Order Status: Completed Specimen: Blood Updated: 02/24/24 0901     CULTURE, BLOOD ([facility]) No Growth At 24 Hours             See below for Radiology    Scheduled Med:   apixaban  2.5 mg Oral BID    carvediloL  12.5 mg Oral BID    cloNIDine 0.2 mg/24 hr td ptwk  1 patch  Transdermal Q7 Days    furosemide (LASIX) injection  20 mg Intravenous Daily    piperacillin-tazobactam (Zosyn) IV (PEDS and ADULTS) (extended infusion is not appropriate)  4.5 g Intravenous Q8H    sodium chloride 0.9%  10 mL Intravenous Q8H      Continuous Infusions:     PRN Meds:  acetaminophen, hydrALAZINE, morphine     Assessment/Plan:  Stage IV bladder cancer   Metastatic cancer to lung  Pneumonia   Acute on chronic diastolic CHF exacerbation   Anasarca   History of deep vein thrombosis   Chronic thrombocytopenia   Severe sepsis present on admit   Blood culture was negative   Hypokalemia    Continue IV Zosyn  Continue Lasix 20 mg IV q.d.   Continue Eliquis for DVT   Consult  for a palliative care   Patient is on DNR comfort measures only   Check CBC BMP in a.m.  Supplement potassium p.r.n.       VTE prophylaxis:     Patient condition:  Stable/Fair/Guarded/ Serious/ Critical    Anticipated discharge and Disposition:         All diagnosis and differential diagnosis have been reviewed; assessment and plan has been documented; I have personally reviewed the labs and test results that are presently available; I have reviewed the patients medication list; I have reviewed the consulting providers response and recommendations. I have reviewed or attempted to review medical records based upon their availability    All of the patient's questions have been  addressed and answered. Patient's is agreeable to the above stated plan. I will continue to monitor closely and make adjustments to medical management as needed.  _____________________________________________________________________    Nutrition Status:    Radiology:  I have personally reviewed the following imaging and agree with the radiologist.     X-Ray Chest 1 View  Narrative: EXAMINATION:  XR CHEST 1 VIEW    CLINICAL HISTORY:  Fever, unspecified    TECHNIQUE:  Single frontal view of the chest was  performed.    COMPARISON:  12/29/2023    FINDINGS:  LINES AND TUBES: Dual lead cardiac pacer device is in place via left subclavian approach with leads overlying the right atrium and right ventricle.  EKG/telemetry leads overlie the chest.    MEDIASTINUM AND FLY: Cardiac silhouette is enlarged. There is a TAVR prosthesis.  There are calcifications at the mitral valve annulus.  Aortic atherosclerosis.    LUNGS: There are new patchy interstitial and confluent alveolar opacities within the lungs.    PLEURA:Small left pleural effusion.No pneumothorax.    OTHER: No acute osseous abnormality.  Impression: Interstitial and confluent alveolar opacities within the lungs with small left pleural effusion.  Differential considerations include multifocal pneumonia, edema or ARDS.  Correlate with BNP.    Electronically signed by: Tiffanie Smart  Date:    02/23/2024  Time:    07:50  X-Ray Chest 1 View  Narrative: EXAMINATION:  XR CHEST 1 VIEW    CLINICAL HISTORY:  Fever, unspecified    TECHNIQUE:  Single frontal view of the chest was performed.    COMPARISON:  02/22/2024    FINDINGS:  LINES AND TUBES: Dual lead cardiac pacer device is in place via left subclavian approach with leads overlying the right atrium and right ventricle.  EKG/telemetry leads overlie the chest.    MEDIASTINUM AND FLY: Cardiac silhouette is enlarged. There is a TAVR prosthesis.  Aortic atherosclerosis.  There are calcifications in the region of the mitral valve annulus.    LUNGS: Persistent multilobar interstitial and confluent alveolar opacities.    PLEURA:Persistent left pleural effusion.No pneumothorax.    OTHER: No acute osseous abnormality.  Impression: No change from prior.    Electronically signed by: Tiffanie Smart  Date:    02/23/2024  Time:    07:49      Kiara Davenport MD  Department of Hospital Medicine   [facility]   02/24/2024

## 2024-02-24 NOTE — PLAN OF CARE
Problem: Skin Injury Risk Increased  Goal: Skin Health and Integrity  Outcome: Ongoing, Progressing     Problem: Fall Injury Risk  Goal: Absence of Fall and Fall-Related Injury  Outcome: Ongoing, Progressing     Problem: Infection (Pneumonia)  Goal: Resolution of Infection Signs and Symptoms  Outcome: Ongoing, Progressing

## 2024-02-25 NOTE — PROGRESS NOTES
Hospital Medicine Progress Note        Chief Complaint: Inpatient Follow-up for     HPI:   History was essentially by the patient's daughter was present at the time of my evaluation.  Patient unable to provide any medical history due to altered mental status.  Patient is a 89 y.o. 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    Patient was just recently discharged from a local hospital wher she was managed for 2 weeks due to probable sepsis with recurrence of fever, generalized body weakness and poor oral intake with altered mental status.  Patient's overall condition was not released significantly improving.  Family was advised on hospice care.  Patient was released yesterday from the local facility.    Since released yesterday she continued to have recurrence of fever, worsening weakness and altered mental status.  Patient has been having poor oral intake.  Patient was brought to the ER for further evaluation.  Family had agreed for transfer to inpatient hospice care facility at this time.  Patient's daughter was also concerned about anasarca especially bilateral lower extremity swelling.  She has also been having shortness of breath.  Currently on 2 L of nasal cannula oxygen therapy.    February 23, 2024-patient is confused, afebrile, I had a long conversation with the daughter about poor prognosis secondary to stage IV bladder cancer and Ag 90, daughter want to pursue palliative care    February 24, 2024-patient is more alert compared to yesterday, WBC improved to 18 from 23, a blood culture was negative, patient is  afebrile,    February 25, 2024-WBC improved to 16 from 18, no fever, no shortness for breath    Case was discussed with patient's nurse and  on the floor.    Objective/physical exam:  General:  Lethargic  Chest:  A bilateral crackles  Heart: RRR, +S1, S2, no appreciable murmur  Abdomen: Soft, nontender, BS +  MSK: Warm, bilateral lower extremity 2+ edema, no clubbing or cyanosis  Neurologic:  Lethargic    VITAL SIGNS: 24 HRS MIN & MAX LAST   Temp  Min: 97.9 °F (36.6 °C)  Max: 100.7 °F (38.2 °C) 98.2 °F (36.8 °C)   BP  Min: 127/64  Max: 164/67 136/65   Pulse  Min: 79  Max: 92  79   Resp  Min: 18  Max: 18 18   SpO2  Min: 92 %  Max: 96 % (!) 94 %     I have reviewed the following labs:  Recent Labs   Lab 02/22/24 1951 02/24/24 0315 02/25/24 0323   WBC 23.37* 18.39* 16.50*   RBC 3.43* 2.87* 2.72*   HGB 9.7* 8.0* 7.7*   HCT 30.0* 23.1* 21.7*   MCV 87.5 80.5 79.8*   MCH 28.3 27.9 28.3   MCHC 32.3* 34.6 35.5   RDW 16.2 15.8 15.7   PLT 98* 64* 88*   MPV 11.0* 10.5*  --        Recent Labs   Lab 02/20/24  0910 02/21/24  0520 02/22/24 1951 02/24/24 0315 02/25/24  0323   * 135* 133 133 134   K 3.3* 4.2 3.6 2.9* 3.4*    106  --   --   --    CO2 16* 16* 20* 22* 25   ANIONGAP 11 13  --   --   --    BUN 20 19 24.0* 25.0* 23.0*   CREATININE 0.71 0.64 0.59 0.68 0.63   CALCIUM 8.8 9.4 9.8 8.9 8.9   ALBUMIN  --   --  1.7*  --   --    ALKPHOS  --   --  147  --   --    ALT  --   --  35  --   --    AST  --   --  36*  --   --    BILITOT  --   --  0.7  --   --        Microbiology Results (last 7 days)       Procedure Component Value Units Date/Time    Blood culture #1 **CANNOT BE ORDERED STAT** [[id number]]  (Normal) Collected: 02/22/24 2018    Order Status: Completed Specimen: Blood Updated: 02/25/24 0901     CULTURE, BLOOD ([facility]) No Growth At 48 Hours    Blood culture #2 **CANNOT BE ORDERED STAT** [[id number]]  (Normal) Collected: 02/22/24 2018    Order Status: Completed Specimen: Blood Updated: 02/25/24 0901      CULTURE, BLOOD ([facility]) No Growth At 48 Hours             See below for Radiology    Scheduled Med:   apixaban  2.5 mg Oral BID    carvediloL  12.5 mg Oral BID    cloNIDine 0.2 mg/24 hr td ptwk  1 patch Transdermal Q7 Days    furosemide (LASIX) injection  20 mg Intravenous Daily    piperacillin-tazobactam (Zosyn) IV (PEDS and ADULTS) (extended infusion is not appropriate)  4.5 g Intravenous Q8H    potassium chloride  40 mEq Oral Once    sodium chloride 0.9%  10 mL Intravenous Q8H      Continuous Infusions:     PRN Meds:  acetaminophen, hydrALAZINE, morphine     Assessment/Plan:  Stage IV bladder cancer   Metastatic cancer to lung  Pneumonia   Acute on chronic diastolic CHF exacerbation   Anasarca   History of deep vein thrombosis   Chronic thrombocytopenia   Severe sepsis present on admit   Blood culture was negative   Hypokalemia    Continue IV Zosyn  Continue Lasix 20 mg IV q.d.   Continue Eliquis for DVT   Consult  for a palliative care   Patient is on DNR comfort measures only   Check CBC BMP in a.m.  Supplement potassium p.r.n.       VTE prophylaxis:     Patient condition:  Stable/Fair/Guarded/ Serious/ Critical    Anticipated discharge and Disposition:         All diagnosis and differential diagnosis have been reviewed; assessment and plan has been documented; I have personally reviewed the labs and test results that are presently available; I have reviewed the patients medication list; I have reviewed the consulting providers response and recommendations. I have reviewed or attempted to review medical records based upon their availability    All of the patient's questions have been  addressed and answered. Patient's is agreeable to the above stated plan. I will continue to monitor closely and make adjustments to medical management as needed.  _____________________________________________________________________    Nutrition Status:    Radiology:  I have personally reviewed the following imaging  and agree with the radiologist.     X-Ray Chest 1 View  Narrative: EXAMINATION:  XR CHEST 1 VIEW    CLINICAL HISTORY:  Fever, unspecified    TECHNIQUE:  Single frontal view of the chest was performed.    COMPARISON:  12/29/2023    FINDINGS:  LINES AND TUBES: Dual lead cardiac pacer device is in place via left subclavian approach with leads overlying the right atrium and right ventricle.  EKG/telemetry leads overlie the chest.    MEDIASTINUM AND FLY: Cardiac silhouette is enlarged. There is a TAVR prosthesis.  There are calcifications at the mitral valve annulus.  Aortic atherosclerosis.    LUNGS: There are new patchy interstitial and confluent alveolar opacities within the lungs.    PLEURA:Small left pleural effusion.No pneumothorax.    OTHER: No acute osseous abnormality.  Impression: Interstitial and confluent alveolar opacities within the lungs with small left pleural effusion.  Differential considerations include multifocal pneumonia, edema or ARDS.  Correlate with BNP.    Electronically signed by: Tiffanie Smart  Date:    02/23/2024  Time:    07:50  X-Ray Chest 1 View  Narrative: EXAMINATION:  XR CHEST 1 VIEW    CLINICAL HISTORY:  Fever, unspecified    TECHNIQUE:  Single frontal view of the chest was performed.    COMPARISON:  02/22/2024    FINDINGS:  LINES AND TUBES: Dual lead cardiac pacer device is in place via left subclavian approach with leads overlying the right atrium and right ventricle.  EKG/telemetry leads overlie the chest.    MEDIASTINUM AND FLY: Cardiac silhouette is enlarged. There is a TAVR prosthesis.  Aortic atherosclerosis.  There are calcifications in the region of the mitral valve annulus.    LUNGS: Persistent multilobar interstitial and confluent alveolar opacities.    PLEURA:Persistent left pleural effusion.No pneumothorax.    OTHER: No acute osseous abnormality.  Impression: No change from prior.    Electronically signed by: Tiffanie  Berry  Date:    02/23/2024  Time:    07:49      Kiara Davenport MD  Department of Hospital Medicine   [facility]   02/25/2024

## 2024-02-25 NOTE — PLAN OF CARE
Problem: Fall Injury Risk  Goal: Absence of Fall and Fall-Related Injury  Outcome: Ongoing, Progressing     Problem: Fluid Imbalance (Pneumonia)  Goal: Fluid Balance  Outcome: Ongoing, Progressing     Problem: Infection (Pneumonia)  Goal: Resolution of Infection Signs and Symptoms  Outcome: Ongoing, Progressing  Intervention: Prevent Infection Progression  Flowsheets (Taken 2/25/2024 4858)  Fever Reduction/Comfort Measures: lightweight clothing  Infection Management: aseptic technique maintained

## 2024-02-26 NOTE — PROGRESS NOTES
"LINETTE spoke briefly with CG who was at work.  He asked for LINETTE to call back tomorrow.  SW will do so. He said pt is in hospital and "not doing well." LINETTE reviewed chart.     "

## 2024-02-26 NOTE — PROGRESS NOTES
Hospital Medicine Progress Note        Chief Complaint: Inpatient Follow-up for     HPI:   History was essentially by the patient's daughter was present at the time of my evaluation.  Patient unable to provide any medical history due to altered mental status.  Patient is a 89 y.o. 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    Patient was just recently discharged from a local hospital wher she was managed for 2 weeks due to probable sepsis with recurrence of fever, generalized body weakness and poor oral intake with altered mental status.  Patient's overall condition was not released significantly improving.  Family was advised on hospice care.  Patient was released yesterday from the local facility.    Since released yesterday she continued to have recurrence of fever, worsening weakness and altered mental status.  Patient has been having poor oral intake.  Patient was brought to the ER for further evaluation.  Family had agreed for transfer to inpatient hospice care facility at this time.  Patient's daughter was also concerned about anasarca especially bilateral lower extremity swelling.  She has also been having shortness of breath.  Currently on 2 L of nasal cannula oxygen therapy.    February 23, 2024-patient is confused, afebrile, I had a long conversation with the daughter about poor prognosis secondary to stage IV bladder cancer and Ag 90, daughter want to pursue palliative care    February 24, 2024-patient is more alert compared to yesterday, WBC improved to 18 from 23, a blood culture was negative, patient is  afebrile,    February 25, 2024-WBC improved to 16 from 18, no fever, no shortness for breath    February 26, 2024-WBC improved to 00195 from 16,000, no fever, no shortness for breath, potassium is low 2.8, wearing for palliative care    Case was discussed with patient's nurse and  on the floor.    Objective/physical exam:  General:  Lethargic  Chest:  A bilateral crackles  Heart: RRR, +S1, S2, no appreciable murmur  Abdomen: Soft, nontender, BS +  MSK: Warm, bilateral lower extremity 2+ edema, no clubbing or cyanosis  Neurologic:  Lethargic    VITAL SIGNS: 24 HRS MIN & MAX LAST   Temp  Min: 97.8 °F (36.6 °C)  Max: 101.9 °F (38.8 °C) 97.8 °F (36.6 °C)   BP  Min: 119/63  Max: 175/71 123/67   Pulse  Min: 69  Max: 99  72   Resp  Min: 18  Max: 18 18   SpO2  Min: 89 %  Max: 98 % (!) 93 %     I have reviewed the following labs:  Recent Labs   Lab 02/22/24 1951 02/24/24 0315 02/25/24 0323 02/26/24  0335   WBC 23.37* 18.39* 16.50* 15.05*   RBC 3.43* 2.87* 2.72* 2.84*   HGB 9.7* 8.0* 7.7* 8.0*   HCT 30.0* 23.1* 21.7* 22.6*   MCV 87.5 80.5 79.8* 79.6*   MCH 28.3 27.9 28.3 28.2   MCHC 32.3* 34.6 35.5 35.4   RDW 16.2 15.8 15.7 15.4   PLT 98* 64* 88* 112*   MPV 11.0* 10.5*  --  12.1*       Recent Labs   Lab 02/20/24  0910 02/21/24  0520 02/22/24 1951 02/22/24 1951 02/24/24 0315 02/25/24 0323 02/26/24  0335   * 135* 133   < > 133 134 132   K 3.3* 4.2 3.6   < > 2.9* 3.4* 2.8*    106  --   --   --   --   --    CO2 16* 16* 20*   < > 22* 25 24   ANIONGAP 11 13  --   --   --   --   --    BUN 20 19 24.0*   < > 25.0* 23.0* 18.0   CREATININE 0.71 0.64 0.59   < > 0.68 0.63 0.59   CALCIUM 8.8 9.4 9.8   < > 8.9 8.9 8.7   ALBUMIN  --   --  1.7*  --   --   --   --    ALKPHOS  --   --  147  --   --   --   --    ALT  --   --  35  --   --   --   --    AST  --   --  36*  --   --   --   --    BILITOT  --   --  0.7  --   --   --   --     < > = values in this interval not displayed.       Microbiology Results (last 7  days)       Procedure Component Value Units Date/Time    Blood culture #1 **CANNOT BE ORDERED STAT** [[id number]]  (Normal) Collected: 02/22/24 2018    Order Status: Completed Specimen: Blood Updated: 02/26/24 0900     CULTURE, BLOOD ([facility]) No Growth At 72 Hours    Blood culture #2 **CANNOT BE ORDERED STAT** [[id number]]  (Normal) Collected: 02/22/24 2018    Order Status: Completed Specimen: Blood Updated: 02/26/24 0900     CULTURE, BLOOD ([facility]) No Growth At 72 Hours             See below for Radiology    Scheduled Med:   apixaban  2.5 mg Oral BID    carvediloL  12.5 mg Oral BID    cloNIDine 0.2 mg/24 hr td ptwk  1 patch Transdermal Q7 Days    furosemide (LASIX) injection  20 mg Intravenous Daily    piperacillin-tazobactam (Zosyn) IV (PEDS and ADULTS) (extended infusion is not appropriate)  4.5 g Intravenous Q8H    potassium chloride  40 mEq Oral Once    sodium chloride 0.9%  10 mL Intravenous Q8H      Continuous Infusions:     PRN Meds:  acetaminophen, hydrALAZINE, morphine     Assessment/Plan:  Stage IV bladder cancer   Metastatic cancer to lung  Pneumonia   Acute on chronic diastolic CHF exacerbation   Anasarca   History of deep vein thrombosis   Chronic thrombocytopenia   Severe sepsis present on admit   Blood culture was negative   Hypokalemia    Continue IV Zosyn  Continue Lasix 20 mg IV q.d.   Continue Eliquis for DVT   Consult  for a palliative care   Patient is on DNR comfort measures only   Check CBC BMP in a.m.  Supplement potassium p.r.n.       VTE prophylaxis:     Patient condition:  Stable/Fair/Guarded/ Serious/ Critical    Anticipated discharge and Disposition:         All diagnosis and differential diagnosis have been reviewed; assessment and plan has been documented; I have personally reviewed the labs and test results that are presently available; I have reviewed the patients medication list; I have reviewed the consulting providers response and recommendations. I have reviewed or  attempted to review medical records based upon their availability    All of the patient's questions have been  addressed and answered. Patient's is agreeable to the above stated plan. I will continue to monitor closely and make adjustments to medical management as needed.  _____________________________________________________________________    Nutrition Status:    Radiology:  I have personally reviewed the following imaging and agree with the radiologist.     X-Ray Chest 1 View  Narrative: EXAMINATION:  XR CHEST 1 VIEW    CLINICAL HISTORY:  Fever, unspecified    TECHNIQUE:  Single frontal view of the chest was performed.    COMPARISON:  12/29/2023    FINDINGS:  LINES AND TUBES: Dual lead cardiac pacer device is in place via left subclavian approach with leads overlying the right atrium and right ventricle.  EKG/telemetry leads overlie the chest.    MEDIASTINUM AND FLY: Cardiac silhouette is enlarged. There is a TAVR prosthesis.  There are calcifications at the mitral valve annulus.  Aortic atherosclerosis.    LUNGS: There are new patchy interstitial and confluent alveolar opacities within the lungs.    PLEURA:Small left pleural effusion.No pneumothorax.    OTHER: No acute osseous abnormality.  Impression: Interstitial and confluent alveolar opacities within the lungs with small left pleural effusion.  Differential considerations include multifocal pneumonia, edema or ARDS.  Correlate with BNP.    Electronically signed by: Tiffanie Smart  Date:    02/23/2024  Time:    07:50  X-Ray Chest 1 View  Narrative: EXAMINATION:  XR CHEST 1 VIEW    CLINICAL HISTORY:  Fever, unspecified    TECHNIQUE:  Single frontal view of the chest was performed.    COMPARISON:  02/22/2024    FINDINGS:  LINES AND TUBES: Dual lead cardiac pacer device is in place via left subclavian approach with leads overlying the right atrium and right ventricle.  EKG/telemetry leads overlie the chest.    MEDIASTINUM AND FLY: Cardiac silhouette is  enlarged. There is a TAVR prosthesis.  Aortic atherosclerosis.  There are calcifications in the region of the mitral valve annulus.    LUNGS: Persistent multilobar interstitial and confluent alveolar opacities.    PLEURA:Persistent left pleural effusion.No pneumothorax.    OTHER: No acute osseous abnormality.  Impression: No change from prior.    Electronically signed by: Tiffanie Smart  Date:    02/23/2024  Time:    07:49      Kiara Davenport MD  Department of Hospital Medicine   [facility]   02/26/2024

## 2024-02-26 NOTE — PLAN OF CARE
02/26/24 1501   Discharge Reassessment   Assessment Type Discharge Planning Reassessment   Discharge Plan discussed with: Adult children   Discharge Plan A Home Health;Other   Discharge Plan B Home Health;Other   DME Needed Upon Discharge  none   Transition of Care Barriers None   Why the patient remains in the hospital Requires continued medical care   Post-Acute Status   Post-Acute Authorization Home Health;Other   Home Health Status Referrals Sent   Discharge Delays (!) Post-Acute Set-up     Spoke to fly in room.  They said pt is current with [facility] and that if this agency has palliative care, they want this also.  Referral sent to [facility] and to [facility], [facility], for palliative care to meet them at the home tomorrow, to admit to palliative care services.

## 2024-02-26 NOTE — NURSING
Dr. Davenport RBVO: Midline placement    Verbal consent received from daughter at bedside for midline placement on patient

## 2024-02-26 NOTE — PROCEDURES
"Miguel Angel Oliveros is a 89 y.o. female patient.    Temp: 97.4 °F (36.3 °C) (02/26/24 1143)  Pulse: 70 (02/26/24 1143)  Resp: 18 (02/25/24 1900)  BP: 132/64 (02/26/24 1143)  SpO2: (!) 94 % (02/26/24 1143)  Weight: 79.2 kg (174 lb 9.7 oz) (02/22/24 2301)  Height: 5' 3" (160 cm) (02/22/24 2301)    PICC  Date/Time: 2/26/2024 4:31 PM  Performed by: Curtis Lovett RN  Consent Done: Yes  Time out: Immediately prior to procedure a time out was called to verify the correct patient, procedure, equipment, support staff and site/side marked as required  Indications: med administration and vascular access  Anesthesia: local infiltration  Local anesthetic: lidocaine 1% without epinephrine  Anesthetic Total (mL): 5  Preparation: skin prepped with ChloraPrep  Skin prep agent dried: skin prep agent completely dried prior to procedure  Sterile barriers: all five maximum sterile barriers used - cap, mask, sterile gown, sterile gloves, and large sterile sheet  Hand hygiene: hand hygiene performed prior to central venous catheter insertion  Location details: right basilic  Catheter type: single lumen  Catheter size: 4 Fr  Catheter Length: 15cm    Ultrasound guidance: yes  Vessel Caliber: patent, compressibility normal  Vascular Doppler: not done  Needle advanced into vessel with real time Ultrasound guidance.  Guidewire confirmed in vessel.  Sterile sheath used.  no esophageal manometryNumber of attempts: 1  Post-procedure: blood return through all ports, sterile dressing applied and chlorhexidine patch    Assessment: successful placement  Complications: none          Name Curtis Lovett RN  2/26/2024    "

## 2024-02-26 NOTE — PLAN OF CARE
Problem: Adult Inpatient Plan of Care  Goal: Plan of Care Review  Outcome: Ongoing, Progressing  Goal: Patient-Specific Goal (Individualized)  Outcome: Ongoing, Progressing  Goal: Absence of Hospital-Acquired Illness or Injury  Outcome: Ongoing, Progressing  Goal: Optimal Comfort and Wellbeing  Outcome: Ongoing, Progressing  Goal: Readiness for Transition of Care  Outcome: Ongoing, Progressing     Problem: Skin Injury Risk Increased  Goal: Skin Health and Integrity  Outcome: Ongoing, Progressing     Problem: Fall Injury Risk  Goal: Absence of Fall and Fall-Related Injury  Outcome: Ongoing, Progressing     Problem: Fluid Imbalance (Pneumonia)  Goal: Fluid Balance  Outcome: Ongoing, Progressing     Problem: Infection (Pneumonia)  Goal: Resolution of Infection Signs and Symptoms  Outcome: Ongoing, Progressing     Problem: Respiratory Compromise (Pneumonia)  Goal: Effective Oxygenation and Ventilation  Outcome: Ongoing, Progressing     Problem: Diabetes Comorbidity  Goal: Blood Glucose Level Within Targeted Range  Outcome: Ongoing, Progressing     Problem: Infection  Goal: Absence of Infection Signs and Symptoms  Outcome: Ongoing, Progressing     Problem: End-of-Life Care  Goal: Comfort, Peace and Preserved Dignity  Outcome: Ongoing, Progressing     Problem: Fever  Goal: Body Temperature in Desired Range  Outcome: Ongoing, Progressing     Problem: Impaired Wound Healing  Goal: Optimal Wound Healing  Outcome: Ongoing, Progressing

## 2024-02-27 NOTE — PROGRESS NOTES
Protocol: The [facility] Effectiveness Study  Identifier: [id number]  IRB#: [id number]  PI: Dr. Glenn Dooley, PhD  CO-I: Dr. Carolyn Boswell PsyD  Version Date: 12/05/2022  Pt Study ID: [id number]  Visit Month: M7     Timeline:   (*Note for CTN - complete timeline using completed or planned date for study events. Add any important events (i.e. Withdrawals, Lost to Follow Up, Adverse Events, etc.).  Consent: Completed(8/23/23)  Baseline questionnaires: Completed(8/23/23)  6-month questionnaires: Planned(2/23/24)  12-month questionnaires: Planned(8/23/24)        Visit Note:   Spoke with caregiver German  (Son) for month 7 visit.  CG reported that pt is not doing well. She is very ill and hospitalized. He said she is going home with palliative care and HH. Pt and CG have a strong support system in their family. CG will reach out as needed.       Falls in the past month: 0  UTIs in the past month: 1  Hospital encounters in the past month (reported by caregiver): 1        Next monthly visit scheduled for: 3/28/24. Caregiver knows how to reach CTN, and is encouraged to do so, as needed before our next scheduled call.      Action items for CTN: SW remains available.

## 2024-02-27 NOTE — PLAN OF CARE
02/27/24 1241   Final Note   Assessment Type Final Discharge Note   Anticipated Discharge Disposition Home-Health   Hospital Resources/Appts/Education Provided Post-Acute resouces added to AVS   Post-Acute Status   Post-Acute Authorization Home Health;Other   Home Health Status Set-up Complete/Auth obtained   Discharge Delays None known at this time     O2 needed for home.  Orders sent to [facility].  Asked that they contact fly to set up O2 at the home since pt will d/c per [facility].  HH is set up with [facility] and Palliative Care is also set up with [facility] and palliative rep met w/fly today in room.  Notifed Dr. Woodruff that everything is set up and pt can d/c.  Fly inquired about blood transfusion and I did let Dr. Woodruff know this. No blood will be ordered.

## 2024-02-27 NOTE — PLAN OF CARE
Patient currently on O2 at 3L/M nasal cannula continuously.  Nurse removed oxygen from patient and after 3 minutes, while patient at rest, Room Air O2 sat checked and was 86%.  Replaced oxygen onto patient and O2 sat came up to 94%.

## 2024-02-27 NOTE — PT/OT/SLP EVAL
Physical Therapy Evaluation    Patient Name:  Miguel Angel Oliveros   MRN:  [id number]    Recommendations:     Discharge Recommendations: No Therapy Indicated   Discharge Equipment Recommendations: none   Barriers to discharge: None    Assessment:     Miguel Angel Oliveros is a 89 y.o. female admitted with a medical diagnosis of <principal problem not specified>.  She presents with the following impairments/functional limitations: weakness, impaired cognition, impaired functional mobility, impaired self care skills .    Rehab Prognosis: Poor; patient would benefit from acute skilled PT services to address these deficits and reach maximum level of function.    Recent Surgery: * No surgery found *      Plan:     During this hospitalization, patient to be seen   to address the identified rehab impairments via   and progress toward the following goals:    Plan of Care Expires:       Subjective     Chief Complaint: Pt asleep, barely arouses for therapist, refusing service  Patient/Family Comments/goals: Pt's family considering hospice  Pain/Comfort:       Patients cultural, spiritual, Yazdanism conflicts given the current situation:      Living Environment:  Pt lives at home with family  Prior to admission, patients level of function was bedbound/wheelchair bound and uses ramiro.  Equipment used at home: wheelchair, lift device, other (see comments) (adjustable bed).  DME owned (not currently used): none.  Upon discharge, patient will have assistance from family.    Objective:     Communicated with pt, family prior to session.  Patient found HOB elevated with    upon PT entry to room.    General Precautions: Standard, fall  Orthopedic Precautions:    Braces:    Respiratory Status: Room air    Exams:  RLE ROM: PROM WFL  LLE ROM: PROM WFL    Functional Mobility:  Bed Mobility:     Supine to Sit: dependence and of 2 persons      AM-PAC 6 CLICK MOBILITY  Total Score:        Treatment & Education:  Pt refusing to sit up or  participate in therapy at this time.  Pt is bed/WC bound at home.  Pt does not qualify as a therapy candidate at this time.    Patient left HOB elevated with all lines intact, call button in reach, and family present.    GOALS:   Multidisciplinary Problems       Physical Therapy Goals       Not on file                    History:     Past Medical History:   Diagnosis Date    Anemia, iron deficiency 02/12/2023    Anxiety disorder     Aortic stenosis     Arthritis     Bladder cancer 02/12/2023    Cancer     Carpal tunnel syndrome     Coronary artery disease     Dementia     Depression     Diabetes mellitus     GERD (gastroesophageal reflux disease)     Hx of rectal polypectomy     Hypercholesterolemia     Hypertension     Obstructive sleep apnea     Respiratory distress     S/P TAVR (transcatheter aortic valve replacement)     TIA (transient ischemic attack)        Past Surgical History:   Procedure Laterality Date    CARDIAC SURGERY      CATARACT EXTRACTION      CORONARY STENT PLACEMENT      EGD, WITH CLOSED BIOPSY  12/28/2022    Procedure: EGD, WITH CLOSED BIOPSY;  Surgeon: Arcadio Carias MD;  Location: [facility] ENDOSCOPY;  Service: Gastroenterology;;    ESOPHAGOGASTRODUODENOSCOPY W/ PEG N/A 12/28/2022    Procedure: PEG;  Surgeon: Arcadio Carias MD;  Location: [facility] ENDOSCOPY;  Service: Gastroenterology;  Laterality: N/A;    HYSTERECTOMY      INSERTION OF INFERIOR VENA CAVAL FILTER N/A 3/1/2023    Procedure: Insertion, Filter, Inferior Vena Cava;  Surgeon: Terri Urbina MD;  Location: [facility] CATH LAB;  Service: Peripheral Vascular;  Laterality: N/A;  IVC FILTER PLACEMENT    INSERTION OF PACEMAKER      PARTIAL HYSTERECTOMY      PULMONARY EMBOLISM SURGERY N/A 12/21/2022    Procedure: EMBOLECTOMY, ARTERY, PULMONARY;  Surgeon: Terri Urbina MD;  Location: [facility] CATH LAB;  Service: Peripheral Vascular;  Laterality: N/A;    RECTAL POLYPECTOMY      TONSILLECTOMY      TRANSCATHETER AORTIC VALVE REPLACEMENT  (TAVR)         Time Tracking:     PT Received On: 02/27/24  PT Start Time: 0815     PT Stop Time: 0845  PT Total Time (min): 30 min     Billable Minutes: Evaluation 30 02/27/2024

## 2024-02-27 NOTE — NURSING
PATIENT TAKEN OUT OF WILL CALL WITH [facility]  DAUGHTER AT BEDSIDE STATED OXYGEN WAS DELIVERED TO HOME AND EDUCATION COMPLETED WITH HER SISTER AND BROTHER AT HOME.  FAMILY UPDATED ON TRANSPORTATION EN ROUTE

## 2024-02-27 NOTE — DISCHARGE SUMMARY
[facility] - Medical Surgical Unit  Hospital Medicine  Discharge Summary      Patient Name: Miguel Angel Oliveros  MRN: [id number]  Admission Date: 2/22/2024  Hospital Length of Stay: 5 days  Discharge Date and Time:  02/27/2024 12:43 PM  Attending Physician: Claudia Almonte MD   Discharging Provider: Vel Woodruff MD  Discharge Provider Team: Networked reference to record PCT   Primary Care Provider: Barrett Lopez MD        HPI:   History was essentially by the patient's daughter was present at the time of my evaluation.  Patient unable to provide any medical history due to altered mental status.  Patient is a 89 y.o. 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    Patient was just recently discharged from a local hospital wher she was managed for 2 weeks due to probable sepsis with recurrence of fever, generalized body weakness and poor oral intake with altered mental status.  Patient's overall condition was not released significantly improving.  Family was advised on hospice care.  Patient was released yesterday from the local facility.    Since released yesterday she continued to have recurrence of fever, worsening weakness and altered mental status.  Patient has been having poor oral intake.  Patient was brought to the ER for further evaluation.  Family had agreed for transfer to inpatient hospice care facility at this time.  Patient's daughter was also concerned about anasarca especially bilateral lower extremity swelling.  She has also been having shortness of breath.  Currently on 2 L of nasal cannula  oxygen therapy.    * No surgery found *      Hospital Course:   ebruary 23, 2024-patient is confused, afebrile, I had a long conversation with the daughter about poor prognosis secondary to stage IV bladder cancer and Ag 90, daughter want to pursue palliative care     February 24, 2024-patient is more alert compared to yesterday, WBC improved to 18 from 23, a blood culture was negative, patient is afebrile,     February 25, 2024-WBC improved to 16 from 18, no fever, no shortness for breath     February 26, 2024-WBC improved to 35769 from 16,000, no fever, no shortness for breath, potassium is low 2.8, wearing for palliative care    2/27-no significant change in condition over past 24 hours; family member at bedside states that she is somewhat less confused today however she is chronically confused due to advanced dementia; she is eating and drinking small amounts    ---    Stage IV bladder cancer   Metastatic cancer to lung  Pneumonia  Acute on chronic diastolic CHF exacerbation   Anasarca   History of deep vein thrombosis   Chronic thrombocytopenia   Severe sepsis present on admit   Anemia  Hypokalemia    She has had stable O2 saturations with 3 L nasal cannula at 96% and can continue supplemental O2 at home with palliative care  H&H has trended down through hospital stay, likely due to hemodilution with IV hydration; she has exhibited no obvious bleeding; at time of discharge does not meet criteria for transfusion  Additional meds at discharge include Augmentin for 7 days, potassium supplement for 5 days  She is nearing end-stage of disease process; family is agreeable to home health and palliative care at this time also think patient is a candidate for hospice when family decides to transition    She is being discharged home with home health and palliative care    Physical exam  Constitution-well nourished, normally developed female in NAD; she appears comfortable lying in bed  HENT-normocephalic,  "atraumatic  Neck-supple  Respiratory-normal respirations  Heart-RRR  Abdomen-soft, nontender, nondistended  Genitourinary-deferred  Musculoskeletal-no joint abnormalities, no edema  Skin-warm, dry; no rashes  Neurologic-alert , confused    Consults:   Consults (From admission, onward)          Status Ordering Provider     Inpatient consult to Care Coordinator  Once        Provider:  (Not yet assigned)    Acknowledged JOSE LARA     Inpatient consult to Midline team  Once        Provider:  (Not yet assigned)    Acknowledged JOSE LARA     Inpatient consult to Social Work/Case Management  Once        Provider:  (Not yet assigned)    Acknowledged RONAN CEVALLOS            Final Active Diagnoses:    Diagnosis Date Noted POA    PRINCIPAL PROBLEM:  Primary cancer of bladder with metastasis to other site [C67.9] 02/12/2023 Yes      Problems Resolved During this Admission:      Discharged Condition: stable    Disposition: Home-Health Care [facility]    Follow Up:   Follow-up Information       [place] [place] [facility]    Specialty: Hospice and Palliative Medicine  Contact information:  [address] [address]  [phone]               Barrett Lopez MD Follow up in 1 week(s).    Specialty: Family Medicine  Contact information:  [address] [place]   [place] [address]  [phone]                           Patient Instructions:      OXYGEN FOR HOME USE     Order Specific Question Answer Comments   Liter Flow 3    Duration Continuous    Qualifying Test Performed at: Rest    Oxygen saturation: 86    Portable mode: continuous    Route nasal cannula    Device: home concentrator with portable tanks conserving device   Length of need (in months): 99 mos    Patient condition with qualifying saturation Other - List qualifying diagnosis and code    Select a diagnosis & list the code in the comments Metastatic disease [156251]    Height: 5' 3" (1.6 m)    Weight: 79.2 " kg (174 lb 9.7 oz)    Alternative treatment measures have been tried or considered and deemed clinically ineffective. Yes      Ambulatory referral/consult to Home Health   Standing Status: Future   Referral Priority: Routine Referral Type: Home Health   Referral Reason: Specialty Services Required   Requested Specialty: Home Health Services   Number of Visits Requested: 1     Ambulatory referral/consult to HOME Palliative Care   Standing Status: Future   Referral Priority: Routine Referral Type: Consultation   Referred to Provider: [facility] Requested Specialty: Hospice and Palliative Medicine   Number of Visits Requested: 1     Diet Cardiac     Activity as tolerated     Medications:  Reconciled Home Medications:      Medication List        START taking these medications      amoxicillin-clavulanate 875-125mg 875-125 mg per tablet  Commonly known as: AUGMENTIN  Take 1 tablet by mouth 2 (two) times daily. for 7 days     cloNIDine 0.2 mg/24 hr td ptwk 0.2 mg/24 hr  Commonly known as: CATAPRES  Place 1 patch onto the skin every 7 days.  Start taking on: March 1, 2024     potassium chloride SA 20 MEQ tablet  Commonly known as: K-DUR,KLOR-CON  Take 1 tablet (20 mEq total) by mouth once daily. for 5 days  Start taking on: February 28, 2024            CONTINUE taking these medications      acetaminophen 325 MG tablet  Commonly known as: TYLENOL  Take 2 tablets (650 mg total) by mouth every 6 (six) hours as needed.     apixaban 5 mg Tab  Commonly known as: ELIQUIS  Take 1 tablet (5 mg total) by mouth 2 (two) times daily.     ascorbic acid (vitamin C) 1000 MG tablet  Commonly known as: VITAMIN C  Take 1,000 mg by mouth once daily.     atorvastatin 40 MG tablet  Commonly known as: LIPITOR  Take 40 mg by mouth once daily.     carvediloL 25 MG tablet  Commonly known as: COREG  Take 6.25 mg by mouth 2 (two) times daily.     CENTRUM SILVER WOMEN 8 mg iron-400 mcg-50 mcg Tab  Generic drug:  multivit-min-iron-FA-vit K-lut  Take 1 Act by mouth once daily.     dorzolamide-timolol 2-0.5% 22.3-6.8 mg/mL ophthalmic solution  Commonly known as: COSOPT  Place 1 drop into both eyes once daily.     hyoscyamine 0.125 mg Tab  Commonly known as: ANASPAZ,LEVSIN  TAKE 1 TABLET BY MOUTH EVERY 4 HOURS AS NEEDED FOR BLADDER PAIN     isosorbide mononitrate 30 MG 24 hr tablet  Commonly known as: IMDUR  15 mg.     linaCLOtide 145 mcg Cap capsule  Commonly known as: LINZESS  Take 145 mcg by mouth daily as needed.     LORazepam 1 MG tablet  Commonly known as: ATIVAN  Take 1 mg by mouth every 6 (six) hours as needed for Anxiety.     magnesium oxide 400 mg magnesium Cap  Take 1 tablet by mouth once daily.     memantine 10 MG Tab  Commonly known as: NAMENDA  Take 1 tablet (10 mg total) by mouth 2 (two) times daily.     methenamine-sodium biphosphonate 500-500 mg per tablet  Commonly known as: UROQUID  Take 1 tablet by mouth every morning.     nitroGLYCERIN 0.4 MG SL tablet  Commonly known as: NITROSTAT  Place 0.4 mg under the tongue every 5 (five) minutes as needed for Chest pain.     ondansetron 4 MG tablet  Commonly known as: ZOFRAN  Take 4 mg by mouth every 8 (eight) hours as needed for Nausea.     pantoprazole 40 MG tablet  Commonly known as: PROTONIX  Take 1 tablet (40 mg total) by mouth once daily.     QUEtiapine 25 MG Tab  Commonly known as: SEROQUEL  Take 25 mg by mouth 2 (two) times daily.     senna 8.6 mg tablet  Commonly known as: SENOKOT  Take 1 tablet by mouth daily as needed for Constipation.     sertraline 50 MG tablet  Commonly known as: ZOLOFT  Take 50 mg by mouth once daily.              Significant Diagnostic Studies: Labs: CMP   Recent Labs   Lab 02/26/24 0335 02/27/24 0322    135   K 2.8* 3.3*   CO2 24 26   BUN 18.0 20.0   CREATININE 0.59 0.62   CALCIUM 8.7 8.7    and CBC   Recent Labs   Lab 02/26/24 0335 02/27/24 0322   WBC 15.05* 12.47*   HGB 8.0* 7.2*   HCT 22.6* 21.0*   * 117*      Radiology:   CXR  Impression:  Interstitial and confluent alveolar opacities within the lungs with small left pleural effusion.  Differential considerations include multifocal pneumonia, edema or ARDS.  Correlate with BNP.    Pending Diagnostic Studies:       None          Indwelling Lines/Drains at time of discharge:   Lines/Drains/Airways       None                   Time spent on the discharge of patient: 40 minutes         Vel Woodruff MD  Department of Hospital Medicine  [facility] Surgical [facility]

## 2024-02-27 NOTE — DISCHARGE INSTRUCTIONS
[facility] will see you at home and continue care as before admission.  Jose will set up home O2.  Phone is [phone]  [facility] services will admit you to their services.  Phone is [phone]      Discharge instructions reviewed with patient and her daughter at bedside  We will call with follow up date and time with  Dr. Lopez  Please keep and attend follow up appointment  Please take all medications as prescribed by your provider          THANK YOU FOR CHOOSING [facility] FOR YOUR CARE!!  IT WAS A PLEASURE TAKING CARE OF YOU AND MEETING YOUR FAMILY!!

## 2024-02-27 NOTE — PLAN OF CARE
D/c info sent to [facility] and to [facility].

## 2024-02-27 NOTE — NURSING
PATIENT PLACED IN WILL CALL WITH [facility]. AWAITING OXYGEN TO BE DELIVERED TO HOME  DAUGHTER AT BEDSIDE UPDATED

## 2024-02-27 NOTE — PLAN OF CARE
Jose spoke to daughter and pt correct  given to Jose.  Jose was able to authorize O2.  They will set up the O2 at the home.  Nurse will place pt in will-call with [facility] to transport her home.  D/C info will be sent to [facility] and to [facility].

## 2024-03-28 NOTE — PROGRESS NOTES
Protocol: The [facility] Effectiveness Study  Identifier: [id number]  IRB#: 2022.247  PI: Dr. Glenn Dooley, PhD  CO-I: Dr. Carolyn Boswell PsyD  Version Date: 12/05/2022  Pt Study ID: [id number]  Visit Month: M8     Timeline:   (*Note for CTN - complete timeline using completed or planned date for study events. Add any important events (i.e. Withdrawals, Lost to Follow Up, Adverse Events, etc.).  Consent: Completed(8/23/23)  Baseline questionnaires: Completed(8/23/23)  6-month questionnaires: Planned(2/23/24)  12-month questionnaires: Planned(8/23/24)        Visit Note:   Spoke with caregiver German  (Son) for month 8 visit.  Discussed CG well-being and carving out time to relax and restore.  CG has support in his 5 sisters. He said pt is stable.      Falls in the past month: 0  UTIs in the past month: 0  Hospital encounters in the past month (reported by caregiver): 0        Next monthly visit scheduled for: 4/30/24. Caregiver knows how to reach CTN, and is encouraged to do so, as needed before our next scheduled call.      Action items for CTN: SW remains available.

## 2024-04-17 PROBLEM — J18.9 PNEUMONIA OF BOTH LUNGS DUE TO INFECTIOUS ORGANISM: Status: ACTIVE | Noted: 2024-01-01

## 2024-04-17 NOTE — PLAN OF CARE
Rounded in pt room for DC planning. Pt was on services [facility] ([phone]) prior to admission and was discharged from services this AM to seek hospital admission. Son present in room states that she lives at home with his brother and sister who take care of her. He is unsure of DC plans or if they will resume services. Called [facility] per his recommendations at [phone] and left a VM. Pending callback to discuss DC plans.

## 2024-04-17 NOTE — PLAN OF CARE
Problem: Adult Inpatient Plan of Care  Goal: Plan of Care Review  Outcome: Ongoing, Progressing  Goal: Patient-Specific Goal (Individualized)  Outcome: Ongoing, Progressing  Goal: Absence of Hospital-Acquired Illness or Injury  Outcome: Ongoing, Progressing  Goal: Optimal Comfort and Wellbeing  Outcome: Ongoing, Progressing  Goal: Readiness for Transition of Care  Outcome: Ongoing, Progressing     Problem: Skin Injury Risk Increased  Goal: Skin Health and Integrity  Outcome: Ongoing, Progressing     Problem: Fever  Goal: Body Temperature in Desired Range  Outcome: Ongoing, Progressing     Problem: Fluid Imbalance (Pneumonia)  Goal: Fluid Balance  Outcome: Ongoing, Progressing     Problem: Infection (Pneumonia)  Goal: Resolution of Infection Signs and Symptoms  Outcome: Ongoing, Progressing     Problem: Respiratory Compromise (Pneumonia)  Goal: Effective Oxygenation and Ventilation  Outcome: Ongoing, Progressing

## 2024-04-17 NOTE — ED PROVIDER NOTES
Encounter Date: 4/17/2024  History from daughter     History     Chief Complaint   Patient presents with    Fever     Daughter reports poor appetite x3 days with fever. Gave tylenol and 5mg morphine given 1 hour ago     HPI    Miguel Angel Oliveros is 89 y.o. female who  has a past medical history of Anemia, iron deficiency (02/12/2023), Anxiety disorder, Aortic stenosis, Arthritis, Bladder cancer (02/12/2023), Cancer, Carpal tunnel syndrome, Coronary artery disease, Dementia, Depression, Diabetes mellitus, GERD (gastroesophageal reflux disease), rectal polypectomy, Hypercholesterolemia, Hypertension, Obstructive sleep apnea, Respiratory distress, S/P TAVR (transcatheter aortic valve replacement), and TIA (transient ischemic attack). arrives in ER with c/o Fever (Daughter reports poor appetite x3 days with fever. Gave tylenol and 5mg morphine given 1 hour ago)      Review of patient's allergies indicates:   Allergen Reactions    Statins-hmg-coa reductase inhibitors      Other reaction(s): Joint pain    Bimatoprost      Other reaction(s): Eye redness    Gabapentin      Other reaction(s): Confusion    Lisinopril     Rosuvastatin Itching    Tafluprost (pf)      Past Medical History:   Diagnosis Date    Anemia, iron deficiency 02/12/2023    Anxiety disorder     Aortic stenosis     Arthritis     Bladder cancer 02/12/2023    Cancer     Carpal tunnel syndrome     Coronary artery disease     Dementia     Depression     Diabetes mellitus     GERD (gastroesophageal reflux disease)     Hx of rectal polypectomy     Hypercholesterolemia     Hypertension     Obstructive sleep apnea     Respiratory distress     S/P TAVR (transcatheter aortic valve replacement)     TIA (transient ischemic attack)      Past Surgical History:   Procedure Laterality Date    CARDIAC SURGERY      CATARACT EXTRACTION      CORONARY STENT PLACEMENT      EGD, WITH CLOSED BIOPSY  12/28/2022    Procedure: EGD, WITH CLOSED BIOPSY;  Surgeon: Arcadoi Carias MD;   Location: [facility] ENDOSCOPY;  Service: Gastroenterology;;    ESOPHAGOGASTRODUODENOSCOPY W/ PEG N/A 12/28/2022    Procedure: PEG;  Surgeon: Arcadio Carias MD;  Location: [facility] ENDOSCOPY;  Service: Gastroenterology;  Laterality: N/A;    HYSTERECTOMY      INSERTION OF INFERIOR VENA CAVAL FILTER N/A 3/1/2023    Procedure: Insertion, Filter, Inferior Vena Cava;  Surgeon: Terri Urbina MD;  Location: [facility] CATH LAB;  Service: Peripheral Vascular;  Laterality: N/A;  IVC FILTER PLACEMENT    INSERTION OF PACEMAKER      PARTIAL HYSTERECTOMY      PULMONARY EMBOLISM SURGERY N/A 12/21/2022    Procedure: EMBOLECTOMY, ARTERY, PULMONARY;  Surgeon: Terri Urbina MD;  Location: [facility] CATH LAB;  Service: Peripheral Vascular;  Laterality: N/A;    RECTAL POLYPECTOMY      TONSILLECTOMY      TRANSCATHETER AORTIC VALVE REPLACEMENT (TAVR)       Family History   Problem Relation Name Age of Onset    Diabetes Mother      Stroke Father      Hyperlipidemia Father      Breast cancer Sister       Social History     Tobacco Use    Smoking status: Never    Smokeless tobacco: Never   Substance Use Topics    Alcohol use: Never    Drug use: Never     Review of Systems   Unable to perform ROS: Patient nonverbal   Constitutional:         Hypoxia according to daughter, tachycardia   Respiratory:  Positive for shortness of breath.        Physical Exam     Initial Vitals [04/17/24 0256]   BP Pulse Resp Temp SpO2   130/72 (!) 133 18 99.7 °F (37.6 °C) (!) 94 %      MAP       --         Physical Exam    Nursing note and vitals reviewed.  Constitutional: No distress.   Elderly lady, laying down in the bed not in any particular distress, does not respond to any questions   HENT:   Head: Atraumatic.   Eyes: Pupils are equal, round, and reactive to light.   Neck: Neck supple.   Cardiovascular:  Regular rhythm.           Tachycardia   Pulmonary/Chest: No respiratory distress. She has rales.   Abdominal: Abdomen is soft. Bowel sounds are normal.  She exhibits no distension.   Musculoskeletal:         General: No edema.      Cervical back: Neck supple.     Skin: Skin is dry.   Sacral decubitus         ED Course   Procedures  Labs Reviewed   COMPREHENSIVE METABOLIC PANEL - Abnormal; Notable for the following components:       Result Value    Potassium Level 3.4 (*)     Creatinine 0.54 (*)     Albumin Level 1.6 (*)     Globulin 4.7 (*)     Albumin/Globulin Ratio 0.3 (*)     All other components within normal limits   CBC WITH DIFFERENTIAL - Abnormal; Notable for the following components:    WBC 24.53 (*)     RBC 2.84 (*)     Hgb 7.5 (*)     Hct 24.6 (*)     MCH 26.4 (*)     MCHC 30.5 (*)     RDW 17.8 (*)     All other components within normal limits   MANUAL DIFFERENTIAL - Abnormal; Notable for the following components:    Neutrophils % 91 (*)     Lymphs % 4 (*)     Neutrophils Abs Calc 22.3223 (*)     RBC Morph Abnormal (*)     Hypochromasia Slight (*)     All other components within normal limits   LACTIC ACID, PLASMA - Abnormal; Notable for the following components:    Lactic Acid Level 2.6 (*)     All other components within normal limits   BLOOD CULTURE [facility]   BLOOD CULTURE [facility]   CBC W/ AUTO DIFFERENTIAL    Narrative:     The following orders were created for panel order CBC auto differential.  Procedure                               Abnormality         Status                     ---------                               -----------         ------                     CBC with Differential[[id number]]       Abnormal            Final result               Manual Differential[[id number]]         Abnormal            Final result                 Please view results for these tests on the individual orders.   URINALYSIS, REFLEX TO URINE CULTURE   LACTIC ACID, PLASMA          Imaging Results              X-Ray Chest 1 View (Preliminary result)  Result time 04/17/24 04:18:15      Wet Read by Jyoti Granados MD (04/17/24 04:18:15, [facility] - Emergency  Dept, Emergency Medicine)    X-Ray, Independently Interpreted by Jyoti Granados MD.  Chest one view:  Almost complete whiteout of the left lung, with worsening opacities in the right history of lung CA                                     Medications   piperacillin-tazobactam (ZOSYN) 4.5 g in dextrose 5 % in water (D5W) 100 mL IVPB (MB+) (4.5 g Intravenous New Bag 4/17/24 0503)   dextrose 5 % in lactated ringers infusion ( Intravenous New Bag 4/17/24 0314)     Medical Decision Making    Miguel Angel Oliveros is 89 y.o. female who  has a past medical history of Anemia, iron deficiency (02/12/2023), Anxiety disorder, Aortic stenosis, Arthritis, Bladder cancer (02/12/2023), Cancer, Carpal tunnel syndrome, Coronary artery disease, Dementia, Depression, Diabetes mellitus, GERD (gastroesophageal reflux disease), rectal polypectomy, Hypercholesterolemia, Hypertension, Obstructive sleep apnea, Respiratory distress, S/P TAVR (transcatheter aortic valve replacement), and TIA (transient ischemic attack). arrives in ER with c/o Fever (Daughter reports poor appetite x3 days with fever. Gave tylenol and 5mg morphine given 1 hour ago)    Patient is elderly and has history of cancer and is on hospice care in the home, according to daughter she noticed that she is having shortness of breath, she checked her heart rate which was high, patient is being treated for UTI by oral antibiotics at home, but she has not been eating for the last couple of days, so she talked to the hospice nurse about her concerns, hospice nurse could not come to see the patient from [place], daughter gave her some morphine and brought her to the emergency room by ambulance to get checked.    We did talk to the hospice nurse, and she says if the patient he is any IV medication then patient will have to be admitted in the hospital they can only give oral medicines at home and she is on Levaquin these days.    Patient is laying down in the bed, she has not in  any particular distress, O2 sat is up to 94% she does have fever, I will draw CBC, CMP, lactic acid, blood culture as she is already on antibiotics, I doubt that I will grow anything in the blood cultures, she also has decubitus ulcer, I will do a chest x-ray and decide further.    Amount and/or Complexity of Data Reviewed  Labs: ordered.  Radiology: ordered and independent interpretation performed.    Risk  Prescription drug management.               ED Course as of 04/17/24 0542   Wed Apr 17, 2024   4258 Patient essentially has fever and she has what appears to be postobstructive pneumonia of the left side, her left lung is why did out, worsening infiltrate on the right side, she is tachycardic, she is still maintaining her oxygen saturation up to 95% on oxygen, her lactic acid slightly elevated, [place] [address], I do not want to overload her with IV fluids at this age and with that lung, she is very little reserve, her daughter was alone at home and she got scared she talked to the hospice who told her that she can bring her to the hospital.  She wants her to be admitted in the hospital.  I have advised her that she can call her family to let them know that her condition is getting worse, I will put her on antibiotic and admitted her in the hospital.  And they can arrange with the hospice to see if they can give IV antibiotics at home or not, she is also being treated for UTI. [GQ]   0846 I talked to Dr. Horton, and is okay with admitting her in the hospital, I have also told patient's daughter that she can let her other siblings know that her condition is poor and is getting worse. [GQ]      ED Course User Index  [GQ] Jyoti Granados MD                           Clinical Impression:  Final diagnoses:  [R50.9] Fever  [J18.9] Pneumonia of both lungs due to infectious organism, unspecified part of lung (Primary)          ED Disposition Condition    Admit Stable                Jyoti Granados MD  04/17/24  2016

## 2024-04-17 NOTE — PLAN OF CARE
Spoke to Latrice (pt daughter) on phone. They do wish to resume services with [facility] upon DC. Will send a new referral/order when MD puts DC orders.

## 2024-04-17 NOTE — PLAN OF CARE
Problem: Adult Inpatient Plan of Care  Goal: Plan of Care Review  Outcome: Ongoing, Progressing  Goal: Patient-Specific Goal (Individualized)  Outcome: Ongoing, Progressing  Goal: Absence of Hospital-Acquired Illness or Injury  Outcome: Ongoing, Progressing  Goal: Optimal Comfort and Wellbeing  Outcome: Ongoing, Progressing  Goal: Readiness for Transition of Care  Outcome: Ongoing, Progressing     Problem: Skin Injury Risk Increased  Goal: Skin Health and Integrity  Outcome: Ongoing, Progressing     Problem: Fever  Goal: Body Temperature in Desired Range  Outcome: Ongoing, Progressing     Problem: Fluid Imbalance (Pneumonia)  Goal: Fluid Balance  Outcome: Ongoing, Progressing     Problem: Infection (Pneumonia)  Goal: Resolution of Infection Signs and Symptoms  Outcome: Ongoing, Progressing     Problem: Respiratory Compromise (Pneumonia)  Goal: Effective Oxygenation and Ventilation  Outcome: Ongoing, Progressing     Problem: Fall Injury Risk  Goal: Absence of Fall and Fall-Related Injury  Outcome: Ongoing, Progressing

## 2024-04-17 NOTE — H&P
[facility] Medicine History & Physical Examination       Patient Name: Miguel Angel Oliveros  MRN: [id number]  Patient Class: IP- Inpatient   Admission Date: 4/17/2024   Admitting Physician: LEROY Service   Length of Stay: 0  Attending Physician: Kiara Davenport MD  Primary Care Provider: Barrett Lopez MD  Face-to-Face encounter date: 04/17/2024  Code Status:  DNR  Chief Complaint: Fever (Daughter reports poor appetite x3 days with fever. Gave tylenol and 5mg morphine given 1 hour ago)        Patient information was obtained from patient, patient's family, past medical records and ER records.  ED records were reviewed in detail and documented below    HISTORY OF PRESENT ILLNESS:   Miguel Angel Oliveros is a 89 y.o. female who  has a past medical history of Anemia, iron deficiency (02/12/2023), Anxiety disorder, Aortic stenosis, Arthritis, Bladder cancer (02/12/2023), Cancer, Carpal tunnel syndrome, Coronary artery disease, Dementia, Depression, Diabetes mellitus, GERD (gastroesophageal reflux disease), rectal polypectomy, Hypercholesterolemia, Hypertension, Obstructive sleep apnea, Respiratory distress, S/P TAVR (transcatheter aortic valve replacement), and TIA (transient ischemic attack)..     89 years old female history of bladder cancer presented to emergency room by EMS secondary to a fever and shortness for breath for 1 day     The patient is on hospice and living with the daughter, daughter noticed this morning around 1:00 a.m., patient has fever and shortness for breath     No nausea, no vomiting, no diarrhea, daughter called EMS     At emergency room, WBC 24, hemoglobin 7.5, albumin 1.6, lactic acid 2.6     Chest x-ray shows bilateral pneumonia     The patient was admitted to the hospital for severe sepsis secondary to pneumonia  PAST MEDICAL HISTORY:     Past Medical History:   Diagnosis Date    Anemia, iron deficiency 02/12/2023    Anxiety disorder     Aortic  stenosis     Arthritis     Bladder cancer 02/12/2023    Cancer     Carpal tunnel syndrome     Coronary artery disease     Dementia     Depression     Diabetes mellitus     GERD (gastroesophageal reflux disease)     Hx of rectal polypectomy     Hypercholesterolemia     Hypertension     Obstructive sleep apnea     Respiratory distress     S/P TAVR (transcatheter aortic valve replacement)     TIA (transient ischemic attack)        PAST SURGICAL HISTORY:     Past Surgical History:   Procedure Laterality Date    CARDIAC SURGERY      CATARACT EXTRACTION      CORONARY STENT PLACEMENT      EGD, WITH CLOSED BIOPSY  12/28/2022    Procedure: EGD, WITH CLOSED BIOPSY;  Surgeon: Arcadio Carias MD;  Location: [facility] ENDOSCOPY;  Service: Gastroenterology;;    ESOPHAGOGASTRODUODENOSCOPY W/ PEG N/A 12/28/2022    Procedure: PEG;  Surgeon: Arcadio Carias MD;  Location: [facility] ENDOSCOPY;  Service: Gastroenterology;  Laterality: N/A;    HYSTERECTOMY      INSERTION OF INFERIOR VENA CAVAL FILTER N/A 3/1/2023    Procedure: Insertion, Filter, Inferior Vena Cava;  Surgeon: Terri Urbina MD;  Location: [facility] CATH LAB;  Service: Peripheral Vascular;  Laterality: N/A;  IVC FILTER PLACEMENT    INSERTION OF PACEMAKER      PARTIAL HYSTERECTOMY      PULMONARY EMBOLISM SURGERY N/A 12/21/2022    Procedure: EMBOLECTOMY, ARTERY, PULMONARY;  Surgeon: Terri Urbina MD;  Location: [facility] CATH LAB;  Service: Peripheral Vascular;  Laterality: N/A;    RECTAL POLYPECTOMY      TONSILLECTOMY      TRANSCATHETER AORTIC VALVE REPLACEMENT (TAVR)         ALLERGIES:   Statins-hmg-coa reductase inhibitors, Bimatoprost, Gabapentin, Lisinopril, Rosuvastatin, and Tafluprost (pf)    FAMILY HISTORY:   Reviewed and negative    SOCIAL HISTORY:     Social History     Tobacco Use    Smoking status: Never    Smokeless tobacco: Never   Substance Use Topics    Alcohol use: Never        HOME MEDICATIONS:     Prior to Admission medications    Medication Sig Start  Date End Date Taking? Authorizing Provider   MARYLIN CHEWABLE ASPIRIN 81 mg Chew Take 81 mg by mouth once daily. 4/4/24  Yes Provider, Historical   bisacodyL (DULCOLAX) 10 mg Supp Place 10 mg rectally daily as needed. 4/12/24  Yes Provider, Historical   ciprofloxacin HCl (CIPRO) 500 MG tablet Take 500 mg by mouth 2 (two) times daily. 4/14/24  Yes Provider, Historical   fentaNYL (DURAGESIC) 12 mcg/hr PT72 Place 1 patch onto the skin Every 3 (three) days. 3/28/24  Yes Provider, Historical   hyoscyamine (LEVSIN) 0.125 mg Subl Place 0.125 mg under the tongue every 4 (four) hours as needed. 3/23/24  Yes Provider, Historical   isosorbide mononitrate (IMDUR) 30 MG 24 hr tablet Take 30 mg by mouth once daily. 4/11/23  Yes Provider, Historical   morphine 100 mg/5 mL (20 mg/mL) concentrated solution Take 5 mg by mouth every 4 (four) hours as needed for Pain. 3/4/24  Yes Provider, Historical   nitroGLYCERIN (NITROSTAT) 0.4 MG SL tablet Nitrostat 0.4 mg sublingual tablet, [RxNorm: 634065] 3/6/23  Yes Provider, Historical   ondansetron (ZOFRAN) 4 MG tablet Take 4 mg by mouth daily as needed. 3/6/23  Yes Provider, Historical   isosorbide dinitrate (ISORDIL) 30 MG Tab Take 30 mg by mouth once daily. 3/27/24 4/17/24 Yes Provider, Historical   acetaminophen (TYLENOL) 325 MG tablet Take 2 tablets (650 mg total) by mouth every 6 (six) hours as needed. 11/11/23   Barrett Lopez MD   apixaban (ELIQUIS) 5 mg Tab Take 1 tablet (5 mg total) by mouth 2 (two) times daily. 1/6/23   Handy Balderrama MD   ascorbic acid, vitamin C, (VITAMIN C) 1000 MG tablet Take 1,000 mg by mouth once daily.    Provider, Historical   carvediloL (COREG) 25 MG tablet Take 6.25 mg by mouth 2 (two) times daily. 5/4/22   Provider, Historical   cloNIDine 0.2 mg/24 hr td ptwk (CATAPRES) 0.2 mg/24 hr Place 1 patch onto the skin every 7 days. 3/1/24 3/1/25  Vel Woodruff MD   linaCLOtide (LINZESS) 145 mcg Cap capsule Take 145 mcg by mouth daily as  needed.    Provider, Historical   LORazepam (ATIVAN) 1 MG tablet Take 1 tablet by mouth every 6 (six) hours as needed.    Provider, Historical   magnesium oxide (MAG-OX) 400 mg (241.3 mg magnesium) tablet Take 1 tablet by mouth every morning.    Provider, Historical   memantine (NAMENDA) 10 MG Tab Take 1 tablet (10 mg total) by mouth 2 (two) times daily. 2/12/23 2/12/24  Barrett Lopez MD   pantoprazole (PROTONIX) 40 MG tablet Take 1 tablet by mouth every morning.    Provider, Historical   potassium gluconate 550 mg (90 mg) Tab Take 1 tablet by mouth once daily.    Provider, Historical   QUEtiapine (SEROQUEL) 25 MG Tab Take 25 mg by mouth 2 (two) times daily. 5/6/22   Provider, Historical   sertraline (ZOLOFT) 50 MG tablet Take 1 tablet by mouth every morning.    Provider, Historical   atorvastatin (LIPITOR) 40 MG tablet Take 40 mg by mouth once daily. 5/4/22 4/17/24  Provider, Historical   dorzolamide-timolol 2-0.5% (COSOPT) 22.3-6.8 mg/mL ophthalmic solution Place 1 drop into both eyes once daily. 12/11/22 4/17/24  Provider, Historical   hyoscyamine (ANASPAZ,LEVSIN) 0.125 mg Tab TAKE 1 TABLET BY MOUTH EVERY 4 HOURS AS NEEDED FOR BLADDER PAIN 9/18/23 4/17/24  Provider, Historical   isosorbide mononitrate (IMDUR) 30 MG 24 hr tablet 15 mg.  4/17/24  Provider, Historical   linaCLOtide (LINZESS) 145 mcg Cap capsule Take 1 capsule by mouth every morning.  4/17/24  Provider, Historical   LORazepam (ATIVAN) 1 MG tablet Take 1 mg by mouth every 6 (six) hours as needed for Anxiety.  4/17/24  Provider, Historical   magnesium oxide 400 mg magnesium Cap Take 1 tablet by mouth once daily. 3/6/23 4/17/24  Provider, Historical   methenamine-sodium biphosphonate (UROQUID) 500-500 mg per tablet Take 1 tablet by mouth every morning.  4/17/24  Provider, Historical   multivit-min-iron-FA-lutein (CENTRUM SILVER WOMEN) 8 mg iron-400 mcg-300 mcg Tab Take 1 Act by mouth once daily. 12/11/22 4/17/24  Provider, Historical    nitroGLYCERIN (NITROSTAT) 0.4 MG SL tablet Place 0.4 mg under the tongue every 5 (five) minutes as needed for Chest pain.  4/17/24  Provider, Historical   ondansetron (ZOFRAN) 4 MG tablet Take 4 mg by mouth every 8 (eight) hours as needed for Nausea.  4/17/24  Provider, Historical   pantoprazole (PROTONIX) 40 MG tablet Take 1 tablet (40 mg total) by mouth once daily. 2/13/23 4/17/24  Barrett Lopez MD   senna (SENOKOT) 8.6 mg tablet Take 1 tablet by mouth daily as needed for Constipation. 2/12/23 4/17/24  Barrett Lopez MD   sertraline (ZOLOFT) 50 MG tablet Take 50 mg by mouth once daily.  4/17/24  Provider, Historical       REVIEW OF SYSTEMS:   Except as documented, all other systems reviewed and negative     PHYSICAL EXAM:     VITAL SIGNS: 24 HRS MIN & MAX LAST   Temp  Min: 97.9 °F (36.6 °C)  Max: 101.1 °F (38.4 °C) 97.9 °F (36.6 °C)   BP  Min: 119/66  Max: 161/77 (!) 161/77   Pulse  Min: 108  Max: 133  (!) 115   Resp  Min: 18  Max: 28 18   SpO2  Min: 86 %  Max: 95 % (!) 86 %     General appearance:  Demented and nonverbal  HENT: Atraumatic head. Moist mucous membranes of oral cavity.  Eyes: Normal extraocular movements.   Neck: Supple.   Lungs:  Bilateral crackles  Heart: Regular rate and rhythm. S1 and S2 present with no murmurs/gallop/rub. No pedal edema. No JVD present.   Abdomen: Soft, non-distended, non-tender. No rebound tenderness/guarding. Bowel sounds are normal.   Extremities: No cyanosis, clubbing, or edema.  Skin: No Rash.   Neuro:  Nonverbal  Psych/mental status:  Nonverbal    LABS AND IMAGING:     Recent Labs   Lab 04/17/24  0304   WBC 24.53*   RBC 2.84*   HGB 7.5*   HCT 24.6*   MCV 86.6   MCH 26.4*   MCHC 30.5*   RDW 17.8*      MPV 9.8       Recent Labs   Lab 04/17/24  0304      K 3.4*   CO2 28   BUN 13.0   CREATININE 0.54*   CALCIUM 9.0   ALBUMIN 1.6*   ALKPHOS 148   ALT 9   AST 24   BILITOT 0.4       Microbiology Results (last 7 days)       Procedure Component Value  Units Date/Time    Blood culture #1 **CANNOT BE ORDERED STAT** [[id number]] Collected: 04/17/24 0351    Order Status: Resulted Specimen: Blood from Forearm, Left Updated: 04/17/24 0356    Blood culture #2 **CANNOT BE ORDERED STAT** [[id number]] Collected: 04/17/24 0348    Order Status: Sent Specimen: Blood from Antecubital, Left Updated: 04/17/24 0356             X-Ray Chest 1 View  Narrative: EXAMINATION:  XR CHEST 1 VIEW    CLINICAL HISTORY:  , Fever, unspecified.    COMPARISON:  02/22/2024    FINDINGS:  An AP view or more reveals interim opacification of the left chest space.  There is interim increasing patchy opacification of the right chest space.  The trachea is midline.  The cardiac silhouette is obscured.  A cardiac device is noted to the left chest.  A prosthetic cardiac valve is identified.  Impression: 1. Interim opacification of the left chest space and interim increasing patchy opacification of the right chest space suspicious for progressing/worsening infiltrate and pleural reaction  2. Cardiac device left chest  3. Prosthetic cardiac valve    Electronically signed by: Prabhakar Rojas  Date:    04/17/2024  Time:    09:10      ASSESSMENT & PLAN:     Severe sepsis present on admit secondary to bilateral pneumonia   Bilateral pneumonia   Bladder cancer  CAD  Dementia   Diabetes   Hypertension  Hypercholesterolemia  Obstructive sleep apnea   Status post transcatheter aortic valve replacement   TIA   Hypoalbuminemia-albumin 1.6       Continue IV Zosyn   The patient is DNR comfort measures only   Check blood culture   Check CBC BMP in a.m.      VTE Prophylaxis: will be placed on Heparin/Lovenox/ Xarelto/ SCD for DVT prophylaxis and will be advised to be as mobile as possible and sit in a chair as tolerated    Patient condition:  Stable/Fair/Guarded/ Serious/ Critical    __________________________________________________________________________  INPATIENT LIST OF MEDICATIONS     Scheduled Meds:  Current  Facility-Administered Medications   Medication Dose Route Frequency Provider Last Rate Last Admin    acetaminophen tablet 650 mg  650 mg Oral Q8H PRN Jyoti Granados MD        piperacillin-tazobactam (ZOSYN) 4.5 g in dextrose 5 % in water (D5W) 100 mL IVPB (MB+)  4.5 g Intravenous Q8H Jyoti Granados MD   Stopped at 04/17/24 0841    sodium chloride 0.9% flush 10 mL  10 mL Intravenous Q8H Jyoti Granados MD   10 mL at 04/17/24 0924     Facility-Administered Medications Ordered in Other Encounters   Medication Dose Route Frequency Provider Last Rate Last Admin    betamethasone acetate-betamethasone sodium phosphate injection 6 mg  6 mg Intra-articular  BouyZina, FNP   6 mg at 06/01/22 1345    betamethasone acetate-betamethasone sodium phosphate injection 6 mg  6 mg Intra-articular  BoZina hein, FNP   6 mg at 06/01/22 1345    LIDOcaine (PF) 20 mg/mL (2%) injection 5 mL  5 mL   Zina Weston, FNP   5 mL at 06/01/22 1345    LIDOcaine (PF) 20 mg/mL (2%) injection 5 mL  5 mL   Zina Weston, FNP   5 mL at 12/28/22 1054     Continuous Infusions:  Current Facility-Administered Medications   Medication Dose Route Frequency Provider Last Rate Last Admin    acetaminophen tablet 650 mg  650 mg Oral Q8H PRN Jyoti Granados MD        piperacillin-tazobactam (ZOSYN) 4.5 g in dextrose 5 % in water (D5W) 100 mL IVPB (MB+)  4.5 g Intravenous Q8H Jyoti Granados MD   Stopped at 04/17/24 0841    sodium chloride 0.9% flush 10 mL  10 mL Intravenous Q8H Jyoti Granados MD   10 mL at 04/17/24 0924     Facility-Administered Medications Ordered in Other Encounters   Medication Dose Route Frequency Provider Last Rate Last Admin    betamethasone acetate-betamethasone sodium phosphate injection 6 mg  6 mg Intra-articular  BoZina hein, FNP   6 mg at 06/01/22 1345    betamethasone acetate-betamethasone sodium phosphate injection 6 mg  6 mg Intra-articular  BouyZina, NEERAJ   6 mg at 06/01/22 3810     LIDOcaine (PF) 20 mg/mL (2%) injection 5 mL  5 mL   Zina Weston, FNP   5 mL at 06/01/22 1345    LIDOcaine (PF) 20 mg/mL (2%) injection 5 mL  5 mL   Zina Weston, FNP   5 mL at 12/28/22 1054     PRN Meds:.  Current Facility-Administered Medications   Medication Dose Route Frequency Provider Last Rate Last Admin    acetaminophen tablet 650 mg  650 mg Oral Q8H PRN Jyoti Granados MD        piperacillin-tazobactam (ZOSYN) 4.5 g in dextrose 5 % in water (D5W) 100 mL IVPB (MB+)  4.5 g Intravenous Q8H Jyoti Granados MD   Stopped at 04/17/24 0841    sodium chloride 0.9% flush 10 mL  10 mL Intravenous Q8H Jyoti Granados MD   10 mL at 04/17/24 0924     Facility-Administered Medications Ordered in Other Encounters   Medication Dose Route Frequency Provider Last Rate Last Admin    betamethasone acetate-betamethasone sodium phosphate injection 6 mg  6 mg Intra-articular  Zina Weston, FNP   6 mg at 06/01/22 1345    betamethasone acetate-betamethasone sodium phosphate injection 6 mg  6 mg Intra-articular  MasonuyZina, FNP   6 mg at 06/01/22 1345    LIDOcaine (PF) 20 mg/mL (2%) injection 5 mL  5 mL   Zina Weston, FNP   5 mL at 06/01/22 1345    LIDOcaine (PF) 20 mg/mL (2%) injection 5 mL  5 mL   Zina Weston, FNP   5 mL at 12/28/22 1054         I, _ NP/PA have reviewed and discussed the case with  _   Please see the following addendum for further assessment and plan from there attending MD.    04/17/2024    ________________________________________________________________________________    MD Addendum:  Dr. HEMANTH ---assumed care of this patient today at ---am/pm  For the patient encounter, I performed the substantive portion of the visit, I reviewed the NP/PA documentation, treatment plan, and medical decision making.  I had face to face time with this patient     A. History:    B. Physical exam:    C. Medical decision making:    Discharge Planning and Disposition: No mobility needs. Ambulating  well. Good social support system.   Anticipated discharge    If patient was admitted under observational status it is with my approval/permission.        All diagnosis and differential diagnosis have been reviewed; assessment and plan has been documented; I have personally reviewed the labs and test results that are presently available; I have reviewed the patients medication list; I have reviewed the consulting providers response and recommendations. I have reviewed or attempted to review medical records based upon their availability.    All of the patient and family questions have been addressed and answered. Patient's is agreeable to the above stated plan. I will continue to monitor closely and make adjustments to medical management as needed.    If patient was admitted under observational status it is with my approval/permission.      Kiara Davenport MD   04/17/2024

## 2024-04-18 NOTE — NURSING
This nurse was called into patients room by staff who stated patient was no longer breathing. After this nurse listened and the charge nurse listen no heart beat was noted by both and patient was not breathing. Dr. Davenport notified of patient's status and is en route to hospital. Family is unsure of  home choice at this time and is currently debating and will let staff know when decision is made. DARSHAN to be contacted after death is pronounced.

## 2024-04-18 NOTE — DISCHARGE SUMMARY
Hospital Medicine discharge summary      Patient Name: Miguel Angel Oliveros  MRN: [id number]  Patient Class: IP- Inpatient   Admission Date: 4/17/2024   Admitting Physician: LEROY Service   Length of Stay: 1  Attending Physician: Kiara Davenport MD  Primary Care Provider: Barrett Lopez MD  Face-to-Face encounter date: 04/18/2024  Code Status:  DNR  Chief Complaint: Fever (Daughter reports poor appetite x3 days with fever. Gave tylenol and 5mg morphine given 1 hour ago)        Patient information was obtained from patient, patient's family, past medical records and ER records.  ED records were reviewed in detail and documented below    HISTORY OF PRESENT ILLNESS:   Miguel Angel Oliveros is a 89 y.o. female who  has a past medical history of Anemia, iron deficiency (02/12/2023), Anxiety disorder, Aortic stenosis, Arthritis, Bladder cancer (02/12/2023), Cancer, Carpal tunnel syndrome, Coronary artery disease, Dementia, Depression, Diabetes mellitus, GERD (gastroesophageal reflux disease), rectal polypectomy, Hypercholesterolemia, Hypertension, Obstructive sleep apnea, Respiratory distress, S/P TAVR (transcatheter aortic valve replacement), and TIA (transient ischemic attack)..     89 years old female history of bladder cancer presented to emergency room by EMS secondary to a fever and shortness for breath for 1 day     The patient is on hospice and living with the daughter, daughter noticed this morning around 1:00 a.m., patient has fever and shortness for breath     No nausea, no vomiting, no diarrhea, daughter called EMS     At emergency room, WBC 24, hemoglobin 7.5, albumin 1.6, lactic acid 2.6     Chest x-ray shows bilateral pneumonia     The patient was admitted to the hospital for severe sepsis secondary to pneumonia    April 17, 2024, pneumonia was treated with IV Zosyn, I had a long conversation with the son about poor prognosis , patient was on DNR and comfort measures only    April 18, 2024, around  8:00 a.m., the patient   PAST MEDICAL HISTORY:     Past Medical History:   Diagnosis Date    Anemia, iron deficiency 2023    Anxiety disorder     Aortic stenosis     Arthritis     Bladder cancer 2023    Cancer     Carpal tunnel syndrome     Coronary artery disease     Dementia     Depression     Diabetes mellitus     GERD (gastroesophageal reflux disease)     Hx of rectal polypectomy     Hypercholesterolemia     Hypertension     Obstructive sleep apnea     Respiratory distress     S/P TAVR (transcatheter aortic valve replacement)     TIA (transient ischemic attack)        PAST SURGICAL HISTORY:     Past Surgical History:   Procedure Laterality Date    CARDIAC SURGERY      CATARACT EXTRACTION      CORONARY STENT PLACEMENT      EGD, WITH CLOSED BIOPSY  2022    Procedure: EGD, WITH CLOSED BIOPSY;  Surgeon: Arcadio Carias MD;  Location: [facility] ENDOSCOPY;  Service: Gastroenterology;;    ESOPHAGOGASTRODUODENOSCOPY W/ PEG N/A 2022    Procedure: PEG;  Surgeon: Arcadio Carias MD;  Location: [facility] ENDOSCOPY;  Service: Gastroenterology;  Laterality: N/A;    HYSTERECTOMY      INSERTION OF INFERIOR VENA CAVAL FILTER N/A 3/1/2023    Procedure: Insertion, Filter, Inferior Vena Cava;  Surgeon: Terri Urbina MD;  Location: [facility] CATH LAB;  Service: Peripheral Vascular;  Laterality: N/A;  IVC FILTER PLACEMENT    INSERTION OF PACEMAKER      PARTIAL HYSTERECTOMY      PULMONARY EMBOLISM SURGERY N/A 2022    Procedure: EMBOLECTOMY, ARTERY, PULMONARY;  Surgeon: Terri Urbina MD;  Location: [facility] CATH LAB;  Service: Peripheral Vascular;  Laterality: N/A;    RECTAL POLYPECTOMY      TONSILLECTOMY      TRANSCATHETER AORTIC VALVE REPLACEMENT (TAVR)         ALLERGIES:   Statins-hmg-coa reductase inhibitors, Bimatoprost, Gabapentin, Lisinopril, Rosuvastatin, and Tafluprost (pf)    FAMILY HISTORY:   Reviewed and negative    SOCIAL HISTORY:     Social History     Tobacco Use    Smoking status:  Never    Smokeless tobacco: Never   Substance Use Topics    Alcohol use: Never        HOME MEDICATIONS:     Prior to Admission medications    Medication Sig Start Date End Date Taking? Authorizing Provider   MARYLIN CHEWABLE ASPIRIN 81 mg Chew Take 81 mg by mouth once daily. 4/4/24  Yes Provider, Historical   bisacodyL (DULCOLAX) 10 mg Supp Place 10 mg rectally daily as needed. 4/12/24  Yes Provider, Historical   ciprofloxacin HCl (CIPRO) 500 MG tablet Take 500 mg by mouth 2 (two) times daily. 4/14/24  Yes Provider, Historical   fentaNYL (DURAGESIC) 12 mcg/hr PT72 Place 1 patch onto the skin Every 3 (three) days. 3/28/24  Yes Provider, Historical   hyoscyamine (LEVSIN) 0.125 mg Subl Place 0.125 mg under the tongue every 4 (four) hours as needed. 3/23/24  Yes Provider, Historical   isosorbide mononitrate (IMDUR) 30 MG 24 hr tablet Take 30 mg by mouth once daily. 4/11/23  Yes Provider, Historical   morphine 100 mg/5 mL (20 mg/mL) concentrated solution Take 5 mg by mouth every 4 (four) hours as needed for Pain. 3/4/24  Yes Provider, Historical   nitroGLYCERIN (NITROSTAT) 0.4 MG SL tablet Nitrostat 0.4 mg sublingual tablet, [RxNorm: 293054] 3/6/23  Yes Provider, Historical   ondansetron (ZOFRAN) 4 MG tablet Take 4 mg by mouth daily as needed. 3/6/23  Yes Provider, Historical   isosorbide dinitrate (ISORDIL) 30 MG Tab Take 30 mg by mouth once daily. 3/27/24 4/17/24 Yes Provider, Historical   acetaminophen (TYLENOL) 325 MG tablet Take 2 tablets (650 mg total) by mouth every 6 (six) hours as needed. 11/11/23   Barrett Lopez MD   apixaban (ELIQUIS) 5 mg Tab Take 1 tablet (5 mg total) by mouth 2 (two) times daily. 1/6/23   Handy Balderrama MD   ascorbic acid, vitamin C, (VITAMIN C) 1000 MG tablet Take 1,000 mg by mouth once daily.    Provider, Historical   carvediloL (COREG) 25 MG tablet Take 6.25 mg by mouth 2 (two) times daily. 5/4/22   Provider, Historical   cloNIDine 0.2 mg/24 hr td ptwk (CATAPRES) 0.2 mg/24  hr Place 1 patch onto the skin every 7 days. 3/1/24 3/1/25  Vel Woodruff MD   linaCLOtide (LINZESS) 145 mcg Cap capsule Take 145 mcg by mouth daily as needed.    Provider, Historical   LORazepam (ATIVAN) 1 MG tablet Take 1 tablet by mouth every 6 (six) hours as needed.    Provider, Historical   magnesium oxide (MAG-OX) 400 mg (241.3 mg magnesium) tablet Take 1 tablet by mouth every morning.    Provider, Historical   memantine (NAMENDA) 10 MG Tab Take 1 tablet (10 mg total) by mouth 2 (two) times daily. 2/12/23 2/12/24  Barrett Lopez MD   pantoprazole (PROTONIX) 40 MG tablet Take 1 tablet by mouth every morning.    Provider, Historical   potassium gluconate 550 mg (90 mg) Tab Take 1 tablet by mouth once daily.    Provider, Historical   QUEtiapine (SEROQUEL) 25 MG Tab Take 25 mg by mouth 2 (two) times daily. 5/6/22   Provider, Historical   sertraline (ZOLOFT) 50 MG tablet Take 1 tablet by mouth every morning.    Provider, Historical   atorvastatin (LIPITOR) 40 MG tablet Take 40 mg by mouth once daily. 5/4/22 4/17/24  Provider, Historical   dorzolamide-timolol 2-0.5% (COSOPT) 22.3-6.8 mg/mL ophthalmic solution Place 1 drop into both eyes once daily. 12/11/22 4/17/24  Provider, Historical   hyoscyamine (ANASPAZ,LEVSIN) 0.125 mg Tab TAKE 1 TABLET BY MOUTH EVERY 4 HOURS AS NEEDED FOR BLADDER PAIN 9/18/23 4/17/24  Provider, Historical   isosorbide mononitrate (IMDUR) 30 MG 24 hr tablet 15 mg.  4/17/24  Provider, Historical   linaCLOtide (LINZESS) 145 mcg Cap capsule Take 1 capsule by mouth every morning.  4/17/24  Provider, Historical   LORazepam (ATIVAN) 1 MG tablet Take 1 mg by mouth every 6 (six) hours as needed for Anxiety.  4/17/24  Provider, Historical   magnesium oxide 400 mg magnesium Cap Take 1 tablet by mouth once daily. 3/6/23 4/17/24  Provider, Historical   methenamine-sodium biphosphonate (UROQUID) 500-500 mg per tablet Take 1 tablet by mouth every morning.  4/17/24  Provider, Historical    multivit-min-iron-FA-lutein (CENTRUM SILVER WOMEN) 8 mg iron-400 mcg-300 mcg Tab Take 1 Act by mouth once daily. 12/11/22 4/17/24  Provider, Historical   nitroGLYCERIN (NITROSTAT) 0.4 MG SL tablet Place 0.4 mg under the tongue every 5 (five) minutes as needed for Chest pain.  4/17/24  Provider, Historical   ondansetron (ZOFRAN) 4 MG tablet Take 4 mg by mouth every 8 (eight) hours as needed for Nausea.  4/17/24  Provider, Historical   pantoprazole (PROTONIX) 40 MG tablet Take 1 tablet (40 mg total) by mouth once daily. 2/13/23 4/17/24  Barrett Lopez MD   senna (SENOKOT) 8.6 mg tablet Take 1 tablet by mouth daily as needed for Constipation. 2/12/23 4/17/24  Barrett Lopez MD   sertraline (ZOLOFT) 50 MG tablet Take 50 mg by mouth once daily.  4/17/24  Provider, Historical       REVIEW OF SYSTEMS:   Except as documented, all other systems reviewed and negative     PHYSICAL EXAM:     VITAL SIGNS: 24 HRS MIN & MAX LAST   Temp  Min: 97.4 °F (36.3 °C)  Max: 99.6 °F (37.6 °C)  (Daughter refused)   BP  Min: 104/62  Max: 161/77 116/66   Pulse  Min: 106  Max: 115  (!) 111   Resp  Min: 18  Max: 20 20   SpO2  Min: 86 %  Max: 97 % (!) 91 %     Neck-no carotid artery pulse  Long-no breath sound  Heart-no heart sound    LABS AND IMAGING:     Recent Labs   Lab 04/17/24  0304   WBC 24.53*   RBC 2.84*   HGB 7.5*   HCT 24.6*   MCV 86.6   MCH 26.4*   MCHC 30.5*   RDW 17.8*      MPV 9.8       Recent Labs   Lab 04/17/24  0304      K 3.4*   CO2 28   BUN 13.0   CREATININE 0.54*   CALCIUM 9.0   ALBUMIN 1.6*   ALKPHOS 148   ALT 9   AST 24   BILITOT 0.4       Microbiology Results (last 7 days)       Procedure Component Value Units Date/Time    Blood culture #1 **CANNOT BE ORDERED STAT** [[id number]]  (Normal) Collected: 04/17/24 0351    Order Status: Completed Specimen: Blood from Forearm, Left Updated: 04/18/24 0900     CULTURE, BLOOD ([facility]) No Growth At 24 Hours    Blood culture #2 **CANNOT BE ORDERED STAT**  [[id number]] Collected: 24 0348    Order Status: Resulted Specimen: Blood from Antecubital, Left Updated: 24 0356             X-Ray Chest 1 View  Narrative: EXAMINATION:  XR CHEST 1 VIEW    CLINICAL HISTORY:  , Fever, unspecified.    COMPARISON:  2024    FINDINGS:  An AP view or more reveals interim opacification of the left chest space.  There is interim increasing patchy opacification of the right chest space.  The trachea is midline.  The cardiac silhouette is obscured.  A cardiac device is noted to the left chest.  A prosthetic cardiac valve is identified.  Impression: 1. Interim opacification of the left chest space and interim increasing patchy opacification of the right chest space suspicious for progressing/worsening infiltrate and pleural reaction  2. Cardiac device left chest  3. Prosthetic cardiac valve    Electronically signed by: Prabhakar Rojas  Date:    2024  Time:    09:10      ASSESSMENT & PLAN:   Discharge diagnosis  Severe sepsis present on admit secondary to bilateral pneumonia   Bilateral pneumonia   Bladder cancer  CAD  Dementia   Diabetes   Hypertension  Hypercholesterolemia  Obstructive sleep apnea   Status post transcatheter aortic valve replacement   TIA   Hypoalbuminemia-albumin 1.6   Acute hypoxic respiratory failure       Discharge condition-     Discharge ehikwtyueki-Lmfevv-ukfmnlwni time 30 minutes        VTE Prophylaxis: will be placed on Heparin/Lovenox/ Xarelto/ SCD for DVT prophylaxis and will be advised to be as mobile as possible and sit in a chair as tolerated    Patient condition:  Stable/Fair/Guarded/ Serious/ Critical    __________________________________________________________________________  INPATIENT LIST OF MEDICATIONS     Scheduled Meds:  Current Facility-Administered Medications   Medication Dose Route Frequency    piperacillin-tazobactam (Zosyn) IV (PEDS and ADULTS) (extended infusion is not appropriate)  4.5 g Intravenous Q8H    sodium  chloride 0.9%  10 mL Intravenous Q8H     Continuous Infusions:  Current Facility-Administered Medications   Medication Dose Route Frequency Last Rate Last Admin     PRN Meds:.    Current Facility-Administered Medications:     acetaminophen, 650 mg, Oral, Q8H PRN    HYDROmorphone, 1 mg, Intravenous, Q4H PRN        I, _ NP/PA have reviewed and discussed the case with  _   Please see the following addendum for further assessment and plan from there attending MD.    04/18/2024    ________________________________________________________________________________    MD Addendum:  Dr. HEMANTH ---assumed care of this patient today at ---am/pm  For the patient encounter, I performed the substantive portion of the visit, I reviewed the NP/PA documentation, treatment plan, and medical decision making.  I had face to face time with this patient     A. History:    B. Physical exam:    C. Medical decision making:    Discharge Planning and Disposition: No mobility needs. Ambulating well. Good social support system.   Anticipated discharge    If patient was admitted under observational status it is with my approval/permission.        All diagnosis and differential diagnosis have been reviewed; assessment and plan has been documented; I have personally reviewed the labs and test results that are presently available; I have reviewed the patients medication list; I have reviewed the consulting providers response and recommendations. I have reviewed or attempted to review medical records based upon their availability.    All of the patient and family questions have been addressed and answered. Patient's is agreeable to the above stated plan. I will continue to monitor closely and make adjustments to medical management as needed.    If patient was admitted under observational status it is with my approval/permission.      Kiara Davenport MD   04/18/2024

## 2024-04-22 LAB
BACTERIA BLD CULT: NORMAL
BACTERIA BLD CULT: NORMAL

## 2024-04-22 NOTE — PROGRESS NOTES
CG sent email to [facility] informing that pt passed away.  SW replied expressing condolences and offering continued support as needed.
